# Patient Record
Sex: FEMALE | Race: BLACK OR AFRICAN AMERICAN | NOT HISPANIC OR LATINO | Employment: OTHER | URBAN - METROPOLITAN AREA
[De-identification: names, ages, dates, MRNs, and addresses within clinical notes are randomized per-mention and may not be internally consistent; named-entity substitution may affect disease eponyms.]

---

## 2023-10-09 ENCOUNTER — TELEPHONE (OUTPATIENT)
Dept: ADMISSION | Facility: HOSPITAL | Age: 43
End: 2023-10-09
Payer: COMMERCIAL

## 2023-10-09 DIAGNOSIS — D57.00 SICKLE CELL DISEASE WITH CRISIS (MULTI): Primary | ICD-10-CM

## 2023-10-09 RX ORDER — DEFERASIROX 180 MG/1
3 TABLET, FILM COATED ORAL DAILY
COMMUNITY
Start: 2018-01-16 | End: 2023-10-09 | Stop reason: SDUPTHER

## 2023-10-09 RX ORDER — HYDROMORPHONE HYDROCHLORIDE 4 MG/1
4 TABLET ORAL 3 TIMES DAILY PRN
COMMUNITY
End: 2023-10-09 | Stop reason: SDUPTHER

## 2023-10-09 RX ORDER — NALOXONE HYDROCHLORIDE 4 MG/.1ML
4 SPRAY NASAL AS NEEDED
Qty: 2 EACH | Refills: 2 | Status: SHIPPED | OUTPATIENT
Start: 2023-10-09 | End: 2023-11-16 | Stop reason: WASHOUT

## 2023-10-09 RX ORDER — DEFERASIROX 180 MG/1
540 TABLET, FILM COATED ORAL DAILY
Qty: 90 TABLET | Refills: 1 | Status: SHIPPED | OUTPATIENT
Start: 2023-10-09 | End: 2023-10-17 | Stop reason: SDUPTHER

## 2023-10-09 RX ORDER — METHADONE HYDROCHLORIDE 5 MG/1
5 TABLET ORAL 2 TIMES DAILY
COMMUNITY
Start: 2023-08-29 | End: 2023-10-09 | Stop reason: SDUPTHER

## 2023-10-09 RX ORDER — METHADONE HYDROCHLORIDE 5 MG/1
5 TABLET ORAL 2 TIMES DAILY
Qty: 60 TABLET | Refills: 0 | Status: SHIPPED | OUTPATIENT
Start: 2023-10-09 | End: 2023-11-28 | Stop reason: SDUPTHER

## 2023-10-09 RX ORDER — NALOXONE HYDROCHLORIDE 4 MG/.1ML
4 SPRAY NASAL AS NEEDED
COMMUNITY
Start: 2020-09-08 | End: 2023-10-09 | Stop reason: SDUPTHER

## 2023-10-09 RX ORDER — HYDROMORPHONE HYDROCHLORIDE 4 MG/1
4 TABLET ORAL 3 TIMES DAILY PRN
Qty: 40 TABLET | Refills: 0 | Status: SHIPPED | OUTPATIENT
Start: 2023-10-09 | End: 2023-10-26 | Stop reason: SDUPTHER

## 2023-10-09 NOTE — TELEPHONE ENCOUNTER
Refill request -   Methadone  Hydromorphone 4mg  Jadenu  oxbryta  [facility]

## 2023-10-11 ENCOUNTER — SPECIALTY PHARMACY (OUTPATIENT)
Dept: PHARMACY | Facility: CLINIC | Age: 43
End: 2023-10-11

## 2023-10-11 ENCOUNTER — APPOINTMENT (OUTPATIENT)
Dept: HEMATOLOGY/ONCOLOGY | Facility: HOSPITAL | Age: 43
End: 2023-10-11
Payer: MEDICARE

## 2023-10-11 ENCOUNTER — PHARMACY VISIT (OUTPATIENT)
Dept: PHARMACY | Facility: CLINIC | Age: 43
End: 2023-10-11
Payer: MEDICARE

## 2023-10-14 PROBLEM — I72.9 ANEURYSM (CMS-HCC): Status: ACTIVE | Noted: 2023-10-14

## 2023-10-14 PROBLEM — U09.9 POST-ACUTE COVID-19 SYNDROME: Status: ACTIVE | Noted: 2023-10-14

## 2023-10-14 PROBLEM — R07.89 ATYPICAL CHEST PAIN: Status: ACTIVE | Noted: 2023-10-14

## 2023-10-14 PROBLEM — H40.003 GLAUCOMA SUSPECT OF BOTH EYES: Status: ACTIVE | Noted: 2023-10-14

## 2023-10-14 PROBLEM — D68.1: Status: ACTIVE | Noted: 2023-10-14

## 2023-10-14 PROBLEM — M43.6 TORTICOLLIS: Status: ACTIVE | Noted: 2023-10-14

## 2023-10-14 PROBLEM — R06.02 SHORTNESS OF BREATH ON EXERTION: Status: ACTIVE | Noted: 2023-10-14

## 2023-10-14 PROBLEM — H52.13 MYOPIA OF BOTH EYES WITH ASTIGMATISM: Status: ACTIVE | Noted: 2023-10-14

## 2023-10-14 PROBLEM — N95.8: Status: ACTIVE | Noted: 2023-10-14

## 2023-10-14 PROBLEM — K21.9 GERD (GASTROESOPHAGEAL REFLUX DISEASE): Status: ACTIVE | Noted: 2023-10-14

## 2023-10-14 PROBLEM — M87.00 AVN (AVASCULAR NECROSIS OF BONE) (MULTI): Status: ACTIVE | Noted: 2023-10-14

## 2023-10-14 PROBLEM — H52.203 MYOPIA OF BOTH EYES WITH ASTIGMATISM: Status: ACTIVE | Noted: 2023-10-14

## 2023-10-14 PROBLEM — D64.9 ANEMIA: Status: ACTIVE | Noted: 2023-10-14

## 2023-10-14 PROBLEM — N93.9 ABNORMAL UTERINE BLEEDING: Status: ACTIVE | Noted: 2023-10-14

## 2023-10-14 PROBLEM — N63.23 MASS OF LOWER OUTER QUADRANT OF LEFT BREAST: Status: ACTIVE | Noted: 2023-10-14

## 2023-10-14 PROBLEM — N94.89 ADNEXAL MASS: Status: ACTIVE | Noted: 2023-10-14

## 2023-10-14 PROBLEM — D57.00 SICKLE-CELL CRISIS (MULTI): Status: ACTIVE | Noted: 2023-10-14

## 2023-10-14 PROBLEM — E04.1 THYROID NODULE: Status: ACTIVE | Noted: 2023-10-14

## 2023-10-14 PROBLEM — G43.009 MIGRAINE WITHOUT AURA AND WITHOUT STATUS MIGRAINOSUS, NOT INTRACTABLE: Status: ACTIVE | Noted: 2023-10-14

## 2023-10-14 PROBLEM — K02.63 DENTAL CARIES ON SMOOTH SURFACE PENETRATING INTO PULP: Status: ACTIVE | Noted: 2023-10-14

## 2023-10-14 PROBLEM — R19.00 PELVIC MASS IN FEMALE: Status: ACTIVE | Noted: 2023-10-14

## 2023-10-14 PROBLEM — G47.30 SLEEP APNEA: Status: ACTIVE | Noted: 2023-10-14

## 2023-10-14 PROBLEM — H52.7 REFRACTIVE ERROR: Status: ACTIVE | Noted: 2023-10-14

## 2023-10-14 PROBLEM — E04.2 MULTINODULAR GOITER (NONTOXIC): Status: ACTIVE | Noted: 2023-10-14

## 2023-10-14 PROBLEM — G47.34 NOCTURNAL HYPOXIA: Status: ACTIVE | Noted: 2023-10-14

## 2023-10-14 PROBLEM — N94.89: Status: ACTIVE | Noted: 2023-10-14

## 2023-10-14 PROBLEM — R30.0 DYSURIA: Status: ACTIVE | Noted: 2023-10-14

## 2023-10-14 PROBLEM — F32.9 MDD (MAJOR DEPRESSIVE DISORDER): Status: ACTIVE | Noted: 2023-10-14

## 2023-10-14 PROBLEM — F41.9 ANXIETY: Status: ACTIVE | Noted: 2023-10-14

## 2023-10-14 PROBLEM — D57.1: Status: ACTIVE | Noted: 2023-10-14

## 2023-10-14 PROBLEM — R93.1 ABNORMAL ECHOCARDIOGRAM: Status: ACTIVE | Noted: 2023-10-14

## 2023-10-14 PROBLEM — D57.1 HOMOZYGOUS SICKLE CELL (SS) DISEASE (MULTI): Status: ACTIVE | Noted: 2023-10-14

## 2023-10-14 PROBLEM — D57.00 HEMOGLOBIN SS DISEASE WITH CRISIS (MULTI): Status: ACTIVE | Noted: 2023-10-14

## 2023-10-14 PROBLEM — Z91.148 NONCOMPLIANCE WITH MEDICATION REGIMEN: Status: ACTIVE | Noted: 2023-10-14

## 2023-10-14 RX ORDER — VERAPAMIL HYDROCHLORIDE 180 MG/1
180 TABLET, FILM COATED, EXTENDED RELEASE ORAL 2 TIMES DAILY
COMMUNITY
End: 2023-11-16 | Stop reason: WASHOUT

## 2023-10-14 RX ORDER — ACETAMINOPHEN 500 MG
650 TABLET ORAL EVERY 6 HOURS PRN
COMMUNITY
Start: 2020-05-14 | End: 2023-11-16 | Stop reason: WASHOUT

## 2023-10-14 RX ORDER — HYDROXYZINE HYDROCHLORIDE 25 MG/1
25 TABLET, FILM COATED ORAL 2 TIMES DAILY PRN
COMMUNITY
End: 2023-11-16 | Stop reason: WASHOUT

## 2023-10-14 RX ORDER — GABAPENTIN 100 MG/1
300 CAPSULE ORAL 3 TIMES DAILY
COMMUNITY
End: 2023-11-16 | Stop reason: WASHOUT

## 2023-10-14 RX ORDER — MELOXICAM 15 MG/1
15 TABLET ORAL DAILY
COMMUNITY
Start: 2023-09-17 | End: 2024-04-22 | Stop reason: SDUPTHER

## 2023-10-14 RX ORDER — PROCHLORPERAZINE MALEATE 10 MG
10 TABLET ORAL EVERY 4 HOURS PRN
COMMUNITY
Start: 2023-09-22 | End: 2023-11-16 | Stop reason: WASHOUT

## 2023-10-14 RX ORDER — HYDROXYUREA 500 MG/1
1000 CAPSULE ORAL EVERY OTHER DAY
COMMUNITY
End: 2023-11-16 | Stop reason: WASHOUT

## 2023-10-14 RX ORDER — TIZANIDINE 2 MG/1
2-4 TABLET ORAL EVERY 8 HOURS PRN
COMMUNITY
Start: 2023-08-14 | End: 2023-11-20 | Stop reason: SDUPTHER

## 2023-10-14 RX ORDER — DOCUSATE SODIUM 100 MG/1
100 CAPSULE, LIQUID FILLED ORAL 2 TIMES DAILY PRN
COMMUNITY
Start: 2016-08-17 | End: 2023-11-16 | Stop reason: WASHOUT

## 2023-10-14 RX ORDER — CYCLOBENZAPRINE HCL 10 MG
10 TABLET ORAL 3 TIMES DAILY PRN
COMMUNITY
End: 2023-11-16 | Stop reason: WASHOUT

## 2023-10-14 RX ORDER — SERTRALINE HYDROCHLORIDE 50 MG/1
75 TABLET, FILM COATED ORAL DAILY
COMMUNITY
End: 2023-12-14 | Stop reason: SDUPTHER

## 2023-10-14 RX ORDER — DICLOFENAC SODIUM 10 MG/G
4 GEL TOPICAL 4 TIMES DAILY PRN
COMMUNITY
Start: 2023-09-22 | End: 2023-10-17 | Stop reason: SDUPTHER

## 2023-10-14 RX ORDER — CHOLECALCIFEROL (VITAMIN D3) 25 MCG
25 TABLET ORAL DAILY
COMMUNITY
Start: 2023-01-12 | End: 2024-04-22 | Stop reason: SDUPTHER

## 2023-10-14 RX ORDER — FOLIC ACID 1 MG/1
1 TABLET ORAL DAILY
COMMUNITY
Start: 2015-05-08 | End: 2023-11-16 | Stop reason: WASHOUT

## 2023-10-14 RX ORDER — OMEPRAZOLE 40 MG/1
40 CAPSULE, DELAYED RELEASE ORAL
COMMUNITY
Start: 2021-06-17 | End: 2023-11-16 | Stop reason: WASHOUT

## 2023-10-14 RX ORDER — CALCIUM CARBONATE 200(500)MG
TABLET,CHEWABLE ORAL
COMMUNITY
Start: 2017-04-12

## 2023-10-14 RX ORDER — TRAZODONE HYDROCHLORIDE 100 MG/1
100-200 TABLET ORAL NIGHTLY PRN
COMMUNITY
End: 2023-11-16 | Stop reason: SDUPTHER

## 2023-10-14 RX ORDER — MULTIVIT-MIN/IRON FUM/FOLIC AC 7.5 MG-4
1 TABLET ORAL DAILY
COMMUNITY
End: 2023-11-16 | Stop reason: WASHOUT

## 2023-10-14 RX ORDER — CAPSAICIN 0.03 G/100G
CREAM TOPICAL
COMMUNITY
Start: 2018-01-25

## 2023-10-14 RX ORDER — LORATADINE 10 MG/1
1 TABLET ORAL DAILY
COMMUNITY
Start: 2017-03-06

## 2023-10-14 RX ORDER — TALC
6 POWDER (GRAM) TOPICAL NIGHTLY PRN
COMMUNITY
Start: 2015-09-07

## 2023-10-14 RX ORDER — SENNOSIDES 8.6 MG
1 TABLET ORAL 2 TIMES DAILY PRN
COMMUNITY
End: 2023-12-18 | Stop reason: SDUPTHER

## 2023-10-14 RX ORDER — IBUPROFEN 800 MG/1
800 TABLET ORAL EVERY 8 HOURS PRN
COMMUNITY
Start: 2023-03-25 | End: 2023-12-18 | Stop reason: SDUPTHER

## 2023-10-14 RX ORDER — UBROGEPANT 50 MG/1
1 TABLET ORAL DAILY PRN
COMMUNITY
Start: 2020-08-13 | End: 2023-11-16 | Stop reason: WASHOUT

## 2023-10-14 RX ORDER — MULTIVIT-MIN/IRON/FOLIC ACID/K 18-600-40
1 CAPSULE ORAL DAILY
COMMUNITY
Start: 2019-12-31 | End: 2023-11-16 | Stop reason: WASHOUT

## 2023-10-14 RX ORDER — NORETHINDRONE 5 MG/1
5 TABLET ORAL DAILY
COMMUNITY
End: 2023-11-26 | Stop reason: SDUPTHER

## 2023-10-14 RX ORDER — LIDOCAINE HCL 4 G/100G
CREAM TOPICAL
COMMUNITY
Start: 2018-01-25

## 2023-10-16 ENCOUNTER — APPOINTMENT (OUTPATIENT)
Dept: HEMATOLOGY/ONCOLOGY | Facility: HOSPITAL | Age: 43
End: 2023-10-16
Payer: COMMERCIAL

## 2023-10-17 ENCOUNTER — DOCUMENTATION (OUTPATIENT)
Dept: HEMATOLOGY/ONCOLOGY | Facility: HOSPITAL | Age: 43
End: 2023-10-17

## 2023-10-17 ENCOUNTER — TELEMEDICINE (OUTPATIENT)
Dept: HEMATOLOGY/ONCOLOGY | Facility: HOSPITAL | Age: 43
End: 2023-10-17
Payer: MEDICARE

## 2023-10-17 DIAGNOSIS — D57.00 SICKLE CELL DISEASE WITH CRISIS (MULTI): ICD-10-CM

## 2023-10-17 DIAGNOSIS — R51.9 HEADACHE DISORDER: Primary | ICD-10-CM

## 2023-10-17 PROCEDURE — 99213 OFFICE O/P EST LOW 20 MIN: CPT | Mod: 95 | Performed by: INTERNAL MEDICINE

## 2023-10-17 PROCEDURE — 99213 OFFICE O/P EST LOW 20 MIN: CPT | Performed by: INTERNAL MEDICINE

## 2023-10-17 RX ORDER — DICLOFENAC SODIUM 10 MG/G
4 GEL TOPICAL 4 TIMES DAILY PRN
Qty: 1000 G | Refills: 3 | Status: SHIPPED | OUTPATIENT
Start: 2023-10-17 | End: 2023-10-26 | Stop reason: SDUPTHER

## 2023-10-17 RX ORDER — DEFERASIROX 180 MG/1
540 TABLET, FILM COATED ORAL DAILY
Qty: 90 TABLET | Refills: 3 | Status: SHIPPED | OUTPATIENT
Start: 2023-10-17 | End: 2023-11-16 | Stop reason: WASHOUT

## 2023-10-17 NOTE — PROGRESS NOTES
"Consent:  Verbal consent was requested and obtained from patient on this date for a telehealth visit.    Patient ID: Mary Alice Aragon is a 43 y.o. female.    Subjective    HPI  Mary Alice switched to a virtual visit today, d/t not feeling well today. She is \"spasming\"more often and feels like she is constipated. Had ketamine on the 1st at [facility], #5-6. Gets 30mg over 30 minutes, feels like she had better results when received monthly at . Has not been able to work lately d/t the spasms and pain. The spasms feel different then her sickle cell pain. Had an appt with neurology set up but has not heard back. Placed new referral today. Needs refill on voltaren gel, jadenu, and oxybryta. Ran out of oxybryta this past week, discussed rebound crisis. Some mild chest pain, varies between sickle cell pain and spasms. All other ROS negative.     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 Right frontal encephalomalacia secondary to sequela of right middle cerebral artery infarction.  History of lead poisoning with hydrocephalus status post right ventriculostomy shunt catheter, and subsequent removal.   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 She has been evaluated by Neurosurgery and is stable with no required intervention.  Low-grade squamous epithelial lesion on Pap smear in May 2011.  Pulmonary embolism in 2009 in the postpartum phase, anticoagulated for 6 months.  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   Status post cholecystectomy.   Pneumonia in 2006.   Chronic pain: Currently managed with monthly ketamine infusions with good response.   Nocturnal oxygen dependence    Objective    BSA: There is no height or weight on file to calculate BSA.  There were no vitals taken for this visit.     Physical Exam  No exam d/t virtual " visit    Performance Status:      Assessment/Plan    Oncology History    No history exists.     HbSS disease: Continue folic acid and hydroxyurea as prescribed.  Hgb 6.9, T, ari 2.6, retics .255, HbF 16.3%, . We discussed voxelotor today to help raise hgb, reduce sickling and reduce lysis labs.  Discussed MOA and side effects.  Mary Alice would like to start medication, will send enrollment to [facility]. Started oxybryta but didn't do well with nausea. She will start will taking just one pill with a meal for a month and then go to two.       Reticulocytosis: Noted, discussed wearing O2 nightly to reduce sickling.  Mary Alice had a sleep study completed which did not indicate any sleep-disordered breathing or need for CPAP titration.      Right ingrown toenail:  Referred to podiatry for evaluation, missed previous appointment, referred  Continue epsom salt soaks.    Chronic pain: Patient continues with ketamine infusions, dates have been fragmented due to staffing shortages and infusion location changes ([facility]), which have impacted scheduling.  Appointment in June 2022 was cancelled, Mary Alice will explore outside providers for ketamine infusion, she has scheduled an appointment at [facility]. Methadone 5mg BID restarted.  Has ketamine at [facility] scheduled monthly- had 3 so far.  Taking gabapentin TID- changed pills to 300mg capsule.     Recent fall:  Refer to orthopedics for evaluation of right arm pain, left knee pain, appointment upcoming.    Iron overload: Mary Alice approved for desferal infusion, ferritin today 2016.  Mary Alice will need Cardiac/Liver MRI, sent new rx and new PA to  spec- pt with high co-pay of $49 per dose of drug but will cover nursing care and supplies. Sent refill of Jadenu     Left breast lump:  Mammogram 8/2022 revealed a fibroadenoma with recommendations for repeat imaging 2/2023, biopsy 10/21/22 benign.Has repeat mammogram scheduled.      Psych: Follows with Dr. May, appointment  upcoming.  Grieving loss of best friend, father and father of her children in the last 2 years.  Referred to psych, provided resources for the children, which were sent to Mary Alice via email.  Mary Alice states that she is still sad at times, but feeling a little better.  Will continue to monitor. missed fuv, will reschedule     Vascular access:  Mary Alice is completing dental care; once dental care is up to date (cleaning and extractions/fillings completed), will refer to IR for consultation for vascular access port. would like to be reassessed for ports- referred to IR   will discuss at RTC.     Health Maintenance:   Dental:  follows with [facility], upcoming appointment has follow up coming up.    Optho: 7/2020    PCP: sees Dr. Mcdaniel, last visit 5/2021, will need follow up, Mary Alice will call to schedule.     ECHO: 2020- cardiology in 2022.    MRI: 2019   Immunizations: influenza 9/9/2021, Prevnar 11/19/2020, PPSV23 1/28/21.  Pfizer covid-19 vaccine series complete, eligible for booster if not yet completed.      Headaches/Neuropathic pain: Patient recently seen in neurology, prescribed verapamil for daily treatment, prescribed ubrelvy for abortive therapy.  Last visit 12/15/21, referred to Dr. Ray for follow up. Current pt with neuro- has fuv next week.      Abdominal pain:  Seen May 2022 in Dr. Bolanos's office, suspected endometriosis, recommended IUD contraception for long-term management; transvaginal u/s ordered for surveillance of ovarian cyst, referred to pelvic floor therapy.  Currently prescribed norethindrone by GYN.    Benign follicular cyst, patient states voice changes:  referred to ENT, seen 6/2021, omeprazole started, plan to follow up with possible scope if symptoms persist.  Mary Alice states that symptoms have returned, referred to ENT. re-referred for dysphagia- will discuss at RTC.     Neuropathy:  Socratesdonis states that this has persisted, referred.    Vitamin D deficiency:  Previously noted,  cholecalciferol 2000IU, check at RTC.    Psychosocial:  Program SW to assist with resources for Mary Alice to contact her property management company regarding restoration of electricity and gas to all parts of her home.SW to contact for support.     Integumentary:  Previously referred to dermatology for evaluation of scalp scabs and abscess at previous appointment. Pilonidal cyst- has history and current is not inflamed but painful. Stat referral for dermatology.- upcoming appt.       Diagnoses and all orders for this visit:  Headache disorder  -     Referral to Neurology; Future         MICHELLE Mercado-CNP

## 2023-10-18 ENCOUNTER — PHARMACY VISIT (OUTPATIENT)
Dept: PHARMACY | Facility: CLINIC | Age: 43
End: 2023-10-18
Payer: MEDICARE

## 2023-10-18 ENCOUNTER — SPECIALTY PHARMACY (OUTPATIENT)
Dept: PHARMACY | Facility: CLINIC | Age: 43
End: 2023-10-18

## 2023-10-18 PROCEDURE — RXMED WILLOW AMBULATORY MEDICATION CHARGE

## 2023-10-23 ENCOUNTER — PHARMACY VISIT (OUTPATIENT)
Dept: PHARMACY | Facility: CLINIC | Age: 43
End: 2023-10-23
Payer: MEDICARE

## 2023-10-23 ENCOUNTER — SPECIALTY PHARMACY (OUTPATIENT)
Dept: PHARMACY | Facility: CLINIC | Age: 43
End: 2023-10-23

## 2023-10-23 PROCEDURE — RXMED WILLOW AMBULATORY MEDICATION CHARGE

## 2023-10-26 ENCOUNTER — TELEPHONE (OUTPATIENT)
Dept: ADMISSION | Facility: HOSPITAL | Age: 43
End: 2023-10-26
Payer: COMMERCIAL

## 2023-10-26 DIAGNOSIS — D57.00 SICKLE CELL DISEASE WITH CRISIS (MULTI): ICD-10-CM

## 2023-10-26 RX ORDER — DICLOFENAC SODIUM 10 MG/G
4 GEL TOPICAL 4 TIMES DAILY PRN
Qty: 1000 G | Refills: 3 | Status: SHIPPED | OUTPATIENT
Start: 2023-10-26 | End: 2023-11-16 | Stop reason: WASHOUT

## 2023-10-26 RX ORDER — HYDROMORPHONE HYDROCHLORIDE 4 MG/1
4 TABLET ORAL 3 TIMES DAILY PRN
Qty: 40 TABLET | Refills: 0 | Status: SHIPPED | OUTPATIENT
Start: 2023-10-26 | End: 2023-11-09 | Stop reason: SDUPTHER

## 2023-10-26 NOTE — TELEPHONE ENCOUNTER
Pt requesting a refill of dilaudid 4mg TID prn, #40.  She uses [facility] on [facility] in [place].

## 2023-10-27 ENCOUNTER — TELEPHONE (OUTPATIENT)
Dept: HEMATOLOGY/ONCOLOGY | Facility: HOSPITAL | Age: 43
End: 2023-10-27
Payer: COMMERCIAL

## 2023-10-27 NOTE — TELEPHONE ENCOUNTER
Patient states pharmacy where he meds go dont have a pharmacist on duty would like her meds to be sent somewhere else.

## 2023-10-27 NOTE — TELEPHONE ENCOUNTER
Called and spoke with Mary Alice regarding her medications. She confirmed she did receive them, but would like a different [facility] added to her chart for future medications. Pharmacy added.

## 2023-11-09 ENCOUNTER — TELEPHONE (OUTPATIENT)
Dept: HEMATOLOGY/ONCOLOGY | Facility: HOSPITAL | Age: 43
End: 2023-11-09
Payer: COMMERCIAL

## 2023-11-09 DIAGNOSIS — D57.00 SICKLE CELL DISEASE WITH CRISIS (MULTI): ICD-10-CM

## 2023-11-09 RX ORDER — HYDROMORPHONE HYDROCHLORIDE 4 MG/1
4 TABLET ORAL 3 TIMES DAILY PRN
Qty: 40 TABLET | Refills: 0 | Status: SHIPPED | OUTPATIENT
Start: 2023-11-09 | End: 2023-11-28 | Stop reason: SDUPTHER

## 2023-11-10 PROBLEM — G89.29 OTHER CHRONIC PAIN: Status: ACTIVE | Noted: 2022-11-11

## 2023-11-10 PROBLEM — Z86.16 PERSONAL HISTORY OF COVID-19: Status: ACTIVE | Noted: 2022-11-11

## 2023-11-10 PROBLEM — D57.1 SICKLE CELL DISEASE WITHOUT CRISIS (MULTI): Status: ACTIVE | Noted: 2022-11-11

## 2023-11-10 PROBLEM — Z86.711 PERSONAL HISTORY OF PULMONARY EMBOLISM: Status: ACTIVE | Noted: 2022-11-11

## 2023-11-10 PROBLEM — I67.1 CEREBRAL ANEURYSM (HHS-HCC): Status: ACTIVE | Noted: 2022-11-11

## 2023-11-10 PROBLEM — F32.9 MAJOR DEPRESSIVE DISORDER, SINGLE EPISODE: Status: ACTIVE | Noted: 2022-11-11

## 2023-11-10 PROBLEM — G91.9 HYDROCEPHALUS (MULTI): Status: ACTIVE | Noted: 2022-11-11

## 2023-11-10 PROBLEM — F41.9 ANXIETY DISORDER, UNSPECIFIED: Status: ACTIVE | Noted: 2022-11-11

## 2023-11-10 PROBLEM — E55.9 VITAMIN D DEFICIENCY, UNSPECIFIED: Status: ACTIVE | Noted: 2022-11-11

## 2023-11-10 PROBLEM — F43.21 ADJUSTMENT DISORDER WITH DEPRESSED MOOD: Status: ACTIVE | Noted: 2022-11-11

## 2023-11-10 PROBLEM — G62.9 POLYNEUROPATHY: Status: ACTIVE | Noted: 2022-11-11

## 2023-11-10 PROBLEM — Z87.01 PERSONAL HISTORY OF PNEUMONIA (RECURRENT): Status: ACTIVE | Noted: 2022-11-11

## 2023-11-10 PROBLEM — L72.9 FOLLICULAR CYST OF THE SKIN AND SUBCUTANEOUS TISSUE, UNSPECIFIED: Status: ACTIVE | Noted: 2022-11-11

## 2023-11-10 PROBLEM — E83.111 HEMOCHROMATOSIS AFTER MULTIPLE RED BLOOD CELL TRANSFUSIONS: Status: ACTIVE | Noted: 2022-11-11

## 2023-11-10 PROBLEM — D64.9 ANEMIA, UNSPECIFIED: Status: ACTIVE | Noted: 2022-11-11

## 2023-11-10 PROBLEM — I69.398 OTHER SEQUELAE OF CEREBRAL INFARCTION: Status: ACTIVE | Noted: 2022-11-11

## 2023-11-10 RX ORDER — TROLAMINE SALICYLATE 10 G/100G
CREAM TOPICAL
COMMUNITY
Start: 2022-11-11

## 2023-11-10 RX ORDER — FOLIC ACID 1 MG/1
TABLET ORAL
COMMUNITY
Start: 2022-11-11

## 2023-11-10 RX ORDER — CYCLOBENZAPRINE HCL 10 MG
TABLET ORAL
COMMUNITY
Start: 2022-11-11 | End: 2024-05-15 | Stop reason: WASHOUT

## 2023-11-10 RX ORDER — PROCHLORPERAZINE MALEATE 10 MG
TABLET ORAL
COMMUNITY
Start: 2022-11-11 | End: 2024-05-21 | Stop reason: SDUPTHER

## 2023-11-10 RX ORDER — DOCUSATE SODIUM 100 MG/1
CAPSULE, LIQUID FILLED ORAL
COMMUNITY
Start: 2022-11-11

## 2023-11-10 RX ORDER — TRAZODONE HYDROCHLORIDE 50 MG/1
TABLET ORAL
COMMUNITY
Start: 2022-11-11 | End: 2023-11-16 | Stop reason: WASHOUT

## 2023-11-10 RX ORDER — NALOXONE HYDROCHLORIDE 4 MG/.1ML
SPRAY NASAL
COMMUNITY
Start: 2022-11-11

## 2023-11-10 RX ORDER — OMEPRAZOLE 40 MG/1
CAPSULE, DELAYED RELEASE ORAL
COMMUNITY
Start: 2022-11-11

## 2023-11-10 RX ORDER — HYDROXYZINE HYDROCHLORIDE 25 MG/1
TABLET, FILM COATED ORAL
COMMUNITY
Start: 2022-11-11

## 2023-11-10 RX ORDER — SERTRALINE HYDROCHLORIDE 50 MG/1
TABLET, FILM COATED ORAL
COMMUNITY
Start: 2022-11-11 | End: 2023-11-16 | Stop reason: WASHOUT

## 2023-11-10 RX ORDER — ASCORBIC ACID 500 MG
TABLET ORAL
COMMUNITY
Start: 2022-11-11

## 2023-11-10 RX ORDER — VERAPAMIL HYDROCHLORIDE 180 MG/1
TABLET, FILM COATED, EXTENDED RELEASE ORAL
COMMUNITY
Start: 2022-11-11 | End: 2024-04-22 | Stop reason: WASHOUT

## 2023-11-10 RX ORDER — DICLOFENAC SODIUM 10 MG/G
GEL TOPICAL
COMMUNITY
Start: 2022-11-11 | End: 2023-12-18 | Stop reason: SDUPTHER

## 2023-11-10 RX ORDER — GABAPENTIN 100 MG/1
CAPSULE ORAL
COMMUNITY
Start: 2022-11-11 | End: 2023-12-18 | Stop reason: SDUPTHER

## 2023-11-10 RX ORDER — DEFEROXAMINE MESYLATE 2 G/1
INJECTION, POWDER, LYOPHILIZED, FOR SOLUTION INTRAMUSCULAR; INTRAVENOUS; SUBCUTANEOUS
COMMUNITY
Start: 2022-11-11 | End: 2024-04-30 | Stop reason: SDUPTHER

## 2023-11-10 RX ORDER — UBROGEPANT 50 MG/1
TABLET ORAL
COMMUNITY
Start: 2022-11-11 | End: 2024-04-22 | Stop reason: WASHOUT

## 2023-11-10 RX ORDER — METHADONE HYDROCHLORIDE 10 MG/1
TABLET ORAL
COMMUNITY
Start: 2022-11-11 | End: 2023-11-28 | Stop reason: SDUPTHER

## 2023-11-10 RX ORDER — ACETAMINOPHEN 500 MG
TABLET ORAL
COMMUNITY
Start: 2022-11-11

## 2023-11-10 RX ORDER — HYDROXYUREA 500 MG/1
CAPSULE ORAL
COMMUNITY
Start: 2022-11-11 | End: 2023-12-18 | Stop reason: SDUPTHER

## 2023-11-10 RX ORDER — HYDROMORPHONE HYDROCHLORIDE 4 MG/1
TABLET ORAL
COMMUNITY
Start: 2022-11-11 | End: 2024-05-09 | Stop reason: WASHOUT

## 2023-11-10 RX ORDER — DIPHENHYDRAMINE HCL 25 MG
CAPSULE ORAL
COMMUNITY
Start: 2022-11-11

## 2023-11-10 RX ORDER — MULTIVITAMIN
TABLET ORAL
COMMUNITY
Start: 2022-11-11

## 2023-11-15 ENCOUNTER — NURSE TRIAGE (OUTPATIENT)
Dept: ADMISSION | Facility: HOSPITAL | Age: 43
End: 2023-11-15
Payer: COMMERCIAL

## 2023-11-15 ENCOUNTER — HOSPITAL ENCOUNTER (EMERGENCY)
Facility: HOSPITAL | Age: 43
Discharge: STILL ADMITTED OR ADMINISTRATIVE | End: 2023-11-16
Payer: MEDICARE

## 2023-11-15 PROCEDURE — 4500999001 HC ED NO CHARGE

## 2023-11-15 NOTE — TELEPHONE ENCOUNTER
Mary Alice called the office with c/o pain. She has been taking Dilaudid 4mg q8 and using Voltaren gel but this not helping. Also takes Methadone BID, Tizanidine and Gabapentin. Received Ketamine last Thursday and pain started on Friday. She states she was basically bed-bound over the weekend because the pain was so bad. Rated pain 10/10 over the weekend. Pain was a 7/10 yesterday and today it is a 4/10 which patient states is still high for her.   She feels like the Ketamine did not help her as much as it has helped her in the past.     No shortness of breath or fever. Noted chest pain two days ago (her son was sick so she thought she may be getting sick), however, this went away and she has not had any chest pain since.   States pain is in her upper back and ribcage though.     She expressed to me that she knows she probably needs to be admitted at this time because she feels terrible and has not been able to leave her bed other than to take her kids to/from school.  She will report to the ED to be evaluated.     She currently is not scheduled for a FUV until 12/18 so she is asking if she can get a sooner FUV as well.         Additional Information   Commented on: Where is the pain? Is there more than one place where you're having pain?     Legs, back and arms. Right arm worse than left. Pain starts up from her heels and shoots up the rest of her body.    Protocols used: Pain

## 2023-11-16 ENCOUNTER — TELEMEDICINE (OUTPATIENT)
Dept: BEHAVIORAL HEALTH | Facility: HOSPITAL | Age: 43
End: 2023-11-16
Payer: MEDICARE

## 2023-11-16 DIAGNOSIS — F33.0 MILD EPISODE OF RECURRENT MAJOR DEPRESSIVE DISORDER (CMS-HCC): ICD-10-CM

## 2023-11-16 DIAGNOSIS — F41.9 ANXIETY: ICD-10-CM

## 2023-11-16 PROBLEM — F32.9 MAJOR DEPRESSIVE DISORDER, SINGLE EPISODE: Status: RESOLVED | Noted: 2022-11-11 | Resolved: 2023-11-16

## 2023-11-16 PROBLEM — F43.21 ADJUSTMENT DISORDER WITH DEPRESSED MOOD: Status: RESOLVED | Noted: 2022-11-11 | Resolved: 2023-11-16

## 2023-11-16 PROCEDURE — 90833 PSYTX W PT W E/M 30 MIN: CPT | Performed by: PSYCHIATRY & NEUROLOGY

## 2023-11-16 PROCEDURE — 99214 OFFICE O/P EST MOD 30 MIN: CPT | Performed by: PSYCHIATRY & NEUROLOGY

## 2023-11-16 RX ORDER — TRAZODONE HYDROCHLORIDE 100 MG/1
100-200 TABLET ORAL NIGHTLY PRN
Qty: 60 TABLET | Refills: 2 | Status: SHIPPED | OUTPATIENT
Start: 2023-11-16 | End: 2024-02-05
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
## 2023-11-17 ENCOUNTER — HOSPITAL ENCOUNTER (EMERGENCY)
Facility: HOSPITAL | Age: 43
Discharge: HOME | End: 2023-11-18
Attending: EMERGENCY MEDICINE
Payer: MEDICARE

## 2023-11-17 ENCOUNTER — APPOINTMENT (OUTPATIENT)
Dept: RADIOLOGY | Facility: HOSPITAL | Age: 43
End: 2023-11-17
Payer: MEDICARE

## 2023-11-17 DIAGNOSIS — D57.00 SICKLE CELL CRISIS (MULTI): Primary | ICD-10-CM

## 2023-11-17 LAB
ALBUMIN SERPL BCP-MCNC: 5.2 G/DL (ref 3.4–5)
ALP SERPL-CCNC: 71 U/L (ref 33–110)
ALT SERPL W P-5'-P-CCNC: 12 U/L (ref 7–45)
ANION GAP SERPL CALC-SCNC: 10 MMOL/L (ref 10–20)
AST SERPL W P-5'-P-CCNC: 20 U/L (ref 9–39)
BASOPHILS # BLD AUTO: 0.13 X10*3/UL (ref 0–0.1)
BASOPHILS NFR BLD AUTO: 0.8 %
BILIRUB SERPL-MCNC: 2.9 MG/DL (ref 0–1.2)
BUN SERPL-MCNC: 10 MG/DL (ref 6–23)
CALCIUM SERPL-MCNC: 9.7 MG/DL (ref 8.6–10.6)
CHLORIDE SERPL-SCNC: 105 MMOL/L (ref 98–107)
CO2 SERPL-SCNC: 28 MMOL/L (ref 21–32)
CREAT SERPL-MCNC: 1.08 MG/DL (ref 0.5–1.05)
EOSINOPHIL # BLD AUTO: 0.16 X10*3/UL (ref 0–0.7)
EOSINOPHIL NFR BLD AUTO: 1 %
ERYTHROCYTE [DISTWIDTH] IN BLOOD BY AUTOMATED COUNT: 16 % (ref 11.5–14.5)
GFR SERPL CREATININE-BSD FRML MDRD: 66 ML/MIN/1.73M*2
GLUCOSE SERPL-MCNC: 97 MG/DL (ref 74–99)
HCT VFR BLD AUTO: 21.9 % (ref 36–46)
HGB BLD-MCNC: 7.5 G/DL (ref 12–16)
HGB RETIC QN: 35 PG (ref 28–38)
IMM GRANULOCYTES # BLD AUTO: 0.06 X10*3/UL (ref 0–0.7)
IMM GRANULOCYTES NFR BLD AUTO: 0.4 % (ref 0–0.9)
IMMATURE RETIC FRACTION: 27.8 %
LDH SERPL L TO P-CCNC: 286 U/L (ref 84–246)
LYMPHOCYTES # BLD AUTO: 7.31 X10*3/UL (ref 1.2–4.8)
LYMPHOCYTES NFR BLD AUTO: 45.3 %
MCH RBC QN AUTO: 31.4 PG (ref 26–34)
MCHC RBC AUTO-ENTMCNC: 34.2 G/DL (ref 32–36)
MCV RBC AUTO: 92 FL (ref 80–100)
MONOCYTES # BLD AUTO: 0.79 X10*3/UL (ref 0.1–1)
MONOCYTES NFR BLD AUTO: 4.9 %
NEUTROPHILS # BLD AUTO: 7.67 X10*3/UL (ref 1.2–7.7)
NEUTROPHILS NFR BLD AUTO: 47.6 %
NRBC BLD-RTO: 0.2 /100 WBCS (ref 0–0)
PLATELET # BLD AUTO: 487 X10*3/UL (ref 150–450)
POTASSIUM SERPL-SCNC: 3.6 MMOL/L (ref 3.5–5.3)
PROT SERPL-MCNC: 9 G/DL (ref 6.4–8.2)
RBC # BLD AUTO: 2.39 X10*6/UL (ref 4–5.2)
RBC MORPH BLD: NORMAL
RETICS #: 0.19 X10*6/UL (ref 0.02–0.08)
RETICS/RBC NFR AUTO: 7.9 % (ref 0.5–2)
SODIUM SERPL-SCNC: 139 MMOL/L (ref 136–145)
TARGETS BLD QL SMEAR: NORMAL
WBC # BLD AUTO: 16.1 X10*3/UL (ref 4.4–11.3)

## 2023-11-17 PROCEDURE — 85045 AUTOMATED RETICULOCYTE COUNT: CPT | Performed by: EMERGENCY MEDICINE

## 2023-11-17 PROCEDURE — 2500000004 HC RX 250 GENERAL PHARMACY W/ HCPCS (ALT 636 FOR OP/ED): Mod: SE

## 2023-11-17 PROCEDURE — 2500000001 HC RX 250 WO HCPCS SELF ADMINISTERED DRUGS (ALT 637 FOR MEDICARE OP): Mod: SE | Performed by: EMERGENCY MEDICINE

## 2023-11-17 PROCEDURE — 96376 TX/PRO/DX INJ SAME DRUG ADON: CPT

## 2023-11-17 PROCEDURE — 99285 EMERGENCY DEPT VISIT HI MDM: CPT | Mod: 25 | Performed by: EMERGENCY MEDICINE

## 2023-11-17 PROCEDURE — 85025 COMPLETE CBC W/AUTO DIFF WBC: CPT | Performed by: EMERGENCY MEDICINE

## 2023-11-17 PROCEDURE — 71046 X-RAY EXAM CHEST 2 VIEWS: CPT | Performed by: RADIOLOGY

## 2023-11-17 PROCEDURE — 96361 HYDRATE IV INFUSION ADD-ON: CPT

## 2023-11-17 PROCEDURE — 2500000005 HC RX 250 GENERAL PHARMACY W/O HCPCS: Mod: SE

## 2023-11-17 PROCEDURE — 96374 THER/PROPH/DIAG INJ IV PUSH: CPT

## 2023-11-17 PROCEDURE — 80053 COMPREHEN METABOLIC PANEL: CPT | Performed by: EMERGENCY MEDICINE

## 2023-11-17 PROCEDURE — 99285 EMERGENCY DEPT VISIT HI MDM: CPT | Performed by: EMERGENCY MEDICINE

## 2023-11-17 PROCEDURE — 36415 COLL VENOUS BLD VENIPUNCTURE: CPT | Performed by: EMERGENCY MEDICINE

## 2023-11-17 PROCEDURE — 83615 LACTATE (LD) (LDH) ENZYME: CPT | Performed by: EMERGENCY MEDICINE

## 2023-11-17 PROCEDURE — 71046 X-RAY EXAM CHEST 2 VIEWS: CPT

## 2023-11-17 PROCEDURE — 99284 EMERGENCY DEPT VISIT MOD MDM: CPT | Mod: 25

## 2023-11-17 RX ORDER — DIPHENHYDRAMINE HCL 25 MG
50 CAPSULE ORAL ONCE
Status: COMPLETED | OUTPATIENT
Start: 2023-11-17 | End: 2023-11-17

## 2023-11-17 RX ORDER — ONDANSETRON 4 MG/1
4 TABLET, ORALLY DISINTEGRATING ORAL ONCE
Status: COMPLETED | OUTPATIENT
Start: 2023-11-17 | End: 2023-11-17

## 2023-11-17 RX ORDER — DIPHENHYDRAMINE HCL 25 MG
CAPSULE ORAL
Status: COMPLETED
Start: 2023-11-17 | End: 2023-11-17

## 2023-11-17 RX ORDER — HYDROMORPHONE HYDROCHLORIDE 1 MG/ML
1 INJECTION, SOLUTION INTRAMUSCULAR; INTRAVENOUS; SUBCUTANEOUS
Status: COMPLETED | OUTPATIENT
Start: 2023-11-17 | End: 2023-11-17

## 2023-11-17 RX ORDER — HYDROMORPHONE HYDROCHLORIDE 1 MG/ML
1 INJECTION, SOLUTION INTRAMUSCULAR; INTRAVENOUS; SUBCUTANEOUS ONCE
Status: COMPLETED | OUTPATIENT
Start: 2023-11-17 | End: 2023-11-17

## 2023-11-17 RX ADMIN — HYDROMORPHONE HYDROCHLORIDE 1 MG: 1 INJECTION, SOLUTION INTRAMUSCULAR; INTRAVENOUS; SUBCUTANEOUS at 23:27

## 2023-11-17 RX ADMIN — DIPHENHYDRAMINE HYDROCHLORIDE 50 MG: 25 CAPSULE ORAL at 21:50

## 2023-11-17 RX ADMIN — ONDANSETRON 4 MG: 4 TABLET, ORALLY DISINTEGRATING ORAL at 23:52

## 2023-11-17 RX ADMIN — Medication 50 MG: at 21:50

## 2023-11-17 RX ADMIN — HYDROMORPHONE HYDROCHLORIDE 1 MG: 1 INJECTION, SOLUTION INTRAMUSCULAR; INTRAVENOUS; SUBCUTANEOUS at 21:43

## 2023-11-17 RX ADMIN — SODIUM CHLORIDE, POTASSIUM CHLORIDE, SODIUM LACTATE AND CALCIUM CHLORIDE 1000 ML: 600; 310; 30; 20 INJECTION, SOLUTION INTRAVENOUS at 21:43

## 2023-11-17 RX ADMIN — HYDROMORPHONE HYDROCHLORIDE 1 MG: 1 INJECTION, SOLUTION INTRAMUSCULAR; INTRAVENOUS; SUBCUTANEOUS at 22:34

## 2023-11-17 ASSESSMENT — LIFESTYLE VARIABLES
EVER HAD A DRINK FIRST THING IN THE MORNING TO STEADY YOUR NERVES TO GET RID OF A HANGOVER: NO
REASON UNABLE TO ASSESS: NO
HAVE PEOPLE ANNOYED YOU BY CRITICIZING YOUR DRINKING: NO
HAVE YOU EVER FELT YOU SHOULD CUT DOWN ON YOUR DRINKING: NO
EVER FELT BAD OR GUILTY ABOUT YOUR DRINKING: NO

## 2023-11-17 ASSESSMENT — PAIN SCALES - GENERAL
PAINLEVEL_OUTOF10: 10 - WORST POSSIBLE PAIN
PAINLEVEL_OUTOF10: 5 - MODERATE PAIN
PAINLEVEL_OUTOF10: 10 - WORST POSSIBLE PAIN

## 2023-11-17 ASSESSMENT — PAIN - FUNCTIONAL ASSESSMENT
PAIN_FUNCTIONAL_ASSESSMENT: 0-10
PAIN_FUNCTIONAL_ASSESSMENT: 0-10

## 2023-11-17 ASSESSMENT — COLUMBIA-SUICIDE SEVERITY RATING SCALE - C-SSRS
2. HAVE YOU ACTUALLY HAD ANY THOUGHTS OF KILLING YOURSELF?: NO
1. IN THE PAST MONTH, HAVE YOU WISHED YOU WERE DEAD OR WISHED YOU COULD GO TO SLEEP AND NOT WAKE UP?: NO
6. HAVE YOU EVER DONE ANYTHING, STARTED TO DO ANYTHING, OR PREPARED TO DO ANYTHING TO END YOUR LIFE?: NO

## 2023-11-18 VITALS
HEIGHT: 63 IN | SYSTOLIC BLOOD PRESSURE: 104 MMHG | WEIGHT: 160 LBS | BODY MASS INDEX: 28.35 KG/M2 | RESPIRATION RATE: 16 BRPM | TEMPERATURE: 98.8 F | OXYGEN SATURATION: 96 % | HEART RATE: 94 BPM | DIASTOLIC BLOOD PRESSURE: 67 MMHG

## 2023-11-18 ASSESSMENT — PAIN SCALES - GENERAL: PAINLEVEL_OUTOF10: 3

## 2023-11-18 NOTE — ED TRIAGE NOTES
Pain in legs, back, tailbone, arms. Pt reports this is typical of her SCC pain. Last took her hydromorphone around 2pm.

## 2023-11-18 NOTE — PROGRESS NOTES
I received Mary Alice Aragon in signout from Dr. Hoff.  Please see the previous note for all HPI, PE and MDM up to the time of signout at 2300.    In brief Mary Alice Aragon is an 43 y.o. female presenting for   Chief Complaint   Patient presents with    Sickle Cell Pain Crisis   .  At the time of signout we were awaitin L fluid bolus to be completed.  Patient was given 4 mg of sublingual Zofran for some nausea but states that she is feeling much better in terms of her pain.  Patient stated that she feels safe to return home and thus is discharged in good condition with strict return precautions.  Patient understood and was agreeable with the plan.        Pt Disposition: Discharged home in good condition with strict return precautions

## 2023-11-18 NOTE — ED PROVIDER NOTES
HPI   Chief Complaint   Patient presents with    Sickle Cell Pain Crisis       HPI  Mary Alice Aragon is a 43-year-old female with pertinent past medical history of sickle cell disease, chronic pain syndrome, MDD presenting today with pain in her legs from her knee down to her toes, pain in bilateral arms as well as tailbone.  Patient currently is taking ketamine infusions and used to take them at [facility] although due to staffing shortages she has been taking them at [facility].  She states that when she was taking her ketamine infusions at [facility] that was roughly for 6 hours and not prevented any sickle cell pain crises.  Given that she is switched over to [facility] she is only given ketamine over an hour and patient feels as if that is not helping resolve her pain.  She states that she is using roughly 3 L of oxygen at night to prevent sickling as well as 5 mg of methadone twice a day.  She admits to chest pain and denies fever, nausea, chills, cough, abdominal pain.       Besides the ketamine infusions patient is also taking gabapentin 300 mg 3 times daily, methadone 5 mg twice daily.    Per her oncology note on 10/17/2023: HbSS disease: Continue folic acid and hydroxyurea as prescribed.  Hgb 6.9, T, ari 2.6, retics .255, HbF 16.3%, . We discussed voxelotor today to help raise hgb, reduce sickling and reduce lysis labs.  Discussed MOA and side effects.  Mary Alice would like to start medication, will send enrollment to [facility]. Started oxybryta but didn't do well with nausea. She will start will taking just one pill with a meal for a month and then go to two.                Rei Coma Scale Score: 15                  Patient History   Past Medical History:   Diagnosis Date    Unspecified astigmatism, bilateral 2020    Astigmatism, bilateral     Past Surgical History:   Procedure Laterality Date     SECTION, CLASSIC  2016     Section    CHOLECYSTECTOMY   06/16/2016    Cholecystectomy    MR HEAD ANGIO W AND WO IV CONTRAST  4/15/2013    MR HEAD ANGIO W AND WO IV CONTRAST 4/15/2013 [facility] CLINICAL LEGACY    MR HEAD ANGIO W AND WO IV CONTRAST  8/2/2013    MR HEAD ANGIO W AND WO IV CONTRAST 8/2/2013 [facility] INPATIENT LEGACY    MR HEAD ANGIO WO IV CONTRAST  11/1/2014    MR HEAD ANGIO WO IV CONTRAST 11/1/2014 [facility] ANCILLARY LEGACY    MR HEAD ANGIO WO IV CONTRAST  2/8/2016    MR HEAD ANGIO WO IV CONTRAST 2/8/2016 [facility] ANCILLARY LEGACY    MR HEAD ANGIO WO IV CONTRAST  4/16/2019    MR HEAD ANGIO WO IV CONTRAST 4/16/2019 [facility] ANCILLARY LEGACY    MR HEAD ANGIO WO IV CONTRAST  5/4/2017    MR HEAD ANGIO WO IV CONTRAST 5/4/2017 [facility] INPATIENT LEGACY    MR NECK ANGIO WO IV CONTRAST  8/2/2013    MR NECK ANGIO WO IV CONTRAST 8/2/2013 [facility] INPATIENT LEGACY    MR NECK ANGIO WO IV CONTRAST  11/1/2014    MR NECK ANGIO WO IV CONTRAST 11/1/2014 [facility] ANCILLARY LEGACY    OTHER SURGICAL HISTORY  06/16/2016    Brain Surgery    TUBAL LIGATION  04/10/2017    Tubal Ligation     Family History   Problem Relation Name Age of Onset    Sickle cell trait Sister      Multiple sclerosis Brother      Breast cancer Maternal Grandmother      Breast cancer Other paternal cousin     Sickle cell trait Child       Social History     Tobacco Use    Smoking status: Never    Smokeless tobacco: Never   Substance Use Topics    Alcohol use: Not Currently    Drug use: Not on file       Physical Exam   ED Triage Vitals [11/17/23 2030]   Temp Heart Rate Resp BP   36.9 °C (98.5 °F) 80 18 131/68      SpO2 Temp src Heart Rate Source Patient Position   100 % -- -- --      BP Location FiO2 (%)     -- --       Physical Exam  Constitutional:       General: She is not in acute distress.     Appearance: Normal appearance. She is not ill-appearing.   HENT:      Head: Normocephalic and atraumatic.   Eyes:      Pupils: Pupils are equal, round, and reactive to light.   Cardiovascular:      Rate and Rhythm: Normal rate.   Pulmonary:       Effort: Pulmonary effort is normal. No respiratory distress.      Breath sounds: No stridor. No wheezing or rales.   Abdominal:      General: Abdomen is flat. There is no distension.      Tenderness: There is no abdominal tenderness. There is no guarding.   Musculoskeletal:      Comments: Pain upon palpation of bilateral upper extremity and lower extremity.   Neurological:      General: No focal deficit present.      Mental Status: She is alert.   Psychiatric:         Mood and Affect: Mood normal.         ED Course & MDM    Patient has few target cells on red blood cell morphology, reticulocyte count of 7.9  which is the lowest value she has had over the last year.  It has been as high as 15.9-year ago and most recently noted 6 months ago at 11.9 her hemoglobin was 7.5 which is roughly at her baseline.  Her white blood cell count was 16.1 which is elevated from her most recent 6 months ago in which it was 12.0.  LDH, CMP, chest x-ray are pending.  Patient will be given 1 mg of Dilaudid and 1 L of lactated Ringer's.  Patient was giving another round of Dilaudid 1 mg.  For a total of 2 mg given so far.  Another round of Dilaudid is available if the patient needs it.    Medical Decision Making  Patient's presentation is most likely suggestive of sickle cell pain crisis.  Will complete chest x-ray to rule out acute chest syndrome.  Patient's exam does not suggest any focal neurologic deficits.  No other pertinent findings were evident on physical exam besides tenderness to palpation of her extremities bilaterally and tailbone.  Patient states that her pain is improving and that she will most likely want to go home as long as the improvement lasts.  She also states that the fluids are helping and would like to finish majority of her fluids prior to being discharged.  Disposition most likely to be home subsequent to fluids finishing and another potential round of Dilaudid.    Procedure  Procedures     Alfred Hoff,  MD  Resident  11/17/23 1948

## 2023-11-18 NOTE — DISCHARGE INSTRUCTIONS
You were seen today in the emergency department for a sickle cell pain crisis.  A chest x-ray was performed which showed no acute issues.  You are given two 1 mg Dilaudid for pain control and 1 L of lactated Ringer's for fluids to which she responded well.  You are safely discharged at this time with strict pain well controlled.  If you have any worsening, new, concerning symptoms please report back to the emergency department.   <<-----Click here for Discharge Medication Review

## 2023-11-20 DIAGNOSIS — D57.00 SICKLE CELL DISEASE WITH CRISIS (MULTI): Primary | ICD-10-CM

## 2023-11-21 RX ORDER — TIZANIDINE 2 MG/1
2-4 TABLET ORAL EVERY 8 HOURS PRN
Qty: 40 TABLET | Refills: 2 | Status: SHIPPED | OUTPATIENT
Start: 2023-11-21 | End: 2024-01-31 | Stop reason: SDUPTHER

## 2023-11-26 DIAGNOSIS — N93.9 ABNORMAL UTERINE BLEEDING: Primary | ICD-10-CM

## 2023-11-26 RX ORDER — NORETHINDRONE 5 MG/1
5 TABLET ORAL DAILY
Qty: 90 TABLET | Refills: 3 | Status: SHIPPED | OUTPATIENT
Start: 2023-11-26

## 2023-11-27 ENCOUNTER — TELEPHONE (OUTPATIENT)
Dept: OBSTETRICS AND GYNECOLOGY | Facility: CLINIC | Age: 43
End: 2023-11-27
Payer: COMMERCIAL

## 2023-11-27 NOTE — TELEPHONE ENCOUNTER
MD Concha Chanel RN  Phone Number: [phone]     Refilled.    Needs appointment at some point for followup .          Previous Messages       ----- Message -----  From: Concha Potter RN  Sent: 11/22/2023   4:21 PM EST  To: Mary Ann Bolanos MD  Subject: FW: refill needed                                Pt last seen 5/2022 Rx for norethindrone 5 mg for bleeding.  ----- Message -----  From: Penelope Guadarrama RN  Sent: 11/22/2023  12:45 PM EST  [facility] [place] [id number] [facility]  Subject: refill needed                                    Patient called to ask for refill on hormone pills, I.e. birth control prescribed by Dr. Bolanos         Comments    11/27/23: Called pt Mail box full Will follow up.   Pt called office due to missed call. Discussed follow up apt, Agrees scheduled 1/12/24.

## 2023-11-28 ENCOUNTER — TELEPHONE (OUTPATIENT)
Dept: HEMATOLOGY/ONCOLOGY | Facility: HOSPITAL | Age: 43
End: 2023-11-28
Payer: COMMERCIAL

## 2023-11-28 DIAGNOSIS — D57.00 SICKLE CELL DISEASE WITH CRISIS (MULTI): ICD-10-CM

## 2023-11-28 RX ORDER — METHADONE HYDROCHLORIDE 5 MG/1
5 TABLET ORAL 2 TIMES DAILY
Qty: 60 TABLET | Refills: 0 | Status: SHIPPED | OUTPATIENT
Start: 2023-11-28 | End: 2023-11-28 | Stop reason: DRUGHIGH

## 2023-11-28 RX ORDER — METHADONE HYDROCHLORIDE 10 MG/1
10 TABLET ORAL EVERY 12 HOURS
Qty: 60 TABLET | Refills: 0 | Status: SHIPPED | OUTPATIENT
Start: 2023-11-28 | End: 2024-01-05 | Stop reason: SDUPTHER

## 2023-11-28 RX ORDER — HYDROMORPHONE HYDROCHLORIDE 4 MG/1
4 TABLET ORAL 3 TIMES DAILY PRN
Qty: 40 TABLET | Refills: 0 | Status: SHIPPED | OUTPATIENT
Start: 2023-11-28 | End: 2023-12-19 | Stop reason: SDUPTHER

## 2023-11-28 NOTE — TELEPHONE ENCOUNTER
Refill requests received for Dilaudid 4mg and Methadone 10mg.  [facility] [place].  Message sent to Sickle Cell team.

## 2023-12-05 ENCOUNTER — SPECIALTY PHARMACY (OUTPATIENT)
Dept: PHARMACY | Facility: CLINIC | Age: 43
End: 2023-12-05

## 2023-12-14 ENCOUNTER — TELEMEDICINE (OUTPATIENT)
Dept: BEHAVIORAL HEALTH | Facility: HOSPITAL | Age: 43
End: 2023-12-14
Payer: MEDICARE

## 2023-12-14 DIAGNOSIS — F41.9 ANXIETY: ICD-10-CM

## 2023-12-14 DIAGNOSIS — F33.0 MILD EPISODE OF RECURRENT MAJOR DEPRESSIVE DISORDER (CMS-HCC): ICD-10-CM

## 2023-12-14 PROCEDURE — 90833 PSYTX W PT W E/M 30 MIN: CPT | Performed by: PSYCHIATRY & NEUROLOGY

## 2023-12-14 PROCEDURE — 99214 OFFICE O/P EST MOD 30 MIN: CPT | Performed by: PSYCHIATRY & NEUROLOGY

## 2023-12-14 RX ORDER — SERTRALINE HYDROCHLORIDE 50 MG/1
75 TABLET, FILM COATED ORAL 2 TIMES DAILY
Qty: 60 TABLET | Refills: 2 | Status: SHIPPED | OUTPATIENT
Start: 2023-12-14 | End: 2024-02-05

## 2023-12-14 NOTE — PROGRESS NOTES
Outpatient Psychiatry FUV      Subjective   Mary Alice Aragon, a 43 y.o. female, for virtual FUV    Assessment/Plan   Patient Discussion:  INCREASE to sertraline 50mg 2x/day  NOT currently trazodone 100-200mg at bedtime  CONTINUE hydroxyzine 25mg 1/2 tablet as needed for anxiety  take melatonin 2 hours before preferred bedtime  continue with good sleep hygiene    PREVIOUSLY placed REFERRAL placed for therapy, also emailed [facility]    CONTINUE with light box for 20 minutes per day at 10,000lux. position above you but so it reaches your eyes. You don't need to look directly into the light    Follow up  1/18 at 9AM virtual FUV and 2/15 at 9AM virtual FUV    Call with questions or concerns [phone]    Assessment:   43 y.o.  F with Hgb SS disease, h/o MCA with R frontal encephalomalacia, h/o lead poisoning, chronic pain managed by ketamine referred to psychiatry for depression and anxiety. History of trauma though not classic PTSD. Pt reports increased depression and anxiety related to stressors at home as well as limited support.     On virtual FUV today, pt reports that she has been more anxious with stressors at home. Agreeable to titrate sertraline and follow up 1 month, sooner if needed     Diagnosis:   MDD, recurrent, mild  Other specified anxiety, likely component of depression    Treatment Plan/Recommendations:   1. Safety Assessment: some fleeting SI passive death with a/w pain, but able to refocus on kids, recently thoughts that it is too much but no plan/intent to act, has improved since starting sertraline. No h/o attempts. RF include single, pain, depression. PF include kids, no guns, seeking treatment, F/age/race. low risk though discussed suicide hotline/text line 270/677269    2. MDD, recurrent mild other spec anxiety-component of depression vs NICHOL--  INCREASE to sertraline 50mg PO BID  CONTINUE trazodone 100-200mg PO QHS, r/b/ae discussed including excess serotonin though currently low  doses. can decrease if sleep improves. Not using regularly  use hydroxyzine 12.5mg PO BID PRN anxiety  continue melatonin 10mg 2 hours before bed. continue good sleep hygiene    CAN USE lightbox at 10,000lux for 20 minutes/day.   PHQ 9---11-->9-->15-->13, deferred related to time  have previously discussed therapy, follow up on referral. Notes kids are in therapy now  will continue with supportive therapy during appt    3. Medical: notes and labs reviewed, recently saw neuro for migraines. pain/ankle pain better on O2 at night, saw ortho, rec PT.   recent sleep study, no longer needs O2 at night, now wonders if she should get another updated sleep study  now back on ketamine though having spasms, less helpful for mood  breast mass bx benign   Recent increased pain    4. Social: 2 kids at home, limited support. 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 Also had moved key and had go back to school to keys from her son  Still having trouble with currently rental so now going to look for new place    Not sleeping well related to hearing raccoons outside and worried they will get in. Management company has not been     Ketamine on Tuesday, wasn't able to get lidocaine for not getting labs, didn't realize needed them for lidocaine. Feels infusion wasn't as effective    Not celebrating Reform, only celebrate EOY, focused more con paola.     Moving causes her to miss dad more    Feels overwhelmed but no SI      Current Medications:    Current Outpatient Medications:     acetaminophen (Tylenol Extra Strength) 500 mg tablet, 2 tablet EVERY 6 HOURS (route: oral), Disp: , Rfl:     ascorbic acid (Vitamin C) 500 mg tablet, 2  tablet DAILY (route: oral), Disp: , Rfl:     calcium carbonate (Tums) 200 mg calcium chewable tablet, Chew., Disp: , Rfl:     capsaicin (Zostrix) 0.025 % cream, Apply as directed as needed, Disp: , Rfl:     cyclobenzaprine (Flexeril) 10 mg tablet, 1 tablet 3 TIMES DAILY (route: oral), Disp: , Rfl:     deferoxamine (Desferal) 2 gram injection, Per instructions DAILY (route: injection), Disp: , Rfl:     diclofenac sodium (Voltaren) 1 % gel gel, Per instructions 4 TIMES DAILY (route: topical), Disp: , Rfl:     diphenhydrAMINE (BENADryl) 25 mg capsule, 1 capsule 3 TIMES DAILY (route: oral), Disp: , Rfl:     docusate sodium (Colace) 100 mg capsule, 1 capsule DAILY (route: oral), Disp: , Rfl:     folic acid (Folvite) 1 mg tablet, 1 tablet DAILY (route: oral), Disp: , Rfl:     gabapentin (Neurontin) 100 mg capsule, 3 capsule EVERY 8 HOURS (route: oral), Disp: , Rfl:     HYDROmorphone (Dilaudid) 4 mg tablet, 1 tablet EVERY 4 HOURS (route: oral), Disp: , Rfl:     HYDROmorphone (Dilaudid) 4 mg tablet, Take 1 tablet (4 mg) by mouth 3 times a day as needed for severe pain (7 - 10)., Disp: 40 tablet, Rfl: 0    hydroxyurea (Hydrea) 500 mg capsule, Per instructions AS DIRECTED (route: oral), Disp: , Rfl:     hydrOXYzine HCL (Atarax) 25 mg tablet, 1 tablet 2 TIMES DAILY (route: oral), Disp: , Rfl:     ibuprofen 800 mg tablet, Take 1 tablet (800 mg) by mouth every 8 hours if needed., Disp: , Rfl:     lidocaine (lidocaine HCL) 4 % cream, USE TOPICALLY AS DIRECTED., Disp: , Rfl:     loratadine (Claritin) 10 mg tablet, Take 1 tablet (10 mg) by mouth once daily., Disp: , Rfl:     melatonin 3 mg tablet, Take 2 tablets (6 mg) by mouth as needed at bedtime., Disp: , Rfl:     meloxicam (Mobic) 15 mg tablet, Take 1 tablet (15 mg) by mouth once daily., Disp: , Rfl:     methadone (Dolophine) 10 mg tablet, Take 1 tablet (10 mg) by mouth every 12 hours., Disp: 60 tablet, Rfl: 0    multivitamin tablet, 1 tablet DAILY (route: oral), Disp: , Rfl:      naloxone (Narcan) 4 mg/0.1 mL nasal spray, Per instructions AS NEEDED (route: nasal), Disp: , Rfl:     norethindrone (Aygestin) 5 mg tablet, Take 1 tablet (5 mg) by mouth once daily., Disp: 90 tablet, Rfl: 3    omeprazole (PriLOSEC) 40 mg DR capsule, 1 capsule DAILY (route: oral), Disp: , Rfl:     oxygen (O2) gas therapy, Inhale 3 L/min.  3L O2 WITH ACTIVITY AS NEEDED 3L O2 AT NIGHT AS NEEDED., Disp: , Rfl:     prochlorperazine (Compazine) 10 mg tablet, 1 tablet EVERY 8 HOURS (route: oral), Disp: , Rfl:     senna 8.6 mg tablet, Take 1 tablet (8.6 mg) by mouth 2 times a day as needed., Disp: , Rfl:     sertraline (Zoloft) 50 mg tablet, Take 1.5 tablets (75 mg) by mouth once daily. Take 1 and 1/2 (one and one-half) tablets by mouth daily., Disp: , Rfl:     tiZANidine (Zanaflex) 2 mg tablet, Take 1-2 tablets (2-4 mg) by mouth every 8 hours if needed for muscle spasms., Disp: 40 tablet, Rfl: 2    traZODone (Desyrel) 100 mg tablet, Take 1-2 tablets (100-200 mg) by mouth as needed at bedtime for sleep., Disp: 60 tablet, Rfl: 2    trolamine salicylate (Aspercreme) 10 % cream, Per instructions 4 TIMES DAILY (route: topical), Disp: , Rfl:     ubrogepant (Ubrelvy) 50 mg tablet, 1 tablet DAILY (route: oral), Disp: , Rfl:     verapamil SR (Calan-SR) 180 mg ER tablet, 1 tablet DAILY (route: oral), Disp: , Rfl:     Vitamin D3 25 mcg (1,000 unit) tablet, Take 1 tablet (25 mcg) by mouth once daily., Disp: , Rfl:     voxelotor (OXBRYTA) 500 mg tablet tablet, TAKE THREE (3) TABLETS BY MOUTH ONCE DAILY., Disp: 90 tablet, Rfl: 3  Medical History:  Past Medical History:   Diagnosis Date    Unspecified astigmatism, bilateral 2020    Astigmatism, bilateral       Surgical History:  Past Surgical History:   Procedure Laterality Date     SECTION, CLASSIC  2016     Section    CHOLECYSTECTOMY  2016    Cholecystectomy    MR HEAD ANGIO W AND WO IV CONTRAST  4/15/2013    MR HEAD ANGIO W AND WO IV CONTRAST  4/15/2013 [facility] CLINICAL LEGACY    MR HEAD ANGIO W AND WO IV CONTRAST  8/2/2013    MR HEAD ANGIO W AND WO IV CONTRAST 8/2/2013 [facility]PATIENT LEGACY    MR HEAD ANGIO WO IV CONTRAST  11/1/2014    MR HEAD ANGIO WO IV CONTRAST 11/1/2014 [facility] ANCILLARY LEGACY    MR HEAD ANGIO WO IV CONTRAST  2/8/2016    MR HEAD ANGIO WO IV CONTRAST 2/8/2016 [facility] ANCILLARY LEGACY    MR HEAD ANGIO WO IV CONTRAST  4/16/2019    MR HEAD ANGIO WO IV CONTRAST 4/16/2019 [facility]CILLARY LEGACY    MR HEAD ANGIO WO IV CONTRAST  5/4/2017    MR HEAD ANGIO WO IV CONTRAST 5/4/2017 [facility]PATIENT LEGACY    MR NECK ANGIO WO IV CONTRAST  8/2/2013    MR NECK ANGIO WO IV CONTRAST 8/2/2013 [facility]PATIENT LEGACY    MR NECK ANGIO WO IV CONTRAST  11/1/2014    MR NECK ANGIO WO IV CONTRAST 11/1/2014 [facility] ANCILLARY LEGACY    OTHER SURGICAL HISTORY  06/16/2016    Brain Surgery    TUBAL LIGATION  04/10/2017    Tubal Ligation       Family History:  Family History   Problem Relation Name Age of Onset    Sickle cell trait Sister      Multiple sclerosis Brother      Breast cancer Maternal Grandmother      Breast cancer Other paternal cousin     Sickle cell trait Child         Social History:  Social History     Socioeconomic History    Marital status: Single     Spouse name: Not on file    Number of children: Not on file    Years of education: Not on file    Highest education level: Not on file   Occupational History    Not on file   Tobacco Use    Smoking status: Never    Smokeless tobacco: Never   Substance and Sexual Activity    Alcohol use: Not Currently    Drug use: Not on file    Sexual activity: Not on file   Other Topics Concern    Not on file   Social History Narrative    Not on file     Social Determinants of Health     Financial Resource Strain: Not on file   Food Insecurity: Not on file   Transportation Needs: Not on file   Physical Activity: Not on file   Stress: Not on file   Social Connections: Not on file   Intimate Partner Violence: Not on file   Housing  "Stability: Not on file         Medical Review Of Systems:  +increased pain  +O2 at night      Psychiatric Review Of Systems:  Per HPI       Objective   Mental Status Exam:     Appearance: dressed casually. Appropriate for weather  Attitude: cooperative, engaged.   Behavior: appropriate eye contact via video.   Motor Activity: spontaneous movement.   Speech: regular rate, low volume. +accent. no dysarthria, no aphasia.   Mood: \"ok\".   Affect: congruent, stable.   Thought Process: no KIRIT, on topic.   Thought Content: no delusions, occ passive SI and no plan/intent, no HI.   Thought Perception: no AVH,.   Cognition: alert, oriented to person, place, time. attn intact. MAX h/o stroke with frontal lobe encephalomalacia.   Insight: fair.   Judgment: fair.      Vitals:  There were no vitals filed for this visit. Deferred for virtual visit  No results found for this or any previous visit (from the past 4464 hour(s)).  Lab Results   Component Value Date    WBC 16.1 (H) 11/17/2023    HGB 7.5 (L) 11/17/2023    HCT 21.9 (L) 11/17/2023    MCV 92 11/17/2023     (H) 11/17/2023     Lab Results   Component Value Date    GLUCOSE 97 11/17/2023    CALCIUM 9.7 11/17/2023     11/17/2023    K 3.6 11/17/2023    CO2 28 11/17/2023     11/17/2023    BUN 10 11/17/2023    CREATININE 1.08 (H) 11/17/2023       Time spent in therapy 22 minutes  type supportive, problem solving  target mood, anxiety, grief  techniques reframing, processing, goal setting  response moderate-good  goal decreased sx burden, improved management  follow up 1-2 months       Cinthia May MD  "

## 2023-12-18 ENCOUNTER — LAB (OUTPATIENT)
Dept: LAB | Facility: HOSPITAL | Age: 43
End: 2023-12-18
Payer: MEDICARE

## 2023-12-18 ENCOUNTER — OFFICE VISIT (OUTPATIENT)
Dept: HEMATOLOGY/ONCOLOGY | Facility: HOSPITAL | Age: 43
End: 2023-12-18
Payer: MEDICARE

## 2023-12-18 ENCOUNTER — SOCIAL WORK (OUTPATIENT)
Dept: HEMATOLOGY/ONCOLOGY | Facility: HOSPITAL | Age: 43
End: 2023-12-18

## 2023-12-18 ENCOUNTER — TELEPHONE (OUTPATIENT)
Dept: HEMATOLOGY/ONCOLOGY | Facility: HOSPITAL | Age: 43
End: 2023-12-18

## 2023-12-18 VITALS
SYSTOLIC BLOOD PRESSURE: 101 MMHG | HEART RATE: 91 BPM | OXYGEN SATURATION: 98 % | BODY MASS INDEX: 26.18 KG/M2 | DIASTOLIC BLOOD PRESSURE: 49 MMHG | RESPIRATION RATE: 19 BRPM | WEIGHT: 147.8 LBS | TEMPERATURE: 97 F

## 2023-12-18 DIAGNOSIS — D57.00 SICKLE CELL DISEASE WITH CRISIS (MULTI): Primary | ICD-10-CM

## 2023-12-18 DIAGNOSIS — D57.00 SICKLE CELL DISEASE WITH CRISIS (MULTI): ICD-10-CM

## 2023-12-18 LAB
ABO GROUP (TYPE) IN BLOOD: NORMAL
ALBUMIN SERPL BCP-MCNC: 4.4 G/DL (ref 3.4–5)
ALP SERPL-CCNC: 47 U/L (ref 33–110)
ALT SERPL W P-5'-P-CCNC: 7 U/L (ref 7–45)
ANION GAP SERPL CALC-SCNC: 12 MMOL/L (ref 10–20)
ANTIBODY SCREEN: NORMAL
AST SERPL W P-5'-P-CCNC: 16 U/L (ref 9–39)
BASOPHILS # BLD AUTO: 0.07 X10*3/UL (ref 0–0.1)
BASOPHILS NFR BLD AUTO: 0.6 %
BILIRUB SERPL-MCNC: 2.2 MG/DL (ref 0–1.2)
BUN SERPL-MCNC: 9 MG/DL (ref 6–23)
CALCIUM SERPL-MCNC: 8.9 MG/DL (ref 8.6–10.3)
CHLORIDE SERPL-SCNC: 107 MMOL/L (ref 98–107)
CO2 SERPL-SCNC: 24 MMOL/L (ref 21–32)
CREAT SERPL-MCNC: 1.01 MG/DL (ref 0.5–1.05)
EOSINOPHIL # BLD AUTO: 0.13 X10*3/UL (ref 0–0.7)
EOSINOPHIL NFR BLD AUTO: 1.1 %
ERYTHROCYTE [DISTWIDTH] IN BLOOD BY AUTOMATED COUNT: 17.4 % (ref 11.5–14.5)
FERRITIN SERPL-MCNC: 1986 NG/ML (ref 8–150)
GFR SERPL CREATININE-BSD FRML MDRD: 72 ML/MIN/1.73M*2
GLUCOSE SERPL-MCNC: 92 MG/DL (ref 74–99)
HCT VFR BLD AUTO: 19.4 % (ref 36–46)
HGB BLD-MCNC: 6.7 G/DL (ref 12–16)
HGB RETIC QN: 33 PG (ref 28–38)
IMM GRANULOCYTES # BLD AUTO: 0.04 X10*3/UL (ref 0–0.7)
IMM GRANULOCYTES NFR BLD AUTO: 0.3 % (ref 0–0.9)
IMMATURE RETIC FRACTION: 30.6 %
LDH SERPL L TO P-CCNC: 269 U/L (ref 84–246)
LYMPHOCYTES # BLD AUTO: 4.31 X10*3/UL (ref 1.2–4.8)
LYMPHOCYTES NFR BLD AUTO: 36.2 %
MCH RBC QN AUTO: 31.9 PG (ref 26–34)
MCHC RBC AUTO-ENTMCNC: 34.5 G/DL (ref 32–36)
MCV RBC AUTO: 92 FL (ref 80–100)
MONOCYTES # BLD AUTO: 0.68 X10*3/UL (ref 0.1–1)
MONOCYTES NFR BLD AUTO: 5.7 %
NEUTROPHILS # BLD AUTO: 6.66 X10*3/UL (ref 1.2–7.7)
NEUTROPHILS NFR BLD AUTO: 56.1 %
NRBC BLD-RTO: 0.2 /100 WBCS (ref 0–0)
PLATELET # BLD AUTO: 453 X10*3/UL (ref 150–450)
POTASSIUM SERPL-SCNC: 3.8 MMOL/L (ref 3.5–5.3)
PROT SERPL-MCNC: 7.7 G/DL (ref 6.4–8.2)
RBC # BLD AUTO: 2.1 X10*6/UL (ref 4–5.2)
RETICS #: 0.21 X10*6/UL (ref 0.02–0.08)
RETICS/RBC NFR AUTO: 10 % (ref 0.5–2)
RH FACTOR (ANTIGEN D): NORMAL
SODIUM SERPL-SCNC: 139 MMOL/L (ref 136–145)
WBC # BLD AUTO: 11.9 X10*3/UL (ref 4.4–11.3)

## 2023-12-18 PROCEDURE — 83615 LACTATE (LD) (LDH) ENZYME: CPT

## 2023-12-18 PROCEDURE — 83021 HEMOGLOBIN CHROMOTOGRAPHY: CPT

## 2023-12-18 PROCEDURE — 85045 AUTOMATED RETICULOCYTE COUNT: CPT

## 2023-12-18 PROCEDURE — 1036F TOBACCO NON-USER: CPT | Performed by: INTERNAL MEDICINE

## 2023-12-18 PROCEDURE — 85025 COMPLETE CBC W/AUTO DIFF WBC: CPT

## 2023-12-18 PROCEDURE — 80053 COMPREHEN METABOLIC PANEL: CPT

## 2023-12-18 PROCEDURE — 83020 HEMOGLOBIN ELECTROPHORESIS: CPT | Performed by: PATHOLOGY

## 2023-12-18 PROCEDURE — 99215 OFFICE O/P EST HI 40 MIN: CPT | Performed by: INTERNAL MEDICINE

## 2023-12-18 PROCEDURE — 36415 COLL VENOUS BLD VENIPUNCTURE: CPT

## 2023-12-18 PROCEDURE — 86901 BLOOD TYPING SEROLOGIC RH(D): CPT

## 2023-12-18 PROCEDURE — 82728 ASSAY OF FERRITIN: CPT

## 2023-12-18 RX ORDER — GABAPENTIN 100 MG/1
CAPSULE ORAL
Qty: 90 CAPSULE | Refills: 2 | Status: SHIPPED | OUTPATIENT
Start: 2023-12-18 | End: 2023-12-18 | Stop reason: DRUGHIGH

## 2023-12-18 RX ORDER — GABAPENTIN 300 MG/1
300 CAPSULE ORAL 3 TIMES DAILY
Qty: 90 CAPSULE | Refills: 11 | Status: SHIPPED | OUTPATIENT
Start: 2023-12-18 | End: 2024-04-26 | Stop reason: SDUPTHER

## 2023-12-18 RX ORDER — SENNOSIDES 8.6 MG
1 TABLET ORAL 2 TIMES DAILY PRN
Qty: 30 TABLET | Refills: 2 | Status: SHIPPED | OUTPATIENT
Start: 2023-12-18

## 2023-12-18 RX ORDER — HYDROXYUREA 500 MG/1
CAPSULE ORAL
Qty: 68 CAPSULE | Refills: 2 | Status: SHIPPED | OUTPATIENT
Start: 2023-12-18

## 2023-12-18 RX ORDER — DICLOFENAC SODIUM 10 MG/G
GEL TOPICAL
Qty: 350 G | Refills: 2 | Status: SHIPPED | OUTPATIENT
Start: 2023-12-18

## 2023-12-18 RX ORDER — IBUPROFEN 800 MG/1
800 TABLET ORAL EVERY 8 HOURS PRN
Qty: 60 TABLET | Refills: 2 | Status: SHIPPED | OUTPATIENT
Start: 2023-12-18

## 2023-12-18 ASSESSMENT — ENCOUNTER SYMPTOMS
SHORTNESS OF BREATH: 1
ARTHRALGIAS: 1
NEUROLOGICAL NEGATIVE: 1
CONSTITUTIONAL NEGATIVE: 1
NERVOUS/ANXIOUS: 1
EYES NEGATIVE: 1
CONSTIPATION: 1
DEPRESSION: 1

## 2023-12-18 ASSESSMENT — PAIN SCALES - GENERAL: PAINLEVEL: 3

## 2023-12-18 NOTE — PROGRESS NOTES
Patient ID: Mary Alice Aragon is a 43 y.o. female.  Referring Physician: No referring provider defined for this encounter.  Primary Care Provider: Nyasia Mcdaniel MD  Visit Type: Follow Up      Subjective      HPI  Mary Alice presents for fuv. She is feeling tired. Not doing too much for [place] this year, only celebrates every other year due to family losses. She is trying to move, current rental is having issues. Doing okay with taking hydroxyurea, missed about 3 doses in past month. Has some mild pain today. Still receiving ketamine at [facility]. Had some chest pain and sob this am, feels like sickle cell pain r/t weather.     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 Right frontal encephalomalacia secondary to sequela of right middle cerebral artery infarction.  History of lead poisoning with hydrocephalus status post right ventriculostomy shunt catheter, and subsequent removal.   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 She has been evaluated by Neurosurgery and is stable with no required intervention.  Low-grade squamous epithelial lesion on Pap smear in May 2011.  Pulmonary embolism in 2009 in the postpartum phase, anticoagulated for 6 months.  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   Status post cholecystectomy.   Pneumonia in 2006.   Chronic pain: Currently managed with monthly ketamine infusions with good response.   Nocturnal oxygen dependence  Review of Systems   Constitutional: Negative.    HENT:  Negative.     Eyes: Negative.    Respiratory:  Positive for shortness of breath.    Cardiovascular:  Positive for chest pain.   Gastrointestinal:  Positive for constipation.   Genitourinary: Negative.     Musculoskeletal:  Positive for arthralgias.   Skin: Negative.    Neurological: Negative.    Psychiatric/Behavioral:  Positive  for depression. The patient is nervous/anxious.         Objective   BSA: 1.73 meters squared  BP (!) 101/49 (BP Location: Left arm, Patient Position: Sitting)   Pulse 91   Temp 36.1 °C (97 °F) (Core)   Resp 19   Wt 67 kg (147 lb 12.8 oz)   SpO2 98%   BMI 26.18 kg/m²      has a past medical history of Unspecified astigmatism, bilateral (2020).   has a past surgical history that includes Cholecystectomy (2016);  section, classic (2016); Other surgical history (2016); Tubal ligation (04/10/2017); MR angio head w and wo IV contrast (4/15/2013); MR angio neck wo IV contrast (2013); MR angio head w and wo IV contrast (2013); MR angio head wo IV contrast (2014); MR angio neck wo IV contrast (2014); MR angio head wo IV contrast (2016); MR angio head wo IV contrast (2019); and MR angio head wo IV contrast (2017).  Family History   Problem Relation Name Age of Onset    Sickle cell trait Sister      Multiple sclerosis Brother      Breast cancer Maternal Grandmother      Breast cancer Other paternal cousin     Sickle cell trait Child       Oncology History    No history exists.       Mary Alice Aragon  reports that she has never smoked. She has never used smokeless tobacco.  She  reports that she does not currently use alcohol.  She  has no history on file for drug use.    Physical Exam  HENT:      Head: Normocephalic.      Nose: Nose normal.      Mouth/Throat:      Mouth: Mucous membranes are moist.   Eyes:      Pupils: Pupils are equal, round, and reactive to light.   Cardiovascular:      Rate and Rhythm: Normal rate and regular rhythm.      Pulses: Normal pulses.      Heart sounds: Normal heart sounds.   Pulmonary:      Effort: Pulmonary effort is normal.      Breath sounds: Normal breath sounds.   Abdominal:      General: Bowel sounds are normal.      Palpations: Abdomen is soft.   Musculoskeletal:         General: Normal range of motion.   Skin:      General: Skin is warm and dry.      Comments: Right great toe discoloration near cuticle    Neurological:      General: No focal deficit present.      Mental Status: She is alert and oriented to person, place, and time.   Psychiatric:         Mood and Affect: Mood normal.         Behavior: Behavior normal.         WBC   Date/Time Value Ref Range Status   11/17/2023 08:45 PM 16.1 (H) 4.4 - 11.3 x10*3/uL Final   04/24/2023 01:16 PM 12.0 (H) 4.4 - 11.3 x10E9/L Final   01/31/2023 12:56 PM 17.1 (H) 4.4 - 11.3 x10E9/L Final   01/12/2023 03:02 PM 13.4 (H) 4.4 - 11.3 x10E9/L Final     nRBC   Date Value Ref Range Status   11/17/2023 0.2 (H) 0.0 - 0.0 /100 WBCs Final   04/24/2023 0.6 0.0 - 0.0 /100 WBC Final   01/31/2023 0.3 0.0 - 0.0 /100 WBC Final   01/12/2023 0.5 0.0 - 0.0 /100 WBC Final     RBC   Date Value Ref Range Status   11/17/2023 2.39 (L) 4.00 - 5.20 x10*6/uL Final   04/24/2023 2.30 (L) 4.00 - 5.20 x10E12/L Final   01/31/2023 2.20 (L) 4.00 - 5.20 x10E12/L Final   01/12/2023 2.16 (L) 4.00 - 5.20 x10E12/L Final     Hemoglobin   Date Value Ref Range Status   11/17/2023 7.5 (L) 12.0 - 16.0 g/dL Final   04/24/2023 7.4 (L) 12.0 - 16.0 g/dL Final   01/31/2023 7.1 (L) 12.0 - 16.0 g/dL Final   01/12/2023 7.0 (L) 12.0 - 16.0 g/dL Final     Hematocrit   Date Value Ref Range Status   11/17/2023 21.9 (L) 36.0 - 46.0 % Final   04/24/2023 21.0 (L) 36.0 - 46.0 % Final   01/31/2023 20.0 (L) 36.0 - 46.0 % Final   01/12/2023 19.9 (L) 36.0 - 46.0 % Final     MCV   Date/Time Value Ref Range Status   11/17/2023 08:45 PM 92 80 - 100 fL Final   04/24/2023 01:16 PM 91 80 - 100 fL Final   01/31/2023 12:56 PM 91 80 - 100 fL Final   01/12/2023 03:02 PM 92 80 - 100 fL Final     MCH   Date/Time Value Ref Range Status   11/17/2023 08:45 PM 31.4 26.0 - 34.0 pg Final     MCHC   Date/Time Value Ref Range Status   11/17/2023 08:45 PM 34.2 32.0 - 36.0 g/dL Final   04/24/2023 01:16 PM 35.2 32.0 - 36.0 g/dL Final   01/31/2023 12:56 PM 35.5 32.0 - 36.0  "g/dL Final   01/12/2023 03:02 PM 35.2 32.0 - 36.0 g/dL Final     RDW   Date/Time Value Ref Range Status   11/17/2023 08:45 PM 16.0 (H) 11.5 - 14.5 % Final   04/24/2023 01:16 PM 17.1 (H) 11.5 - 14.5 % Final   01/31/2023 12:56 PM 17.2 (H) 11.5 - 14.5 % Final   01/12/2023 03:02 PM 19.3 (H) 11.5 - 14.5 % Final     Platelets   Date/Time Value Ref Range Status   11/17/2023 08:45  (H) 150 - 450 x10*3/uL Final   04/24/2023 01:16  (H) 150 - 450 x10E9/L Final   01/31/2023 12:56  (H) 150 - 450 x10E9/L Final   01/12/2023 03:02  (H) 150 - 450 x10E9/L Final     No results found for: \"MPV\"  Neutrophils %   Date/Time Value Ref Range Status   11/17/2023 08:45 PM 47.6 40.0 - 80.0 % Final   04/24/2023 01:16 PM 59.3 40.0 - 80.0 % Final   01/31/2023 12:56 PM 62.4 40.0 - 80.0 % Final   01/12/2023 03:02 PM 54.9 40.0 - 80.0 % Final     Immature Granulocytes %, Automated   Date/Time Value Ref Range Status   11/17/2023 08:45 PM 0.4 0.0 - 0.9 % Final     Comment:     Immature Granulocyte Count (IG) includes promyelocytes, myelocytes and metamyelocytes but does not include bands. Percent differential counts (%) should be interpreted in the context of the absolute cell counts (cells/UL).   04/24/2023 01:16 PM 0.7 0.0 - 0.9 % Final     Comment:      Immature Granulocyte Count (IG) includes promyelocytes,    myelocytes and metamyelocytes but does not include bands.   Percent differential counts (%) should be interpreted in the   context of the absolute cell counts (cells/L).     01/31/2023 12:56 PM 0.5 0.0 - 0.9 % Final     Comment:      Immature Granulocyte Count (IG) includes promyelocytes,    myelocytes and metamyelocytes but does not include bands.   Percent differential counts (%) should be interpreted in the   context of the absolute cell counts (cells/L).     01/12/2023 03:02 PM 0.3 0.0 - 0.9 % Final     Comment:      Immature Granulocyte Count (IG) includes promyelocytes,    myelocytes and metamyelocytes but does " not include bands.   Percent differential counts (%) should be interpreted in the   context of the absolute cell counts (cells/L).       Lymphocytes %   Date/Time Value Ref Range Status   11/17/2023 08:45 PM 45.3 13.0 - 44.0 % Final   04/24/2023 01:16 PM 32.1 13.0 - 44.0 % Final   01/31/2023 12:56 PM 30.7 13.0 - 44.0 % Final   01/12/2023 03:02 PM 38.4 13.0 - 44.0 % Final     Monocytes %   Date/Time Value Ref Range Status   11/17/2023 08:45 PM 4.9 2.0 - 10.0 % Final   04/24/2023 01:16 PM 5.9 2.0 - 10.0 % Final   01/31/2023 12:56 PM 5.0 2.0 - 10.0 % Final   01/12/2023 03:02 PM 4.6 2.0 - 10.0 % Final     Eosinophils %   Date/Time Value Ref Range Status   11/17/2023 08:45 PM 1.0 0.0 - 6.0 % Final   04/24/2023 01:16 PM 1.0 0.0 - 6.0 % Final   01/31/2023 12:56 PM 0.8 0.0 - 6.0 % Final   01/12/2023 03:02 PM 1.2 0.0 - 6.0 % Final     Basophils %   Date/Time Value Ref Range Status   11/17/2023 08:45 PM 0.8 0.0 - 2.0 % Final   04/24/2023 01:16 PM 1.0 0.0 - 2.0 % Final   01/31/2023 12:56 PM 0.6 0.0 - 2.0 % Final   01/12/2023 03:02 PM 0.6 0.0 - 2.0 % Final     Neutrophils Absolute   Date/Time Value Ref Range Status   11/17/2023 08:45 PM 7.67 1.20 - 7.70 x10*3/uL Final     Comment:     Percent differential counts (%) should be interpreted in the context of the absolute cell counts (cells/uL).   04/24/2023 01:16 PM 7.10 1.20 - 7.70 x10E9/L Final   01/31/2023 12:56 PM 10.68 (H) 1.20 - 7.70 x10E9/L Final   01/12/2023 03:02 PM 7.33 1.20 - 7.70 x10E9/L Final     Immature Granulocytes Absolute, Automated   Date/Time Value Ref Range Status   11/17/2023 08:45 PM 0.06 0.00 - 0.70 x10*3/uL Final     Lymphocytes Absolute   Date/Time Value Ref Range Status   11/17/2023 08:45 PM 7.31 (H) 1.20 - 4.80 x10*3/uL Final   04/24/2023 01:16 PM 3.84 1.20 - 4.80 x10E9/L Final   01/31/2023 12:56 PM 5.24 (H) 1.20 - 4.80 x10E9/L Final   01/12/2023 03:02 PM 5.13 (H) 1.20 - 4.80 x10E9/L Final     Monocytes Absolute   Date/Time Value Ref Range Status  "  11/17/2023 08:45 PM 0.79 0.10 - 1.00 x10*3/uL Final   04/24/2023 01:16 PM 0.71 0.10 - 1.00 x10E9/L Final   01/31/2023 12:56 PM 0.85 0.10 - 1.00 x10E9/L Final   01/12/2023 03:02 PM 0.61 0.10 - 1.00 x10E9/L Final     Eosinophils Absolute   Date/Time Value Ref Range Status   11/17/2023 08:45 PM 0.16 0.00 - 0.70 x10*3/uL Final   04/24/2023 01:16 PM 0.12 0.00 - 0.70 x10E9/L Final   01/31/2023 12:56 PM 0.13 0.00 - 0.70 x10E9/L Final   01/12/2023 03:02 PM 0.16 0.00 - 0.70 x10E9/L Final     Basophils Absolute   Date/Time Value Ref Range Status   11/17/2023 08:45 PM 0.13 (H) 0.00 - 0.10 x10*3/uL Final   04/24/2023 01:16 PM 0.12 (H) 0.00 - 0.10 x10E9/L Final   01/31/2023 12:56 PM 0.11 (H) 0.00 - 0.10 x10E9/L Final   01/12/2023 03:02 PM 0.08 0.00 - 0.10 x10E9/L Final       No components found for: \"PT\"  aPTT   Date/Time Value Ref Range Status   03/11/2020 11:34 AM 30 28 - 38 sec Final     Comment:       THE APTT IS NO LONGER USED FOR MONITORING     UNFRACTIONATED HEPARIN THERAPY.    FOR MONITORING HEPARIN THERAPY,     USE THE HEPARIN ASSAY.     09/13/2019 07:00 AM 35 28 - 38 sec Final     Comment:       THE APTT IS NO LONGER USED FOR MONITORING     UNFRACTIONATED HEPARIN THERAPY.    FOR MONITORING HEPARIN THERAPY,     USE THE HEPARIN ASSAY.         Assessment/Plan    - fuv in 3 months  - labs pending   - refer to podiatry for left foot pain  - SW to see pt for housing concerns  - rx refills sent  - continue oxybryta and hydroxyurea     HbSS disease: Continue folic acid and hydroxyurea as prescribed.  Hgb 6.9, T, ari 2.6, retics .255, HbF 16.3%, . We discussed voxelotor today to help raise hgb, reduce sickling and reduce lysis labs.  Discussed MOA and side effects.  Mary Alice would like to start medication, will send enrollment to [facility]. Started oxybryta but didn't do well with nausea. She will start will taking just one pill with a meal for a month and then go to two. Will revisit at next visit. Did not stop for " labs.         Reticulocytosis: Noted, discussed wearing O2 nightly to reduce sickling.  Mary Alice had a sleep study completed which did not indicate any sleep-disordered breathing or need for CPAP titration.      Right ingrown toenail:  Referred to podiatry for evaluation, missed previous appointment, referred  Continue epsom salt soaks.    Chronic pain: Patient continues with ketamine infusions, dates have been fragmented due to staffing shortages and infusion location changes ([facility]), which have impacted scheduling.  Appointment in June 2022 was cancelled, Mary Alice will explore outside providers for ketamine infusion, she has scheduled an appointment at [facility]. Methadone 5mg BID restarted.  Has ketamine at [facility] [place] scheduled monthly- had 3 so far.  Taking gabapentin TID- changed pills to 300mg capsule.     Recent fall:  Refer to orthopedics for evaluation of right arm pain, left knee pain, appointment upcoming.    Iron overload: Mary Alice approved for desferal infusion, ferritin today 2016.  Mary Alice will need Cardiac/Liver MRI, sent new rx and new PA to  spec- pt with high co-pay of $49 per dose of drug but will cover nursing care and supplies. Will discuss Jadenu at [facility].     Left breast lump:  Mammogram 8/2022 revealed a fibroadenoma with recommendations for repeat imaging 2/2023, biopsy 10/21/22 benign.Has repeat mammogram scheduled.      Psych: Follows with Dr. May, appointment upcoming.  Grieving loss of best friend, father and father of her children in the last 2 years.  Referred to psych, provided resources for the children, which were sent to Mary Alice via email.  Mary Alice states that she is still sad at times, but feeling a little better.  Will continue to monitor. missed fuv, will reschedule     Vascular access:  Mary Alice is completing dental care; once dental care is up to date (cleaning and extractions/fillings completed), will refer to IR for consultation for vascular access port. would like to be  reassessed for ports- referred to IR       Health Maintenance:   Dental:  follows with [facility], upcoming appointment has follow up coming up.    Optho: 7/2020    PCP: sees Dr. Mcadniel, last visit 5/2021, will need follow up, Mary Alice will call to schedule.     ECHO: 2020- cardiology in 2022.    MRI: 2019   Immunizations: influenza 9/9/2021, Prevnar 11/19/2020, PPSV23 1/28/21.  Pfizer covid-19 vaccine series complete, eligible for booster if not yet completed.      Headaches/Neuropathic pain: Patient recently seen in neurology, prescribed verapamil for daily treatment, prescribed ubrelvy for abortive therapy.  Last visit 12/15/21, referred to Dr. Ray for follow up. Current pt with neuro- has fuv next week.      Abdominal pain:  Seen May 2022 in Dr. Bolanos's office, suspected endometriosis, recommended IUD contraception for long-term management; transvaginal u/s ordered for surveillance of ovarian cyst, referred to pelvic floor therapy.  Currently prescribed norethindrone by GYN.    Benign follicular cyst, patient states voice changes:  referred to ENT, seen 6/2021, omeprazole started, plan to follow up with possible scope if symptoms persist.  Dendonis states that symptoms have returned, referred to ENT. re-referred for dysphagia      Neuropathy:  Mary Alice states that this has persisted, referred.    Vitamin D deficiency:  Previously noted, cholecalciferol 2000IU, check at [facility].    Psychosocial:  Program SW to assist with resources for Mary Alice to contact her property management company regarding restoration of electricity and gas to all parts of her home.SW to contact for support.     Integumentary:  Previously referred to dermatology for evaluation of scalp scabs and abscess at previous appointment. Pilonidal cyst- has history and current is not inflamed but painful. Stat referral for dermatology.- upcoming appt.        Problem List Items Addressed This Visit    None           Adriana Coelho,  APRN-CNP

## 2023-12-18 NOTE — TELEPHONE ENCOUNTER
[facility] in [place] calls today to discuss a recent script that was sent in to them for Gabapentin 100mg. Patient's insurance will only cover 6 capsules/day so they are inquiring if the team wants to send in a new script of Gabapentin 300mg take q8 hours.  Message sent to Sickle Cell team.

## 2023-12-19 ENCOUNTER — TELEPHONE (OUTPATIENT)
Dept: ADMISSION | Facility: HOSPITAL | Age: 43
End: 2023-12-19
Payer: COMMERCIAL

## 2023-12-19 DIAGNOSIS — D57.00 SICKLE CELL DISEASE WITH CRISIS (MULTI): ICD-10-CM

## 2023-12-19 RX ORDER — HYDROMORPHONE HYDROCHLORIDE 4 MG/1
4 TABLET ORAL 3 TIMES DAILY PRN
Qty: 40 TABLET | Refills: 0 | Status: SHIPPED | OUTPATIENT
Start: 2023-12-19 | End: 2024-01-05 | Stop reason: SDUPTHER

## 2023-12-19 NOTE — TELEPHONE ENCOUNTER
Mary Alice Aragon called the refill line for hydromorphone. Requesting refills be sent to [facility] pharmacy; message sent to Sickle Cell team to submit.

## 2023-12-19 NOTE — PROGRESS NOTES
LISW met with patient during outpatient sickle cell appointment for ongoing assessment and support. Pt utilized time with LISW to discuss her plans to move. LISW provided pt with empathetic listening and support throughout visit. Additionally, pt requested letter for children's school. Letter completed by Adriana Coelho CNP and provided to pt via email. Patient denies any further psychosocial or support service needs at this time. Patient encouraged to contact LISW if any needs arise.     LISW will continue to follow as needed.

## 2023-12-20 LAB
HEMOGLOBIN A2: 3.1 % (ref 2–3.5)
HEMOGLOBIN F: 19.8 % (ref 0–2)
HEMOGLOBIN IDENTIFICATION INTERPRETATION: ABNORMAL
HEMOGLOBIN S: 77.1 %
PATH REVIEW-HGB IDENTIFICATION: ABNORMAL

## 2023-12-28 PROCEDURE — RXMED WILLOW AMBULATORY MEDICATION CHARGE

## 2023-12-29 ENCOUNTER — PHARMACY VISIT (OUTPATIENT)
Dept: PHARMACY | Facility: CLINIC | Age: 43
End: 2023-12-29
Payer: MEDICARE

## 2024-01-04 ENCOUNTER — TELEPHONE (OUTPATIENT)
Dept: ADMISSION | Facility: HOSPITAL | Age: 44
End: 2024-01-04
Payer: COMMERCIAL

## 2024-01-04 DIAGNOSIS — D57.00 SICKLE CELL DISEASE WITH CRISIS (MULTI): ICD-10-CM

## 2024-01-04 NOTE — TELEPHONE ENCOUNTER
Pt requesting refill   Dilaudid 4mg. 1 tablet TID prn  Pharmacy on file verified.   Last FUV 12/18

## 2024-01-05 ENCOUNTER — TELEPHONE (OUTPATIENT)
Dept: ADMISSION | Facility: HOSPITAL | Age: 44
End: 2024-01-05
Payer: COMMERCIAL

## 2024-01-05 RX ORDER — METHADONE HYDROCHLORIDE 10 MG/1
10 TABLET ORAL EVERY 12 HOURS
Qty: 60 TABLET | Refills: 0 | Status: SHIPPED | OUTPATIENT
Start: 2024-01-05 | End: 2024-04-22 | Stop reason: SDUPTHER

## 2024-01-05 RX ORDER — HYDROMORPHONE HYDROCHLORIDE 4 MG/1
4 TABLET ORAL 3 TIMES DAILY PRN
Qty: 40 TABLET | Refills: 0 | Status: SHIPPED | OUTPATIENT
Start: 2024-01-05 | End: 2024-01-31 | Stop reason: SDUPTHER

## 2024-01-05 NOTE — TELEPHONE ENCOUNTER
Pt called to check on status of prescription refills and was informed that dilaudid and methadone were refilled today.  She wanted to make team aware that she stopped oxbryta about a week ago after speaking to a pharmacist about increased risk of clotting on the medication.  She correlates this to the issue she is having with her right great toe which she identified as a clot.  She has upcoming appt with podiatry for this.

## 2024-01-05 NOTE — TELEPHONE ENCOUNTER
Per Adriana Coelho CNP, pt should go to podiatry appt and medication can be discussed at her next follow up appt.  Pt aware.  No further needs at this time.

## 2024-01-09 ENCOUNTER — TELEPHONE (OUTPATIENT)
Dept: ADMISSION | Facility: HOSPITAL | Age: 44
End: 2024-01-09
Payer: COMMERCIAL

## 2024-01-09 NOTE — TELEPHONE ENCOUNTER
Pt called stating that paperwork was faxed to the office Friday by her insurance company, and she is requesting for it to be filled out. She said it's so her insurance policy doesn't lapse. Message sent to the team.

## 2024-01-17 RX ORDER — DEFERASIROX 180 MG/1
TABLET, FILM COATED ORAL
COMMUNITY
Start: 2023-10-23

## 2024-01-17 RX ORDER — CEPHALEXIN 500 MG/1
CAPSULE ORAL
COMMUNITY
Start: 2023-12-17

## 2024-01-17 RX ORDER — KETAMINE HYDROCHLORIDE 10 MG/ML
INJECTION, SOLUTION INTRAMUSCULAR; INTRAVENOUS
COMMUNITY

## 2024-01-18 ENCOUNTER — TELEMEDICINE (OUTPATIENT)
Dept: BEHAVIORAL HEALTH | Facility: HOSPITAL | Age: 44
End: 2024-01-18
Payer: MEDICARE

## 2024-01-18 DIAGNOSIS — F41.9 ANXIETY: ICD-10-CM

## 2024-01-18 DIAGNOSIS — F33.0 MILD EPISODE OF RECURRENT MAJOR DEPRESSIVE DISORDER (CMS-HCC): ICD-10-CM

## 2024-01-18 PROCEDURE — 90833 PSYTX W PT W E/M 30 MIN: CPT | Performed by: PSYCHIATRY & NEUROLOGY

## 2024-01-18 PROCEDURE — 99214 OFFICE O/P EST MOD 30 MIN: CPT | Performed by: PSYCHIATRY & NEUROLOGY

## 2024-01-18 NOTE — PROGRESS NOTES
Outpatient Psychiatry FUV      Subjective   Mary Alice Aragon, a 43 y.o. female, for virtual FUV    Assessment/Plan   Patient Discussion:  RESUME sertraline 50mg daily, can increase to 2x/day in 2 weeks  NOT currently trazodone 100-200mg at bedtime  CONTINUE hydroxyzine 25mg 1/2 tablet as needed for anxiety  take melatonin 2 hours before preferred bedtime  continue with good sleep hygiene    Previously placed referral to therapy, continue to consider    CONTINUE with light box for 20 minutes per day at 10,000lux. position above you but so it reaches your eyes. You don't need to look directly into the light    Follow up  2/15 at 9AM virtual FUV  Phone check in 1/25 at noon    Call with questions or concerns [phone]        988 Crisis hotline if any further SI    Assessment:   43 y.o.  F with Hgb SS disease, h/o MCA with R frontal encephalomalacia, h/o lead poisoning, chronic pain managed by ketamine referred to psychiatry for depression and anxiety. History of trauma though not classic PTSD. Pt reports increased depression and anxiety related to stressors at home as well as limited support.     On virtual FUV today, pt reports worsening mood related to stress around housing. Discussed SI with plan last month but didn't act 2/2 worry about her kids, no current SI/plan. Stopped medication so will plan to restart. Will contact  for support, discussed [phone] if further SI. follow up scheduled 1 month, phone check in 1 week, sooner if needed     Diagnosis:   MDD, recurrent, severe  Other specified anxiety, likely component of depression    Treatment Plan/Recommendations:   1. Safety Assessment: over holiday +SI with plan but did not act, worried about kids being affected but continues to have passive thoughts, no plan, kids are protective. Pt also stopped meds so will restart. No h/o attempts. RF include single, pain, depression. PF include kids, no guns, seeking treatment, F/age/race. Low imminent but increased risk  with recent SI +plan that was not acted on, reviewed safety plan including  suicide hotline/text line [phone]    2. MDD, recurrent severe other spec anxiety-component of depression vs NICHOL--  RESUME sertraline 50mg PO daily or 25mg PO BID  CONTINUE trazodone 100-200mg PO QHS, r/b/ae discussed including excess serotonin though currently low doses. can decrease if sleep improves. Not using regularly  use hydroxyzine 12.5mg PO BID PRN anxiety  continue melatonin 10mg 2 hours before bed. continue good sleep hygiene    CAN USE lightbox at 10,000lux for 20 minutes/day.   PHQ 9---11-->9-->15-->13, deferred related to time  have previously discussed therapy, follow up on referral. Notes kids are in therapy now  will continue with supportive therapy during appt    Discussed case with JENNIFER Man to see if increased support around housing and emotional support    3. Medical: notes and labs reviewed, recently saw neuro for migraines. pain/ankle pain better on O2 at night, saw ortho, rec PT.   recent sleep study, no longer needs O2 at night, now wonders if she should get another updated sleep study  now back on ketamine though having spasms, less helpful for mood  breast mass bx benign   Recent increased pain    4. Social: 2 kids at home, limited support. works at [employer] but has not been able to related to health, notes some help from aunt, increased stress around housing, feels stuck      Reason for Visit:   FUV for mood/anxiety    Subjective:  Last seen 1 month ago  Since then continues to have stress related to house   Trying to find a new place but money is tight, not able to work 2/2 health  Stress is making health worse  Feels stuck  Looking for new place later today  Stopped meds last month related to stress    Others don't understand when in pain  Feels alone, limited support    Discussed over [place] had SI with plan for CO poisoning but then was worried it could get to the kids. No longer having this thought or  "plan but still discussed feeling hopeless and thinking about it she has a pain crisis going to hospital and \"that would be it.\" Discussed safety plan with patient including reaching out to MD, JENNIFER, crisis hotline. Pt agreeable and feels she is not presently planning or thinking about ending her life. States, \"I am tired of suffering.\"    Pt agreeable for MD to contact SW. Spoke with Krista Man who reports she spoke with the patient earlier today about paperwork. Discussed recent worsening mood, SI over holidays, and stopping meds. JENNIFER will follow up later today or tomorrow, follow more closely. Discussed MD phone check in planned for next week.      Current Medications:    Current Outpatient Medications:     cephalexin (Keflex) 500 mg capsule, TAKE 1 CAPSULE BY MOUTH EVERY 6 HOURS FOR 10 DAYS., Disp: , Rfl:     deferasirox (Jadenu) 180 mg tablet, , Disp: , Rfl:     acetaminophen (Tylenol Extra Strength) 500 mg tablet, 2 tablet EVERY 6 HOURS (route: oral), Disp: , Rfl:     ascorbic acid (Vitamin C) 500 mg tablet, 2 tablet DAILY (route: oral), Disp: , Rfl:     calcium carbonate (Tums) 200 mg calcium chewable tablet, Chew., Disp: , Rfl:     capsaicin (Zostrix) 0.025 % cream, Apply as directed as needed, Disp: , Rfl:     cyclobenzaprine (Flexeril) 10 mg tablet, 1 tablet 3 TIMES DAILY (route: oral), Disp: , Rfl:     deferoxamine (Desferal) 2 gram injection, Per instructions DAILY (route: injection), Disp: , Rfl:     diclofenac sodium (Voltaren) 1 % gel gel, Per instructions 4 TIMES DAILY (route: topical), Disp: 350 g, Rfl: 2    diphenhydrAMINE (BENADryl) 25 mg capsule, 1 capsule 3 TIMES DAILY (route: oral), Disp: , Rfl:     docusate sodium (Colace) 100 mg capsule, 1 capsule DAILY (route: oral), Disp: , Rfl:     folic acid (Folvite) 1 mg tablet, 1 tablet DAILY (route: oral), Disp: , Rfl:     gabapentin (Neurontin) 300 mg capsule, Take 1 capsule (300 mg) by mouth 3 times a day., Disp: 90 capsule, Rfl: 11    HYDROmorphone " (Dilaudid) 4 mg tablet, 1 tablet EVERY 4 HOURS (route: oral), Disp: , Rfl:     HYDROmorphone (Dilaudid) 4 mg tablet, Take 1 tablet (4 mg) by mouth 3 times a day as needed for severe pain (7 - 10)., Disp: 40 tablet, Rfl: 0    hydroxyurea (Hydrea) 500 mg capsule, Per instructions AS DIRECTED (route: oral), Disp: 68 capsule, Rfl: 2    hydrOXYzine HCL (Atarax) 25 mg tablet, 1 tablet 2 TIMES DAILY (route: oral), Disp: , Rfl:     ibuprofen 800 mg tablet, Take 1 tablet (800 mg) by mouth every 8 hours if needed for moderate pain (4 - 6)., Disp: 60 tablet, Rfl: 2    ketamine (Ketalar) 10 mg/mL injection, Infuse into a venous catheter., Disp: , Rfl:     lidocaine (lidocaine HCL) 4 % cream, USE TOPICALLY AS DIRECTED., Disp: , Rfl:     loratadine (Claritin) 10 mg tablet, Take 1 tablet (10 mg) by mouth once daily., Disp: , Rfl:     melatonin 3 mg tablet, Take 2 tablets (6 mg) by mouth as needed at bedtime., Disp: , Rfl:     meloxicam (Mobic) 15 mg tablet, Take 1 tablet (15 mg) by mouth once daily., Disp: , Rfl:     methadone (Dolophine) 10 mg tablet, Take 1 tablet (10 mg) by mouth every 12 hours., Disp: 60 tablet, Rfl: 0    multivitamin tablet, 1 tablet DAILY (route: oral), Disp: , Rfl:     naloxone (Narcan) 4 mg/0.1 mL nasal spray, Per instructions AS NEEDED (route: nasal), Disp: , Rfl:     norethindrone (Aygestin) 5 mg tablet, Take 1 tablet (5 mg) by mouth once daily., Disp: 90 tablet, Rfl: 3    omeprazole (PriLOSEC) 40 mg DR capsule, 1 capsule DAILY (route: oral), Disp: , Rfl:     oxygen (O2) gas therapy, Inhale 3 L/min.  3L O2 WITH ACTIVITY AS NEEDED 3L O2 AT NIGHT AS NEEDED., Disp: , Rfl:     prochlorperazine (Compazine) 10 mg tablet, 1 tablet EVERY 8 HOURS (route: oral), Disp: , Rfl:     senna 8.6 mg tablet, Take 1 tablet (8.6 mg) by mouth 2 times a day as needed for constipation., Disp: 30 tablet, Rfl: 2    sertraline (Zoloft) 50 mg tablet, Take 1.5 tablets (75 mg) by mouth 2 times a day., Disp: 60 tablet, Rfl: 2     tiZANidine (Zanaflex) 2 mg tablet, Take 1-2 tablets (2-4 mg) by mouth every 8 hours if needed for muscle spasms., Disp: 40 tablet, Rfl: 2    traZODone (Desyrel) 100 mg tablet, Take 1-2 tablets (100-200 mg) by mouth as needed at bedtime for sleep., Disp: 60 tablet, Rfl: 2    trolamine salicylate (Aspercreme) 10 % cream, Per instructions 4 TIMES DAILY (route: topical), Disp: , Rfl:     ubrogepant (Ubrelvy) 50 mg tablet, 1 tablet DAILY (route: oral), Disp: , Rfl:     verapamil SR (Calan-SR) 180 mg ER tablet, 1 tablet DAILY (route: oral), Disp: , Rfl:     Vitamin D3 25 mcg (1,000 unit) tablet, Take 1 tablet (25 mcg) by mouth once daily., Disp: , Rfl:     voxelotor (OXBRYTA) 500 mg tablet tablet, TAKE THREE (3) TABLETS BY MOUTH ONCE DAILY., Disp: 90 tablet, Rfl: 3  Medical History:  Past Medical History:   Diagnosis Date    Unspecified astigmatism, bilateral 2020    Astigmatism, bilateral       Surgical History:  Past Surgical History:   Procedure Laterality Date     SECTION, CLASSIC  2016     Section    CHOLECYSTECTOMY  2016    Cholecystectomy    MR HEAD ANGIO W AND WO IV CONTRAST  4/15/2013    MR HEAD ANGIO W AND WO IV CONTRAST 4/15/2013 [facility] CLINICAL LEGACY    MR HEAD ANGIO W AND WO IV CONTRAST  2013    MR HEAD ANGIO W AND WO IV CONTRAST 2013 [facility] INPATIENT LEGACY    MR HEAD ANGIO WO IV CONTRAST  2014    MR HEAD ANGIO WO IV CONTRAST 2014 [facility] ANCILLARY LEGACY    MR HEAD ANGIO WO IV CONTRAST  2016    MR HEAD ANGIO WO IV CONTRAST 2016 [facility] ANCILLARY LEGACY    MR HEAD ANGIO WO IV CONTRAST  2019    MR HEAD ANGIO WO IV CONTRAST 2019 [facility] ANCILLARY LEGACY    MR HEAD ANGIO WO IV CONTRAST  2017    MR HEAD ANGIO WO IV CONTRAST 2017 [facility] INPATIENT LEGACY    MR NECK ANGIO WO IV CONTRAST  2013    MR NECK ANGIO WO IV CONTRAST 2013 [facility] INPATIENT LEGACY    MR NECK ANGIO WO IV CONTRAST  2014    MR NECK ANGIO WO IV CONTRAST 2014 [facility] [place] [facility] ANCILLARY  "LEGACY    OTHER SURGICAL HISTORY  06/16/2016    Brain Surgery    TUBAL LIGATION  04/10/2017    Tubal Ligation       Family History:  Family History   Problem Relation Name Age of Onset    Sickle cell trait Sister      Multiple sclerosis Brother      Breast cancer Maternal Grandmother      Breast cancer Other paternal cousin     Sickle cell trait Child         Social History:  Social History     Socioeconomic History    Marital status: Single     Spouse name: Not on file    Number of children: Not on file    Years of education: Not on file    Highest education level: Not on file   Occupational History    Not on file   Tobacco Use    Smoking status: Never    Smokeless tobacco: Never   Substance and Sexual Activity    Alcohol use: Not Currently    Drug use: Not on file    Sexual activity: Not on file   Other Topics Concern    Not on file   Social History Narrative    Not on file     Social Determinants of Health     Financial Resource Strain: Not on file   Food Insecurity: Not on file   Transportation Needs: Not on file   Physical Activity: Not on file   Stress: Not on file   Social Connections: Not on file   Intimate Partner Violence: Not on file   Housing Stability: Not on file         Medical Review Of Systems:  +increased pain  +O2 at night      Psychiatric Review Of Systems:  Per HPI       Objective   Mental Status Exam:     Appearance: dressed casually. Appropriate for weather  Attitude: cooperative, engaged.   Behavior: appropriate eye contact via video.   Motor Activity: spontaneous movement.   Speech: regular rate, low volume. +accent. no dysarthria, no aphasia.   Mood: \"depressed\".   Affect: congruent, dysphoric.   Thought Process: no KIRIT, on topic.   Thought Content: no delusions, passive SI but no current plan/intent, no HI.   Thought Perception: no AVH,.   Cognition: alert, oriented to person, place, time. attn intact. MAX h/o stroke with frontal lobe encephalomalacia.   Insight: fair.   Judgment: fair.    "   Vitals:  There were no vitals filed for this visit. Deferred for virtual visit  No results found for this or any previous visit (from the past 4464 hour(s)).  Lab Results   Component Value Date    WBC 11.9 (H) 12/18/2023    HGB 6.7 (L) 12/18/2023    HCT 19.4 (L) 12/18/2023    MCV 92 12/18/2023     (H) 12/18/2023     Lab Results   Component Value Date    GLUCOSE 92 12/18/2023    CALCIUM 8.9 12/18/2023     12/18/2023    K 3.8 12/18/2023    CO2 24 12/18/2023     12/18/2023    BUN 9 12/18/2023    CREATININE 1.01 12/18/2023       Time spent in therapy 28 minutes  type supportive, problem solving  target mood, anxiety, grief  techniques reframing, processing, goal setting  response moderate-good  goal decreased sx burden, improved management  follow up 1-2 months       Cinthia May MD

## 2024-01-24 ENCOUNTER — HOSPITAL ENCOUNTER (OUTPATIENT)
Dept: RADIOLOGY | Facility: EXTERNAL LOCATION | Age: 44
Discharge: HOME | End: 2024-01-24

## 2024-01-24 DIAGNOSIS — M54.9 BACK PAIN DUE TO INJURY: ICD-10-CM

## 2024-01-25 ENCOUNTER — TELEPHONE (OUTPATIENT)
Dept: BEHAVIORAL HEALTH | Facility: HOSPITAL | Age: 44
End: 2024-01-25
Payer: COMMERCIAL

## 2024-01-25 NOTE — TELEPHONE ENCOUNTER
Spoke with patient, notes feeling better since restarted medicine, though feels mixed about needing a medicine  Went back to work to see if that helped her mood and hurt back, feels like a sign that shouldn't be working  Increased sickle cell pain and now new pain with strained muscle in back    Did find a new place in [place]  They did fix the pipe at current place but nothing else    Discussed SI, pt describes more passive thoughts presently. Very focused on finding new place for her kids. Still feels doesn't get to focus on self but hopeful when in a better situation for housing can start to focus on health and feel better    Next appt 2/15  Will check in again via phone 2/1 at 12pm

## 2024-01-31 ENCOUNTER — TELEPHONE (OUTPATIENT)
Dept: ADMISSION | Facility: HOSPITAL | Age: 44
End: 2024-01-31
Payer: COMMERCIAL

## 2024-01-31 DIAGNOSIS — D57.00 SICKLE CELL DISEASE WITH CRISIS (MULTI): ICD-10-CM

## 2024-01-31 RX ORDER — HYDROMORPHONE HYDROCHLORIDE 4 MG/1
4 TABLET ORAL 3 TIMES DAILY PRN
Qty: 40 TABLET | Refills: 0 | Status: SHIPPED | OUTPATIENT
Start: 2024-01-31 | End: 2024-02-22 | Stop reason: SDUPTHER

## 2024-01-31 RX ORDER — TIZANIDINE 2 MG/1
2-4 TABLET ORAL EVERY 8 HOURS PRN
Qty: 40 TABLET | Refills: 2 | Status: SHIPPED | OUTPATIENT
Start: 2024-01-31 | End: 2024-06-06 | Stop reason: SDUPTHER

## 2024-01-31 NOTE — TELEPHONE ENCOUNTER
Pt called requesting refills on her Zanaflex 2mg and Dilaudid 4mg to be sent to [facility] on file. Message sent to the team.

## 2024-02-01 ENCOUNTER — TELEPHONE (OUTPATIENT)
Dept: BEHAVIORAL HEALTH | Facility: HOSPITAL | Age: 44
End: 2024-02-01
Payer: COMMERCIAL

## 2024-02-01 NOTE — TELEPHONE ENCOUNTER
Called to check in  Has a tooth ache  Mood ok other than tooth  Asks for letter to keep her cat for new place  Agreed to check in again next week, next appt 2/15

## 2024-02-04 DIAGNOSIS — F41.9 ANXIETY: ICD-10-CM

## 2024-02-04 DIAGNOSIS — F33.0 MILD EPISODE OF RECURRENT MAJOR DEPRESSIVE DISORDER (CMS-HCC): ICD-10-CM

## 2024-02-05 DIAGNOSIS — F33.0 MILD EPISODE OF RECURRENT MAJOR DEPRESSIVE DISORDER (CMS-HCC): ICD-10-CM

## 2024-02-05 DIAGNOSIS — F41.9 ANXIETY: ICD-10-CM

## 2024-02-05 RX ORDER — SERTRALINE HYDROCHLORIDE 50 MG/1
75 TABLET, FILM COATED ORAL 2 TIMES DAILY
Qty: 270 TABLET | OUTPATIENT
Start: 2024-02-05

## 2024-02-05 RX ORDER — TRAZODONE HYDROCHLORIDE 100 MG/1
TABLET ORAL
Qty: 180 TABLET | Refills: 0 | OUTPATIENT
Start: 2024-02-05

## 2024-02-05 RX ORDER — SERTRALINE HYDROCHLORIDE 50 MG/1
50 TABLET, FILM COATED ORAL 2 TIMES DAILY
Qty: 180 TABLET | Refills: 1 | Status: SHIPPED | OUTPATIENT
Start: 2024-02-05

## 2024-02-05 RX ORDER — TRAZODONE HYDROCHLORIDE 100 MG/1
TABLET ORAL
Qty: 180 TABLET | Refills: 0 | Status: SHIPPED | OUTPATIENT
Start: 2024-02-05 | End: 2024-03-14 | Stop reason: SDUPTHER

## 2024-02-06 ENCOUNTER — SPECIALTY PHARMACY (OUTPATIENT)
Dept: PHARMACY | Facility: CLINIC | Age: 44
End: 2024-02-06

## 2024-02-06 PROCEDURE — RXMED WILLOW AMBULATORY MEDICATION CHARGE

## 2024-02-12 ENCOUNTER — TELEPHONE (OUTPATIENT)
Dept: BEHAVIORAL HEALTH | Facility: HOSPITAL | Age: 44
End: 2024-02-12
Payer: COMMERCIAL

## 2024-02-12 NOTE — TELEPHONE ENCOUNTER
Called pt to check in per plan from last discussion  Had tooth pulled and had a nail in her tire  Son has COVID and conjunctivitis  Also tested for COVID+  Heading home to rest  Will get access code to new [place] tomorrow 2/13/24  Has ketamine Wednesday. Has been able to schedule the rest of the year  Has appt 2/15 at 9AM virtual FUV  Will contact MD if not feeling well enough to keep

## 2024-02-15 ENCOUNTER — TELEMEDICINE (OUTPATIENT)
Dept: BEHAVIORAL HEALTH | Facility: HOSPITAL | Age: 44
End: 2024-02-15
Payer: MEDICARE

## 2024-02-15 DIAGNOSIS — F41.9 ANXIETY: ICD-10-CM

## 2024-02-15 DIAGNOSIS — F33.0 MILD EPISODE OF RECURRENT MAJOR DEPRESSIVE DISORDER (CMS-HCC): ICD-10-CM

## 2024-02-15 PROCEDURE — 99214 OFFICE O/P EST MOD 30 MIN: CPT | Performed by: PSYCHIATRY & NEUROLOGY

## 2024-02-15 PROCEDURE — 1036F TOBACCO NON-USER: CPT | Performed by: PSYCHIATRY & NEUROLOGY

## 2024-02-15 NOTE — PROGRESS NOTES
Outpatient Psychiatry FUV      Subjective   Mary Alice Aragon, a 43 y.o. female, for virtual FUV    Confirmed to be home , started on video, converted to phone 2/2 poor audio quality    Assessment/Plan   Patient Discussion:  CONTINUE sertraline 50mg daily, can increase to 2x/day as tolerated  NOT currently trazodone 100-200mg at bedtime  CONTINUE hydroxyzine 25mg 1/2 tablet as needed for anxiety  take melatonin 2 hours before preferred bedtime  continue with good sleep hygiene    Previously placed referral to therapy, continue to consider    CONTINUE with light box for 20 minutes per day at 10,000lux. position above you but so it reaches your eyes. You don't need to look directly into the light    Follow up  3/21 at 12pm virtual FUV  Phone check in 2/22 around noon    Call with questions or concerns [phone] Crisis hotline if any further SI    Assessment:   43 y.o.  F with Hgb SS disease, h/o MCA with R frontal encephalomalacia, h/o lead poisoning, chronic pain managed by ketamine referred to psychiatry for depression and anxiety. History of trauma though not classic PTSD. Pt reports increased depression and anxiety related to stressors at home as well as limited support.     On virtual FUV today, some improvement in mood, is at new house now follow up scheduled 1 month, phone check in 1 week, sooner if needed     Diagnosis:   MDD, recurrent, mild-moderate  Other specified anxiety, likely component of depression    Treatment Plan/Recommendations:   1. Safety Assessment: over holiday +SI with plan but did not act, worried about kids being affected but continues to have passive thoughts, no plan, kids are protective. Pt also stopped meds so will restart. No h/o attempts. RF include single, pain, depression. PF include kids, no guns, seeking treatment, F/age/race. Low imminent but increased risk with recent SI +plan that was not acted on, reviewed safety plan including  suicide hotline/text line  988/316670    2. MDD, recurrent moderate other spec anxiety-component of depression vs NICHOL--  CONTINUE sertraline 50mg PO daily or 25mg PO BID  CONTINUE trazodone 100-200mg PO QHS, r/b/ae discussed including excess serotonin though currently low doses. can decrease if sleep improves. Not using regularly  use hydroxyzine 12.5mg PO BID PRN anxiety  continue melatonin 10mg 2 hours before bed. continue good sleep hygiene    CAN USE lightbox at 10,000lux for 20 minutes/day.   PHQ 9---11-->9-->15-->13, deferred related to time  have previously discussed therapy, follow up on referral. Notes kids are in therapy now  will continue with supportive therapy during appt    Discussed case with JENNIFER Man 1/2024 to see if increased support around housing and emotional support    3. Medical: notes and labs reviewed, recently saw neuro for migraines. pain/ankle pain better on O2 at night, saw ortho, rec PT.   recent sleep study, no longer needs O2 at night, now wonders if she should get another updated sleep study  now back on ketamine though having spasms, less helpful for mood  breast mass bx benign   Pain better, ketamine 2/14/24, +COVID    4. Social: 2 kids at home, limited support. works at [employer] but has not been able to related to health, notes some help from aunt, found house in [place] and are staying there, will move stuff soon      Reason for Visit:   FUV for mood/anxiety    Subjective:  Last seen 1 month ago though have had weekly check ins  Is now in new house in [place], much happier, better lay out and has heat    Has COVID, still feeling run down    Too busy and too sick to think about mood    Had ketamine yesterday, felt really good while getting the infusion, didn't even feel the COVID sx  Discussed the impact of ketamine on her life to better manage pain.  No further SI    Short visit today but will plan for phone check in 1 week and then 1 month FUV    Current Medications:    Current Outpatient Medications:      acetaminophen (Tylenol Extra Strength) 500 mg tablet, 2 tablet EVERY 6 HOURS (route: oral), Disp: , Rfl:     ascorbic acid (Vitamin C) 500 mg tablet, 2 tablet DAILY (route: oral), Disp: , Rfl:     calcium carbonate (Tums) 200 mg calcium chewable tablet, Chew., Disp: , Rfl:     capsaicin (Zostrix) 0.025 % cream, Apply as directed as needed, Disp: , Rfl:     cephalexin (Keflex) 500 mg capsule, TAKE 1 CAPSULE BY MOUTH EVERY 6 HOURS FOR 10 DAYS., Disp: , Rfl:     cyclobenzaprine (Flexeril) 10 mg tablet, 1 tablet 3 TIMES DAILY (route: oral), Disp: , Rfl:     deferasirox (Jadenu) 180 mg tablet, , Disp: , Rfl:     deferoxamine (Desferal) 2 gram injection, Per instructions DAILY (route: injection), Disp: , Rfl:     diclofenac sodium (Voltaren) 1 % gel gel, Per instructions 4 TIMES DAILY (route: topical), Disp: 350 g, Rfl: 2    diphenhydrAMINE (BENADryl) 25 mg capsule, 1 capsule 3 TIMES DAILY (route: oral), Disp: , Rfl:     docusate sodium (Colace) 100 mg capsule, 1 capsule DAILY (route: oral), Disp: , Rfl:     folic acid (Folvite) 1 mg tablet, 1 tablet DAILY (route: oral), Disp: , Rfl:     gabapentin (Neurontin) 300 mg capsule, Take 1 capsule (300 mg) by mouth 3 times a day., Disp: 90 capsule, Rfl: 11    HYDROmorphone (Dilaudid) 4 mg tablet, 1 tablet EVERY 4 HOURS (route: oral), Disp: , Rfl:     HYDROmorphone (Dilaudid) 4 mg tablet, Take 1 tablet (4 mg) by mouth 3 times a day as needed for severe pain (7 - 10)., Disp: 40 tablet, Rfl: 0    hydroxyurea (Hydrea) 500 mg capsule, Per instructions AS DIRECTED (route: oral), Disp: 68 capsule, Rfl: 2    hydrOXYzine HCL (Atarax) 25 mg tablet, 1 tablet 2 TIMES DAILY (route: oral), Disp: , Rfl:     ibuprofen 800 mg tablet, Take 1 tablet (800 mg) by mouth every 8 hours if needed for moderate pain (4 - 6)., Disp: 60 tablet, Rfl: 2    ketamine (Ketalar) 10 mg/mL injection, Infuse into a venous catheter., Disp: , Rfl:     lidocaine (lidocaine HCL) 4 % cream, USE TOPICALLY AS DIRECTED.,  Disp: , Rfl:     loratadine (Claritin) 10 mg tablet, Take 1 tablet (10 mg) by mouth once daily., Disp: , Rfl:     melatonin 3 mg tablet, Take 2 tablets (6 mg) by mouth as needed at bedtime., Disp: , Rfl:     meloxicam (Mobic) 15 mg tablet, Take 1 tablet (15 mg) by mouth once daily., Disp: , Rfl:     methadone (Dolophine) 10 mg tablet, Take 1 tablet (10 mg) by mouth every 12 hours., Disp: 60 tablet, Rfl: 0    multivitamin tablet, 1 tablet DAILY (route: oral), Disp: , Rfl:     naloxone (Narcan) 4 mg/0.1 mL nasal spray, Per instructions AS NEEDED (route: nasal), Disp: , Rfl:     norethindrone (Aygestin) 5 mg tablet, Take 1 tablet (5 mg) by mouth once daily., Disp: 90 tablet, Rfl: 3    omeprazole (PriLOSEC) 40 mg DR capsule, 1 capsule DAILY (route: oral), Disp: , Rfl:     oxygen (O2) gas therapy, Inhale 3 L/min.  3L O2 WITH ACTIVITY AS NEEDED 3L O2 AT NIGHT AS NEEDED., Disp: , Rfl:     prochlorperazine (Compazine) 10 mg tablet, 1 tablet EVERY 8 HOURS (route: oral), Disp: , Rfl:     senna 8.6 mg tablet, Take 1 tablet (8.6 mg) by mouth 2 times a day as needed for constipation., Disp: 30 tablet, Rfl: 2    sertraline (Zoloft) 50 mg tablet, Take 1 tablet (50 mg) by mouth 2 times a day., Disp: 180 tablet, Rfl: 1    tiZANidine (Zanaflex) 2 mg tablet, Take 1-2 tablets (2-4 mg) by mouth every 8 hours if needed for muscle spasms., Disp: 40 tablet, Rfl: 2    traZODone (Desyrel) 100 mg tablet, TAKE 1 TO 2 TABLETS(100  MG) BY MOUTH AT BEDTIME AS NEEDED FOR SLEEP, Disp: 180 tablet, Rfl: 0    trolamine salicylate (Aspercreme) 10 % cream, Per instructions 4 TIMES DAILY (route: topical), Disp: , Rfl:     ubrogepant (Ubrelvy) 50 mg tablet, 1 tablet DAILY (route: oral), Disp: , Rfl:     verapamil SR (Calan-SR) 180 mg ER tablet, 1 tablet DAILY (route: oral), Disp: , Rfl:     Vitamin D3 25 mcg (1,000 unit) tablet, Take 1 tablet (25 mcg) by mouth once daily., Disp: , Rfl:     voxelotor (OXBRYTA) 500 mg tablet tablet, TAKE THREE (3)  TABLETS BY MOUTH ONCE DAILY., Disp: 90 tablet, Rfl: 3  Medical History:  Past Medical History:   Diagnosis Date    Unspecified astigmatism, bilateral 2020    Astigmatism, bilateral       Surgical History:  Past Surgical History:   Procedure Laterality Date     SECTION, CLASSIC  2016     Section    CHOLECYSTECTOMY  2016    Cholecystectomy    MR HEAD ANGIO W AND WO IV CONTRAST  4/15/2013    MR HEAD ANGIO W AND WO IV CONTRAST 4/15/2013 [facility] CLINICAL LEGACY    MR HEAD ANGIO W AND WO IV CONTRAST  2013    MR HEAD ANGIO W AND WO IV CONTRAST 2013 [facility] INPATIENT LEGACY    MR HEAD ANGIO WO IV CONTRAST  2014    MR HEAD ANGIO WO IV CONTRAST 2014 [facility] ANCILLARY LEGACY    MR HEAD ANGIO WO IV CONTRAST  2016    MR HEAD ANGIO WO IV CONTRAST 2016 [facility] ANCILLARY LEGACY    MR HEAD ANGIO WO IV CONTRAST  2019    MR HEAD ANGIO WO IV CONTRAST 2019 [facility] ANCILLARY LEGACY    MR HEAD ANGIO WO IV CONTRAST  2017    MR HEAD ANGIO WO IV CONTRAST 2017 [facility] INPATIENT LEGACY    MR NECK ANGIO WO IV CONTRAST  2013    MR NECK ANGIO WO IV CONTRAST 2013 [facility] INPATIENT LEGACY    MR NECK ANGIO WO IV CONTRAST  2014    MR NECK ANGIO WO IV CONTRAST 2014 [facility] ANCILLARY LEGACY    OTHER SURGICAL HISTORY  2016    Brain Surgery    TUBAL LIGATION  04/10/2017    Tubal Ligation       Family History:  Family History   Problem Relation Name Age of Onset    Sickle cell trait Sister      Multiple sclerosis Brother      Breast cancer Maternal Grandmother      Breast cancer Other paternal cousin     Sickle cell trait Child         Social History:  Social History     Socioeconomic History    Marital status: Single     Spouse name: Not on file    Number of children: Not on file    Years of education: Not on file    Highest education level: Not on file   Occupational History    Not on file   Tobacco Use    Smoking status: Never    Smokeless tobacco: Never   Substance and Sexual  "Activity    Alcohol use: Not Currently    Drug use: Not on file    Sexual activity: Not on file   Other Topics Concern    Not on file   Social History Narrative    Not on file     Social Determinants of Health     Financial Resource Strain: Not on file   Food Insecurity: Not on file   Transportation Needs: Not on file   Physical Activity: Not on file   Stress: Not on file   Social Connections: Not on file   Intimate Partner Violence: Not on file   Housing Stability: Not on file         Medical Review Of Systems:  +increased pain  +O2 at night      Psychiatric Review Of Systems:  Per HPI       Objective   Mental Status Exam:     Appearance: dressed casually. Appropriate for weather  Attitude: cooperative, engaged.   Behavior: appropriate eye contact via video.   Motor Activity: spontaneous movement.   Speech: regular rate, low volume. +accent. no dysarthria, no aphasia.   Mood: \"too busy and sick to think about my mood\".   Affect: congruent,brighter, improved range.   Thought Process: no KIRIT, on topic.   Thought Content: no delusions, passive SI but no current plan/intent, no HI.   Thought Perception: no AVH,.   Cognition: alert, oriented to person, place, time. attn intact. MAX h/o stroke with frontal lobe encephalomalacia.   Insight: fair.   Judgment: fair.      Vitals:  There were no vitals filed for this visit. Deferred for virtual visit  No results found for this or any previous visit (from the past 4464 hour(s)).  Lab Results   Component Value Date    WBC 11.9 (H) 12/18/2023    HGB 6.7 (L) 12/18/2023    HCT 19.4 (L) 12/18/2023    MCV 92 12/18/2023     (H) 12/18/2023     Lab Results   Component Value Date    GLUCOSE 92 12/18/2023    CALCIUM 8.9 12/18/2023     12/18/2023    K 3.8 12/18/2023    CO2 24 12/18/2023     12/18/2023    BUN 9 12/18/2023    CREATININE 1.01 12/18/2023       Psychotherapy  Time: 12 minutes  type supportive, problem solving  target mood, anxiety, grief  techniques " reframing, processing, goal setting  response moderate-good  goal decreased sx burden, improved management  follow up 1-2 months       Cinthia May MD

## 2024-02-21 ENCOUNTER — TELEPHONE (OUTPATIENT)
Dept: ADMISSION | Facility: HOSPITAL | Age: 44
End: 2024-02-21
Payer: COMMERCIAL

## 2024-02-21 DIAGNOSIS — D57.00 SICKLE CELL DISEASE WITH CRISIS (MULTI): ICD-10-CM

## 2024-02-21 NOTE — TELEPHONE ENCOUNTER
Pt requests a refill of dilaudid 4mg TID prn, #40.  Last prescription written 1/31/24.  Preferred pharmacy is [facility] at [address], [place].

## 2024-02-22 RX ORDER — HYDROMORPHONE HYDROCHLORIDE 4 MG/1
4 TABLET ORAL 3 TIMES DAILY PRN
Qty: 40 TABLET | Refills: 0 | Status: SHIPPED | OUTPATIENT
Start: 2024-02-22 | End: 2024-03-11 | Stop reason: SDUPTHER

## 2024-02-23 ENCOUNTER — PHARMACY VISIT (OUTPATIENT)
Dept: PHARMACY | Facility: CLINIC | Age: 44
End: 2024-02-23
Payer: COMMERCIAL

## 2024-02-28 ENCOUNTER — APPOINTMENT (OUTPATIENT)
Dept: PODIATRY | Facility: CLINIC | Age: 44
End: 2024-02-28
Payer: COMMERCIAL

## 2024-02-29 ENCOUNTER — TELEPHONE (OUTPATIENT)
Dept: BEHAVIORAL HEALTH | Facility: HOSPITAL | Age: 44
End: 2024-02-29
Payer: COMMERCIAL

## 2024-03-11 ENCOUNTER — TELEPHONE (OUTPATIENT)
Dept: ADMISSION | Facility: HOSPITAL | Age: 44
End: 2024-03-11
Payer: COMMERCIAL

## 2024-03-11 DIAGNOSIS — D57.00 SICKLE CELL DISEASE WITH CRISIS (MULTI): ICD-10-CM

## 2024-03-11 RX ORDER — HYDROMORPHONE HYDROCHLORIDE 4 MG/1
4 TABLET ORAL 3 TIMES DAILY PRN
Qty: 40 TABLET | Refills: 0 | Status: SHIPPED | OUTPATIENT
Start: 2024-03-11 | End: 2024-03-28 | Stop reason: SDUPTHER

## 2024-03-12 ENCOUNTER — TELEPHONE (OUTPATIENT)
Dept: BEHAVIORAL HEALTH | Facility: HOSPITAL | Age: 44
End: 2024-03-12
Payer: COMMERCIAL

## 2024-03-12 DIAGNOSIS — F33.0 MILD EPISODE OF RECURRENT MAJOR DEPRESSIVE DISORDER (CMS-HCC): ICD-10-CM

## 2024-03-12 NOTE — TELEPHONE ENCOUNTER
Called pt for planned check in, no answer. Left message that will try later today or another day this week. Pt is scheduled for 3/21 at 12pm virtual FUV    UPDATE 3/14: spoke with patient. Notes doing ok, mixed on date for ketamine. Notes is out of trazodone. Some concerns with kids' school. Reports no SI but has had days not wanting to go out of bed. Next appt is 3/21/24, pt will call sooner if needed.

## 2024-03-14 RX ORDER — TRAZODONE HYDROCHLORIDE 100 MG/1
TABLET ORAL
Qty: 180 TABLET | Refills: 1 | Status: SHIPPED | OUTPATIENT
Start: 2024-03-14 | End: 2024-05-23 | Stop reason: SDUPTHER

## 2024-03-15 DIAGNOSIS — D57.00 SICKLE CELL DISEASE WITH CRISIS (MULTI): Primary | ICD-10-CM

## 2024-03-19 ENCOUNTER — APPOINTMENT (OUTPATIENT)
Dept: HEMATOLOGY/ONCOLOGY | Facility: HOSPITAL | Age: 44
End: 2024-03-19
Payer: MEDICARE

## 2024-03-21 ENCOUNTER — TELEMEDICINE (OUTPATIENT)
Dept: BEHAVIORAL HEALTH | Facility: HOSPITAL | Age: 44
End: 2024-03-21
Payer: MEDICARE

## 2024-03-21 DIAGNOSIS — F33.0 MILD EPISODE OF RECURRENT MAJOR DEPRESSIVE DISORDER (CMS-HCC): ICD-10-CM

## 2024-03-21 DIAGNOSIS — F41.9 ANXIETY: ICD-10-CM

## 2024-03-21 PROCEDURE — 99214 OFFICE O/P EST MOD 30 MIN: CPT | Performed by: PSYCHIATRY & NEUROLOGY

## 2024-03-21 PROCEDURE — 90833 PSYTX W PT W E/M 30 MIN: CPT | Performed by: PSYCHIATRY & NEUROLOGY

## 2024-03-21 PROCEDURE — 1036F TOBACCO NON-USER: CPT | Performed by: PSYCHIATRY & NEUROLOGY

## 2024-03-21 NOTE — PROGRESS NOTES
Outpatient Psychiatry FUV      Subjective   Mary Alice Aragon, a 43 y.o. female, for virtual FUV    Confirmed to be home     Assessment/Plan   Patient Discussion:  CONTINUE sertraline 50mg daily, can increase to 2x/day as tolerated  CONTINUE  trazodone 100-200mg at bedtime  CONTINUE hydroxyzine 25mg 1/2 tablet as needed for anxiety  take melatonin 2 hours before preferred bedtime  continue with good sleep hygiene    Previously placed referral to therapy, continue to consider    CONTINUE with light box for 20 minutes per day at 10,000lux. position above you but so it reaches your eyes. You don't need to look directly into the light    Follow up  5/23 at 12pm virtual FUV  Phone check in 4/11 at 12:30-1      Call with questions or concerns [phone]        985 Crisis hotline if any further SI    Assessment:   43 y.o.  F with Hgb SS disease, h/o MCA with R frontal encephalomalacia, h/o lead poisoning, chronic pain managed by ketamine referred to psychiatry for depression and anxiety. History of trauma though not classic PTSD. Pt reports increased depression and anxiety related to stressors at home as well as limited support.     On virtual FUV today, noted  improvement in mood, working on settling in new house. More pain after mix up in ketamine appt time. phone check in 3 weeks, sooner if needed     Diagnosis:   MDD, recurrent, mild  Other specified anxiety, likely component of depression    Treatment Plan/Recommendations:   1. Safety Assessment: No further SI. over holiday 2023 +SI with plan but did not act, worried about kids being affected but continues to have passive thoughts, no plan, kids are protective. Pt also stopped meds at this time. No h/o attempts. RF include single, pain, depression. PF include kids, no guns, seeking treatment, F/age/race. Low imminent but increased risk with recent SI +plan that was not acted on, reviewed safety plan including  suicide hotline/text line [phone]    2. MDD, recurrent  "moderate other spec anxiety-component of depression vs NICHOL--  CONTINUE sertraline 50mg PO daily or 25mg PO BID  CONTINUE trazodone 100-200mg PO QHS, r/b/ae discussed including excess serotonin though currently low doses. can decrease if sleep improves. Not using regularly  use hydroxyzine 12.5mg PO BID PRN anxiety  continue melatonin 10mg 2 hours before bed. continue good sleep hygiene    CAN USE lightbox at 10,000lux for 20 minutes/day.   PHQ 9---11-->9-->15-->13, deferred related to time  have previously discussed therapy, follow up on referral. Notes kids are in therapy now  will continue with supportive therapy during appt    Discussed case with JENNIFER Man 1/2024 to see if increased support around housing and emotional support    3. Medical: notes and labs reviewed, recently saw neuro for migraines. pain/ankle pain better on O2 at night, saw ortho, rec PT.   recent sleep study, no longer needs O2 at night, now wonders if she should get another updated sleep study  now back on ketamine though having spasms, less helpful for mood  breast mass bx benign   Pain increased, next ketamine 4/10/24    4. Social: 2 kids at home, limited support. works at [employer] but has not been able to related to health, notes some help from aunt, moved to [place]      Reason for Visit:   FUV for mood/anxiety    Subjective:  Last seen 1 month ago with several check ins via phone  Pt notes mood has been better though still, \"all over the place\" with move    More pain and spasms since mix up with ketamine appt  Affecting sleep, which hasn't been a problem for some time  Missed sickle cell appt due to ride for kids getting to school fell through  Had to order more oxygen supplies, has been coming unplugged at night  Next Ketamine is 4/10/24  No further SI  Sleep is good with trazodone  No ae to medications        Current Medications:    Current Outpatient Medications:     acetaminophen (Tylenol Extra Strength) 500 mg tablet, 2 tablet EVERY " 6 HOURS (route: oral), Disp: , Rfl:     ascorbic acid (Vitamin C) 500 mg tablet, 2 tablet DAILY (route: oral), Disp: , Rfl:     calcium carbonate (Tums) 200 mg calcium chewable tablet, Chew., Disp: , Rfl:     capsaicin (Zostrix) 0.025 % cream, Apply as directed as needed, Disp: , Rfl:     cephalexin (Keflex) 500 mg capsule, TAKE 1 CAPSULE BY MOUTH EVERY 6 HOURS FOR 10 DAYS., Disp: , Rfl:     cyclobenzaprine (Flexeril) 10 mg tablet, 1 tablet 3 TIMES DAILY (route: oral), Disp: , Rfl:     deferasirox (Jadenu) 180 mg tablet, , Disp: , Rfl:     deferoxamine (Desferal) 2 gram injection, Per instructions DAILY (route: injection), Disp: , Rfl:     diclofenac sodium (Voltaren) 1 % gel gel, Per instructions 4 TIMES DAILY (route: topical), Disp: 350 g, Rfl: 2    diphenhydrAMINE (BENADryl) 25 mg capsule, 1 capsule 3 TIMES DAILY (route: oral), Disp: , Rfl:     docusate sodium (Colace) 100 mg capsule, 1 capsule DAILY (route: oral), Disp: , Rfl:     folic acid (Folvite) 1 mg tablet, 1 tablet DAILY (route: oral), Disp: , Rfl:     gabapentin (Neurontin) 300 mg capsule, Take 1 capsule (300 mg) by mouth 3 times a day., Disp: 90 capsule, Rfl: 11    HYDROmorphone (Dilaudid) 4 mg tablet, 1 tablet EVERY 4 HOURS (route: oral), Disp: , Rfl:     HYDROmorphone (Dilaudid) 4 mg tablet, Take 1 tablet (4 mg) by mouth 3 times a day as needed for severe pain (7 - 10)., Disp: 40 tablet, Rfl: 0    hydroxyurea (Hydrea) 500 mg capsule, Per instructions AS DIRECTED (route: oral), Disp: 68 capsule, Rfl: 2    hydrOXYzine HCL (Atarax) 25 mg tablet, 1 tablet 2 TIMES DAILY (route: oral), Disp: , Rfl:     ibuprofen 800 mg tablet, Take 1 tablet (800 mg) by mouth every 8 hours if needed for moderate pain (4 - 6)., Disp: 60 tablet, Rfl: 2    ketamine (Ketalar) 10 mg/mL injection, Infuse into a venous catheter., Disp: , Rfl:     lidocaine (lidocaine HCL) 4 % cream, USE TOPICALLY AS DIRECTED., Disp: , Rfl:     loratadine (Claritin) 10 mg tablet, Take 1 tablet (10  mg) by mouth once daily., Disp: , Rfl:     melatonin 3 mg tablet, Take 2 tablets (6 mg) by mouth as needed at bedtime., Disp: , Rfl:     meloxicam (Mobic) 15 mg tablet, Take 1 tablet (15 mg) by mouth once daily., Disp: , Rfl:     methadone (Dolophine) 10 mg tablet, Take 1 tablet (10 mg) by mouth every 12 hours., Disp: 60 tablet, Rfl: 0    multivitamin tablet, 1 tablet DAILY (route: oral), Disp: , Rfl:     naloxone (Narcan) 4 mg/0.1 mL nasal spray, Per instructions AS NEEDED (route: nasal), Disp: , Rfl:     norethindrone (Aygestin) 5 mg tablet, Take 1 tablet (5 mg) by mouth once daily., Disp: 90 tablet, Rfl: 3    omeprazole (PriLOSEC) 40 mg DR capsule, 1 capsule DAILY (route: oral), Disp: , Rfl:     oxygen (O2) gas therapy, Inhale 3 L/min.  3L O2 WITH ACTIVITY AS NEEDED 3L O2 AT NIGHT AS NEEDED., Disp: , Rfl:     prochlorperazine (Compazine) 10 mg tablet, 1 tablet EVERY 8 HOURS (route: oral), Disp: , Rfl:     senna 8.6 mg tablet, Take 1 tablet (8.6 mg) by mouth 2 times a day as needed for constipation., Disp: 30 tablet, Rfl: 2    sertraline (Zoloft) 50 mg tablet, Take 1 tablet (50 mg) by mouth 2 times a day., Disp: 180 tablet, Rfl: 1    tiZANidine (Zanaflex) 2 mg tablet, Take 1-2 tablets (2-4 mg) by mouth every 8 hours if needed for muscle spasms., Disp: 40 tablet, Rfl: 2    traZODone (Desyrel) 100 mg tablet, TAKE 1 TO 2 TABLETS(100  MG) BY MOUTH AT BEDTIME AS NEEDED FOR SLEEP, Disp: 180 tablet, Rfl: 1    trolamine salicylate (Aspercreme) 10 % cream, Per instructions 4 TIMES DAILY (route: topical), Disp: , Rfl:     ubrogepant (Ubrelvy) 50 mg tablet, 1 tablet DAILY (route: oral), Disp: , Rfl:     verapamil SR (Calan-SR) 180 mg ER tablet, 1 tablet DAILY (route: oral), Disp: , Rfl:     Vitamin D3 25 mcg (1,000 unit) tablet, Take 1 tablet (25 mcg) by mouth once daily., Disp: , Rfl:     voxelotor (OXBRYTA) 500 mg tablet tablet, TAKE THREE (3) TABLETS BY MOUTH ONCE DAILY., Disp: 90 tablet, Rfl: 3  Medical  History:  Past Medical History:   Diagnosis Date    Unspecified astigmatism, bilateral 2020    Astigmatism, bilateral       Surgical History:  Past Surgical History:   Procedure Laterality Date     SECTION, CLASSIC  2016     Section    CHOLECYSTECTOMY  2016    Cholecystectomy    MR HEAD ANGIO W AND WO IV CONTRAST  4/15/2013    MR HEAD ANGIO W AND WO IV CONTRAST 4/15/2013 [facility] CLINICAL LEGACY    MR HEAD ANGIO W AND WO IV CONTRAST  2013    MR HEAD ANGIO W AND WO IV CONTRAST 2013 [facility] INPATIENT LEGACY    MR HEAD ANGIO WO IV CONTRAST  2014    MR HEAD ANGIO WO IV CONTRAST 2014 [facility] ANCILLARY LEGACY    MR HEAD ANGIO WO IV CONTRAST  2016    MR HEAD ANGIO WO IV CONTRAST 2016 [facility] ANCILLARY LEGACY    MR HEAD ANGIO WO IV CONTRAST  2019    MR HEAD ANGIO WO IV CONTRAST 2019 [facility] ANCILLARY LEGACY    MR HEAD ANGIO WO IV CONTRAST  2017    MR HEAD ANGIO WO IV CONTRAST 2017 [facility] INPATIENT LEGACY    MR NECK ANGIO WO IV CONTRAST  2013    MR NECK ANGIO WO IV CONTRAST 2013 [facility] INPATIENT LEGACY    MR NECK ANGIO WO IV CONTRAST  2014    MR NECK ANGIO WO IV CONTRAST 2014 [facility] ANCILLARY LEGACY    OTHER SURGICAL HISTORY  2016    Brain Surgery    TUBAL LIGATION  04/10/2017    Tubal Ligation       Family History:  Family History   Problem Relation Name Age of Onset    Sickle cell trait Sister      Multiple sclerosis Brother      Breast cancer Maternal Grandmother      Breast cancer Other paternal cousin     Sickle cell trait Child         Social History:  Social History     Socioeconomic History    Marital status: Single     Spouse name: Not on file    Number of children: Not on file    Years of education: Not on file    Highest education level: Not on file   Occupational History    Not on file   Tobacco Use    Smoking status: Never    Smokeless tobacco: Never   Substance and Sexual Activity    Alcohol use: Not Currently    Drug use: Not on file     "Sexual activity: Not on file   Other Topics Concern    Not on file   Social History Narrative    Not on file     Social Determinants of Health     Financial Resource Strain: Not on file   Food Insecurity: Not on file   Transportation Needs: Not on file   Physical Activity: Not on file   Stress: Not on file   Social Connections: Not on file   Intimate Partner Violence: Not on file   Housing Stability: Not on file         Medical Review Of Systems:  +increased pain  +O2 at night      Psychiatric Review Of Systems:  Per HPI       Objective   Mental Status Exam:     Appearance: dressed casually. Appropriate for weather  Attitude: cooperative, engaged.   Behavior: appropriate eye contact via video.   Motor Activity: spontaneous movement.   Speech: regular rate, low volume. +accent. no dysarthria, no aphasia.   Mood: \"mood is ok but is on the back burner\"  Affect: congruent,brighter, improved range.   Thought Process: no KIRIT, on topic.   Thought Content: no delusions, passive SI but no current plan/intent, no HI.   Thought Perception: no AVH,.   Cognition: alert, oriented to person, place, time. attn intact. MAX h/o stroke with frontal lobe encephalomalacia.   Insight: fair.   Judgment: fair.      Vitals:  There were no vitals filed for this visit. Deferred for virtual visit  No results found for this or any previous visit (from the past 4464 hour(s)).  Lab Results   Component Value Date    WBC 11.9 (H) 12/18/2023    HGB 6.7 (L) 12/18/2023    HCT 19.4 (L) 12/18/2023    MCV 92 12/18/2023     (H) 12/18/2023     Lab Results   Component Value Date    GLUCOSE 92 12/18/2023    CALCIUM 8.9 12/18/2023     12/18/2023    K 3.8 12/18/2023    CO2 24 12/18/2023     12/18/2023    BUN 9 12/18/2023    CREATININE 1.01 12/18/2023       Psychotherapy  Time: 18 minutes  type supportive, problem solving  target mood, anxiety, grief  techniques reframing, processing, goal setting  response moderate-good  goal decreased sx " burden, improved management  follow up 1-2 months       Cinthia May MD

## 2024-03-28 ENCOUNTER — TELEPHONE (OUTPATIENT)
Dept: ADMISSION | Facility: HOSPITAL | Age: 44
End: 2024-03-28
Payer: COMMERCIAL

## 2024-03-28 DIAGNOSIS — D57.00 SICKLE CELL DISEASE WITH CRISIS (MULTI): ICD-10-CM

## 2024-03-28 RX ORDER — HYDROMORPHONE HYDROCHLORIDE 4 MG/1
4 TABLET ORAL 3 TIMES DAILY PRN
Qty: 40 TABLET | Refills: 0 | Status: SHIPPED | OUTPATIENT
Start: 2024-03-28 | End: 2024-04-22 | Stop reason: SDUPTHER

## 2024-04-02 ENCOUNTER — SPECIALTY PHARMACY (OUTPATIENT)
Dept: PHARMACY | Facility: CLINIC | Age: 44
End: 2024-04-02

## 2024-04-08 ENCOUNTER — TELEPHONE (OUTPATIENT)
Dept: HEMATOLOGY/ONCOLOGY | Facility: HOSPITAL | Age: 44
End: 2024-04-08

## 2024-04-08 ENCOUNTER — TELEPHONE (OUTPATIENT)
Dept: HEMATOLOGY/ONCOLOGY | Facility: HOSPITAL | Age: 44
End: 2024-04-08
Payer: COMMERCIAL

## 2024-04-08 NOTE — TELEPHONE ENCOUNTER
Per team, pt needs an appt prior to medication refill.  Attempted to notify pt, no answer and voicemail is full.

## 2024-04-08 NOTE — TELEPHONE ENCOUNTER
Pt requests a refill of methadone 10mg q12, #60.  Last prescribed 1/5/24.  Preferred pharmacy is [address], [place].

## 2024-04-09 DIAGNOSIS — D57.00 SICKLE CELL DISEASE WITH CRISIS (MULTI): ICD-10-CM

## 2024-04-11 ENCOUNTER — TELEPHONE (OUTPATIENT)
Dept: BEHAVIORAL HEALTH | Facility: HOSPITAL | Age: 44
End: 2024-04-11
Payer: COMMERCIAL

## 2024-04-11 NOTE — TELEPHONE ENCOUNTER
Called pt for planned phone check in. She reports having a lot of pain and sickling today. Mood otherwise doing ok. Agreed to talk next week instead due to pain.

## 2024-04-12 ENCOUNTER — SOCIAL WORK (OUTPATIENT)
Dept: HEMATOLOGY/ONCOLOGY | Facility: HOSPITAL | Age: 44
End: 2024-04-12
Payer: COMMERCIAL

## 2024-04-12 ENCOUNTER — TELEPHONE (OUTPATIENT)
Dept: ADMISSION | Facility: HOSPITAL | Age: 44
End: 2024-04-12
Payer: COMMERCIAL

## 2024-04-12 NOTE — PROGRESS NOTES
RADHA received referral from LEE ANN Spencer RN stating pt is reporting issues  with disability claim document that was submitted 3/12/24. LISW contacted pt to discuss. Pt states she was informed the document did not have the provider's signature. The document LISW submitted was signed by Gio Saleem MD. Pt gave LISW approval to contact insurance company ([phone]).     Additionally, pt expressed frustration that she is unable to get her pain medications filled at this time. LISW informed pt of policy regarding attending a follow-up within 3 months in order to refill medications. Pt states she has been seen in March. CAMW reviewed pt's chart and informed pt she had an appointment with Dr. May in March, however, she needs to be seen by a sickle cell provider. Pt expressed understanding. Pt states she will be contacting a  if her medications continue to be withheld from her after her appointment on 4/22/24.     CAMW contacted [employer] regarding pt's disability claim paperwork. CAMW was informed pt would have to provide written permission for representative to speak with LISW. CAMW updated pt who plans to contact [employer] to discuss further. Patient denies any further psychosocial or support service needs at this time. Patient encouraged to contact LISW if any needs arise.     LISW will continue to follow as needed.

## 2024-04-12 NOTE — TELEPHONE ENCOUNTER
The patient called in, states that she has been going back and forth with Krista regarding a form that needs sent into her insurance company, the company states that the form is now sissing the doctors signature, Mary Alice would like this addressed asap since her account is about to go into collections.  The patient then began to escalate with frustration, telling this RN that she has been very sick and that is the reason she is missing her apts and now that her meds are being denied refill, she is on the way to a dispensary in order to get marijuana to assist with pain symptoms. Per pt report she has never done a street drug however she wants her pain relieved, would like me to send this all to the Sickle Cell team. Message routed.

## 2024-04-18 ENCOUNTER — SPECIALTY PHARMACY (OUTPATIENT)
Dept: PHARMACY | Facility: CLINIC | Age: 44
End: 2024-04-18

## 2024-04-18 ENCOUNTER — TELEPHONE (OUTPATIENT)
Dept: BEHAVIORAL HEALTH | Facility: HOSPITAL | Age: 44
End: 2024-04-18
Payer: COMMERCIAL

## 2024-04-22 ENCOUNTER — SOCIAL WORK (OUTPATIENT)
Dept: HEMATOLOGY/ONCOLOGY | Facility: HOSPITAL | Age: 44
End: 2024-04-22
Payer: COMMERCIAL

## 2024-04-22 ENCOUNTER — LAB (OUTPATIENT)
Dept: LAB | Facility: HOSPITAL | Age: 44
End: 2024-04-22
Payer: MEDICARE

## 2024-04-22 ENCOUNTER — OFFICE VISIT (OUTPATIENT)
Dept: HEMATOLOGY/ONCOLOGY | Facility: HOSPITAL | Age: 44
End: 2024-04-22
Payer: MEDICARE

## 2024-04-22 VITALS
DIASTOLIC BLOOD PRESSURE: 63 MMHG | SYSTOLIC BLOOD PRESSURE: 111 MMHG | BODY MASS INDEX: 25.35 KG/M2 | OXYGEN SATURATION: 100 % | TEMPERATURE: 97.5 F | HEART RATE: 90 BPM | WEIGHT: 143.08 LBS | RESPIRATION RATE: 18 BRPM

## 2024-04-22 DIAGNOSIS — D57.00 SICKLE CELL DISEASE WITH CRISIS (MULTI): ICD-10-CM

## 2024-04-22 DIAGNOSIS — E83.111 IRON OVERLOAD, TRANSFUSIONAL: Primary | ICD-10-CM

## 2024-04-22 LAB
AMPHETAMINES UR QL SCN: ABNORMAL
APPEARANCE UR: CLEAR
BACTERIA #/AREA URNS AUTO: ABNORMAL /HPF
BARBITURATES UR QL SCN: ABNORMAL
BENZODIAZ UR QL SCN: ABNORMAL
BILIRUB UR STRIP.AUTO-MCNC: NEGATIVE MG/DL
BZE UR QL SCN: ABNORMAL
CANNABINOIDS UR QL SCN: ABNORMAL
COLOR UR: YELLOW
FENTANYL+NORFENTANYL UR QL SCN: ABNORMAL
GLUCOSE UR STRIP.AUTO-MCNC: NORMAL MG/DL
KETONES UR STRIP.AUTO-MCNC: NEGATIVE MG/DL
LEUKOCYTE ESTERASE UR QL STRIP.AUTO: NEGATIVE
METHADONE UR QL SCN: ABNORMAL
MUCOUS THREADS #/AREA URNS AUTO: ABNORMAL /LPF
NITRITE UR QL STRIP.AUTO: NEGATIVE
OPIATES UR QL SCN: ABNORMAL
OXYCODONE+OXYMORPHONE UR QL SCN: ABNORMAL
PCP UR QL SCN: ABNORMAL
PH UR STRIP.AUTO: 5.5 [PH]
PROT UR STRIP.AUTO-MCNC: ABNORMAL MG/DL
RBC # UR STRIP.AUTO: ABNORMAL /UL
RBC #/AREA URNS AUTO: ABNORMAL /HPF
SP GR UR STRIP.AUTO: 1.01
SQUAMOUS #/AREA URNS AUTO: ABNORMAL /HPF
UROBILINOGEN UR STRIP.AUTO-MCNC: NORMAL MG/DL
WBC #/AREA URNS AUTO: ABNORMAL /HPF

## 2024-04-22 PROCEDURE — 80307 DRUG TEST PRSMV CHEM ANLYZR: CPT | Performed by: INTERNAL MEDICINE

## 2024-04-22 PROCEDURE — 81001 URINALYSIS AUTO W/SCOPE: CPT | Mod: 59 | Performed by: INTERNAL MEDICINE

## 2024-04-22 PROCEDURE — 80349 CANNABINOIDS NATURAL: CPT | Performed by: INTERNAL MEDICINE

## 2024-04-22 PROCEDURE — 99215 OFFICE O/P EST HI 40 MIN: CPT | Performed by: INTERNAL MEDICINE

## 2024-04-22 PROCEDURE — 80365 DRUG SCREENING OXYCODONE: CPT | Mod: MUE | Performed by: INTERNAL MEDICINE

## 2024-04-22 RX ORDER — METHADONE HYDROCHLORIDE 10 MG/1
10 TABLET ORAL EVERY 12 HOURS
Qty: 60 TABLET | Refills: 0 | Status: SHIPPED | OUTPATIENT
Start: 2024-04-22 | End: 2024-04-23 | Stop reason: SDUPTHER

## 2024-04-22 RX ORDER — HYDROMORPHONE HYDROCHLORIDE 4 MG/1
4 TABLET ORAL 3 TIMES DAILY PRN
Qty: 40 TABLET | Refills: 0 | Status: SHIPPED | OUTPATIENT
Start: 2024-04-22 | End: 2024-05-09 | Stop reason: SDUPTHER

## 2024-04-22 RX ORDER — CHOLECALCIFEROL (VITAMIN D3) 25 MCG
1000 TABLET ORAL DAILY
Qty: 30 TABLET | Refills: 3 | Status: SHIPPED | OUTPATIENT
Start: 2024-04-22

## 2024-04-22 RX ORDER — MELOXICAM 15 MG/1
15 TABLET ORAL DAILY
Qty: 30 TABLET | Refills: 2 | Status: SHIPPED | OUTPATIENT
Start: 2024-04-22

## 2024-04-22 ASSESSMENT — ENCOUNTER SYMPTOMS
NERVOUS/ANXIOUS: 1
GASTROINTESTINAL NEGATIVE: 1
ARTHRALGIAS: 1
NUMBNESS: 1
CONSTITUTIONAL NEGATIVE: 1
MYALGIAS: 1
HEADACHES: 0
DEPRESSION: 1

## 2024-04-22 ASSESSMENT — PAIN SCALES - GENERAL: PAINLEVEL: 4

## 2024-04-22 NOTE — PROGRESS NOTES
"COMPREHENSIVE SICKLE CELL CLINIC NOTE   Date of Encounter: @today@    Name:  Mary Alice Aragon  Age: @ge@  MRN: [id number]  Sickle Cell Genotype: SS  Care Plan last updated and signed:   Name of PCP: Nyasia Mcdaniel       - get ALYCE with reflex next visit  - consider cardiac or liver MRI for iron overload  - pulm referral- discuss at RTC   - urine albumin     ASSESSMENT AND PLAN  Mary Alice Aragon is 44 y.o. @gender with     History of Hb SS Sickle cell disease with (acute, chronic, acute on chronic pain) and (HENRY, OUD). Pain regimen will be as follows  Meloxicam 15 daily for AVN  Hydromorphone 4mg   Methadone- 10mg BID   Discussed non pharmacologic methods of pain control   Reviewed OARRS     Encounter for management of disease modifying therapy. Name is currently on veoxeletor, will proceed with/without changes to current management restart voxelotor with 2 tablets and then increase to 3.     Chronic anemia secondary to SCD with a hemoglobin of 7.4. This is around her baseline HB   Daily folic acid     Nocturnal hypoxia on supplemental night time oxygen- needs supplies, has called company.     Constipation is well controlled  - continue current laxatives with     Iron overload secondary to chronic transfusions,  on chelation therapy  - continue current chelation therapy with Desferoximine 2000mg- sent renewal to [facility]    7.  CKD- last renal panel wnl, need urine albumin          Interval History    Subjective    HPI  Mary Alice reports feeling not well lately. She has been having spasm for years, they filling internal and the sharp shooting pain. Flexeril used to help, now takes tizanidine and gabapentin but feels they are getting worse. Worse first thing in the am, the weather or \"excitement' makes it worse. Feels these all over, but usually is the back of her heals and shoots up her legs. Can last anywhere for 5 mins to 1 hrs. She is able to get some relief with a warm blanket, deep breaths, stopping what she is doing, " drinking fluids can help or make it worse.  Takes her longer to do things. Feels this is overall worse in the past 6 months, has had increased stress had to move unexpectedly and went without heat. She is now living in [place]. She increased her gabapentin to an additional 200mg in between the 3 doses of 300mg. She has tried this for the past month. She would like to return to her deferoxamine infusions. Is still getting ketamine for her chronic pain, she is unsure if this is helping. She is getting once a month infusions currently.     Review of Systems   Constitutional: Negative.    HENT:  Negative.     Cardiovascular:  Positive for chest pain.   Gastrointestinal: Negative.    Genitourinary: Negative.     Musculoskeletal:  Positive for arthralgias and myalgias.   Skin: Negative.    Neurological:  Positive for numbness. Negative for headaches.   Psychiatric/Behavioral:  Positive for depression. The patient is nervous/anxious.         Objective   BSA: 1.7 meters squared  /63 (BP Location: Right arm, Patient Position: Sitting)   Pulse 90   Temp 36.4 °C (97.5 °F) (Temporal)   Resp 18   Wt 64.9 kg (143 lb 1.3 oz)   SpO2 100%   BMI 25.35 kg/m²      has a past medical history of Unspecified astigmatism, bilateral (2020).   has a past surgical history that includes Cholecystectomy (2016);  section, classic (2016); Other surgical history (2016); Tubal ligation (04/10/2017); MR angio head w and wo IV contrast (4/15/2013); MR angio neck wo IV contrast (2013); MR angio head w and wo IV contrast (2013); MR angio head wo IV contrast (2014); MR angio neck wo IV contrast (2014); MR angio head wo IV contrast (2016); MR angio head wo IV contrast (2019); and MR angio head wo IV contrast (2017).  Family History   Problem Relation Name Age of Onset    Sickle cell trait Sister      Multiple sclerosis Brother      Breast cancer Maternal Grandmother      Breast  cancer Other paternal cousin     Sickle cell trait Child       Oncology History    No history exists.       Mary Alice Aragon  reports that she has never smoked. She has never used smokeless tobacco.  She  reports that she does not currently use alcohol.  She  has no history on file for drug use.    Physical Exam  Constitutional:       Appearance: She is ill-appearing.   HENT:      Head: Normocephalic.      Nose: Nose normal.      Mouth/Throat:      Mouth: Mucous membranes are moist.   Eyes:      Pupils: Pupils are equal, round, and reactive to light.   Cardiovascular:      Rate and Rhythm: Normal rate and regular rhythm.      Pulses: Normal pulses.      Heart sounds: Normal heart sounds.   Pulmonary:      Effort: Pulmonary effort is normal.      Breath sounds: Normal breath sounds.   Abdominal:      General: Bowel sounds are normal.      Palpations: Abdomen is soft.   Musculoskeletal:         General: Normal range of motion.   Skin:     General: Skin is warm and dry.   Neurological:      General: No focal deficit present.      Mental Status: She is alert and oriented to person, place, and time.   Psychiatric:         Mood and Affect: Mood normal.         Behavior: Behavior normal.           Labs   @labs@         Adriana MICHELLE Mathew-CNP                    Immunizations: defer to PCP   Screening: Mammogram, Pap smear, Colonoscopy, PSA- defer to PCP   Disease modifying therapy: Voxeletor- plans to start   Dose and Frequency 1500mg daily   Hospitalizations in past 12 months: 1  ED and ACC visits in past 12 months: 2       Cardio/Pulmonary   Last Echo date: 2022  Results : normal RSVP, negative bubble study   EF: 60-65%  TRJV: 2.06  Cardiomyopathy: none noted   HTN: no  Cardiac MRI- none   Results   Cardiac Cath date and results none    PAH   PVH  EKG: NSR,    Cardiology follow up [facility]- July 2023  History of Asthma/restrictive lung disease- none noted per chart review   Last PFT, 6 MWD - remote will discuss  RTC   Sleep study remote- discuss RTC  Results   Supplemental oxygen 3L night time     Renal/Nephrology  Urinalysis- trace blood, otherwise normal   Urine albuminuria - last 2022 elevated- need repeat   Renal panel - last wnl   Cystatin C   Renal disease present (YES/NO) inconclusive   Nephrology follow up (Provider and date) none     Eyes  Last eye examination: upcoming appt   Results:  Next eye examination:    Dental examination  Last Dental Examination: past 6 months   Results: extraction   Next dental examination:     Iron overload   Recent ferritin level: pending   Recent ferriscan:   Chelation therapy: desferol plus subcutaneous deferoxamine     Transfusion history  Alloantibodies and type: B positive- negative for antibodies   Full exchange: never   Frequency   Indication   Partial/manual:  Simple: 2022  Date of Blood Transfusion Consent:    Neuro/Psych  Depression and Anxiety Screening:  Provider and last appointment: Lalo- monthly and checks weekly   Neurocognitive Testing:    Social Issue  Needs addressed:   Followed up on:   Occupation:

## 2024-04-23 ENCOUNTER — OFFICE VISIT (OUTPATIENT)
Dept: HEMATOLOGY/ONCOLOGY | Facility: HOSPITAL | Age: 44
End: 2024-04-23
Payer: MEDICARE

## 2024-04-23 ENCOUNTER — TELEPHONE (OUTPATIENT)
Dept: HEMATOLOGY/ONCOLOGY | Facility: HOSPITAL | Age: 44
End: 2024-04-23

## 2024-04-23 VITALS
WEIGHT: 144.84 LBS | SYSTOLIC BLOOD PRESSURE: 102 MMHG | BODY MASS INDEX: 25.66 KG/M2 | RESPIRATION RATE: 18 BRPM | DIASTOLIC BLOOD PRESSURE: 65 MMHG | OXYGEN SATURATION: 100 % | TEMPERATURE: 95.9 F | HEART RATE: 68 BPM

## 2024-04-23 DIAGNOSIS — D57.00 SICKLE CELL DISEASE WITH CRISIS (MULTI): ICD-10-CM

## 2024-04-23 DIAGNOSIS — D57.00 SICKLE CELL DISEASE WITH CRISIS (MULTI): Primary | ICD-10-CM

## 2024-04-23 LAB
ALBUMIN SERPL BCP-MCNC: 4.5 G/DL (ref 3.4–5)
ALP SERPL-CCNC: 54 U/L (ref 33–110)
ALT SERPL W P-5'-P-CCNC: 16 U/L (ref 7–45)
ANION GAP SERPL CALC-SCNC: 14 MMOL/L (ref 10–20)
AST SERPL W P-5'-P-CCNC: 28 U/L (ref 9–39)
BASOPHILS # BLD AUTO: 0.08 X10*3/UL (ref 0–0.1)
BASOPHILS NFR BLD AUTO: 0.7 %
BILIRUB SERPL-MCNC: 2.6 MG/DL (ref 0–1.2)
BUN SERPL-MCNC: 13 MG/DL (ref 6–23)
CALCIUM SERPL-MCNC: 9 MG/DL (ref 8.6–10.3)
CHLORIDE SERPL-SCNC: 107 MMOL/L (ref 98–107)
CO2 SERPL-SCNC: 22 MMOL/L (ref 21–32)
CREAT SERPL-MCNC: 0.83 MG/DL (ref 0.5–1.05)
EGFRCR SERPLBLD CKD-EPI 2021: 90 ML/MIN/1.73M*2
EOSINOPHIL # BLD AUTO: 0.18 X10*3/UL (ref 0–0.7)
EOSINOPHIL NFR BLD AUTO: 1.6 %
ERYTHROCYTE [DISTWIDTH] IN BLOOD BY AUTOMATED COUNT: 17.1 % (ref 11.5–14.5)
GLUCOSE SERPL-MCNC: 84 MG/DL (ref 74–99)
HCT VFR BLD AUTO: 22.3 % (ref 36–46)
HGB BLD-MCNC: 7.4 G/DL (ref 12–16)
HGB RETIC QN: 34 PG (ref 28–38)
HOLD SPECIMEN: NORMAL
IMM GRANULOCYTES # BLD AUTO: 0.04 X10*3/UL (ref 0–0.7)
IMM GRANULOCYTES NFR BLD AUTO: 0.4 % (ref 0–0.9)
IMMATURE RETIC FRACTION: 21.5 %
LDH SERPL L TO P-CCNC: 305 U/L (ref 84–246)
LYMPHOCYTES # BLD AUTO: 3.79 X10*3/UL (ref 1.2–4.8)
LYMPHOCYTES NFR BLD AUTO: 33.4 %
MCH RBC QN AUTO: 31.2 PG (ref 26–34)
MCHC RBC AUTO-ENTMCNC: 33.2 G/DL (ref 32–36)
MCV RBC AUTO: 94 FL (ref 80–100)
MONOCYTES # BLD AUTO: 0.71 X10*3/UL (ref 0.1–1)
MONOCYTES NFR BLD AUTO: 6.3 %
NEUTROPHILS # BLD AUTO: 6.54 X10*3/UL (ref 1.2–7.7)
NEUTROPHILS NFR BLD AUTO: 57.6 %
NRBC BLD-RTO: 0.2 /100 WBCS (ref 0–0)
PLATELET # BLD AUTO: 401 X10*3/UL (ref 150–450)
POTASSIUM SERPL-SCNC: 4 MMOL/L (ref 3.5–5.3)
PROT SERPL-MCNC: 7.8 G/DL (ref 6.4–8.2)
RBC # BLD AUTO: 2.37 X10*6/UL (ref 4–5.2)
RETICS #: 0.18 X10*6/UL (ref 0.02–0.08)
RETICS/RBC NFR AUTO: 7.4 % (ref 0.5–2)
SODIUM SERPL-SCNC: 139 MMOL/L (ref 136–145)
WBC # BLD AUTO: 11.3 X10*3/UL (ref 4.4–11.3)

## 2024-04-23 PROCEDURE — 96372 THER/PROPH/DIAG INJ SC/IM: CPT

## 2024-04-23 PROCEDURE — 2500000005 HC RX 250 GENERAL PHARMACY W/O HCPCS: Performed by: NURSE PRACTITIONER

## 2024-04-23 PROCEDURE — 2500000001 HC RX 250 WO HCPCS SELF ADMINISTERED DRUGS (ALT 637 FOR MEDICARE OP): Performed by: NURSE PRACTITIONER

## 2024-04-23 PROCEDURE — 80053 COMPREHEN METABOLIC PANEL: CPT | Performed by: NURSE PRACTITIONER

## 2024-04-23 PROCEDURE — 83615 LACTATE (LD) (LDH) ENZYME: CPT | Performed by: NURSE PRACTITIONER

## 2024-04-23 PROCEDURE — 99215 OFFICE O/P EST HI 40 MIN: CPT | Mod: ZK | Performed by: NURSE PRACTITIONER

## 2024-04-23 PROCEDURE — 84075 ASSAY ALKALINE PHOSPHATASE: CPT | Performed by: NURSE PRACTITIONER

## 2024-04-23 PROCEDURE — 85045 AUTOMATED RETICULOCYTE COUNT: CPT | Performed by: NURSE PRACTITIONER

## 2024-04-23 PROCEDURE — 2500000004 HC RX 250 GENERAL PHARMACY W/ HCPCS (ALT 636 FOR OP/ED): Performed by: NURSE PRACTITIONER

## 2024-04-23 PROCEDURE — 85025 COMPLETE CBC W/AUTO DIFF WBC: CPT | Performed by: NURSE PRACTITIONER

## 2024-04-23 RX ORDER — DIPHENHYDRAMINE HCL 25 MG
25 CAPSULE ORAL ONCE
Status: COMPLETED | OUTPATIENT
Start: 2024-04-23 | End: 2024-04-23

## 2024-04-23 RX ORDER — DEFEROXAMINE MESYLATE 2 G/1
INJECTION, POWDER, LYOPHILIZED, FOR SOLUTION INTRAMUSCULAR; INTRAVENOUS; SUBCUTANEOUS
Status: CANCELLED | OUTPATIENT
Start: 2024-04-23

## 2024-04-23 RX ORDER — NALOXONE HYDROCHLORIDE 0.4 MG/ML
0.4 INJECTION, SOLUTION INTRAMUSCULAR; INTRAVENOUS; SUBCUTANEOUS
Status: DISCONTINUED | OUTPATIENT
Start: 2024-04-23 | End: 2024-04-29 | Stop reason: HOSPADM

## 2024-04-23 RX ORDER — KETOROLAC TROMETHAMINE 30 MG/ML
30 INJECTION, SOLUTION INTRAMUSCULAR; INTRAVENOUS ONCE
Status: COMPLETED | OUTPATIENT
Start: 2024-04-23 | End: 2024-04-23

## 2024-04-23 RX ORDER — METHADONE HYDROCHLORIDE 10 MG/1
10 TABLET ORAL EVERY 12 HOURS
Qty: 60 TABLET | Refills: 0 | Status: SHIPPED | OUTPATIENT
Start: 2024-04-23 | End: 2024-04-26 | Stop reason: SDUPTHER

## 2024-04-23 RX ORDER — ONDANSETRON 4 MG/1
4 TABLET, FILM COATED ORAL ONCE
Status: COMPLETED | OUTPATIENT
Start: 2024-04-23 | End: 2024-04-23

## 2024-04-23 RX ORDER — CYCLOBENZAPRINE HCL 10 MG
5 TABLET ORAL ONCE
Status: DISCONTINUED | OUTPATIENT
Start: 2024-04-23 | End: 2024-04-23

## 2024-04-23 RX ADMIN — HYDROMORPHONE HYDROCHLORIDE 0.5 MG: 2 INJECTION, SOLUTION INTRAMUSCULAR; INTRAVENOUS; SUBCUTANEOUS at 13:45

## 2024-04-23 RX ADMIN — HYDROMORPHONE HYDROCHLORIDE 0.5 MG: 2 INJECTION, SOLUTION INTRAMUSCULAR; INTRAVENOUS; SUBCUTANEOUS at 15:33

## 2024-04-23 RX ADMIN — KETOROLAC TROMETHAMINE 30 MG: 30 INJECTION INTRAMUSCULAR; INTRAVENOUS at 13:45

## 2024-04-23 RX ADMIN — DIPHENHYDRAMINE HYDROCHLORIDE 25 MG: 25 CAPSULE ORAL at 13:44

## 2024-04-23 RX ADMIN — HYDROMORPHONE HYDROCHLORIDE 0.5 MG: 2 INJECTION, SOLUTION INTRAMUSCULAR; INTRAVENOUS; SUBCUTANEOUS at 14:47

## 2024-04-23 RX ADMIN — ONDANSETRON HYDROCHLORIDE 4 MG: 4 TABLET, FILM COATED ORAL at 13:45

## 2024-04-23 ASSESSMENT — PAIN SCALES - GENERAL: PAINLEVEL: 7

## 2024-04-23 NOTE — PROGRESS NOTES
Patient reports slight improvement in pain at discharge. Advised to call for appointment tomorrow if pain not adequately controlled overnight, per APRN. Niurka called for patient for transport home. Patient left department under own power, no acute distress noted.

## 2024-04-23 NOTE — PROGRESS NOTES
Patient ID:  Mary Alice Aragon is a 44 y.o. female.  Subjective   Chief Complaint: Uncontrolled Pain    HPI  Mary Alice is a 44 y.o. female with history of sickle cell SS disease who presents to [facility] for evaluation of uncontrolled pain. Patient having pain in right arm that is consistent with her sickle cell pain. The pain started yesterday. She has tried taking her home dilaudid 4 mg without enough relief. She has not taken her methadone for the last few weeks, her sickle cell team has sent a request to the specialty pharmacy yesterday and awaiting PA.     Pt denies chest pain, cough, SOB, headaches, blurry vision, falls, fever or chills, n/v/d/abd pain, or urinary complaints.        ROS  Review of Systems - Oncology     Allergies  Allergies   Allergen Reactions   • Other Other        Medications  Current Outpatient Medications   Medication Instructions   • acetaminophen (Tylenol Extra Strength) 500 mg tablet 2 tablet EVERY 6 HOURS (route: oral)   • ascorbic acid (Vitamin C) 500 mg tablet 2 tablet DAILY (route: oral)   • calcium carbonate (Tums) 200 mg calcium chewable tablet oral   • capsaicin (Zostrix) 0.025 % cream Apply as directed as needed   • cephalexin (Keflex) 500 mg capsule TAKE 1 CAPSULE BY MOUTH EVERY 6 HOURS FOR 10 DAYS.   • cyclobenzaprine (Flexeril) 10 mg tablet 1 tablet 3 TIMES DAILY (route: oral)   • deferasirox (Jadenu) 180 mg tablet    • deferoxamine (Desferal) 2 gram injection Per instructions DAILY (route: injection)   • diclofenac sodium (Voltaren) 1 % gel gel Per instructions 4 TIMES DAILY (route: topical)   • diphenhydrAMINE (BENADryl) 25 mg capsule 1 capsule 3 TIMES DAILY (route: oral)   • docusate sodium (Colace) 100 mg capsule 1 capsule DAILY (route: oral)   • folic acid (Folvite) 1 mg tablet 1 tablet DAILY (route: oral)   • gabapentin (NEURONTIN) 300 mg, oral, 3 times daily   • HYDROmorphone (Dilaudid) 4 mg tablet 1 tablet EVERY 4 HOURS (route: oral)   • HYDROmorphone (DILAUDID) 4 mg, oral, 3  times daily PRN   • hydroxyurea (Hydrea) 500 mg capsule Per instructions AS DIRECTED (route: oral)   • hydrOXYzine HCL (Atarax) 25 mg tablet 1 tablet 2 TIMES DAILY (route: oral)   • ibuprofen 800 mg, oral, Every 8 hours PRN   • ketamine (Ketalar) 10 mg/mL injection intravenous   • lidocaine (lidocaine HCL) 4 % cream USE TOPICALLY AS DIRECTED.   • loratadine (Claritin) 10 mg tablet 1 tablet, oral, Daily   • melatonin 6 mg, oral, Nightly PRN   • meloxicam (MOBIC) 15 mg, oral, Daily   • methadone (DOLOPHINE) 10 mg, oral, Every 12 hours   • multivitamin tablet 1 tablet DAILY (route: oral)   • naloxone (Narcan) 4 mg/0.1 mL nasal spray Per instructions AS NEEDED (route: nasal)   • norethindrone (AYGESTIN) 5 mg, oral, Daily   • omeprazole (PriLOSEC) 40 mg DR capsule 1 capsule DAILY (route: oral)   • oxygen (O2) 3 L/min, inhalation,  3L O2 WITH ACTIVITY AS NEEDED 3L O2 AT NIGHT AS NEEDED.<BR>   • prochlorperazine (Compazine) 10 mg tablet 1 tablet EVERY 8 HOURS (route: oral)   • senna 8.6 mg, oral, 2 times daily PRN   • sertraline (ZOLOFT) 50 mg, oral, 2 times daily   • tiZANidine (ZANAFLEX) 2-4 mg, oral, Every 8 hours PRN   • traZODone (Desyrel) 100 mg tablet TAKE 1 TO 2 TABLETS(100  MG) BY MOUTH AT BEDTIME AS NEEDED FOR SLEEP   • trolamine salicylate (Aspercreme) 10 % cream Per instructions 4 TIMES DAILY (route: topical)   • Vitamin D3 1,000 Units, oral, Daily   • voxelotor (OXBRYTA) 500 mg tablet tablet TAKE THREE (3) TABLETS BY MOUTH ONCE DAILY.        Past Medical History:   Past Medical History:  2020: Unspecified astigmatism, bilateral      Comment:  Astigmatism, bilateral   Surgical History:    Past Surgical History:   Procedure Laterality Date   •  SECTION, CLASSIC  2016     Section   • CHOLECYSTECTOMY  2016    Cholecystectomy   • MR HEAD ANGIO W AND WO IV CONTRAST  4/15/2013    MR HEAD ANGIO W AND WO IV CONTRAST 4/15/2013 [facility]   • MR HEAD ANGIO W AND WO IV  CONTRAST  8/2/2013    MR HEAD ANGIO W AND WO IV CONTRAST 8/2/2013 [facility]   • MR HEAD ANGIO WO IV CONTRAST  11/1/2014    MR HEAD ANGIO WO IV CONTRAST 11/1/2014 [facility]   • MR HEAD ANGIO WO IV CONTRAST  2/8/2016    MR HEAD ANGIO WO IV CONTRAST 2/8/2016 [facility]   • MR HEAD ANGIO WO IV CONTRAST  4/16/2019    MR HEAD ANGIO WO IV CONTRAST 4/16/2019 [facility]   • MR HEAD ANGIO WO IV CONTRAST  5/4/2017    MR HEAD ANGIO WO IV CONTRAST 5/4/2017 [facility]   • MR NECK ANGIO WO IV CONTRAST  8/2/2013    MR NECK ANGIO WO IV CONTRAST 8/2/2013 [facility]   • MR NECK ANGIO WO IV CONTRAST  11/1/2014    MR NECK ANGIO WO IV CONTRAST 11/1/2014 [facility]   • OTHER SURGICAL HISTORY  06/16/2016    Brain Surgery   • TUBAL LIGATION  04/10/2017    Tubal Ligation      Family History:    Family History   Problem Relation Name Age of Onset   • Sickle cell trait Sister     • Multiple sclerosis Brother     • Breast cancer Maternal Grandmother     • Breast cancer Other paternal cousin    • Sickle cell trait Child       Family Oncology History:    Cancer-related family history includes Breast cancer in her maternal grandmother and another family member.  Social History:    Social History     Tobacco Use   • Smoking status: Never   • Smokeless tobacco: Never   Substance Use Topics   • Alcohol use: Not Currently        Objective   Vitals: /65 (BP Location: Left arm, Patient Position: Sitting)   Pulse 68   Temp 35.5 °C (95.9 °F) (Temporal)   Resp 18   Wt 65.7 kg (144 lb 13.5 oz)   SpO2 100%   BMI 25.66 kg/m²   Weight:   Vitals:    04/23/24 1322   Weight: 65.7 kg (144 lb 13.5 oz)       Physical Exam  Vitals reviewed.   Constitutional:       Appearance: Normal appearance.   HENT:      Head: Normocephalic and atraumatic.      Nose: Nose normal.      Mouth/Throat:      Mouth: Mucous membranes are moist.      Pharynx: Oropharynx is clear.   Eyes:       Conjunctiva/sclera: Conjunctivae normal.      Pupils: Pupils are equal, round, and reactive to light.   Cardiovascular:      Pulses: Normal pulses.      Heart sounds: Normal heart sounds.   Pulmonary:      Effort: Pulmonary effort is normal.      Breath sounds: Normal breath sounds.   Abdominal:      General: Bowel sounds are normal. There is no distension.      Palpations: Abdomen is soft.      Tenderness: There is no abdominal tenderness.   Musculoskeletal:      Cervical back: Normal range of motion.      Right lower leg: No edema.      Left lower leg: No edema.   Skin:     General: Skin is warm and dry.   Neurological:      General: No focal deficit present.      Mental Status: She is alert and oriented to person, place, and time.   Psychiatric:         Mood and Affect: Mood normal.         Behavior: Behavior normal.       Diagnostic Results     Labs  Results from last 7 days   Lab Units 04/23/24  1357   WBC AUTO x10*3/uL 11.3   HEMOGLOBIN g/dL 7.4*   HEMATOCRIT % 22.3*   PLATELETS AUTO x10*3/uL 401   NEUTROS ABS x10*3/uL 6.54   LYMPHS ABS AUTO x10*3/uL 3.79   MONOS ABS AUTO x10*3/uL 0.71   EOS ABS AUTO x10*3/uL 0.18   NEUTROS PCT AUTO % 57.6   LYMPHS PCT AUTO % 33.4   MONOS PCT AUTO % 6.3   EOS PCT AUTO % 1.6      Results from last 7 days   Lab Units 04/23/24  1357   GLUCOSE mg/dL 84   SODIUM mmol/L 139   POTASSIUM mmol/L 4.0   CHLORIDE mmol/L 107   CO2 mmol/L 22   BUN mg/dL 13   CREATININE mg/dL 0.83   EGFR mL/min/1.73m*2 90   CALCIUM mg/dL 9.0   ALBUMIN g/dL 4.5   PROTEIN TOTAL g/dL 7.8     Results from last 7 days   Lab Units 04/23/24  1357   BILIRUBIN TOTAL mg/dL 2.6*   ALK PHOS U/L 54   ALT U/L 16   AST U/L 28                     Lab Results   Component Value Date    HGB 6.7 (L) 12/18/2023    HGB 7.5 (L) 11/17/2023    HGB 7.4 (L) 04/24/2023     (H) 12/18/2023     (H) 11/17/2023     (H) 04/24/2023     (H) 12/18/2023     (H) 11/17/2023     04/24/2023       Images  ===  01/24/24 ===    XR URGENT CARE XRAY    - Impression -  Mild chronic appearing height loss of L3, L4 and L5 vertebral bodies.  No definite evidence of acute fracture of the lumbar spine.    Multilevel degenerative change of the lumbar spine.      MACRO:  None    Signed by: Costa Carney 1/23/2024 7:43 PM  Dictation workstation:   [id number]   === 03/10/22 ===    - Impression -  Right pelvic abnormality compatible with a unilocular right ovarian  cyst without ancillary features to suggest torsion. If there is  clinical concern, pelvic ultrasound correlation could be considered.    Indeterminate 4.5 mm calcification right pelvis inseparable from the  expected course of the right ureter. The right ureter is normal  caliber. Ureteral calculus not definitively excluded. Recommend  correlation with patient's symptomatology, and urinalysis.    Physiologic amount of pelvic free fluid.    Redemonstrated diffuse multinodular thyroid gland which demonstrate  mixed response compared with prior CT scan. The findings could be  more reliably compared by ultrasound.    Mild prominent caliber central pulmonary arteries. Correlate for  evidence of pulmonary arterial hypertension.    Likely postinflammatory changes at the lung bases.    Chronic findings related to sickle cell anemia.    No thoracic or abdominal aortic aneurysm or dissection.     No echocardiogram results found for the past 12 months       Assessment/Plan   Mary Alice Aragon is a 44 y.o. female with history of sickle cell SS disease who presents to [facility] for evaluation of uncontrolled pain.         ACC Course  - VSS  - Hemolysis labs near baseline, no indication of acute vaso-occlusive crisis or indication for blood transfusion   - Toradol 30mg, given for AVN related pain/inflammation  - dilaudid 0.5 mg subcutaneous x 3 doses  given for sickle cell related pain  - Zofran 4 mg once for opioid induced nausea/vomiting  - Benadryl 25mg once for opioid induced pruritus      Disposition  - Patient discharged home with no further needs following ACC Course  - Return to clinic/ED instructions given            Bri Berry, APRN-CNP

## 2024-04-23 NOTE — PROGRESS NOTES
ONCOLOGY/HEMATOLOGY PSYCHOSOCIAL ASSESSMENT     Demographic Information  Mary Alice Aragon  1980  [id number]  Assessment Type:    Date of assessment: 04/22/24  Person(s) present during assessment:   Did patient participate in assessment:  yes  If no, why not:  Primary language: English  Interpretive services used:   Provider: Adriana Coelho CNP   Diagnosis: Sickle Cell SS                Marital Status / Family / Household  Status:   single  Family composition: Patient resides with two minor children  Support systems: Pt reports limited support system  Primary caregiver:  self    Current employment status:  employed and disability  Work history/type of work:     Comments: Pt is currently on leave from employement. SW assisted pt with paperwork for [employer]     Environmental Supports  Current living situation:   house  Patient's home environment:      Functional Status   Functional status:  Independent, assistance needed during pain crises  Other health issues that the patient is experiencing:   What supports are in place to assist the patient:   What community resources have been offered: Support group  Transportation:  self  Psychosocial risk factors impacting the patient:   Limited social supports     Assistive Devices  Patient has the following equipment: n/a  Patient currently ambulates:      Finances/Insurance  Primary insurance: Medicare  Secondary insurance: [facility]  Prescription insurance:  Does the patient have any pending insurance applications: No   Patient's current income source:  Employment disability  Does the patient have any financial concerns:  denies    Comments:      Advance Directives  power of  for healthcare on file  Advanced directives status:  Not activated  Legal decision maker, when applicable: Kwadwoalisha Gigi-Sister     Taoism or Spiritual Identity  Comments:    Mental Health  Mental health history and status details:  Patient follows with Dr. May.  Open to therapy referral     Depression Screen        Social Determinants of Health     Tobacco Use: Low Risk  (3/21/2024)    Patient History     Smoking Tobacco Use: Never     Smokeless Tobacco Use: Never     Passive Exposure: Not on file   Alcohol Use: Not on file   Financial Resource Strain: Not on file   Food Insecurity: Not on file   Transportation Needs: Not on file   Physical Activity: Not on file   Stress: Not on file   Social Connections: Not on file   Intimate Partner Violence: Not on file   Depression: Not at risk (3/1/2022)    PHQ-2     PHQ-2 Score: 2   Housing Stability: Not on file   Utilities: Not on file   Digital Equity: Not on file   Health Literacy: Not on file       Assessment/Plan  LISW met with patient during Comprehensive Care Visit. Pt was accompanied by her 14 year old daughter. LISW and pt completed documentation for pt's supplemental insurance. Form signed by Adriana Coelho CNP and submitted via email to [email or web address]    Pt shares her greatest concern at this time is getting her pain under control. Pt states her depression has been worse because of this. She has been following consistently with Dr. May. Pt is open to therapy referrals. LISW will send pt email with options based on pt's preferences.  LISW provided pt with empathetic listening and support throughout visit.     Pt shares her overall focus and goal right now is to get her pain under control. Patient denies any further psychosocial or support service needs at this time. Patient encouraged to contact LISW if any needs arise.     LISW will continue to follow as needed.

## 2024-04-24 ENCOUNTER — TELEPHONE (OUTPATIENT)
Dept: HEMATOLOGY/ONCOLOGY | Facility: HOSPITAL | Age: 44
End: 2024-04-24

## 2024-04-25 ENCOUNTER — SOCIAL WORK (OUTPATIENT)
Dept: HEMATOLOGY/ONCOLOGY | Facility: HOSPITAL | Age: 44
End: 2024-04-25
Payer: COMMERCIAL

## 2024-04-25 ENCOUNTER — TELEPHONE (OUTPATIENT)
Dept: HEMATOLOGY/ONCOLOGY | Facility: HOSPITAL | Age: 44
End: 2024-04-25

## 2024-04-25 DIAGNOSIS — D57.00 SICKLE CELL DISEASE WITH CRISIS (MULTI): ICD-10-CM

## 2024-04-25 LAB — CARBOXYTHC UR-MCNC: 216 NG/ML

## 2024-04-25 NOTE — PROGRESS NOTES
Pt contacted RADHA to express her frustrations related to communicating with her medical team. Pt shared the challenges she has experienced this week. Pt shares she is interested in transferring her care to another hospital system. RAD[facility]. CAMW provided pt with empathetic listening and support throughout conversation. Patient denies any further psychosocial or support service needs at this time. Patient encouraged to contact LISW if any needs arise.     RADHA will continue to follow as needed.

## 2024-04-25 NOTE — PROGRESS NOTES
I reached out to patient after a phone call where she was very upset about her arm and leg pain and pain medication. I gave her the phone number for  Specialty Pharmacy since that is where her Methadone was sent on 4/23/2024 when she was seen at a Sickle Cell appointment. She seemed very unhappy with my suggestions and mentioned transitioning to [facility]. I suggested she go to ED via EMS since she was unable to drive. I am concerned because she said she is barely able to move and has two children at home. Reaching out to Social Work.

## 2024-04-26 RX ORDER — GABAPENTIN 300 MG/1
CAPSULE ORAL
Qty: 56 CAPSULE | Refills: 2 | Status: SHIPPED | OUTPATIENT
Start: 2024-04-26 | End: 2024-05-10

## 2024-04-26 RX ORDER — METHADONE HYDROCHLORIDE 10 MG/1
10 TABLET ORAL EVERY 12 HOURS
Qty: 60 TABLET | Refills: 0 | Status: SHIPPED | OUTPATIENT
Start: 2024-04-26

## 2024-04-30 ENCOUNTER — SPECIALTY PHARMACY (OUTPATIENT)
Dept: PHARMACY | Facility: CLINIC | Age: 44
End: 2024-04-30

## 2024-04-30 RX ORDER — DEFEROXAMINE MESYLATE 2 G/1
40 INJECTION, POWDER, LYOPHILIZED, FOR SOLUTION INTRAMUSCULAR; INTRAVENOUS; SUBCUTANEOUS DAILY
Qty: 1000 G | Refills: 3 | Status: SHIPPED | OUTPATIENT
Start: 2024-04-30 | End: 2024-05-01

## 2024-05-01 ENCOUNTER — HOME INFUSION (OUTPATIENT)
Dept: INFUSION THERAPY | Age: 44
End: 2024-05-01
Payer: COMMERCIAL

## 2024-05-01 DIAGNOSIS — E83.111 IRON OVERLOAD, TRANSFUSIONAL: ICD-10-CM

## 2024-05-01 DIAGNOSIS — D57.00 SICKLE CELL DISEASE WITH CRISIS (MULTI): Primary | ICD-10-CM

## 2024-05-01 LAB
6MAM UR CFM-MCNC: <25 NG/ML
CODEINE UR CFM-MCNC: <50 NG/ML
HYDROCODONE CTO UR CFM-MCNC: <25 NG/ML
HYDROMORPHONE UR CFM-MCNC: >2500 NG/ML
MORPHINE UR CFM-MCNC: <50 NG/ML
NORHYDROCODONE UR CFM-MCNC: <25 NG/ML
NOROXYCODONE UR CFM-MCNC: <25 NG/ML
OXYCODONE UR CFM-MCNC: <25 NG/ML
OXYMORPHONE UR CFM-MCNC: <25 NG/ML

## 2024-05-01 RX ORDER — DEFEROXAMINE MESYLATE 2 G/1
INJECTION, POWDER, LYOPHILIZED, FOR SOLUTION INTRAMUSCULAR; INTRAVENOUS; SUBCUTANEOUS
Qty: 1 EACH | Refills: 99 | Status: SHIPPED
Start: 2024-05-01

## 2024-05-01 NOTE — PROGRESS NOTES
Obtained orders for subcutaneous infusion over 8 hours - dose adjusted to 2000mg   Desferal ordered to restart - per referral source homecare RN not needed  RX Only - Desferal profiled    Left message for patient to call infusion pharmacy to schedule delivery

## 2024-05-08 ENCOUNTER — TELEPHONE (OUTPATIENT)
Dept: HEMATOLOGY/ONCOLOGY | Facility: HOSPITAL | Age: 44
End: 2024-05-08

## 2024-05-09 ENCOUNTER — TELEPHONE (OUTPATIENT)
Dept: ADMISSION | Facility: HOSPITAL | Age: 44
End: 2024-05-09
Payer: COMMERCIAL

## 2024-05-09 DIAGNOSIS — D57.00 SICKLE CELL DISEASE WITH CRISIS (MULTI): ICD-10-CM

## 2024-05-09 RX ORDER — HYDROMORPHONE HYDROCHLORIDE 4 MG/1
4 TABLET ORAL 3 TIMES DAILY PRN
Qty: 40 TABLET | Refills: 0 | Status: SHIPPED | OUTPATIENT
Start: 2024-05-09 | End: 2024-06-04 | Stop reason: SDUPTHER

## 2024-05-09 NOTE — TELEPHONE ENCOUNTER
Mary Alice Aragon called the refill line for Hydromorphone. Requesting refills be sent to [facility] pharmacy; message sent to Sickle Cell team to submit.

## 2024-05-15 ENCOUNTER — TELEMEDICINE (OUTPATIENT)
Dept: HEMATOLOGY/ONCOLOGY | Facility: HOSPITAL | Age: 44
End: 2024-05-15
Payer: COMMERCIAL

## 2024-05-15 DIAGNOSIS — D57.00 SICKLE CELL DISEASE WITH CRISIS (MULTI): Primary | ICD-10-CM

## 2024-05-15 PROCEDURE — 99443 PR PHYS/QHP TELEPHONE EVALUATION 21-30 MIN: CPT | Performed by: INTERNAL MEDICINE

## 2024-05-15 ASSESSMENT — ENCOUNTER SYMPTOMS
NERVOUS/ANXIOUS: 1
DEPRESSION: 1
SHORTNESS OF BREATH: 1
EYES NEGATIVE: 1
NEUROLOGICAL NEGATIVE: 1
CONSTIPATION: 1
CONSTITUTIONAL NEGATIVE: 1
ARTHRALGIAS: 1

## 2024-05-15 NOTE — PROGRESS NOTES
Patient ID: Mary Alice Aragon is a 44 y.o. female.  Referring Physician: Adriana Coelho, APRN-CNP  [id number] [place] [address]  Primary Care Provider: Nyasia Mcdaniel MD  Visit Type: Follow Up     Telephone visit-    Subjective      HPI  Mary Alice presents for fuv vis phone. She tried to increase her gabapentin to 600mg and she had some vomiting and dizziness and was unable to tolerate. She tried to slowly increase the medication in the evening dose and still had vomiting. She is currently taking 300mg TID. Tizanidine does help the spasm pain but she is frustrated that it takes an hour to work and that it makes her sleepy. These spasms have been occurring for about 10 yrs, but not its worse and more often. She has tried some deep breathing in the past or counting. She didn't have any other symptoms of illness. She is interested in getting a port placed since it is very difficult to get vascular access.       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 Right frontal encephalomalacia secondary to sequela of right middle cerebral artery infarction.  History of lead poisoning with hydrocephalus status post right ventriculostomy shunt catheter, and subsequent removal.   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 She has been evaluated by Neurosurgery and is stable with no required intervention.  Low-grade squamous epithelial lesion on Pap smear in May 2011.  Pulmonary embolism in 2009 in the postpartum phase, anticoagulated for 6 months.  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   Status post cholecystectomy.   Pneumonia in 2006.   Chronic pain: Currently managed with monthly ketamine infusions with good response.   Nocturnal oxygen dependence  Review of Systems   Constitutional: Negative.    HENT:  Negative.     Eyes: Negative.     Respiratory:  Positive for shortness of breath.    Cardiovascular:  Positive for chest pain.   Gastrointestinal:  Positive for constipation.   Genitourinary: Negative.     Musculoskeletal:  Positive for arthralgias.   Skin: Negative.    Neurological: Negative.    Psychiatric/Behavioral:  Positive for depression. The patient is nervous/anxious.         Objective   BSA: There is no height or weight on file to calculate BSA.  There were no vitals taken for this visit.     has a past medical history of Unspecified astigmatism, bilateral (2020).   has a past surgical history that includes Cholecystectomy (2016);  section, classic (2016); Other surgical history (2016); Tubal ligation (04/10/2017); MR angio head w and wo IV contrast (4/15/2013); MR angio neck wo IV contrast (2013); MR angio head w and wo IV contrast (2013); MR angio head wo IV contrast (2014); MR angio neck wo IV contrast (2014); MR angio head wo IV contrast (2016); MR angio head wo IV contrast (2019); and MR angio head wo IV contrast (2017).  Family History   Problem Relation Name Age of Onset    Sickle cell trait Sister      Multiple sclerosis Brother      Breast cancer Maternal Grandmother      Breast cancer Other paternal cousin     Sickle cell trait Child       Oncology History    No history exists.       Mary Alice Aragon  reports that she has never smoked. She has never used smokeless tobacco.  She  reports that she does not currently use alcohol.  She  has no history on file for drug use.    Physical Exam  HENT:      Head: Normocephalic.      Nose: Nose normal.      Mouth/Throat:      Mouth: Mucous membranes are moist.   Eyes:      Pupils: Pupils are equal, round, and reactive to light.   Cardiovascular:      Rate and Rhythm: Normal rate and regular rhythm.      Pulses: Normal pulses.      Heart sounds: Normal heart sounds.   Pulmonary:      Effort: Pulmonary effort is normal.       Breath sounds: Normal breath sounds.   Abdominal:      General: Bowel sounds are normal.      Palpations: Abdomen is soft.   Musculoskeletal:         General: Normal range of motion.   Skin:     General: Skin is warm and dry.      Comments: Right great toe discoloration near cuticle    Neurological:      General: No focal deficit present.      Mental Status: She is alert and oriented to person, place, and time.   Psychiatric:         Mood and Affect: Mood normal.         Behavior: Behavior normal.         WBC   Date/Time Value Ref Range Status   04/23/2024 01:57 PM 11.3 4.4 - 11.3 x10*3/uL Final   12/18/2023 12:23 PM 11.9 (H) 4.4 - 11.3 x10*3/uL Final   11/17/2023 08:45 PM 16.1 (H) 4.4 - 11.3 x10*3/uL Final     nRBC   Date Value Ref Range Status   04/23/2024 0.2 (H) 0.0 - 0.0 /100 WBCs Final   12/18/2023 0.2 (H) 0.0 - 0.0 /100 WBCs Final   11/17/2023 0.2 (H) 0.0 - 0.0 /100 WBCs Final     RBC   Date Value Ref Range Status   04/23/2024 2.37 (L) 4.00 - 5.20 x10*6/uL Final   12/18/2023 2.10 (L) 4.00 - 5.20 x10*6/uL Final   11/17/2023 2.39 (L) 4.00 - 5.20 x10*6/uL Final     Hemoglobin   Date Value Ref Range Status   04/23/2024 7.4 (L) 12.0 - 16.0 g/dL Final   12/18/2023 6.7 (L) 12.0 - 16.0 g/dL Final   11/17/2023 7.5 (L) 12.0 - 16.0 g/dL Final     Hematocrit   Date Value Ref Range Status   04/23/2024 22.3 (L) 36.0 - 46.0 % Final   12/18/2023 19.4 (L) 36.0 - 46.0 % Final   11/17/2023 21.9 (L) 36.0 - 46.0 % Final     MCV   Date/Time Value Ref Range Status   04/23/2024 01:57 PM 94 80 - 100 fL Final   12/18/2023 12:23 PM 92 80 - 100 fL Final   11/17/2023 08:45 PM 92 80 - 100 fL Final     MCH   Date/Time Value Ref Range Status   04/23/2024 01:57 PM 31.2 26.0 - 34.0 pg Final   12/18/2023 12:23 PM 31.9 26.0 - 34.0 pg Final   11/17/2023 08:45 PM 31.4 26.0 - 34.0 pg Final     MCHC   Date/Time Value Ref Range Status   04/23/2024 01:57 PM 33.2 32.0 - 36.0 g/dL Final   12/18/2023 12:23 PM 34.5 32.0 - 36.0 g/dL Final   11/17/2023  "08:45 PM 34.2 32.0 - 36.0 g/dL Final     RDW   Date/Time Value Ref Range Status   04/23/2024 01:57 PM 17.1 (H) 11.5 - 14.5 % Final   12/18/2023 12:23 PM 17.4 (H) 11.5 - 14.5 % Final   11/17/2023 08:45 PM 16.0 (H) 11.5 - 14.5 % Final     Platelets   Date/Time Value Ref Range Status   04/23/2024 01:57  150 - 450 x10*3/uL Final   12/18/2023 12:23  (H) 150 - 450 x10*3/uL Final   11/17/2023 08:45  (H) 150 - 450 x10*3/uL Final     No results found for: \"MPV\"  Neutrophils %   Date/Time Value Ref Range Status   04/23/2024 01:57 PM 57.6 40.0 - 80.0 % Final   12/18/2023 12:23 PM 56.1 40.0 - 80.0 % Final   11/17/2023 08:45 PM 47.6 40.0 - 80.0 % Final     Immature Granulocytes %, Automated   Date/Time Value Ref Range Status   04/23/2024 01:57 PM 0.4 0.0 - 0.9 % Final     Comment:     Immature Granulocyte Count (IG) includes promyelocytes, myelocytes and metamyelocytes but does not include bands. Percent differential counts (%) should be interpreted in the context of the absolute cell counts (cells/UL).   12/18/2023 12:23 PM 0.3 0.0 - 0.9 % Final     Comment:     Immature Granulocyte Count (IG) includes promyelocytes, myelocytes and metamyelocytes but does not include bands. Percent differential counts (%) should be interpreted in the context of the absolute cell counts (cells/UL).   11/17/2023 08:45 PM 0.4 0.0 - 0.9 % Final     Comment:     Immature Granulocyte Count (IG) includes promyelocytes, myelocytes and metamyelocytes but does not include bands. Percent differential counts (%) should be interpreted in the context of the absolute cell counts (cells/UL).     Lymphocytes %   Date/Time Value Ref Range Status   04/23/2024 01:57 PM 33.4 13.0 - 44.0 % Final   12/18/2023 12:23 PM 36.2 13.0 - 44.0 % Final   11/17/2023 08:45 PM 45.3 13.0 - 44.0 % Final     Monocytes %   Date/Time Value Ref Range Status   04/23/2024 01:57 PM 6.3 2.0 - 10.0 % Final   12/18/2023 12:23 PM 5.7 2.0 - 10.0 % Final   11/17/2023 08:45 PM " 4.9 2.0 - 10.0 % Final     Eosinophils %   Date/Time Value Ref Range Status   04/23/2024 01:57 PM 1.6 0.0 - 6.0 % Final   12/18/2023 12:23 PM 1.1 0.0 - 6.0 % Final   11/17/2023 08:45 PM 1.0 0.0 - 6.0 % Final     Basophils %   Date/Time Value Ref Range Status   04/23/2024 01:57 PM 0.7 0.0 - 2.0 % Final   12/18/2023 12:23 PM 0.6 0.0 - 2.0 % Final   11/17/2023 08:45 PM 0.8 0.0 - 2.0 % Final     Neutrophils Absolute   Date/Time Value Ref Range Status   04/23/2024 01:57 PM 6.54 1.20 - 7.70 x10*3/uL Final     Comment:     Percent differential counts (%) should be interpreted in the context of the absolute cell counts (cells/uL).   12/18/2023 12:23 PM 6.66 1.20 - 7.70 x10*3/uL Final     Comment:     Percent differential counts (%) should be interpreted in the context of the absolute cell counts (cells/uL).   11/17/2023 08:45 PM 7.67 1.20 - 7.70 x10*3/uL Final     Comment:     Percent differential counts (%) should be interpreted in the context of the absolute cell counts (cells/uL).     Immature Granulocytes Absolute, Automated   Date/Time Value Ref Range Status   04/23/2024 01:57 PM 0.04 0.00 - 0.70 x10*3/uL Final   12/18/2023 12:23 PM 0.04 0.00 - 0.70 x10*3/uL Final   11/17/2023 08:45 PM 0.06 0.00 - 0.70 x10*3/uL Final     Lymphocytes Absolute   Date/Time Value Ref Range Status   04/23/2024 01:57 PM 3.79 1.20 - 4.80 x10*3/uL Final   12/18/2023 12:23 PM 4.31 1.20 - 4.80 x10*3/uL Final   11/17/2023 08:45 PM 7.31 (H) 1.20 - 4.80 x10*3/uL Final     Monocytes Absolute   Date/Time Value Ref Range Status   04/23/2024 01:57 PM 0.71 0.10 - 1.00 x10*3/uL Final   12/18/2023 12:23 PM 0.68 0.10 - 1.00 x10*3/uL Final   11/17/2023 08:45 PM 0.79 0.10 - 1.00 x10*3/uL Final     Eosinophils Absolute   Date/Time Value Ref Range Status   04/23/2024 01:57 PM 0.18 0.00 - 0.70 x10*3/uL Final   12/18/2023 12:23 PM 0.13 0.00 - 0.70 x10*3/uL Final   11/17/2023 08:45 PM 0.16 0.00 - 0.70 x10*3/uL Final     Basophils Absolute   Date/Time Value Ref  "Range Status   04/23/2024 01:57 PM 0.08 0.00 - 0.10 x10*3/uL Final   12/18/2023 12:23 PM 0.07 0.00 - 0.10 x10*3/uL Final   11/17/2023 08:45 PM 0.13 (H) 0.00 - 0.10 x10*3/uL Final       No components found for: \"PT\"  aPTT   Date/Time Value Ref Range Status   03/11/2020 11:34 AM 30 28 - 38 sec Final     Comment:       THE APTT IS NO LONGER USED FOR MONITORING     UNFRACTIONATED HEPARIN THERAPY.    FOR MONITORING HEPARIN THERAPY,     USE THE HEPARIN ASSAY.     09/13/2019 07:00 AM 35 28 - 38 sec Final     Comment:       THE APTT IS NO LONGER USED FOR MONITORING     UNFRACTIONATED HEPARIN THERAPY.    FOR MONITORING HEPARIN THERAPY,     USE THE HEPARIN ASSAY.         Assessment/Plan    - fuv in 3 months  - try tizanidine (1/2 pill) up to TID for spasms  - refer to therapist- ask SW   - rx refills sent  - didn't start oxybryta- wants to have foot/toenail resolved    HbSS disease: Continue folic acid and hydroxyurea as prescribed.  Hgb 6.9, T, ari 2.6, retics .255, HbF 16.3%, . We discussed voxelotor today to help raise hgb, reduce sickling and reduce lysis labs.  Discussed MOA and side effects.  Mary Alice would like to start medication, will send enrollment to [facility]. Started oxybryta but didn't do well with nausea. She will start will taking just one pill with a meal for a month and then go to two. Will revisit at next visit. Did not stop for labs.         Reticulocytosis: Noted, discussed wearing O2 nightly to reduce sickling.  Mary Alice had a sleep study completed which did not indicate any sleep-disordered breathing or need for CPAP titration.      Right ingrown toenail:  Referred to podiatry for evaluation, missed previous appointment, referred  Continue epsom salt soaks.    Chronic pain: Patient continues with ketamine infusions, dates have been fragmented due to staffing shortages and [facility] which have impacted scheduling.  Appointment in June 2022 was cancelled, Mary Alice will " explore outside providers for ketamine infusion, she has scheduled an appointment at [facility] [place]. Methadone 5mg BID restarted.  Has ketamine at [facility] scheduled monthly- had 3 so far.  Taking gabapentin TID- changed pills to 300mg capsule.     Recent fall:  Refer to orthopedics for evaluation of right arm pain, left knee pain, appointment upcoming.    Iron overload: Mary Alice approved for desferal infusion, ferritin today 2016.  Mary Alice will need Cardiac/Liver MRI, sent new rx and new PA to  spec- pt with high co-pay of $49 per dose of drug but will cover nursing care and supplies. Will discuss Jadenu at [facility].     Left breast lump:  Mammogram 8/2022 revealed a fibroadenoma with recommendations for repeat imaging 2/2023, biopsy 10/21/22 benign.Has repeat mammogram scheduled.      Psych: Follows with Dr. May, appointment upcoming.  Grieving loss of best friend, father and father of her children in the last 2 years.  Referred to psych, provided resources for the children, which were sent to Mray Alice via email.  Mary Alice states that she is still sad at times, but feeling a little better.  Will continue to monitor. missed fuv, will reschedule     Vascular access:  Mary Alice is completing dental care; once dental care is up to date (cleaning and extractions/fillings completed), will refer to IR for consultation for vascular access port. would like to be reassessed for ports- referred to IR       Health Maintenance:   Dental:  follows with [facility], upcoming appointment has follow up coming up.    Optho: 7/2020    PCP: sees Dr. Mcdaniel, last visit 5/2021, will need follow up, Mary Alice will call to schedule.     ECHO: 2020- cardiology in 2022.    MRI: 2019   Immunizations: influenza 9/9/2021, Prevnar 11/19/2020, PPSV23 1/28/21.  Pfizer covid-19 vaccine series complete, eligible for booster if not yet completed.      Headaches/Neuropathic pain: Patient recently seen in neurology, prescribed verapamil for daily treatment,  prescribed ubrelvy for abortive therapy.  Last visit 12/15/21, referred to Dr. Ray for follow up. Current pt with neuro- has fuv next week.      Abdominal pain:  Seen May 2022 in Dr. Bolanos's office, suspected endometriosis, recommended IUD contraception for long-term management; transvaginal u/s ordered for surveillance of ovarian cyst, referred to pelvic floor therapy.  Currently prescribed norethindrone by GYN.    Benign follicular cyst, patient states voice changes:  referred to ENT, seen 6/2021, omeprazole started, plan to follow up with possible scope if symptoms persist.  Mary Alice states that symptoms have returned, referred to ENT. re-referred for dysphagia      Neuropathy:  Mary Alice states that this has persisted, referred.    Vitamin D deficiency:  Previously noted, cholecalciferol 2000IU, check at [facility].    Psychosocial:  Program SW to assist with resources for Mary Alice to contact her property management company regarding restoration of electricity and gas to all parts of her home.SW to contact for support.     Integumentary:  Previously referred to dermatology for evaluation of scalp scabs and abscess at previous appointment. Pilonidal cyst- has history and current is not inflamed but painful. Stat referral for dermatology.- upcoming appt.        Problem List Items Addressed This Visit    None           MICHELLE Mercado-CNP

## 2024-05-21 ENCOUNTER — HOME HEALTH ADMISSION (OUTPATIENT)
Dept: HOME HEALTH SERVICES | Facility: HOME HEALTH | Age: 44
End: 2024-05-21
Payer: COMMERCIAL

## 2024-05-21 ENCOUNTER — TELEPHONE (OUTPATIENT)
Dept: ADMISSION | Facility: HOSPITAL | Age: 44
End: 2024-05-21
Payer: COMMERCIAL

## 2024-05-21 DIAGNOSIS — D57.00 SICKLE CELL DISEASE WITH CRISIS (MULTI): Primary | ICD-10-CM

## 2024-05-21 DIAGNOSIS — E83.111 IRON OVERLOAD, TRANSFUSIONAL: ICD-10-CM

## 2024-05-21 RX ORDER — PROCHLORPERAZINE MALEATE 10 MG
TABLET ORAL
Qty: 30 TABLET | Refills: 3 | Status: SHIPPED | OUTPATIENT
Start: 2024-05-21

## 2024-05-21 NOTE — TELEPHONE ENCOUNTER
Pt requesting refill   Compazine 10mg. 1 tablet every 8 hrs prn  Pharmacy: [address] in [place]  Last FUV 5/15

## 2024-05-23 ENCOUNTER — TELEMEDICINE (OUTPATIENT)
Dept: BEHAVIORAL HEALTH | Facility: HOSPITAL | Age: 44
End: 2024-05-23
Payer: MEDICARE

## 2024-05-23 DIAGNOSIS — F33.0 MILD EPISODE OF RECURRENT MAJOR DEPRESSIVE DISORDER (CMS-HCC): ICD-10-CM

## 2024-05-23 DIAGNOSIS — F41.9 ANXIETY: ICD-10-CM

## 2024-05-23 PROCEDURE — 1036F TOBACCO NON-USER: CPT | Performed by: PSYCHIATRY & NEUROLOGY

## 2024-05-23 PROCEDURE — 99214 OFFICE O/P EST MOD 30 MIN: CPT | Performed by: PSYCHIATRY & NEUROLOGY

## 2024-05-23 PROCEDURE — 90833 PSYTX W PT W E/M 30 MIN: CPT | Performed by: PSYCHIATRY & NEUROLOGY

## 2024-05-23 RX ORDER — TRAZODONE HYDROCHLORIDE 100 MG/1
TABLET ORAL
Qty: 180 TABLET | Refills: 1 | Status: SHIPPED | OUTPATIENT
Start: 2024-05-23

## 2024-05-23 NOTE — PROGRESS NOTES
Outpatient Psychiatry FUV      Subjective   Mary Alice Aragon, a 44 y.o. female, for virtual FUV    Confirmed to be in car, parked at gas station    Assessment/Plan   Patient Discussion:  CONTINUE sertraline 50mg daily, if you have been off, take 1/2 tab for 1-2 weeks to resume  CONTINUE  trazodone 100-200mg at bedtime  CONTINUE hydroxyzine 25mg 1/2 tablet as needed for anxiety  take melatonin 2 hours before preferred bedtime  continue with good sleep hygiene  Resume melatonin    Previously placed referral to therapy, continue to consider    CONTINUE with light box for 20 minutes per day at 10,000lux. position above you but so it reaches your eyes. You don't need to look directly into the light    Follow up  6/27 at 12pm virtual FUV      Call with questions or concerns [phone] Crisis hotline if any further SI    Assessment:   44 y.o.  F with Hgb SS disease, h/o MCA with R frontal encephalomalacia, h/o lead poisoning, chronic pain managed by ketamine referred to psychiatry for depression and anxiety. History of trauma though not classic PTSD. Pt reports increased depression and anxiety related to stressors at home as well as limited support.     On virtual FUV today, pt reports increased health concerns impacting mood. Somewhat inconsistent with sertraline and not sleeping as well with trazodone but planning to add melatonin and work to be more consistent. Follow up 1 month, sooner if needed     Diagnosis:   MDD, recurrent, mild  Other specified anxiety, likely component of depression  Insomnia    Treatment Plan/Recommendations:   1. Safety Assessment: intermittent passive SI but no plan since 12/2023. over holiday 2023 +SI with plan but did not act, worried about kids being affected kids are protective. Pt also stopped meds at this time. No h/o attempts. RF include single, pain, depression. PF include kids, no guns, seeking treatment, F/age/race. Low imminent but increased risk with recent SI +plan  that was not acted on, reviewed safety plan including  suicide hotline/text line [id number]    2. MDD, recurrent moderate other spec anxiety-component of depression vs NICHOL--  TAKE sertraline 25mg PO daily for 2 weeks then sertraline 50mg PO daily or 25mg PO BID  CONTINUE trazodone 100-200mg PO QHS, r/b/ae discussed   including excess serotonin though currently low doses. can decrease if sleep improves.      use hydroxyzine 12.5mg PO BID PRN anxiety  RESUME  melatonin 10mg 2 hours before bed. continue good sleep hygiene    CAN USE lightbox at 10,000lux for 20 minutes/day.   PHQ 9---11-->9-->15-->13, deferred related to time  have previously discussed therapy, follow up on referral. Notes kids are in therapy now  will continue with supportive therapy during appt    Discussed case with JENNIFER Man 1/2024 to see if increased support around housing and emotional support    3. Medical: notes and labs reviewed, recently saw neuro for migraines. pain/ankle pain better on O2 at night, saw ortho, rec PT.   recent sleep study, no longer needs O2 at night, now wonders if she should get another updated sleep study  now back on ketamine though having spasms, less helpful for mood  breast mass bx benign   Pain increased, less benefit from ketamine    4. Social: 2 kids at home, limited support. works at [employer] but has not been able to related to health, notes some help from aunt, moved to [place]      Reason for Visit:   FUV for mood/anxiety    Subjective:  Last seen 2 months ago. Had missed last phone check in    Daughter has promotion tomorrow  Daughter starting [employer] for high school  Daughter is having anxiety    Having more nausea, 1x emesis x1 week. Thought was maybe related to gabapentin titration. Has been more forgetful and wonders if this is related. Also spasms have been worse.    Goal is to be back to work by Jan 2025. Feels better when working, more fulfilled    Sleep not as good, plans to restart melatonin in addition  to trazodone  Discussed resuming sertraline 1/2 tablet to resume dose to minimize side effects    Ketamine has been less helpful for pain    Reports passive SI when stressed but no plans.     No ae to medications        Current Medications:    Current Outpatient Medications:     acetaminophen (Tylenol Extra Strength) 500 mg tablet, 2 tablet EVERY 6 HOURS (route: oral), Disp: , Rfl:     ascorbic acid (Vitamin C) 500 mg tablet, 2 tablet DAILY (route: oral), Disp: , Rfl:     calcium carbonate (Tums) 200 mg calcium chewable tablet, Chew., Disp: , Rfl:     capsaicin (Zostrix) 0.025 % cream, Apply as directed as needed, Disp: , Rfl:     cephalexin (Keflex) 500 mg capsule, TAKE 1 CAPSULE BY MOUTH EVERY 6 HOURS FOR 10 DAYS., Disp: , Rfl:     deferasirox (Jadenu) 180 mg tablet, , Disp: , Rfl:     deferoxamine (Desferal) 2 gram injection, Infuse 2000mg subcutaneously once daily over 8 hours via [place] Pump, Disp: 1 each, Rfl: 99    diclofenac sodium (Voltaren) 1 % gel gel, Per instructions 4 TIMES DAILY (route: topical), Disp: 350 g, Rfl: 2    diphenhydrAMINE (BENADryl) 25 mg capsule, 1 capsule 3 TIMES DAILY (route: oral), Disp: , Rfl:     docusate sodium (Colace) 100 mg capsule, 1 capsule DAILY (route: oral), Disp: , Rfl:     folic acid (Folvite) 1 mg tablet, 1 tablet DAILY (route: oral), Disp: , Rfl:     gabapentin (Neurontin) 300 mg capsule, Take 300mg for am dose and midday dose and take 2- 300mg capsules before bed, Disp: 56 capsule, Rfl: 2    HYDROmorphone (Dilaudid) 4 mg tablet, Take 1 tablet (4 mg) by mouth 3 times a day as needed for severe pain (7 - 10)., Disp: 40 tablet, Rfl: 0    hydroxyurea (Hydrea) 500 mg capsule, Per instructions AS DIRECTED (route: oral), Disp: 68 capsule, Rfl: 2    hydrOXYzine HCL (Atarax) 25 mg tablet, 1 tablet 2 TIMES DAILY (route: oral), Disp: , Rfl:     ibuprofen 800 mg tablet, Take 1 tablet (800 mg) by mouth every 8 hours if needed for moderate pain (4 - 6)., Disp: 60 tablet, Rfl:  2    ketamine (Ketalar) 10 mg/mL injection, Infuse into a venous catheter., Disp: , Rfl:     lidocaine (lidocaine HCL) 4 % cream, USE TOPICALLY AS DIRECTED., Disp: , Rfl:     loratadine (Claritin) 10 mg tablet, Take 1 tablet (10 mg) by mouth once daily., Disp: , Rfl:     melatonin 3 mg tablet, Take 2 tablets (6 mg) by mouth as needed at bedtime., Disp: , Rfl:     meloxicam (Mobic) 15 mg tablet, Take 1 tablet (15 mg) by mouth once daily., Disp: 30 tablet, Rfl: 2    methadone (Dolophine) 10 mg tablet, Take 1 tablet (10 mg) by mouth every 12 hours., Disp: 60 tablet, Rfl: 0    multivitamin tablet, 1 tablet DAILY (route: oral), Disp: , Rfl:     naloxone (Narcan) 4 mg/0.1 mL nasal spray, Per instructions AS NEEDED (route: nasal), Disp: , Rfl:     norethindrone (Aygestin) 5 mg tablet, Take 1 tablet (5 mg) by mouth once daily., Disp: 90 tablet, Rfl: 3    omeprazole (PriLOSEC) 40 mg DR capsule, 1 capsule DAILY (route: oral), Disp: , Rfl:     oxygen (O2) gas therapy, Inhale 3 L/min.  3L O2 WITH ACTIVITY AS NEEDED 3L O2 AT NIGHT AS NEEDED., Disp: , Rfl:     prochlorperazine (Compazine) 10 mg tablet, 1 tablet EVERY 8 HOURS (route: oral), Disp: 30 tablet, Rfl: 3    senna 8.6 mg tablet, Take 1 tablet (8.6 mg) by mouth 2 times a day as needed for constipation., Disp: 30 tablet, Rfl: 2    sertraline (Zoloft) 50 mg tablet, Take 1 tablet (50 mg) by mouth 2 times a day., Disp: 180 tablet, Rfl: 1    tiZANidine (Zanaflex) 2 mg tablet, Take 1-2 tablets (2-4 mg) by mouth every 8 hours if needed for muscle spasms., Disp: 40 tablet, Rfl: 2    traZODone (Desyrel) 100 mg tablet, TAKE 1 TO 2 TABLETS(100  MG) BY MOUTH AT BEDTIME AS NEEDED FOR SLEEP, Disp: 180 tablet, Rfl: 1    trolamine salicylate (Aspercreme) 10 % cream, Per instructions 4 TIMES DAILY (route: topical), Disp: , Rfl:     Vitamin D3 25 mcg (1,000 unit) tablet, Take 1 tablet (1,000 Units) by mouth once daily., Disp: 30 tablet, Rfl: 3    voxelotor (OXBRYTA) 500 mg tablet  tablet, TAKE THREE (3) TABLETS BY MOUTH ONCE DAILY., Disp: 90 tablet, Rfl: 3  Medical History:  Past Medical History:   Diagnosis Date    Unspecified astigmatism, bilateral 2020    Astigmatism, bilateral       Surgical History:  Past Surgical History:   Procedure Laterality Date     SECTION, CLASSIC  2016     Section    CHOLECYSTECTOMY  2016    Cholecystectomy    MR HEAD ANGIO W AND WO IV CONTRAST  4/15/2013    MR HEAD ANGIO W AND WO IV CONTRAST 4/15/2013 [facility]    MR HEAD ANGIO W AND WO IV CONTRAST  2013    MR HEAD ANGIO W AND WO IV CONTRAST 2013 [facility]GAC[facility]    MR HEAD ANGIO WO IV CONTRAST  2014    MR HEAD ANGIO WO IV CONTRAST 2014 [facility]    MR HEAD ANGIO WO IV CONTRAST  2016    MR HEAD ANGIO WO IV CONTRAST 2016 [facility]    MR HEAD ANGIO WO IV CONTRAST  2019    MR HEAD ANGIO WO IV CONTRAST 2019 [facility]GAC[facility]    MR HEAD ANGIO WO IV CONTRAST  2017    MR HEAD ANGIO WO IV CONTRAST 2017 [facility] INPATIENT LEGACY    MR NECK ANGIO WO IV CONTRAST  2013    MR NECK ANGIO WO IV CONTRAST 2013 [facility] INPATIENT LEGACY    MR NECK ANGIO WO IV CONTRAST  2014    MR NECK ANGIO WO IV CONTRAST 2014 [facility]    OTHER SURGICAL HISTORY  2016    Brain Surgery    TUBAL LIGATION  04/10/2017    Tubal Ligation       Family History:  Family History   Problem Relation Name Age of Onset    Sickle cell trait Sister      Multiple sclerosis Brother      Breast cancer Maternal Grandmother      Breast cancer Other paternal cousin     Sickle cell trait Child         Social History:  Social History     Socioeconomic History    Marital status: Single     Spouse name: Not on file    Number of children: Not on file    Years of education: Not on file    Highest education level: Not on file   Occupational History    Not on file   Tobacco Use    Smoking status: Never    Smokeless tobacco: Never    Substance and Sexual Activity    Alcohol use: Not Currently    Drug use: Not on file    Sexual activity: Not on file   Other Topics Concern    Not on file   Social History Narrative    ONCOLOGY/HEMATOLOGY PSYCHOSOCIAL ASSESSMENT         Demographic Information    Mary Alice Aragon                       1980                      [id number]    Assessment Type:      Date of assessment: 04/22/24    Person(s) present during assessment:     Did patient participate in assessment:  yes    If no, why not:    Primary language: English    Interpretive services used:     Provider: Adriana Coelho CNP     Diagnosis: Sickle Cell SS                    Marital Status / Family / Household    Status:   single    Family composition: Patient resides with two minor children    Support systems: Pt reports limited support system    Primary caregiver:  self         Current employment status:  employed and disability    Work history/type of work:       Comments: Pt is currently on leave from employement. SW assisted pt with paperwork for insurance company         Environmental Supports    Current living situation:   house    Patient's home environment:          Functional Status     Functional status:  Independent, assistance needed during pain crises    Other health issues that the patient is experiencing:     What supports are in place to assist the patient:     What community resources have been offered: Support group    Transportation:  self    Psychosocial risk factors impacting the patient:     Limited social supports         Assistive Devices    Patient has the following equipment: n/a    Patient currently ambulates:          Finances/Insurance    Primary insurance: Medicare    Secondary insurance: [facility]    Prescription insurance:    Does the patient have any pending insurance applications: No     Patient's current income source:  Employment disability    Does the patient have any financial concerns:  denies       "Comments:          Advance Directives    power of  for healthcare on file    Advanced directives status:  Not activated    Legal decision maker, when applicable: Maryanne Yu-Sister         Advent or Spiritual Identity    Comments:         Mental Health    Mental health history and status details:  Patient follows with Dr. May. Open to therapy referral           Social Determinants of Health     Financial Resource Strain: Not on file   Food Insecurity: Not on file   Transportation Needs: Not on file   Physical Activity: Not on file   Stress: Not on file   Social Connections: Not on file   Intimate Partner Violence: Not on file   Housing Stability: Not on file         Medical Review Of Systems:  +increased pain  +O2 at night      Psychiatric Review Of Systems:  Per HPI       Objective   Mental Status Exam:     Appearance: dressed casually. Appropriate for weather  Attitude: cooperative, engaged.   Behavior: appropriate eye contact via video.   Motor Activity: spontaneous movement.   Speech: regular rate, low volume. +accent. no dysarthria, no aphasia.   Mood: \"OK\"  Affect: congruent,overall euthymic, good range  Thought Process: no KIRIT, on topic.   Thought Content: no delusions, passive SI but no current plan/intent, no HI.   Thought Perception: no AVH,.   Cognition: alert, oriented to person, place, time. attn intact. MAX h/o stroke with frontal lobe encephalomalacia.   Insight: fair.   Judgment: fair.      Vitals:  There were no vitals filed for this visit. Deferred for virtual visit  No results found for this or any previous visit (from the past 4464 hour(s)).  Lab Results   Component Value Date    WBC 11.3 04/23/2024    HGB 7.4 (L) 04/23/2024    HCT 22.3 (L) 04/23/2024    MCV 94 04/23/2024     04/23/2024     Lab Results   Component Value Date    GLUCOSE 84 04/23/2024    CALCIUM 9.0 04/23/2024     04/23/2024    K 4.0 04/23/2024    CO2 22 04/23/2024     04/23/2024    BUN 13 " 04/23/2024    CREATININE 0.83 04/23/2024       Psychotherapy  Time: 25 minutes  type supportive, problem solving  target mood, anxiety, grief  techniques reframing, processing, goal setting  response moderate-good  goal decreased sx burden, improved management  follow up 1-2 months       Cinthia May MD

## 2024-05-29 ENCOUNTER — DOCUMENTATION (OUTPATIENT)
Dept: HOME HEALTH SERVICES | Facility: HOME HEALTH | Age: 44
End: 2024-05-29

## 2024-05-29 NOTE — HH CARE COORDINATION
This referral has been made a CANCEL with  Home Care due to  PROVIDER'S NOTIFICATION TO CANCEL AT THIS TIME, WILL NOT CHANGE THE HOME BOUND STATUS TO HB UNTIL AFTER NEXT F/U APPOINTMENT WITH THE PATIENT.  . If you have further questions, feel free to reach out to our office at [phone]. Thank you, [facility] Intake.

## 2024-06-04 ENCOUNTER — TELEPHONE (OUTPATIENT)
Dept: ADMISSION | Facility: HOSPITAL | Age: 44
End: 2024-06-04

## 2024-06-04 DIAGNOSIS — D57.00 SICKLE CELL DISEASE WITH CRISIS (MULTI): ICD-10-CM

## 2024-06-04 RX ORDER — HYDROMORPHONE HYDROCHLORIDE 4 MG/1
4 TABLET ORAL 3 TIMES DAILY PRN
Qty: 40 TABLET | Refills: 0 | Status: SHIPPED | OUTPATIENT
Start: 2024-06-04

## 2024-06-04 NOTE — TELEPHONE ENCOUNTER
Refill request received for Hydromorphone 4mg.  Preferred pharmacy is [facility] at [address] in [place].  Message sent to Sickle Cell team.

## 2024-06-06 ENCOUNTER — TELEPHONE (OUTPATIENT)
Dept: HEMATOLOGY/ONCOLOGY | Facility: HOSPITAL | Age: 44
End: 2024-06-06

## 2024-06-06 DIAGNOSIS — D57.00 SICKLE CELL DISEASE WITH CRISIS (MULTI): ICD-10-CM

## 2024-06-06 RX ORDER — TIZANIDINE 2 MG/1
2-4 TABLET ORAL EVERY 8 HOURS PRN
Qty: 40 TABLET | Refills: 1 | Status: SHIPPED | OUTPATIENT
Start: 2024-06-06

## 2024-06-06 NOTE — TELEPHONE ENCOUNTER
Refill request received for Tizanidine 2mg.  Preferred pharmacy is [facility] at [address] [facility] in [place].  Message sent to Sickle Cell team.

## 2024-06-12 ENCOUNTER — TELEPHONE (OUTPATIENT)
Dept: HEMATOLOGY/ONCOLOGY | Facility: HOSPITAL | Age: 44
End: 2024-06-12

## 2024-06-12 NOTE — TELEPHONE ENCOUNTER
Patient called re port placement.    She no longer had her phone with information in it re scheduling.    There is an active request in and patient was transferred to schedule.

## 2024-06-17 RX ORDER — ORPHENADRINE CITRATE 100 MG/1
TABLET, EXTENDED RELEASE ORAL
COMMUNITY
Start: 2024-01-23

## 2024-06-17 RX ORDER — BENZONATATE 100 MG/1
CAPSULE ORAL 3 TIMES DAILY PRN
COMMUNITY

## 2024-06-17 RX ORDER — MULTIVIT-MIN/IRON FUM/FOLIC AC 7.5 MG-4
TABLET ORAL
COMMUNITY

## 2024-06-17 RX ORDER — GABAPENTIN 100 MG/1
200 CAPSULE ORAL EVERY 8 HOURS
COMMUNITY
Start: 2024-02-06

## 2024-06-17 RX ORDER — VALACYCLOVIR HYDROCHLORIDE 500 MG/1
TABLET, FILM COATED ORAL
COMMUNITY

## 2024-06-17 RX ORDER — AMOXICILLIN 500 MG/1
CAPSULE ORAL
COMMUNITY
Start: 2024-02-06

## 2024-06-17 RX ORDER — CHOLECALCIFEROL (VITAMIN D3) 50 MCG
1 TABLET ORAL
COMMUNITY
Start: 2024-02-05

## 2024-06-17 RX ORDER — FLUCONAZOLE 150 MG/1
TABLET ORAL
COMMUNITY
Start: 2024-03-11

## 2024-06-21 ENCOUNTER — HOSPITAL ENCOUNTER (OUTPATIENT)
Dept: RADIOLOGY | Facility: HOSPITAL | Age: 44
Discharge: HOME | End: 2024-06-21
Payer: MEDICARE

## 2024-06-21 ENCOUNTER — TELEPHONE (OUTPATIENT)
Dept: ADMISSION | Facility: HOSPITAL | Age: 44
End: 2024-06-21

## 2024-06-21 VITALS
TEMPERATURE: 98.4 F | SYSTOLIC BLOOD PRESSURE: 91 MMHG | HEART RATE: 86 BPM | RESPIRATION RATE: 18 BRPM | DIASTOLIC BLOOD PRESSURE: 53 MMHG | OXYGEN SATURATION: 99 %

## 2024-06-21 DIAGNOSIS — D57.00 HB-SS DISEASE WITH CRISIS, UNSPECIFIED (MULTI): ICD-10-CM

## 2024-06-21 DIAGNOSIS — D57.00 SICKLE CELL DISEASE WITH CRISIS (MULTI): ICD-10-CM

## 2024-06-21 PROCEDURE — 77001 FLUOROGUIDE FOR VEIN DEVICE: CPT

## 2024-06-21 PROCEDURE — 7100000009 HC PHASE TWO TIME - INITIAL BASE CHARGE

## 2024-06-21 PROCEDURE — 7100000010 HC PHASE TWO TIME - EACH INCREMENTAL 1 MINUTE

## 2024-06-21 PROCEDURE — 36561 INSERT TUNNELED CV CATH: CPT

## 2024-06-21 PROCEDURE — 2500000004 HC RX 250 GENERAL PHARMACY W/ HCPCS (ALT 636 FOR OP/ED): Performed by: RADIOLOGY

## 2024-06-21 PROCEDURE — 36010 PLACE CATHETER IN VEIN: CPT

## 2024-06-21 PROCEDURE — 76937 US GUIDE VASCULAR ACCESS: CPT

## 2024-06-21 PROCEDURE — C1788 PORT, INDWELLING, IMP: HCPCS

## 2024-06-21 PROCEDURE — 99153 MOD SED SAME PHYS/QHP EA: CPT

## 2024-06-21 PROCEDURE — 2780000003 HC OR 278 NO HCPCS

## 2024-06-21 PROCEDURE — 99152 MOD SED SAME PHYS/QHP 5/>YRS: CPT

## 2024-06-21 PROCEDURE — 2500000005 HC RX 250 GENERAL PHARMACY W/O HCPCS: Performed by: RADIOLOGY

## 2024-06-21 RX ORDER — LIDOCAINE HYDROCHLORIDE AND EPINEPHRINE 10; 10 MG/ML; UG/ML
INJECTION, SOLUTION INFILTRATION; PERINEURAL
Status: COMPLETED | OUTPATIENT
Start: 2024-06-21 | End: 2024-06-21

## 2024-06-21 RX ORDER — FENTANYL CITRATE 50 UG/ML
INJECTION, SOLUTION INTRAMUSCULAR; INTRAVENOUS
Status: COMPLETED | OUTPATIENT
Start: 2024-06-21 | End: 2024-06-21

## 2024-06-21 RX ORDER — MIDAZOLAM HYDROCHLORIDE 1 MG/ML
INJECTION INTRAMUSCULAR; INTRAVENOUS
Status: COMPLETED | OUTPATIENT
Start: 2024-06-21 | End: 2024-06-21

## 2024-06-21 RX ORDER — HEPARIN 100 UNIT/ML
SYRINGE INTRAVENOUS
Status: COMPLETED | OUTPATIENT
Start: 2024-06-21 | End: 2024-06-21

## 2024-06-21 ASSESSMENT — PAIN SCALES - GENERAL
PAINLEVEL_OUTOF10: 0 - NO PAIN

## 2024-06-21 ASSESSMENT — PAIN - FUNCTIONAL ASSESSMENT
PAIN_FUNCTIONAL_ASSESSMENT: 0-10

## 2024-06-21 NOTE — PROCEDURES
Interventional Radiology Brief Postprocedure Note    Attending: Earnestine Lee MD    Assistant:   Staff Role   Toña Velez MT Radiology Technologist   Cinthia Oviedo MT Radiology Technologist   Juan Mendoza, ELEANOR Radiology Nurse   Earnestine Lee MD Radiologist       Diagnosis:   1. Hb-SS disease with crisis, unspecified (Multi)  IR CVC port placement    IR CVC port placement          Description of procedure: IR CVC port placement    Timeout:  Yes    Procedure Area: Procedure Area     Anesthesia:   Conscious Sedation    Complications: None    Estimated Blood Loss: minimal    Medications (Filter: Administrations occurring from 0818 to 0905 on 06/21/24) As of 06/21/24 0905      oxygen (O2) therapy (L/min) Total volume:  Not documented*   *Total volume has not been documented. View each administration to see the amount administered.     Date/Time Rate/Dose/Volume Action       06/21/24  0820 3 L/min Start               lidocaine-epinephrine (Xylocaine W/EPI) 1 %-1:100,000 injection (mL) Total volume:  10 mL      Date/Time Rate/Dose/Volume Action       06/21/24  0828 5 mL Given      0834 5 mL Given               fentaNYL PF (Sublimaze) injection (mcg) Total dose:  100 mcg      Date/Time Rate/Dose/Volume Action       06/21/24  0831 50 mcg Given      0838 50 mcg Given               midazolam (Versed) injection (mg) Total dose:  2 mg      Date/Time Rate/Dose/Volume Action       06/21/24  0831 1 mg Given      0838 1 mg Given               heparin flush 100 unit/mL syringe (Units) Total dose:  100 Units      Date/Time Rate/Dose/Volume Action       06/21/24  0854 100 Units Given                   No specimens collected      See detailed result report with images in PACS.    The patient tolerated the procedure well without incident or complication and is in stable condition.

## 2024-06-21 NOTE — PRE-PROCEDURE NOTE
Interventional Radiology Preprocedure Note    Indication for procedure: The encounter diagnosis was Hb-SS disease with crisis, unspecified (Multi).    Relevant review of systems: NA    Relevant Labs:   Lab Results   Component Value Date    CREATININE 0.83 04/23/2024    EGFR 90 04/23/2024    INR 1.3 (H) 03/11/2022    PROTIME 15.6 (H) 03/11/2022       Planned Sedation/Anesthesia: Moderate    Airway assessment: normal    Directed physical examination:    Aox3  No increased work of breathing.   No acute distress      Mallampati: II (hard and soft palate, upper portion of tonsils and uvula visible)    ASA Score: ASA 2 - Patient with mild systemic disease with no functional limitations    Benefits, risks and alternatives of procedure and planned sedation have been discussed with the patient and/or their representative. All questions answered and they agree to proceed.

## 2024-06-21 NOTE — Clinical Note
Liquid skin adhesive applied to both port site and cut down site. Allowed to dry. Steri strips applied to both chest port site and cut down site.

## 2024-06-24 ENCOUNTER — OFFICE VISIT (OUTPATIENT)
Dept: HEMATOLOGY/ONCOLOGY | Facility: HOSPITAL | Age: 44
End: 2024-06-24
Payer: COMMERCIAL

## 2024-06-24 DIAGNOSIS — D57.00 SICKLE CELL DISEASE WITH CRISIS (MULTI): ICD-10-CM

## 2024-06-24 RX ORDER — METHADONE HYDROCHLORIDE 10 MG/1
10 TABLET ORAL EVERY 12 HOURS
Qty: 60 TABLET | Refills: 0 | Status: SHIPPED | OUTPATIENT
Start: 2024-06-24 | End: 2024-06-26 | Stop reason: SDUPTHER

## 2024-06-24 RX ORDER — HYDROMORPHONE HYDROCHLORIDE 4 MG/1
4 TABLET ORAL 3 TIMES DAILY PRN
Qty: 40 TABLET | Refills: 0 | Status: SHIPPED | OUTPATIENT
Start: 2024-06-24 | End: 2024-06-26 | Stop reason: SDUPTHER

## 2024-06-26 ENCOUNTER — OFFICE VISIT (OUTPATIENT)
Dept: HEMATOLOGY/ONCOLOGY | Facility: HOSPITAL | Age: 44
End: 2024-06-26
Payer: MEDICARE

## 2024-06-26 VITALS
WEIGHT: 142.9 LBS | OXYGEN SATURATION: 100 % | DIASTOLIC BLOOD PRESSURE: 69 MMHG | SYSTOLIC BLOOD PRESSURE: 117 MMHG | RESPIRATION RATE: 16 BRPM | BODY MASS INDEX: 25.31 KG/M2 | HEART RATE: 74 BPM | TEMPERATURE: 98.1 F

## 2024-06-26 DIAGNOSIS — D57.00 SICKLE CELL DISEASE WITH CRISIS (MULTI): ICD-10-CM

## 2024-06-26 DIAGNOSIS — Z87.09 HISTORY OF ENLARGED TONSILS: ICD-10-CM

## 2024-06-26 DIAGNOSIS — R25.2 SPASMS OF THE HANDS OR FEET: Primary | ICD-10-CM

## 2024-06-26 DIAGNOSIS — E83.111 IRON OVERLOAD, TRANSFUSIONAL: ICD-10-CM

## 2024-06-26 DIAGNOSIS — G89.29 OTHER CHRONIC PAIN: ICD-10-CM

## 2024-06-26 PROBLEM — M87.08: Status: ACTIVE | Noted: 2024-06-26

## 2024-06-26 PROBLEM — R93.1 ABNORMAL ECHOCARDIOGRAPHY: Status: ACTIVE | Noted: 2024-06-26

## 2024-06-26 PROBLEM — K21.9 GASTROESOPHAGEAL REFLUX DISEASE: Status: ACTIVE | Noted: 2024-06-26

## 2024-06-26 PROBLEM — E04.2 NON-TOXIC MULTINODULAR GOITER: Status: ACTIVE | Noted: 2024-06-26

## 2024-06-26 PROBLEM — R06.09 DYSPNEA ON EXERTION: Status: ACTIVE | Noted: 2024-06-26

## 2024-06-26 PROBLEM — F32.9 MAJOR DEPRESSIVE DISORDER: Status: ACTIVE | Noted: 2024-06-26

## 2024-06-26 PROBLEM — R19.00 PELVIC MASS: Status: ACTIVE | Noted: 2024-06-26

## 2024-06-26 PROBLEM — N63.0 BREAST LUMP IN LOWER OUTER QUADRANT: Status: ACTIVE | Noted: 2024-06-26

## 2024-06-26 PROBLEM — G43.009 MIGRAINE WITHOUT AURA: Status: ACTIVE | Noted: 2024-06-26

## 2024-06-26 PROCEDURE — 99214 OFFICE O/P EST MOD 30 MIN: CPT | Performed by: INTERNAL MEDICINE

## 2024-06-26 RX ORDER — HYDROMORPHONE HYDROCHLORIDE 4 MG/1
4 TABLET ORAL 3 TIMES DAILY PRN
Qty: 40 TABLET | Refills: 0 | Status: SHIPPED | OUTPATIENT
Start: 2024-06-26

## 2024-06-26 RX ORDER — METHADONE HYDROCHLORIDE 10 MG/1
10 TABLET ORAL EVERY 12 HOURS
Qty: 60 TABLET | Refills: 0 | Status: SHIPPED | OUTPATIENT
Start: 2024-06-26

## 2024-06-26 RX ORDER — MELOXICAM 15 MG/1
15 TABLET ORAL DAILY
Qty: 30 TABLET | Refills: 2 | Status: SHIPPED | OUTPATIENT
Start: 2024-06-26

## 2024-06-26 RX ORDER — HYDROXYUREA 500 MG/1
CAPSULE ORAL
Qty: 68 CAPSULE | Refills: 2 | Status: SHIPPED | OUTPATIENT
Start: 2024-06-26

## 2024-06-26 ASSESSMENT — ENCOUNTER SYMPTOMS
ARTHRALGIAS: 1
EYES NEGATIVE: 1
SHORTNESS OF BREATH: 1
CONSTIPATION: 1
DEPRESSION: 1
CONSTITUTIONAL NEGATIVE: 1
NEUROLOGICAL NEGATIVE: 1
NERVOUS/ANXIOUS: 1

## 2024-06-26 ASSESSMENT — PAIN SCALES - GENERAL: PAINLEVEL: 0-NO PAIN

## 2024-06-26 NOTE — PROGRESS NOTES
Patient ID: Mary Alice Aragon is a 44 y.o. female.  Referring Physician: No referring provider defined for this encounter.  Primary Care Provider: Nyasia Mcdaniel MD  Visit Type: Follow Up      Subjective      HPI  Mary Alice presents for fuv. She is still have spasms, has about 4-5 per week. Start in her feet and move up to her neck. Tizanidine helps her. Taking 300mg TID, but sometimes takes 100-200mg inbetween if spasms are bad. If she takes 600mg at night she feels dizzy, nausea, and vomits. These spasms keep her from her leaving home at time as does sickle cell crisis. She can feel she cannot leave the home about 3-4/7 days per week.     Overall she feels her spasms are about the same as our last visit. Has not seen neurology in awhile, needs a new provider, previous retired. She would also like a referral to ENT, had a biopsy for swelling and is concerned this happening again. History of recurrent strep thought.       These spasms have been occurring for about 10 yrs, but not its worse and more often. She has tried some deep breathing in the past or counting. She didn't have any other symptoms of illness. She is interested in getting a port placed since it is very difficult to get vascular access.       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 Right frontal encephalomalacia secondary to sequela of right middle cerebral artery infarction.  History of lead poisoning with hydrocephalus status post right ventriculostomy shunt catheter, and subsequent removal.   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 She has been evaluated by Neurosurgery and is stable with no required intervention.  Low-grade squamous epithelial lesion on Pap smear in May 2011.  Pulmonary embolism in 2009 in the postpartum phase, anticoagulated for 6 months.  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   Status post cholecystectomy.   Pneumonia in .   Chronic pain: Currently managed with monthly ketamine infusions with good response.   Nocturnal oxygen dependence  Chronic pain   Review of Systems   Constitutional: Negative.    HENT:  Negative.     Eyes: Negative.    Respiratory:  Positive for shortness of breath.    Cardiovascular:  Positive for chest pain.   Gastrointestinal:  Positive for constipation.   Genitourinary: Negative.     Musculoskeletal:  Positive for arthralgias.   Skin: Negative.    Neurological: Negative.    Psychiatric/Behavioral:  Positive for depression. The patient is nervous/anxious.         Objective   BSA: 1.7 meters squared  /69 (BP Location: Left arm, Patient Position: Sitting, BP Cuff Size: Adult)   Pulse 74   Temp 36.7 °C (98.1 °F) (Temporal)   Resp 16   Wt 64.8 kg (142 lb 14.4 oz)   SpO2 100%   BMI 25.31 kg/m²      has a past medical history of Unspecified astigmatism, bilateral (2020).   has a past surgical history that includes Cholecystectomy (2016);  section, classic (2016); Other surgical history (2016); Tubal ligation (04/10/2017); MR angio head w and wo IV contrast (4/15/2013); MR angio neck wo IV contrast (2013); MR angio head w and wo IV contrast (2013); MR angio head wo IV contrast (2014); MR angio neck wo IV contrast (2014); MR angio head wo IV contrast (2016); MR angio head wo IV contrast (2019); and MR angio head wo IV contrast (2017).  Family History   Problem Relation Name Age of Onset    Sickle cell trait Sister      Multiple sclerosis Brother      Breast cancer Maternal Grandmother      Breast cancer Other paternal cousin     Sickle cell trait Child       Oncology History    No history exists.       Mary Alice MIKE Aragon  reports that she has never smoked. She has never used smokeless tobacco.  She  reports that she does not currently use alcohol.  She  has no  history on file for drug use.    Physical Exam  HENT:      Head: Normocephalic.      Nose: Nose normal.      Mouth/Throat:      Mouth: Mucous membranes are moist.   Eyes:      Pupils: Pupils are equal, round, and reactive to light.   Cardiovascular:      Rate and Rhythm: Normal rate and regular rhythm.      Pulses: Normal pulses.      Heart sounds: Normal heart sounds.   Pulmonary:      Effort: Pulmonary effort is normal.      Breath sounds: Normal breath sounds.   Abdominal:      General: Bowel sounds are normal.      Palpations: Abdomen is soft.   Musculoskeletal:         General: Normal range of motion.   Skin:     General: Skin is warm and dry.      Comments: Right great toe discoloration near cuticle    Neurological:      General: No focal deficit present.      Mental Status: She is alert and oriented to person, place, and time.   Psychiatric:         Mood and Affect: Mood normal.         Behavior: Behavior normal.         WBC   Date/Time Value Ref Range Status   04/23/2024 01:57 PM 11.3 4.4 - 11.3 x10*3/uL Final   12/18/2023 12:23 PM 11.9 (H) 4.4 - 11.3 x10*3/uL Final   11/17/2023 08:45 PM 16.1 (H) 4.4 - 11.3 x10*3/uL Final     nRBC   Date Value Ref Range Status   04/23/2024 0.2 (H) 0.0 - 0.0 /100 WBCs Final   12/18/2023 0.2 (H) 0.0 - 0.0 /100 WBCs Final   11/17/2023 0.2 (H) 0.0 - 0.0 /100 WBCs Final     RBC   Date Value Ref Range Status   04/23/2024 2.37 (L) 4.00 - 5.20 x10*6/uL Final   12/18/2023 2.10 (L) 4.00 - 5.20 x10*6/uL Final   11/17/2023 2.39 (L) 4.00 - 5.20 x10*6/uL Final     Hemoglobin   Date Value Ref Range Status   04/23/2024 7.4 (L) 12.0 - 16.0 g/dL Final   12/18/2023 6.7 (L) 12.0 - 16.0 g/dL Final   11/17/2023 7.5 (L) 12.0 - 16.0 g/dL Final     Hematocrit   Date Value Ref Range Status   04/23/2024 22.3 (L) 36.0 - 46.0 % Final   12/18/2023 19.4 (L) 36.0 - 46.0 % Final   11/17/2023 21.9 (L) 36.0 - 46.0 % Final     MCV   Date/Time Value Ref Range Status   04/23/2024 01:57 PM 94 80 - 100 fL Final  "  12/18/2023 12:23 PM 92 80 - 100 fL Final   11/17/2023 08:45 PM 92 80 - 100 fL Final     MCH   Date/Time Value Ref Range Status   04/23/2024 01:57 PM 31.2 26.0 - 34.0 pg Final   12/18/2023 12:23 PM 31.9 26.0 - 34.0 pg Final   11/17/2023 08:45 PM 31.4 26.0 - 34.0 pg Final     MCHC   Date/Time Value Ref Range Status   04/23/2024 01:57 PM 33.2 32.0 - 36.0 g/dL Final   12/18/2023 12:23 PM 34.5 32.0 - 36.0 g/dL Final   11/17/2023 08:45 PM 34.2 32.0 - 36.0 g/dL Final     RDW   Date/Time Value Ref Range Status   04/23/2024 01:57 PM 17.1 (H) 11.5 - 14.5 % Final   12/18/2023 12:23 PM 17.4 (H) 11.5 - 14.5 % Final   11/17/2023 08:45 PM 16.0 (H) 11.5 - 14.5 % Final     Platelets   Date/Time Value Ref Range Status   04/23/2024 01:57  150 - 450 x10*3/uL Final   12/18/2023 12:23  (H) 150 - 450 x10*3/uL Final   11/17/2023 08:45  (H) 150 - 450 x10*3/uL Final     No results found for: \"MPV\"  Neutrophils %   Date/Time Value Ref Range Status   04/23/2024 01:57 PM 57.6 40.0 - 80.0 % Final   12/18/2023 12:23 PM 56.1 40.0 - 80.0 % Final   11/17/2023 08:45 PM 47.6 40.0 - 80.0 % Final     Immature Granulocytes %, Automated   Date/Time Value Ref Range Status   04/23/2024 01:57 PM 0.4 0.0 - 0.9 % Final     Comment:     Immature Granulocyte Count (IG) includes promyelocytes, myelocytes and metamyelocytes but does not include bands. Percent differential counts (%) should be interpreted in the context of the absolute cell counts (cells/UL).   12/18/2023 12:23 PM 0.3 0.0 - 0.9 % Final     Comment:     Immature Granulocyte Count (IG) includes promyelocytes, myelocytes and metamyelocytes but does not include bands. Percent differential counts (%) should be interpreted in the context of the absolute cell counts (cells/UL).   11/17/2023 08:45 PM 0.4 0.0 - 0.9 % Final     Comment:     Immature Granulocyte Count (IG) includes promyelocytes, myelocytes and metamyelocytes but does not include bands. Percent differential counts (%) " should be interpreted in the context of the absolute cell counts (cells/UL).     Lymphocytes %   Date/Time Value Ref Range Status   04/23/2024 01:57 PM 33.4 13.0 - 44.0 % Final   12/18/2023 12:23 PM 36.2 13.0 - 44.0 % Final   11/17/2023 08:45 PM 45.3 13.0 - 44.0 % Final     Monocytes %   Date/Time Value Ref Range Status   04/23/2024 01:57 PM 6.3 2.0 - 10.0 % Final   12/18/2023 12:23 PM 5.7 2.0 - 10.0 % Final   11/17/2023 08:45 PM 4.9 2.0 - 10.0 % Final     Eosinophils %   Date/Time Value Ref Range Status   04/23/2024 01:57 PM 1.6 0.0 - 6.0 % Final   12/18/2023 12:23 PM 1.1 0.0 - 6.0 % Final   11/17/2023 08:45 PM 1.0 0.0 - 6.0 % Final     Basophils %   Date/Time Value Ref Range Status   04/23/2024 01:57 PM 0.7 0.0 - 2.0 % Final   12/18/2023 12:23 PM 0.6 0.0 - 2.0 % Final   11/17/2023 08:45 PM 0.8 0.0 - 2.0 % Final     Neutrophils Absolute   Date/Time Value Ref Range Status   04/23/2024 01:57 PM 6.54 1.20 - 7.70 x10*3/uL Final     Comment:     Percent differential counts (%) should be interpreted in the context of the absolute cell counts (cells/uL).   12/18/2023 12:23 PM 6.66 1.20 - 7.70 x10*3/uL Final     Comment:     Percent differential counts (%) should be interpreted in the context of the absolute cell counts (cells/uL).   11/17/2023 08:45 PM 7.67 1.20 - 7.70 x10*3/uL Final     Comment:     Percent differential counts (%) should be interpreted in the context of the absolute cell counts (cells/uL).     Immature Granulocytes Absolute, Automated   Date/Time Value Ref Range Status   04/23/2024 01:57 PM 0.04 0.00 - 0.70 x10*3/uL Final   12/18/2023 12:23 PM 0.04 0.00 - 0.70 x10*3/uL Final   11/17/2023 08:45 PM 0.06 0.00 - 0.70 x10*3/uL Final     Lymphocytes Absolute   Date/Time Value Ref Range Status   04/23/2024 01:57 PM 3.79 1.20 - 4.80 x10*3/uL Final   12/18/2023 12:23 PM 4.31 1.20 - 4.80 x10*3/uL Final   11/17/2023 08:45 PM 7.31 (H) 1.20 - 4.80 x10*3/uL Final     Monocytes Absolute   Date/Time Value Ref Range  "Status   04/23/2024 01:57 PM 0.71 0.10 - 1.00 x10*3/uL Final   12/18/2023 12:23 PM 0.68 0.10 - 1.00 x10*3/uL Final   11/17/2023 08:45 PM 0.79 0.10 - 1.00 x10*3/uL Final     Eosinophils Absolute   Date/Time Value Ref Range Status   04/23/2024 01:57 PM 0.18 0.00 - 0.70 x10*3/uL Final   12/18/2023 12:23 PM 0.13 0.00 - 0.70 x10*3/uL Final   11/17/2023 08:45 PM 0.16 0.00 - 0.70 x10*3/uL Final     Basophils Absolute   Date/Time Value Ref Range Status   04/23/2024 01:57 PM 0.08 0.00 - 0.10 x10*3/uL Final   12/18/2023 12:23 PM 0.07 0.00 - 0.10 x10*3/uL Final   11/17/2023 08:45 PM 0.13 (H) 0.00 - 0.10 x10*3/uL Final       No components found for: \"PT\"  aPTT   Date/Time Value Ref Range Status   03/11/2020 11:34 AM 30 28 - 38 sec Final     Comment:       THE APTT IS NO LONGER USED FOR MONITORING     UNFRACTIONATED HEPARIN THERAPY.    FOR MONITORING HEPARIN THERAPY,     USE THE HEPARIN ASSAY.     09/13/2019 07:00 AM 35 28 - 38 sec Final     Comment:       THE APTT IS NO LONGER USED FOR MONITORING     UNFRACTIONATED HEPARIN THERAPY.    FOR MONITORING HEPARIN THERAPY,     USE THE HEPARIN ASSAY.         Assessment/Plan    - fuv in 3 months  - try tizanidine (1/2 pill) up to TID for spasms  - resent request to home care for teaching on subcutaneous desferal   - rx refills sent      HbSS disease: Continue folic acid and hydroxyurea as prescribed.  Hgb 6.9, T, ari 2.6, retics .255, HbF 16.3%, . We discussed voxelotor today to help raise hgb, reduce sickling and reduce lysis labs.  Discussed MOA and side effects.  Mary Alice would like to start medication, will send enrollment to [facility]. Started oxybryta but didn't do well with nausea. She will start will taking just one pill with a meal for a month and then go to two.      Reticulocytosis: Noted, discussed wearing O2 nightly to reduce sickling.  Mary Alice had a sleep study completed which did not indicate any sleep-disordered breathing or need for CPAP titration.      Right " ingrown toenail:  Referred to podiatry for evaluation, missed previous appointment, referred  Continue epsom salt soaks.    Chronic pain: Patient continues with ketamine infusions, dates have been fragmented due to staffing shortages and infusion location changes ([facility]), which have impacted scheduling.  Appointment in June 2022 was cancelled, Mary Alice will explore outside providers for ketamine infusion, she has scheduled an appointment at [facility]. Methadone 5mg BID restarted.  Has ketamine at [facility] [place], operated by [facility] scheduled monthly- had 3 so far.  Taking gabapentin TID- but unable to tolerate increased third dose at 600mg. Unable to leave home some weeks due to her acute on chronic pain, despite medication management with methadone, dilaudid and gabapentin.     Recent fall:  Refer to orthopedics for evaluation of right arm pain, left knee pain, appointment upcoming.    Iron overload: Mary Alice approved for desferal infusion, ferritin today 2016.  Mary Alice will need Cardiac/Liver MRI, sent new rx and new PA to  spec- pt with high co-pay of $49 per dose of drug but will cover nursing care and supplies. Refer to home health for re-teaching of desferal home infusions.     Left breast lump:  Mammogram 8/2022 revealed a fibroadenoma with recommendations for repeat imaging 2/2023, biopsy 10/21/22 benign.Has repeat mammogram scheduled.      Psych: Follows with Dr. May, appointment upcoming.  Grieving loss of best friend, father and father of her children in the last 2 years.  Referred to psych, provided resources for the children, which were sent to Mary Alice via email.  Mary Alice states that she is still sad at times, but feeling a little better.  Will continue to monitor. missed fuv, will reschedule     Vascular access:  Mary Alice is completing dental care; once dental care is up to date (cleaning and extractions/fillings completed), will refer to IR for consultation for vascular access port. Had a newly placed right mediport, power  port. Placed 6/24.     Health Maintenance:   Dental:  follows with [facility], upcoming appointment has follow up coming up.    Optho: 7/2020    PCP: sees Dr. Mcdaniel, last visit 5/2021, will need follow up, Mary Alice will call to schedule.     ECHO: 2020- cardiology in 2022.    MRI: 2019   Immunizations: influenza 9/9/2021, Prevnar 11/19/2020, PPSV23 1/28/21.  Pfizer covid-19 vaccine series complete, eligible for booster if not yet completed.      Headaches/Neuropathic pain: Patient recently seen in neurology, prescribed verapamil for daily treatment, prescribed ubrelvy for abortive therapy.  Last visit 12/15/21, referred to Dr. Ray for follow up. Current pt with neuro- has fuv next week.      Abdominal pain:  Seen May 2022 in Dr. Bolanos's office, suspected endometriosis, recommended IUD contraception for long-term management; transvaginal u/s ordered for surveillance of ovarian cyst, referred to pelvic floor therapy.  Currently prescribed norethindrone by GYN.    Benign follicular cyst, patient states voice changes:  referred to ENT, seen 6/2021, omeprazole started, plan to follow up with possible scope if symptoms persist.  Mary Alice states that symptoms have returned, referred to ENT. re-referred for dysphagia      Neuropathy:  Mary Alice states that this has persisted, referred.    Vitamin D deficiency:  Previously noted, cholecalciferol 2000IU, check at [facility].    Psychosocial:  Program SW to assist with resources for Mary Alice to contact her property management company regarding restoration of electricity and gas to all parts of her home.SW to contact for support.     Integumentary:  Previously referred to dermatology for evaluation of scalp scabs and abscess at previous appointment. Pilonidal cyst- has history and current is not inflamed but painful. Stat referral for dermatology.- upcoming appt.        Problem List Items Addressed This Visit    None  Visit Diagnoses         Codes    Spasms of the hands or feet    -   Primary R25.2    Relevant Orders    Referral to Neurology    History of enlarged tonsils     Z87.09    Relevant Orders    Referral to ENT    Sickle cell disease with crisis (Multi)     D57.00    Relevant Medications    meloxicam (Mobic) 15 mg tablet    methadone (Dolophine) 10 mg tablet    HYDROmorphone (Dilaudid) 4 mg tablet    hydroxyurea (Hydrea) 500 mg capsule    Other Relevant Orders    Clinic Appointment Request                 Adriana Coelho, APRN-CNP

## 2024-06-27 ENCOUNTER — TELEMEDICINE (OUTPATIENT)
Dept: BEHAVIORAL HEALTH | Facility: HOSPITAL | Age: 44
End: 2024-06-27
Payer: MEDICARE

## 2024-06-27 DIAGNOSIS — F41.9 ANXIETY: ICD-10-CM

## 2024-06-27 DIAGNOSIS — F33.0 MILD EPISODE OF RECURRENT MAJOR DEPRESSIVE DISORDER (CMS-HCC): ICD-10-CM

## 2024-06-28 ENCOUNTER — APPOINTMENT (OUTPATIENT)
Dept: OBSTETRICS AND GYNECOLOGY | Facility: CLINIC | Age: 44
End: 2024-06-28
Payer: MEDICARE

## 2024-07-05 ENCOUNTER — HOSPITAL ENCOUNTER (EMERGENCY)
Facility: HOSPITAL | Age: 44
Discharge: STILL ADMITTED OR ADMINISTRATIVE | End: 2024-07-05
Payer: MEDICARE

## 2024-07-05 ENCOUNTER — HOSPITAL ENCOUNTER (EMERGENCY)
Facility: HOSPITAL | Age: 44
Discharge: HOME | End: 2024-07-05
Attending: EMERGENCY MEDICINE
Payer: MEDICARE

## 2024-07-05 ENCOUNTER — APPOINTMENT (OUTPATIENT)
Dept: CARDIOLOGY | Facility: HOSPITAL | Age: 44
End: 2024-07-05
Payer: MEDICARE

## 2024-07-05 ENCOUNTER — APPOINTMENT (OUTPATIENT)
Dept: RADIOLOGY | Facility: HOSPITAL | Age: 44
End: 2024-07-05
Payer: MEDICARE

## 2024-07-05 ENCOUNTER — HOME HEALTH ADMISSION (OUTPATIENT)
Dept: HOME HEALTH SERVICES | Facility: HOME HEALTH | Age: 44
End: 2024-07-05
Payer: MEDICARE

## 2024-07-05 ENCOUNTER — NURSE TRIAGE (OUTPATIENT)
Dept: HEMATOLOGY/ONCOLOGY | Facility: HOSPITAL | Age: 44
End: 2024-07-05
Payer: COMMERCIAL

## 2024-07-05 VITALS
TEMPERATURE: 98.6 F | WEIGHT: 150 LBS | SYSTOLIC BLOOD PRESSURE: 137 MMHG | RESPIRATION RATE: 16 BRPM | OXYGEN SATURATION: 100 % | BODY MASS INDEX: 26.58 KG/M2 | DIASTOLIC BLOOD PRESSURE: 65 MMHG | HEART RATE: 95 BPM | HEIGHT: 63 IN

## 2024-07-05 DIAGNOSIS — Z45.2 ENCOUNTER FOR CARE RELATED TO VASCULAR ACCESS PORT: Primary | ICD-10-CM

## 2024-07-05 DIAGNOSIS — D57.00 SICKLE CELL DISEASE WITH CRISIS (MULTI): ICD-10-CM

## 2024-07-05 LAB
ACANTHOCYTES BLD QL SMEAR: NORMAL
ALBUMIN SERPL BCP-MCNC: 4.6 G/DL (ref 3.4–5)
ALP SERPL-CCNC: 48 U/L (ref 33–110)
ALT SERPL W P-5'-P-CCNC: 7 U/L (ref 7–45)
ANION GAP SERPL CALC-SCNC: 13 MMOL/L (ref 10–20)
AST SERPL W P-5'-P-CCNC: 19 U/L (ref 9–39)
BASOPHILS # BLD AUTO: 0.08 X10*3/UL (ref 0–0.1)
BASOPHILS NFR BLD AUTO: 0.5 %
BILIRUB SERPL-MCNC: 2.7 MG/DL (ref 0–1.2)
BUN SERPL-MCNC: 12 MG/DL (ref 6–23)
CALCIUM SERPL-MCNC: 9.1 MG/DL (ref 8.6–10.3)
CHLORIDE SERPL-SCNC: 107 MMOL/L (ref 98–107)
CO2 SERPL-SCNC: 21 MMOL/L (ref 21–32)
CREAT SERPL-MCNC: 1.08 MG/DL (ref 0.5–1.05)
DACRYOCYTES BLD QL SMEAR: NORMAL
EGFRCR SERPLBLD CKD-EPI 2021: 66 ML/MIN/1.73M*2
EOSINOPHIL # BLD AUTO: 0.13 X10*3/UL (ref 0–0.7)
EOSINOPHIL NFR BLD AUTO: 0.9 %
ERYTHROCYTE [DISTWIDTH] IN BLOOD BY AUTOMATED COUNT: 17.2 % (ref 11.5–14.5)
GLUCOSE SERPL-MCNC: 106 MG/DL (ref 74–99)
HCT VFR BLD AUTO: 19.2 % (ref 36–46)
HGB BLD-MCNC: 6.9 G/DL (ref 12–16)
HGB RETIC QN: 34 PG (ref 28–38)
HOWELL-JOLLY BOD BLD QL SMEAR: PRESENT
HYPOCHROMIA BLD QL SMEAR: NORMAL
IMM GRANULOCYTES # BLD AUTO: 0.04 X10*3/UL (ref 0–0.7)
IMM GRANULOCYTES NFR BLD AUTO: 0.3 % (ref 0–0.9)
IMMATURE RETIC FRACTION: 29.7 %
LDH SERPL L TO P-CCNC: 313 U/L (ref 84–246)
LYMPHOCYTES # BLD AUTO: 5.97 X10*3/UL (ref 1.2–4.8)
LYMPHOCYTES NFR BLD AUTO: 40.7 %
MCH RBC QN AUTO: 32.9 PG (ref 26–34)
MCHC RBC AUTO-ENTMCNC: 35.9 G/DL (ref 32–36)
MCV RBC AUTO: 91 FL (ref 80–100)
MONOCYTES # BLD AUTO: 0.8 X10*3/UL (ref 0.1–1)
MONOCYTES NFR BLD AUTO: 5.4 %
NEUTROPHILS # BLD AUTO: 7.66 X10*3/UL (ref 1.2–7.7)
NEUTROPHILS NFR BLD AUTO: 52.2 %
NRBC BLD-RTO: 0.2 /100 WBCS (ref 0–0)
OVALOCYTES BLD QL SMEAR: NORMAL
PLATELET # BLD AUTO: 455 X10*3/UL (ref 150–450)
POLYCHROMASIA BLD QL SMEAR: NORMAL
POTASSIUM SERPL-SCNC: 3.8 MMOL/L (ref 3.5–5.3)
PROT SERPL-MCNC: 7.8 G/DL (ref 6.4–8.2)
RBC # BLD AUTO: 2.1 X10*6/UL (ref 4–5.2)
RBC MORPH BLD: NORMAL
RETICS #: 0.17 X10*6/UL (ref 0.02–0.08)
RETICS/RBC NFR AUTO: 8 % (ref 0.5–2)
SCHISTOCYTES BLD QL SMEAR: NORMAL
SICKLE CELLS BLD QL SMEAR: NORMAL
SODIUM SERPL-SCNC: 137 MMOL/L (ref 136–145)
TARGETS BLD QL SMEAR: NORMAL
WBC # BLD AUTO: 14.7 X10*3/UL (ref 4.4–11.3)

## 2024-07-05 PROCEDURE — 93005 ELECTROCARDIOGRAM TRACING: CPT

## 2024-07-05 PROCEDURE — 71046 X-RAY EXAM CHEST 2 VIEWS: CPT | Performed by: RADIOLOGY

## 2024-07-05 PROCEDURE — 83615 LACTATE (LD) (LDH) ENZYME: CPT | Performed by: NURSE PRACTITIONER

## 2024-07-05 PROCEDURE — 2500000004 HC RX 250 GENERAL PHARMACY W/ HCPCS (ALT 636 FOR OP/ED): Performed by: EMERGENCY MEDICINE

## 2024-07-05 PROCEDURE — 85025 COMPLETE CBC W/AUTO DIFF WBC: CPT | Performed by: NURSE PRACTITIONER

## 2024-07-05 PROCEDURE — 85045 AUTOMATED RETICULOCYTE COUNT: CPT | Performed by: NURSE PRACTITIONER

## 2024-07-05 PROCEDURE — 71046 X-RAY EXAM CHEST 2 VIEWS: CPT

## 2024-07-05 PROCEDURE — 80053 COMPREHEN METABOLIC PANEL: CPT | Performed by: NURSE PRACTITIONER

## 2024-07-05 PROCEDURE — 4500999001 HC ED NO CHARGE

## 2024-07-05 PROCEDURE — 96374 THER/PROPH/DIAG INJ IV PUSH: CPT

## 2024-07-05 PROCEDURE — 99284 EMERGENCY DEPT VISIT MOD MDM: CPT

## 2024-07-05 RX ORDER — DEFEROXAMINE MESYLATE 500 MG/1
2000 INJECTION, POWDER, LYOPHILIZED, FOR SOLUTION INTRAMUSCULAR; INTRAVENOUS; SUBCUTANEOUS DAILY
Qty: 60000 MG | Refills: 11 | Status: SHIPPED
Start: 2024-07-05

## 2024-07-05 RX ORDER — DEFEROXAMINE MESYLATE 2 G/1
INJECTION, POWDER, LYOPHILIZED, FOR SOLUTION INTRAMUSCULAR; INTRAVENOUS; SUBCUTANEOUS
Qty: 1 EACH | Refills: 99 | Status: SHIPPED | OUTPATIENT
Start: 2024-07-05

## 2024-07-05 ASSESSMENT — PAIN DESCRIPTION - ORIENTATION: ORIENTATION: RIGHT;LEFT

## 2024-07-05 ASSESSMENT — COLUMBIA-SUICIDE SEVERITY RATING SCALE - C-SSRS
6. HAVE YOU EVER DONE ANYTHING, STARTED TO DO ANYTHING, OR PREPARED TO DO ANYTHING TO END YOUR LIFE?: NO
1. IN THE PAST MONTH, HAVE YOU WISHED YOU WERE DEAD OR WISHED YOU COULD GO TO SLEEP AND NOT WAKE UP?: NO
2. HAVE YOU ACTUALLY HAD ANY THOUGHTS OF KILLING YOURSELF?: NO

## 2024-07-05 ASSESSMENT — PAIN DESCRIPTION - LOCATION: LOCATION: ARM

## 2024-07-05 ASSESSMENT — PAIN DESCRIPTION - PAIN TYPE: TYPE: ACUTE PAIN;CHRONIC PAIN

## 2024-07-05 ASSESSMENT — PAIN DESCRIPTION - DESCRIPTORS: DESCRIPTORS: SORE

## 2024-07-05 ASSESSMENT — PAIN SCALES - GENERAL: PAINLEVEL_OUTOF10: 5 - MODERATE PAIN

## 2024-07-05 ASSESSMENT — PAIN - FUNCTIONAL ASSESSMENT: PAIN_FUNCTIONAL_ASSESSMENT: 0-10

## 2024-07-05 NOTE — ED TRIAGE NOTES
"PT. STATES HAD PORT PLACED ON JUNE 21, STATES WANTS TO HAVE IT CHECKED. PT. STATES STILL HAS STERI STRIPS AND IT HAS NOT BEEN FLUSHED. PT. ALSO C/O SOME \"SICKLE CELL PAIN,\" STATES PAIN TO ARMS AND LEGS INCREASED TODAY.   "

## 2024-07-05 NOTE — TELEPHONE ENCOUNTER
Per Ev Osman CNP patient should go to the ED to be evaluated.    Call to the patient she is in agreement and states she will drive herself to the [place] ED at 1:30 today.

## 2024-07-05 NOTE — ED TRIAGE NOTES
This patient was seen and examined in triage    HPI:  Patient is a healthy nontoxic-appearing 44-year-old female with past medical history of adnexal mass, anemia, aneurysm, anxiety, congenital factor XI deficiency, hemoglobin sickle cell disease with crisis, depression, COVID-19, migraine, presents the emergency today for concern of sickle cell crisis and port evaluation.  Patient states she had 4 placed several weeks ago to the right chest.  Patient states she is been having gradually worsening pain to the site and Steri-Strips are still in place.  Patient states symptoms began several days ago.  Patient also complains of pain to the bilateral arms and legs which she states is similar in comparison to previous sickle cell pain flares.  Patient states she has not taken any of her home medication today prior to evaluation.  Patient denies any chest pain, shortness of breath or difficulty breathing, abdominal pain, nausea vomiting, diarrhea or constipation, fever, shaking, or chills.    Focused PE:  Gen: Well-appearing, not in acute distress  Cardiovascular: Regular rate, normal rhythm, no murmur, no gallop  Respiratory: No adventitious lung sounds auscultated.  Abdomen: No reproducible abdominal tenderness upon palpation,  physical exam may be limited by patient positioning sitting up in a chair  Neuro:  Alert and Oriented, speech clear and coherent    Plan:  Lab work ordered from triage.    For the remainder the patient's work-up and ED course, please see the main ED provider note.  We discussed need for diagnostic testing including lab studies and imaging.  We also discussed that they may be asked to wait in the waiting room while these test are pending.  They understand that if they choose to leave without having the testing completed or resulted that we cannot rule out acute life-threatening illnesses and the risks involved to lead to worsening condition, permanent disability or even death.

## 2024-07-05 NOTE — TELEPHONE ENCOUNTER
"Patient left a voice mail re her report.    It was put in June 21st and it was healing.   The area right over the port (the round area) is intact. It has steri strips.  The steri strips are slowly starting to come off.  She is noticing the area is wet at times. It looks \"creamy\" like more yellow than not.  It was enough to leave a wet spor on her shirt.  This happened yesterday.    The neck insertion area is not draining but it is swollen.  It is the size of a dime.      Discomfort is both on the port area and neck.  The pain comes and goes and she isn't having any right now.  It feels worse early morning or late night.  It can get up to 7-8/10.  This morning her pain is 5-6/10 at this time.       She does not have a fever, No shortness of breath, no chest pain, No N/V/D.    Pharmacy is [facility]   [phone].    If you want her seen she can go to [facility].    Message sent   "

## 2024-07-06 NOTE — DISCHARGE INSTRUCTIONS
You have been seen at a [facility].  Please follow-up with your primary care provider in the next 1 to 2 days for further evaluation and routine follow-up.  Please return to the emergency room if having any worsening symptoms.  Please follow-up with any specialists if discussed during your emergency room stay.

## 2024-07-06 NOTE — PROGRESS NOTES
"I received Mary Alice Aragon in signout from Dr. Girard.  Please see the previous note for all HPI, PE and MDM up to the time of signout.    In brief Mary Alice Aragon is an 44 y.o. female presenting for   Chief Complaint   Patient presents with    OTHER     PT. STATES HAD PORT PLACED ON JUNE 21, STATES WANTS TO HAVE IT CHECKED. PT. STATES STILL HAS STERI STRIPS AND IT HAS NOT BEEN FLUSHED. PT. ALSO C/O SOME \"SICKLE CELL PAIN,\" STATES PAIN TO ARMS AND LEGS INCREASED TODAY.    .  At the time of signout we were awaiting:    Reevaluation after lab work and chest x-ray being completed for Port-A-Cath evaluation.  Patient was able to have her Port-A-Cath flushed with normal saline.  She also did complain of some sickle cell related pain and did receive IV Dilaudid with relief on reexamination.  This was similar to prior sickle cell pain in the past.  Otherwise she is hemodynamically stable and afebrile.  She did have anemia although near baseline with a hemoglobin of 6.9.  I believe she needs emergent transfusion at this time.  She did have a leukocytosis of 14.7 although no evidence of acute chest syndrome chest x-ray nonacute.  She does have plans to follow-up with her hematologist and oncologist soon for additional evaluation otherwise stable for home discharge at this time.  She will continue her home medications.    Pt Disposition:     DISCHARGE.  The patient is discharged back to their place of residence.  Discharge diagnosis, instructions and plan were discussed and understood. At the time of discharge the patient was comfortable and was in no apparent distress. Patient is aware of diagnostic uncertainty and was notified though testing is negative here, there is a very small chance that pathology may be missed.  The patient understands these risks and the patient /family understood to return immediately to the emergency department if the symptoms worsen or if they have any additional concerns.    FOLLOW UP  Primary " care provider in 1-2 days.

## 2024-07-08 ENCOUNTER — HOME INFUSION (OUTPATIENT)
Dept: INFUSION THERAPY | Age: 44
End: 2024-07-08
Payer: COMMERCIAL

## 2024-07-08 ENCOUNTER — DOCUMENTATION (OUTPATIENT)
Dept: PHARMACY | Facility: CLINIC | Age: 44
End: 2024-07-08

## 2024-07-08 NOTE — PROGRESS NOTES
Reviewed pt info as correct    Diagnosis: transfusional iron overload 2/2 sickle cell disease  PMH: hx of R middle cerebral artery infarction in childhood; questionable history of factor XI deficiency    Allergies   Allergen Reactions    Other Other        Review of labs at discharge  Line info: pt has a mediport to POTENTIALLY be managed per [facility] protocol  Pt ordered subcutaneously desferal 2000mg every day over 8 hours indefinitely  Labs ordered include ferritin levels  Med placed in CADD bags  Care plan done today    Mary Alice Aragon is a 44 y.o. female rferred to [facility] for skilled nursing and pharmacy services.  SOC 7/9  Followed by Adriana Coelho (will need another provider after 7/12 - Ev Osman?)  Follow up appt 8/28 with heme-onc    Rx dispensed the following with flushes and supplies to match with delivery by 9pm 7/8 :  3x desferal CADD bags  DOS 7/9-7/11    Follow up 7/11 : progress, labs, delivery straight    Spoke at length with patient. Verified correct address and phone #. Reviewed [facility] services and answered all questions. RN to call pt with time of appt. Pt agreed to delivery by 9pm this evening and confirmed will refrigerate med as appropriate.   to call pt at [phone] before delivery.    Confirmed address as:  [id number] [id number] ([place])

## 2024-07-10 NOTE — ED PROVIDER NOTES
HPI  Mary Alice Aragon is a 44 y.o. female with a history of sickle cell disease presenting to the emergency department with concern for having her port evaluated.  This was placed on June 21.  She states that she wanted to have it checked.  She also reports that she is having a sickle cell pain crisis and believe that she needs pain medications.  She denies any chest pain or shortness of breath.  She denies any fevers.    [facility]  Past Medical History:   Diagnosis Date    Sickle cell anemia (Multi)     Unspecified astigmatism, bilateral 05/11/2020    Astigmatism, bilateral       Meds  Current Outpatient Medications   Medication Instructions    amoxicillin (Amoxil) 500 mg capsule TAKE 2 CAPSULES BY MOUTH NOW THEN 1 THREE TIMES DAILY UNTIL ALL TAKEN    ascorbic acid (Vitamin C) 500 mg tablet 2 tablet DAILY (route: oral)    ASPIRIN-ACETAMINOPHEN-CAFFEINE ORAL oral, Weekly    benzonatate (Tessalon) 100 mg capsule oral, 3 times daily PRN    calcium carbonate (Tums) 200 mg calcium chewable tablet oral    capsaicin (Zostrix) 0.025 % cream Apply as directed as needed    cholecalciferol (Vitamin D-3) 50 MCG (2000 UT) tablet 1 tablet, oral, Daily (0630)    deferasirox (Jadenu) 180 mg tablet     deferoxamine (Desferal) 2 gram injection Infuse 2000mg subcutaneously once daily over 8 hours via [place] Pump    deferoxamine (DESFERAL) 2,000 mg, subcutaneous, Daily    diclofenac sodium (Voltaren) 1 % gel gel Per instructions 4 TIMES DAILY (route: topical)    diphenhydrAMINE (BENADryl) 25 mg capsule 1 capsule 3 TIMES DAILY (route: oral)    docusate sodium (Colace) 100 mg capsule 1 capsule DAILY (route: oral)    fluconazole (Diflucan) 150 mg tablet TAKE 1 TABLET BY MOUTH 1 TIME AS DIRECTED    folic acid (Folvite) 1 mg tablet 1 tablet DAILY (route: oral)    gabapentin (Neurontin) 300 mg capsule Take 300mg for am dose and midday dose and take 2- 300mg capsules before bed    gabapentin (NEURONTIN) 200 mg, oral, Every 8 hours     HYDROmorphone (DILAUDID) 4 mg, oral, 3 times daily PRN    hydroxyurea (Hydrea) 500 mg capsule Per instructions AS DIRECTED (route: oral)    ibuprofen 800 mg, oral, Every 8 hours PRN    ketamine (Ketalar) 10 mg/mL injection intravenous    lidocaine (lidocaine HCL) 4 % cream USE TOPICALLY AS DIRECTED.    loratadine (Claritin) 10 mg tablet 1 tablet, oral, Daily    melatonin 6 mg, oral, Nightly PRN    meloxicam (MOBIC) 15 mg, oral, Daily    methadone (DOLOPHINE) 10 mg, oral, Every 12 hours    METHYL SALICYLATE-MENTHOL TOP Topical    MULTIVITAMIN ORAL oral    multivitamin tablet 1 tablet DAILY (route: oral)    multivitamin with minerals (multivit-min-iron fum-folic ac) tablet oral    naloxone (Narcan) 4 mg/0.1 mL nasal spray Per instructions AS NEEDED (route: nasal)    norethindrone (AYGESTIN) 5 mg, oral, Daily    omeprazole (PriLOSEC) 40 mg DR capsule 1 capsule DAILY (route: oral)    orphenadrine (Norflex) 100 mg 12 hr tablet     oxygen (O2) 3 L/min, inhalation,  3L O2 WITH ACTIVITY AS NEEDED 3L O2 AT NIGHT AS NEEDED.<BR>    peg 400/hypromellose/glycerin (DRY EYE RELIEF OPHT) ophthalmic (eye)    prochlorperazine (Compazine) 10 mg tablet 1 tablet EVERY 8 HOURS (route: oral)    senna 8.6 mg, oral, 2 times daily PRN    sertraline (ZOLOFT) 50 mg, oral, 2 times daily    tiZANidine (ZANAFLEX) 2-4 mg, oral, Every 8 hours PRN    traZODone (Desyrel) 100 mg tablet TAKE 1 TO 2 TABLETS(100  MG) BY MOUTH AT BEDTIME AS NEEDED FOR SLEEP    trolamine salicylate (Aspercreme) 10 % cream Per instructions 4 TIMES DAILY (route: topical)    valACYclovir (Valtrex) 500 mg tablet oral    Vitamin D3 1,000 Units, oral, Daily    voxelotor (OXBRYTA) 500 mg tablet tablet TAKE THREE (3) TABLETS BY MOUTH ONCE DAILY.       Allergies  Allergies   Allergen Reactions    Other Other        SHx  Social History     Tobacco Use    Smoking status: Never    Smokeless tobacco: Never   Vaping Use    Vaping status: Never Used   Substance Use Topics     "Alcohol use: Not Currently    Drug use: Not Currently       ------------------------------------------------------------------------------------------------------------------------------------------    /65 (BP Location: Right arm, Patient Position: Sitting)   Pulse 95   Temp 37 °C (98.6 °F) (Temporal)   Resp 16   Ht 1.6 m (5' 3\")   Wt 68 kg (150 lb)   SpO2 100%   BMI 26.57 kg/m²     Physical Exam  Vitals and nursing note reviewed.   Constitutional:       General: She is not in acute distress.     Appearance: Normal appearance.   HENT:      Head: Normocephalic and atraumatic.      Right Ear: External ear normal.      Left Ear: External ear normal.   Eyes:      Extraocular Movements: Extraocular movements intact.      Conjunctiva/sclera: Conjunctivae normal.   Cardiovascular:      Rate and Rhythm: Normal rate and regular rhythm.      Pulses: Normal pulses.      Heart sounds: Normal heart sounds. No murmur heard.     No friction rub. No gallop.   Pulmonary:      Effort: Pulmonary effort is normal. No respiratory distress.      Breath sounds: No wheezing, rhonchi or rales.   Chest:       Abdominal:      General: There is no distension.      Palpations: Abdomen is soft.      Tenderness: There is no abdominal tenderness. There is no guarding or rebound.   Musculoskeletal:         General: No swelling. Normal range of motion.      Cervical back: Neck supple.      Right lower leg: No edema.      Left lower leg: No edema.   Skin:     General: Skin is warm and dry.   Neurological:      General: No focal deficit present.      Mental Status: She is alert and oriented to person, place, and time.   Psychiatric:         Mood and Affect: Mood normal.          ------------------------------------------------------------------------------------------------------------------------------------------    Medical Decision Making: This is a well-appearing 44-year-old female presenting to the emergency department with concern " for evaluation of her port.  Her vital signs are normal and stable.  On examination her port has a small mount of edema but there is no signs of infection.  Chest x-ray was performed to evaluate for placement.  This revealed normal positioning.  Her laboratory testing is currently pending.  She was given 1 mg of IV Dilaudid and we signed out pending the rest of her laboratory studies.  Anticipate that she will be stable for discharge if these result normal.      Diagnoses as of 07/10/24 1200   Encounter for care related to vascular access port   Sickle cell disease with crisis (Multi)         Lacho Girard MD  Emergency Medicine Attending       Lacho Girard MD  07/10/24 1201

## 2024-07-11 ENCOUNTER — TELEPHONE (OUTPATIENT)
Dept: HEMATOLOGY/ONCOLOGY | Facility: HOSPITAL | Age: 44
End: 2024-07-11

## 2024-07-11 ENCOUNTER — OFFICE VISIT (OUTPATIENT)
Dept: HEMATOLOGY/ONCOLOGY | Facility: HOSPITAL | Age: 44
End: 2024-07-11
Payer: MEDICARE

## 2024-07-11 VITALS
DIASTOLIC BLOOD PRESSURE: 73 MMHG | OXYGEN SATURATION: 98 % | SYSTOLIC BLOOD PRESSURE: 121 MMHG | RESPIRATION RATE: 16 BRPM | HEART RATE: 71 BPM

## 2024-07-11 DIAGNOSIS — D57.00 SICKLE CELL CRISIS (MULTI): Primary | ICD-10-CM

## 2024-07-11 LAB
ALBUMIN SERPL BCP-MCNC: 4.2 G/DL (ref 3.4–5)
ALP SERPL-CCNC: 48 U/L (ref 33–110)
ALT SERPL W P-5'-P-CCNC: 18 U/L (ref 7–45)
ANION GAP SERPL CALC-SCNC: 13 MMOL/L (ref 10–20)
AST SERPL W P-5'-P-CCNC: 21 U/L (ref 9–39)
BASOPHILS # BLD AUTO: 0.09 X10*3/UL (ref 0–0.1)
BASOPHILS NFR BLD AUTO: 0.6 %
BILIRUB SERPL-MCNC: 2.9 MG/DL (ref 0–1.2)
BUN SERPL-MCNC: 11 MG/DL (ref 6–23)
CALCIUM SERPL-MCNC: 9 MG/DL (ref 8.6–10.3)
CHLORIDE SERPL-SCNC: 109 MMOL/L (ref 98–107)
CO2 SERPL-SCNC: 23 MMOL/L (ref 21–32)
CREAT SERPL-MCNC: 0.98 MG/DL (ref 0.5–1.05)
EGFRCR SERPLBLD CKD-EPI 2021: 74 ML/MIN/1.73M*2
EOSINOPHIL # BLD AUTO: 0.17 X10*3/UL (ref 0–0.7)
EOSINOPHIL NFR BLD AUTO: 1.2 %
ERYTHROCYTE [DISTWIDTH] IN BLOOD BY AUTOMATED COUNT: 18.4 % (ref 11.5–14.5)
GLUCOSE SERPL-MCNC: 85 MG/DL (ref 74–99)
HCT VFR BLD AUTO: 17.3 % (ref 36–46)
HGB BLD-MCNC: 6 G/DL (ref 12–16)
HGB RETIC QN: 36 PG (ref 28–38)
IMM GRANULOCYTES # BLD AUTO: 0.07 X10*3/UL (ref 0–0.7)
IMM GRANULOCYTES NFR BLD AUTO: 0.5 % (ref 0–0.9)
IMMATURE RETIC FRACTION: 30.8 %
LDH SERPL L TO P-CCNC: 266 U/L (ref 84–246)
LYMPHOCYTES # BLD AUTO: 4.81 X10*3/UL (ref 1.2–4.8)
LYMPHOCYTES NFR BLD AUTO: 33.1 %
MCH RBC QN AUTO: 31.7 PG (ref 26–34)
MCHC RBC AUTO-ENTMCNC: 34.7 G/DL (ref 32–36)
MCV RBC AUTO: 92 FL (ref 80–100)
MONOCYTES # BLD AUTO: 0.99 X10*3/UL (ref 0.1–1)
MONOCYTES NFR BLD AUTO: 6.8 %
NEUTROPHILS # BLD AUTO: 8.39 X10*3/UL (ref 1.2–7.7)
NEUTROPHILS NFR BLD AUTO: 57.8 %
NRBC BLD-RTO: 0.8 /100 WBCS (ref 0–0)
PLATELET # BLD AUTO: 415 X10*3/UL (ref 150–450)
POTASSIUM SERPL-SCNC: 3.6 MMOL/L (ref 3.5–5.3)
PROT SERPL-MCNC: 7.3 G/DL (ref 6.4–8.2)
RBC # BLD AUTO: 1.89 X10*6/UL (ref 4–5.2)
RETICS #: 0.29 X10*6/UL (ref 0.02–0.08)
RETICS/RBC NFR AUTO: 15.2 % (ref 0.5–2)
SODIUM SERPL-SCNC: 141 MMOL/L (ref 136–145)
WBC # BLD AUTO: 14.5 X10*3/UL (ref 4.4–11.3)

## 2024-07-11 PROCEDURE — 96375 TX/PRO/DX INJ NEW DRUG ADDON: CPT | Mod: INF

## 2024-07-11 PROCEDURE — 2500000004 HC RX 250 GENERAL PHARMACY W/ HCPCS (ALT 636 FOR OP/ED): Performed by: NURSE PRACTITIONER

## 2024-07-11 PROCEDURE — 85025 COMPLETE CBC W/AUTO DIFF WBC: CPT | Performed by: NURSE PRACTITIONER

## 2024-07-11 PROCEDURE — 96374 THER/PROPH/DIAG INJ IV PUSH: CPT | Mod: INF

## 2024-07-11 PROCEDURE — 99215 OFFICE O/P EST HI 40 MIN: CPT | Performed by: NURSE PRACTITIONER

## 2024-07-11 PROCEDURE — 2500000005 HC RX 250 GENERAL PHARMACY W/O HCPCS: Performed by: NURSE PRACTITIONER

## 2024-07-11 PROCEDURE — 80053 COMPREHEN METABOLIC PANEL: CPT | Performed by: NURSE PRACTITIONER

## 2024-07-11 PROCEDURE — 85045 AUTOMATED RETICULOCYTE COUNT: CPT | Performed by: NURSE PRACTITIONER

## 2024-07-11 PROCEDURE — 2500000001 HC RX 250 WO HCPCS SELF ADMINISTERED DRUGS (ALT 637 FOR MEDICARE OP): Performed by: NURSE PRACTITIONER

## 2024-07-11 PROCEDURE — 83615 LACTATE (LD) (LDH) ENZYME: CPT | Performed by: NURSE PRACTITIONER

## 2024-07-11 RX ORDER — DIPHENHYDRAMINE HCL 25 MG
25 CAPSULE ORAL ONCE
Status: COMPLETED | OUTPATIENT
Start: 2024-07-11 | End: 2024-07-11

## 2024-07-11 RX ORDER — NALOXONE HYDROCHLORIDE 0.4 MG/ML
0.4 INJECTION, SOLUTION INTRAMUSCULAR; INTRAVENOUS; SUBCUTANEOUS
Status: DISCONTINUED | OUTPATIENT
Start: 2024-07-11 | End: 2024-07-11 | Stop reason: HOSPADM

## 2024-07-11 RX ORDER — CYCLOBENZAPRINE HCL 10 MG
5 TABLET ORAL ONCE
OUTPATIENT
Start: 2024-07-11

## 2024-07-11 RX ORDER — HEPARIN 100 UNIT/ML
5 SYRINGE INTRAVENOUS ONCE
Status: DISCONTINUED | OUTPATIENT
Start: 2024-07-11 | End: 2024-07-11 | Stop reason: HOSPADM

## 2024-07-11 RX ORDER — ONDANSETRON HYDROCHLORIDE 8 MG/1
8 TABLET, FILM COATED ORAL ONCE
Status: COMPLETED | OUTPATIENT
Start: 2024-07-11 | End: 2024-07-11

## 2024-07-11 ASSESSMENT — PAIN DESCRIPTION - LOCATION
LOCATION: ANKLE
LOCATION: ARM

## 2024-07-11 ASSESSMENT — PAIN SCALES - GENERAL
PAINLEVEL_OUTOF10: 5 - MODERATE PAIN
PAINLEVEL_OUTOF10: 5 - MODERATE PAIN
PAINLEVEL_OUTOF10: 7
PAINLEVEL_OUTOF10: 7

## 2024-07-11 ASSESSMENT — PAIN SCALES - PAIN ASSESSMENT IN ADVANCED DEMENTIA (PAINAD): TOTALSCORE: MEDICATION (SEE MAR)

## 2024-07-11 ASSESSMENT — PAIN DESCRIPTION - ORIENTATION
ORIENTATION: RIGHT
ORIENTATION: RIGHT

## 2024-07-11 NOTE — PROGRESS NOTES
Patient ID:  Mary Alice Aragon is a 44 y.o. female.  Subjective   Chief Complaint: Uncontrolled Pain    HPI  Mary Alice is a 44 y.o. female with sickle cell SS disease, who presents to [facility] with uncontrolled pain. Her pain started last week in her right arm and BLE typical of her sickle cell disease. She went to the ED because she was having a hard time getting around 2/2 pain. They gave her one dose of dilaudid and she got significant relief and decided to go home. The pain has been getting progressively worse since she got home. She took her methadone and oxy this morning with some relief but not enough. She still has medication left and says that her methadone lasts her about 2 months. Pt denies chest pain, cough, SOB, headaches, blurry vision, falls, fever or chills, n/v/d/abd pain, or urinary complaints.       She said they evaluated her port at the ED and no one felt comfortable using it so it caused delay in her care. She said other than it being a little sore after insertion she denies any complaints. She had a cavity filled after her port was placed and denies any issues with that filling. She has some pain with chewing on a tooth that is cracked but denies fever, swelling, drainage or pain at rest in that area. She reports the dentist recommended extraction at some point to prevent issues.     ROS  Review of Systems - Oncology     Allergies  Allergies   Allergen Reactions    Other Other        Medications  Current Outpatient Medications   Medication Instructions    amoxicillin (Amoxil) 500 mg capsule TAKE 2 CAPSULES BY MOUTH NOW THEN 1 THREE TIMES DAILY UNTIL ALL TAKEN    ascorbic acid (Vitamin C) 500 mg tablet 2 tablet DAILY (route: oral)    ASPIRIN-ACETAMINOPHEN-CAFFEINE ORAL oral, Weekly    benzonatate (Tessalon) 100 mg capsule oral, 3 times daily PRN    calcium carbonate (Tums) 200 mg calcium chewable tablet oral    capsaicin (Zostrix) 0.025 % cream Apply as directed as needed    cholecalciferol (Vitamin D-3)  50 MCG (2000 UT) tablet 1 tablet, oral, Daily (0630)    deferasirox (Jadenu) 180 mg tablet     deferoxamine (Desferal) 2 gram injection Infuse 2000mg subcutaneously once daily over 8 hours via [place] Pump    deferoxamine (DESFERAL) 2,000 mg, subcutaneous, Daily    diclofenac sodium (Voltaren) 1 % gel gel Per instructions 4 TIMES DAILY (route: topical)    diphenhydrAMINE (BENADryl) 25 mg capsule 1 capsule 3 TIMES DAILY (route: oral)    docusate sodium (Colace) 100 mg capsule 1 capsule DAILY (route: oral)    fluconazole (Diflucan) 150 mg tablet TAKE 1 TABLET BY MOUTH 1 TIME AS DIRECTED    folic acid (Folvite) 1 mg tablet 1 tablet DAILY (route: oral)    gabapentin (Neurontin) 300 mg capsule Take 300mg for am dose and midday dose and take 2- 300mg capsules before bed    gabapentin (NEURONTIN) 200 mg, oral, Every 8 hours    HYDROmorphone (DILAUDID) 4 mg, oral, 3 times daily PRN    hydroxyurea (Hydrea) 500 mg capsule Per instructions AS DIRECTED (route: oral)    ibuprofen 800 mg, oral, Every 8 hours PRN    ketamine (Ketalar) 10 mg/mL injection intravenous    lidocaine (lidocaine HCL) 4 % cream USE TOPICALLY AS DIRECTED.    loratadine (Claritin) 10 mg tablet 1 tablet, oral, Daily    melatonin 6 mg, oral, Nightly PRN    meloxicam (MOBIC) 15 mg, oral, Daily    methadone (DOLOPHINE) 10 mg, oral, Every 12 hours    METHYL SALICYLATE-MENTHOL TOP Topical    MULTIVITAMIN ORAL oral    multivitamin tablet 1 tablet DAILY (route: oral)    multivitamin with minerals (multivit-min-iron fum-folic ac) tablet oral    naloxone (Narcan) 4 mg/0.1 mL nasal spray Per instructions AS NEEDED (route: nasal)    norethindrone (AYGESTIN) 5 mg, oral, Daily    omeprazole (PriLOSEC) 40 mg DR capsule 1 capsule DAILY (route: oral)    orphenadrine (Norflex) 100 mg 12 hr tablet     oxygen (O2) 3 L/min, inhalation,  3L O2 WITH ACTIVITY AS NEEDED 3L O2 AT NIGHT AS NEEDED.<BR>    peg 400/hypromellose/glycerin (DRY EYE RELIEF OPHT) ophthalmic (eye)     prochlorperazine (Compazine) 10 mg tablet 1 tablet EVERY 8 HOURS (route: oral)    senna 8.6 mg, oral, 2 times daily PRN    sertraline (ZOLOFT) 50 mg, oral, 2 times daily    tiZANidine (ZANAFLEX) 2-4 mg, oral, Every 8 hours PRN    traZODone (Desyrel) 100 mg tablet TAKE 1 TO 2 TABLETS(100  MG) BY MOUTH AT BEDTIME AS NEEDED FOR SLEEP    trolamine salicylate (Aspercreme) 10 % cream Per instructions 4 TIMES DAILY (route: topical)    valACYclovir (Valtrex) 500 mg tablet oral    Vitamin D3 1,000 Units, oral, Daily    voxelotor (OXBRYTA) 500 mg tablet tablet TAKE THREE (3) TABLETS BY MOUTH ONCE DAILY.        Past Medical History:   Past Medical History:  No date: Sickle cell anemia (Multi)  2020: Unspecified astigmatism, bilateral      Comment:  Astigmatism, bilateral   Surgical History:    Past Surgical History:   Procedure Laterality Date     SECTION, CLASSIC  2016     Section    CHOLECYSTECTOMY  2016    Cholecystectomy    MR HEAD ANGIO W AND WO IV CONTRAST  4/15/2013    MR HEAD ANGIO W AND WO IV CONTRAST 4/15/2013 [facility] CLINICAL LEGACY    MR HEAD ANGIO W AND WO IV CONTRAST  2013    MR HEAD ANGIO W AND WO IV CONTRAST 2013 [facility] INPATIENT LEGACY    MR HEAD ANGIO WO IV CONTRAST  2014    MR HEAD ANGIO WO IV CONTRAST 2014 [facility] ANCILLARY LEGACY    MR HEAD ANGIO WO IV CONTRAST  2016    MR HEAD ANGIO WO IV CONTRAST 2016 [facility] ANCILLARY LEGACY    MR HEAD ANGIO WO IV CONTRAST  2019    MR HEAD ANGIO WO IV CONTRAST 2019 [facility] ANCILLARY LEGACY    MR HEAD ANGIO WO IV CONTRAST  2017    MR HEAD ANGIO WO IV CONTRAST 2017 [facility] INPATIENT LEGACY    MR NECK ANGIO WO IV CONTRAST  2013    MR NECK ANGIO WO IV CONTRAST 2013 [facility] INPATIENT LEGACY    MR NECK ANGIO WO IV CONTRAST  2014    MR NECK ANGIO WO IV CONTRAST 2014 [facility]CILLARY LEGACY    OTHER SURGICAL HISTORY  2016    Brain Surgery    TUBAL LIGATION  04/10/2017    Tubal Ligation      Family  History:    Family History   Problem Relation Name Age of Onset    Sickle cell trait Sister      Multiple sclerosis Brother      Breast cancer Maternal Grandmother      Breast cancer Other paternal cousin     Sickle cell trait Child       Family Oncology History:    Cancer-related family history includes Breast cancer in her maternal grandmother and another family member.  Social History:    Social History     Tobacco Use    Smoking status: Never    Smokeless tobacco: Never   Vaping Use    Vaping status: Never Used   Substance Use Topics    Alcohol use: Not Currently    Drug use: Not Currently        Objective   Vitals: /73   Pulse 71   Resp 16   SpO2 98%   Weight: There were no vitals filed for this visit.    Physical Exam  Vitals reviewed.   Constitutional:       Appearance: Normal appearance.   HENT:      Head: Normocephalic and atraumatic.      Nose: Nose normal.      Mouth/Throat:      Mouth: Mucous membranes are moist.      Comments: No erythema, swelling or drainage  Eyes:      Conjunctiva/sclera: Conjunctivae normal.      Pupils: Pupils are equal, round, and reactive to light.   Cardiovascular:      Rate and Rhythm: Normal rate and regular rhythm.   Pulmonary:      Effort: Pulmonary effort is normal.      Breath sounds: Normal breath sounds.      Comments: Mediport without erythema or swelling  Abdominal:      General: Abdomen is flat. Bowel sounds are normal.      Palpations: Abdomen is soft.      Tenderness: There is no guarding.   Musculoskeletal:         General: Normal range of motion.      Cervical back: Normal range of motion.   Skin:     General: Skin is warm and dry.   Neurological:      General: No focal deficit present.      Mental Status: She is alert.   Psychiatric:         Mood and Affect: Mood normal.         Behavior: Behavior normal.       Diagnostic Results     Labs  Results from last 7 days   Lab Units 07/11/24  1032 07/05/24  2113   WBC AUTO x10*3/uL 14.5* 14.7*   HEMOGLOBIN g/dL  6.0* 6.9*   HEMATOCRIT % 17.3* 19.2*   PLATELETS AUTO x10*3/uL 415 455*   NEUTROS ABS x10*3/uL 8.39* 7.66   LYMPHS ABS AUTO x10*3/uL 4.81* 5.97*   MONOS ABS AUTO x10*3/uL 0.99 0.80   EOS ABS AUTO x10*3/uL 0.17 0.13   NEUTROS PCT AUTO % 57.8 52.2   LYMPHS PCT AUTO % 33.1 40.7   MONOS PCT AUTO % 6.8 5.4   EOS PCT AUTO % 1.2 0.9      Results from last 7 days   Lab Units 07/11/24  1032 07/05/24  2113   GLUCOSE mg/dL 85 106*   SODIUM mmol/L 141 137   POTASSIUM mmol/L 3.6 3.8   CHLORIDE mmol/L 109* 107   CO2 mmol/L 23 21   BUN mg/dL 11 12   CREATININE mg/dL 0.98 1.08*   EGFR mL/min/1.73m*2 74 66   CALCIUM mg/dL 9.0 9.1   ALBUMIN g/dL 4.2 4.6   PROTEIN TOTAL g/dL 7.3 7.8     Results from last 7 days   Lab Units 07/11/24  1032 07/05/24  2113   BILIRUBIN TOTAL mg/dL 2.9* 2.7*   ALK PHOS U/L 48 48   ALT U/L 18 7   AST U/L 21 19         Results from last 7 days   Lab Units 07/11/24  1032 07/05/24  2113   RETIC CT PCT % 15.2* 8.0*   RETIC CT ABS x10*6/uL 0.287* 0.168*   IMMATURE RETIC FRACTION % 30.8* 29.7*   RETIC HGB pg 36 34     Results from last 7 days   Lab Units 07/11/24  1032 07/05/24  2113   LD U/L 266* 313*         Lab Results   Component Value Date    HGB 6.0 (LL) 07/11/2024    HGB 6.9 (L) 07/05/2024    HGB 7.4 (L) 04/23/2024     07/11/2024     (H) 07/05/2024     04/23/2024     (H) 07/11/2024     (H) 07/05/2024     (H) 04/23/2024       Images  === 07/05/24 ===    XR CHEST 2 VIEWS    - Impression -  No acute cardiopulmonary process.    MACRO:  None    Signed by: Autumn Rodriguez 7/5/2024 9:27 PM  Dictation workstation:   [id number]   === 03/10/22 ===    - Impression -  Right pelvic abnormality compatible with a unilocular right ovarian  cyst without ancillary features to suggest torsion. If there is  clinical concern, pelvic ultrasound correlation could be considered.    Indeterminate 4.5 mm calcification right pelvis inseparable from the  expected course of the right ureter.  The right ureter is normal  caliber. Ureteral calculus not definitively excluded. Recommend  correlation with patient's symptomatology, and urinalysis.    Physiologic amount of pelvic free fluid.    Redemonstrated diffuse multinodular thyroid gland which demonstrate  mixed response compared with prior CT scan. The findings could be  more reliably compared by ultrasound.    Mild prominent caliber central pulmonary arteries. Correlate for  evidence of pulmonary arterial hypertension.    Likely postinflammatory changes at the lung bases.    Chronic findings related to sickle cell anemia.    No thoracic or abdominal aortic aneurysm or dissection.     No echocardiogram results found for the past 12 months       Assessment/Plan   Mary Alice Aragon is a 44 y.o. female with sickle cell disease who presents with uncontrolled pain.          ACC Course  - VSS  - Hemolysis labs near baseline, no indication of acute vaso-occlusive crisis. Hgb 6 which is near baseline and no other symptoms or evidence of organ ischemia to indicate need for blood transfusion. She has also reported not taking her hydrea very consistently and may have caused mild drop in hgb, educated to resume with consistency or discuss with her team if she cannot consistently take her hydrea. She does not want oxybryta because she thinks it gave her bad side effects  - pt took her home tizanidine on arrival  - dilaudid 0.5mg IVP (port) x4 doses given for sickle cell related pain  - Zofran 8mg PO once for opioid induced nausea/vomiting  - Benadryl 25mg PO once for opioid induced pruritus   - EMG ordered for c/o significant nerve pain  - ortho referral made to evaluate BLE pain/AVN  - discussed case with Dr. Saleem who gave gabapentin weaning instructions, called to patient    Disposition  - Patient discharged home with no further needs following ACC Course  - Return to clinic/ED instructions given            MICHELLE Aggarwal-CNP

## 2024-07-12 ENCOUNTER — HOME INFUSION (OUTPATIENT)
Dept: INFUSION THERAPY | Age: 44
End: 2024-07-12
Payer: COMMERCIAL

## 2024-07-12 NOTE — PROGRESS NOTES
Checked with Nursing Team and start of care has not occurred. Team will try again on Monday to resume DFO infusions.    Follow up 07/17 with RN start of care, contact patient and arrange delivery of more doses.

## 2024-07-13 LAB
ATRIAL RATE: 94 BPM
P AXIS: 72 DEGREES
P OFFSET: 190 MS
P ONSET: 138 MS
PR INTERVAL: 166 MS
Q ONSET: 221 MS
QRS COUNT: 15 BEATS
QRS DURATION: 78 MS
QT INTERVAL: 350 MS
QTC CALCULATION(BAZETT): 437 MS
QTC FREDERICIA: 406 MS
R AXIS: 47 DEGREES
T AXIS: 61 DEGREES
T OFFSET: 396 MS
VENTRICULAR RATE: 94 BPM

## 2024-07-15 ENCOUNTER — APPOINTMENT (OUTPATIENT)
Dept: HEMATOLOGY/ONCOLOGY | Facility: HOSPITAL | Age: 44
End: 2024-07-15
Payer: COMMERCIAL

## 2024-07-15 ENCOUNTER — HOME CARE VISIT (OUTPATIENT)
Dept: HOME HEALTH SERVICES | Facility: HOME HEALTH | Age: 44
End: 2024-07-15

## 2024-07-15 NOTE — CASE COMMUNICATION
Hi all. We have been unsuccessful getting this patient scheduled for her SOC. We have made 6 unsuccessful attempts. I made an unsuccessful attempt today to explain to her that we need to get her set up for a SOC by Tuesday 7/16 and if we are unsucessful, we would make her a non admit and she would need to coordinate with her MD for possible utilization of an outpatient infusion center. I did leave a message for her urging her to please  call us so we could get her started. I will update when/if I am able to contact the patient. Thank you.

## 2024-07-16 ENCOUNTER — HOME CARE VISIT (OUTPATIENT)
Dept: HOME HEALTH SERVICES | Facility: HOME HEALTH | Age: 44
End: 2024-07-16
Payer: MEDICARE

## 2024-07-16 PROCEDURE — 169592 NO-PAY CLAIM PROCEDURE

## 2024-07-16 PROCEDURE — G0299 HHS/HOSPICE OF RN EA 15 MIN: HCPCS | Mod: HHH

## 2024-07-16 ASSESSMENT — LIFESTYLE VARIABLES: SMOKING_STATUS: 0

## 2024-07-16 ASSESSMENT — ENCOUNTER SYMPTOMS
HEADACHES: 1
DYSPNEA ACTIVITY LEVEL: AT REST
APPETITE LEVEL: GOOD
DESCRIPTION OF MEMORY LOSS: SHORT TERM
LOWEST PAIN SEVERITY IN PAST 24 HOURS: 1/10
HIGHEST PAIN SEVERITY IN PAST 24 HOURS: 6/10
PAIN: 1
SHORTNESS OF BREATH: 1
PERSON REPORTING PAIN: PATIENT

## 2024-07-16 ASSESSMENT — ACTIVITIES OF DAILY LIVING (ADL)
OASIS_M1830: 00
ENTERING_EXITING_HOME: DEPENDENT

## 2024-07-16 NOTE — HOME HEALTH
PT SEEN FOR SUBQ EDUCATION. MARILYN NIEVES PRESENT FOR ADMIN. PT HAS DONE IT BEFORE JUST NEEDED A REFRESH ON PRIMING PROCEDURE. PT TOLERATED WELL. AGREES TO PULL OUT NEEDLE WHEN INFUSION IS DONE. WILL HAVE IV MED FOR 3 DAYS. HAS SICKLE CELL- PAIN IS CHRONIC. TREATED W/ CURRENT REGIMEN. MEDICATIONS AND ALLERGIES REVIEWED AND UPDATED. NO ISSUES FOUND. PATIENT HAS DENIED THE NEED FOR A DIETICIAN. [facility] WILL NOT BE DOING LABS OR MANAGING MEDIIPORT. PATIENT WILL GO INTO MD OFFICE FOR PORT CARE AND LABS.

## 2024-07-16 NOTE — Clinical Note
PT TAUGHT HOW TO ADMIN SUBQ INFUSION. AGREES TO PULL OUT NEEDLE HERSELF WHEN INFUSION IS COMPLETE. SHE TOLERATED WELL. NO OTHER CONCERNS AT THIS TIME

## 2024-07-17 ENCOUNTER — DOCUMENTATION (OUTPATIENT)
Dept: PHARMACY | Facility: CLINIC | Age: 44
End: 2024-07-17

## 2024-07-17 ENCOUNTER — HOME INFUSION (OUTPATIENT)
Dept: INFUSION THERAPY | Age: 44
End: 2024-07-17
Payer: COMMERCIAL

## 2024-07-17 NOTE — PROGRESS NOTES
Chart review. Mary Alice Aragon is receiving subcutaneous defeoxamine 2000mg over 8 hours once daily for sickle cell disease indefinitely. Previously followed by Adriana Coelho, no longer with . unknown following provider     No labs ordered    Patient started with nursing on 7/16. Per notes, pt has done SQ infusions before, just needed a refresher on priming. Tolerated well, no issues.    Tel call to patient, infusions going well. Has a bag for tomorrow 7/18. Agreeable to delivery tomorrow any time,  to call on the way. If she is not home,  can leave on porch - please call before delivering.     Mixing 7/17 and delivering 7/18 with appropriate supplies to match:  7x deferoxamine CADD bags  DOS 7/19-7/25     Followup 7/24 check progress, OVN

## 2024-07-19 ENCOUNTER — TELEPHONE (OUTPATIENT)
Dept: HEMATOLOGY/ONCOLOGY | Facility: HOSPITAL | Age: 44
End: 2024-07-19
Payer: COMMERCIAL

## 2024-07-19 DIAGNOSIS — D57.00 SICKLE CELL DISEASE WITH CRISIS (MULTI): ICD-10-CM

## 2024-07-19 RX ORDER — TIZANIDINE 2 MG/1
2-4 TABLET ORAL EVERY 8 HOURS PRN
Qty: 40 TABLET | Refills: 1 | Status: SHIPPED | OUTPATIENT
Start: 2024-07-19

## 2024-07-19 RX ORDER — MELOXICAM 15 MG/1
15 TABLET ORAL DAILY
Qty: 30 TABLET | Refills: 2 | Status: SHIPPED | OUTPATIENT
Start: 2024-07-19

## 2024-07-19 RX ORDER — HYDROMORPHONE HYDROCHLORIDE 4 MG/1
4 TABLET ORAL 3 TIMES DAILY PRN
Qty: 40 TABLET | Refills: 0 | Status: SHIPPED | OUTPATIENT
Start: 2024-07-19

## 2024-07-19 NOTE — TELEPHONE ENCOUNTER
Refill requests received for the following medications:    Dilaudid 4mg  Meloxicam 15mg  Tizanidine 2mg    Preferred pharmacy is [facility] at [address] [facility] [address] in [place].    Message sent to Sickle Cell team.

## 2024-07-24 ENCOUNTER — HOME INFUSION (OUTPATIENT)
Dept: INFUSION THERAPY | Age: 44
End: 2024-07-24
Payer: COMMERCIAL

## 2024-07-24 ENCOUNTER — HOME CARE VISIT (OUTPATIENT)
Dept: HOME HEALTH SERVICES | Facility: HOME HEALTH | Age: 44
End: 2024-07-24
Payer: MEDICARE

## 2024-07-24 NOTE — PROGRESS NOTES
Chart review. Mary Alice Aragon is receiving subcutaneous defeoxamine 2000mg over 8 hours once daily for sickle cell disease indefinitely. Previously followed by dAriana Coelho, no longer with . unknown following provider    No labs ordered    [facility] call to patient, left message informing of delivery of medication and standard supplies on 7/25.  to call at delivery, left pharmacy number to contact if needed.    Pharmacy to mix 7/24 and deliver 7/25 with supplies to match:  7x deferoxamine CADD bags  DOS: 7/26 - 8/1    Follow up 7/31 - check progress, deliver OVN

## 2024-07-25 ENCOUNTER — DOCUMENTATION (OUTPATIENT)
Dept: PHARMACY | Facility: CLINIC | Age: 44
End: 2024-07-25

## 2024-07-25 ENCOUNTER — TELEPHONE (OUTPATIENT)
Dept: HEMATOLOGY/ONCOLOGY | Facility: HOSPITAL | Age: 44
End: 2024-07-25

## 2024-07-25 NOTE — PROGRESS NOTES
CALLED PT AND LEFT VM - DELIVERY IS SCHEDULED FOR THURSDAY  9  PM.  LET PT KNOW WE'RE SENDING STND SUPPLIES AND ASKED TO CALL W/ ANY QUESTIONS.    2 = A lot of assistance 1 = Total assistance

## 2024-07-25 NOTE — TELEPHONE ENCOUNTER
Patient would like to discuss transferring Infusion treatment to [facility], with the care team.

## 2024-07-29 ENCOUNTER — TELEPHONE (OUTPATIENT)
Dept: HEMATOLOGY/ONCOLOGY | Facility: HOSPITAL | Age: 44
End: 2024-07-29

## 2024-07-29 DIAGNOSIS — D57.00 SICKLE CELL CRISIS (MULTI): Primary | ICD-10-CM

## 2024-07-31 ENCOUNTER — HOME INFUSION (OUTPATIENT)
Dept: INFUSION THERAPY | Age: 44
End: 2024-07-31
Payer: COMMERCIAL

## 2024-07-31 NOTE — PROGRESS NOTES
Chart review. Mary Alice Aragon is receiving subcutaneous defeoxamine 2000mg over 8 hours once daily for sickle cell disease indefinitely. Previously followed by Adriana Coelho, no longer with . unknown following provider     No labs ordered     [facility] call to patient, left message informing of delivery of medication and standard supplies on 8/1.  to call at delivery, left pharmacy number to contact if needed.     Pharmacy to mix 7/31 and deliver 8/1 with supplies to match:  7x deferoxamine CADD bags  DOS: 8/2 - 8/8     Follow up 8/7 - check progress, deliver OVN

## 2024-08-01 ENCOUNTER — HOME CARE VISIT (OUTPATIENT)
Dept: HOME HEALTH SERVICES | Facility: HOME HEALTH | Age: 44
End: 2024-08-01
Payer: MEDICARE

## 2024-08-01 ENCOUNTER — TELEPHONE (OUTPATIENT)
Dept: HEMATOLOGY/ONCOLOGY | Facility: HOSPITAL | Age: 44
End: 2024-08-01
Payer: COMMERCIAL

## 2024-08-01 ENCOUNTER — TELEPHONE (OUTPATIENT)
Dept: ADMISSION | Facility: HOSPITAL | Age: 44
End: 2024-08-01
Payer: COMMERCIAL

## 2024-08-01 ENCOUNTER — DOCUMENTATION (OUTPATIENT)
Dept: PHARMACY | Facility: CLINIC | Age: 44
End: 2024-08-01

## 2024-08-01 DIAGNOSIS — D57.00 SICKLE CELL DISEASE WITH CRISIS (MULTI): ICD-10-CM

## 2024-08-01 DIAGNOSIS — G60.9 IDIOPATHIC PERIPHERAL NEUROPATHY: Primary | ICD-10-CM

## 2024-08-01 RX ORDER — PREGABALIN 25 MG/1
25 CAPSULE ORAL 2 TIMES DAILY
Qty: 60 CAPSULE | Refills: 0 | Status: SHIPPED | OUTPATIENT
Start: 2024-08-01 | End: 2024-08-31

## 2024-08-01 RX ORDER — METHADONE HYDROCHLORIDE 10 MG/1
10 TABLET ORAL EVERY 12 HOURS
Qty: 60 TABLET | Refills: 0 | Status: SHIPPED | OUTPATIENT
Start: 2024-08-01

## 2024-08-01 ASSESSMENT — ACTIVITIES OF DAILY LIVING (ADL)
HOME_HEALTH_OASIS: 00
OASIS_M1830: 00

## 2024-08-01 NOTE — TELEPHONE ENCOUNTER
Per Dr. Saleem:  David. Wanted to call her and talk to her about the Lyrica I was planning on starting her on. I will call her back to discuss this.

## 2024-08-01 NOTE — HOME HEALTH
PT SHIRLEY FROM [facility]. MD OFFICE WILL MANAGE PORT. NO FURTHER CONCERNS OR SKILLED NEED

## 2024-08-01 NOTE — TELEPHONE ENCOUNTER
Mary Alice states team was weaning her off of her Gabapentin and once she was off of it, they would plan to switch her to a new medication.  She finished her Gabapentin yesterday.   Asking if team can send in new prescription.

## 2024-08-01 NOTE — TELEPHONE ENCOUNTER
Called patient to discuss new prescription for Lyrica. I will start her at 25 mg BID with plan to increase dose as needed. She has completed gabapentin. Indication for medication is peripheral neuropathy not responding to gabapentin. Side effects of medication reviewed and thins includes but not limited to  allergic reaction, increased sleepiness or drowsiness, suicidal ideation, issues with breathing and GI symptoms. Patient verbalized understanding of the plan and will let us know if there are any new developments

## 2024-08-01 NOTE — PROGRESS NOTES
CALLED PT AND LEFT VM - DELIVERY IS SCHEDULED FOR THURSDAY  8  PM.  LET PT KNOW WE'RE SENDING STND SUPPLIES AND ASKED TO CALL W/ ANY QUESTIONS.

## 2024-08-07 ENCOUNTER — DOCUMENTATION (OUTPATIENT)
Dept: PHARMACY | Facility: CLINIC | Age: 44
End: 2024-08-07

## 2024-08-07 ENCOUNTER — HOME INFUSION (OUTPATIENT)
Dept: INFUSION THERAPY | Age: 44
End: 2024-08-07
Payer: COMMERCIAL

## 2024-08-07 NOTE — PROGRESS NOTES
Chart review. Mary Alice Aragon is receiving subcutaneous defeoxamine 2000mg over 8 hours once daily for sickle cell disease indefinitely. Previously followed by Adriana Coelho, no longer with . Next heme-onc appt 8/28 with Ev Osman     No labs ordered     [facility] call to patient, direct to voicemail, left message informing of delivery of medication and standard supplies on 8/1.  to call at delivery, left pharmacy number to contact if needed. Patient may not have power at the moment     Pharmacy to mix 8/7 and deliver 8/8 with supplies to match:  7x deferoxamine CADD bags  DOS: 8/9 - 8/15     Follow up 8/14 - check progress, deliver OVN

## 2024-08-08 ENCOUNTER — TELEPHONE (OUTPATIENT)
Dept: ADMISSION | Facility: HOSPITAL | Age: 44
End: 2024-08-08
Payer: COMMERCIAL

## 2024-08-08 DIAGNOSIS — D57.00 SICKLE CELL DISEASE WITH CRISIS (MULTI): ICD-10-CM

## 2024-08-08 RX ORDER — HYDROMORPHONE HYDROCHLORIDE 4 MG/1
4 TABLET ORAL 3 TIMES DAILY PRN
Qty: 40 TABLET | Refills: 0 | Status: SHIPPED
Start: 2024-08-08

## 2024-08-08 NOTE — TELEPHONE ENCOUNTER
Pt requesting refill   Dilaudid 4mg. 1 tablet TID prn  Pharmacy: [facility] in [place]  Last FUV 6/26 with next FUV 8/28

## 2024-08-12 ENCOUNTER — TELEPHONE (OUTPATIENT)
Dept: HEMATOLOGY/ONCOLOGY | Facility: HOSPITAL | Age: 44
End: 2024-08-12
Payer: COMMERCIAL

## 2024-08-12 DIAGNOSIS — D57.00 SICKLE CELL DISEASE WITH CRISIS (MULTI): ICD-10-CM

## 2024-08-12 RX ORDER — HYDROMORPHONE HYDROCHLORIDE 4 MG/1
4 TABLET ORAL 3 TIMES DAILY PRN
Qty: 40 TABLET | Refills: 0 | Status: SHIPPED | OUTPATIENT
Start: 2024-08-12

## 2024-08-12 NOTE — TELEPHONE ENCOUNTER
Pt's pharmacy did not receive refill of dilaudid 4mg last week because of a power outage.  Pt is requesting to have it re-sent to [facility] on [address], [place].

## 2024-08-14 ENCOUNTER — DOCUMENTATION (OUTPATIENT)
Dept: PHARMACY | Facility: CLINIC | Age: 44
End: 2024-08-14

## 2024-08-14 ENCOUNTER — HOME INFUSION (OUTPATIENT)
Dept: INFUSION THERAPY | Age: 44
End: 2024-08-14
Payer: COMMERCIAL

## 2024-08-14 NOTE — PROGRESS NOTES
Chart review. Mary Alice Aragon is receiving subcutaneous defeoxamine 2000mg over 8 hours once daily for sickle cell disease indefinitely. Previously followed by Adriana Coeloh, no longer with . Next heme-onc appt 8/28 with Ev Osman     No labs ordered    [facility] call to patient, her power did not come back until Saturday, she was unable to keep her previous doses cold and so has not had doses since some time last week. Delivery last week was held and kept in fridge. Agreeable to delivery tonight of medications (one off ice) with supplies to match.  to call on the way.     Pharmacy to mix 8/14 and deliver straight with supplies to match:  9x deferoxamine CADD bags (recycle 4 bags from last week)  DOS: 8/14 - 8/22     Follow up 8/21 - check progress, deliver OVN

## 2024-08-16 ENCOUNTER — TELEPHONE (OUTPATIENT)
Dept: HEMATOLOGY/ONCOLOGY | Facility: HOSPITAL | Age: 44
End: 2024-08-16
Payer: COMMERCIAL

## 2024-08-16 DIAGNOSIS — D57.00 SICKLE CELL DISEASE WITH CRISIS (MULTI): ICD-10-CM

## 2024-08-16 RX ORDER — DICLOFENAC SODIUM 10 MG/G
GEL TOPICAL
Qty: 350 G | Refills: 2 | Status: SHIPPED | OUTPATIENT
Start: 2024-08-16

## 2024-08-21 ENCOUNTER — HOME INFUSION (OUTPATIENT)
Dept: INFUSION THERAPY | Age: 44
End: 2024-08-21
Payer: COMMERCIAL

## 2024-08-21 NOTE — PROGRESS NOTES
Chart review. Mary Alice Aragon is receiving subcutaneous defeoxamine 2000mg over 8 hours once daily for sickle cell disease indefinitely. Previously followed by Adriana Coelho, no longer with . Next heme-onc appt 8/28 with Ev Osman     No labs ordered     [facility] call to patient, infusions going well and inventory correct. Agreeable to delivery Thursday of medications with supplies to match.  to call on the way.     Pharmacy to mix 8/21 and deliver OVN with supplies to match:  7x deferoxamine CADD bags   DOS: 8/23 - 8/29/24     Follow up 8/28 - check progress, deliver OVN

## 2024-08-22 ENCOUNTER — DOCUMENTATION (OUTPATIENT)
Dept: PHARMACY | Facility: CLINIC | Age: 44
End: 2024-08-22

## 2024-08-28 ENCOUNTER — HOME INFUSION (OUTPATIENT)
Dept: INFUSION THERAPY | Age: 44
End: 2024-08-28
Payer: COMMERCIAL

## 2024-08-28 ENCOUNTER — DOCUMENTATION (OUTPATIENT)
Dept: PHARMACY | Facility: CLINIC | Age: 44
End: 2024-08-28

## 2024-08-28 ENCOUNTER — OFFICE VISIT (OUTPATIENT)
Dept: HEMATOLOGY/ONCOLOGY | Facility: HOSPITAL | Age: 44
End: 2024-08-28
Payer: MEDICARE

## 2024-08-28 VITALS
SYSTOLIC BLOOD PRESSURE: 120 MMHG | DIASTOLIC BLOOD PRESSURE: 72 MMHG | WEIGHT: 138.7 LBS | OXYGEN SATURATION: 100 % | BODY MASS INDEX: 24.57 KG/M2 | HEART RATE: 67 BPM | TEMPERATURE: 98.2 F

## 2024-08-28 DIAGNOSIS — D57.00 SICKLE CELL DISEASE WITH CRISIS (MULTI): ICD-10-CM

## 2024-08-28 PROCEDURE — 1036F TOBACCO NON-USER: CPT | Performed by: NURSE PRACTITIONER

## 2024-08-28 PROCEDURE — 99214 OFFICE O/P EST MOD 30 MIN: CPT | Performed by: NURSE PRACTITIONER

## 2024-08-28 RX ORDER — HYDROMORPHONE HYDROCHLORIDE 4 MG/1
4 TABLET ORAL 3 TIMES DAILY PRN
Qty: 40 TABLET | Refills: 0 | Status: SHIPPED | OUTPATIENT
Start: 2024-08-28

## 2024-08-28 RX ORDER — HEPARIN SODIUM,PORCINE/PF 10 UNIT/ML
50 SYRINGE (ML) INTRAVENOUS AS NEEDED
OUTPATIENT
Start: 2024-08-28

## 2024-08-28 RX ORDER — HEPARIN 100 UNIT/ML
500 SYRINGE INTRAVENOUS AS NEEDED
Status: CANCELLED | OUTPATIENT
Start: 2024-08-28

## 2024-08-28 ASSESSMENT — PAIN SCALES - GENERAL: PAINLEVEL: 0-NO PAIN

## 2024-08-28 NOTE — PROGRESS NOTES
Chart review. Mary Alice Aragon is receiving subcutaneous defeoxamine 2000mg over 8 hours once daily for sickle cell disease indefinitely. Previously followed by Adriana Coelho, no longer with . Next heme-onc appt it today, 8/28, with Ev Osman     No labs ordered     [facility] call to patient, infusions going well and inventory correct. Agreeable to delivery Thursday of medications with supplies to match.  to call on the way.     Pharmacy to mix 8/28 and deliver 8/29/24 with supplies to match:  7x deferoxamine CADD bags   DOS: 8/30 - 9/5/24     Follow up 9/4/24 - check progress, deliver OVN

## 2024-08-28 NOTE — PROGRESS NOTES
Patient ID: Mary Alice Aragon is a 44 y.o. female.  Referring Physician: Adriana Coelho, APRN-CNP  No address on file  Primary Care Provider: Nyasia Mcdaniel MD  Visit Type: Follow Up      Subjective  44 year old black female presents for Sickle Cell follow up. Patient has SS type. She was seen in [facility]  for pain crisis. When she has pain it is in her legs, arms and chest. She manages her pain at home with  Ibuprofen, Gabapentin and Dilaudid 4 mg. She uses 3 liters oxygen at 3 liters at home. She has a port which needs to be flushed. She is not working, denies smoking and has a child. She has not had Ketamine for 2 months due to unforseen circumstances. She takes Oxbryta , Hydroxyuera and Jadenu by injection for disease modification. She C/O constipation.     HPI    Review of Systems - Oncology     Objective   BSA: 1.67 meters squared  /72 (BP Location: Left arm, Patient Position: Sitting, BP Cuff Size: Adult)   Pulse 67   Temp 36.8 °C (98.2 °F) (Temporal)   Wt 62.9 kg (138 lb 11.2 oz)   SpO2 100%   BMI 24.57 kg/m²      has a past medical history of Sickle cell anemia (Multi) and Unspecified astigmatism, bilateral (2020).   has a past surgical history that includes Cholecystectomy (2016);  section, classic (2016); Other surgical history (2016); Tubal ligation (04/10/2017); MR angio head w and wo IV contrast (4/15/2013); MR angio neck wo IV contrast (2013); MR angio head w and wo IV contrast (2013); MR angio head wo IV contrast (2014); MR angio neck wo IV contrast (2014); MR angio head wo IV contrast (2016); MR angio head wo IV contrast (2019); and MR angio head wo IV contrast (2017).  Family History   Problem Relation Name Age of Onset    Sickle cell trait Sister      Multiple sclerosis Brother      Breast cancer Maternal Grandmother      Breast cancer Other paternal cousin     Sickle cell trait Child       Oncology History    No history  existsJose R       Mary Alice Aragon  reports that she has never smoked. She has never used smokeless tobacco.  She  reports that she does not currently use alcohol.  She  reports that she does not currently use drugs.    Physical Exam  Vitals reviewed.   Constitutional:       Appearance: Normal appearance.   HENT:      Head: Normocephalic and atraumatic.      Nose: Nose normal.      Mouth/Throat:      Mouth: Mucous membranes are moist.   Eyes:      Pupils: Pupils are equal, round, and reactive to light.   Cardiovascular:      Rate and Rhythm: Normal rate.   Pulmonary:      Effort: Pulmonary effort is normal.      Breath sounds: Normal breath sounds.   Abdominal:      General: Bowel sounds are normal.      Palpations: Abdomen is soft.   Musculoskeletal:         General: Normal range of motion.      Cervical back: Normal range of motion and neck supple.   Skin:     General: Skin is warm and dry.      Capillary Refill: Capillary refill takes less than 2 seconds.   Neurological:      General: No focal deficit present.      Mental Status: She is alert.   Psychiatric:         Mood and Affect: Mood normal.         Behavior: Behavior normal.     WBC   Date/Time Value Ref Range Status   07/11/2024 10:32 AM 14.5 (H) 4.4 - 11.3 x10*3/uL Final   07/05/2024 09:13 PM 14.7 (H) 4.4 - 11.3 x10*3/uL Final   04/23/2024 01:57 PM 11.3 4.4 - 11.3 x10*3/uL Final     nRBC   Date Value Ref Range Status   07/11/2024 0.8 (H) 0.0 - 0.0 /100 WBCs Final   07/05/2024 0.2 (H) 0.0 - 0.0 /100 WBCs Final   04/23/2024 0.2 (H) 0.0 - 0.0 /100 WBCs Final     RBC   Date Value Ref Range Status   07/11/2024 1.89 (L) 4.00 - 5.20 x10*6/uL Final   07/05/2024 2.10 (L) 4.00 - 5.20 x10*6/uL Final   04/23/2024 2.37 (L) 4.00 - 5.20 x10*6/uL Final     Hemoglobin   Date Value Ref Range Status   07/11/2024 6.0 (LL) 12.0 - 16.0 g/dL Final   07/05/2024 6.9 (L) 12.0 - 16.0 g/dL Final   04/23/2024 7.4 (L) 12.0 - 16.0 g/dL Final     Hematocrit   Date Value Ref Range Status  "  07/11/2024 17.3 (L) 36.0 - 46.0 % Final   07/05/2024 19.2 (L) 36.0 - 46.0 % Final   04/23/2024 22.3 (L) 36.0 - 46.0 % Final     MCV   Date/Time Value Ref Range Status   07/11/2024 10:32 AM 92 80 - 100 fL Final   07/05/2024 09:13 PM 91 80 - 100 fL Final   04/23/2024 01:57 PM 94 80 - 100 fL Final     MCH   Date/Time Value Ref Range Status   07/11/2024 10:32 AM 31.7 26.0 - 34.0 pg Final   07/05/2024 09:13 PM 32.9 26.0 - 34.0 pg Final   04/23/2024 01:57 PM 31.2 26.0 - 34.0 pg Final     MCHC   Date/Time Value Ref Range Status   07/11/2024 10:32 AM 34.7 32.0 - 36.0 g/dL Final   07/05/2024 09:13 PM 35.9 32.0 - 36.0 g/dL Final   04/23/2024 01:57 PM 33.2 32.0 - 36.0 g/dL Final     RDW   Date/Time Value Ref Range Status   07/11/2024 10:32 AM 18.4 (H) 11.5 - 14.5 % Final   07/05/2024 09:13 PM 17.2 (H) 11.5 - 14.5 % Final   04/23/2024 01:57 PM 17.1 (H) 11.5 - 14.5 % Final     Platelets   Date/Time Value Ref Range Status   07/11/2024 10:32  150 - 450 x10*3/uL Final   07/05/2024 09:13  (H) 150 - 450 x10*3/uL Final   04/23/2024 01:57  150 - 450 x10*3/uL Final     No results found for: \"MPV\"  Neutrophils %   Date/Time Value Ref Range Status   07/11/2024 10:32 AM 57.8 40.0 - 80.0 % Final   07/05/2024 09:13 PM 52.2 40.0 - 80.0 % Final   04/23/2024 01:57 PM 57.6 40.0 - 80.0 % Final     Immature Granulocytes %, Automated   Date/Time Value Ref Range Status   07/11/2024 10:32 AM 0.5 0.0 - 0.9 % Final     Comment:     Immature Granulocyte Count (IG) includes promyelocytes, myelocytes and metamyelocytes but does not include bands. Percent differential counts (%) should be interpreted in the context of the absolute cell counts (cells/UL).   07/05/2024 09:13 PM 0.3 0.0 - 0.9 % Final     Comment:     Immature Granulocyte Count (IG) includes promyelocytes, myelocytes and metamyelocytes but does not include bands. Percent differential counts (%) should be interpreted in the context of the absolute cell counts (cells/UL). "   04/23/2024 01:57 PM 0.4 0.0 - 0.9 % Final     Comment:     Immature Granulocyte Count (IG) includes promyelocytes, myelocytes and metamyelocytes but does not include bands. Percent differential counts (%) should be interpreted in the context of the absolute cell counts (cells/UL).     Lymphocytes %   Date/Time Value Ref Range Status   07/11/2024 10:32 AM 33.1 13.0 - 44.0 % Final   07/05/2024 09:13 PM 40.7 13.0 - 44.0 % Final   04/23/2024 01:57 PM 33.4 13.0 - 44.0 % Final     Monocytes %   Date/Time Value Ref Range Status   07/11/2024 10:32 AM 6.8 2.0 - 10.0 % Final   07/05/2024 09:13 PM 5.4 2.0 - 10.0 % Final   04/23/2024 01:57 PM 6.3 2.0 - 10.0 % Final     Eosinophils %   Date/Time Value Ref Range Status   07/11/2024 10:32 AM 1.2 0.0 - 6.0 % Final   07/05/2024 09:13 PM 0.9 0.0 - 6.0 % Final   04/23/2024 01:57 PM 1.6 0.0 - 6.0 % Final     Basophils %   Date/Time Value Ref Range Status   07/11/2024 10:32 AM 0.6 0.0 - 2.0 % Final   07/05/2024 09:13 PM 0.5 0.0 - 2.0 % Final   04/23/2024 01:57 PM 0.7 0.0 - 2.0 % Final     Neutrophils Absolute   Date/Time Value Ref Range Status   07/11/2024 10:32 AM 8.39 (H) 1.20 - 7.70 x10*3/uL Final     Comment:     Percent differential counts (%) should be interpreted in the context of the absolute cell counts (cells/uL).   07/05/2024 09:13 PM 7.66 1.20 - 7.70 x10*3/uL Final     Comment:     Percent differential counts (%) should be interpreted in the context of the absolute cell counts (cells/uL).   04/23/2024 01:57 PM 6.54 1.20 - 7.70 x10*3/uL Final     Comment:     Percent differential counts (%) should be interpreted in the context of the absolute cell counts (cells/uL).     Immature Granulocytes Absolute, Automated   Date/Time Value Ref Range Status   07/11/2024 10:32 AM 0.07 0.00 - 0.70 x10*3/uL Final   07/05/2024 09:13 PM 0.04 0.00 - 0.70 x10*3/uL Final   04/23/2024 01:57 PM 0.04 0.00 - 0.70 x10*3/uL Final     Lymphocytes Absolute   Date/Time Value Ref Range Status  "  07/11/2024 10:32 AM 4.81 (H) 1.20 - 4.80 x10*3/uL Final   07/05/2024 09:13 PM 5.97 (H) 1.20 - 4.80 x10*3/uL Final   04/23/2024 01:57 PM 3.79 1.20 - 4.80 x10*3/uL Final     Monocytes Absolute   Date/Time Value Ref Range Status   07/11/2024 10:32 AM 0.99 0.10 - 1.00 x10*3/uL Final   07/05/2024 09:13 PM 0.80 0.10 - 1.00 x10*3/uL Final   04/23/2024 01:57 PM 0.71 0.10 - 1.00 x10*3/uL Final     Eosinophils Absolute   Date/Time Value Ref Range Status   07/11/2024 10:32 AM 0.17 0.00 - 0.70 x10*3/uL Final   07/05/2024 09:13 PM 0.13 0.00 - 0.70 x10*3/uL Final   04/23/2024 01:57 PM 0.18 0.00 - 0.70 x10*3/uL Final     Basophils Absolute   Date/Time Value Ref Range Status   07/11/2024 10:32 AM 0.09 0.00 - 0.10 x10*3/uL Final   07/05/2024 09:13 PM 0.08 0.00 - 0.10 x10*3/uL Final   04/23/2024 01:57 PM 0.08 0.00 - 0.10 x10*3/uL Final       No components found for: \"PT\"  aPTT   Date/Time Value Ref Range Status   03/11/2020 11:34 AM 30 28 - 38 sec Final     Comment:       THE APTT IS NO LONGER USED FOR MONITORING     UNFRACTIONATED HEPARIN THERAPY.    FOR MONITORING HEPARIN THERAPY,     USE THE HEPARIN ASSAY.     09/13/2019 07:00 AM 35 28 - 38 sec Final     Comment:       THE APTT IS NO LONGER USED FOR MONITORING     UNFRACTIONATED HEPARIN THERAPY.    FOR MONITORING HEPARIN THERAPY,     USE THE HEPARIN ASSAY.         Assessment/Plan         3 month follow up  Continue with Jadenu injection  Follow up with Ketamine doses and administration  Continue the use of Oxygen 3 liters at night   Follow up with line care appointment   Continue with Hydroxyurea and  Oxbryta       "

## 2024-08-29 ENCOUNTER — TELEPHONE (OUTPATIENT)
Dept: HEMATOLOGY/ONCOLOGY | Facility: HOSPITAL | Age: 44
End: 2024-08-29
Payer: COMMERCIAL

## 2024-08-29 NOTE — TELEPHONE ENCOUNTER
Patient is currently on Pregabalin 25mg BID, she reports the dose is been working well, but doesn't seem to last for the 12 hours. Could it be adjusted to more often to help her in middle of day.  Patient wants to update team, she has received Ketamine infusion in past at [facility], but was unable to get a dose in July based on availability and not received in August due to power outage. She researched a facility called [facility] that will be able to provider her Ketamine infusion but she will have to pay out of pocket for it. She plans to contact her pain management doctor at [facility] to discuss, but wanted Dr. Saleem to also be aware.

## 2024-08-30 ENCOUNTER — LAB (OUTPATIENT)
Dept: HEMATOLOGY/ONCOLOGY | Facility: HOSPITAL | Age: 44
End: 2024-08-30
Payer: MEDICARE

## 2024-08-30 DIAGNOSIS — D57.1 SICKLE CELL DISEASE WITHOUT CRISIS (MULTI): ICD-10-CM

## 2024-08-30 DIAGNOSIS — D57.00 SICKLE CELL DISEASE WITH CRISIS (MULTI): ICD-10-CM

## 2024-08-30 DIAGNOSIS — D57.00 SICKLE CELL CRISIS (MULTI): ICD-10-CM

## 2024-08-30 LAB
ABO GROUP (TYPE) IN BLOOD: NORMAL
ALBUMIN SERPL BCP-MCNC: 4.4 G/DL (ref 3.4–5)
ALP SERPL-CCNC: 49 U/L (ref 33–110)
ALT SERPL W P-5'-P-CCNC: 12 U/L (ref 7–45)
ANION GAP SERPL CALC-SCNC: 14 MMOL/L (ref 10–20)
ANTIBODY SCREEN: NORMAL
AST SERPL W P-5'-P-CCNC: 19 U/L (ref 9–39)
BASOPHILS # BLD AUTO: 0.05 X10*3/UL (ref 0–0.1)
BASOPHILS NFR BLD AUTO: 0.5 %
BILIRUB SERPL-MCNC: 2.1 MG/DL (ref 0–1.2)
BUN SERPL-MCNC: 11 MG/DL (ref 6–23)
CALCIUM SERPL-MCNC: 8.6 MG/DL (ref 8.6–10.3)
CHLORIDE SERPL-SCNC: 109 MMOL/L (ref 98–107)
CO2 SERPL-SCNC: 20 MMOL/L (ref 21–32)
CREAT SERPL-MCNC: 1.04 MG/DL (ref 0.5–1.05)
EGFRCR SERPLBLD CKD-EPI 2021: 69 ML/MIN/1.73M*2
EOSINOPHIL # BLD AUTO: 0.05 X10*3/UL (ref 0–0.7)
EOSINOPHIL NFR BLD AUTO: 0.5 %
ERYTHROCYTE [DISTWIDTH] IN BLOOD BY AUTOMATED COUNT: 18.7 % (ref 11.5–14.5)
FERRITIN SERPL-MCNC: 1678 NG/ML (ref 8–150)
GLUCOSE SERPL-MCNC: 83 MG/DL (ref 74–99)
HCT VFR BLD AUTO: 20.1 % (ref 36–46)
HGB BLD-MCNC: 7.1 G/DL (ref 12–16)
HGB RETIC QN: 37 PG (ref 28–38)
IMM GRANULOCYTES # BLD AUTO: 0.03 X10*3/UL (ref 0–0.7)
IMM GRANULOCYTES NFR BLD AUTO: 0.3 % (ref 0–0.9)
IMMATURE RETIC FRACTION: 29.4 %
LDH SERPL L TO P-CCNC: 308 U/L (ref 84–246)
LYMPHOCYTES # BLD AUTO: 3.03 X10*3/UL (ref 1.2–4.8)
LYMPHOCYTES NFR BLD AUTO: 29.8 %
MCH RBC QN AUTO: 34.5 PG (ref 26–34)
MCHC RBC AUTO-ENTMCNC: 35.3 G/DL (ref 32–36)
MCV RBC AUTO: 98 FL (ref 80–100)
MONOCYTES # BLD AUTO: 0.38 X10*3/UL (ref 0.1–1)
MONOCYTES NFR BLD AUTO: 3.7 %
NEUTROPHILS # BLD AUTO: 6.63 X10*3/UL (ref 1.2–7.7)
NEUTROPHILS NFR BLD AUTO: 65.2 %
NRBC BLD-RTO: 0.4 /100 WBCS (ref 0–0)
PLATELET # BLD AUTO: 369 X10*3/UL (ref 150–450)
POTASSIUM SERPL-SCNC: 3.5 MMOL/L (ref 3.5–5.3)
PROT SERPL-MCNC: 7.7 G/DL (ref 6.4–8.2)
RBC # BLD AUTO: 2.06 X10*6/UL (ref 4–5.2)
RETICS #: 0.15 X10*6/UL (ref 0.02–0.08)
RETICS/RBC NFR AUTO: 7.3 % (ref 0.5–2)
RH FACTOR (ANTIGEN D): NORMAL
SODIUM SERPL-SCNC: 139 MMOL/L (ref 136–145)
WBC # BLD AUTO: 10.2 X10*3/UL (ref 4.4–11.3)

## 2024-08-30 PROCEDURE — 83615 LACTATE (LD) (LDH) ENZYME: CPT | Performed by: NURSE PRACTITIONER

## 2024-08-30 PROCEDURE — 85045 AUTOMATED RETICULOCYTE COUNT: CPT | Performed by: NURSE PRACTITIONER

## 2024-08-30 PROCEDURE — 85025 COMPLETE CBC W/AUTO DIFF WBC: CPT | Performed by: NURSE PRACTITIONER

## 2024-08-30 PROCEDURE — 86901 BLOOD TYPING SEROLOGIC RH(D): CPT | Performed by: NURSE PRACTITIONER

## 2024-08-30 PROCEDURE — 83021 HEMOGLOBIN CHROMOTOGRAPHY: CPT | Performed by: NURSE PRACTITIONER

## 2024-08-30 PROCEDURE — 36591 DRAW BLOOD OFF VENOUS DEVICE: CPT

## 2024-08-30 PROCEDURE — 80053 COMPREHEN METABOLIC PANEL: CPT | Performed by: NURSE PRACTITIONER

## 2024-08-30 PROCEDURE — 2500000004 HC RX 250 GENERAL PHARMACY W/ HCPCS (ALT 636 FOR OP/ED): Performed by: NURSE PRACTITIONER

## 2024-08-30 PROCEDURE — 82728 ASSAY OF FERRITIN: CPT | Performed by: NURSE PRACTITIONER

## 2024-08-30 RX ORDER — HEPARIN 100 UNIT/ML
500 SYRINGE INTRAVENOUS AS NEEDED
Status: DISCONTINUED | OUTPATIENT
Start: 2024-08-30 | End: 2024-08-30 | Stop reason: HOSPADM

## 2024-08-30 RX ORDER — HEPARIN 100 UNIT/ML
500 SYRINGE INTRAVENOUS AS NEEDED
OUTPATIENT
Start: 2024-08-30

## 2024-08-30 RX ORDER — HEPARIN SODIUM,PORCINE/PF 10 UNIT/ML
50 SYRINGE (ML) INTRAVENOUS AS NEEDED
OUTPATIENT
Start: 2024-08-30

## 2024-08-31 LAB
HEMOGLOBIN A2: 2.1 % (ref 2–3.5)
HEMOGLOBIN F: 20.7 % (ref 0–2)
HEMOGLOBIN IDENTIFICATION INTERPRETATION: ABNORMAL
HEMOGLOBIN S: 77.2 %
PATH REVIEW-HGB IDENTIFICATION: ABNORMAL

## 2024-09-04 ENCOUNTER — HOME INFUSION (OUTPATIENT)
Dept: INFUSION THERAPY | Age: 44
End: 2024-09-04
Payer: COMMERCIAL

## 2024-09-04 NOTE — PROGRESS NOTES
Chart review. Mary Alice Aragon is receiving subcutaneous deferoxamine 2000mg over 8 hours once daily for sickle cell disease indefinitely.    No labs ordered     [facility] call to patient, infusions going well but has some extra doses on hand due to not feeling well. Agreeable to delivery next Tuesday of medications with supplies to match.  to call on the way.     Pharmacy to mix 9/09 and deliver 9/10/24 with supplies to match:  7x deferoxamine CADD bags   DOS: 9/11 - 9/17/24     Follow up 9/16/24 - check progress, deliver OVN

## 2024-09-05 ENCOUNTER — DOCUMENTATION (OUTPATIENT)
Dept: INFUSION THERAPY | Age: 44
End: 2024-09-05
Payer: COMMERCIAL

## 2024-09-05 NOTE — PROGRESS NOTES
PER [facility] THE NEXT SHIPMENT PT REQUESTED TUESDAY, SEPT 10TH... AND WITH 7 DFO BAGS OK FOR USUAL SUPPLIES...

## 2024-09-10 ENCOUNTER — TELEPHONE (OUTPATIENT)
Dept: ADMISSION | Facility: HOSPITAL | Age: 44
End: 2024-09-10
Payer: COMMERCIAL

## 2024-09-10 DIAGNOSIS — G60.9 IDIOPATHIC PERIPHERAL NEUROPATHY: ICD-10-CM

## 2024-09-10 DIAGNOSIS — D57.00 SICKLE CELL DISEASE WITH CRISIS (MULTI): ICD-10-CM

## 2024-09-11 DIAGNOSIS — G60.9 IDIOPATHIC PERIPHERAL NEUROPATHY: ICD-10-CM

## 2024-09-11 DIAGNOSIS — D57.00 SICKLE CELL DISEASE WITH CRISIS (MULTI): ICD-10-CM

## 2024-09-11 RX ORDER — PREGABALIN 25 MG/1
25 CAPSULE ORAL 2 TIMES DAILY
Qty: 60 CAPSULE | Refills: 0 | Status: SHIPPED | OUTPATIENT
Start: 2024-09-11 | End: 2024-10-11

## 2024-09-11 RX ORDER — TIZANIDINE 2 MG/1
2-4 TABLET ORAL EVERY 8 HOURS PRN
Qty: 40 TABLET | Refills: 1 | Status: SHIPPED | OUTPATIENT
Start: 2024-09-11

## 2024-09-11 RX ORDER — PREGABALIN 25 MG/1
25 CAPSULE ORAL 2 TIMES DAILY
Qty: 60 CAPSULE | Refills: 0 | Status: CANCELLED | OUTPATIENT
Start: 2024-09-11 | End: 2024-10-11

## 2024-09-11 RX ORDER — TIZANIDINE 2 MG/1
2-4 TABLET ORAL EVERY 8 HOURS PRN
Qty: 40 TABLET | Refills: 1 | Status: CANCELLED | OUTPATIENT
Start: 2024-09-11

## 2024-09-11 RX ORDER — HYDROMORPHONE HYDROCHLORIDE 4 MG/1
4 TABLET ORAL 3 TIMES DAILY PRN
Qty: 40 TABLET | Refills: 0 | Status: CANCELLED | OUTPATIENT
Start: 2024-09-11

## 2024-09-11 RX ORDER — HYDROMORPHONE HYDROCHLORIDE 4 MG/1
4 TABLET ORAL 3 TIMES DAILY PRN
Qty: 40 TABLET | Refills: 0 | Status: SHIPPED | OUTPATIENT
Start: 2024-09-11

## 2024-09-16 ENCOUNTER — HOME INFUSION (OUTPATIENT)
Dept: INFUSION THERAPY | Age: 44
End: 2024-09-16
Payer: COMMERCIAL

## 2024-09-16 PROBLEM — R25.2 SPASM: Status: ACTIVE | Noted: 2024-09-16

## 2024-09-16 PROBLEM — M25.559 ARTHRALGIA OF HIP: Status: ACTIVE | Noted: 2024-09-16

## 2024-09-16 PROBLEM — U07.1 DISEASE DUE TO SEVERE ACUTE RESPIRATORY SYNDROME CORONAVIRUS 2 (SARS-COV-2): Status: ACTIVE | Noted: 2024-09-16

## 2024-09-16 PROBLEM — R06.02 SHORTNESS OF BREATH: Status: ACTIVE | Noted: 2024-09-16

## 2024-09-16 PROBLEM — T14.8XXA LOCAL INFECTION OF WOUND: Status: ACTIVE | Noted: 2024-09-16

## 2024-09-16 PROBLEM — M16.10 ARTHRITIS OF HIP: Status: ACTIVE | Noted: 2024-09-16

## 2024-09-16 PROBLEM — M79.673 PAIN OF FOOT: Status: ACTIVE | Noted: 2024-09-16

## 2024-09-16 PROBLEM — R10.31 RIGHT LOWER QUADRANT ABDOMINAL PAIN: Status: ACTIVE | Noted: 2024-09-16

## 2024-09-16 PROBLEM — S93.409A SPRAIN OF ANKLE: Status: ACTIVE | Noted: 2024-09-16

## 2024-09-16 PROBLEM — N83.8 COMPLEX CYST OF UTERINE ADNEXA: Status: ACTIVE | Noted: 2024-09-16

## 2024-09-16 PROBLEM — D57.00 SICKLE CELL CRISIS (MULTI): Status: ACTIVE | Noted: 2024-09-16

## 2024-09-16 PROBLEM — R69 DISEASE SUSPECTED: Status: ACTIVE | Noted: 2024-09-16

## 2024-09-16 PROBLEM — M79.89 SWELLING OF LOWER EXTREMITY: Status: ACTIVE | Noted: 2024-09-16

## 2024-09-16 PROBLEM — L08.9 LOCAL INFECTION OF WOUND: Status: ACTIVE | Noted: 2024-09-16

## 2024-09-16 PROBLEM — L02.811 SCALP ABSCESS: Status: ACTIVE | Noted: 2024-09-16

## 2024-09-16 PROBLEM — Z91.89: Status: ACTIVE | Noted: 2024-09-16

## 2024-09-16 PROBLEM — M25.569 KNEE PAIN: Status: ACTIVE | Noted: 2024-09-16

## 2024-09-16 PROBLEM — M25.579 ANKLE PAIN: Status: ACTIVE | Noted: 2024-09-16

## 2024-09-16 NOTE — PROGRESS NOTES
Review of chart. Patient with order for subcutaneous deferoxamine over 8 hours daily for transfusional iron overload. Current order is good thru 7/5/25.    No labs are ordered.    Tel call to patient. No answer. Left message letting patient know that another delivery is scheduled for 9/17/24. Asked for call back if a different delivery date is needed or for questions or concerns.    Processed fill for 7 x deferoxamine bags for mix 9/16 and delivery 9/17 to cover doses 9/18 thru 9/24/24.    Follow up 9/23/24 with next fill ovn. Check progress.

## 2024-09-17 ENCOUNTER — DOCUMENTATION (OUTPATIENT)
Dept: PHARMACY | Facility: CLINIC | Age: 44
End: 2024-09-17

## 2024-09-17 ENCOUNTER — TELEPHONE (OUTPATIENT)
Dept: ADMISSION | Facility: HOSPITAL | Age: 44
End: 2024-09-17
Payer: COMMERCIAL

## 2024-09-17 DIAGNOSIS — D57.00 SICKLE CELL DISEASE WITH CRISIS (MULTI): ICD-10-CM

## 2024-09-17 RX ORDER — IBUPROFEN 800 MG/1
800 TABLET ORAL EVERY 8 HOURS PRN
Qty: 60 TABLET | Refills: 2 | Status: SHIPPED | OUTPATIENT
Start: 2024-09-17

## 2024-09-17 NOTE — TELEPHONE ENCOUNTER
Pt requesting refill  Ibuprofen 800mg. 1 tablet every 8 hrs prn  Pharmacy: [facility] in [place].   Last FUV 8/28, next FUV 10/2

## 2024-09-17 NOTE — PROGRESS NOTES
CALLED PT AND LEFT VM - DELIVERY IS SCHEDULED FOR TUESDAY BY  8  PM.  LET PT KNOW WE'RE SENDING STND SUPPLIES AND ASKED TO CALL W/ ANY QUESTIONS.

## 2024-09-23 ENCOUNTER — DOCUMENTATION (OUTPATIENT)
Dept: PHARMACY | Facility: CLINIC | Age: 44
End: 2024-09-23

## 2024-09-23 ENCOUNTER — HOME INFUSION (OUTPATIENT)
Dept: INFUSION THERAPY | Age: 44
End: 2024-09-23
Payer: COMMERCIAL

## 2024-09-23 NOTE — PROGRESS NOTES
Review of chart. Patient with order for deferoxamin subcutaneous over 8 hours daily for transfusional iron overload. Current orders are good thru 7/25/25.    No labs are ordered.    Tel call with patient. She states infusions are going well, but she used last dose yesterday and has nothing to infuse this pm. Patient states she infuses just once daily and has not had to waste any doses. Patient agreeable to delivery later today with 9 doses (2 x replacement and 7 x weekly doses) with supplies to match along with the addition of Tegaderm. States last week that she only received gauze to tape down over that needle and that doesn't stay put well enough. Tegaderm to be sent with this weeks delivery.    Processed fill for the following for mix and straight delivery 9/23/24:    2 x replacement deferoxamine doses for infusion on 9/23 and 9/24 (one dose to be kept off ice)    7 x deferoxamine doses for infusion 9/25 thru 10/1/24.    Follow up 9/30/24 for next fill ovn.

## 2024-09-26 ENCOUNTER — APPOINTMENT (OUTPATIENT)
Dept: BEHAVIORAL HEALTH | Facility: HOSPITAL | Age: 44
End: 2024-09-26
Payer: MEDICARE

## 2024-09-26 DIAGNOSIS — F33.41 RECURRENT MAJOR DEPRESSIVE DISORDER, IN PARTIAL REMISSION (CMS-HCC): ICD-10-CM

## 2024-09-26 DIAGNOSIS — F41.9 ANXIETY: ICD-10-CM

## 2024-09-26 PROCEDURE — 90833 PSYTX W PT W E/M 30 MIN: CPT | Performed by: PSYCHIATRY & NEUROLOGY

## 2024-09-26 PROCEDURE — 1036F TOBACCO NON-USER: CPT | Performed by: PSYCHIATRY & NEUROLOGY

## 2024-09-26 PROCEDURE — 99214 OFFICE O/P EST MOD 30 MIN: CPT | Performed by: PSYCHIATRY & NEUROLOGY

## 2024-09-26 RX ORDER — FLUOXETINE 10 MG/1
10 CAPSULE ORAL DAILY
Qty: 30 CAPSULE | Refills: 2 | Status: SHIPPED | OUTPATIENT
Start: 2024-09-26 | End: 2024-12-25

## 2024-09-26 RX ORDER — HYDROXYZINE HYDROCHLORIDE 25 MG/1
25 TABLET, FILM COATED ORAL 2 TIMES DAILY PRN
Qty: 60 TABLET | Refills: 2 | Status: SHIPPED | OUTPATIENT
Start: 2024-09-26 | End: 2024-12-25

## 2024-09-26 NOTE — PROGRESS NOTES
Outpatient Psychiatry [facility]UV      Subjective   Mary Alice Aragon, a 44 y.o. female, for virtual FUV    Virtual or Telephone Consent    A telephone visit (audio only) between the patient (at the originating site) and the provider (at the distant site) was utilized to provide this telehealth service.   Verbal consent was requested and obtained from Mary Alice Aragon on this date, 09/26/24 for a telehealth visit.   Confirmed to be home        Assessment/Plan   Patient Discussion:  START fluoxetine 10mg daily  STOP sertraline  CONTINUE  trazodone 100-200mg at bedtime  CONTINUE hydroxyzine 25mg 2x as needed for anxiety  take melatonin 2 hours before preferred bedtime  continue with good sleep hygiene  Resume melatonin     Previously placed referral to therapy, continue to consider     CONTINUE with light box for 20 minutes per day at 10,000lux. position above you but so it reaches your eyes. You don't need to look directly into the light     Follow up  10/31 at 12pm virtual FUV      Call with questions or concerns [phone] Crisis hotline if any further SI        Assessment:   44 y.o.  F with Hgb SS disease, h/o MCA with R frontal encephalomalacia, h/o lead poisoning, chronic pain managed by ketamine referred to psychiatry for depression and anxiety. History of trauma though not classic PTSD. Pt reports increased depression and anxiety related to stressors at home as well as limited support.      FUV 9/26/2024  pt reports mood is currently stable but will have untriggered week bouts of depression. Will change sertraline to fluoxetine. Follow up 1 month, sooner if needed     Diagnosis:   MDD, recurrent, currently p remission  Other specified anxiety, likely component of depression  Insomnia    Treatment Plan/Recommendations:   1. Safety Assessment: intermittent passive SI but no plan since 12/2023. over holiday 2023 +SI with plan but did not act, worried about kids being affected kids are protective. Pt also  stopped meds at this time. No h/o attempts. RF include single, pain, depression. PF include kids, no guns, seeking treatment, F/age/race. Low imminent but increased risk with recent SI +plan that was not acted on, reviewed safety plan including  suicide hotline/text line [id number]     2. MDD, recurrent moderate other spec anxiety-component of depression vs NICHOL--  STOP sertraline--not filled since 2/2024 though may have had leftover from previous rx  START fluoxetine 10mg PO daily   CONTINUE trazodone 100-200mg PO QHS, r/b/ae discussed   including excess serotonin though currently low doses. can decrease if sleep improves.      use hydroxyzine 25mg PO BID PRN anxiety  RESUME  melatonin 10mg 2 hours before bed. continue good sleep hygiene     CAN USE lightbox at 10,000lux for 20 minutes/day.   have previously discussed therapy, follow up on referral. Notes kids are in therapy now  will continue with supportive therapy during appt     Discussed case with JENNIFER Man 1/2024 to see if increased support around housing and emotional support     3. Medical: notes and labs reviewed, recently saw neuro for migraines. pain/ankle pain better on O2 at night, saw ortho, rec PT.   recent sleep study, no longer needs O2 at night, now wonders if she should get another updated sleep study  now back on ketamine though having spasms, less helpful for mood  breast mass bx benign   Pain increased, less benefit from ketamine     4. Social: 2 kids at home, limited support. works at [email or web address] but has not been able to related to health, notes some help from aunt, moved to [place]     Reason for Visit:   FUV for mood/anxiety    Subjective:  Last seen 5/2024    Since then notes currently doing ok but still has periods of depression, hit out of no where and last 1 week, occurs about 2-2.5 weeks/month    Health no great but has been avoiding after bad experience  Still having a lot of pain, wondering about moving away from ketamine    Changing  gabapentin to lyrica due to increased spasm and this has been helpful    No recent thoughts of wishing she were dead, no SI    No ae to medications, notes taking sertraline but not full dose. Agreed to change to fluoxetine         Current Medications:    Current Outpatient Medications:     amoxicillin (Amoxil) 500 mg capsule, TAKE 2 CAPSULES BY MOUTH NOW THEN 1 THREE TIMES DAILY UNTIL ALL TAKEN, Disp: , Rfl:     ascorbic acid (Vitamin C) 500 mg tablet, 2 tablet DAILY (route: oral), Disp: , Rfl:     ASPIRIN-ACETAMINOPHEN-CAFFEINE ORAL, Take by mouth once a week., Disp: , Rfl:     benzonatate (Tessalon) 100 mg capsule, Take by mouth 3 times a day as needed., Disp: , Rfl:     calcium carbonate (Tums) 200 mg calcium chewable tablet, Chew 1 tablet once daily., Disp: , Rfl:     capsaicin (Zostrix) 0.025 % cream, Apply as directed as needed, Disp: , Rfl:     cholecalciferol (Vitamin D-3) 50 MCG (2000 UT) tablet, Take 1 tablet (2,000 Units) by mouth early in the morning.., Disp: , Rfl:     deferasirox (Jadenu) 180 mg tablet, TAKE ONE TABLET BY MOUTH DAILY, Disp: , Rfl:     deferoxamine (Desferal) 2 gram injection, Infuse 2000mg subcutaneously once daily over 8 hours via [place] Pump, Disp: 1 each, Rfl: 99    deferoxamine (Desferal) 500 mg injection, Inject 2,000 mg under the skin once daily., Disp: 73575 mg, Rfl: 11    diclofenac sodium (Voltaren) 1 % gel, Per instructions 4 TIMES DAILY (route: topical), Disp: 350 g, Rfl: 2    diphenhydrAMINE (BENADryl) 25 mg capsule, 1 capsule 3 TIMES DAILY (route: oral), Disp: , Rfl:     docusate sodium (Colace) 100 mg capsule, 1 capsule DAILY (route: oral), Disp: , Rfl:     fluconazole (Diflucan) 150 mg tablet, TAKE 1 TABLET BY MOUTH 1 TIME AS DIRECTED, Disp: , Rfl:     folic acid (Folvite) 1 mg tablet, 1 tablet DAILY (route: oral), Disp: , Rfl:     gabapentin (Neurontin) 100 mg capsule, Take 2 capsules by mouth every 8 hours., Disp: , Rfl:     gabapentin (Neurontin) 300 mg capsule,  Take 300mg for am dose and midday dose and take 2- 300mg capsules before bed, Disp: 56 capsule, Rfl: 2    HYDROmorphone (Dilaudid) 4 mg tablet, Take 1 tablet (4 mg) by mouth 3 times a day as needed for severe pain (7 - 10)., Disp: 40 tablet, Rfl: 0    hydroxyurea (Hydrea) 500 mg capsule, Per instructions AS DIRECTED (route: oral), Disp: 68 capsule, Rfl: 2    ibuprofen 800 mg tablet, Take 1 tablet (800 mg) by mouth every 8 hours if needed for moderate pain (4 - 6)., Disp: 60 tablet, Rfl: 2    ketamine (Ketalar) 10 mg/mL injection, Infuse 10 mg into a venous catheter 1 time if needed (CHRONIC PAIN TREATMENT). Not administered in the home. , Disp: , Rfl:     lidocaine (lidocaine HCL) 4 % cream, USE TOPICALLY AS DIRECTED., Disp: , Rfl:     loratadine (Claritin) 10 mg tablet, Take 1 tablet (10 mg) by mouth once daily., Disp: , Rfl:     melatonin 3 mg tablet, Take 2 tablets by mouth as needed at bedtime., Disp: , Rfl:     meloxicam (Mobic) 15 mg tablet, Take 1 tablet (15 mg) by mouth once daily., Disp: 30 tablet, Rfl: 2    methadone (Dolophine) 10 mg tablet, Take 1 tablet (10 mg) by mouth every 12 hours., Disp: 60 tablet, Rfl: 0    METHYL SALICYLATE-MENTHOL TOP, Apply topically., Disp: , Rfl:     MULTIVITAMIN ORAL, Take 1 tablet by mouth once daily., Disp: , Rfl:     multivitamin tablet, 1 tablet DAILY (route: oral), Disp: , Rfl:     multivitamin with minerals (multivit-min-iron fum-folic ac) tablet, Take by mouth., Disp: , Rfl:     naloxone (Narcan) 4 mg/0.1 mL nasal spray, Per instructions AS NEEDED (route: nasal), Disp: , Rfl:     norethindrone (Aygestin) 5 mg tablet, Take 1 tablet (5 mg) by mouth once daily., Disp: 90 tablet, Rfl: 3    omeprazole (PriLOSEC) 40 mg DR capsule, 1 capsule DAILY (route: oral), Disp: , Rfl:     orphenadrine (Norflex) 100 mg 12 hr tablet, TAKE ONE TABLET BY MOUTH DAILY AS NEEDED, Disp: , Rfl:     oxygen (O2) gas therapy, Inhale 3 L/min.  3L O2 WITH ACTIVITY AS NEEDED 3L O2 AT NIGHT AS  NEEDED. , Disp: , Rfl:     peg 400/hypromellose/glycerin (DRY EYE RELIEF OPHT), Administer 1 drop into affected eye(s) once daily., Disp: , Rfl:     pregabalin (Lyrica) 25 mg capsule, Take 1 capsule (25 mg) by mouth 2 times a day., Disp: 60 capsule, Rfl: 0    prochlorperazine (Compazine) 10 mg tablet, 1 tablet EVERY 8 HOURS (route: oral), Disp: 30 tablet, Rfl: 3    senna 8.6 mg tablet, Take 1 tablet (8.6 mg) by mouth 2 times a day as needed for constipation., Disp: 30 tablet, Rfl: 2    sertraline (Zoloft) 50 mg tablet, Take 1 tablet (50 mg) by mouth 2 times a day., Disp: 180 tablet, Rfl: 1    tiZANidine (Zanaflex) 2 mg tablet, Take 1-2 tablets (2-4 mg) by mouth every 8 hours if needed for muscle spasms., Disp: 40 tablet, Rfl: 1    traZODone (Desyrel) 100 mg tablet, TAKE 1 TO 2 TABLETS(100  MG) BY MOUTH AT BEDTIME AS NEEDED FOR SLEEP, Disp: 180 tablet, Rfl: 1    trolamine salicylate (Aspercreme) 10 % cream, Per instructions 4 TIMES DAILY (route: topical), Disp: , Rfl:     valACYclovir (Valtrex) 500 mg tablet, Take 500 mg by mouth once daily., Disp: , Rfl:     Vitamin D3 25 mcg (1,000 unit) tablet, Take 1 tablet (1,000 Units) by mouth once daily., Disp: 30 tablet, Rfl: 3    voxelotor (OXBRYTA) 500 mg tablet tablet, TAKE THREE (3) TABLETS BY MOUTH ONCE DAILY., Disp: 90 tablet, Rfl: 3  Medical History:  Past Medical History:   Diagnosis Date    Sickle cell anemia (Multi)     Unspecified astigmatism, bilateral 2020    Astigmatism, bilateral       Surgical History:  Past Surgical History:   Procedure Laterality Date     SECTION, CLASSIC  2016     Section    CHOLECYSTECTOMY  2016    Cholecystectomy    MR HEAD ANGIO W AND WO IV CONTRAST  4/15/2013    MR HEAD ANGIO W AND WO IV CONTRAST 4/15/2013 [facility] CLINICAL LEGACY    MR HEAD ANGIO W AND WO IV CONTRAST  2013    MR HEAD ANGIO W AND WO IV CONTRAST 2013 [facility] INPATIENT LEGACY    MR HEAD ANGIO WO IV CONTRAST  2014    MR HEAD  ANGIO WO IV CONTRAST 11/1/2014 [facility] ANCILLARY LEGACY    MR HEAD ANGIO WO IV CONTRAST  2/8/2016    MR HEAD ANGIO WO IV CONTRAST 2/8/2016 [facility] ANCILLARY LEGACY    MR HEAD ANGIO WO IV CONTRAST  4/16/2019    MR HEAD ANGIO WO IV CONTRAST 4/16/2019 [facility] ANCILLARY LEGACY    MR HEAD ANGIO WO IV CONTRAST  5/4/2017    MR HEAD ANGIO WO IV CONTRAST 5/4/2017 [facility] INPATIENT LEGACY    MR NECK ANGIO WO IV CONTRAST  8/2/2013    MR NECK ANGIO WO IV CONTRAST 8/2/2013 [facility] INPATIENT LEGACY    MR NECK ANGIO WO IV CONTRAST  11/1/2014    MR NECK ANGIO WO IV CONTRAST 11/1/2014 [facility] ANCILLARY LEGACY    OTHER SURGICAL HISTORY  06/16/2016    Brain Surgery    TUBAL LIGATION  04/10/2017    Tubal Ligation       Family History:  Family History   Problem Relation Name Age of Onset    Sickle cell trait Sister      Multiple sclerosis Brother      Breast cancer Maternal Grandmother      Breast cancer Other paternal cousin     Sickle cell trait Child         Social History:  Social History     Socioeconomic History    Marital status: Single     Spouse name: Not on file    Number of children: Not on file    Years of education: Not on file    Highest education level: Not on file   Occupational History    Not on file   Tobacco Use    Smoking status: Never    Smokeless tobacco: Never   Vaping Use    Vaping status: Never Used   Substance and Sexual Activity    Alcohol use: Not Currently    Drug use: Not Currently    Sexual activity: Not on file   Other Topics Concern    Not on file   Social History Narrative    ONCOLOGY/HEMATOLOGY PSYCHOSOCIAL ASSESSMENT         Demographic Information    Mary Alice MCKEON Margo                       1980                      [id number]    Assessment Type:      Date of assessment: 04/22/24    Person(s) present during assessment:     Did patient participate in assessment:  yes    If no, why not:    Primary language: English    Interpretive services used:     Provider: Adriana Coelho CNP     Diagnosis: Sickle Cell SS                     Marital Status / Family / Household    Status:   single    Family composition: Patient resides with two minor children    Support systems: Pt reports limited support system    Primary caregiver:  self         Current employment status:  employed and disability    Work history/type of work:       Comments: Pt is currently on leave from employement. SW assisted pt with paperwork for insurance company         Environmental Supports    Current living situation:   house    Patient's home environment:          Functional Status     Functional status:  Independent, assistance needed during pain crises    Other health issues that the patient is experiencing:     What supports are in place to assist the patient:     What community resources have been offered: Support group    Transportation:  self    Psychosocial risk factors impacting the patient:     Limited social supports         Assistive Devices    Patient has the following equipment: n/a    Patient currently ambulates:          Finances/Insurance    Primary insurance: Medicare    Secondary insurance: [employer]    Prescription insurance:    Does the patient have any pending insurance applications: No     Patient's current income source:  Employment disability    Does the patient have any financial concerns:  denies      Comments:          Advance Directives    power of  for healthcare on file    Advanced directives status:  Not activated    Legal decision maker, when applicable: Maryanne Yu-Sister         Pentecostalism or Spiritual Identity    Comments:         Mental Health    Mental health history and status details:  Patient follows with Dr. May. Open to therapy referral           Social Determinants of Health     Financial Resource Strain: Not on file   Food Insecurity: Not on file   Transportation Needs: No Transportation Needs (8/1/2024)    OASIS : Transportation     Lack of Transportation (Medical): No     Lack of Transportation  "(Non-Medical): No     Patient Unable or Declines to Respond: No   Physical Activity: Not on file   Stress: Not on file   Social Connections: Feeling Socially Integrated (8/1/2024)    OASIS : Social Isolation     Frequency of experiencing loneliness or isolation: Never   Intimate Partner Violence: Not on file   Housing Stability: Not on file         Medical Review Of Systems:  +increased pain  +O2 at night    Psychiatric Review Of Systems:  Per HPI       Objective   Mental Status Exam:  Appearance: defer  Attitude: cooperative, engaged.   Behavior: appropriate conversation  Motor Activity: defer  Speech: regular rate, low volume. +accent. no dysarthria, no aphasia.   Mood: \"OK now\"  Affect: congruent,overall euthymic, good range  Thought Process: no KIRIT, on topic.   Thought Content: no delusions, passive SI but no current plan/intent, no HI.   Thought Perception: no AVH,.   Cognition: alert, oriented to person, place, time. attn intact. MAX h/o stroke with frontal lobe encephalomalacia.   Insight: fair.   Judgment: fair.      Vitals:  There were no vitals filed for this visit. Deferred for virtual visit  Encounter Date: 07/05/24   ECG 12 lead   Result Value    Ventricular Rate 94    Atrial Rate 94    AZ Interval 166    QRS Duration 78    QT Interval 350    QTC Calculation(Bazett) 437    P Axis 72    R Axis 47    T Axis 61    QRS Count 15    Q Onset 221    P Onset 138    P Offset 190    T Offset 396    QTC Fredericia 406    Narrative    Normal sinus rhythm  Possible Left atrial enlargement  Septal infarct , age undetermined  Abnormal ECG  When compared with ECG of 20-JUL-2023 13:23,  Septal infarct is now Present  T wave inversion now evident in Anterior leads  See ED provider note for full interpretation and clinical correlation  Confirmed by Virginia Morales ([id number]) on 7/13/2024 7:00:37 PM     Lab Results   Component Value Date    WBC 10.2 08/30/2024    HGB 7.1 (L) 08/30/2024    HCT 20.1 (L) 08/30/2024    " MCV 98 08/30/2024     08/30/2024     Lab Results   Component Value Date    GLUCOSE 83 08/30/2024    CALCIUM 8.6 08/30/2024     08/30/2024    K 3.5 08/30/2024    CO2 20 (L) 08/30/2024     (H) 08/30/2024    BUN 11 08/30/2024    CREATININE 1.04 08/30/2024       Psychotherapy  Time: 19 minutes  type supportive, problem solving  target mood, anxiety, grief  techniques reframing, processing, goal setting  response moderate-good  goal decreased sx burden, improved management  follow up 1-2 months     Total time via phone: 24 minutes    Cinthia May MD

## 2024-09-27 ENCOUNTER — TELEPHONE (OUTPATIENT)
Dept: HEMATOLOGY/ONCOLOGY | Facility: HOSPITAL | Age: 44
End: 2024-09-27
Payer: COMMERCIAL

## 2024-09-27 DIAGNOSIS — D57.00 SICKLE CELL DISEASE WITH CRISIS (MULTI): ICD-10-CM

## 2024-09-30 ENCOUNTER — HOME INFUSION (OUTPATIENT)
Dept: INFUSION THERAPY | Age: 44
End: 2024-09-30
Payer: COMMERCIAL

## 2024-09-30 RX ORDER — HYDROMORPHONE HYDROCHLORIDE 4 MG/1
4 TABLET ORAL 3 TIMES DAILY PRN
Qty: 40 TABLET | Refills: 0 | Status: SHIPPED | OUTPATIENT
Start: 2024-09-30

## 2024-09-30 NOTE — PROGRESS NOTES
Review of chart. Patient with order for deferoxamine subcutaneous infusions over 8 hours daily for transfusional iron overload. Current orders are good thru 7/25/25. No labs are ordered. Last week patient reported being short x 2 DFO bags and replacement bags were sent with last weeks delivery.    Tel call to patient. No answer. VM full, unable to leave a message.    No new entries in epic. Patient is not showing as being admitted. Will continue with therapy for this week as ordered.    Processed fill for 7 x deferoxamine doses for mix 9/30 and delivery 10/1 to cover doses 10/2 thru 10/8/24.    Follow up 10/7/24 with next fill ovn. Check progress.

## 2024-10-01 ENCOUNTER — DOCUMENTATION (OUTPATIENT)
Dept: PHARMACY | Facility: CLINIC | Age: 44
End: 2024-10-01

## 2024-10-01 DIAGNOSIS — D57.00 SICKLE CELL DISEASE WITH CRISIS (MULTI): Primary | ICD-10-CM

## 2024-10-01 NOTE — PROGRESS NOTES
CALLED PT AND LEFT VM - DELIVERY IS SCHEDULED FOR TUESDAY BY  8  PM.  LET PT KNOW WE'RE SENDING STND SUPPLIES AND ASKED TO CALL W/ ANY QUESTIONS

## 2024-10-02 ENCOUNTER — APPOINTMENT (OUTPATIENT)
Dept: HEMATOLOGY/ONCOLOGY | Facility: HOSPITAL | Age: 44
End: 2024-10-02
Payer: MEDICARE

## 2024-10-02 ENCOUNTER — OFFICE VISIT (OUTPATIENT)
Dept: HEMATOLOGY/ONCOLOGY | Facility: HOSPITAL | Age: 44
End: 2024-10-02
Payer: MEDICARE

## 2024-10-02 ENCOUNTER — SOCIAL WORK (OUTPATIENT)
Dept: HEMATOLOGY/ONCOLOGY | Facility: HOSPITAL | Age: 44
End: 2024-10-02

## 2024-10-02 ENCOUNTER — LAB (OUTPATIENT)
Dept: HEMATOLOGY/ONCOLOGY | Facility: HOSPITAL | Age: 44
End: 2024-10-02
Payer: MEDICARE

## 2024-10-02 ENCOUNTER — TELEPHONE (OUTPATIENT)
Dept: HEMATOLOGY/ONCOLOGY | Facility: HOSPITAL | Age: 44
End: 2024-10-02

## 2024-10-02 VITALS
TEMPERATURE: 97.5 F | HEART RATE: 90 BPM | SYSTOLIC BLOOD PRESSURE: 132 MMHG | BODY MASS INDEX: 25.15 KG/M2 | WEIGHT: 142 LBS | DIASTOLIC BLOOD PRESSURE: 73 MMHG | OXYGEN SATURATION: 100 %

## 2024-10-02 DIAGNOSIS — D57.1 SICKLE CELL DISEASE WITHOUT CRISIS (MULTI): ICD-10-CM

## 2024-10-02 DIAGNOSIS — D57.00 SICKLE CELL DISEASE WITH CRISIS (MULTI): ICD-10-CM

## 2024-10-02 LAB
ALBUMIN SERPL BCP-MCNC: 4.7 G/DL (ref 3.4–5)
ALP SERPL-CCNC: 59 U/L (ref 33–110)
ALT SERPL W P-5'-P-CCNC: 12 U/L (ref 7–45)
ANION GAP SERPL CALC-SCNC: 13 MMOL/L (ref 10–20)
AST SERPL W P-5'-P-CCNC: 20 U/L (ref 9–39)
BASOPHILS # BLD AUTO: 0.07 X10*3/UL (ref 0–0.1)
BASOPHILS NFR BLD AUTO: 0.8 %
BILIRUB SERPL-MCNC: 3.3 MG/DL (ref 0–1.2)
BUN SERPL-MCNC: 12 MG/DL (ref 6–23)
CALCIUM SERPL-MCNC: 9.3 MG/DL (ref 8.6–10.3)
CHLORIDE SERPL-SCNC: 106 MMOL/L (ref 98–107)
CO2 SERPL-SCNC: 23 MMOL/L (ref 21–32)
CREAT SERPL-MCNC: 1.08 MG/DL (ref 0.5–1.05)
EGFRCR SERPLBLD CKD-EPI 2021: 66 ML/MIN/1.73M*2
EOSINOPHIL # BLD AUTO: 0.07 X10*3/UL (ref 0–0.7)
EOSINOPHIL NFR BLD AUTO: 0.8 %
ERYTHROCYTE [DISTWIDTH] IN BLOOD BY AUTOMATED COUNT: 16 % (ref 11.5–14.5)
FERRITIN SERPL-MCNC: 2285 NG/ML (ref 8–150)
GLUCOSE SERPL-MCNC: 120 MG/DL (ref 74–99)
HCT VFR BLD AUTO: 21.2 % (ref 36–46)
HGB BLD-MCNC: 7.4 G/DL (ref 12–16)
HGB RETIC QN: 34 PG (ref 28–38)
IMM GRANULOCYTES # BLD AUTO: 0.1 X10*3/UL (ref 0–0.7)
IMM GRANULOCYTES NFR BLD AUTO: 1.1 % (ref 0–0.9)
IMMATURE RETIC FRACTION: 22.6 %
LDH SERPL L TO P-CCNC: 286 U/L (ref 84–246)
LYMPHOCYTES # BLD AUTO: 3.06 X10*3/UL (ref 1.2–4.8)
LYMPHOCYTES NFR BLD AUTO: 33.7 %
MCH RBC QN AUTO: 33 PG (ref 26–34)
MCHC RBC AUTO-ENTMCNC: 34.9 G/DL (ref 32–36)
MCV RBC AUTO: 95 FL (ref 80–100)
MONOCYTES # BLD AUTO: 0.59 X10*3/UL (ref 0.1–1)
MONOCYTES NFR BLD AUTO: 6.5 %
NEUTROPHILS # BLD AUTO: 5.2 X10*3/UL (ref 1.2–7.7)
NEUTROPHILS NFR BLD AUTO: 57.1 %
NRBC BLD-RTO: 0 /100 WBCS (ref 0–0)
PLATELET # BLD AUTO: 451 X10*3/UL (ref 150–450)
POTASSIUM SERPL-SCNC: 3.8 MMOL/L (ref 3.5–5.3)
PROT SERPL-MCNC: 8.2 G/DL (ref 6.4–8.2)
RBC # BLD AUTO: 2.24 X10*6/UL (ref 4–5.2)
RETICS #: 0.14 X10*6/UL (ref 0.02–0.08)
RETICS/RBC NFR AUTO: 6.3 % (ref 0.5–2)
SODIUM SERPL-SCNC: 138 MMOL/L (ref 136–145)
WBC # BLD AUTO: 9.1 X10*3/UL (ref 4.4–11.3)

## 2024-10-02 PROCEDURE — 36591 DRAW BLOOD OFF VENOUS DEVICE: CPT

## 2024-10-02 PROCEDURE — 85045 AUTOMATED RETICULOCYTE COUNT: CPT

## 2024-10-02 PROCEDURE — 2500000004 HC RX 250 GENERAL PHARMACY W/ HCPCS (ALT 636 FOR OP/ED): Performed by: NURSE PRACTITIONER

## 2024-10-02 PROCEDURE — 84075 ASSAY ALKALINE PHOSPHATASE: CPT

## 2024-10-02 PROCEDURE — 85025 COMPLETE CBC W/AUTO DIFF WBC: CPT

## 2024-10-02 PROCEDURE — 82728 ASSAY OF FERRITIN: CPT

## 2024-10-02 PROCEDURE — 83615 LACTATE (LD) (LDH) ENZYME: CPT

## 2024-10-02 RX ORDER — HEPARIN 100 UNIT/ML
500 SYRINGE INTRAVENOUS AS NEEDED
Status: DISCONTINUED | OUTPATIENT
Start: 2024-10-02 | End: 2024-10-02 | Stop reason: HOSPADM

## 2024-10-02 RX ORDER — HEPARIN 100 UNIT/ML
500 SYRINGE INTRAVENOUS AS NEEDED
OUTPATIENT
Start: 2024-10-02

## 2024-10-02 RX ORDER — HEPARIN SODIUM,PORCINE/PF 10 UNIT/ML
50 SYRINGE (ML) INTRAVENOUS AS NEEDED
OUTPATIENT
Start: 2024-10-02

## 2024-10-02 ASSESSMENT — PAIN SCALES - GENERAL: PAINLEVEL: 0-NO PAIN

## 2024-10-02 NOTE — TELEPHONE ENCOUNTER
Mary Alice calls to say that she needs a referral to [facility].  She came for her NP visit with Ev Osman.  When she checked in she was sent to get port care that she was not aware that she was supposed to come for port care or she would have been there earlier.    She states her confirmation information did not include the time for the port care.   She states she had also asked to talk to a  but was told she was busy on the phone and was not able to speak.    She expresses frustration with the system and would like a referral to the [facility] for her care.      She still would like to speak to Krista In social work when possible     Message sent

## 2024-10-02 NOTE — PROGRESS NOTES
Social Work Note    Referral Source: Patient/JESÚS Askew RN  Meeting Location: Phone  Person(s) Present: Patient/ SW  Identified Needs: Referral   Impression and Plan: LISW was informed by RN pt is requesting to see pt. LISW unavailable at moment and pt left before being seen. LISW contacted pt via phone to connect. Pt expressed frustration with her visit today as she was unable to be seen by provider. Pt shared her frustrations related to the current status of their health and the healthcare they have been receiving. Pt states she would like a referral to [facility] as soon as possible. LISW informed pt that the referral was being placed by Gio Saleem MD. LISW encouraged pt to contact the medical centers to schedule an appointment after the referrals are placed to receive the earliest appointment. LISW encouraged pt to make the best choices for her and her health. LISW provided pt with active listening and support thorughout visit. Additionally, pt had spoken to LISW regarding insurance documentation on 10/1. Pt was informed to send paperwork to [facility] and paperwork will be completed. LISW reiterated this information. Pt agreeable. Patient denies any further psychosocial or support service needs at this time. Patient encouraged to contact LISW if any needs arise. Team updated.   Interventions Provided: Advocacy, Care Coordination, De-Escalation, Empowerment/Coaching, and Supportive Counseling  Estimated Time Spent: 30 min     SW will continue to follow as needed.

## 2024-10-07 ENCOUNTER — HOME INFUSION (OUTPATIENT)
Dept: INFUSION THERAPY | Age: 44
End: 2024-10-07
Payer: COMMERCIAL

## 2024-10-07 NOTE — PROGRESS NOTES
Review of chart. Patient with order for deferoxamine subcutaneous infusions over 8 hours daily for transfusional iron overload. Current orders are good thru 7/25/25. No labs are ordered.      Tel call to patient. No answer. Left message letting patient know that this week's delivery is scheduled for tomorrow, 10/8/24, and asked for call back with questions or concerns.     Review of entries in Epic. Pt requesting referrals to [facility] and [facility] as she is current frustrated with her care at . Will continue with therapy for this week as ordered and follow for possible future change in provider in the future.     Processed fill for 7 x deferoxamine doses for mix 10/7 and delivery 10/8 to cover doses 10/29 thru 10/15/24.     Follow up 10/14/24 with next fill ovn. Check progress.

## 2024-10-08 ENCOUNTER — TELEPHONE (OUTPATIENT)
Dept: HEMATOLOGY/ONCOLOGY | Facility: HOSPITAL | Age: 44
End: 2024-10-08

## 2024-10-08 ENCOUNTER — TELEPHONE (OUTPATIENT)
Dept: HEMATOLOGY/ONCOLOGY | Facility: HOSPITAL | Age: 44
End: 2024-10-08
Payer: COMMERCIAL

## 2024-10-09 ENCOUNTER — SOCIAL WORK (OUTPATIENT)
Dept: HEMATOLOGY/ONCOLOGY | Facility: HOSPITAL | Age: 44
End: 2024-10-09
Payer: COMMERCIAL

## 2024-10-09 NOTE — PROGRESS NOTES
Documentation    Documentation Type: Life Insurance Physician Form  Date Received:10/8/24   Individual Requesting Documentation: Patient  Completed By: Gio Saleem MD   Documentation submitted to : [employer] via Email (Samira Gonzales <[email or web address]>) on 10/09/24  Additional Information: n/a      Documentation scanned into EMR. Pt updated. SW will continue to follow as needed.

## 2024-10-14 ENCOUNTER — HOME INFUSION (OUTPATIENT)
Dept: INFUSION THERAPY | Age: 44
End: 2024-10-14
Payer: COMMERCIAL

## 2024-10-14 ENCOUNTER — DOCUMENTATION (OUTPATIENT)
Dept: PHARMACY | Facility: CLINIC | Age: 44
End: 2024-10-14

## 2024-10-14 ENCOUNTER — TELEPHONE (OUTPATIENT)
Dept: ADMISSION | Facility: HOSPITAL | Age: 44
End: 2024-10-14
Payer: COMMERCIAL

## 2024-10-14 DIAGNOSIS — D57.00 SICKLE CELL DISEASE WITH CRISIS (MULTI): ICD-10-CM

## 2024-10-14 RX ORDER — HYDROMORPHONE HYDROCHLORIDE 4 MG/1
4 TABLET ORAL 3 TIMES DAILY PRN
Qty: 40 TABLET | Refills: 0 | Status: SHIPPED | OUTPATIENT
Start: 2024-10-14

## 2024-10-14 NOTE — PROGRESS NOTES
SPOKE W/ PT - DELIVERY IS SCHEDULED FOR MONDAY BY 9 PM.  ASKED PT IF THERE ARE ANY QUESTIONS TO A PHARMACIST. PT SAID: NO QUESTIONS.

## 2024-10-14 NOTE — TELEPHONE ENCOUNTER
Mary Alice Aragon called the refill line for Dilaudid. Requesting refills be sent to [facility] pharmacy; message sent to Sickle Cell team to submit.

## 2024-10-14 NOTE — TELEPHONE ENCOUNTER
Pt called to scheduled follow up visit. Appointment scheduled for 10/21 @ 1030. Pt acknowledged understanding.

## 2024-10-14 NOTE — PROGRESS NOTES
Review of chart. Patient with order for deferoxamine subcutaneous infusions over 8 hours daily for transfusional iron overload. Current orders are good thru 7/25/25. No labs are ordered.       Tel call to patient. Delivery last week was left out and wasted due to patient being away for a death in the family. Needs dose for tonight and tomorrow. Pharmacy will replace at no charge to patient due to communication issue. Patient also agrees to this week's delivery to arrive tonight.  to call before arrival.  Future deliveries will only be sent after patient is contacted by Pharmacy (Per patient request)     Review of entries in Epic. Follow up appt scheduled 10/21.     Processed fill for 2 x deferoxamine doses (Replacements at No Charge) for mix and delivery 10/14 to cover doses 10/14 thru 10/15/24.     Processed fill for 7 x deferoxamine doses for mix and delivery 10/14 to cover doses 10/16 thru 10/22/24.    Follow up 10/21/24 with next fill ovn. Check progress and only send if patient is contacted.

## 2024-10-21 ENCOUNTER — LAB (OUTPATIENT)
Dept: LAB | Facility: HOSPITAL | Age: 44
End: 2024-10-21
Payer: MEDICARE

## 2024-10-21 ENCOUNTER — HOME INFUSION (OUTPATIENT)
Dept: INFUSION THERAPY | Age: 44
End: 2024-10-21
Payer: COMMERCIAL

## 2024-10-21 ENCOUNTER — TELEPHONE (OUTPATIENT)
Dept: ADMISSION | Facility: HOSPITAL | Age: 44
End: 2024-10-21
Payer: COMMERCIAL

## 2024-10-21 ENCOUNTER — OFFICE VISIT (OUTPATIENT)
Dept: HEMATOLOGY/ONCOLOGY | Facility: HOSPITAL | Age: 44
End: 2024-10-21
Payer: MEDICARE

## 2024-10-21 VITALS
RESPIRATION RATE: 16 BRPM | TEMPERATURE: 98.8 F | BODY MASS INDEX: 25.31 KG/M2 | SYSTOLIC BLOOD PRESSURE: 117 MMHG | WEIGHT: 142.86 LBS | DIASTOLIC BLOOD PRESSURE: 56 MMHG | OXYGEN SATURATION: 97 % | HEART RATE: 91 BPM

## 2024-10-21 DIAGNOSIS — Z79.64 ENCOUNTER FOR MONITORING OF HYDROXYUREA THERAPY: ICD-10-CM

## 2024-10-21 DIAGNOSIS — I67.1 CEREBRAL ANEURYSM (HHS-HCC): Primary | ICD-10-CM

## 2024-10-21 DIAGNOSIS — G89.4 CHRONIC PAIN SYNDROME: ICD-10-CM

## 2024-10-21 DIAGNOSIS — Z51.81 ENCOUNTER FOR MONITORING OF HYDROXYUREA THERAPY: ICD-10-CM

## 2024-10-21 DIAGNOSIS — D57.00 SICKLE CELL DISEASE WITH CRISIS (MULTI): ICD-10-CM

## 2024-10-21 DIAGNOSIS — E83.111 IRON OVERLOAD, TRANSFUSIONAL: ICD-10-CM

## 2024-10-21 DIAGNOSIS — E83.19 IRON OVERLOAD: ICD-10-CM

## 2024-10-21 LAB
AMPHETAMINES UR QL SCN: ABNORMAL
BARBITURATES UR QL SCN: ABNORMAL
BENZODIAZ UR QL SCN: ABNORMAL
BZE UR QL SCN: ABNORMAL
CANNABINOIDS UR QL SCN: ABNORMAL
CREAT UR-MCNC: 81.6 MG/DL (ref 20–320)
FENTANYL+NORFENTANYL UR QL SCN: ABNORMAL
METHADONE UR QL SCN: ABNORMAL
MICROALBUMIN UR-MCNC: 16.8 MG/L
MICROALBUMIN/CREAT UR: 20.6 UG/MG CREAT
OPIATES UR QL SCN: ABNORMAL
OXYCODONE+OXYMORPHONE UR QL SCN: ABNORMAL
PCP UR QL SCN: ABNORMAL

## 2024-10-21 PROCEDURE — 99215 OFFICE O/P EST HI 40 MIN: CPT | Performed by: PEDIATRICS

## 2024-10-21 PROCEDURE — 80361 OPIATES 1 OR MORE: CPT | Mod: MUE

## 2024-10-21 PROCEDURE — G2211 COMPLEX E/M VISIT ADD ON: HCPCS | Performed by: PEDIATRICS

## 2024-10-21 PROCEDURE — 80358 DRUG SCREENING METHADONE: CPT | Mod: MUE

## 2024-10-21 PROCEDURE — 80349 CANNABINOIDS NATURAL: CPT

## 2024-10-21 PROCEDURE — 80307 DRUG TEST PRSMV CHEM ANLYZR: CPT

## 2024-10-21 PROCEDURE — 82043 UR ALBUMIN QUANTITATIVE: CPT

## 2024-10-21 RX ORDER — NALOXONE HYDROCHLORIDE 4 MG/.1ML
1 SPRAY NASAL AS NEEDED
Qty: 2 EACH | Refills: 0 | Status: SHIPPED | OUTPATIENT
Start: 2024-10-21

## 2024-10-21 RX ORDER — SENNOSIDES 8.6 MG
1 TABLET ORAL 2 TIMES DAILY PRN
Qty: 30 TABLET | Refills: 2 | Status: SHIPPED | OUTPATIENT
Start: 2024-10-21

## 2024-10-21 RX ORDER — CHOLECALCIFEROL (VITAMIN D3) 50 MCG
2000 TABLET ORAL
Qty: 90 TABLET | Refills: 3 | Status: SHIPPED | OUTPATIENT
Start: 2024-10-21 | End: 2025-01-19

## 2024-10-21 ASSESSMENT — PAIN SCALES - GENERAL: PAINLEVEL_OUTOF10: 2

## 2024-10-21 ASSESSMENT — COLUMBIA-SUICIDE SEVERITY RATING SCALE - C-SSRS
2. HAVE YOU ACTUALLY HAD ANY THOUGHTS OF KILLING YOURSELF?: NO
6. HAVE YOU EVER DONE ANYTHING, STARTED TO DO ANYTHING, OR PREPARED TO DO ANYTHING TO END YOUR LIFE?: NO
1. IN THE PAST MONTH, HAVE YOU WISHED YOU WERE DEAD OR WISHED YOU COULD GO TO SLEEP AND NOT WAKE UP?: NO

## 2024-10-21 ASSESSMENT — PATIENT HEALTH QUESTIONNAIRE - PHQ9
1. LITTLE INTEREST OR PLEASURE IN DOING THINGS: NOT AT ALL
SUM OF ALL RESPONSES TO PHQ9 QUESTIONS 1 AND 2: 2
2. FEELING DOWN, DEPRESSED OR HOPELESS: MORE THAN HALF THE DAYS
10. IF YOU CHECKED OFF ANY PROBLEMS, HOW DIFFICULT HAVE THESE PROBLEMS MADE IT FOR YOU TO DO YOUR WORK, TAKE CARE OF THINGS AT HOME, OR GET ALONG WITH OTHER PEOPLE: NOT DIFFICULT AT ALL

## 2024-10-21 NOTE — PROGRESS NOTES
NPV - Former patient of Karyn MARTINEZ-CNP last seen 6/8/2020. She has a history of headaches, sickle cell disease, remote right hemispheric infarct with suboccipital craniotomy, and 3 mm LICA cavernous segment aneurysm observed with serial MRAs with the last MRA done 4/16/2019 with plan to re-image this year (5 year). She was lost to follow up and presents to re-establish care for observation of known Aneurysm.

## 2024-10-21 NOTE — PROGRESS NOTES
Review of chart. Patient with order for deferoxamine subcutaneous infusions over 8 hours daily for transfusional iron overload. Current orders are good thru 7/25/25. No labs are ordered.       Tel call to patient. She is agreeable to delivery of another 7 days tomorrow. Same supplies as las week.  to call on the way. Patient was currently at hospital for MD appt.    *REMINDER: DONOT send a delivery if not able to confirm with patient (Per patient request)*     Review of entries in Epic. Follow up appt scheduled 10/21.    Processed fill for 7 x deferoxamine doses for mix 10/21 and delivery 10/22 to cover doses 10/23 thru 10/29/24.     Follow up 10/28/24 with next fill ovn. Check progress and only send if patient is contacted.

## 2024-10-21 NOTE — TELEPHONE ENCOUNTER
Patient called and states she just left [facility] with Dr. Saleem.  She is calling to notify Dr. Saleem that she was previously taking Verapamil - patient was taking this for headaches, previously prescribed by Dr. Ray (neurology) he is now retired.      Patient has neurosurgery appointment on 10/28/24.

## 2024-10-22 ENCOUNTER — APPOINTMENT (OUTPATIENT)
Dept: AUDIOLOGY | Facility: CLINIC | Age: 44
End: 2024-10-22
Payer: COMMERCIAL

## 2024-10-22 ENCOUNTER — DOCUMENTATION (OUTPATIENT)
Dept: PHARMACY | Facility: CLINIC | Age: 44
End: 2024-10-22

## 2024-10-22 ENCOUNTER — APPOINTMENT (OUTPATIENT)
Dept: OTOLARYNGOLOGY | Facility: CLINIC | Age: 44
End: 2024-10-22
Payer: COMMERCIAL

## 2024-10-24 ENCOUNTER — APPOINTMENT (OUTPATIENT)
Dept: HEMATOLOGY/ONCOLOGY | Facility: HOSPITAL | Age: 44
End: 2024-10-24
Payer: MEDICARE

## 2024-10-24 LAB
6MAM UR CFM-MCNC: <25 NG/ML
CODEINE UR CFM-MCNC: <50 NG/ML
EDDP UR CFM-MCNC: >1000 NG/ML
HYDROCODONE CTO UR CFM-MCNC: <25 NG/ML
HYDROMORPHONE UR CFM-MCNC: >2500 NG/ML
METHADONE UR CFM-MCNC: 290 NG/ML
MORPHINE UR CFM-MCNC: <50 NG/ML
NORHYDROCODONE UR CFM-MCNC: <25 NG/ML
NOROXYCODONE UR CFM-MCNC: <25 NG/ML
OXYCODONE UR CFM-MCNC: <25 NG/ML
OXYMORPHONE UR CFM-MCNC: <25 NG/ML

## 2024-10-26 LAB — CARBOXYTHC UR-MCNC: 25 NG/ML

## 2024-10-28 ENCOUNTER — APPOINTMENT (OUTPATIENT)
Dept: NEUROSURGERY | Facility: CLINIC | Age: 44
End: 2024-10-28
Payer: MEDICARE

## 2024-10-28 ENCOUNTER — HOME INFUSION (OUTPATIENT)
Dept: INFUSION THERAPY | Age: 44
End: 2024-10-28
Payer: COMMERCIAL

## 2024-10-29 ENCOUNTER — DOCUMENTATION (OUTPATIENT)
Dept: PHARMACY | Facility: CLINIC | Age: 44
End: 2024-10-29

## 2024-10-29 DIAGNOSIS — D57.00 SICKLE CELL DISEASE WITH CRISIS (MULTI): ICD-10-CM

## 2024-10-29 RX ORDER — PROCHLORPERAZINE MALEATE 10 MG
TABLET ORAL
Qty: 30 TABLET | Refills: 1 | Status: SHIPPED | OUTPATIENT
Start: 2024-10-29

## 2024-10-30 ENCOUNTER — APPOINTMENT (OUTPATIENT)
Dept: HEMATOLOGY/ONCOLOGY | Facility: HOSPITAL | Age: 44
End: 2024-10-30
Payer: COMMERCIAL

## 2024-10-30 ENCOUNTER — APPOINTMENT (OUTPATIENT)
Dept: RADIOLOGY | Facility: HOSPITAL | Age: 44
End: 2024-10-30
Payer: MEDICARE

## 2024-10-31 ENCOUNTER — LAB (OUTPATIENT)
Dept: HEMATOLOGY/ONCOLOGY | Facility: HOSPITAL | Age: 44
End: 2024-10-31
Payer: MEDICARE

## 2024-10-31 ENCOUNTER — TELEPHONE (OUTPATIENT)
Dept: ADMISSION | Facility: HOSPITAL | Age: 44
End: 2024-10-31
Payer: COMMERCIAL

## 2024-10-31 ENCOUNTER — TELEMEDICINE (OUTPATIENT)
Dept: BEHAVIORAL HEALTH | Facility: HOSPITAL | Age: 44
End: 2024-10-31
Payer: MEDICARE

## 2024-10-31 ENCOUNTER — APPOINTMENT (OUTPATIENT)
Dept: NEUROSURGERY | Facility: HOSPITAL | Age: 44
End: 2024-10-31
Payer: MEDICARE

## 2024-10-31 DIAGNOSIS — D57.1 SICKLE CELL DISEASE WITHOUT CRISIS (MULTI): ICD-10-CM

## 2024-10-31 DIAGNOSIS — D57.00 SICKLE CELL DISEASE WITH CRISIS (MULTI): ICD-10-CM

## 2024-10-31 DIAGNOSIS — D57.00 SICKLE CELL DISEASE WITH CRISIS (MULTI): Primary | ICD-10-CM

## 2024-10-31 DIAGNOSIS — F33.41 RECURRENT MAJOR DEPRESSIVE DISORDER, IN PARTIAL REMISSION (CMS-HCC): ICD-10-CM

## 2024-10-31 DIAGNOSIS — F41.9 ANXIETY: ICD-10-CM

## 2024-10-31 LAB
ALBUMIN SERPL BCP-MCNC: 4.6 G/DL (ref 3.4–5)
ALP SERPL-CCNC: 62 U/L (ref 33–110)
ALT SERPL W P-5'-P-CCNC: 9 U/L (ref 7–45)
ANION GAP SERPL CALC-SCNC: 12 MMOL/L (ref 10–20)
AST SERPL W P-5'-P-CCNC: 19 U/L (ref 9–39)
BASOPHILS # BLD AUTO: 0.06 X10*3/UL (ref 0–0.1)
BASOPHILS NFR BLD AUTO: 0.6 %
BILIRUB SERPL-MCNC: 2 MG/DL (ref 0–1.2)
BUN SERPL-MCNC: 9 MG/DL (ref 6–23)
CALCIUM SERPL-MCNC: 9.3 MG/DL (ref 8.6–10.3)
CHLORIDE SERPL-SCNC: 107 MMOL/L (ref 98–107)
CO2 SERPL-SCNC: 24 MMOL/L (ref 21–32)
CREAT SERPL-MCNC: 0.99 MG/DL (ref 0.5–1.05)
EGFRCR SERPLBLD CKD-EPI 2021: 73 ML/MIN/1.73M*2
EOSINOPHIL # BLD AUTO: 0.07 X10*3/UL (ref 0–0.7)
EOSINOPHIL NFR BLD AUTO: 0.7 %
ERYTHROCYTE [DISTWIDTH] IN BLOOD BY AUTOMATED COUNT: 16.5 % (ref 11.5–14.5)
GLUCOSE SERPL-MCNC: 112 MG/DL (ref 74–99)
HCT VFR BLD AUTO: 21 % (ref 36–46)
HGB BLD-MCNC: 7.3 G/DL (ref 12–16)
IMM GRANULOCYTES # BLD AUTO: 0.03 X10*3/UL (ref 0–0.7)
IMM GRANULOCYTES NFR BLD AUTO: 0.3 % (ref 0–0.9)
LYMPHOCYTES # BLD AUTO: 3.09 X10*3/UL (ref 1.2–4.8)
LYMPHOCYTES NFR BLD AUTO: 32.4 %
MCH RBC QN AUTO: 32.9 PG (ref 26–34)
MCHC RBC AUTO-ENTMCNC: 34.8 G/DL (ref 32–36)
MCV RBC AUTO: 95 FL (ref 80–100)
MONOCYTES # BLD AUTO: 0.38 X10*3/UL (ref 0.1–1)
MONOCYTES NFR BLD AUTO: 4 %
NEUTROPHILS # BLD AUTO: 5.9 X10*3/UL (ref 1.2–7.7)
NEUTROPHILS NFR BLD AUTO: 62 %
NRBC BLD-RTO: 0.4 /100 WBCS (ref 0–0)
PLATELET # BLD AUTO: 453 X10*3/UL (ref 150–450)
POTASSIUM SERPL-SCNC: 3.7 MMOL/L (ref 3.5–5.3)
PROT SERPL-MCNC: 8.2 G/DL (ref 6.4–8.2)
RBC # BLD AUTO: 2.22 X10*6/UL (ref 4–5.2)
SODIUM SERPL-SCNC: 139 MMOL/L (ref 136–145)
WBC # BLD AUTO: 9.5 X10*3/UL (ref 4.4–11.3)

## 2024-10-31 PROCEDURE — 80053 COMPREHEN METABOLIC PANEL: CPT

## 2024-10-31 PROCEDURE — 36591 DRAW BLOOD OFF VENOUS DEVICE: CPT

## 2024-10-31 PROCEDURE — 2500000004 HC RX 250 GENERAL PHARMACY W/ HCPCS (ALT 636 FOR OP/ED): Performed by: NURSE PRACTITIONER

## 2024-10-31 PROCEDURE — 85025 COMPLETE CBC W/AUTO DIFF WBC: CPT

## 2024-10-31 RX ORDER — HYDROMORPHONE HYDROCHLORIDE 4 MG/1
4 TABLET ORAL 3 TIMES DAILY PRN
Qty: 40 TABLET | Refills: 0 | Status: SHIPPED | OUTPATIENT
Start: 2024-10-31

## 2024-10-31 RX ORDER — HEPARIN 100 UNIT/ML
500 SYRINGE INTRAVENOUS AS NEEDED
OUTPATIENT
Start: 2024-10-31

## 2024-10-31 RX ORDER — METHADONE HYDROCHLORIDE 10 MG/1
10 TABLET ORAL EVERY 12 HOURS
Qty: 60 TABLET | Refills: 0 | Status: SHIPPED | OUTPATIENT
Start: 2024-10-31

## 2024-10-31 RX ORDER — HEPARIN 100 UNIT/ML
500 SYRINGE INTRAVENOUS AS NEEDED
Status: DISCONTINUED | OUTPATIENT
Start: 2024-10-31 | End: 2024-10-31 | Stop reason: HOSPADM

## 2024-10-31 RX ORDER — HEPARIN SODIUM,PORCINE/PF 10 UNIT/ML
50 SYRINGE (ML) INTRAVENOUS AS NEEDED
OUTPATIENT
Start: 2024-10-31

## 2024-11-02 ASSESSMENT — ENCOUNTER SYMPTOMS
ABDOMINAL PAIN: 0
BRUISES/BLEEDS EASILY: 0
CHEST TIGHTNESS: 0
CONSTIPATION: 0
SORE THROAT: 0
FEVER: 0
NAUSEA: 0
ARTHRALGIAS: 1
EYE PROBLEMS: 0
LIGHT-HEADEDNESS: 0
DEPRESSION: 0
WOUND: 0
HEMOPTYSIS: 0
VOMITING: 0
SHORTNESS OF BREATH: 0
MYALGIAS: 0
COUGH: 0
NERVOUS/ANXIOUS: 0
WHEEZING: 0
HEADACHES: 0
LEG SWELLING: 0
FATIGUE: 0
DIZZINESS: 0
SCLERAL ICTERUS: 0
PALPITATIONS: 0
DIARRHEA: 0
BACK PAIN: 0

## 2024-11-04 ENCOUNTER — HOME INFUSION (OUTPATIENT)
Dept: INFUSION THERAPY | Age: 44
End: 2024-11-04
Payer: COMMERCIAL

## 2024-11-04 ENCOUNTER — DOCUMENTATION (OUTPATIENT)
Dept: PHARMACY | Facility: CLINIC | Age: 44
End: 2024-11-04

## 2024-11-04 NOTE — PROGRESS NOTES
Review of chart. Patient with order for deferoxamine subcutaneous infusions over 8 hours daily for transfusional iron overload. Current orders are good thru 7/25/25. No labs are ordered.       Tel call to patient. She is agreeable to delivery of another 7 days tomorrow. Same supplies as last week.  to call on the way. Can be anytime during Tuesday.      *REMINDER: DON'T send a delivery if not able to confirm with patient (Per patient request)*     Review of entries in Epic. No changes in therapy.     Processed refill for 7x deferoxamine doses for mix 11/4 and delivery 11/5 to cover doses 11/6 thru 11/12/24.     Follow up 11/11/24 with next fill OVN. Check progress and only send if patient is contacted.

## 2024-11-04 NOTE — PROGRESS NOTES
SPOKE W/ PT - DELIVERY IS SCHEDULED FOR TUESDAY BY 9 PM.  ASKED PT IF THERE ARE ANY QUESTIONS TO A PHARMACIST. PT SAID: NO QUESTIONS.

## 2024-11-05 ENCOUNTER — HOSPITAL ENCOUNTER (OUTPATIENT)
Dept: RADIOLOGY | Facility: HOSPITAL | Age: 44
Discharge: HOME | End: 2024-11-05
Payer: MEDICARE

## 2024-11-05 ENCOUNTER — TELEPHONE (OUTPATIENT)
Dept: HEMATOLOGY/ONCOLOGY | Facility: HOSPITAL | Age: 44
End: 2024-11-05

## 2024-11-05 ENCOUNTER — OFFICE VISIT (OUTPATIENT)
Dept: HEMATOLOGY/ONCOLOGY | Facility: HOSPITAL | Age: 44
End: 2024-11-05
Payer: MEDICARE

## 2024-11-05 ENCOUNTER — APPOINTMENT (OUTPATIENT)
Dept: NEUROSURGERY | Facility: HOSPITAL | Age: 44
End: 2024-11-05
Payer: COMMERCIAL

## 2024-11-05 VITALS
TEMPERATURE: 96.8 F | OXYGEN SATURATION: 99 % | RESPIRATION RATE: 18 BRPM | WEIGHT: 146 LBS | HEART RATE: 72 BPM | BODY MASS INDEX: 25.86 KG/M2 | SYSTOLIC BLOOD PRESSURE: 117 MMHG | DIASTOLIC BLOOD PRESSURE: 60 MMHG

## 2024-11-05 DIAGNOSIS — R52 ACUTE PAIN: Primary | ICD-10-CM

## 2024-11-05 DIAGNOSIS — D57.1 SICKLE CELL DISEASE WITHOUT CRISIS (MULTI): ICD-10-CM

## 2024-11-05 DIAGNOSIS — D57.00 SICKLE CELL CRISIS (MULTI): ICD-10-CM

## 2024-11-05 DIAGNOSIS — R07.9 CHEST PAIN, UNSPECIFIED TYPE: ICD-10-CM

## 2024-11-05 DIAGNOSIS — D57.09 SICKLE CELL DISEASE WITH CRISIS AND OTHER COMPLICATION: ICD-10-CM

## 2024-11-05 LAB
ALBUMIN SERPL BCP-MCNC: 4.1 G/DL (ref 3.4–5)
ALP SERPL-CCNC: 59 U/L (ref 33–110)
ALT SERPL W P-5'-P-CCNC: 8 U/L (ref 7–45)
ANION GAP SERPL CALC-SCNC: 8 MMOL/L (ref 10–20)
AST SERPL W P-5'-P-CCNC: 18 U/L (ref 9–39)
BASOPHILS # BLD AUTO: 0.06 X10*3/UL (ref 0–0.1)
BASOPHILS NFR BLD AUTO: 0.6 %
BILIRUB SERPL-MCNC: 1.9 MG/DL (ref 0–1.2)
BUN SERPL-MCNC: 8 MG/DL (ref 6–23)
CALCIUM SERPL-MCNC: 8.6 MG/DL (ref 8.6–10.3)
CHLORIDE SERPL-SCNC: 111 MMOL/L (ref 98–107)
CO2 SERPL-SCNC: 25 MMOL/L (ref 21–32)
CREAT SERPL-MCNC: 0.82 MG/DL (ref 0.5–1.05)
CRP SERPL HS-MCNC: 1.9 MG/L
EGFRCR SERPLBLD CKD-EPI 2021: >90 ML/MIN/1.73M*2
EOSINOPHIL # BLD AUTO: 0.08 X10*3/UL (ref 0–0.7)
EOSINOPHIL NFR BLD AUTO: 0.8 %
ERYTHROCYTE [DISTWIDTH] IN BLOOD BY AUTOMATED COUNT: 16.7 % (ref 11.5–14.5)
GLUCOSE SERPL-MCNC: 98 MG/DL (ref 74–99)
HCT VFR BLD AUTO: 19.2 % (ref 36–46)
HGB BLD-MCNC: 6.6 G/DL (ref 12–16)
HGB RETIC QN: 35 PG (ref 28–38)
IMM GRANULOCYTES # BLD AUTO: 0.05 X10*3/UL (ref 0–0.7)
IMM GRANULOCYTES NFR BLD AUTO: 0.5 % (ref 0–0.9)
IMMATURE RETIC FRACTION: 35.7 %
LDH SERPL L TO P-CCNC: 286 U/L (ref 84–246)
LYMPHOCYTES # BLD AUTO: 3.23 X10*3/UL (ref 1.2–4.8)
LYMPHOCYTES NFR BLD AUTO: 32.1 %
MCH RBC QN AUTO: 32.4 PG (ref 26–34)
MCHC RBC AUTO-ENTMCNC: 34.4 G/DL (ref 32–36)
MCV RBC AUTO: 94 FL (ref 80–100)
MONOCYTES # BLD AUTO: 0.47 X10*3/UL (ref 0.1–1)
MONOCYTES NFR BLD AUTO: 4.7 %
NEUTROPHILS # BLD AUTO: 6.18 X10*3/UL (ref 1.2–7.7)
NEUTROPHILS NFR BLD AUTO: 61.3 %
NRBC BLD-RTO: 0.8 /100 WBCS (ref 0–0)
PLATELET # BLD AUTO: 366 X10*3/UL (ref 150–450)
POTASSIUM SERPL-SCNC: 3.3 MMOL/L (ref 3.5–5.3)
PROT SERPL-MCNC: 7 G/DL (ref 6.4–8.2)
RBC # BLD AUTO: 2.04 X10*6/UL (ref 4–5.2)
RETICS #: 0.2 X10*6/UL (ref 0.02–0.08)
RETICS/RBC NFR AUTO: 10 % (ref 0.5–2)
SODIUM SERPL-SCNC: 141 MMOL/L (ref 136–145)
WBC # BLD AUTO: 10.1 X10*3/UL (ref 4.4–11.3)

## 2024-11-05 PROCEDURE — 73610 X-RAY EXAM OF ANKLE: CPT | Mod: RIGHT SIDE | Performed by: RADIOLOGY

## 2024-11-05 PROCEDURE — 2500000004 HC RX 250 GENERAL PHARMACY W/ HCPCS (ALT 636 FOR OP/ED)

## 2024-11-05 PROCEDURE — 99215 OFFICE O/P EST HI 40 MIN: CPT

## 2024-11-05 PROCEDURE — 85025 COMPLETE CBC W/AUTO DIFF WBC: CPT

## 2024-11-05 PROCEDURE — 2500000004 HC RX 250 GENERAL PHARMACY W/ HCPCS (ALT 636 FOR OP/ED): Performed by: NURSE PRACTITIONER

## 2024-11-05 PROCEDURE — 2500000005 HC RX 250 GENERAL PHARMACY W/O HCPCS

## 2024-11-05 PROCEDURE — 73610 X-RAY EXAM OF ANKLE: CPT | Mod: RT

## 2024-11-05 PROCEDURE — 84075 ASSAY ALKALINE PHOSPHATASE: CPT

## 2024-11-05 PROCEDURE — 83690 ASSAY OF LIPASE: CPT | Performed by: EMERGENCY MEDICINE

## 2024-11-05 PROCEDURE — 96375 TX/PRO/DX INJ NEW DRUG ADDON: CPT | Mod: INF

## 2024-11-05 PROCEDURE — 2500000002 HC RX 250 W HCPCS SELF ADMINISTERED DRUGS (ALT 637 FOR MEDICARE OP, ALT 636 FOR OP/ED): Mod: MUE

## 2024-11-05 PROCEDURE — 86141 C-REACTIVE PROTEIN HS: CPT

## 2024-11-05 PROCEDURE — 2500000001 HC RX 250 WO HCPCS SELF ADMINISTERED DRUGS (ALT 637 FOR MEDICARE OP)

## 2024-11-05 PROCEDURE — 85045 AUTOMATED RETICULOCYTE COUNT: CPT

## 2024-11-05 PROCEDURE — 96374 THER/PROPH/DIAG INJ IV PUSH: CPT | Mod: INF

## 2024-11-05 PROCEDURE — 83615 LACTATE (LD) (LDH) ENZYME: CPT

## 2024-11-05 PROCEDURE — 96376 TX/PRO/DX INJ SAME DRUG ADON: CPT

## 2024-11-05 PROCEDURE — 99215 OFFICE O/P EST HI 40 MIN: CPT | Mod: 25

## 2024-11-05 RX ORDER — ONDANSETRON 4 MG/1
4 TABLET, FILM COATED ORAL ONCE
Status: DISCONTINUED | OUTPATIENT
Start: 2024-11-05 | End: 2024-11-05

## 2024-11-05 RX ORDER — POTASSIUM CHLORIDE 750 MG/1
40 TABLET, FILM COATED, EXTENDED RELEASE ORAL ONCE
Status: COMPLETED | OUTPATIENT
Start: 2024-11-05 | End: 2024-11-05

## 2024-11-05 RX ORDER — HEPARIN 100 UNIT/ML
500 SYRINGE INTRAVENOUS AS NEEDED
OUTPATIENT
Start: 2024-11-05

## 2024-11-05 RX ORDER — DIPHENHYDRAMINE HCL 25 MG
25 CAPSULE ORAL ONCE
Status: COMPLETED | OUTPATIENT
Start: 2024-11-05 | End: 2024-11-05

## 2024-11-05 RX ORDER — DIPHENHYDRAMINE HCL 25 MG
25 CAPSULE ORAL ONCE
Status: DISCONTINUED | OUTPATIENT
Start: 2024-11-05 | End: 2024-11-05

## 2024-11-05 RX ORDER — CYCLOBENZAPRINE HCL 10 MG
5 TABLET ORAL ONCE
Status: DISCONTINUED | OUTPATIENT
Start: 2024-11-05 | End: 2024-11-05

## 2024-11-05 RX ORDER — HYDROMORPHONE HYDROCHLORIDE 1 MG/ML
0.5 INJECTION, SOLUTION INTRAMUSCULAR; INTRAVENOUS; SUBCUTANEOUS EVERY 30 MIN PRN
Status: DISCONTINUED | OUTPATIENT
Start: 2024-11-05 | End: 2024-11-05

## 2024-11-05 RX ORDER — ONDANSETRON 4 MG/1
4 TABLET, FILM COATED ORAL ONCE
Status: COMPLETED | OUTPATIENT
Start: 2024-11-05 | End: 2024-11-05

## 2024-11-05 RX ORDER — NALOXONE HYDROCHLORIDE 0.4 MG/ML
0.4 INJECTION, SOLUTION INTRAMUSCULAR; INTRAVENOUS; SUBCUTANEOUS
Status: DISCONTINUED | OUTPATIENT
Start: 2024-11-05 | End: 2024-11-05 | Stop reason: HOSPADM

## 2024-11-05 RX ORDER — HYDROMORPHONE HYDROCHLORIDE 1 MG/ML
0.5 INJECTION, SOLUTION INTRAMUSCULAR; INTRAVENOUS; SUBCUTANEOUS
Status: DISCONTINUED | OUTPATIENT
Start: 2024-11-05 | End: 2024-11-05

## 2024-11-05 RX ORDER — HEPARIN 100 UNIT/ML
500 SYRINGE INTRAVENOUS AS NEEDED
Status: DISCONTINUED | OUTPATIENT
Start: 2024-11-05 | End: 2024-11-05 | Stop reason: HOSPADM

## 2024-11-05 RX ORDER — DIPHENHYDRAMINE HCL 25 MG
25 CAPSULE ORAL ONCE AS NEEDED
Status: DISCONTINUED | OUTPATIENT
Start: 2024-11-05 | End: 2024-11-05 | Stop reason: HOSPADM

## 2024-11-05 RX ORDER — KETOROLAC TROMETHAMINE 30 MG/ML
30 INJECTION, SOLUTION INTRAMUSCULAR; INTRAVENOUS ONCE
Status: DISCONTINUED | OUTPATIENT
Start: 2024-11-05 | End: 2024-11-05

## 2024-11-05 RX ORDER — KETOROLAC TROMETHAMINE 30 MG/ML
30 INJECTION, SOLUTION INTRAMUSCULAR; INTRAVENOUS ONCE
Status: COMPLETED | OUTPATIENT
Start: 2024-11-05 | End: 2024-11-05

## 2024-11-05 RX ORDER — HEPARIN SODIUM,PORCINE/PF 10 UNIT/ML
50 SYRINGE (ML) INTRAVENOUS AS NEEDED
OUTPATIENT
Start: 2024-11-05

## 2024-11-05 RX ORDER — HYDROMORPHONE HYDROCHLORIDE 1 MG/ML
0.5 INJECTION, SOLUTION INTRAMUSCULAR; INTRAVENOUS; SUBCUTANEOUS EVERY 30 MIN PRN
Status: DISCONTINUED | OUTPATIENT
Start: 2024-11-05 | End: 2024-11-05 | Stop reason: HOSPADM

## 2024-11-05 ASSESSMENT — PAIN SCALES - GENERAL
PAINLEVEL_OUTOF10: 5 - MODERATE PAIN
PAINLEVEL_OUTOF10: 6
PAINLEVEL_OUTOF10: 5
PAINLEVEL_OUTOF10: 6
PAINLEVEL_OUTOF10: 5 - MODERATE PAIN
PAINLEVEL_OUTOF10: 6
PAINLEVEL_OUTOF10: 0-NO PAIN
PAINLEVEL_OUTOF10: 6
PAINLEVEL_OUTOF10: 6

## 2024-11-05 ASSESSMENT — ENCOUNTER SYMPTOMS
BACK PAIN: 0
DIZZINESS: 0
CONSTIPATION: 0
ARTHRALGIAS: 1
SHORTNESS OF BREATH: 0
DYSURIA: 0
FEVER: 0
COUGH: 0
CHILLS: 0
VOMITING: 0
SORE THROAT: 0
LIGHT-HEADEDNESS: 0
HEADACHES: 0
PALPITATIONS: 0
ABDOMINAL PAIN: 0
NAUSEA: 0
DIARRHEA: 0

## 2024-11-05 ASSESSMENT — PAIN DESCRIPTION - ORIENTATION: ORIENTATION: RIGHT

## 2024-11-05 NOTE — PROGRESS NOTES
Patient ID:  Mary Alice Aragon is a 44 y.o. female.  Subjective   Chief Complaint: Uncontrolled Pain    HPI  Mary Alice is a 44 y.o. female with PMHx of Hgb SS (baseline Hgb ~7) c/b transfusion-related iron overload, cerebral aneurysm, NICHOL, MDD, and nocturnal hypoxia who presents to [facility] with R ankle pain, unable to fully bear weight. She states this started on Saturday night, and normally doesn't have issues with pain since she is on ketamine infusions. She has been using charline-bruce and pressure socks on that ankle, but hasn't seen improvement. She was taking meloxicam at home (no dose today) without relief.       ROS  Review of Systems   Constitutional:  Negative for chills and fever.   HENT:   Negative for sore throat.    Respiratory:  Negative for cough and shortness of breath.    Cardiovascular:  Negative for chest pain and palpitations.   Gastrointestinal:  Negative for abdominal pain, constipation, diarrhea, nausea and vomiting.   Genitourinary:  Negative for dysuria.    Musculoskeletal:  Positive for arthralgias. Negative for back pain.   Neurological:  Negative for dizziness, headaches and light-headedness.        Allergies  No Known Allergies     Medications  Current Outpatient Medications   Medication Instructions    ascorbic acid (Vitamin C) 500 mg tablet 2 tablet DAILY (route: oral)    calcium carbonate (Tums) 200 mg calcium chewable tablet 1 tablet, Daily    capsaicin (Zostrix) 0.025 % cream Apply as directed as needed    cholecalciferol (VITAMIN D-3) 2,000 Units, oral, Daily (0630)    deferasirox (Jadenu) 180 mg tablet TAKE ONE TABLET BY MOUTH DAILY    deferoxamine (Desferal) 2 gram injection Infuse 2000mg subcutaneously once daily over 8 hours via [place] Pump    deferoxamine (DESFERAL) 2,000 mg, subcutaneous, Daily    diclofenac sodium (Voltaren) 1 % gel Per instructions 4 TIMES DAILY (route: topical)    diphenhydrAMINE (BENADryl) 25 mg capsule 1 capsule 3 TIMES DAILY (route: oral)    docusate sodium  (Colace) 100 mg capsule 1 capsule DAILY (route: oral)    fluconazole (Diflucan) 150 mg tablet TAKE 1 TABLET BY MOUTH 1 TIME AS DIRECTED    FLUoxetine (PROZAC) 10 mg, oral, Daily    folic acid (Folvite) 1 mg tablet 1 tablet DAILY (route: oral)    HYDROmorphone (DILAUDID) 4 mg, oral, 3 times daily PRN    hydroxyurea (Hydrea) 500 mg capsule Per instructions AS DIRECTED (route: oral)    hydrOXYzine HCL (ATARAX) 25 mg, oral, 2 times daily PRN    ibuprofen 800 mg, oral, Every 8 hours PRN    ketamine (KETALAR) 10 mg, Once PRN Procedure    loratadine (Claritin) 10 mg tablet 1 tablet, Daily    melatonin 3 mg tablet 2 tablets, Nightly PRN    meloxicam (MOBIC) 15 mg, oral, Daily    methadone (DOLOPHINE) 10 mg, oral, Every 12 hours    METHYL SALICYLATE-MENTHOL TOP Topical    MULTIVITAMIN ORAL 1 tablet, oral, Daily    multivitamin tablet 1 tablet DAILY (route: oral)    multivitamin with minerals (multivit-min-iron fum-folic ac) tablet oral    naloxone (NARCAN) 4 mg, nasal, As needed, May repeat every 2-3 minutes if needed, alternating nostrils, until medical assistance becomes available.    norethindrone (AYGESTIN) 5 mg, oral, Daily    omeprazole (PriLOSEC) 40 mg DR capsule 1 capsule DAILY (route: oral)    orphenadrine (Norflex) 100 mg 12 hr tablet TAKE ONE TABLET BY MOUTH DAILY AS NEEDED    oxygen (O2) 3 L/min    peg 400/hypromellose/glycerin (DRY EYE RELIEF OPHT) 1 drop, Daily    pregabalin (LYRICA) 25 mg, oral, 2 times daily    prochlorperazine (Compazine) 10 mg tablet 1 tablet EVERY 8 HOURS (route: oral)    senna 8.6 mg, oral, 2 times daily PRN    tiZANidine (ZANAFLEX) 2-4 mg, oral, Every 8 hours PRN    traZODone (Desyrel) 100 mg tablet TAKE 1 TO 2 TABLETS(100  MG) BY MOUTH AT BEDTIME AS NEEDED FOR SLEEP    trolamine salicylate (Aspercreme) 10 % cream Per instructions 4 TIMES DAILY (route: topical)    valACYclovir (VALTREX) 500 mg, oral, Daily    Vitamin D3 1,000 Units, oral, Daily        Past Medical History:   Past  Medical History:  No date: Sickle cell anemia (Multi)  2020: Unspecified astigmatism, bilateral      Comment:  Astigmatism, bilateral   Surgical History:    Past Surgical History:   Procedure Laterality Date     SECTION, CLASSIC  2016     Section    CHOLECYSTECTOMY  2016    Cholecystectomy    MR HEAD ANGIO W AND WO IV CONTRAST  4/15/2013    MR HEAD ANGIO W AND WO IV CONTRAST 4/15/2013 [facility] CLINICAL LEGACY    MR HEAD ANGIO W AND WO IV CONTRAST  2013    MR HEAD ANGIO W AND WO IV CONTRAST 2013 [facility] INPATIENT LEGACY    MR HEAD ANGIO WO IV CONTRAST  2014    MR HEAD ANGIO WO IV CONTRAST 2014 [facility] ANCILLARY LEGACY    MR HEAD ANGIO WO IV CONTRAST  2016    MR HEAD ANGIO WO IV CONTRAST 2016 [facility] ANCILLARY LEGACY    MR HEAD ANGIO WO IV CONTRAST  2019    MR HEAD ANGIO WO IV CONTRAST 2019 [facility] ANCILLARY LEGACY    MR HEAD ANGIO WO IV CONTRAST  2017    MR HEAD ANGIO WO IV CONTRAST 2017 [facility] INPATIENT LEGACY    MR NECK ANGIO WO IV CONTRAST  2013    MR NECK ANGIO WO IV CONTRAST 2013 [facility] INPATIENT LEGACY    MR NECK ANGIO WO IV CONTRAST  2014    MR NECK ANGIO WO IV CONTRAST 2014 [facility] ANCILLARY LEGACY    OTHER SURGICAL HISTORY  2016    Brain Surgery    TUBAL LIGATION  04/10/2017    Tubal Ligation      Family History:    Family History   Problem Relation Name Age of Onset    Sickle cell trait Sister      Multiple sclerosis Brother      Breast cancer Maternal Grandmother      Breast cancer Other paternal cousin     Sickle cell trait Child       Family Oncology History:    Cancer-related family history includes Breast cancer in her maternal grandmother and another family member.    Social History:    Social History     Tobacco Use    Smoking status: Never    Smokeless tobacco: Never   Vaping Use    Vaping status: Never Used   Substance Use Topics    Alcohol use: Not Currently    Drug use: Not Currently        Objective   Vitals: /60 (BP  Location: Left arm, Patient Position: Sitting)   Pulse 72   Temp 36 °C (96.8 °F) (Temporal)   Resp 18   Wt 66.2 kg (146 lb)   SpO2 99%   BMI 25.86 kg/m²   Weight:   Vitals:    11/05/24 1049   Weight: 66.2 kg (146 lb)       Physical Exam  Constitutional:       General: She is not in acute distress.     Appearance: She is not toxic-appearing.   HENT:      Head: Normocephalic and atraumatic.   Eyes:      General: Scleral icterus present.   Cardiovascular:      Rate and Rhythm: Normal rate and regular rhythm.   Pulmonary:      Effort: Pulmonary effort is normal.   Abdominal:      General: Abdomen is flat.      Palpations: Abdomen is soft.      Tenderness: There is no abdominal tenderness.   Musculoskeletal:         General: Swelling (R ankle) and tenderness (R ankle) present.   Skin:     Findings: No bruising or rash.   Neurological:      General: No focal deficit present.      Mental Status: She is oriented to person, place, and time. Mental status is at baseline.         Diagnostic Results     Labs  Results from last 7 days   Lab Units 10/31/24  1126   WBC AUTO x10*3/uL 9.5   HEMOGLOBIN g/dL 7.3*   HEMATOCRIT % 21.0*   PLATELETS AUTO x10*3/uL 453*   NEUTROS ABS x10*3/uL 5.90   LYMPHS ABS AUTO x10*3/uL 3.09   MONOS ABS AUTO x10*3/uL 0.38   EOS ABS AUTO x10*3/uL 0.07   NEUTROS PCT AUTO % 62.0   LYMPHS PCT AUTO % 32.4   MONOS PCT AUTO % 4.0   EOS PCT AUTO % 0.7      Results from last 7 days   Lab Units 10/31/24  1126   GLUCOSE mg/dL 112*   SODIUM mmol/L 139   POTASSIUM mmol/L 3.7   CHLORIDE mmol/L 107   CO2 mmol/L 24   BUN mg/dL 9   CREATININE mg/dL 0.99   EGFR mL/min/1.73m*2 73   CALCIUM mg/dL 9.3   ALBUMIN g/dL 4.6   PROTEIN TOTAL g/dL 8.2     Results from last 7 days   Lab Units 10/31/24  1126   BILIRUBIN TOTAL mg/dL 2.0*   ALK PHOS U/L 62   ALT U/L 9   AST U/L 19                     Lab Results   Component Value Date    HGB 7.3 (L) 10/31/2024    HGB 7.4 (L) 10/02/2024    HGB 7.1 (L) 08/30/2024     (H)  10/31/2024     (H) 10/02/2024     08/30/2024     (H) 10/02/2024     (H) 08/30/2024     (H) 07/11/2024       Assessment/Plan   Mary Alice Aragon is a 44 y.o. female with PMHx of Hgb SS (baseline Hgb ~7) c/b transfusion-related iron overload, cerebral aneurysm, NICHOL, MDD, and nocturnal hypoxia who presents to [facility] with R ankle pain, unable to fully bear weight.       [facility] Course  - VSS  - Hemolysis labs near baseline, no indication of acute vaso-occlusive crisis or indication for blood transfusion. 40meq potassium given for hypokalemia   - CRP 1.9  - R ankle xray- spoke with radiology and no acute process identified  - Toradol 30mg IVP once, given for AVN related pain/inflammation  - dilaudid 0.5mg IV q30min x 4 doses (via port) given for sickle cell related pain (additional dose given for ongoing pain in right arm and right ankle)  - Zofran 4mg PO once for opioid induced nausea/vomiting  - Benadryl 25mg PO once for opioid induced pruritus     - given normal WBC and no objective redness/warmth, low suspicion for infectious process of R ankle. XR showing remote changes r/t SCD, no acute injury.     Disposition  - Patient discharged home with no further needs following [facility] Course  - Return to clinic/ED instructions given      MICHELLE Aranda-CNP

## 2024-11-06 ENCOUNTER — TELEPHONE (OUTPATIENT)
Dept: ADMISSION | Facility: HOSPITAL | Age: 44
End: 2024-11-06
Payer: COMMERCIAL

## 2024-11-06 DIAGNOSIS — D57.00 SICKLE CELL DISEASE WITH CRISIS (MULTI): ICD-10-CM

## 2024-11-06 DIAGNOSIS — G47.34 NOCTURNAL HYPOXIA: Primary | ICD-10-CM

## 2024-11-06 NOTE — TELEPHONE ENCOUNTER
Pt states she was in [facility] yesterday for pain in rt leg and arm and has gotten no relief.   She is taking dilaudid every 2-4 hrs with no relief.   She does not want to come back to [facility] - since it did not help yesterday.   Pt also states she is planning on going out of town 12/22-1/3 and needs to know if she needs to have a flight test scheduled.

## 2024-11-07 ENCOUNTER — TELEPHONE (OUTPATIENT)
Dept: ADMISSION | Facility: HOSPITAL | Age: 44
End: 2024-11-07
Payer: COMMERCIAL

## 2024-11-08 ENCOUNTER — APPOINTMENT (OUTPATIENT)
Dept: OTOLARYNGOLOGY | Facility: CLINIC | Age: 44
End: 2024-11-08
Payer: COMMERCIAL

## 2024-11-08 DIAGNOSIS — D57.00 SICKLE CELL DISEASE WITH CRISIS (MULTI): ICD-10-CM

## 2024-11-08 RX ORDER — TIZANIDINE 2 MG/1
2-4 TABLET ORAL EVERY 8 HOURS PRN
Qty: 40 TABLET | Refills: 1 | Status: SHIPPED | OUTPATIENT
Start: 2024-11-08

## 2024-11-14 ENCOUNTER — APPOINTMENT (OUTPATIENT)
Dept: RADIOLOGY | Facility: HOSPITAL | Age: 44
End: 2024-11-14
Payer: MEDICARE

## 2024-11-14 ENCOUNTER — HOSPITAL ENCOUNTER (EMERGENCY)
Facility: HOSPITAL | Age: 44
Discharge: HOME | End: 2024-11-15
Attending: EMERGENCY MEDICINE
Payer: MEDICARE

## 2024-11-14 DIAGNOSIS — J06.9 UPPER RESPIRATORY TRACT INFECTION, UNSPECIFIED TYPE: Primary | ICD-10-CM

## 2024-11-14 PROCEDURE — 99285 EMERGENCY DEPT VISIT HI MDM: CPT

## 2024-11-14 PROCEDURE — 99284 EMERGENCY DEPT VISIT MOD MDM: CPT | Mod: 25

## 2024-11-14 PROCEDURE — 71045 X-RAY EXAM CHEST 1 VIEW: CPT

## 2024-11-14 RX ORDER — ONDANSETRON HYDROCHLORIDE 2 MG/ML
4 INJECTION, SOLUTION INTRAVENOUS ONCE
Status: COMPLETED | OUTPATIENT
Start: 2024-11-14 | End: 2024-11-15

## 2024-11-14 RX ORDER — HYDROMORPHONE HYDROCHLORIDE 1 MG/ML
0.6 INJECTION, SOLUTION INTRAMUSCULAR; INTRAVENOUS; SUBCUTANEOUS ONCE
Status: COMPLETED | OUTPATIENT
Start: 2024-11-14 | End: 2024-11-15

## 2024-11-14 RX ORDER — KETOROLAC TROMETHAMINE 30 MG/ML
30 INJECTION, SOLUTION INTRAMUSCULAR; INTRAVENOUS ONCE
Status: COMPLETED | OUTPATIENT
Start: 2024-11-14 | End: 2024-11-15

## 2024-11-14 RX ORDER — DIPHENHYDRAMINE HYDROCHLORIDE 50 MG/ML
50 INJECTION INTRAMUSCULAR; INTRAVENOUS ONCE
Status: COMPLETED | OUTPATIENT
Start: 2024-11-14 | End: 2024-11-15

## 2024-11-14 ASSESSMENT — PAIN DESCRIPTION - PAIN TYPE: TYPE: ACUTE PAIN

## 2024-11-14 ASSESSMENT — COLUMBIA-SUICIDE SEVERITY RATING SCALE - C-SSRS
1. IN THE PAST MONTH, HAVE YOU WISHED YOU WERE DEAD OR WISHED YOU COULD GO TO SLEEP AND NOT WAKE UP?: NO
6. HAVE YOU EVER DONE ANYTHING, STARTED TO DO ANYTHING, OR PREPARED TO DO ANYTHING TO END YOUR LIFE?: NO
2. HAVE YOU ACTUALLY HAD ANY THOUGHTS OF KILLING YOURSELF?: NO

## 2024-11-14 ASSESSMENT — PAIN DESCRIPTION - DESCRIPTORS: DESCRIPTORS: ACHING

## 2024-11-14 ASSESSMENT — PAIN - FUNCTIONAL ASSESSMENT: PAIN_FUNCTIONAL_ASSESSMENT: 0-10

## 2024-11-14 ASSESSMENT — PAIN SCALES - GENERAL: PAINLEVEL_OUTOF10: 4

## 2024-11-15 ENCOUNTER — TELEPHONE (OUTPATIENT)
Dept: ADMISSION | Facility: HOSPITAL | Age: 44
End: 2024-11-15

## 2024-11-15 ENCOUNTER — TELEPHONE (OUTPATIENT)
Dept: ADMISSION | Facility: HOSPITAL | Age: 44
End: 2024-11-15
Payer: COMMERCIAL

## 2024-11-15 VITALS
TEMPERATURE: 97.7 F | BODY MASS INDEX: 26.05 KG/M2 | HEART RATE: 80 BPM | HEIGHT: 63 IN | WEIGHT: 147 LBS | SYSTOLIC BLOOD PRESSURE: 102 MMHG | RESPIRATION RATE: 16 BRPM | OXYGEN SATURATION: 98 % | DIASTOLIC BLOOD PRESSURE: 58 MMHG

## 2024-11-15 LAB
ALBUMIN SERPL BCP-MCNC: 4.4 G/DL (ref 3.4–5)
ALP SERPL-CCNC: 66 U/L (ref 33–110)
ALT SERPL W P-5'-P-CCNC: 20 U/L (ref 7–45)
ANION GAP SERPL CALC-SCNC: 12 MMOL/L (ref 10–20)
AST SERPL W P-5'-P-CCNC: 23 U/L (ref 9–39)
B-HCG SERPL-ACNC: <2 MIU/ML
BASOPHILS # BLD AUTO: 0.05 X10*3/UL (ref 0–0.1)
BASOPHILS NFR BLD AUTO: 0.4 %
BILIRUB SERPL-MCNC: 2.8 MG/DL (ref 0–1.2)
BUN SERPL-MCNC: 17 MG/DL (ref 6–23)
CALCIUM SERPL-MCNC: 8.8 MG/DL (ref 8.6–10.3)
CHLORIDE SERPL-SCNC: 107 MMOL/L (ref 98–107)
CO2 SERPL-SCNC: 25 MMOL/L (ref 21–32)
CREAT SERPL-MCNC: 1.24 MG/DL (ref 0.5–1.05)
DACRYOCYTES BLD QL SMEAR: NORMAL
EGFRCR SERPLBLD CKD-EPI 2021: 56 ML/MIN/1.73M*2
EOSINOPHIL # BLD AUTO: 0.11 X10*3/UL (ref 0–0.7)
EOSINOPHIL NFR BLD AUTO: 0.8 %
ERYTHROCYTE [DISTWIDTH] IN BLOOD BY AUTOMATED COUNT: 15.8 % (ref 11.5–14.5)
FLUAV RNA RESP QL NAA+PROBE: NOT DETECTED
FLUBV RNA RESP QL NAA+PROBE: NOT DETECTED
GLUCOSE SERPL-MCNC: 98 MG/DL (ref 74–99)
HCT VFR BLD AUTO: 17.6 % (ref 36–46)
HGB BLD-MCNC: 6.1 G/DL (ref 12–16)
HGB RETIC QN: 33 PG (ref 28–38)
HYPOCHROMIA BLD QL SMEAR: NORMAL
IMM GRANULOCYTES # BLD AUTO: 0.06 X10*3/UL (ref 0–0.7)
IMM GRANULOCYTES NFR BLD AUTO: 0.4 % (ref 0–0.9)
IMMATURE RETIC FRACTION: 18 %
LACTATE SERPL-SCNC: 1 MMOL/L (ref 0.4–2)
LIPASE SERPL-CCNC: 12 U/L (ref 9–82)
LYMPHOCYTES # BLD AUTO: 5.53 X10*3/UL (ref 1.2–4.8)
LYMPHOCYTES NFR BLD AUTO: 41.2 %
MAGNESIUM SERPL-MCNC: 1.82 MG/DL (ref 1.6–2.4)
MCH RBC QN AUTO: 32.1 PG (ref 26–34)
MCHC RBC AUTO-ENTMCNC: 34.7 G/DL (ref 32–36)
MCV RBC AUTO: 93 FL (ref 80–100)
MONOCYTES # BLD AUTO: 0.81 X10*3/UL (ref 0.1–1)
MONOCYTES NFR BLD AUTO: 6 %
NEUTROPHILS # BLD AUTO: 6.86 X10*3/UL (ref 1.2–7.7)
NEUTROPHILS NFR BLD AUTO: 51.2 %
NRBC BLD-RTO: 0 /100 WBCS (ref 0–0)
OVALOCYTES BLD QL SMEAR: NORMAL
PLATELET # BLD AUTO: 297 X10*3/UL (ref 150–450)
POLYCHROMASIA BLD QL SMEAR: NORMAL
POTASSIUM SERPL-SCNC: 3.7 MMOL/L (ref 3.5–5.3)
PROT SERPL-MCNC: 7.5 G/DL (ref 6.4–8.2)
RBC # BLD AUTO: 1.9 X10*6/UL (ref 4–5.2)
RBC MORPH BLD: NORMAL
RETICS #: 0.07 X10*6/UL (ref 0.02–0.08)
RETICS/RBC NFR AUTO: 3.8 % (ref 0.5–2)
S PYO DNA THROAT QL NAA+PROBE: NOT DETECTED
SARS-COV-2 RNA RESP QL NAA+PROBE: NOT DETECTED
SODIUM SERPL-SCNC: 140 MMOL/L (ref 136–145)
TARGETS BLD QL SMEAR: NORMAL
WBC # BLD AUTO: 13.4 X10*3/UL (ref 4.4–11.3)

## 2024-11-15 PROCEDURE — 83605 ASSAY OF LACTIC ACID: CPT | Performed by: EMERGENCY MEDICINE

## 2024-11-15 PROCEDURE — 80053 COMPREHEN METABOLIC PANEL: CPT | Performed by: EMERGENCY MEDICINE

## 2024-11-15 PROCEDURE — 84702 CHORIONIC GONADOTROPIN TEST: CPT | Performed by: EMERGENCY MEDICINE

## 2024-11-15 PROCEDURE — 96361 HYDRATE IV INFUSION ADD-ON: CPT

## 2024-11-15 PROCEDURE — 96375 TX/PRO/DX INJ NEW DRUG ADDON: CPT

## 2024-11-15 PROCEDURE — 2500000004 HC RX 250 GENERAL PHARMACY W/ HCPCS (ALT 636 FOR OP/ED): Performed by: EMERGENCY MEDICINE

## 2024-11-15 PROCEDURE — 87636 SARSCOV2 & INF A&B AMP PRB: CPT | Performed by: EMERGENCY MEDICINE

## 2024-11-15 PROCEDURE — 71045 X-RAY EXAM CHEST 1 VIEW: CPT | Performed by: STUDENT IN AN ORGANIZED HEALTH CARE EDUCATION/TRAINING PROGRAM

## 2024-11-15 PROCEDURE — 96376 TX/PRO/DX INJ SAME DRUG ADON: CPT

## 2024-11-15 PROCEDURE — 85045 AUTOMATED RETICULOCYTE COUNT: CPT | Performed by: EMERGENCY MEDICINE

## 2024-11-15 PROCEDURE — 96374 THER/PROPH/DIAG INJ IV PUSH: CPT

## 2024-11-15 PROCEDURE — 87651 STREP A DNA AMP PROBE: CPT | Performed by: EMERGENCY MEDICINE

## 2024-11-15 PROCEDURE — 83735 ASSAY OF MAGNESIUM: CPT | Performed by: EMERGENCY MEDICINE

## 2024-11-15 PROCEDURE — 85025 COMPLETE CBC W/AUTO DIFF WBC: CPT | Performed by: EMERGENCY MEDICINE

## 2024-11-15 RX ORDER — HYDROMORPHONE HYDROCHLORIDE 4 MG/1
4 TABLET ORAL 3 TIMES DAILY PRN
Qty: 40 TABLET | Refills: 0 | Status: SHIPPED | OUTPATIENT
Start: 2024-11-15

## 2024-11-15 RX ORDER — HYDROMORPHONE HYDROCHLORIDE 1 MG/ML
0.6 INJECTION, SOLUTION INTRAMUSCULAR; INTRAVENOUS; SUBCUTANEOUS ONCE
Status: COMPLETED | OUTPATIENT
Start: 2024-11-15 | End: 2024-11-15

## 2024-11-15 ASSESSMENT — LIFESTYLE VARIABLES
HAVE YOU EVER FELT YOU SHOULD CUT DOWN ON YOUR DRINKING: NO
TOTAL SCORE: 0
EVER FELT BAD OR GUILTY ABOUT YOUR DRINKING: NO
EVER HAD A DRINK FIRST THING IN THE MORNING TO STEADY YOUR NERVES TO GET RID OF A HANGOVER: NO
HAVE PEOPLE ANNOYED YOU BY CRITICIZING YOUR DRINKING: NO

## 2024-11-15 ASSESSMENT — PAIN SCALES - GENERAL
PAINLEVEL_OUTOF10: 5 - MODERATE PAIN

## 2024-11-15 ASSESSMENT — PAIN - FUNCTIONAL ASSESSMENT
PAIN_FUNCTIONAL_ASSESSMENT: 0-10
PAIN_FUNCTIONAL_ASSESSMENT: 0-10

## 2024-11-15 NOTE — TELEPHONE ENCOUNTER
Pt is requesting a refill of dilaudid 4mg TID prn, #40.  Last prescription was written 10/31/24.  Preferred pharmacy is [facility] in [place].

## 2024-11-15 NOTE — DISCHARGE INSTRUCTIONS
Please follow-up with your primary care provider as well as your hematologist regarding your abnormal blood test.  Increase oral fluid intake.  Return immediately to the emergency department if you develop any worsening abdominal pain, chest pain, headache, lower extremity pain, persistent fever, lightheadedness, dizziness or if concerns arise.

## 2024-11-15 NOTE — ED PROVIDER NOTES
HPI   Chief Complaint   Patient presents with    Flu Symptoms       This is a 44 years old female patient with history of sickle cell disease presented to the emergency department with multiple complaints including fever, abdominal pain, cough, nonspecific lower extremity pain, vomiting.  Stated that the abdominal pain resolved spontaneously but she is having bilateral lower extremity pain which is concerning her for sickle cell disease flareup.  Also reported cough, highest fever was 101 Fahrenheit, reported headache that is 3 out of 10 in severity which did not start suddenly and would not call it the worst headache in her life.  Stated that her son was sick with the same symptoms.  Stated that her symptoms started on Wednesday which is less than 72 hours ago contrary to what was documented in the triage note.    Review of system: As stated above in the HPI section.              Patient History   Past Medical History:   Diagnosis Date    Sickle cell anemia (Multi)     Unspecified astigmatism, bilateral 2020    Astigmatism, bilateral     Past Surgical History:   Procedure Laterality Date     SECTION, CLASSIC  2016     Section    CHOLECYSTECTOMY  2016    Cholecystectomy    MR HEAD ANGIO W AND WO IV CONTRAST  4/15/2013    MR HEAD ANGIO W AND WO IV CONTRAST 4/15/2013 [facility] CLINICAL LEGACY    MR HEAD ANGIO W AND WO IV CONTRAST  2013    MR HEAD ANGIO W AND WO IV CONTRAST 2013 [facility] INPATIENT LEGACY    MR HEAD ANGIO WO IV CONTRAST  2014    MR HEAD ANGIO WO IV CONTRAST 2014 [facility] ANCILLARY LEGACY    MR HEAD ANGIO WO IV CONTRAST  2016    MR HEAD ANGIO WO IV CONTRAST 2016 [facility] ANCILLARY LEGACY    MR HEAD ANGIO WO IV CONTRAST  2019    MR HEAD ANGIO WO IV CONTRAST 2019 [facility] ANCILLARY LEGACY    MR HEAD ANGIO WO IV CONTRAST  2017    MR HEAD ANGIO WO IV CONTRAST 2017 [facility] INPATIENT LEGACY    MR NECK ANGIO WO IV CONTRAST  2013    MR NECK ANGIO WO IV  CONTRAST 8/2/2013 [facility] INPATIENT LEGACY    MR NECK ANGIO WO IV CONTRAST  11/1/2014    MR NECK ANGIO WO IV CONTRAST 11/1/2014 [facility] ANCILLARY LEGACY    OTHER SURGICAL HISTORY  06/16/2016    Brain Surgery    TUBAL LIGATION  04/10/2017    Tubal Ligation     Family History   Problem Relation Name Age of Onset    Sickle cell trait Sister      Multiple sclerosis Brother      Breast cancer Maternal Grandmother      Breast cancer Other paternal cousin     Sickle cell trait Child       Social History     Tobacco Use    Smoking status: Never    Smokeless tobacco: Never   Vaping Use    Vaping status: Never Used   Substance Use Topics    Alcohol use: Not Currently    Drug use: Not Currently       Physical Exam   ED Triage Vitals [11/14/24 2321]   Temperature Heart Rate Respirations BP   36.4 °C (97.5 °F) 79 16 108/55      Pulse Ox Temp Source Heart Rate Source Patient Position   98 % Temporal Monitor Sitting      BP Location FiO2 (%)     Right arm --       Physical Exam  Vitals and nursing note reviewed.   Constitutional:       General: She is not in acute distress.     Appearance: She is well-developed.   HENT:      Head: Normocephalic and atraumatic.   Eyes:      Conjunctiva/sclera: Conjunctivae normal.   Cardiovascular:      Rate and Rhythm: Normal rate and regular rhythm.      Heart sounds: No murmur heard.  Pulmonary:      Effort: Pulmonary effort is normal. No respiratory distress.      Breath sounds: Normal breath sounds.   Abdominal:      Palpations: Abdomen is soft.      Tenderness: There is no abdominal tenderness.      Comments: Abdomen soft, nondistended, nontender, no guarding, rigidity, rebound.   Musculoskeletal:         General: No swelling.      Cervical back: Neck supple.      Comments: Intact neurovascular exam distally.  Able to bear weight and walk with no difficulty.   Skin:     General: Skin is warm and dry.      Capillary Refill: Capillary refill takes less than 2 seconds.   Neurological:      Mental  Status: She is alert.   Psychiatric:         Mood and Affect: Mood normal.           ED Course & MDM   Diagnoses as of 11/15/24 2913   Upper respiratory tract infection, unspecified type                 No data recorded     [place] Coma Scale Score: 15 (11/14/24 8273 : [place], ELEANOR)                           Medical Decision Making  Patient seen and examined, obtain basic labs, will obtain viral panel including flu testing, COVID-19, and strep throat.  Patient's symptoms started less than 72 hours ago.  Headache is benign and abdominal exam is unremarkable.  Given the sickle cell disease history and lower extremity pain concerning the patient for sickle cell disease flareup will obtain basic labs, will administer IV fluids, Dilaudid, Toradol, and Benadryl.  Will administer Zofran and will provide the patient with a p.o. challenge after medication.  Reassess the patient after treatment and if labs are reassuring and she is feeling better we will be able to discharge the patient home.    I consulted hematology service from [facility] and I spoke to Dr. Wilde hematologist and the recommendation was to hold off back.  Blood cell transfusion and to transfer the patient to [facility] if she remains in pain.  Patient remained in pain but stated that her pain is slightly better and she declined transfer to [facility] and stated that I will be able to deal with this pain and it at home.  She is given recommendation to follow-up with her sickle cell disease/hematologist.  To return to the emergency department if alarming symptoms arise as per discharge instruction.        Procedure  Procedures     Eros Richardson,   11/15/24 2654

## 2024-11-15 NOTE — TELEPHONE ENCOUNTER
"Pt went to ED yesterday with her son- she was advised to be admitted to main [facility]- pt declined as she felt she needed to take care of her son.   Pt's hgb 6.1 and she \" feels like crap\". Pt advised to go to ED for evaluation/treatment.   Pt requesting to be seen in [facility] on Monday- advised that she needs to be seen in ED if she is not feeling well.   Pt states that she will try to make it through weekend, but if symptoms- dyspnea, fatigue- worsen she will go to main campus ED.   "

## 2024-11-18 ENCOUNTER — OFFICE VISIT (OUTPATIENT)
Dept: HEMATOLOGY/ONCOLOGY | Facility: HOSPITAL | Age: 44
End: 2024-11-18
Payer: MEDICARE

## 2024-11-18 VITALS
HEART RATE: 74 BPM | SYSTOLIC BLOOD PRESSURE: 114 MMHG | OXYGEN SATURATION: 100 % | RESPIRATION RATE: 16 BRPM | TEMPERATURE: 98.1 F | DIASTOLIC BLOOD PRESSURE: 60 MMHG

## 2024-11-18 DIAGNOSIS — D57.00 SICKLE CELL CRISIS (MULTI): Primary | ICD-10-CM

## 2024-11-18 DIAGNOSIS — R11.0 NAUSEA: ICD-10-CM

## 2024-11-18 DIAGNOSIS — T45.1X5A CHEMOTHERAPY INDUCED NAUSEA AND VOMITING: ICD-10-CM

## 2024-11-18 DIAGNOSIS — R11.2 CHEMOTHERAPY INDUCED NAUSEA AND VOMITING: ICD-10-CM

## 2024-11-18 DIAGNOSIS — D57.1 SICKLE CELL DISEASE WITHOUT CRISIS (MULTI): ICD-10-CM

## 2024-11-18 LAB
ABO GROUP (TYPE) IN BLOOD: NORMAL
ALBUMIN SERPL BCP-MCNC: 4.2 G/DL (ref 3.4–5)
ALP SERPL-CCNC: 59 U/L (ref 33–110)
ALT SERPL W P-5'-P-CCNC: 13 U/L (ref 7–45)
ANION GAP SERPL CALC-SCNC: 12 MMOL/L (ref 10–20)
ANTIBODY SCREEN: NORMAL
AST SERPL W P-5'-P-CCNC: 20 U/L (ref 9–39)
BASOPHILS # BLD AUTO: 0.07 X10*3/UL (ref 0–0.1)
BASOPHILS NFR BLD AUTO: 0.6 %
BILIRUB SERPL-MCNC: 2.1 MG/DL (ref 0–1.2)
BLOOD EXPIRATION DATE: NORMAL
BUN SERPL-MCNC: 12 MG/DL (ref 6–23)
CALCIUM SERPL-MCNC: 8.7 MG/DL (ref 8.6–10.3)
CHLORIDE SERPL-SCNC: 109 MMOL/L (ref 98–107)
CO2 SERPL-SCNC: 25 MMOL/L (ref 21–32)
CREAT SERPL-MCNC: 0.87 MG/DL (ref 0.5–1.05)
DISPENSE STATUS: NORMAL
EGFRCR SERPLBLD CKD-EPI 2021: 85 ML/MIN/1.73M*2
EOSINOPHIL # BLD AUTO: 0.12 X10*3/UL (ref 0–0.7)
EOSINOPHIL NFR BLD AUTO: 1 %
ERYTHROCYTE [DISTWIDTH] IN BLOOD BY AUTOMATED COUNT: 17.6 % (ref 11.5–14.5)
GLUCOSE SERPL-MCNC: 105 MG/DL (ref 74–99)
HCT VFR BLD AUTO: 16.8 % (ref 36–46)
HGB BLD-MCNC: 5.8 G/DL (ref 12–16)
HGB RETIC QN: 35 PG (ref 28–38)
IMM GRANULOCYTES # BLD AUTO: 0.05 X10*3/UL (ref 0–0.7)
IMM GRANULOCYTES NFR BLD AUTO: 0.4 % (ref 0–0.9)
IMMATURE RETIC FRACTION: 43.2 %
LDH SERPL L TO P-CCNC: 295 U/L (ref 84–246)
LYMPHOCYTES # BLD AUTO: 4.24 X10*3/UL (ref 1.2–4.8)
LYMPHOCYTES NFR BLD AUTO: 34.9 %
MCH RBC QN AUTO: 32.6 PG (ref 26–34)
MCHC RBC AUTO-ENTMCNC: 34.5 G/DL (ref 32–36)
MCV RBC AUTO: 94 FL (ref 80–100)
MONOCYTES # BLD AUTO: 0.76 X10*3/UL (ref 0.1–1)
MONOCYTES NFR BLD AUTO: 6.3 %
NEUTROPHILS # BLD AUTO: 6.91 X10*3/UL (ref 1.2–7.7)
NEUTROPHILS NFR BLD AUTO: 56.8 %
NRBC BLD-RTO: 0.7 /100 WBCS (ref 0–0)
PLATELET # BLD AUTO: 322 X10*3/UL (ref 150–450)
POTASSIUM SERPL-SCNC: 3.5 MMOL/L (ref 3.5–5.3)
PRODUCT BLOOD TYPE: 9500
PRODUCT CODE: NORMAL
PROT SERPL-MCNC: 7.1 G/DL (ref 6.4–8.2)
RBC # BLD AUTO: 1.78 X10*6/UL (ref 4–5.2)
RETICS #: 0.2 X10*6/UL (ref 0.02–0.08)
RETICS/RBC NFR AUTO: 11.4 % (ref 0.5–2)
RH FACTOR (ANTIGEN D): NORMAL
SODIUM SERPL-SCNC: 142 MMOL/L (ref 136–145)
UNIT ABO: NORMAL
UNIT NUMBER: NORMAL
UNIT RH: NORMAL
UNIT VOLUME: 274
WBC # BLD AUTO: 12.2 X10*3/UL (ref 4.4–11.3)
XM INTEP: NORMAL

## 2024-11-18 PROCEDURE — 2500000001 HC RX 250 WO HCPCS SELF ADMINISTERED DRUGS (ALT 637 FOR MEDICARE OP)

## 2024-11-18 PROCEDURE — 96374 THER/PROPH/DIAG INJ IV PUSH: CPT | Mod: INF

## 2024-11-18 PROCEDURE — 36430 TRANSFUSION BLD/BLD COMPNT: CPT

## 2024-11-18 PROCEDURE — 86920 COMPATIBILITY TEST SPIN: CPT

## 2024-11-18 PROCEDURE — 2500000004 HC RX 250 GENERAL PHARMACY W/ HCPCS (ALT 636 FOR OP/ED): Performed by: NURSE PRACTITIONER

## 2024-11-18 PROCEDURE — 2500000005 HC RX 250 GENERAL PHARMACY W/O HCPCS

## 2024-11-18 PROCEDURE — 85045 AUTOMATED RETICULOCYTE COUNT: CPT

## 2024-11-18 PROCEDURE — 83615 LACTATE (LD) (LDH) ENZYME: CPT

## 2024-11-18 PROCEDURE — P9040 RBC LEUKOREDUCED IRRADIATED: HCPCS

## 2024-11-18 PROCEDURE — 99214 OFFICE O/P EST MOD 30 MIN: CPT | Mod: 25

## 2024-11-18 PROCEDURE — 85025 COMPLETE CBC W/AUTO DIFF WBC: CPT

## 2024-11-18 PROCEDURE — 86901 BLOOD TYPING SEROLOGIC RH(D): CPT

## 2024-11-18 PROCEDURE — 99214 OFFICE O/P EST MOD 30 MIN: CPT

## 2024-11-18 PROCEDURE — 96376 TX/PRO/DX INJ SAME DRUG ADON: CPT

## 2024-11-18 PROCEDURE — 80053 COMPREHEN METABOLIC PANEL: CPT

## 2024-11-18 PROCEDURE — 2500000004 HC RX 250 GENERAL PHARMACY W/ HCPCS (ALT 636 FOR OP/ED)

## 2024-11-18 RX ORDER — ONDANSETRON HYDROCHLORIDE 8 MG/1
8 TABLET, FILM COATED ORAL ONCE
Status: COMPLETED | OUTPATIENT
Start: 2024-11-18 | End: 2024-11-18

## 2024-11-18 RX ORDER — EPINEPHRINE 0.3 MG/.3ML
0.3 INJECTION SUBCUTANEOUS EVERY 5 MIN PRN
OUTPATIENT
Start: 2024-11-18

## 2024-11-18 RX ORDER — DIPHENHYDRAMINE HCL 25 MG
25 CAPSULE ORAL ONCE
OUTPATIENT
Start: 2024-11-18

## 2024-11-18 RX ORDER — NALOXONE HYDROCHLORIDE 0.4 MG/ML
0.4 INJECTION, SOLUTION INTRAMUSCULAR; INTRAVENOUS; SUBCUTANEOUS
Status: DISCONTINUED | OUTPATIENT
Start: 2024-11-18 | End: 2024-11-18 | Stop reason: HOSPADM

## 2024-11-18 RX ORDER — ACETAMINOPHEN 325 MG/1
650 TABLET ORAL ONCE
OUTPATIENT
Start: 2024-11-18

## 2024-11-18 RX ORDER — HYDROMORPHONE HYDROCHLORIDE 1 MG/ML
1 INJECTION, SOLUTION INTRAMUSCULAR; INTRAVENOUS; SUBCUTANEOUS ONCE
Status: COMPLETED | OUTPATIENT
Start: 2024-11-18 | End: 2024-11-18

## 2024-11-18 RX ORDER — ALBUTEROL SULFATE 0.83 MG/ML
3 SOLUTION RESPIRATORY (INHALATION) AS NEEDED
OUTPATIENT
Start: 2024-11-18

## 2024-11-18 RX ORDER — DIPHENHYDRAMINE HYDROCHLORIDE 50 MG/ML
50 INJECTION INTRAMUSCULAR; INTRAVENOUS AS NEEDED
OUTPATIENT
Start: 2024-11-18

## 2024-11-18 RX ORDER — HYDROMORPHONE HYDROCHLORIDE 1 MG/ML
1 INJECTION, SOLUTION INTRAMUSCULAR; INTRAVENOUS; SUBCUTANEOUS
Status: COMPLETED | OUTPATIENT
Start: 2024-11-18 | End: 2024-11-18

## 2024-11-18 RX ORDER — FAMOTIDINE 10 MG/ML
20 INJECTION INTRAVENOUS ONCE AS NEEDED
OUTPATIENT
Start: 2024-11-18

## 2024-11-18 RX ORDER — HEPARIN 100 UNIT/ML
500 SYRINGE INTRAVENOUS AS NEEDED
Status: DISCONTINUED | OUTPATIENT
Start: 2024-11-18 | End: 2024-11-18 | Stop reason: HOSPADM

## 2024-11-18 RX ORDER — ACETAMINOPHEN 325 MG/1
650 TABLET ORAL ONCE
Status: COMPLETED | OUTPATIENT
Start: 2024-11-18 | End: 2024-11-18

## 2024-11-18 RX ORDER — DIPHENHYDRAMINE HCL 25 MG
25 CAPSULE ORAL ONCE
Status: COMPLETED | OUTPATIENT
Start: 2024-11-18 | End: 2024-11-18

## 2024-11-18 RX ORDER — HEPARIN SODIUM,PORCINE/PF 10 UNIT/ML
50 SYRINGE (ML) INTRAVENOUS AS NEEDED
OUTPATIENT
Start: 2024-11-18

## 2024-11-18 RX ORDER — HYDROMORPHONE HYDROCHLORIDE 1 MG/ML
0.5 INJECTION, SOLUTION INTRAMUSCULAR; INTRAVENOUS; SUBCUTANEOUS
Status: DISCONTINUED | OUTPATIENT
Start: 2024-11-18 | End: 2024-11-18

## 2024-11-18 RX ORDER — PROCHLORPERAZINE MALEATE 10 MG
5 TABLET ORAL ONCE
Status: COMPLETED | OUTPATIENT
Start: 2024-11-18 | End: 2024-11-18

## 2024-11-18 RX ORDER — HEPARIN 100 UNIT/ML
500 SYRINGE INTRAVENOUS AS NEEDED
OUTPATIENT
Start: 2024-11-18

## 2024-11-18 ASSESSMENT — PAIN SCALES - GENERAL
PAINLEVEL_OUTOF10: 8
PAINLEVEL_OUTOF10: 5
PAINLEVEL_OUTOF10: 8

## 2024-11-18 ASSESSMENT — ENCOUNTER SYMPTOMS
APPETITE CHANGE: 1
FATIGUE: 1
NAUSEA: 1
BACK PAIN: 1
LEG SWELLING: 1
ARTHRALGIAS: 1

## 2024-11-18 ASSESSMENT — PAIN DESCRIPTION - LOCATION: LOCATION: GENERALIZED

## 2024-11-18 NOTE — PROGRESS NOTES
Patient ID:  Mary Alice Aragon is a 44 y.o. female.  Subjective   Chief Complaint: Uncontrolled Pain    HPI  Mary Alice is a 44 y.o. female with PMHx of Hgb SS (baseline Hgb ~7) c/b transfusion-related iron overload, cerebral aneurysm, NICHOL, MDD, and nocturnal hypoxia who presents to [facility] with ongoing fatigue, leg swelling, pain all over. Patient reports that her son is sick and she developed the same sickness. She feels that this has caused her to crisis to worsening and ongoing pain. Reports over the weekend she started to feel better. Has not had any more nausea, able to keep fluids down, and has not had any fevers. Reports ongoing swelling in legs and pain along with nausea.     ROS  Review of Systems   Constitutional:  Positive for appetite change and fatigue.   Cardiovascular:  Positive for leg swelling.   Gastrointestinal:  Positive for nausea.   Musculoskeletal:  Positive for arthralgias and back pain.        Allergies  No Known Allergies     Medications  Current Outpatient Medications   Medication Instructions    ascorbic acid (Vitamin C) 500 mg tablet 2 tablet DAILY (route: oral)    calcium carbonate (Tums) 200 mg calcium chewable tablet 1 tablet, Daily    capsaicin (Zostrix) 0.025 % cream Apply as directed as needed    cholecalciferol (VITAMIN D-3) 2,000 Units, oral, Daily (0630)    deferasirox (Jadenu) 180 mg tablet TAKE ONE TABLET BY MOUTH DAILY    deferoxamine (Desferal) 2 gram injection Infuse 2000mg subcutaneously once daily over 8 hours via [place] Pump    deferoxamine (DESFERAL) 2,000 mg, subcutaneous, Daily    diclofenac sodium (Voltaren) 1 % gel Per instructions 4 TIMES DAILY (route: topical)    diphenhydrAMINE (BENADryl) 25 mg capsule 1 capsule 3 TIMES DAILY (route: oral)    docusate sodium (Colace) 100 mg capsule 1 capsule DAILY (route: oral)    fluconazole (Diflucan) 150 mg tablet TAKE 1 TABLET BY MOUTH 1 TIME AS DIRECTED    FLUoxetine (PROZAC) 10 mg, oral, Daily    folic acid (Folvite) 1 mg tablet  1 tablet DAILY (route: oral)    HYDROmorphone (DILAUDID) 4 mg, oral, 3 times daily PRN    hydroxyurea (Hydrea) 500 mg capsule Per instructions AS DIRECTED (route: oral)    hydrOXYzine HCL (ATARAX) 25 mg, oral, 2 times daily PRN    ibuprofen 800 mg, oral, Every 8 hours PRN    ketamine (KETALAR) 10 mg, Once PRN Procedure    loratadine (Claritin) 10 mg tablet 1 tablet, Daily    melatonin 3 mg tablet 2 tablets, Nightly PRN    meloxicam (MOBIC) 15 mg, oral, Daily    methadone (DOLOPHINE) 10 mg, oral, Every 12 hours    METHYL SALICYLATE-MENTHOL TOP Topical    MULTIVITAMIN ORAL 1 tablet, oral, Daily    multivitamin tablet 1 tablet DAILY (route: oral)    multivitamin with minerals (multivit-min-iron fum-folic ac) tablet oral    naloxone (NARCAN) 4 mg, nasal, As needed, May repeat every 2-3 minutes if needed, alternating nostrils, until medical assistance becomes available.    norethindrone (AYGESTIN) 5 mg, oral, Daily    omeprazole (PriLOSEC) 40 mg DR capsule 1 capsule DAILY (route: oral)    orphenadrine (Norflex) 100 mg 12 hr tablet TAKE ONE TABLET BY MOUTH DAILY AS NEEDED    oxygen (O2) 3 L/min    peg 400/hypromellose/glycerin (DRY EYE RELIEF OPHT) 1 drop, Daily    pregabalin (LYRICA) 25 mg, oral, 2 times daily    prochlorperazine (Compazine) 10 mg tablet 1 tablet EVERY 8 HOURS (route: oral)    senna 8.6 mg, oral, 2 times daily PRN    tiZANidine (ZANAFLEX) 2-4 mg, oral, Every 8 hours PRN    traZODone (Desyrel) 100 mg tablet TAKE 1 TO 2 TABLETS(100  MG) BY MOUTH AT BEDTIME AS NEEDED FOR SLEEP    trolamine salicylate (Aspercreme) 10 % cream Per instructions 4 TIMES DAILY (route: topical)    valACYclovir (VALTREX) 500 mg, oral, Daily    Vitamin D3 1,000 Units, oral, Daily        Past Medical History:   Past Medical History:  No date: Sickle cell anemia (Multi)  05/11/2020: Unspecified astigmatism, bilateral      Comment:  Astigmatism, bilateral   Surgical History:    Past Surgical History:   Procedure Laterality Date      SECTION, CLASSIC  2016     Section    CHOLECYSTECTOMY  2016    Cholecystectomy    MR HEAD ANGIO W AND WO IV CONTRAST  4/15/2013    MR HEAD ANGIO W AND WO IV CONTRAST 4/15/2013 [facility] CLINICAL LEGACY    MR HEAD ANGIO W AND WO IV CONTRAST  2013    MR HEAD ANGIO W AND WO IV CONTRAST 2013 [facility] INPATIENT LEGACY    MR HEAD ANGIO WO IV CONTRAST  2014    MR HEAD ANGIO WO IV CONTRAST 2014 [facility] ANCILLARY LEGACY    MR HEAD ANGIO WO IV CONTRAST  2016    MR HEAD ANGIO WO IV CONTRAST 2016 [facility] ANCILLARY LEGACY    MR HEAD ANGIO WO IV CONTRAST  2019    MR HEAD ANGIO WO IV CONTRAST 2019 [facility] ANCILLARY LEGACY    MR HEAD ANGIO WO IV CONTRAST  2017    MR HEAD ANGIO WO IV CONTRAST 2017 [facility] INPATIENT LEGACY    MR NECK ANGIO WO IV CONTRAST  2013    MR NECK ANGIO WO IV CONTRAST 2013 [facility] INPATIENT LEGACY    MR NECK ANGIO WO IV CONTRAST  2014    MR NECK ANGIO WO IV CONTRAST 2014 [facility] ANCILLARY LEGACY    OTHER SURGICAL HISTORY  2016    Brain Surgery    TUBAL LIGATION  04/10/2017    Tubal Ligation      Family History:    Family History   Problem Relation Name Age of Onset    Sickle cell trait Sister      Multiple sclerosis Brother      Breast cancer Maternal Grandmother      Breast cancer Other paternal cousin     Sickle cell trait Child       Family Oncology History:    Cancer-related family history includes Breast cancer in her maternal grandmother and another family member.  Social History:    Social History     Tobacco Use    Smoking status: Never    Smokeless tobacco: Never   Vaping Use    Vaping status: Never Used   Substance Use Topics    Alcohol use: Not Currently    Drug use: Not Currently        Objective   Vitals: LMP  (LMP Unknown) Comment: tubal and hormones to prevent menses  Weight: There were no vitals filed for this visit.    Physical Exam  Vitals reviewed.   Constitutional:       Appearance: She is ill-appearing.   HENT:      Head:  Normocephalic.      Mouth/Throat:      Mouth: Mucous membranes are moist.   Eyes:      Pupils: Pupils are equal, round, and reactive to light.   Cardiovascular:      Rate and Rhythm: Normal rate and regular rhythm.      Pulses: Normal pulses.   Pulmonary:      Effort: Pulmonary effort is normal.      Breath sounds: Normal breath sounds.   Abdominal:      General: Abdomen is flat.      Palpations: Abdomen is soft.   Musculoskeletal:      Cervical back: Normal range of motion.      Right lower leg: Edema present.      Left lower leg: Edema present.   Skin:     General: Skin is warm.      Capillary Refill: Capillary refill takes less than 2 seconds.   Neurological:      General: No focal deficit present.      Mental Status: She is alert and oriented to person, place, and time.   Psychiatric:         Mood and Affect: Mood normal.         Behavior: Behavior normal.         Diagnostic Results     Labs  Results from last 7 days   Lab Units 11/15/24  0049   WBC AUTO x10*3/uL 13.4*   HEMOGLOBIN g/dL 6.1*   HEMATOCRIT % 17.6*   PLATELETS AUTO x10*3/uL 297   NEUTROS ABS x10*3/uL 6.86   LYMPHS ABS AUTO x10*3/uL 5.53*   MONOS ABS AUTO x10*3/uL 0.81   EOS ABS AUTO x10*3/uL 0.11   NEUTROS PCT AUTO % 51.2   LYMPHS PCT AUTO % 41.2   MONOS PCT AUTO % 6.0   EOS PCT AUTO % 0.8      Results from last 7 days   Lab Units 11/15/24  0049   GLUCOSE mg/dL 98   SODIUM mmol/L 140   POTASSIUM mmol/L 3.7   CHLORIDE mmol/L 107   CO2 mmol/L 25   BUN mg/dL 17   CREATININE mg/dL 1.24*   EGFR mL/min/1.73m*2 56*   CALCIUM mg/dL 8.8   MAGNESIUM mg/dL 1.82   ALBUMIN g/dL 4.4   PROTEIN TOTAL g/dL 7.5     Results from last 7 days   Lab Units 11/15/24  0049   BILIRUBIN TOTAL mg/dL 2.8*   ALK PHOS U/L 66   ALT U/L 20   AST U/L 23         Results from last 7 days   Lab Units 11/15/24  0049   RETIC CT PCT % 3.8*   RETIC CT ABS x10*6/uL 0.072   IMMATURE RETIC FRACTION % 18.0*   RETIC HGB pg 33     Results from last 7 days   Lab Units 11/15/24  0049   LACTATE  mmol/L 1.0         Lab Results   Component Value Date    HGB 6.1 (LL) 11/15/2024    HGB 6.6 (L) 11/05/2024    HGB 7.3 (L) 10/31/2024     11/15/2024     11/05/2024     (H) 10/31/2024     (H) 11/05/2024     (H) 10/02/2024     (H) 08/30/2024       Images  === 11/14/24 ===    XR CHEST 1 VIEW    - Impression -  1. No acute cardiopulmonary process.    MACRO:  None    Signed by: Tony Rand 11/15/2024 12:40 AM  Dictation workstation:   [id number]   === 03/10/22 ===    - Impression -  Right pelvic abnormality compatible with a unilocular right ovarian  cyst without ancillary features to suggest torsion. If there is  clinical concern, pelvic ultrasound correlation could be considered.    Indeterminate 4.5 mm calcification right pelvis inseparable from the  expected course of the right ureter. The right ureter is normal  caliber. Ureteral calculus not definitively excluded. Recommend  correlation with patient's symptomatology, and urinalysis.    Physiologic amount of pelvic free fluid.    Redemonstrated diffuse multinodular thyroid gland which demonstrate  mixed response compared with prior CT scan. The findings could be  more reliably compared by ultrasound.    Mild prominent caliber central pulmonary arteries. Correlate for  evidence of pulmonary arterial hypertension.    Likely postinflammatory changes at the lung bases.    Chronic findings related to sickle cell anemia.    No thoracic or abdominal aortic aneurysm or dissection.     No echocardiogram results found for the past 12 months       Assessment/Plan   Mary Alice Aragon is a 44 y.o. female with PMHx of Hgb SS (baseline Hgb ~7) c/b transfusion-related iron overload, cerebral aneurysm, NICHOL, MDD, and nocturnal hypoxia who presents to [facility] with ongoing fatigue and pain all over.     ACC Course  - VSS  -  ongoing acute on chronic anemia. Hgb 5.8 today, 1 unit PRBC ordered. Dr. Saleem made aware  - 1mg dilaudid q1 hr IVP x4 doses  given for sickle cell related pain  - Zofran 8mg  once for opioid induced nausea/vomiting  - Benadryl 25mg/ tylenol 650mg PO once for premed prior to blood transfusion  - 5mg oral compazine given for ongoing nausea     Disposition  - Patient discharged home with no further needs following ACC Course  - Return to clinic/ED instructions given    MICHELLE Schneider-CNP

## 2024-11-18 NOTE — TELEPHONE ENCOUNTER
Spoke with pt- she did not go to ED- not feeling better, actually feeling worse.   Message sent to [facility] provider and Sickle Cell team  via secure chat

## 2024-11-19 ENCOUNTER — HOME INFUSION (OUTPATIENT)
Dept: INFUSION THERAPY | Age: 44
End: 2024-11-19
Payer: COMMERCIAL

## 2024-11-19 ENCOUNTER — DOCUMENTATION (OUTPATIENT)
Dept: PHARMACY | Facility: CLINIC | Age: 44
End: 2024-11-19

## 2024-11-19 ENCOUNTER — TELEPHONE (OUTPATIENT)
Dept: ADMISSION | Facility: HOSPITAL | Age: 44
End: 2024-11-19
Payer: COMMERCIAL

## 2024-11-19 NOTE — TELEPHONE ENCOUNTER
Called patient and recommended that she comes to the ED to be evaluated and a decision made on whether she needs to be admitted to hospital or not. I will like her to make arrangements for  on account of possibility of her being admitted to hospital for further evaluation and management. She was agreeable to the plan

## 2024-11-19 NOTE — PROGRESS NOTES
Review of chart. Patient with order for deferoxamine subcutaneous infusions over 8 hours daily for transfusional iron overload. Current orders are good thru 7/25/25. No labs are ordered.       Tel call to patient. She is agreeable to delivery of another 7 days today. Same supplies as last time.  to call on the way.      *REMINDER: DON'T send a delivery if not able to confirm with patient (Per patient request)*     Review of entries in Epic. No changes in therapy.     Processed refill for 7x deferoxamine doses for mix and delivery 11/19 to cover doses 11/20 thru 11/26/24.     Follow up 11/25/24 with next fill OVN. Check progress and only send if patient is contacted.

## 2024-11-19 NOTE — TELEPHONE ENCOUNTER
"Pt states that she is still \" feeling like crap\" . Very fatigued, some dyspnea, generalized malaise.   Pt had 1 unit of blood yesterday 11/18, would like to know if/when she needs labs and possibly another unit of PRBC.   Please advise.   "

## 2024-11-20 ENCOUNTER — HOSPITAL ENCOUNTER (INPATIENT)
Facility: HOSPITAL | Age: 44
LOS: 1 days | Discharge: ANOTHER HOSPITAL | End: 2024-11-23
Attending: EMERGENCY MEDICINE | Admitting: INTERNAL MEDICINE
Payer: MEDICARE

## 2024-11-20 ENCOUNTER — APPOINTMENT (OUTPATIENT)
Dept: CARDIOLOGY | Facility: HOSPITAL | Age: 44
End: 2024-11-20
Payer: MEDICARE

## 2024-11-20 ENCOUNTER — TELEPHONE (OUTPATIENT)
Dept: ADMISSION | Facility: HOSPITAL | Age: 44
End: 2024-11-20
Payer: COMMERCIAL

## 2024-11-20 ENCOUNTER — APPOINTMENT (OUTPATIENT)
Dept: RADIOLOGY | Facility: HOSPITAL | Age: 44
End: 2024-11-20
Payer: MEDICARE

## 2024-11-20 DIAGNOSIS — D57.00 SICKLE CELL PAIN CRISIS (MULTI): Primary | ICD-10-CM

## 2024-11-20 DIAGNOSIS — K64.0 GRADE I HEMORRHOIDS: ICD-10-CM

## 2024-11-20 LAB
ABO GROUP (TYPE) IN BLOOD: NORMAL
ALBUMIN SERPL BCP-MCNC: 4.1 G/DL (ref 3.4–5)
ALP SERPL-CCNC: 65 U/L (ref 33–110)
ALT SERPL W P-5'-P-CCNC: 14 U/L (ref 7–45)
ANION GAP SERPL CALC-SCNC: 9 MMOL/L (ref 10–20)
ANTIBODY SCREEN: NORMAL
AST SERPL W P-5'-P-CCNC: 21 U/L (ref 9–39)
BASOPHILS # BLD AUTO: 0.07 X10*3/UL (ref 0–0.1)
BASOPHILS NFR BLD AUTO: 0.5 %
BILIRUB SERPL-MCNC: 2.2 MG/DL (ref 0–1.2)
BITE CELLS BLD QL SMEAR: PRESENT
BUN SERPL-MCNC: 10 MG/DL (ref 6–23)
CALCIUM SERPL-MCNC: 8.8 MG/DL (ref 8.6–10.3)
CARDIAC TROPONIN I PNL SERPL HS: <3 NG/L (ref 0–13)
CHLORIDE SERPL-SCNC: 110 MMOL/L (ref 98–107)
CO2 SERPL-SCNC: 26 MMOL/L (ref 21–32)
CREAT SERPL-MCNC: 1.06 MG/DL (ref 0.5–1.05)
DACRYOCYTES BLD QL SMEAR: NORMAL
EGFRCR SERPLBLD CKD-EPI 2021: 67 ML/MIN/1.73M*2
EOSINOPHIL # BLD AUTO: 0.08 X10*3/UL (ref 0–0.7)
EOSINOPHIL NFR BLD AUTO: 0.6 %
ERYTHROCYTE [DISTWIDTH] IN BLOOD BY AUTOMATED COUNT: 18.3 % (ref 11.5–14.5)
GLUCOSE SERPL-MCNC: 110 MG/DL (ref 74–99)
HCT VFR BLD AUTO: 19.7 % (ref 36–46)
HGB BLD-MCNC: 6.7 G/DL (ref 12–16)
HGB RETIC QN: 32 PG (ref 28–38)
HYPOCHROMIA BLD QL SMEAR: NORMAL
IMM GRANULOCYTES # BLD AUTO: 0.51 X10*3/UL (ref 0–0.7)
IMM GRANULOCYTES NFR BLD AUTO: 3.9 % (ref 0–0.9)
IMMATURE RETIC FRACTION: 31.6 %
LYMPHOCYTES # BLD AUTO: 3.3 X10*3/UL (ref 1.2–4.8)
LYMPHOCYTES NFR BLD AUTO: 25 %
MCH RBC QN AUTO: 32.4 PG (ref 26–34)
MCHC RBC AUTO-ENTMCNC: 34 G/DL (ref 32–36)
MCV RBC AUTO: 95 FL (ref 80–100)
MONOCYTES # BLD AUTO: 0.66 X10*3/UL (ref 0.1–1)
MONOCYTES NFR BLD AUTO: 5 %
NEUTROPHILS # BLD AUTO: 8.58 X10*3/UL (ref 1.2–7.7)
NEUTROPHILS NFR BLD AUTO: 65 %
NRBC BLD-RTO: 2.4 /100 WBCS (ref 0–0)
OVALOCYTES BLD QL SMEAR: NORMAL
PLATELET # BLD AUTO: 347 X10*3/UL (ref 150–450)
POLYCHROMASIA BLD QL SMEAR: NORMAL
POTASSIUM SERPL-SCNC: 4 MMOL/L (ref 3.5–5.3)
PROT SERPL-MCNC: 6.8 G/DL (ref 6.4–8.2)
RBC # BLD AUTO: 2.07 X10*6/UL (ref 4–5.2)
RBC MORPH BLD: NORMAL
RETICS #: 0.24 X10*6/UL (ref 0.02–0.08)
RETICS/RBC NFR AUTO: 11.7 % (ref 0.5–2)
RH FACTOR (ANTIGEN D): NORMAL
SCHISTOCYTES BLD QL SMEAR: NORMAL
SODIUM SERPL-SCNC: 141 MMOL/L (ref 136–145)
TARGETS BLD QL SMEAR: NORMAL
WBC # BLD AUTO: 13.2 X10*3/UL (ref 4.4–11.3)

## 2024-11-20 PROCEDURE — 96376 TX/PRO/DX INJ SAME DRUG ADON: CPT

## 2024-11-20 PROCEDURE — 80053 COMPREHEN METABOLIC PANEL: CPT | Performed by: EMERGENCY MEDICINE

## 2024-11-20 PROCEDURE — 99285 EMERGENCY DEPT VISIT HI MDM: CPT | Mod: 25

## 2024-11-20 PROCEDURE — 2500000004 HC RX 250 GENERAL PHARMACY W/ HCPCS (ALT 636 FOR OP/ED): Performed by: EMERGENCY MEDICINE

## 2024-11-20 PROCEDURE — 85025 COMPLETE CBC W/AUTO DIFF WBC: CPT | Performed by: EMERGENCY MEDICINE

## 2024-11-20 PROCEDURE — 84484 ASSAY OF TROPONIN QUANT: CPT | Performed by: EMERGENCY MEDICINE

## 2024-11-20 PROCEDURE — 96374 THER/PROPH/DIAG INJ IV PUSH: CPT

## 2024-11-20 PROCEDURE — 86900 BLOOD TYPING SEROLOGIC ABO: CPT | Performed by: EMERGENCY MEDICINE

## 2024-11-20 PROCEDURE — 96375 TX/PRO/DX INJ NEW DRUG ADDON: CPT

## 2024-11-20 PROCEDURE — 85045 AUTOMATED RETICULOCYTE COUNT: CPT | Performed by: EMERGENCY MEDICINE

## 2024-11-20 PROCEDURE — 71045 X-RAY EXAM CHEST 1 VIEW: CPT | Mod: FOREIGN READ | Performed by: RADIOLOGY

## 2024-11-20 PROCEDURE — 93005 ELECTROCARDIOGRAM TRACING: CPT

## 2024-11-20 PROCEDURE — 71045 X-RAY EXAM CHEST 1 VIEW: CPT

## 2024-11-20 PROCEDURE — 86922 COMPATIBILITY TEST ANTIGLOB: CPT

## 2024-11-20 RX ORDER — CHOLECALCIFEROL (VITAMIN D3) 25 MCG
2000 TABLET ORAL
Status: DISCONTINUED | OUTPATIENT
Start: 2024-11-21 | End: 2024-11-23 | Stop reason: HOSPADM

## 2024-11-20 RX ORDER — KETOROLAC TROMETHAMINE 30 MG/ML
15 INJECTION, SOLUTION INTRAMUSCULAR; INTRAVENOUS ONCE
Status: COMPLETED | OUTPATIENT
Start: 2024-11-20 | End: 2024-11-20

## 2024-11-20 RX ORDER — TIZANIDINE 4 MG/1
2 TABLET ORAL EVERY 8 HOURS PRN
Status: DISCONTINUED | OUTPATIENT
Start: 2024-11-20 | End: 2024-11-23 | Stop reason: HOSPADM

## 2024-11-20 RX ORDER — HYDROMORPHONE HYDROCHLORIDE 1 MG/ML
1 INJECTION, SOLUTION INTRAMUSCULAR; INTRAVENOUS; SUBCUTANEOUS
Status: COMPLETED | OUTPATIENT
Start: 2024-11-20 | End: 2024-11-20

## 2024-11-20 RX ORDER — FLUOXETINE 10 MG/1
10 CAPSULE ORAL DAILY
Status: DISCONTINUED | OUTPATIENT
Start: 2024-11-21 | End: 2024-11-23 | Stop reason: HOSPADM

## 2024-11-20 RX ORDER — HYDROMORPHONE HYDROCHLORIDE 1 MG/ML
1 INJECTION, SOLUTION INTRAMUSCULAR; INTRAVENOUS; SUBCUTANEOUS
Status: DISCONTINUED | OUTPATIENT
Start: 2024-11-20 | End: 2024-11-22

## 2024-11-20 RX ORDER — HYDROMORPHONE HYDROCHLORIDE 1 MG/ML
1 INJECTION, SOLUTION INTRAMUSCULAR; INTRAVENOUS; SUBCUTANEOUS ONCE
Status: COMPLETED | OUTPATIENT
Start: 2024-11-20 | End: 2024-11-20

## 2024-11-20 RX ORDER — IBUPROFEN 800 MG/1
800 TABLET ORAL EVERY 8 HOURS PRN
Status: DISCONTINUED | OUTPATIENT
Start: 2024-11-20 | End: 2024-11-21 | Stop reason: SDUPTHER

## 2024-11-20 RX ORDER — ONDANSETRON HYDROCHLORIDE 2 MG/ML
4 INJECTION, SOLUTION INTRAVENOUS ONCE
Status: COMPLETED | OUTPATIENT
Start: 2024-11-20 | End: 2024-11-20

## 2024-11-20 RX ORDER — HYDROXYZINE HYDROCHLORIDE 25 MG/1
25 TABLET, FILM COATED ORAL 2 TIMES DAILY PRN
Status: DISCONTINUED | OUTPATIENT
Start: 2024-11-20 | End: 2024-11-23 | Stop reason: HOSPADM

## 2024-11-20 RX ORDER — DIPHENHYDRAMINE HYDROCHLORIDE 50 MG/ML
25 INJECTION INTRAMUSCULAR; INTRAVENOUS ONCE
Status: COMPLETED | OUTPATIENT
Start: 2024-11-20 | End: 2024-11-20

## 2024-11-20 ASSESSMENT — COLUMBIA-SUICIDE SEVERITY RATING SCALE - C-SSRS
1. IN THE PAST MONTH, HAVE YOU WISHED YOU WERE DEAD OR WISHED YOU COULD GO TO SLEEP AND NOT WAKE UP?: NO
2. HAVE YOU ACTUALLY HAD ANY THOUGHTS OF KILLING YOURSELF?: NO
6. HAVE YOU EVER DONE ANYTHING, STARTED TO DO ANYTHING, OR PREPARED TO DO ANYTHING TO END YOUR LIFE?: NO

## 2024-11-20 ASSESSMENT — PAIN SCALES - GENERAL
PAINLEVEL_OUTOF10: 8
PAINLEVEL_OUTOF10: 9
PAINLEVEL_OUTOF10: 10 - WORST POSSIBLE PAIN
PAINLEVEL_OUTOF10: 9

## 2024-11-20 ASSESSMENT — PAIN DESCRIPTION - PROGRESSION: CLINICAL_PROGRESSION: GRADUALLY WORSENING

## 2024-11-20 ASSESSMENT — PAIN - FUNCTIONAL ASSESSMENT: PAIN_FUNCTIONAL_ASSESSMENT: 0-10

## 2024-11-20 ASSESSMENT — PAIN DESCRIPTION - LOCATION
LOCATION: GENERALIZED
LOCATION: GENERALIZED

## 2024-11-20 NOTE — TELEPHONE ENCOUNTER
Pt called back- states she is still waiting on a bed at [facility] campus and doesn't want anyone to forget about her. She said she doesn't feel like the ER is controlling her pain. I advised her that she has already been accepted to transfer to [facility], but they are just waiting for a bed to open up. Pt verbalized understanding. Team updated.

## 2024-11-20 NOTE — TELEPHONE ENCOUNTER
Pt states that she is in [place] ED awaiting transfer to [facility].   Pt said ED told her they do not know when she will be transferred. Pt having extreme pain, dizziness, and fatigue.   Team updated.

## 2024-11-20 NOTE — ED PROVIDER NOTES
"HPI   Chief Complaint   Patient presents with    Sickle Cell Pain Crisis       HPI  Patient is a 44-year-old female with history of sickle cell anemia, presenting to the ED today for pain consistent with sickle cell pain crisis.  Patient explains that she has been having sickle cell pain for the past 2 days, but got progressively worse today.  Patient takes Dilaudid 4 mg every 4-6 hours for her pain crisis at home, but it has not been improving.  She notes that she did have a blood transfusion 2 days ago for anemia with some improvement of her pain at that time.  She otherwise denies any significant chest pain or shortness of breath.  She describes \"pain everywhere,\" particularly in her legs bilaterally.  Patient follows with Dr. Saleem, heme/onc.      Patient History   Past Medical History:   Diagnosis Date    Sickle cell anemia (Multi)     Unspecified astigmatism, bilateral 2020    Astigmatism, bilateral     Past Surgical History:   Procedure Laterality Date     SECTION, CLASSIC  2016     Section    CHOLECYSTECTOMY  2016    Cholecystectomy    MR HEAD ANGIO W AND WO IV CONTRAST  4/15/2013    MR HEAD ANGIO W AND WO IV CONTRAST 4/15/2013 [facility] CLINICAL LEGACY    MR HEAD ANGIO W AND WO IV CONTRAST  2013    MR HEAD ANGIO W AND WO IV CONTRAST 2013 [facility] INPATIENT LEGACY    MR HEAD ANGIO WO IV CONTRAST  2014    MR HEAD ANGIO WO IV CONTRAST 2014 [facility] ANCILLARY LEGACY    MR HEAD ANGIO WO IV CONTRAST  2016    MR HEAD ANGIO WO IV CONTRAST 2016 [facility] ANCILLARY LEGACY    MR HEAD ANGIO WO IV CONTRAST  2019    MR HEAD ANGIO WO IV CONTRAST 2019 [facility] ANCILLARY LEGACY    MR HEAD ANGIO WO IV CONTRAST  2017    MR HEAD ANGIO WO IV CONTRAST 2017 [facility] INPATIENT LEGACY    MR NECK ANGIO WO IV CONTRAST  2013    MR NECK ANGIO WO IV CONTRAST 2013 [facility] INPATIENT LEGACY    MR NECK ANGIO WO IV CONTRAST  2014    MR NECK ANGIO WO IV CONTRAST 2014 [facility] ANCILLARY " LEGACY    OTHER SURGICAL HISTORY  06/16/2016    Brain Surgery    TUBAL LIGATION  04/10/2017    Tubal Ligation     Family History   Problem Relation Name Age of Onset    Sickle cell trait Sister      Multiple sclerosis Brother      Breast cancer Maternal Grandmother      Breast cancer Other paternal cousin     Sickle cell trait Child       Social History     Tobacco Use    Smoking status: Never    Smokeless tobacco: Never   Vaping Use    Vaping status: Never Used   Substance Use Topics    Alcohol use: Not Currently    Drug use: Not Currently       Physical Exam   ED Triage Vitals [11/20/24 0659]   Temperature Heart Rate Respirations BP   36.9 °C (98.4 °F) 94 18 121/67      Pulse Ox Temp Source Heart Rate Source Patient Position   96 % Temporal Monitor Sitting      BP Location FiO2 (%)     -- --       Physical Exam  Vitals and nursing note reviewed.   Constitutional:       Appearance: She is not toxic-appearing.   HENT:      Head: Normocephalic.      Mouth/Throat:      Mouth: Mucous membranes are moist.   Eyes:      Extraocular Movements: Extraocular movements intact.      Conjunctiva/sclera: Conjunctivae normal.   Cardiovascular:      Rate and Rhythm: Normal rate and regular rhythm.      Pulses: Normal pulses.      Comments: Mediport present in the right anterior chest wall  Pulmonary:      Effort: Pulmonary effort is normal. No respiratory distress.      Breath sounds: Normal breath sounds. No wheezing.   Abdominal:      General: There is no distension.      Palpations: Abdomen is soft.      Tenderness: There is no abdominal tenderness.   Musculoskeletal:         General: No swelling.      Cervical back: Neck supple.   Skin:     General: Skin is warm and dry.      Capillary Refill: Capillary refill takes less than 2 seconds.   Neurological:      General: No focal deficit present.      Mental Status: She is alert. Mental status is at baseline.           ED Course & MDM   ED Course as of 11/20/24 1252   Wed Nov 20,  2024 1111 EKG obtained at 734, interpreted by myself.  Normal sinus rhythm with a ventricular rate of 80, no axis deviation, normal intervals, with no acute ischemic changes [VT]      ED Course User Index  [VT] Akila VARGAS MD         Diagnoses as of 11/20/24 1252   Sickle cell pain crisis (Multi)             No data recorded     Rei Coma Scale Score: 15 (11/20/24 0702 : Hoa Bright, ELEANOR)                         Medical Decision Making  Patient was seen evaluated for symptoms consistent with her episodes of sickle cell pain crisis.  On arrival, vital signs are unremarkable.  Patient denies any chance of pregnancy.  Mediport is accessed and patient is administered Dilaudid and Toradol for her pain.  Additional lab work and imaging are ordered for further evaluation.    CBC shows chronic anemia with a hemoglobin of 6.7.  Patient has 11.7% reticulocytes.  CMP is unremarkable.  High sensitive troponin is negative.  XR chest 1 view   Final Result   No acute findings.   Signed by Zenon Felix MD        I discussed the case with Dr. Saleem, patient's hematologist.  As patient's hemoglobin of 6.7 is near her baseline, he does NOT recommend transfusion at this time.  He recommends every 1 hour doses of Dilaudid 1 mg IV.  If patient's pain continues to remain uncontrolled, he recommends transfer to [facility] for further pain management.  On reevaluation after 3 doses of Dilaudid, patient continues to have significant pain. Patient was informed of their lab and imaging results, and all questions and concerns were answered. Transfer planning for further management was discussed at this time, to which the patient was agreeable.  I discussed the case with Dr. Penaloza, bernarda/onc at [facility], who accepts patient for transfer.    Procedure  Procedures     Akila VARGAS MD  11/20/24 6740

## 2024-11-21 ENCOUNTER — HOME INFUSION (OUTPATIENT)
Dept: INFUSION THERAPY | Age: 44
End: 2024-11-21
Payer: COMMERCIAL

## 2024-11-21 PROBLEM — D57.00 SICKLE CELL PAIN CRISIS (MULTI): Status: ACTIVE | Noted: 2024-11-21

## 2024-11-21 PROCEDURE — 2500000005 HC RX 250 GENERAL PHARMACY W/O HCPCS: Performed by: NURSE PRACTITIONER

## 2024-11-21 PROCEDURE — 2500000001 HC RX 250 WO HCPCS SELF ADMINISTERED DRUGS (ALT 637 FOR MEDICARE OP): Performed by: NURSE PRACTITIONER

## 2024-11-21 PROCEDURE — 2500000004 HC RX 250 GENERAL PHARMACY W/ HCPCS (ALT 636 FOR OP/ED): Performed by: EMERGENCY MEDICINE

## 2024-11-21 PROCEDURE — G0378 HOSPITAL OBSERVATION PER HR: HCPCS

## 2024-11-21 PROCEDURE — 96376 TX/PRO/DX INJ SAME DRUG ADON: CPT

## 2024-11-21 PROCEDURE — S0109 METHADONE ORAL 5MG: HCPCS | Performed by: NURSE PRACTITIONER

## 2024-11-21 PROCEDURE — 97161 PT EVAL LOW COMPLEX 20 MIN: CPT | Mod: GP

## 2024-11-21 PROCEDURE — 99222 1ST HOSP IP/OBS MODERATE 55: CPT

## 2024-11-21 PROCEDURE — 2500000002 HC RX 250 W HCPCS SELF ADMINISTERED DRUGS (ALT 637 FOR MEDICARE OP, ALT 636 FOR OP/ED): Performed by: NURSE PRACTITIONER

## 2024-11-21 PROCEDURE — 2500000001 HC RX 250 WO HCPCS SELF ADMINISTERED DRUGS (ALT 637 FOR MEDICARE OP): Performed by: EMERGENCY MEDICINE

## 2024-11-21 PROCEDURE — 2500000002 HC RX 250 W HCPCS SELF ADMINISTERED DRUGS (ALT 637 FOR MEDICARE OP, ALT 636 FOR OP/ED): Performed by: EMERGENCY MEDICINE

## 2024-11-21 PROCEDURE — 2500000004 HC RX 250 GENERAL PHARMACY W/ HCPCS (ALT 636 FOR OP/ED): Performed by: NURSE PRACTITIONER

## 2024-11-21 PROCEDURE — 97165 OT EVAL LOW COMPLEX 30 MIN: CPT | Mod: GO

## 2024-11-21 PROCEDURE — S4993 CONTRACEPTIVE PILLS FOR BC: HCPCS | Performed by: NURSE PRACTITIONER

## 2024-11-21 RX ORDER — DOCUSATE SODIUM 100 MG/1
100 CAPSULE, LIQUID FILLED ORAL EVERY OTHER DAY
Status: DISCONTINUED | OUTPATIENT
Start: 2024-11-22 | End: 2024-11-23 | Stop reason: HOSPADM

## 2024-11-21 RX ORDER — ACETAMINOPHEN 500 MG
5 TABLET ORAL NIGHTLY
Status: DISCONTINUED | OUTPATIENT
Start: 2024-11-21 | End: 2024-11-23 | Stop reason: HOSPADM

## 2024-11-21 RX ORDER — VALACYCLOVIR HYDROCHLORIDE 500 MG/1
500 TABLET, FILM COATED ORAL DAILY
COMMUNITY

## 2024-11-21 RX ORDER — HYDROXYUREA 500 MG/1
500 CAPSULE ORAL
COMMUNITY

## 2024-11-21 RX ORDER — DOCUSATE SODIUM 100 MG/1
100 CAPSULE, LIQUID FILLED ORAL DAILY
Status: CANCELLED | OUTPATIENT
Start: 2024-11-21

## 2024-11-21 RX ORDER — FOLIC ACID 1 MG/1
1 TABLET ORAL DAILY
Status: CANCELLED | OUTPATIENT
Start: 2024-11-21

## 2024-11-21 RX ORDER — SODIUM CHLORIDE 9 MG/ML
100 INJECTION, SOLUTION INTRAVENOUS CONTINUOUS
Status: ACTIVE | OUTPATIENT
Start: 2024-11-21 | End: 2024-11-22

## 2024-11-21 RX ORDER — ACETAMINOPHEN 325 MG/1
650 TABLET ORAL EVERY 4 HOURS PRN
Status: DISCONTINUED | OUTPATIENT
Start: 2024-11-21 | End: 2024-11-23 | Stop reason: HOSPADM

## 2024-11-21 RX ORDER — CETIRIZINE HYDROCHLORIDE 10 MG/1
10 TABLET ORAL DAILY
Status: DISCONTINUED | OUTPATIENT
Start: 2024-11-21 | End: 2024-11-23 | Stop reason: HOSPADM

## 2024-11-21 RX ORDER — DEFERASIROX 180 MG/1
180 TABLET, FILM COATED ORAL DAILY
Status: CANCELLED | OUTPATIENT
Start: 2024-11-21

## 2024-11-21 RX ORDER — NORETHINDRONE 5 MG/1
5 TABLET ORAL DAILY
Status: DISCONTINUED | OUTPATIENT
Start: 2024-11-21 | End: 2024-11-23 | Stop reason: HOSPADM

## 2024-11-21 RX ORDER — FOLIC ACID 1 MG/1
1 TABLET ORAL DAILY
Status: DISCONTINUED | OUTPATIENT
Start: 2024-11-21 | End: 2024-11-23 | Stop reason: HOSPADM

## 2024-11-21 RX ORDER — HYDROXYUREA 500 MG/1
500 CAPSULE ORAL
Status: DISCONTINUED | OUTPATIENT
Start: 2024-11-21 | End: 2024-11-23 | Stop reason: HOSPADM

## 2024-11-21 RX ORDER — PROCHLORPERAZINE MALEATE 5 MG
5 TABLET ORAL EVERY 8 HOURS PRN
Status: DISCONTINUED | OUTPATIENT
Start: 2024-11-21 | End: 2024-11-21

## 2024-11-21 RX ORDER — DIPHENHYDRAMINE HCL 25 MG
25 CAPSULE ORAL EVERY 8 HOURS PRN
Status: DISCONTINUED | OUTPATIENT
Start: 2024-11-21 | End: 2024-11-23 | Stop reason: HOSPADM

## 2024-11-21 RX ORDER — KETOROLAC TROMETHAMINE 30 MG/ML
15 INJECTION, SOLUTION INTRAMUSCULAR; INTRAVENOUS EVERY 8 HOURS PRN
Status: DISCONTINUED | OUTPATIENT
Start: 2024-11-21 | End: 2024-11-23 | Stop reason: HOSPADM

## 2024-11-21 RX ORDER — METHADONE HYDROCHLORIDE 10 MG/1
10 TABLET ORAL EVERY 12 HOURS
Status: CANCELLED | OUTPATIENT
Start: 2024-11-21

## 2024-11-21 RX ORDER — CETIRIZINE HYDROCHLORIDE 10 MG/1
10 TABLET ORAL DAILY
Status: CANCELLED | OUTPATIENT
Start: 2024-11-21

## 2024-11-21 RX ORDER — DIPHENHYDRAMINE HCL 25 MG
25 CAPSULE ORAL 3 TIMES DAILY
Status: CANCELLED | OUTPATIENT
Start: 2024-11-21

## 2024-11-21 RX ORDER — ACETAMINOPHEN 650 MG/1
650 SUPPOSITORY RECTAL EVERY 4 HOURS PRN
Status: DISCONTINUED | OUTPATIENT
Start: 2024-11-21 | End: 2024-11-23 | Stop reason: HOSPADM

## 2024-11-21 RX ORDER — PREGABALIN 25 MG/1
25 CAPSULE ORAL 2 TIMES DAILY
Status: DISCONTINUED | OUTPATIENT
Start: 2024-11-21 | End: 2024-11-23 | Stop reason: HOSPADM

## 2024-11-21 RX ORDER — POLYETHYLENE GLYCOL 3350 17 G/17G
17 POWDER, FOR SOLUTION ORAL DAILY
Status: DISCONTINUED | OUTPATIENT
Start: 2024-11-21 | End: 2024-11-23 | Stop reason: HOSPADM

## 2024-11-21 RX ORDER — SENNOSIDES 8.6 MG/1
1 TABLET ORAL 2 TIMES DAILY PRN
Status: DISCONTINUED | OUTPATIENT
Start: 2024-11-21 | End: 2024-11-23 | Stop reason: HOSPADM

## 2024-11-21 RX ORDER — ACETAMINOPHEN 500 MG
5 TABLET ORAL NIGHTLY
COMMUNITY

## 2024-11-21 RX ORDER — ACETAMINOPHEN 160 MG/5ML
650 SOLUTION ORAL EVERY 4 HOURS PRN
Status: DISCONTINUED | OUTPATIENT
Start: 2024-11-21 | End: 2024-11-23 | Stop reason: HOSPADM

## 2024-11-21 RX ORDER — MELOXICAM 15 MG/1
15 TABLET ORAL DAILY
Status: DISCONTINUED | OUTPATIENT
Start: 2024-11-21 | End: 2024-11-23 | Stop reason: HOSPADM

## 2024-11-21 RX ORDER — PRAMOXINE HYDROCHLORIDE 10 MG/G
1 AEROSOL, FOAM TOPICAL EVERY 6 HOURS PRN
Status: DISCONTINUED | OUTPATIENT
Start: 2024-11-21 | End: 2024-11-23 | Stop reason: HOSPADM

## 2024-11-21 RX ORDER — PROCHLORPERAZINE MALEATE 5 MG
5 TABLET ORAL EVERY 6 HOURS PRN
Status: DISCONTINUED | OUTPATIENT
Start: 2024-11-21 | End: 2024-11-23 | Stop reason: HOSPADM

## 2024-11-21 RX ORDER — LIDOCAINE 50 MG/G
1 PATCH TOPICAL DAILY PRN
COMMUNITY

## 2024-11-21 RX ORDER — CAPSAICIN 0.03 G/100G
1 CREAM TOPICAL AS NEEDED
Status: DISCONTINUED | OUTPATIENT
Start: 2024-11-21 | End: 2024-11-23 | Stop reason: HOSPADM

## 2024-11-21 RX ORDER — METHADONE HYDROCHLORIDE 10 MG/1
10 TABLET ORAL EVERY 12 HOURS
Status: DISCONTINUED | OUTPATIENT
Start: 2024-11-21 | End: 2024-11-23 | Stop reason: HOSPADM

## 2024-11-21 RX ORDER — LIDOCAINE 560 MG/1
1 PATCH PERCUTANEOUS; TOPICAL; TRANSDERMAL DAILY
Status: DISCONTINUED | OUTPATIENT
Start: 2024-11-21 | End: 2024-11-23 | Stop reason: HOSPADM

## 2024-11-21 RX ORDER — TALC
6 POWDER (GRAM) TOPICAL NIGHTLY PRN
Status: CANCELLED | OUTPATIENT
Start: 2024-11-21

## 2024-11-21 RX ORDER — PANTOPRAZOLE SODIUM 40 MG/1
40 TABLET, DELAYED RELEASE ORAL
Status: DISCONTINUED | OUTPATIENT
Start: 2024-11-22 | End: 2024-11-23 | Stop reason: HOSPADM

## 2024-11-21 RX ORDER — MELOXICAM 15 MG/1
15 TABLET ORAL DAILY
Status: CANCELLED | OUTPATIENT
Start: 2024-11-21

## 2024-11-21 RX ORDER — CAPSAICIN 0.03 G/100G
1 CREAM TOPICAL AS NEEDED
Status: CANCELLED | OUTPATIENT
Start: 2024-11-21

## 2024-11-21 RX ORDER — DICLOFENAC SODIUM 10 MG/G
4 GEL TOPICAL 4 TIMES DAILY PRN
Status: DISCONTINUED | OUTPATIENT
Start: 2024-11-21 | End: 2024-11-23 | Stop reason: HOSPADM

## 2024-11-21 SDOH — SOCIAL STABILITY: SOCIAL INSECURITY: HAS ANYONE EVER THREATENED TO HURT YOUR FAMILY OR YOUR PETS?: NO

## 2024-11-21 SDOH — SOCIAL STABILITY: SOCIAL INSECURITY
WITHIN THE LAST YEAR, HAVE YOU BEEN RAPED OR FORCED TO HAVE ANY KIND OF SEXUAL ACTIVITY BY YOUR PARTNER OR EX-PARTNER?: NO

## 2024-11-21 SDOH — SOCIAL STABILITY: SOCIAL INSECURITY: DOES ANYONE TRY TO KEEP YOU FROM HAVING/CONTACTING OTHER FRIENDS OR DOING THINGS OUTSIDE YOUR HOME?: NO

## 2024-11-21 SDOH — SOCIAL STABILITY: SOCIAL INSECURITY: DO YOU FEEL UNSAFE GOING BACK TO THE PLACE WHERE YOU ARE LIVING?: NO

## 2024-11-21 SDOH — ECONOMIC STABILITY: FOOD INSECURITY: WITHIN THE PAST 12 MONTHS, YOU WORRIED THAT YOUR FOOD WOULD RUN OUT BEFORE YOU GOT THE MONEY TO BUY MORE.: NEVER TRUE

## 2024-11-21 SDOH — ECONOMIC STABILITY: INCOME INSECURITY: IN THE PAST 12 MONTHS HAS THE ELECTRIC, GAS, OIL, OR WATER COMPANY THREATENED TO SHUT OFF SERVICES IN YOUR HOME?: NO

## 2024-11-21 SDOH — SOCIAL STABILITY: SOCIAL INSECURITY: WITHIN THE LAST YEAR, HAVE YOU BEEN HUMILIATED OR EMOTIONALLY ABUSED IN OTHER WAYS BY YOUR PARTNER OR EX-PARTNER?: NO

## 2024-11-21 SDOH — SOCIAL STABILITY: SOCIAL INSECURITY: HAVE YOU HAD THOUGHTS OF HARMING ANYONE ELSE?: NO

## 2024-11-21 SDOH — SOCIAL STABILITY: SOCIAL INSECURITY: HAVE YOU HAD ANY THOUGHTS OF HARMING ANYONE ELSE?: NO

## 2024-11-21 SDOH — SOCIAL STABILITY: SOCIAL INSECURITY: ABUSE: ADULT

## 2024-11-21 SDOH — SOCIAL STABILITY: SOCIAL INSECURITY: DO YOU FEEL ANYONE HAS EXPLOITED OR TAKEN ADVANTAGE OF YOU FINANCIALLY OR OF YOUR PERSONAL PROPERTY?: NO

## 2024-11-21 SDOH — SOCIAL STABILITY: SOCIAL INSECURITY
WITHIN THE LAST YEAR, HAVE YOU BEEN KICKED, HIT, SLAPPED, OR OTHERWISE PHYSICALLY HURT BY YOUR PARTNER OR EX-PARTNER?: NO

## 2024-11-21 SDOH — SOCIAL STABILITY: SOCIAL INSECURITY: WITHIN THE LAST YEAR, HAVE YOU BEEN AFRAID OF YOUR PARTNER OR EX-PARTNER?: NO

## 2024-11-21 SDOH — ECONOMIC STABILITY: FOOD INSECURITY: WITHIN THE PAST 12 MONTHS, THE FOOD YOU BOUGHT JUST DIDN'T LAST AND YOU DIDN'T HAVE MONEY TO GET MORE.: NEVER TRUE

## 2024-11-21 SDOH — SOCIAL STABILITY: SOCIAL INSECURITY: WERE YOU ABLE TO COMPLETE ALL THE BEHAVIORAL HEALTH SCREENINGS?: YES

## 2024-11-21 SDOH — SOCIAL STABILITY: SOCIAL INSECURITY: ARE THERE ANY APPARENT SIGNS OF INJURIES/BEHAVIORS THAT COULD BE RELATED TO ABUSE/NEGLECT?: NO

## 2024-11-21 SDOH — SOCIAL STABILITY: SOCIAL INSECURITY: ARE YOU OR HAVE YOU BEEN THREATENED OR ABUSED PHYSICALLY, EMOTIONALLY, OR SEXUALLY BY ANYONE?: NO

## 2024-11-21 ASSESSMENT — PAIN DESCRIPTION - ORIENTATION
ORIENTATION: RIGHT;LEFT
ORIENTATION: RIGHT;LEFT

## 2024-11-21 ASSESSMENT — ACTIVITIES OF DAILY LIVING (ADL)
HEARING - RIGHT EAR: FUNCTIONAL
BATHING: NEEDS ASSISTANCE
JUDGMENT_ADEQUATE_SAFELY_COMPLETE_DAILY_ACTIVITIES: YES
GROOMING: INDEPENDENT
ADEQUATE_TO_COMPLETE_ADL: YES
FEEDING YOURSELF: INDEPENDENT
DRESSING YOURSELF: INDEPENDENT
WALKS IN HOME: NEEDS ASSISTANCE
HEARING - LEFT EAR: FUNCTIONAL
LACK_OF_TRANSPORTATION: NO
TOILETING: NEEDS ASSISTANCE
PATIENT'S MEMORY ADEQUATE TO SAFELY COMPLETE DAILY ACTIVITIES?: YES

## 2024-11-21 ASSESSMENT — LIFESTYLE VARIABLES
AUDIT-C TOTAL SCORE: 0
HOW MANY STANDARD DRINKS CONTAINING ALCOHOL DO YOU HAVE ON A TYPICAL DAY: PATIENT DOES NOT DRINK
HOW OFTEN DO YOU HAVE 6 OR MORE DRINKS ON ONE OCCASION: NEVER
HOW OFTEN DO YOU HAVE A DRINK CONTAINING ALCOHOL: NEVER
PRESCIPTION_ABUSE_PAST_12_MONTHS: NO
SKIP TO QUESTIONS 9-10: 1
AUDIT-C TOTAL SCORE: 0
SUBSTANCE_ABUSE_PAST_12_MONTHS: NO

## 2024-11-21 ASSESSMENT — PAIN DESCRIPTION - LOCATION
LOCATION: GENERALIZED
LOCATION: ARM
LOCATION: GENERALIZED

## 2024-11-21 ASSESSMENT — PAIN SCALES - GENERAL
PAINLEVEL_OUTOF10: 8
PAINLEVEL_OUTOF10: 8
PAINLEVEL_OUTOF10: 6
PAINLEVEL_OUTOF10: 8
PAINLEVEL_OUTOF10: 9
PAINLEVEL_OUTOF10: 8

## 2024-11-21 ASSESSMENT — COGNITIVE AND FUNCTIONAL STATUS - GENERAL
DAILY ACTIVITIY SCORE: 19
MOVING TO AND FROM BED TO CHAIR: A LITTLE
HELP NEEDED FOR BATHING: A LITTLE
STANDING UP FROM CHAIR USING ARMS: A LITTLE
CLIMB 3 TO 5 STEPS WITH RAILING: A LOT
DRESSING REGULAR LOWER BODY CLOTHING: A LITTLE
PATIENT BASELINE BEDBOUND: NO
HELP NEEDED FOR BATHING: A LITTLE
DRESSING REGULAR UPPER BODY CLOTHING: A LITTLE
MOVING TO AND FROM BED TO CHAIR: A LITTLE
TOILETING: A LITTLE
MOBILITY SCORE: 19
HELP NEEDED FOR BATHING: A LITTLE
WALKING IN HOSPITAL ROOM: A LITTLE
WALKING IN HOSPITAL ROOM: A LITTLE
CLIMB 3 TO 5 STEPS WITH RAILING: A LITTLE
CLIMB 3 TO 5 STEPS WITH RAILING: A LITTLE
MOBILITY SCORE: 20
MOBILITY SCORE: 20
STANDING UP FROM CHAIR USING ARMS: A LITTLE
STANDING UP FROM CHAIR USING ARMS: A LITTLE
DAILY ACTIVITIY SCORE: 22
MOVING TO AND FROM BED TO CHAIR: A LITTLE
WALKING IN HOSPITAL ROOM: A LITTLE
DAILY ACTIVITIY SCORE: 22
TOILETING: A LITTLE
PERSONAL GROOMING: A LITTLE
TOILETING: A LITTLE

## 2024-11-21 ASSESSMENT — PATIENT HEALTH QUESTIONNAIRE - PHQ9
2. FEELING DOWN, DEPRESSED OR HOPELESS: NOT AT ALL
1. LITTLE INTEREST OR PLEASURE IN DOING THINGS: NOT AT ALL
SUM OF ALL RESPONSES TO PHQ9 QUESTIONS 1 & 2: 0

## 2024-11-21 ASSESSMENT — PAIN - FUNCTIONAL ASSESSMENT
PAIN_FUNCTIONAL_ASSESSMENT: 0-10

## 2024-11-21 NOTE — PROGRESS NOTES
Occupational Therapy                 Therapy Communication Note    Patient Name: Mary Alice Aragon  MRN: [id number]  Department: [facility] ED  Room: [place] [address]  Today's Date: 11/21/2024     Discipline: Occupational Therapy    Missed Visit Reason: Missed Visit Reason: Patient placed on medical hold (RN asking to hold until after patient is medicated; will reattempt as able/appropriate)

## 2024-11-21 NOTE — PROGRESS NOTES
Pharmacy Medication History Review    Mary Alice Aragon is a 44 y.o. female admitted for Sickle cell pain crisis (Multi). Pharmacy reviewed the patient's [id number] medications and allergies for accuracy.    The list below reflectives the updated PTA list. Please review each medication in order reconciliation for additional clarification and justification.  Prior to Admission medications    Medication Sig Start Date End Date Authorizing Provider   pregabalin (Lyrica) 25 mg capsule Take 1 capsule (25 mg) by mouth 2 times a day.  Last filled 9/11 for 30ds   Ev Osman APRN-CNP   traZODone (Desyrel) 100 mg tablet Take 1-2 tablets (100-200 mg) by mouth once daily at bedtime.  Last filled 2/5 for 30ds   Cinthia May MD   ascorbic acid (Vitamin C) 500 mg tablet Take 2 tablets (1,000 mg) by mouth once daily.   Historical Provider, MD   calcium carbonate (Tums) 200 mg calcium chewable tablet Chew 1-2 tablets (500-1,000 mg) 4 times a day as needed for heartburn.   Historical Provider, MD   capsaicin (Zostrix) 0.025 % cream Apply 1 Application topically if needed.   Historical Provider, MD   cholecalciferol (Vitamin D-3) 50 MCG (2000 UT) tablet Take 1 tablet (2,000 Units) by mouth early in the morning..   Gio Saleem MD   deferasirox (Jadenu) 180 mg tablet Take 1 tablet (180 mg) by mouth once daily.  Only takes when Desferal is unavailable    Historical Provider, MD   deferoxamine (Desferal) 2 gram injection Infuse 2000mg subcutaneously once daily over 8 hours via [place] Pump   Adriana Coelho APRN-CNP   diclofenac sodium (Voltaren) 1 % gel Apply 4.5 inches (4 g) topically 4 times a day as needed (pain).   Gio Saleem MD   diphenhydrAMINE (BENADryl) 25 mg capsule Take 1 capsule (25 mg) by mouth every 8 hours if needed.   Historical Provider, MD   docusate sodium (Colace) 100 mg capsule Take 1 capsule (100 mg) by mouth every other day.   Historical Provider, MD   FLUoxetine (PROzac) 10  mg capsule Take 1 capsule (10 mg) by mouth once daily.  Last filled 9/26 for 30ds 9/26/24 12/25/24 Cinthia May MD   folic acid (Folvite) 1 mg tablet Take 1 tablet (1 mg) by mouth once daily.   Historical Provider, MD   HYDROmorphone (Dilaudid) 4 mg tablet Take 1 tablet (4 mg) by mouth 3 times a day as needed for severe pain (7 - 10).   Ev Osman APRN-CNP   hydroxyurea (Hydrea) 500 mg capsule Take 2 capsules (1,000 mg total) by mouth once a day on Monday, Wednesday, and Friday.  Last filled 4/15 for 28ds   MICHELLE Mercado-CNP   hydroxyurea (Hydrea) 500 mg capsule Take 1 capsule (500 mg total) by mouth once a day on Sunday, Tuesday, Thursday, and Saturday.  Last filled 4/15 for 28ds   Historical Provider, MD   hydrOXYzine HCL (Atarax) 25 mg tablet Take 1 tablet (25 mg) by mouth 2 times a day as needed for anxiety.   Cinthia May MD   ibuprofen 800 mg tablet Take 1 tablet (800 mg) by mouth every 8 hours if needed for moderate pain (4 - 6).  Does not take while on Meloxicam   Gio Saleem MD   ketamine (Ketalar) 10 mg/mL injection Infuse 1 mL (10 mg) into a venous catheter every 30 (thirty) days. Not administered in the home.   Historical Provider, MD   lidocaine (Lidoderm) 5 % patch Place 1 patch on the skin once daily as needed for mild pain (1 - 3).   Historical Provider, MD   loratadine (Claritin) 10 mg tablet Take 1 tablet (10 mg) by mouth once daily as needed for allergies.   Historical Provider, MD   melatonin 5 mg tablet Take 1 tablet (5 mg) by mouth once daily at bedtime.   Historical Provider, MD   meloxicam (Mobic) 15 mg tablet Take 1 tablet (15 mg) by mouth once daily.   vE Osman, APRN-CNP   methadone (Dolophine) 10 mg tablet Take 1 tablet (10 mg) by mouth every 12 hours.   Gio Saleem MD   multivitamin tablet Take 1 tablet by mouth once daily.   Historical Provider, MD   naloxone (Narcan) 4 mg/0.1 mL nasal spray Administer 1 spray (4 mg) into  affected nostril(s) if needed for opioid reversal or respiratory depression. May repeat every 2-3 minutes if needed, alternating nostrils, until medical assistance becomes available.   Gio Saleem MD   norethindrone (Aygestin) 5 mg tablet Take 1 tablet (5 mg) by mouth once daily.   Mary Ann Bolanos MD   omeprazole (PriLOSEC) 40 mg DR capsule Take 1 capsule (40 mg) by mouth once daily as needed.   Historical Provider, MD   oxygen (O2) gas therapy Inhale 3 L/min once daily at bedtime.   Historical Provider, MD   peg 400/hypromellose/glycerin (DRY EYE RELIEF OPHT) Administer 1 drop into affected eye(s) once daily.   Historical Provider, MD   prochlorperazine (Compazine) 10 mg tablet 1 tablet EVERY 8 HOURS (route: oral)   Gio Saleem MD   senna 8.6 mg tablet Take 1 tablet (8.6 mg) by mouth 2 times a day as needed for constipation.   Gio Saleem MD   tiZANidine (Zanaflex) 2 mg tablet Take 1-2 tablets (2-4 mg) by mouth every 8 hours if needed for muscle spasms.   Ev Osman, APRN-CNP   trolamine salicylate (Aspercreme) 10 % cream Apply 1 Application topically if needed.   Historical Provider, MD   valACYclovir (Valtrex) 500 mg tablet Take 1 tablet (500 mg) by mouth once daily.  No pharmacy record   Historical Provider, MD        The list below reflectives the updated allergy list. Please review each documented allergy for additional clarification and justification.  Allergies  Reviewed by Merly Frazier RN on 11/21/2024   No Known Allergies         Below are additional concerns with the patient's PTA list.      Jane Knight

## 2024-11-21 NOTE — PROGRESS NOTES
Physical Therapy    Physical Therapy Evaluation    Patient Name: Mary Alice Aragon  MRN: [id number]  Today's Date: 11/21/2024   Time Calculation  Start Time: 1420  Stop Time: 1436  Time Calculation (min): 16 min  AC25/AC25    Assessment/Plan   PT Assessment  PT Assessment Results: Decreased strength, Decreased mobility, Impaired balance, Pain  Rehab Prognosis: Fair  Evaluation/Treatment Tolerance: Patient tolerated treatment well  Medical Staff Made Aware: Yes  Strengths: Ability to acquire knowledge  End of Session Communication: Bedside nurse  Assessment Comment: pt tolerated session. pt required SBA for functional mobility to maintain safety. pt presented with decreased functional mobility, strength, and balance. pt would benefit from low int therapy.  End of Session Patient Position: Alarm off, caregiver present, On cart (2 rails, dtr present, call light in reach)  IP OR SWING BED PT PLAN  Inpatient or Swing Bed: Inpatient  PT Plan  Treatment/Interventions: Bed mobility, Transfer training, Gait training, Balance training, Strengthening, Stair training, Range of motion, Endurance training, Therapeutic exercise, Therapeutic activity  PT Plan: Ongoing PT  PT Frequency: 2 times per week  PT Discharge Recommendations: Low intensity level of continued care  PT Recommended Transfer Status: Assist x1  PT - OK to Discharge: Yes (once medically cleared)    Subjective     Current Problem:  1. Sickle cell pain crisis (Multi)          Patient Active Problem List   Diagnosis    Abnormal echocardiogram    Abnormal uterine bleeding    Adnexal mass    Pelvic mass in female    Simple adnexal cyst less than 5 cm in diameter in premenopausal patient    Anemia    Aneurysm (CMS-HCC)    Anxiety    Atypical chest pain    Congenital factor XI deficiency (Multi)    Dental caries on smooth surface penetrating into pulp    Dysuria    GERD (gastroesophageal reflux disease)    Glaucoma suspect of both eyes    Hemoglobin SS disease with crisis  (Multi)    Hemoglobin SS genotype (Multi)    Homozygous sickle cell (SS) disease (Multi)    Sickle-cell crisis (Multi)    AVN (avascular necrosis of bone) (Multi)    Mass of lower outer quadrant of left breast    MDD (major depressive disorder)    Migraine without aura and without status migrainosus, not intractable    Multinodular goiter (nontoxic)    Myopia of both eyes with astigmatism    Noncompliance with medication regimen    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    Refractive error    Shortness of breath on exertion    Nocturnal hypoxia    Sleep apnea    Thyroid nodule    Torticollis    Vitamin D deficiency, unspecified    Sickle cell disease without crisis (Multi)    Polyneuropathy    Personal history of pulmonary embolism    Personal history of pneumonia (recurrent)    Personal history of COVID-19    Other sequelae of cerebral infarction    Follicular cyst of the skin and subcutaneous tissue, unspecified    Other chronic pain    Hydrocephalus    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    Cerebral aneurysm (HHS-HCC)    Anemia, unspecified    Idiopathic aseptic necrosis of bone, other site    Abnormal echocardiography    Gastroesophageal reflux disease    Hemoglobin SS disease (Multi)    Major depressive disorder    Breast lump in lower outer quadrant    Migraine without aura    Non-toxic multinodular goiter    Pelvic mass    Myopia    Dyspnea on exertion    Shortness of breath    Swelling of lower extremity    Sprain of ankle    Spasm    Right lower quadrant abdominal pain    Local infection of wound    Disease suspected    Disease due to severe acute respiratory syndrome coronavirus 2 (SARS-CoV-2)    Arthritis of hip    Knee pain    Arthralgia of hip    Pain of foot    Ankle pain    Sickle cell crisis (Multi)    Scalp abscess    At risk for complication    Preglaucoma, unspecified, bilateral    Complex cyst of uterine adnexa    Sickle cell pain crisis (Multi)       General Visit  Information:  General  Reason for Referral: PT eval and tx  Referred By: Khoa  Past Medical History Relevant to Rehab: Afib, anemia, DM, DVT, CKD, GERD, HTN, HLD, OA, LUE thrombus, vertigo, AV block, heart failure, MI, IBS, pacemaker, cardiomyopathy, dehydration  Co-Treatment: OT  Co-Treatment Reason: to maximize pt safety and mobility  Prior to Session Communication: Bedside nurse  Patient Position Received: On cart, Alarm off, not on at start of session (2 rails, dtr present)    Home Living:  Home Living  Home Living Comments: lives with 2 dtrs in house. 4 GABRIEL with rail. first floor set up. pt has tub shower with HHS. pt has standard toilet. pt owns cane. pt drives. denies falls.    Prior Level of Function:  Prior Function Per Pt/Caregiver Report  Prior Function Comments: pt IND in ADLs. shares IADLs with dtrs. pt amb with cane PRN.    Precautions:  Precautions  Precautions Comment: falls, IV, NC, tele       Objective     Pain:  Pain Assessment  0-10 (Numeric) Pain Score: 8  Pain Location: Generalized    Cognition:  Cognition  Overall Cognitive Status: Within Functional Limits    General Assessments:         Sensation  Light Touch: No apparent deficits    Strength  Strength Comments: BLE strength not assessed formally with MMT d/t LE pain. pt was SBA for functional mobility to maintain safety.          Functional Assessments:     Bed Mobility  Bed Mobility:  (completed sup<>sitting with SBA)  Transfers  Transfer:  (completed STS from bed with SBA)  Ambulation/Gait Training  Ambulation/Gait Training Performed:  (completed amb with w/w and SBA)          Outcome Measures:     [facility] Basic Mobility  Turning from your back to your side while in a flat bed without using bedrails: None  Moving from lying on your back to sitting on the side of a flat bed without using bedrails: None  Moving to and from bed to chair (including a wheelchair): A little  Standing up from a chair using your arms (e.g. wheelchair or bedside  chair): A little  To walk in hospital room: A little  Climbing 3-5 steps with railing: A lot  Basic Mobility - Total Score: 19                   Goals:  Encounter Problems       Encounter Problems (Active)       PT Problem       STG - Pt will perform a B LE ther ex program of 2-3 sets of 10  (Progressing)       Start:  11/21/24    Expected End:  12/05/24            STG - Pt will amb 100' using w/w with Ramana  (Progressing)       Start:  11/21/24    Expected End:  12/05/24            STG - Pt will transfer STS IND  (Progressing)       Start:  11/21/24    Expected End:  12/05/24            STG -  Pt will navigate 4 stairs using rail with SBA  (Progressing)       Start:  11/21/24    Expected End:  12/05/24                 Education Documentation  Mobility Training, taught by Wandy Wolfe PT at 11/21/2024  3:05 PM.  Learner: Family, Patient  Readiness: Acceptance  Method: Explanation  Response: Verbalizes Understanding    Education Comments  No comments found.

## 2024-11-21 NOTE — PROGRESS NOTES
Occupational Therapy    Evaluation    Patient Name: Mary Alice Aragon  MRN: [id number]  Department: [facility] ED  Room: [place] [address]  Today's Date: 11/21/2024  Time Calculation  Start Time: 1419  Stop Time: 1436  Time Calculation (min): 17 min        Assessment:  End of Session Communication: Bedside nurse  End of Session Patient Position: On cart, Alarm off, not on at start of session (2 rails up, call light in reach, all needs met. daughter present at bedside)  Strengths: Insight into problems, Living arrangement secure, Support of extended family/friends  Barriers to Participation: Comorbidities  Plan:  Treatment Interventions: ADL retraining, Functional transfer training, UE strengthening/ROM, Endurance training, Patient/family training, Equipment evaluation/education, Compensatory technique education  OT Frequency: 2 times per week  OT Discharge Recommendations: Low intensity level of continued care  OT - OK to Discharge: Yes (once medically appropriate to next level of care)  Treatment Interventions: ADL retraining, Functional transfer training, UE strengthening/ROM, Endurance training, Patient/family training, Equipment evaluation/education, Compensatory technique education    Subjective   Current Problem:  1. Sickle cell pain crisis (Multi)          General:  General  Reason for Referral: OT eval and tx; ADLs. dx; Sickle cell pain crisis. chest x-ray: No acute findings.  Referred By: Khoa  Past Medical History Relevant to Rehab: 44 y.o. female with PMHx of Sickle cell disease c/b transfusion-related iron overload, heart murmur, cerebral aneurysm, NICHOL, MDD, and nocturnal hypoxia (with home 02 at 3 liters at hs and prn) who presented yesterday with pain consistent with sickle cell crisis.  Patient reports that 5 days ago she developed a viral illness for which she had fevers, chills, abdominal pain, and diarrhea.  She reports after a few days she started to feel better, but then started having pain consistent with  "sickle cell crisis.  She notes bilateral lower extremity pain 9-10 out of 10 and inability to walk so she decided to come to the emergency room for evaluation.  She notes she does not often have sickle cell crisis however it is normally precipitated with an illness in this case.  She currently denies any other symptoms at this time but does endorse some mild shortness of breath for which she uses her prescribed oxygen as needed.  Missed Visit: Yes  Missed Visit Reason: Patient placed on medical hold (RN asking to hold until after patient is medicated; will reattempt as able/appropriate)  Family/Caregiver Present: Yes (patient daughter present, supportive)  Co-Treatment: PT  Co-Treatment Reason: for safety and to maximize therapeutic performance  Prior to Session Communication: Bedside nurse  Patient Position Received: On cart, Alarm off, not on at start of session (2 rails up)  General Comment: patient pleasant and cooperative to participate  Precautions:  Medical Precautions: Fall precautions (IV, tele, 02 via NC)      Pain:  Pain Assessment  Pain Assessment:  (reports 8/10 pain \"all over\" especially in BLE; reports she is not due for pain meds yet; also reports was premedicated)    Objective   Cognition:  Overall Cognitive Status: Within Functional Limits           Home Living:  Type of Home:  (per patient report upon eval, lives with 2 kids, house 4 [address] with HR. patient has 1st floor set up)  Bathroom Shower/Tub:  (tub shower with HHS)  Bathroom Toilet:  (standard toilet)  Prior Function:  Level of [place]:  (independent in ADLs/share IADLs with kids PLOF. use of cane PRN. drives. reports not currently working. denies falls.)  Prior Function Comments: reports has friends/family close by that can assist    ADL:  LE Dressing Assistance:  (debra MCNEIL, required SBA to doff non skid socks)  Activity Tolerance:  Endurance: Decreased tolerance for upright activites  Bed Mobility/Transfers: Bed Mobility  Bed " Mobility:  (completed supine <-->sit on ED cart with SBA)    Transfers  Transfer:  (completed STS with SBA without AD)      Functional Mobility:  Functional Mobility  Functional Mobility Performed:  (engaged in simple mobility with SBA with WW; distance limited due to lines)     Sensation:  Light Touch: No apparent deficits  Strength:  Strength Comments: BUE ROM/MMT WFL for patient age as seen through ADLs, transfers, and mobility this date      Outcome Measures:[facility] Daily Activity  Putting on and taking off regular lower body clothing: A little  Bathing (including washing, rinsing, drying): A little  Putting on and taking off regular upper body clothing: A little  Toileting, which includes using toilet, bedpan or urinal: A little  Taking care of personal grooming such as brushing teeth: A little  Eating Meals: None  Daily Activity - Total Score: 19        Education Documentation  Body Mechanics, taught by Kandy Givens OT at 11/21/2024  2:57 PM.  Learner: Patient  Readiness: Acceptance  Method: Explanation  Response: Verbalizes Understanding, Needs Reinforcement    ADL Training, taught by Kandy Givens OT at 11/21/2024  2:57 PM.  Learner: Patient  Readiness: Acceptance  Method: Explanation  Response: Verbalizes Understanding, Needs Reinforcement    Education Comments  No comments found.        OP EDUCATION:       Goals:  Encounter Problems       Encounter Problems (Active)       OT Goals       STG- patient will complete LB dressing at MOD I with use of ae/ad/dme prn (Progressing)       Start:  11/21/24    Expected End:  12/05/24            STG- patient will complete toileting at MOD I with use of ae/ad/dme prn (Progressing)       Start:  11/21/24    Expected End:  12/05/24            STG- patient will complete transfers from bed, chair, commode at MOD I with use of ae/ad/dme prn (Progressing)       Start:  11/21/24    Expected End:  12/05/24            STG- patient will complete simple mobility at MOD I with  use of ae/ad/dme prn (Progressing)       Start:  11/21/24    Expected End:  12/05/24            STG- patient will complete grooming at MOD I with use of ae/ad/dme prn (Progressing)       Start:  11/21/24    Expected End:  12/05/24

## 2024-11-21 NOTE — PROGRESS NOTES
Emergency Medicine Transition of Care Note.    I received Mary Alice Aragon in signout from Dr. Klerman.  Please see the previous ED provider note for all HPI, PE and MDM up to the time of signout at 0700. This is in addition to the primary record.    In brief Mary Alice Aragon is an 44 y.o. female presenting for sickle cell pain  Chief Complaint   Patient presents with    Sickle Cell Pain Crisis     At the time of signout we were awaiting: bed placement at [facility]    ED Course as of 11/21/24 0748 Wed Nov 20, 2024   1111 EKG obtained at 734, interpreted by myself.  Normal sinus rhythm with a ventricular rate of 80, no axis deviation, normal intervals, with no acute ischemic changes [VT]      ED Course User Index  [VT] Akila VARGAS MD         Diagnoses as of 11/21/24 0748   Sickle cell pain crisis (Multi)       Medical Decision Making  Patient initially presented to the ED for sickle cell pain crisis.  She is pending transfer to [facility].  She has been awaiting a bed at [facility] for approximately 24 hours now.  We did contact the transfer center and they state that there will be no beds available until at least this afternoon.  We will therefore plan on admitting patient here while pending bed placement at [facility].  Patient is agreeable with this plan.    I discussed the case with Dr. Couch, hospitalist, who accepts patient for admission.      Final diagnoses:   [D57.00] Sickle cell pain crisis (Multi)       Procedure  Procedures    Akila Morales MD

## 2024-11-21 NOTE — H&P
History Of Present Illness  Mary Alice Aragon is a 44 y.o. female with PMHx of Sickle cell disease c/b transfusion-related iron overload, heart murmur, cerebral aneurysm, NICHOL, MDD, and nocturnal hypoxia (with home 02 at 3 liters at hs and prn) who presented yesterday with pain consistent with sickle cell crisis.  Patient reports that 5 days ago she developed a viral illness for which she had fevers, chills, abdominal pain, and diarrhea.  She reports after a few days she started to feel better, but then started having pain consistent with sickle cell crisis.  She notes bilateral lower extremity pain 9-10 out of 10 and inability to walk so she decided to come to the emergency room for evaluation.  She notes she does not often have sickle cell crisis however it is normally precipitated with an illness in this case.  She currently denies any other symptoms at this time but does endorse some mild shortness of breath for which she uses her prescribed oxygen as needed.    In the ED lab work was performed and revealed chloride 110, creatinine 1.06 (0.87 on November 18, 2024), WBC 13.2, H&H 6.7 and 19.7 (improved from 5.8 and 16.8 on November 18), neutrophils 8.58, retic 0.242 (has been steadily increasing since November 15) EKG, per ER physician impression revealed Normal sinus rhythm with a ventricular rate of 80, no axis deviation, normal intervals, with no acute ischemic changes.  Vital signs were stable.  ER physician spoke with patient's hematologist, Dr. Saleem, who eventually recommended admission to [facility] due to patient's continued pain after treatment.  It was also noted patient's hemoglobin was 6.7 but since this was patient's baseline, Dr. Saleem did not recommend any transfusion at this time.  Given there were no beds at [facility], the decision was made to admit the patient here at [place] until a bed is available at [facility].  Patient was given Benadryl, Dilaudid, Toradol, and 1 mg of Dilaudid every 1 hour is ordered for her pain.  She was admitted under the care of Dr. Couch for the time being.      10-point review of systems performed and is negative besides what is stated in HPI.    Past Medical History  Past Medical History:   Diagnosis Date    Sickle cell anemia (Multi)     Unspecified astigmatism, bilateral 2020    Astigmatism, bilateral       Surgical History  Past Surgical History:   Procedure Laterality Date     SECTION, CLASSIC  2016     Section    CHOLECYSTECTOMY  2016    Cholecystectomy    MR HEAD ANGIO W AND WO IV CONTRAST  4/15/2013    MR HEAD ANGIO W AND WO IV CONTRAST 4/15/2013 [facility] CLINICAL LEGACY    MR HEAD ANGIO W AND WO IV CONTRAST  2013    MR HEAD ANGIO W AND WO IV CONTRAST 2013 [facility] INPATIENT LEGACY    MR HEAD ANGIO WO IV CONTRAST  2014    MR HEAD ANGIO WO IV CONTRAST 2014 [facility]CILLARY LEGACY    MR HEAD ANGIO WO IV CONTRAST  2016    MR HEAD ANGIO WO IV CONTRAST 2016 [facility]GAC[facility]    MR HEAD ANGIO WO IV CONTRAST  2019    MR HEAD ANGIO WO IV CONTRAST 2019 [facility] LEGAC[facility]    MR HEAD ANGIO WO IV CONTRAST  2017    MR HEAD ANGIO WO IV CONTRAST 2017 [facility] INPATIENT LEGACY    MR NECK ANGIO WO IV CONTRAST  2013    MR NECK ANGIO WO IV CONTRAST 2013 [facility] INPATIENT LEGACY    MR NECK ANGIO WO IV CONTRAST  2014    MR NECK ANGIO WO IV CONTRAST 2014 [facility] LEGACY    OTHER SURGICAL HISTORY  2016    Brain Surgery    TUBAL LIGATION  04/10/2017    Tubal Ligation        Social History  She reports that she has never smoked. She has never used smokeless tobacco. She reports that she does not currently use alcohol. She reports that she does not currently use drugs.    Family History  Family History   Problem Relation Name Age of Onset    Sickle cell trait Sister      Multiple sclerosis Brother      Breast cancer Maternal Grandmother      Breast cancer Other paternal cousin     Sickle cell trait Child         "  Allergies  Patient has no known allergies.      Physical Exam  Constitutional:       Appearance: Normal appearance.   HENT:      Head: Normocephalic and atraumatic.      Nose: Nose normal.      Mouth/Throat:      Mouth: Mucous membranes are moist.   Eyes:      Conjunctiva/sclera: Conjunctivae normal.   Cardiovascular:      Rate and Rhythm: Normal rate and regular rhythm.      Heart sounds: Murmur heard.   Pulmonary:      Effort: Pulmonary effort is normal.      Breath sounds: Normal breath sounds.   Abdominal:      General: Bowel sounds are normal.      Palpations: Abdomen is soft.      Tenderness: There is abdominal tenderness.   Musculoskeletal:         General: Normal range of motion.      Cervical back: Neck supple.      Comments: Slight nausea or lower extremity edema, bilateral lower extremity tenderness   Skin:     General: Skin is warm and dry.   Neurological:      Mental Status: She is alert. Mental status is at baseline.   Psychiatric:         Mood and Affect: Mood normal.          Last Recorded Vitals  Blood pressure 117/57, pulse 51, temperature 36.9 °C (98.4 °F), temperature source Temporal, resp. rate 14, height 1.6 m (5' 3\"), weight 68 kg (150 lb), SpO2 100%.    Relevant Results    Scheduled medications  cetirizine, 10 mg, oral, Daily  cholecalciferol, 2,000 Units, oral, Daily  [START ON 11/22/2024] docusate sodium, 100 mg, oral, Every other day  FLUoxetine, 10 mg, oral, Daily  folic acid, 1 mg, oral, Daily  hydroxyurea, 500 mg, oral, Every Sun/Tues/Thur/Sat  lidocaine, 1 patch, transdermal, Daily  melatonin, 5 mg, oral, Nightly  meloxicam, 15 mg, oral, Daily  methadone, 10 mg, oral, q12h  norethindrone, 5 mg, oral, Daily  oxygen, 3 L/min, inhalation, Nightly  [START ON 11/22/2024] pantoprazole, 40 mg, oral, Daily before breakfast  peg 400-hypromellose-glycerin, 1 drop, Both Eyes, Daily  polyethylene glycol, 17 g, oral, Daily      Continuous medications  sodium chloride 0.9%, 100 mL/hr      PRN " medications  PRN medications: acetaminophen **OR** acetaminophen **OR** acetaminophen, capsaicin, diclofenac sodium, diphenhydrAMINE, HYDROmorphone, hydrOXYzine HCL, ibuprofen, prochlorperazine, sennosides, tiZANidine, trolamine salicylate       Scheduled medications  cetirizine, 10 mg, oral, Daily  cholecalciferol, 2,000 Units, oral, Daily  [START ON 11/22/2024] docusate sodium, 100 mg, oral, Every other day  FLUoxetine, 10 mg, oral, Daily  folic acid, 1 mg, oral, Daily  hydroxyurea, 500 mg, oral, Every Sun/Tues/Thur/Sat  lidocaine, 1 patch, transdermal, Daily  melatonin, 5 mg, oral, Nightly  meloxicam, 15 mg, oral, Daily  methadone, 10 mg, oral, q12h  norethindrone, 5 mg, oral, Daily  oxygen, 3 L/min, inhalation, Nightly  [START ON 11/22/2024] pantoprazole, 40 mg, oral, Daily before breakfast  peg 400-hypromellose-glycerin, 1 drop, Both Eyes, Daily  polyethylene glycol, 17 g, oral, Daily      Continuous medications  sodium chloride 0.9%, 100 mL/hr      PRN medications  PRN medications: acetaminophen **OR** acetaminophen **OR** acetaminophen, capsaicin, diclofenac sodium, diphenhydrAMINE, HYDROmorphone, hydrOXYzine HCL, ibuprofen, prochlorperazine, sennosides, tiZANidine, trolamine salicylate       XR chest 1 view    Result Date: 11/20/2024  STUDY: Chest Radiograph;  11/20/2024 7:31 AM. INDICATION: Chest pain. COMPARISON: Chest radiograph 11/15/2024. ACCESSION NUMBER(S): [id number] ORDERING CLINICIAN: LAINEY ESPINOSA TECHNIQUE:  Frontal chest was obtained at 07:31 hours. FINDINGS: CARDIOMEDIASTINAL SILHOUETTE: Cardiomediastinal silhouette is normal in size and configuration.  LUNGS: Right-sided chest port with catheter tip in the right atrium. Lungs are clear.  ABDOMEN: No remarkable upper abdominal findings.  BONES: No acute osseous changes.    No acute findings. Signed by Zenon Felix MD           Assessment/Plan   Assessment & Plan  Sickle cell pain crisis (Multi)      Admit to regular medicine  floor  Observation  Appreciate heme/onc input  [place] [facility]  Continue dilaudid  NS at 100 ml/hr x1d  AM CBC, CMP      Sickle cell disease c/b transfusion-related iron overload, cerebral aneurysm, NICHOL, MDD, and nocturnal hypoxia (with home 02 at 3 liters at hs and prn)     Continue home medications as listed above  Continue home oxygen    DVT prophylaxis    SCDs      Cinthia Juares, APRN-CNP

## 2024-11-21 NOTE — CONSULTS
Inpatient consult to Oncology  Consult performed by: Jimi Fine DO  Consult ordered by: Cinthia Juares, APRN-CNP        Reason For Consult  Sickle cell crisis    History Of Present Illness  Mary Alice Aragon is a 44 y.o. 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 She reports the last time she had a sickle crisis was over 1 year ago. Her son had a cold and she reports she also became sick which she attributes to this crisis. She reports she ahs been taking her hydroxyurea however she missed her ketamine infusion this month. Reports she is chronically on 3L oxygen nightly and as needed. Denies any chest pain or any other complaints.     In the ED, WBC 13.2, Cr 1.06, Hgb 6.7 (near pts baseline). Patients hematologist was contacted from [facility], Dr. Saleem who recommended transfer to [facility] however there are currently no beds available so pt will be admitted to [facility] pending transfer. He also reported her hgb is around her baseline and does not need a transfusion. Pt also received benadryl, toradol, dilaudid and IVF.     Past Medical History  As above    Surgical History  As above     Social History  She reports that she has never smoked. She has never used smokeless tobacco. She reports that she does not currently use alcohol. She reports that she does not currently use drugs.    Family History  Family History   Problem Relation Name Age of Onset    Sickle cell trait Sister      Multiple sclerosis Brother      Breast cancer Maternal Grandmother      Breast cancer Other paternal cousin     Sickle cell trait Child          Allergies  Patient has no known allergies.    Review of Systems  10 point review of systems performed and negative except as stated above     Physical Exam  General:  Pleasant and cooperative. No apparent distress.  HEENT:  Normocephalic, atraumatic, mucus membranes moist.    Neck:  Trachea midline.  No JVD.    Chest:  Clear to auscultation bilaterally. No wheezes, rales, or rhonchi.  CV:  Regular rate and rhythm.  Positive S1/S2.   Abdomen: Bowel sounds present in all four quadrants, abdomen is soft, non-tender, non-distended.  Extremities:  No lower extremity edema or cyanosis.   Neurological:  AAOx3. No focal deficits.  Skin:  Warm and dry.         Last Recorded Vitals  /57   Pulse 52   Temp 36.9 °C (98.4 °F) (Temporal)   Resp 12   Wt 68 kg (150 lb)   SpO2 100%        Assessment/Plan     #Sickle cell disease, acute crisis  [place] [facility]   Agree with IV fluids and pain control as needed. Continue supportive care. No transfusion for now as patients hgb is around her baseline.     Jimi Fine, DO

## 2024-11-22 ENCOUNTER — TELEPHONE (OUTPATIENT)
Dept: ADMISSION | Facility: HOSPITAL | Age: 44
End: 2024-11-22
Payer: COMMERCIAL

## 2024-11-22 LAB
ABO GROUP (TYPE) IN BLOOD: NORMAL
ALBUMIN SERPL BCP-MCNC: 3.8 G/DL (ref 3.4–5)
ALP SERPL-CCNC: 51 U/L (ref 33–110)
ALT SERPL W P-5'-P-CCNC: 11 U/L (ref 7–45)
ANION GAP SERPL CALC-SCNC: 7 MMOL/L (ref 10–20)
AST SERPL W P-5'-P-CCNC: 15 U/L (ref 9–39)
BILIRUB SERPL-MCNC: 1.8 MG/DL (ref 0–1.2)
BLOOD EXPIRATION DATE: NORMAL
BUN SERPL-MCNC: 13 MG/DL (ref 6–23)
CALCIUM SERPL-MCNC: 8.2 MG/DL (ref 8.6–10.3)
CHLORIDE SERPL-SCNC: 111 MMOL/L (ref 98–107)
CO2 SERPL-SCNC: 27 MMOL/L (ref 21–32)
CREAT SERPL-MCNC: 0.96 MG/DL (ref 0.5–1.05)
DISPENSE STATUS: NORMAL
EGFRCR SERPLBLD CKD-EPI 2021: 76 ML/MIN/1.73M*2
ERYTHROCYTE [DISTWIDTH] IN BLOOD BY AUTOMATED COUNT: 17.2 % (ref 11.5–14.5)
GLUCOSE SERPL-MCNC: 95 MG/DL (ref 74–99)
HCT VFR BLD AUTO: 17.2 % (ref 36–46)
HGB BLD-MCNC: 5.8 G/DL (ref 12–16)
HOLD SPECIMEN: NORMAL
MCH RBC QN AUTO: 31.9 PG (ref 26–34)
MCHC RBC AUTO-ENTMCNC: 33.7 G/DL (ref 32–36)
MCV RBC AUTO: 95 FL (ref 80–100)
NRBC BLD-RTO: 0.6 /100 WBCS (ref 0–0)
PLATELET # BLD AUTO: 295 X10*3/UL (ref 150–450)
POTASSIUM SERPL-SCNC: 4 MMOL/L (ref 3.5–5.3)
PRODUCT BLOOD TYPE: 7300
PRODUCT CODE: NORMAL
PROT SERPL-MCNC: 6.2 G/DL (ref 6.4–8.2)
RBC # BLD AUTO: 1.82 X10*6/UL (ref 4–5.2)
RH FACTOR (ANTIGEN D): NORMAL
SODIUM SERPL-SCNC: 141 MMOL/L (ref 136–145)
UNIT ABO: NORMAL
UNIT NUMBER: NORMAL
UNIT RH: NORMAL
UNIT VOLUME: 350
WBC # BLD AUTO: 11.2 X10*3/UL (ref 4.4–11.3)
XM INTEP: NORMAL

## 2024-11-22 PROCEDURE — 2500000005 HC RX 250 GENERAL PHARMACY W/O HCPCS: Performed by: NURSE PRACTITIONER

## 2024-11-22 PROCEDURE — 85027 COMPLETE CBC AUTOMATED: CPT | Performed by: NURSE PRACTITIONER

## 2024-11-22 PROCEDURE — 1100000001 HC PRIVATE ROOM DAILY

## 2024-11-22 PROCEDURE — S0109 METHADONE ORAL 5MG: HCPCS | Performed by: NURSE PRACTITIONER

## 2024-11-22 PROCEDURE — 99233 SBSQ HOSP IP/OBS HIGH 50: CPT

## 2024-11-22 PROCEDURE — S4993 CONTRACEPTIVE PILLS FOR BC: HCPCS | Performed by: NURSE PRACTITIONER

## 2024-11-22 PROCEDURE — P9040 RBC LEUKOREDUCED IRRADIATED: HCPCS

## 2024-11-22 PROCEDURE — 2500000001 HC RX 250 WO HCPCS SELF ADMINISTERED DRUGS (ALT 637 FOR MEDICARE OP): Performed by: NURSE PRACTITIONER

## 2024-11-22 PROCEDURE — 2500000001 HC RX 250 WO HCPCS SELF ADMINISTERED DRUGS (ALT 637 FOR MEDICARE OP): Performed by: EMERGENCY MEDICINE

## 2024-11-22 PROCEDURE — 2500000002 HC RX 250 W HCPCS SELF ADMINISTERED DRUGS (ALT 637 FOR MEDICARE OP, ALT 636 FOR OP/ED): Performed by: EMERGENCY MEDICINE

## 2024-11-22 PROCEDURE — 2500000002 HC RX 250 W HCPCS SELF ADMINISTERED DRUGS (ALT 637 FOR MEDICARE OP, ALT 636 FOR OP/ED): Performed by: NURSE PRACTITIONER

## 2024-11-22 PROCEDURE — 84132 ASSAY OF SERUM POTASSIUM: CPT | Performed by: NURSE PRACTITIONER

## 2024-11-22 PROCEDURE — 2500000004 HC RX 250 GENERAL PHARMACY W/ HCPCS (ALT 636 FOR OP/ED): Performed by: NURSE PRACTITIONER

## 2024-11-22 PROCEDURE — 2500000001 HC RX 250 WO HCPCS SELF ADMINISTERED DRUGS (ALT 637 FOR MEDICARE OP): Performed by: PHYSICIAN ASSISTANT

## 2024-11-22 PROCEDURE — 2500000004 HC RX 250 GENERAL PHARMACY W/ HCPCS (ALT 636 FOR OP/ED): Performed by: EMERGENCY MEDICINE

## 2024-11-22 PROCEDURE — 36591 DRAW BLOOD OFF VENOUS DEVICE: CPT | Performed by: NURSE PRACTITIONER

## 2024-11-22 PROCEDURE — 36430 TRANSFUSION BLD/BLD COMPNT: CPT

## 2024-11-22 PROCEDURE — 2500000004 HC RX 250 GENERAL PHARMACY W/ HCPCS (ALT 636 FOR OP/ED): Performed by: INTERNAL MEDICINE

## 2024-11-22 RX ORDER — HYDROMORPHONE HYDROCHLORIDE 1 MG/ML
1 INJECTION, SOLUTION INTRAMUSCULAR; INTRAVENOUS; SUBCUTANEOUS EVERY 4 HOURS PRN
Status: DISCONTINUED | OUTPATIENT
Start: 2024-11-22 | End: 2024-11-22

## 2024-11-22 ASSESSMENT — PAIN DESCRIPTION - LOCATION
LOCATION: LEG
LOCATION: OTHER (COMMENT)

## 2024-11-22 ASSESSMENT — PAIN - FUNCTIONAL ASSESSMENT
PAIN_FUNCTIONAL_ASSESSMENT: 0-10

## 2024-11-22 ASSESSMENT — PAIN SCALES - WONG BAKER
WONGBAKER_NUMERICALRESPONSE: HURTS EVEN MORE
WONGBAKER_NUMERICALRESPONSE: HURTS WORST
WONGBAKER_NUMERICALRESPONSE: HURTS EVEN MORE
WONGBAKER_NUMERICALRESPONSE: HURTS WHOLE LOT
WONGBAKER_NUMERICALRESPONSE: HURTS WORST

## 2024-11-22 ASSESSMENT — PAIN SCALES - GENERAL
PAINLEVEL_OUTOF10: 7
PAINLEVEL_OUTOF10: 7
PAINLEVEL_OUTOF10: 9
PAINLEVEL_OUTOF10: 8
PAINLEVEL_OUTOF10: 10 - WORST POSSIBLE PAIN
PAINLEVEL_OUTOF10: 9
PAINLEVEL_OUTOF10: 7
PAINLEVEL_OUTOF10: 8
PAINLEVEL_OUTOF10: 7
PAINLEVEL_OUTOF10: 8
PAINLEVEL_OUTOF10: 8

## 2024-11-22 ASSESSMENT — PAIN DESCRIPTION - ORIENTATION
ORIENTATION: RIGHT;LEFT

## 2024-11-22 NOTE — PROGRESS NOTES
Mary Alice Aragon is a 44 y.o. female on day 0 of admission presenting with Sickle cell pain crisis (Multi).      Subjective   44 y.o. female with PMHx of Sickle cell disease c/b transfusion-related iron overload, heart murmur, cerebral aneurysm, NICHOL, MDD, and nocturnal hypoxia (with home 02 at 3 liters at hs and prn) who presented yesterday with pain consistent with sickle cell crisis.  Patient reports that 5 days ago she developed a viral illness for which she had fevers, chills, abdominal pain, and diarrhea.  She reports after a few days she started to feel better, but then started having pain consistent with sickle cell crisis.  She notes bilateral lower extremity pain 9-10 out of 10 and inability to walk so she decided to come to the emergency room for evaluation.  She notes she does not often have sickle cell crisis however it is normally precipitated with an illness in this case.  She currently denies any other symptoms at this time but does endorse some mild shortness of breath for which she uses her prescribed oxygen as needed.     In the ED lab work was performed and revealed chloride 110, creatinine 1.06 (0.87 on November 18, 2024), WBC 13.2, H&H 6.7 and 19.7 (improved from 5.8 and 16.8 on November 18), neutrophils 8.58, retic 0.242 (has been steadily increasing since November 15) EKG, per ER physician impression revealed Normal sinus rhythm with a ventricular rate of 80, no axis deviation, normal intervals, with no acute ischemic changes.  Vital signs were stable.  ER physician spoke with patient's hematologist, Dr. Saleem, who eventually recommended admission to [facility] due to patient's continued pain after treatment.  It was also noted patient's hemoglobin was 6.7 but since this was patient's baseline, Dr. Saleem did not recommend any transfusion at this time.  Given there were no beds at [facility], the decision was made to admit the patient here at [place] until a bed is available at [facility].  Patient was given Benadryl,  Dilaudid, Toradol, and 1 mg of Dilaudid every 1 hour is ordered for her pain. She was admitted under the care of Dr. Couch for the time being.          Objective     Last Recorded Vitals  /60 (BP Location: Right arm, Patient Position: Lying)   Pulse 66   Temp 37.1 °C (98.8 °F) (Temporal)   Resp 18   Wt 68 kg (150 lb)   SpO2 96%   Intake/Output last 3 Shifts:    Intake/Output Summary (Last 24 hours) at 11/22/2024 1430  Last data filed at 11/21/2024 2300  Gross per 24 hour   Intake 1101.67 ml   Output --   Net 1101.67 ml       Admission Weight  Weight: 68 kg (150 lb) (11/20/24 0659)    Daily Weight  11/21/24 : 68 kg (150 lb)    Image Results  XR chest 1 view  Narrative: STUDY:  Chest Radiograph;  11/20/2024 7:31 AM.  INDICATION:  Chest pain.  COMPARISON:  Chest radiograph 11/15/2024.  ACCESSION NUMBER(S):  [id number]  ORDERING CLINICIAN:  LAINEY ESPINOSA  TECHNIQUE:  Frontal chest was obtained at 07:31 hours.  FINDINGS:  CARDIOMEDIASTINAL SILHOUETTE:  Cardiomediastinal silhouette is normal in size and configuration.     LUNGS:  Right-sided chest port with catheter tip in the right atrium.  Lungs are clear.     ABDOMEN:  No remarkable upper abdominal findings.     BONES:  No acute osseous changes.  Impression: No acute findings.  Signed by Zenon Felix MD    Results from last 7 days   Lab Units 11/22/24  0620   WBC AUTO x10*3/uL 11.2   HEMOGLOBIN g/dL 5.8*   HEMATOCRIT % 17.2*   PLATELETS AUTO x10*3/uL 295      Results from last 7 days   Lab Units 11/22/24  0620   SODIUM mmol/L 141   POTASSIUM mmol/L 4.0   CHLORIDE mmol/L 111*   CO2 mmol/L 27   BUN mg/dL 13   CREATININE mg/dL 0.96   GLUCOSE mg/dL 95   CALCIUM mg/dL 8.2*      Except for what was mentioned in the history of present illness the 10 system review is negative at this time      Physical Exam  Awake and alert and oriented not icteric or jaundiced stable  Relevant Results               Assessment/Plan        This patient has a central line   Reason  for the central line remaining today?             Assessment & Plan  Sickle cell pain crisis (Multi)    Admit to regular medicine floor  Observation  Appreciate heme/onc input  [facility]  Continue dilaudid  NS at 100 ml/hr x1d  AM CBC, CMP        Sickle cell disease c/b transfusion-related iron overload, cerebral aneurysm, NICHOL, MDD, and nocturnal hypoxia (with home 02 at 3 liters at hs and prn)      Continue home medications as listed above  Continue home oxygen                 Reid Couch, DO

## 2024-11-22 NOTE — CARE PLAN
I have been asked to order 1 unit PRBCs for this patient per Dr. Couch's request. Patient's Hgb has dropped from 6.7 -> 5.8. Consent has been obtained and 1 unit of PRBCs ordered.

## 2024-11-22 NOTE — PROGRESS NOTES
Mary Alice Aragon is a 44 y.o. female on day 0 of admission presenting with Sickle cell pain crisis (Multi).      Subjective   Pt was seen at bedside today, reports her pain has improved since admission but is still there.        Objective     Last Recorded Vitals  /60 (BP Location: Right arm, Patient Position: Lying)   Pulse 66   Temp 37.1 °C (98.8 °F) (Temporal)   Resp 18   Wt 68 kg (150 lb)   SpO2 96%   Intake/Output last 3 Shifts:    Intake/Output Summary (Last 24 hours) at 11/22/2024 1312  Last data filed at 11/21/2024 2300  Gross per 24 hour   Intake 1101.67 ml   Output --   Net 1101.67 ml       Admission Weight  Weight: 68 kg (150 lb) (11/20/24 0659)    Daily Weight  11/21/24 : 68 kg (150 lb)    Image Results  XR chest 1 view  Narrative: STUDY:  Chest Radiograph;  11/20/2024 7:31 AM.  INDICATION:  Chest pain.  COMPARISON:  Chest radiograph 11/15/2024.  ACCESSION NUMBER(S):  [id number]  ORDERING CLINICIAN:  LAINEY ESPINOSA  TECHNIQUE:  Frontal chest was obtained at 07:31 hours.  FINDINGS:  CARDIOMEDIASTINAL SILHOUETTE:  Cardiomediastinal silhouette is normal in size and configuration.     LUNGS:  Right-sided chest port with catheter tip in the right atrium.  Lungs are clear.     ABDOMEN:  No remarkable upper abdominal findings.     BONES:  No acute osseous changes.  Impression: No acute findings.  Signed by Zenon Felix MD      Physical Exam  General:  Pleasant and cooperative. No apparent distress.  HEENT:  Normocephalic, atraumatic, mucus membranes moist.   Neck:  Trachea midline.  No JVD.    Chest:  Clear to auscultation bilaterally. No wheezes, rales, or rhonchi.  CV:  Regular rate and rhythm.  Positive S1/S2.   Abdomen: Bowel sounds present in all four quadrants, abdomen is soft, non-tender, non-distended.  Extremities:  No lower extremity edema or cyanosis.   Neurological:  AAOx3. No focal deficits.  Skin:  Warm and dry.        Assessment/Plan   Assessment & Plan  Sickle cell pain crisis  (Multi)    #Sickle cell disease, acute crisis  Patient is pending transfer to [facility].   Agree with IV fluids and pain control as needed. Continue supportive care. Pt received 1 unit pRBC today.     Jimi Fine DO

## 2024-11-22 NOTE — TELEPHONE ENCOUNTER
Patient called and states she is currently admitted at [facility] for pain. She received another unit of blood.  Her biggest concern is pain control.  She is now only getting pain medication every 4 hrs instead of every 1 hr.  Using hot packs and cream.  She is concerned pain will not be controlled with every 4 hrs.  She is frustrated that the dose was changed without her being notified.     I advised patient to reach out to nurse and MD on division when pain is not controlled again.  She will reach out to team taking care of her.      No further needs at this time.  Message sent to team as RIKY.

## 2024-11-22 NOTE — PROGRESS NOTES
Spiritual Care Visit   Notes  Ms. Aragon welcomed a visit. She talked about her present health issue. Her dtr was at her bedside. Ms. Aragon shared strong emotions. I created time and space for spiritual support and a non-anxious, nonjudgmental presence. Per her request, I offered spiritual resources. She expressed gratitude. My visit was part of a holistic approach that addressed the body, mind and spirit. I integrated with the interdisciplinary team members. There are no other needs.    Clinical Encounter Type  Visited With: Patient  Routine Visit: Introduction  Continue Visiting: No  Referral From:   Patient Spiritual Care Encounters  Suffering Severity: Mild  Taxonomy  Intended Effects: Demonstrate caring and concern  Methods: Explore gabriel and values  Interventions: Provide spiritual/Amish resources, Active listening    Patient: Mary Alice Aragon    Date: 11/22/2024  Time: 4:55 PM  Total time (min.): 40    I educated Ms. Aragon on how to contact the Spiritual Care department for any future requests. I am available upon request.    [facility] Department of Spiritual Care Contact #: [phone]  Signed by: Rev. Adelina Prakash [facility], [place]

## 2024-11-22 NOTE — CARE PLAN
I accompanied the attending physician, Dr. Couch, during rounding today. He gave me the verbal order to decrease the patient's IV Dilaudid order from 1mg Q1H to 1mg Q4H. Order modified per attending physician.

## 2024-11-23 ENCOUNTER — HOSPITAL ENCOUNTER (INPATIENT)
Facility: HOSPITAL | Age: 44
End: 2024-11-23
Attending: INTERNAL MEDICINE | Admitting: INTERNAL MEDICINE
Payer: MEDICARE

## 2024-11-23 VITALS
HEART RATE: 98 BPM | TEMPERATURE: 98.8 F | OXYGEN SATURATION: 96 % | SYSTOLIC BLOOD PRESSURE: 141 MMHG | DIASTOLIC BLOOD PRESSURE: 67 MMHG | BODY MASS INDEX: 26.58 KG/M2 | HEIGHT: 63 IN | WEIGHT: 150 LBS | RESPIRATION RATE: 18 BRPM

## 2024-11-23 DIAGNOSIS — D57.00 SICKLE CELL CRISIS (MULTI): Primary | ICD-10-CM

## 2024-11-23 LAB
ANION GAP SERPL CALC-SCNC: 9 MMOL/L (ref 10–20)
BUN SERPL-MCNC: 16 MG/DL (ref 6–23)
CALCIUM SERPL-MCNC: 8.4 MG/DL (ref 8.6–10.3)
CHLORIDE SERPL-SCNC: 110 MMOL/L (ref 98–107)
CO2 SERPL-SCNC: 25 MMOL/L (ref 21–32)
CREAT SERPL-MCNC: 1.04 MG/DL (ref 0.5–1.05)
EGFRCR SERPLBLD CKD-EPI 2021: 69 ML/MIN/1.73M*2
ERYTHROCYTE [DISTWIDTH] IN BLOOD BY AUTOMATED COUNT: 17.9 % (ref 11.5–14.5)
GLUCOSE SERPL-MCNC: 87 MG/DL (ref 74–99)
HCT VFR BLD AUTO: 19.7 % (ref 36–46)
HGB BLD-MCNC: 6.7 G/DL (ref 12–16)
HOLD SPECIMEN: NORMAL
MAGNESIUM SERPL-MCNC: 1.97 MG/DL (ref 1.6–2.4)
MCH RBC QN AUTO: 31.3 PG (ref 26–34)
MCHC RBC AUTO-ENTMCNC: 34 G/DL (ref 32–36)
MCV RBC AUTO: 92 FL (ref 80–100)
NRBC BLD-RTO: 0.4 /100 WBCS (ref 0–0)
PLATELET # BLD AUTO: 299 X10*3/UL (ref 150–450)
POTASSIUM SERPL-SCNC: 4.2 MMOL/L (ref 3.5–5.3)
RBC # BLD AUTO: 2.14 X10*6/UL (ref 4–5.2)
SODIUM SERPL-SCNC: 140 MMOL/L (ref 136–145)
WBC # BLD AUTO: 12.9 X10*3/UL (ref 4.4–11.3)

## 2024-11-23 PROCEDURE — 80048 BASIC METABOLIC PNL TOTAL CA: CPT | Performed by: PHYSICIAN ASSISTANT

## 2024-11-23 PROCEDURE — S0109 METHADONE ORAL 5MG: HCPCS | Performed by: NURSE PRACTITIONER

## 2024-11-23 PROCEDURE — 1170000001 HC PRIVATE ONCOLOGY ROOM DAILY

## 2024-11-23 PROCEDURE — 2500000004 HC RX 250 GENERAL PHARMACY W/ HCPCS (ALT 636 FOR OP/ED): Performed by: NURSE PRACTITIONER

## 2024-11-23 PROCEDURE — 85027 COMPLETE CBC AUTOMATED: CPT | Performed by: PHYSICIAN ASSISTANT

## 2024-11-23 PROCEDURE — 2500000004 HC RX 250 GENERAL PHARMACY W/ HCPCS (ALT 636 FOR OP/ED): Performed by: INTERNAL MEDICINE

## 2024-11-23 PROCEDURE — S4993 CONTRACEPTIVE PILLS FOR BC: HCPCS | Performed by: NURSE PRACTITIONER

## 2024-11-23 PROCEDURE — 2500000002 HC RX 250 W HCPCS SELF ADMINISTERED DRUGS (ALT 637 FOR MEDICARE OP, ALT 636 FOR OP/ED): Performed by: NURSE PRACTITIONER

## 2024-11-23 PROCEDURE — 2500000002 HC RX 250 W HCPCS SELF ADMINISTERED DRUGS (ALT 637 FOR MEDICARE OP, ALT 636 FOR OP/ED): Performed by: EMERGENCY MEDICINE

## 2024-11-23 PROCEDURE — 2500000001 HC RX 250 WO HCPCS SELF ADMINISTERED DRUGS (ALT 637 FOR MEDICARE OP): Performed by: PHYSICIAN ASSISTANT

## 2024-11-23 PROCEDURE — 2500000001 HC RX 250 WO HCPCS SELF ADMINISTERED DRUGS (ALT 637 FOR MEDICARE OP): Performed by: NURSE PRACTITIONER

## 2024-11-23 PROCEDURE — 2500000001 HC RX 250 WO HCPCS SELF ADMINISTERED DRUGS (ALT 637 FOR MEDICARE OP): Performed by: EMERGENCY MEDICINE

## 2024-11-23 PROCEDURE — 2500000005 HC RX 250 GENERAL PHARMACY W/O HCPCS: Performed by: NURSE PRACTITIONER

## 2024-11-23 PROCEDURE — 83735 ASSAY OF MAGNESIUM: CPT | Performed by: PHYSICIAN ASSISTANT

## 2024-11-23 RX ORDER — PRAMOXINE HYDROCHLORIDE 10 MG/G
1 AEROSOL, FOAM TOPICAL EVERY 6 HOURS PRN
Start: 2024-11-23

## 2024-11-23 ASSESSMENT — COGNITIVE AND FUNCTIONAL STATUS - GENERAL
MOBILITY SCORE: 20
CLIMB 3 TO 5 STEPS WITH RAILING: A LITTLE
DAILY ACTIVITIY SCORE: 22
WALKING IN HOSPITAL ROOM: A LITTLE
HELP NEEDED FOR BATHING: A LITTLE
MOVING TO AND FROM BED TO CHAIR: A LITTLE
TOILETING: A LITTLE
STANDING UP FROM CHAIR USING ARMS: A LITTLE

## 2024-11-23 ASSESSMENT — PAIN SCALES - WONG BAKER: WONGBAKER_NUMERICALRESPONSE: HURTS WORST

## 2024-11-23 ASSESSMENT — PAIN SCALES - GENERAL
PAINLEVEL_OUTOF10: 6
PAINLEVEL_OUTOF10: 8
PAINLEVEL_OUTOF10: 6
PAINLEVEL_OUTOF10: 8
PAINLEVEL_OUTOF10: 5 - MODERATE PAIN
PAINLEVEL_OUTOF10: 10 - WORST POSSIBLE PAIN
PAINLEVEL_OUTOF10: 6
PAINLEVEL_OUTOF10: 9
PAINLEVEL_OUTOF10: 8
PAINLEVEL_OUTOF10: 9
PAINLEVEL_OUTOF10: 5 - MODERATE PAIN
PAINLEVEL_OUTOF10: 7

## 2024-11-23 ASSESSMENT — PAIN - FUNCTIONAL ASSESSMENT
PAIN_FUNCTIONAL_ASSESSMENT: 0-10

## 2024-11-23 ASSESSMENT — PAIN DESCRIPTION - LOCATION
LOCATION: GENERALIZED
LOCATION: GENERALIZED
LOCATION: CHEST
LOCATION: CHEST
LOCATION: GENERALIZED

## 2024-11-23 ASSESSMENT — PAIN DESCRIPTION - ORIENTATION
ORIENTATION: MID
ORIENTATION: MID

## 2024-11-23 ASSESSMENT — PAIN SCALES - PAIN ASSESSMENT IN ADVANCED DEMENTIA (PAINAD): TOTALSCORE: MEDICATION (SEE MAR)

## 2024-11-23 NOTE — DISCHARGE SUMMARY
Discharge Diagnosis  Sickle cell pain crisis (Multi)    Issues Requiring Follow-Up      Discharge Meds     Medication List      ASK your doctor about these medications     Aspercreme 10 % cream; Generic drug: trolamine salicylate   capsaicin 0.025 % cream; Commonly known as: Zostrix   deferasirox 180 mg tablet; Commonly known as: Jadenu   * deferoxamine 2 gram injection; Commonly known as: Desferal; Infuse   2000mg subcutaneously once daily over 8 hours via [facility]   * deferoxamine 500 mg injection; Commonly known as: Desferal; Inject   2,000 mg under the skin once daily.   diclofenac sodium 1 % gel; Commonly known as: Voltaren; Per instructions   4 TIMES DAILY (route: topical)   diphenhydrAMINE 25 mg capsule; Commonly known as: BENADryl   docusate sodium 100 mg capsule; Commonly known as: Colace   DRY EYE RELIEF OPHT   FLUoxetine 10 mg capsule; Commonly known as: PROzac; Take 1 capsule (10   mg) by mouth once daily.   folic acid 1 mg tablet; Commonly known as: Folvite   HYDROmorphone 4 mg tablet; Commonly known as: Dilaudid; Take 1 tablet (4   mg) by mouth 3 times a day as needed for severe pain (7 - 10).   * hydroxyurea 500 mg capsule; Commonly known as: Hydrea   * hydroxyurea 500 mg capsule; Commonly known as: Hydrea; Per   instructions AS DIRECTED (route: oral)   hydrOXYzine HCL 25 mg tablet; Commonly known as: Atarax; Take 1 tablet   (25 mg) by mouth 2 times a day as needed for anxiety.   ibuprofen 800 mg tablet; Take 1 tablet (800 mg) by mouth every 8 hours   if needed for moderate pain (4 - 6).   ketamine 10 mg/mL injection; Commonly known as: Ketalar   lidocaine 5 % patch; Commonly known as: Lidoderm   loratadine 10 mg tablet; Commonly known as: Claritin   melatonin 5 mg tablet; Ask about: Which instructions should I use?   meloxicam 15 mg tablet; Commonly known as: Mobic; Take 1 tablet (15 mg)   by mouth once daily.   methadone 10 mg tablet; Commonly known as: Dolophine; Take 1 tablet (10   mg) by  mouth every 12 hours.   multivitamin tablet; Ask about: Which instructions should I use?   naloxone 4 mg/0.1 mL nasal spray; Commonly known as: Narcan; Administer   1 spray (4 mg) into affected nostril(s) if needed for opioid reversal or   respiratory depression. May repeat every 2-3 minutes if needed,   alternating nostrils, until medical assistance becomes available.   norethindrone 5 mg tablet; Commonly known as: Aygestin; Take 1 tablet (5   mg) by mouth once daily.   omeprazole 40 mg DR capsule; Commonly known as: PriLOSEC   oxygen gas therapy; Commonly known as: O2   pregabalin 25 mg capsule; Commonly known as: Lyrica; Take 1 capsule (25   mg) by mouth 2 times a day.   prochlorperazine 10 mg tablet; Commonly known as: Compazine; 1 tablet   EVERY 8 HOURS (route: oral)   senna 8.6 mg tablet; Generic drug: sennosides; Take 1 tablet (8.6 mg) by   mouth 2 times a day as needed for constipation.   tiZANidine 2 mg tablet; Commonly known as: Zanaflex; Take 1-2 tablets   (2-4 mg) by mouth every 8 hours if needed for muscle spasms.   traZODone 100 mg tablet; Commonly known as: Desyrel; TAKE 1 TO 2   TABLETS(100  MG) BY MOUTH AT BEDTIME AS NEEDED FOR SLEEP   Tums 200 mg calcium chewable tablet; Generic drug: calcium carbonate   valACYclovir 500 mg tablet; Commonly known as: Valtrex; Ask about: Which   instructions should I use?   Vitamin C 500 mg tablet; Generic drug: ascorbic acid   * Vitamin D3 25 mcg (1,000 unit) tablet; Generic drug: cholecalciferol;   Take 1 tablet (1,000 Units) by mouth once daily.   * cholecalciferol 50 MCG (2000 UT) tablet; Commonly known as: Vitamin   D-3; Take 1 tablet (2,000 Units) by mouth early in the morning..  * This list has 6 medication(s) that are the same as other medications   prescribed for you. Read the directions carefully, and ask your doctor or   other care provider to review them with you.       Test Results Pending At Discharge  Pending Labs       No current pending labs.             Hospital Course   44 y.o. female with PMHx of Sickle cell disease c/b transfusion-related iron overload, heart murmur, cerebral aneurysm, NICHOL, MDD, and nocturnal hypoxia (with home 02 at 3 liters at hs and prn) who presented yesterday with pain consistent with sickle cell crisis.  Patient reports that 5 days ago she developed a viral illness for which she had fevers, chills, abdominal pain, and diarrhea.  She reports after a few days she started to feel better, but then started having pain consistent with sickle cell crisis.  She notes bilateral lower extremity pain 9-10 out of 10 and inability to walk so she decided to come to the emergency room for evaluation.  She notes she does not often have sickle cell crisis however it is normally precipitated with an illness in this case.  She currently denies any other symptoms at this time but does endorse some mild shortness of breath for which she uses her prescribed oxygen as needed.     In the ED lab work was performed and revealed chloride 110, creatinine 1.06 (0.87 on November 18, 2024), WBC 13.2, H&H 6.7 and 19.7 (improved from 5.8 and 16.8 on November 18), neutrophils 8.58, retic 0.242 (has been steadily increasing since November 15) EKG, per ER physician impression revealed Normal sinus rhythm with a ventricular rate of 80, no axis deviation, normal intervals, with no acute ischemic changes.  Vital signs were stable.  ER physician spoke with patient's hematologist, Dr. Saleem, who eventually recommended admission to [facility] due to patient's continued pain after treatment.  It was also noted patient's hemoglobin was 6.7 but since this was patient's baseline, Dr. Saleem did not recommend any transfusion at this time.  Given there were no beds at [facility], the decision was made to admit the patient here at [place] until a bed is available at [facility].  Patient was given Benadryl, Dilaudid, Toradol, and 1 mg of Dilaudid every 1 hour is ordered for her pain. She was admitted  under the care of Dr. Couch for the time being.     I went ahead and treated her with pain medication transfuse her due to 1 unit because of her sickle cell and because of the fragility do not want to do any further units she was asymptomatic went ahead and transfuse just 1 unit.  Brought the pain under control transfer was in place.    On the day of her discharge she was awake and alert comfortable hemodynamically stable I went through all of her meds and her labs I completed the transfer presumably discharges went through the remainder of her medication medication management and discharged her transfer no complications discharged the management greater than 30 minutes total time thank you    Pertinent Physical Exam At Time of Discharge  Physical Exam  Awake and alert and oriented x 3 no specific complaints or problems or issues was able to be transferred in stable condition  Outpatient Follow-Up  Future Appointments   Date Time Provider Department Center   12/18/2024 11:40 AM [facility] CENTRAL LINE 01 [id number] Academic   12/18/2024 12:00 PM Gio Saleem MD [id number] Academic   3/13/2025  9:10 AM Ania Merchant DO [id number] [place]         Reid Couch DO   Statement Selected

## 2024-11-23 NOTE — CARE PLAN
The patient's goals for the shift include      The clinical goals for the shift include Pain relief    Problem: Pain - Adult  Goal: Verbalizes/displays adequate comfort level or baseline comfort level  Outcome: Progressing     Problem: Safety - Adult  Goal: Free from fall injury  Outcome: Progressing     Problem: Discharge Planning  Goal: Discharge to home or other facility with appropriate resources  Outcome: Progressing     Problem: Chronic Conditions and Co-morbidities  Goal: Patient's chronic conditions and co-morbidity symptoms are monitored and maintained or improved  Outcome: Progressing     Problem: Pain  Goal: Takes deep breaths with improved pain control throughout the shift  Outcome: Progressing  Goal: Turns in bed with improved pain control throughout the shift  Outcome: Progressing  Goal: Walks with improved pain control throughout the shift  Outcome: Progressing  Goal: Performs ADL's with improved pain control throughout shift  Outcome: Progressing  Goal: Participates in PT with improved pain control throughout the shift  Outcome: Progressing  Goal: Free from opioid side effects throughout the shift  Outcome: Progressing  Goal: Free from acute confusion related to pain meds throughout the shift  Outcome: Progressing

## 2024-11-23 NOTE — CARE PLAN
The patient's goals for the shift include  pain control    The clinical goals for the shift include effective pain management      Problem: Pain - Adult  Goal: Verbalizes/displays adequate comfort level or baseline comfort level  Outcome: Progressing     Problem: Safety - Adult  Goal: Free from fall injury  Outcome: Progressing     Problem: Discharge Planning  Goal: Discharge to home or other facility with appropriate resources  Outcome: Progressing     Problem: Chronic Conditions and Co-morbidities  Goal: Patient's chronic conditions and co-morbidity symptoms are monitored and maintained or improved  Outcome: Progressing     Problem: Pain  Goal: Takes deep breaths with improved pain control throughout the shift  Outcome: Progressing  Goal: Turns in bed with improved pain control throughout the shift  Outcome: Progressing  Goal: Walks with improved pain control throughout the shift  Outcome: Progressing  Goal: Performs ADL's with improved pain control throughout shift  Outcome: Progressing  Goal: Participates in PT with improved pain control throughout the shift  Outcome: Progressing  Goal: Free from opioid side effects throughout the shift  Outcome: Progressing  Goal: Free from acute confusion related to pain meds throughout the shift  Outcome: Progressing

## 2024-11-24 ENCOUNTER — APPOINTMENT (OUTPATIENT)
Dept: RADIOLOGY | Facility: HOSPITAL | Age: 44
End: 2024-11-24
Payer: MEDICARE

## 2024-11-24 VITALS
HEIGHT: 63 IN | DIASTOLIC BLOOD PRESSURE: 70 MMHG | TEMPERATURE: 97.7 F | RESPIRATION RATE: 18 BRPM | OXYGEN SATURATION: 94 % | BODY MASS INDEX: 26.58 KG/M2 | WEIGHT: 150 LBS | SYSTOLIC BLOOD PRESSURE: 105 MMHG | HEART RATE: 85 BPM

## 2024-11-24 LAB
ALBUMIN SERPL BCP-MCNC: 4 G/DL (ref 3.4–5)
ALBUMIN SERPL BCP-MCNC: 4 G/DL (ref 3.4–5)
ALP SERPL-CCNC: 54 U/L (ref 33–110)
ALT SERPL W P-5'-P-CCNC: 11 U/L (ref 7–45)
ANION GAP SERPL CALC-SCNC: 8 MMOL/L (ref 10–20)
AST SERPL W P-5'-P-CCNC: 23 U/L (ref 9–39)
BASOPHILS # BLD AUTO: 0.07 X10*3/UL (ref 0–0.1)
BASOPHILS NFR BLD AUTO: 0.5 %
BILIRUB DIRECT SERPL-MCNC: 0.5 MG/DL (ref 0–0.3)
BILIRUB SERPL-MCNC: 2.2 MG/DL (ref 0–1.2)
BUN SERPL-MCNC: 14 MG/DL (ref 6–23)
CALCIUM SERPL-MCNC: 8.9 MG/DL (ref 8.6–10.6)
CARDIAC TROPONIN I PNL SERPL HS: 3 NG/L (ref 0–34)
CHLORIDE SERPL-SCNC: 108 MMOL/L (ref 98–107)
CO2 SERPL-SCNC: 29 MMOL/L (ref 21–32)
CREAT SERPL-MCNC: 0.98 MG/DL (ref 0.5–1.05)
EGFRCR SERPLBLD CKD-EPI 2021: 74 ML/MIN/1.73M*2
EOSINOPHIL # BLD AUTO: 0.27 X10*3/UL (ref 0–0.7)
EOSINOPHIL NFR BLD AUTO: 1.9 %
ERYTHROCYTE [DISTWIDTH] IN BLOOD BY AUTOMATED COUNT: 17.3 % (ref 11.5–14.5)
GLUCOSE SERPL-MCNC: 90 MG/DL (ref 74–99)
HCT VFR BLD AUTO: 19.4 % (ref 36–46)
HGB BLD-MCNC: 6.5 G/DL (ref 12–16)
HGB RETIC QN: 30 PG (ref 28–38)
IMM GRANULOCYTES # BLD AUTO: 0.05 X10*3/UL (ref 0–0.7)
IMM GRANULOCYTES NFR BLD AUTO: 0.4 % (ref 0–0.9)
IMMATURE RETIC FRACTION: 37.5 %
LDH SERPL L TO P-CCNC: 325 U/L (ref 84–246)
LYMPHOCYTES # BLD AUTO: 3.45 X10*3/UL (ref 1.2–4.8)
LYMPHOCYTES NFR BLD AUTO: 24.4 %
MAGNESIUM SERPL-MCNC: 2.14 MG/DL (ref 1.6–2.4)
MCH RBC QN AUTO: 30.2 PG (ref 26–34)
MCHC RBC AUTO-ENTMCNC: 33.5 G/DL (ref 32–36)
MCV RBC AUTO: 90 FL (ref 80–100)
MONOCYTES # BLD AUTO: 0.69 X10*3/UL (ref 0.1–1)
MONOCYTES NFR BLD AUTO: 4.9 %
NEUTROPHILS # BLD AUTO: 9.63 X10*3/UL (ref 1.2–7.7)
NEUTROPHILS NFR BLD AUTO: 67.9 %
NRBC BLD-RTO: 0.2 /100 WBCS (ref 0–0)
PHOSPHATE SERPL-MCNC: 3.9 MG/DL (ref 2.5–4.9)
PLATELET # BLD AUTO: 314 X10*3/UL (ref 150–450)
POTASSIUM SERPL-SCNC: 4.3 MMOL/L (ref 3.5–5.3)
PROT SERPL-MCNC: 6.5 G/DL (ref 6.4–8.2)
RBC # BLD AUTO: 2.15 X10*6/UL (ref 4–5.2)
RETICS #: 0.11 X10*6/UL (ref 0.02–0.08)
RETICS/RBC NFR AUTO: 5.2 % (ref 0.5–2)
SODIUM SERPL-SCNC: 141 MMOL/L (ref 136–145)
WBC # BLD AUTO: 14.2 X10*3/UL (ref 4.4–11.3)

## 2024-11-24 PROCEDURE — 2500000004 HC RX 250 GENERAL PHARMACY W/ HCPCS (ALT 636 FOR OP/ED)

## 2024-11-24 PROCEDURE — 71045 X-RAY EXAM CHEST 1 VIEW: CPT

## 2024-11-24 PROCEDURE — 2500000005 HC RX 250 GENERAL PHARMACY W/O HCPCS

## 2024-11-24 PROCEDURE — 80069 RENAL FUNCTION PANEL: CPT

## 2024-11-24 PROCEDURE — 71045 X-RAY EXAM CHEST 1 VIEW: CPT | Performed by: STUDENT IN AN ORGANIZED HEALTH CARE EDUCATION/TRAINING PROGRAM

## 2024-11-24 PROCEDURE — 83735 ASSAY OF MAGNESIUM: CPT

## 2024-11-24 PROCEDURE — 82040 ASSAY OF SERUM ALBUMIN: CPT

## 2024-11-24 PROCEDURE — 83615 LACTATE (LD) (LDH) ENZYME: CPT

## 2024-11-24 PROCEDURE — 85045 AUTOMATED RETICULOCYTE COUNT: CPT

## 2024-11-24 PROCEDURE — 1170000001 HC PRIVATE ONCOLOGY ROOM DAILY

## 2024-11-24 PROCEDURE — 84484 ASSAY OF TROPONIN QUANT: CPT

## 2024-11-24 PROCEDURE — 85025 COMPLETE CBC W/AUTO DIFF WBC: CPT

## 2024-11-24 PROCEDURE — S4993 CONTRACEPTIVE PILLS FOR BC: HCPCS

## 2024-11-24 PROCEDURE — 83021 HEMOGLOBIN CHROMOTOGRAPHY: CPT

## 2024-11-24 PROCEDURE — 99223 1ST HOSP IP/OBS HIGH 75: CPT

## 2024-11-24 PROCEDURE — 2500000002 HC RX 250 W HCPCS SELF ADMINISTERED DRUGS (ALT 637 FOR MEDICARE OP, ALT 636 FOR OP/ED)

## 2024-11-24 PROCEDURE — S0109 METHADONE ORAL 5MG: HCPCS

## 2024-11-24 PROCEDURE — 2500000001 HC RX 250 WO HCPCS SELF ADMINISTERED DRUGS (ALT 637 FOR MEDICARE OP)

## 2024-11-24 RX ORDER — PRAMOXINE HYDROCHLORIDE 10 MG/G
1 AEROSOL, FOAM TOPICAL EVERY 6 HOURS PRN
Status: DISCONTINUED | OUTPATIENT
Start: 2024-11-24 | End: 2024-11-27 | Stop reason: HOSPADM

## 2024-11-24 RX ORDER — ACETAMINOPHEN 500 MG
5 TABLET ORAL NIGHTLY
Status: DISCONTINUED | OUTPATIENT
Start: 2024-11-24 | End: 2024-11-27 | Stop reason: HOSPADM

## 2024-11-24 RX ORDER — IBUPROFEN 400 MG/1
800 TABLET ORAL EVERY 8 HOURS PRN
Status: DISCONTINUED | OUTPATIENT
Start: 2024-11-24 | End: 2024-11-24

## 2024-11-24 RX ORDER — ENOXAPARIN SODIUM 100 MG/ML
40 INJECTION SUBCUTANEOUS EVERY 24 HOURS
Status: DISCONTINUED | OUTPATIENT
Start: 2024-11-24 | End: 2024-11-24

## 2024-11-24 RX ORDER — CAPSAICIN 0.03 G/100G
1 CREAM TOPICAL AS NEEDED
Status: DISCONTINUED | OUTPATIENT
Start: 2024-11-24 | End: 2024-11-27 | Stop reason: HOSPADM

## 2024-11-24 RX ORDER — ACETAMINOPHEN 650 MG/1
650 SUPPOSITORY RECTAL EVERY 4 HOURS PRN
Status: DISCONTINUED | OUTPATIENT
Start: 2024-11-24 | End: 2024-11-26

## 2024-11-24 RX ORDER — ONDANSETRON 4 MG/1
4 TABLET, FILM COATED ORAL EVERY 8 HOURS PRN
Status: DISCONTINUED | OUTPATIENT
Start: 2024-11-24 | End: 2024-11-27 | Stop reason: HOSPADM

## 2024-11-24 RX ORDER — ACETAMINOPHEN 160 MG/5ML
650 SOLUTION ORAL EVERY 4 HOURS PRN
Status: DISCONTINUED | OUTPATIENT
Start: 2024-11-24 | End: 2024-11-26

## 2024-11-24 RX ORDER — HYDROXYUREA 500 MG/1
500 CAPSULE ORAL
Status: DISCONTINUED | OUTPATIENT
Start: 2024-11-24 | End: 2024-11-27 | Stop reason: HOSPADM

## 2024-11-24 RX ORDER — PANTOPRAZOLE SODIUM 40 MG/1
40 TABLET, DELAYED RELEASE ORAL
Status: DISCONTINUED | OUTPATIENT
Start: 2024-11-24 | End: 2024-11-27 | Stop reason: HOSPADM

## 2024-11-24 RX ORDER — AMOXICILLIN 250 MG
2 CAPSULE ORAL 2 TIMES DAILY
Status: DISCONTINUED | OUTPATIENT
Start: 2024-11-24 | End: 2024-11-27 | Stop reason: HOSPADM

## 2024-11-24 RX ORDER — HYDROXYZINE HYDROCHLORIDE 25 MG/1
25 TABLET, FILM COATED ORAL 2 TIMES DAILY PRN
Status: DISCONTINUED | OUTPATIENT
Start: 2024-11-24 | End: 2024-11-27 | Stop reason: HOSPADM

## 2024-11-24 RX ORDER — METHADONE HYDROCHLORIDE 10 MG/1
10 TABLET ORAL EVERY 12 HOURS
Status: DISCONTINUED | OUTPATIENT
Start: 2024-11-24 | End: 2024-11-27 | Stop reason: HOSPADM

## 2024-11-24 RX ORDER — HYDROMORPHONE HCL/0.9% NACL/PF 15 MG/30ML
PATIENT CONTROLLED ANALGESIA SYRINGE INTRAVENOUS CONTINUOUS
Status: DISCONTINUED | OUTPATIENT
Start: 2024-11-24 | End: 2024-11-25

## 2024-11-24 RX ORDER — TIZANIDINE 2 MG/1
2 TABLET ORAL EVERY 8 HOURS PRN
Status: DISCONTINUED | OUTPATIENT
Start: 2024-11-24 | End: 2024-11-27 | Stop reason: HOSPADM

## 2024-11-24 RX ORDER — FOLIC ACID 1 MG/1
1 TABLET ORAL DAILY
Status: DISCONTINUED | OUTPATIENT
Start: 2024-11-24 | End: 2024-11-27 | Stop reason: HOSPADM

## 2024-11-24 RX ORDER — ACETAMINOPHEN 325 MG/1
650 TABLET ORAL EVERY 4 HOURS PRN
Status: DISCONTINUED | OUTPATIENT
Start: 2024-11-24 | End: 2024-11-26

## 2024-11-24 RX ORDER — DEFEROXAMINE MESYLATE 2 G/1
2000 INJECTION, POWDER, LYOPHILIZED, FOR SOLUTION INTRAMUSCULAR; INTRAVENOUS; SUBCUTANEOUS DAILY
Status: DISCONTINUED | OUTPATIENT
Start: 2024-11-24 | End: 2024-11-24

## 2024-11-24 RX ORDER — ASCORBIC ACID 500 MG
1000 TABLET ORAL DAILY
Status: DISCONTINUED | OUTPATIENT
Start: 2024-11-24 | End: 2024-11-27 | Stop reason: HOSPADM

## 2024-11-24 RX ORDER — KETOROLAC TROMETHAMINE 15 MG/ML
15 INJECTION, SOLUTION INTRAMUSCULAR; INTRAVENOUS ONCE
Status: COMPLETED | OUTPATIENT
Start: 2024-11-24 | End: 2024-11-24

## 2024-11-24 RX ORDER — NORETHINDRONE 5 MG/1
5 TABLET ORAL DAILY
Status: DISCONTINUED | OUTPATIENT
Start: 2024-11-24 | End: 2024-11-27 | Stop reason: HOSPADM

## 2024-11-24 RX ORDER — DICLOFENAC SODIUM 10 MG/G
4 GEL TOPICAL 4 TIMES DAILY PRN
Status: DISCONTINUED | OUTPATIENT
Start: 2024-11-24 | End: 2024-11-27 | Stop reason: HOSPADM

## 2024-11-24 RX ORDER — NALOXONE HYDROCHLORIDE 0.4 MG/ML
0.2 INJECTION, SOLUTION INTRAMUSCULAR; INTRAVENOUS; SUBCUTANEOUS AS NEEDED
Status: DISCONTINUED | OUTPATIENT
Start: 2024-11-24 | End: 2024-11-25

## 2024-11-24 RX ORDER — DIPHENHYDRAMINE HCL 25 MG
25 CAPSULE ORAL EVERY 8 HOURS PRN
Status: DISCONTINUED | OUTPATIENT
Start: 2024-11-24 | End: 2024-11-27 | Stop reason: HOSPADM

## 2024-11-24 RX ORDER — LIDOCAINE 560 MG/1
2 PATCH PERCUTANEOUS; TOPICAL; TRANSDERMAL DAILY
Status: DISCONTINUED | OUTPATIENT
Start: 2024-11-24 | End: 2024-11-27 | Stop reason: HOSPADM

## 2024-11-24 RX ORDER — DOCUSATE SODIUM 100 MG/1
100 CAPSULE, LIQUID FILLED ORAL EVERY OTHER DAY
Status: DISCONTINUED | OUTPATIENT
Start: 2024-11-24 | End: 2024-11-27 | Stop reason: HOSPADM

## 2024-11-24 RX ORDER — HYDROXYUREA 500 MG/1
1000 CAPSULE ORAL
Status: DISCONTINUED | OUTPATIENT
Start: 2024-11-25 | End: 2024-11-27 | Stop reason: HOSPADM

## 2024-11-24 RX ORDER — KETOROLAC TROMETHAMINE 30 MG/ML
30 INJECTION, SOLUTION INTRAMUSCULAR; INTRAVENOUS EVERY 6 HOURS SCHEDULED
Status: COMPLETED | OUTPATIENT
Start: 2024-11-24 | End: 2024-11-25

## 2024-11-24 RX ORDER — VALACYCLOVIR HYDROCHLORIDE 500 MG/1
500 TABLET, FILM COATED ORAL DAILY
Status: DISCONTINUED | OUTPATIENT
Start: 2024-11-24 | End: 2024-11-27 | Stop reason: HOSPADM

## 2024-11-24 RX ORDER — CALCIUM CARBONATE 200(500)MG
500 TABLET,CHEWABLE ORAL 4 TIMES DAILY PRN
Status: DISCONTINUED | OUTPATIENT
Start: 2024-11-24 | End: 2024-11-27 | Stop reason: HOSPADM

## 2024-11-24 RX ORDER — LIDOCAINE 560 MG/1
1 PATCH PERCUTANEOUS; TOPICAL; TRANSDERMAL DAILY
Status: DISCONTINUED | OUTPATIENT
Start: 2024-11-24 | End: 2024-11-24

## 2024-11-24 RX ORDER — DEFEROXAMINE MESYLATE 2 G/1
2000 INJECTION, POWDER, LYOPHILIZED, FOR SOLUTION INTRAMUSCULAR; INTRAVENOUS; SUBCUTANEOUS DAILY
Status: DISCONTINUED | OUTPATIENT
Start: 2024-11-24 | End: 2024-11-25

## 2024-11-24 RX ORDER — PREGABALIN 25 MG/1
25 CAPSULE ORAL 2 TIMES DAILY
Status: DISCONTINUED | OUTPATIENT
Start: 2024-11-24 | End: 2024-11-27 | Stop reason: HOSPADM

## 2024-11-24 RX ORDER — POLYETHYLENE GLYCOL 3350 17 G/17G
17 POWDER, FOR SOLUTION ORAL DAILY
Status: DISCONTINUED | OUTPATIENT
Start: 2024-11-24 | End: 2024-11-27 | Stop reason: HOSPADM

## 2024-11-24 RX ORDER — TRAZODONE HYDROCHLORIDE 50 MG/1
100 TABLET ORAL NIGHTLY PRN
Status: DISCONTINUED | OUTPATIENT
Start: 2024-11-24 | End: 2024-11-27 | Stop reason: HOSPADM

## 2024-11-24 RX ORDER — MELOXICAM 15 MG/1
15 TABLET ORAL DAILY
Status: DISCONTINUED | OUTPATIENT
Start: 2024-11-24 | End: 2024-11-27 | Stop reason: HOSPADM

## 2024-11-24 RX ORDER — FLUOXETINE 10 MG/1
10 CAPSULE ORAL DAILY
Status: DISCONTINUED | OUTPATIENT
Start: 2024-11-24 | End: 2024-11-27 | Stop reason: HOSPADM

## 2024-11-24 RX ORDER — CHOLECALCIFEROL (VITAMIN D3) 25 MCG
2000 TABLET ORAL
Status: DISCONTINUED | OUTPATIENT
Start: 2024-11-24 | End: 2024-11-27 | Stop reason: HOSPADM

## 2024-11-24 RX ADMIN — Medication 2000 UNITS: at 06:31

## 2024-11-24 RX ADMIN — VALACYCLOVIR 500 MG: 500 TABLET, FILM COATED ORAL at 08:56

## 2024-11-24 RX ADMIN — LIDOCAINE 2 PATCH: 4 PATCH TOPICAL at 12:31

## 2024-11-24 RX ADMIN — KETOROLAC TROMETHAMINE 30 MG: 30 INJECTION, SOLUTION INTRAMUSCULAR; INTRAVENOUS at 18:12

## 2024-11-24 RX ADMIN — METHADONE HYDROCHLORIDE 10 MG: 10 TABLET ORAL at 21:22

## 2024-11-24 RX ADMIN — DOCUSATE SODIUM 100 MG: 100 CAPSULE, LIQUID FILLED ORAL at 08:56

## 2024-11-24 RX ADMIN — DEFEROXAMINE MESYLATE 2000 MG: 2 INJECTION, POWDER, LYOPHILIZED, FOR SOLUTION INTRAMUSCULAR; INTRAVENOUS; SUBCUTANEOUS at 08:57

## 2024-11-24 RX ADMIN — HYDROMORPHONE HYDROCHLORIDE 2 MG: 2 INJECTION, SOLUTION INTRAMUSCULAR; INTRAVENOUS; SUBCUTANEOUS at 09:56

## 2024-11-24 RX ADMIN — PANTOPRAZOLE SODIUM 40 MG: 40 TABLET, DELAYED RELEASE ORAL at 06:53

## 2024-11-24 RX ADMIN — KETOROLAC TROMETHAMINE 15 MG: 15 INJECTION, SOLUTION INTRAMUSCULAR; INTRAVENOUS at 07:05

## 2024-11-24 RX ADMIN — SENNOSIDES AND DOCUSATE SODIUM 2 TABLET: 50; 8.6 TABLET ORAL at 21:22

## 2024-11-24 RX ADMIN — OXYCODONE HYDROCHLORIDE AND ACETAMINOPHEN 1000 MG: 500 TABLET ORAL at 08:56

## 2024-11-24 RX ADMIN — FLUOXETINE 10 MG: 10 CAPSULE ORAL at 08:59

## 2024-11-24 RX ADMIN — POLYETHYLENE GLYCOL 3350 17 G: 17 POWDER, FOR SOLUTION ORAL at 08:56

## 2024-11-24 RX ADMIN — PREGABALIN 25 MG: 25 CAPSULE ORAL at 08:56

## 2024-11-24 RX ADMIN — ONDANSETRON HYDROCHLORIDE 4 MG: 4 TABLET, FILM COATED ORAL at 05:13

## 2024-11-24 RX ADMIN — HYDROMORPHONE HYDROCHLORIDE 2 MG: 2 INJECTION, SOLUTION INTRAMUSCULAR; INTRAVENOUS; SUBCUTANEOUS at 07:49

## 2024-11-24 RX ADMIN — MELOXICAM 15 MG: 15 TABLET ORAL at 08:56

## 2024-11-24 RX ADMIN — FOLIC ACID 1 MG: 1 TABLET ORAL at 08:56

## 2024-11-24 RX ADMIN — HYDROMORPHONE HYDROCHLORIDE: 10 INJECTION, SOLUTION INTRAMUSCULAR; INTRAVENOUS; SUBCUTANEOUS at 23:00

## 2024-11-24 RX ADMIN — LIDOCAINE 1 PATCH: 4 PATCH TOPICAL at 08:56

## 2024-11-24 RX ADMIN — ONDANSETRON HYDROCHLORIDE 4 MG: 4 TABLET, FILM COATED ORAL at 18:12

## 2024-11-24 RX ADMIN — HYDROMORPHONE HYDROCHLORIDE 2 MG: 2 INJECTION, SOLUTION INTRAMUSCULAR; INTRAVENOUS; SUBCUTANEOUS at 04:17

## 2024-11-24 RX ADMIN — NORETHINDRONE ACETATE 5 MG: 5 TABLET ORAL at 08:56

## 2024-11-24 RX ADMIN — HYDROMORPHONE HYDROCHLORIDE 2 MG: 2 INJECTION, SOLUTION INTRAMUSCULAR; INTRAVENOUS; SUBCUTANEOUS at 01:07

## 2024-11-24 RX ADMIN — KETOROLAC TROMETHAMINE 30 MG: 30 INJECTION, SOLUTION INTRAMUSCULAR; INTRAVENOUS at 12:31

## 2024-11-24 RX ADMIN — SENNOSIDES AND DOCUSATE SODIUM 2 TABLET: 50; 8.6 TABLET ORAL at 01:07

## 2024-11-24 RX ADMIN — HYDROXYUREA 500 MG: 500 CAPSULE ORAL at 08:56

## 2024-11-24 RX ADMIN — HYDROMORPHONE HYDROCHLORIDE: 10 INJECTION, SOLUTION INTRAMUSCULAR; INTRAVENOUS; SUBCUTANEOUS at 12:34

## 2024-11-24 RX ADMIN — Medication 5 MG: at 21:22

## 2024-11-24 RX ADMIN — METHADONE HYDROCHLORIDE 10 MG: 10 TABLET ORAL at 08:56

## 2024-11-24 RX ADMIN — HYDROMORPHONE HYDROCHLORIDE 2 MG: 2 INJECTION, SOLUTION INTRAMUSCULAR; INTRAVENOUS; SUBCUTANEOUS at 11:57

## 2024-11-24 RX ADMIN — PREGABALIN 25 MG: 25 CAPSULE ORAL at 21:22

## 2024-11-24 SDOH — SOCIAL STABILITY: SOCIAL INSECURITY: WITHIN THE LAST YEAR, HAVE YOU BEEN HUMILIATED OR EMOTIONALLY ABUSED IN OTHER WAYS BY YOUR PARTNER OR EX-PARTNER?: NO

## 2024-11-24 SDOH — ECONOMIC STABILITY: FOOD INSECURITY: WITHIN THE PAST 12 MONTHS, YOU WORRIED THAT YOUR FOOD WOULD RUN OUT BEFORE YOU GOT THE MONEY TO BUY MORE.: NEVER TRUE

## 2024-11-24 SDOH — ECONOMIC STABILITY: FOOD INSECURITY: WITHIN THE PAST 12 MONTHS, THE FOOD YOU BOUGHT JUST DIDN'T LAST AND YOU DIDN'T HAVE MONEY TO GET MORE.: NEVER TRUE

## 2024-11-24 SDOH — SOCIAL STABILITY: SOCIAL INSECURITY: HAS ANYONE EVER THREATENED TO HURT YOUR FAMILY OR YOUR PETS?: NO

## 2024-11-24 SDOH — SOCIAL STABILITY: SOCIAL INSECURITY: DOES ANYONE TRY TO KEEP YOU FROM HAVING/CONTACTING OTHER FRIENDS OR DOING THINGS OUTSIDE YOUR HOME?: NO

## 2024-11-24 SDOH — SOCIAL STABILITY: SOCIAL INSECURITY: HAVE YOU HAD THOUGHTS OF HARMING ANYONE ELSE?: NO

## 2024-11-24 SDOH — SOCIAL STABILITY: SOCIAL INSECURITY: ABUSE: ADULT

## 2024-11-24 SDOH — SOCIAL STABILITY: SOCIAL INSECURITY: DO YOU FEEL UNSAFE GOING BACK TO THE PLACE WHERE YOU ARE LIVING?: NO

## 2024-11-24 SDOH — SOCIAL STABILITY: SOCIAL INSECURITY: WITHIN THE LAST YEAR, HAVE YOU BEEN AFRAID OF YOUR PARTNER OR EX-PARTNER?: NO

## 2024-11-24 SDOH — ECONOMIC STABILITY: HOUSING INSECURITY: IN THE LAST 12 MONTHS, WAS THERE A TIME WHEN YOU WERE NOT ABLE TO PAY THE MORTGAGE OR RENT ON TIME?: NO

## 2024-11-24 SDOH — ECONOMIC STABILITY: INCOME INSECURITY: IN THE PAST 12 MONTHS HAS THE ELECTRIC, GAS, OIL, OR WATER COMPANY THREATENED TO SHUT OFF SERVICES IN YOUR HOME?: NO

## 2024-11-24 SDOH — SOCIAL STABILITY: SOCIAL INSECURITY: ARE YOU OR HAVE YOU BEEN THREATENED OR ABUSED PHYSICALLY, EMOTIONALLY, OR SEXUALLY BY ANYONE?: NO

## 2024-11-24 SDOH — SOCIAL STABILITY: SOCIAL INSECURITY: DO YOU FEEL ANYONE HAS EXPLOITED OR TAKEN ADVANTAGE OF YOU FINANCIALLY OR OF YOUR PERSONAL PROPERTY?: NO

## 2024-11-24 SDOH — ECONOMIC STABILITY: HOUSING INSECURITY: AT ANY TIME IN THE PAST 12 MONTHS, WERE YOU HOMELESS OR LIVING IN A SHELTER (INCLUDING NOW)?: NO

## 2024-11-24 SDOH — SOCIAL STABILITY: SOCIAL INSECURITY: WERE YOU ABLE TO COMPLETE ALL THE BEHAVIORAL HEALTH SCREENINGS?: YES

## 2024-11-24 SDOH — ECONOMIC STABILITY: HOUSING INSECURITY: IN THE PAST 12 MONTHS, HOW MANY TIMES HAVE YOU MOVED WHERE YOU WERE LIVING?: 0

## 2024-11-24 SDOH — ECONOMIC STABILITY: TRANSPORTATION INSECURITY: IN THE PAST 12 MONTHS, HAS LACK OF TRANSPORTATION KEPT YOU FROM MEDICAL APPOINTMENTS OR FROM GETTING MEDICATIONS?: NO

## 2024-11-24 SDOH — SOCIAL STABILITY: SOCIAL INSECURITY: HAVE YOU HAD ANY THOUGHTS OF HARMING ANYONE ELSE?: NO

## 2024-11-24 SDOH — ECONOMIC STABILITY: FOOD INSECURITY: HOW HARD IS IT FOR YOU TO PAY FOR THE VERY BASICS LIKE FOOD, HOUSING, MEDICAL CARE, AND HEATING?: NOT HARD AT ALL

## 2024-11-24 SDOH — SOCIAL STABILITY: SOCIAL INSECURITY: ARE THERE ANY APPARENT SIGNS OF INJURIES/BEHAVIORS THAT COULD BE RELATED TO ABUSE/NEGLECT?: NO

## 2024-11-24 ASSESSMENT — COGNITIVE AND FUNCTIONAL STATUS - GENERAL
MOBILITY SCORE: 21
DAILY ACTIVITIY SCORE: 23
CLIMB 3 TO 5 STEPS WITH RAILING: A LITTLE
MOVING TO AND FROM BED TO CHAIR: A LITTLE
MOVING TO AND FROM BED TO CHAIR: A LITTLE
WALKING IN HOSPITAL ROOM: A LITTLE
STANDING UP FROM CHAIR USING ARMS: A LITTLE
WALKING IN HOSPITAL ROOM: A LITTLE
MOBILITY SCORE: 20
CLIMB 3 TO 5 STEPS WITH RAILING: A LITTLE
CLIMB 3 TO 5 STEPS WITH RAILING: A LITTLE
DAILY ACTIVITIY SCORE: 24
DAILY ACTIVITIY SCORE: 24
HELP NEEDED FOR BATHING: A LITTLE
STANDING UP FROM CHAIR USING ARMS: A LITTLE
PATIENT BASELINE BEDBOUND: NO
MOVING TO AND FROM BED TO CHAIR: A LITTLE
WALKING IN HOSPITAL ROOM: A LITTLE
MOBILITY SCORE: 20

## 2024-11-24 ASSESSMENT — ACTIVITIES OF DAILY LIVING (ADL)
WALKS IN HOME: INDEPENDENT
HEARING - RIGHT EAR: FUNCTIONAL
LACK_OF_TRANSPORTATION: NO
BATHING: INDEPENDENT
LACK_OF_TRANSPORTATION: NO
TOILETING: NEEDS ASSISTANCE
DRESSING YOURSELF: INDEPENDENT
ADEQUATE_TO_COMPLETE_ADL: YES
HEARING - LEFT EAR: FUNCTIONAL
JUDGMENT_ADEQUATE_SAFELY_COMPLETE_DAILY_ACTIVITIES: YES
FEEDING YOURSELF: INDEPENDENT
PATIENT'S MEMORY ADEQUATE TO SAFELY COMPLETE DAILY ACTIVITIES?: YES
GROOMING: INDEPENDENT

## 2024-11-24 ASSESSMENT — LIFESTYLE VARIABLES
HOW OFTEN DO YOU HAVE 6 OR MORE DRINKS ON ONE OCCASION: NEVER
SKIP TO QUESTIONS 9-10: 1
HOW OFTEN DO YOU HAVE A DRINK CONTAINING ALCOHOL: MONTHLY OR LESS
HOW MANY STANDARD DRINKS CONTAINING ALCOHOL DO YOU HAVE ON A TYPICAL DAY: 1 OR 2
AUDIT-C TOTAL SCORE: 1
AUDIT-C TOTAL SCORE: 1

## 2024-11-24 ASSESSMENT — PAIN SCALES - GENERAL
PAINLEVEL_OUTOF10: 4
PAINLEVEL_OUTOF10: 10 - WORST POSSIBLE PAIN
PAINLEVEL_OUTOF10: 9
PAINLEVEL_OUTOF10: 10 - WORST POSSIBLE PAIN
PAINLEVEL_OUTOF10: 9

## 2024-11-24 ASSESSMENT — PAIN DESCRIPTION - ORIENTATION
ORIENTATION: RIGHT;LEFT
ORIENTATION: RIGHT

## 2024-11-24 ASSESSMENT — PATIENT HEALTH QUESTIONNAIRE - PHQ9
SUM OF ALL RESPONSES TO PHQ9 QUESTIONS 1 & 2: 2
2. FEELING DOWN, DEPRESSED OR HOPELESS: SEVERAL DAYS
1. LITTLE INTEREST OR PLEASURE IN DOING THINGS: SEVERAL DAYS

## 2024-11-24 ASSESSMENT — PAIN - FUNCTIONAL ASSESSMENT
PAIN_FUNCTIONAL_ASSESSMENT: UNABLE TO SELF-REPORT
PAIN_FUNCTIONAL_ASSESSMENT: 0-10

## 2024-11-24 ASSESSMENT — PAIN DESCRIPTION - LOCATION
LOCATION: LEG

## 2024-11-24 NOTE — PROGRESS NOTES
Pharmacy Medication History Review    Mary Alice Aragon is a 44 y.o. female admitted for Sickle cell crisis (Multi). Pharmacy reviewed the patient's [id number] medications and allergies for accuracy.    The list below reflects the updated PTA list.   Prior to Admission Medications   Prescriptions Last Dose Informant   FLUoxetine (PROzac) 10 mg capsule  Self   Sig: Take 1 capsule (10 mg) by mouth once daily.   HYDROmorphone (Dilaudid) 4 mg tablet  Self   Sig: Take 1 tablet (4 mg) by mouth 3 times a day as needed for severe pain (7 - 10).   ascorbic acid (Vitamin C) 500 mg tablet  Self   Sig: Take 2 tablets (1,000 mg) by mouth once daily.   calcium carbonate (Tums) 200 mg calcium chewable tablet  Self   Sig: Chew 1-2 tablets (500-1,000 mg) 4 times a day as needed for heartburn.   capsaicin (Zostrix) 0.025 % cream  Self   Sig: Apply 1 Application topically if needed.   cholecalciferol (Vitamin D-3) 50 MCG (2000 UT) tablet  Self   Sig: Take 1 tablet (2,000 Units) by mouth early in the morning..   deferasirox (Jadenu) 180 mg tablet  Self   Sig: Take 1 tablet (180 mg) by mouth once daily.  Only takes if she does not have the Desferal     deferoxamine (Desferal) 2 gram injection  Self   Sig: Infuse 2000mg subcutaneously once daily over 8 hours via [place] Pump   diclofenac sodium (Voltaren) 1 % gel  Self   Sig: Per instructions 4 TIMES DAILY (route: topical)   Patient taking differently: Apply 4.5 inches (4 g) topically 4 times a day as needed (pain).   diphenhydrAMINE (BENADryl) 25 mg capsule  Self   Sig: Take 1 capsule (25 mg) by mouth every 8 hours if needed.   docusate sodium (Colace) 100 mg capsule  Self   Sig: Take 1 capsule (100 mg) by mouth every other day.   folic acid (Folvite) 1 mg tablet  Self   Sig: Take 1 tablet (1 mg) by mouth once daily.   hydrOXYzine HCL (Atarax) 25 mg tablet  Self   Sig: Take 1 tablet (25 mg) by mouth 2 times a day as needed for anxiety.   hydroxyurea (Hydrea) 500 mg capsule   Self   Sig: Per instructions AS DIRECTED (route: oral)   Patient taking differently: Take 2 capsules (1,000 mg total) by mouth once a day on Monday, Wednesday, and Friday.   hydroxyurea (Hydrea) 500 mg capsule  Self   Sig: Take 1 capsule (500 mg total) by mouth once a day on Sunday, Tuesday, Thursday, and Saturday.   ibuprofen 800 mg tablet  Self   Sig: Take 1 tablet (800 mg) by mouth every 8 hours if needed for moderate pain (4 - 6).   ketamine (Ketalar) 10 mg/mL injection  Self   Sig: Infuse 1 mL (10 mg) into a venous catheter every 30 (thirty) days. Not administered in the home.   lidocaine (Lidoderm) 5 % patch  Self   Sig: Place 1 patch on the skin once daily as needed for mild pain (1 - 3).   loratadine (Claritin) 10 mg tablet  Self   Sig: Take 1 tablet (10 mg) by mouth once daily as needed for allergies.   melatonin 5 mg tablet  Self   Sig: Take 1 tablet (5 mg) by mouth once daily at bedtime.   meloxicam (Mobic) 15 mg tablet  Self   Sig: Take 1 tablet (15 mg) by mouth once daily.   methadone (Dolophine) 10 mg tablet  Self   Sig: Take 1 tablet (10 mg) by mouth every 12 hours.   multivitamin tablet  Self   Sig: Take 1 tablet by mouth once daily.   naloxone (Narcan) 4 mg/0.1 mL nasal spray  Self   Sig: Administer 1 spray (4 mg) into affected nostril(s) if needed for opioid reversal or respiratory depression. May repeat every 2-3 minutes if needed, alternating nostrils, until medical assistance becomes available.   norethindrone (Aygestin) 5 mg tablet  Self   Sig: Take 1 tablet (5 mg) by mouth once daily.           oxygen (O2) gas therapy  Self   Sig: Inhale 3 L/min once daily at bedtime.   peg 400/hypromellose/glycerin (DRY EYE RELIEF OPHT)  Self   Sig: Administer 1 drop into affected eye(s) once daily.   pramoxine (Proctofoam) 1 % foam  Self   Sig: Insert 1 Application into the rectum every 6 hours if needed for irritation (2nd line itching).   pregabalin (Lyrica) 25 mg capsule  Self   Sig: Take 1 capsule (25  "mg) by mouth 2 times a day.   prochlorperazine (Compazine) 10 mg tablet  Self   Si tablet EVERY 8 HOURS (route: oral)   senna 8.6 mg tablet  Self   Sig: Take 1 tablet (8.6 mg) by mouth 2 times a day as needed for constipation.   tiZANidine (Zanaflex) 2 mg tablet  Self   Sig: Take 1-2 tablets (2-4 mg) by mouth every 8 hours if needed for muscle spasms.   traZODone (Desyrel) 100 mg tablet  Self   Sig: TAKE 1 TO 2 TABLETS(100  MG) BY MOUTH AT BEDTIME AS NEEDED FOR SLEEP   Patient taking differently: Take 1-2 tablets (100-200 mg) by mouth once daily at bedtime.   trolamine salicylate (Aspercreme) 10 % cream  Self   Sig: Apply 1 Application topically if needed.   valACYclovir (Valtrex) 500 mg tablet  Self   Sig: Take 1 tablet (500 mg) by mouth once daily.      Facility-Administered Medications: None        The list below reflects the updated allergy list. Please review each documented allergy for additional clarification and justification.  Allergies  Reviewed by Felecia Hinds PharmD on 2024   No Known Allergies         Patient declines M2B at discharge. Pharmacy has been updated to [facility].    Sources used to complete the med history include:    [facility]  Pharmacy dispense history  Patient interview Moderate historian  Chart Review  Care Everywhere     Below are additional concerns with the patient's PTA list.  Pt reports that she gets Valacyclovir from a feminine clinic (No pharmacy record available)  Patient gets Folic Acid 1mg with paying out of pocket, so no pharmacy fill history available.  Patient takes Jadenu when she doesn't receive Desferal.  Patient is not sure if taking \"Omeprazole 40\"---> no pharmacy fill history available.  Patient takes Hyroxyurea as follows:   1000 mg on ,,and Friday  500 mg the rest of the week      Medications ADDED:  None   Medications CHANGED:  None   Medications REMOVED:   Omeprazole    Felecia Hinds PharmD  Transitions of Care Pharmacist  [facility] " and Retail Services  Please reach out via Secure Chat for questions, or if no response call [employer] or [facility]

## 2024-11-24 NOTE — CARE PLAN
The patient's goals for the shift include      The clinical goals for the shift include pts pain will be well controlled      Problem: Pain - Adult  Goal: Verbalizes/displays adequate comfort level or baseline comfort level  Outcome: Progressing     Problem: Safety - Adult  Goal: Free from fall injury  Outcome: Progressing     Problem: Discharge Planning  Goal: Discharge to home or other facility with appropriate resources  Outcome: Progressing     Problem: Chronic Conditions and Co-morbidities  Goal: Patient's chronic conditions and co-morbidity symptoms are monitored and maintained or improved  Outcome: Progressing     Problem: Pain  Goal: Takes deep breaths with improved pain control throughout the shift  Outcome: Progressing  Goal: Turns in bed with improved pain control throughout the shift  Outcome: Progressing  Goal: Walks with improved pain control throughout the shift  Outcome: Progressing  Goal: Performs ADL's with improved pain control throughout shift  Outcome: Progressing  Goal: Participates in PT with improved pain control throughout the shift  Outcome: Progressing  Goal: Free from opioid side effects throughout the shift  Outcome: Progressing  Goal: Free from acute confusion related to pain meds throughout the shift  Outcome: Progressing

## 2024-11-24 NOTE — CARE PLAN
The patient's goals for the shift include      The clinical goals for the shift include patient's pain will be <5/10 throughout shift.      Problem: Pain - Adult  Goal: Verbalizes/displays adequate comfort level or baseline comfort level  Outcome: Progressing     Problem: Safety - Adult  Goal: Free from fall injury  Outcome: Progressing     Problem: Discharge Planning  Goal: Discharge to home or other facility with appropriate resources  Outcome: Progressing     Problem: Chronic Conditions and Co-morbidities  Goal: Patient's chronic conditions and co-morbidity symptoms are monitored and maintained or improved  Outcome: Progressing

## 2024-11-24 NOTE — H&P
"MEDICINE ADMISSION NOTE    History of Present Illness  Mary Alice Aragon is a 44 y.o. female with PMHx of Sickle cell disease c/b transfusion-related iron overload, heart murmur, cerebral aneurysm, NICHOL, MDD, and nocturnal hypoxia (on 2-3L at home as needed) who presented to [facility] 11/21 for bilateral lower extremity pain that was concerning for sickle cell crisis pain.    She reports the last time she had a sickle crisis was over 1 year ago. Her son had a cold and she reports she also became sick with fever and chills 1 week ago, now resolved, which she attributed to this crisis. She reports she has been taking her hydroxyurea however she missed her ketamine infusion this month. Reports she is chronically on 3L oxygen nightly and as needed. Heme was consulted at [facility], patient had an acute drop in Hgb 6.7 (baseline) -> 5.8 s/p 1 unit of pRBCs 11/22. Dr. Saleem aware of patient admission and was waiting for bed to become available, which she was later transferred to [facility].      Upon arrival, patient reporting 10/10 pain mostly in her bilateral LE. States dilaudid helps bring the pain down to 7/10. Per RN, patient pain started after she walked back from the restroom. Denied any worsening sob, breathing on 3L (home baseline), fever, chills, lightheadedness,  chest pain, abdominal pain, N/V/C/D.      ED Course:  /78 (BP Location: Left arm, Patient Position: Lying)   Pulse 85   Temp 36.4 °C (97.5 °F) (Temporal)   Resp 18   Ht 1.6 m (5' 3\")   Wt 68 kg (150 lb)   LMP  (LMP Unknown) Comment: tubal and hormones to prevent menses  SpO2 98%   BMI 26.57 kg/m²      Labs:     CBC: WBC 12.9 , HGB 6.7,   BMP: , K 4.2, Cl 110, HCO3 25, BUN 16, CR 1.04, Glu 87  LFTS: AST 15 , ALT 11, ALKPHOS 51 , TBILI 1.8 , DBILI No results found for requested labs within last 365 days.  TROP: <3  BNP: No results found for requested labs within last 365 days.  COAGS: PT No results found for requested labs within last 365 days. " , PTT No results found for requested labs within last 365 days.  , INR No results found for requested labs within last 365 days.  UA:     ABG:       EKG  Encounter Date: 07/05/24   ECG 12 lead   Result Value    Ventricular Rate 94    Atrial Rate 94    IN Interval 166    QRS Duration 78    QT Interval 350    QTC Calculation(Bazett) 437    P Axis 72    R Axis 47    T Axis 61    QRS Count 15    Q Onset 221    P Onset 138    P Offset 190    T Offset 396    QTC Fredericia 406    Narrative    Normal sinus rhythm  Possible Left atrial enlargement  Septal infarct , age undetermined  Abnormal ECG  When compared with ECG of 20-JUL-2023 13:23,  Septal infarct is now Present  T wave inversion now evident in Anterior leads  See ED provider note for full interpretation and clinical correlation  Confirmed by Virginia Morales ([id number]) on 7/13/2024 7:00:37 PM        Imaging:  No results found.     ED Interventions:  Medications   enoxaparin (Lovenox) syringe 40 mg (40 mg subcutaneous Not Given 11/24/24 0107)   polyethylene glycol (Glycolax, Miralax) packet 17 g (has no administration in time range)   sennosides-docusate sodium (Emilie-Colace) 8.6-50 mg per tablet 2 tablet (2 tablets oral Given 11/24/24 0107)   ascorbic acid (Vitamin C) tablet 1,000 mg (has no administration in time range)   calcium carbonate (Tums) chewable tablet 500 mg (has no administration in time range)   capsaicin (Zostrix) 0.025 % cream 1 Application (has no administration in time range)   cholecalciferol (Vitamin D-3) tablet 2,000 Units (has no administration in time range)   diphenhydrAMINE (BENADryl) capsule 25 mg (has no administration in time range)   folic acid (Folvite) tablet 1 mg (has no administration in time range)   FLUoxetine (PROzac) capsule 10 mg (has no administration in time range)   docusate sodium (Colace) capsule 100 mg (has no administration in time range)   hydroxyurea (Hydrea) capsule 1,000 mg (has no administration in time range)    hydroxyurea (Hydrea) capsule 500 mg (has no administration in time range)   hydrOXYzine HCL (Atarax) tablet 25 mg (has no administration in time range)   ibuprofen tablet 800 mg (has no administration in time range)   lidocaine 4 % patch 1 patch (has no administration in time range)   melatonin tablet 5 mg (5 mg oral Not Given 11/24/24 0108)   methadone (Dolophine) tablet 10 mg (has no administration in time range)   norethindrone (Aygestin) tablet 5 mg (has no administration in time range)   pantoprazole (ProtoNix) EC tablet 40 mg (has no administration in time range)   pregabalin (Lyrica) capsule 25 mg (25 mg oral Not Given 11/24/24 0108)   tiZANidine (Zanaflex) tablet 2 mg (has no administration in time range)   traZODone (Desyrel) tablet 100 mg (has no administration in time range)   valACYclovir (Valtrex) tablet 500 mg (has no administration in time range)   meloxicam (Mobic) tablet 15 mg (has no administration in time range)   acetaminophen (Tylenol) tablet 650 mg (has no administration in time range)     Or   acetaminophen (Tylenol) oral liquid 650 mg (has no administration in time range)     Or   acetaminophen (Tylenol) suppository 650 mg (has no administration in time range)   diclofenac sodium (Voltaren) 1 % gel 4 g (has no administration in time range)   HYDROmorphone PF (Dilaudid) injection 2 mg (2 mg intravenous Given 11/24/24 0107)   pramoxine (Proctofoam) 1 % foam 1 Application (has no administration in time range)   deferoxamine (Desferal) 2,000 mg in sodium chloride 0.9% 100 mL IV (has no administration in time range)       Cardiac Hx:  Last echo: No results found for this or any previous visit.   Last cath: CV NCDR CATHPCI V5 COLLECTION FORM   Last EKG:   Encounter Date: 07/05/24   ECG 12 lead   Result Value    Ventricular Rate 94    Atrial Rate 94    PA Interval 166    QRS Duration 78    QT Interval 350    QTC Calculation(Bazett) 437    P Axis 72    R Axis 47    T Axis 61    QRS Count 15    Q  Onset 221    P Onset 138    P Offset 190    T Offset 396    QTC Fredericia 406    Narrative    Normal sinus rhythm  Possible Left atrial enlargement  Septal infarct , age undetermined  Abnormal ECG  When compared with ECG of 20-JUL-2023 13:23,  Septal infarct is now Present  T wave inversion now evident in Anterior leads  See ED provider note for full interpretation and clinical correlation  Confirmed by Virginia Morales ([id number]) on 7/13/2024 7:00:37 PM        GI Hx:  Last EGD: No results found for this or any previous visit. No results found for this or any previous visit.  Last Colonoscopy: No results found for this or any previous visit. No results found for this or any previous visit.    Historical Data:  XR chest 1 view    Result Date: 11/20/2024  STUDY: Chest Radiograph;  11/20/2024 7:31 AM. INDICATION: Chest pain. COMPARISON: Chest radiograph 11/15/2024. ACCESSION NUMBER(S): [id number] ORDERING CLINICIAN: LAINEY ESPINOSA TECHNIQUE:  Frontal chest was obtained at 07:31 hours. FINDINGS: CARDIOMEDIASTINAL SILHOUETTE: Cardiomediastinal silhouette is normal in size and configuration.  LUNGS: Right-sided chest port with catheter tip in the right atrium. Lungs are clear.  ABDOMEN: No remarkable upper abdominal findings.  BONES: No acute osseous changes.    No acute findings. Signed by Zenon Felix MD    XR chest 1 view    Result Date: 11/15/2024  Interpreted By:  Tony Rand, STUDY: XR CHEST 1 VIEW;  11/15/2024 12:07 am   INDICATION: Signs/Symptoms:cough.   COMPARISON: CXR 07/05/2024 Chest CT 03/10/2022   ACCESSION NUMBER(S): [id number]   ORDERING CLINICIAN: AJ BEARD   FINDINGS: Right chest port with catheter tip in the mid SVC.   Lungs are clear.   No pleural effusion or pneumothorax.   Cardiomediastinal contour is normal in size and configuration.   Upper abdomen is unremarkable.   No acute osseous abnormality.       1. No acute cardiopulmonary process.   MACRO: None   Signed by: Tony Rand 11/15/2024  12:40 AM Dictation workstation:   [id number]    XR ankle right 3+ views    Result Date: 2024  Interpreted By:  Mikie Davalos  and Karyn Briggs STUDY: XR ANKLE RIGHT 3+ VIEWS; ;  2024 12:40 pm   INDICATION: Signs/Symptoms:ankle pain, sickle cell anemia.     COMPARISON: MRI foot 2019.   ACCESSION NUMBER(S): [id number]   ORDERING CLINICIAN: LILLIAN PITTMAN   FINDINGS: Three views of the right ankle are provided.   Ankle mortise is intact. Serpiginous area with overlying sclerosis in the distal tibia marrow which is concurrent with possible chronic infarct when compared to prior MRI of the foot 2019. No acute fractures or malalignment. No soft tissue gas or radiopaque foreign body.       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   Previous bone infarct of the calcaneus seen on prior MRI not visualized well. No acute findings right ankle.   I personally reviewed the images/study and I agree with the findings as stated by Resident Brigitte Boss. This study was interpreted at [place], [place].   MACRO: None   Signed by: Mikie Davalos 2024 3:04 PM Dictation workstation:   [id number]        Past Medical History  Past Medical History:   Diagnosis Date    Sickle cell anemia (Multi)     Unspecified astigmatism, bilateral 2020    Astigmatism, bilateral       Surgical History  Past Surgical History:   Procedure Laterality Date     SECTION, CLASSIC  2016     Section    CHOLECYSTECTOMY  2016    Cholecystectomy    MR HEAD ANGIO W AND WO IV CONTRAST  4/15/2013    MR HEAD ANGIO W AND WO IV CONTRAST 4/15/2013 [facility] CLINICAL LEGACY    MR HEAD ANGIO W AND WO IV CONTRAST  2013    MR HEAD ANGIO W AND WO IV CONTRAST 2013 [facility] INPATIENT LEGACY    MR HEAD ANGIO WO IV CONTRAST  2014    MR HEAD ANGIO WO IV CONTRAST 2014 [facility] ANCILLARY LEGACY    MR HEAD ANGIO WO IV CONTRAST  2016    MR HEAD ANGIO WO IV  CONTRAST 2/8/2016 [facility] ANCILLARY LEGACY    MR HEAD ANGIO WO IV CONTRAST  4/16/2019    MR HEAD ANGIO WO IV CONTRAST 4/16/2019 [facility] ANCILLARY LEGACY    MR HEAD ANGIO WO IV CONTRAST  5/4/2017    MR HEAD ANGIO WO IV CONTRAST 5/4/2017 [facility] INPATIENT LEGACY    MR NECK ANGIO WO IV CONTRAST  8/2/2013    MR NECK ANGIO WO IV CONTRAST 8/2/2013 [facility] INPATIENT LEGACY    MR NECK ANGIO WO IV CONTRAST  11/1/2014    MR NECK ANGIO WO IV CONTRAST 11/1/2014 [facility] ANCILLARY LEGACY    OTHER SURGICAL HISTORY  06/16/2016    Brain Surgery    TUBAL LIGATION  04/10/2017    Tubal Ligation       Social History  She reports that she has never smoked. She has never used smokeless tobacco. She reports that she does not currently use alcohol. She reports that she does not currently use drugs.    Family History  Family History   Problem Relation Name Age of Onset    Sickle cell trait Sister      Multiple sclerosis Brother      Breast cancer Maternal Grandmother      Breast cancer Other paternal cousin     Sickle cell trait Child          Allergies  Patient has no known allergies.     Physical Exam   Constitutional: No acute distress, resting comfortably in bed, cooperative  HEENT: Normocephalic, atraumatic, PERRLA, EOMI, moist mucous membranes, no pharyngeal erythema  Cardiovascular: RRR, normal S1/S2, no murmurs noted  Pulmonary: Clear to auscultation b/l, no wheezes/crackles/rhonchi, no increased work of breathing,on 3L supplemental oxygen  GI: Soft, non-tender, non-distended, normoactive bowel sounds  : No quintanilla catheter  Lower extremities: Warm and well perfused, no lower extremity edema  Neuro: A&O x3, able to move all 4 extremities spontaneously, normal gait  Psych: Appropriate mood and affect     Medications  Scheduled medications  ascorbic acid, 1,000 mg, oral, Daily  cholecalciferol, 2,000 Units, oral, Daily  deferoxamine, 2,000 mg, intravenous, Daily  docusate sodium, 100 mg, oral, Every other day  enoxaparin, 40 mg, subcutaneous,  q24h  FLUoxetine, 10 mg, oral, Daily  folic acid, 1 mg, oral, Daily  [START ON 11/25/2024] hydroxyurea, 1,000 mg, oral, Every Mon/Wed/Fri  hydroxyurea, 500 mg, oral, Every Sun/Tues/Thur/Sat  lidocaine, 1 patch, transdermal, Daily  melatonin, 5 mg, oral, Nightly  meloxicam, 15 mg, oral, Daily  methadone, 10 mg, oral, q12h  norethindrone, 5 mg, oral, Daily  pantoprazole, 40 mg, oral, Daily before breakfast  polyethylene glycol, 17 g, oral, Daily  pregabalin, 25 mg, oral, BID  sennosides-docusate sodium, 2 tablet, oral, BID  valACYclovir, 500 mg, oral, Daily      Continuous medications     PRN medications  PRN medications: acetaminophen **OR** acetaminophen **OR** acetaminophen, calcium carbonate, capsaicin, diclofenac sodium, diphenhydrAMINE, HYDROmorphone, hydrOXYzine HCL, ibuprofen, pramoxine, tiZANidine, traZODone      Assessment/Plan   Mary Alice Aragon is a 44 y.o. female with PMHx of Sickle cell disease c/b transfusion-related iron overload, heart murmur, cerebral aneurysm, NICHOL, MDD, and nocturnal hypoxia (on 2-3L at home as needed) who presented to [facility] 11/21 for bilateral lower extremity pain concerning for sickle cell crisis pain after recent exposure to viral infection from her son. [place] [facility] [place].     #Hb SS SCD with acute on chronic sickle cell pain   ::Missed ketamine infusion this month because there was none available.   - Retic count and LDH similar to baseline 11/20  - Continue home methadone 10 mg BID, lyrica 25 BID.  - Continue home hydroxyurea 500 mg X 4 days in a week and 1000 X 3 days in a week   - IV Dilaudid 2 mg every 3 hours PRN  - PO zofran and benadryl for nausea and itching PRN    #Nocturnal hypoxia on 3L supplemental night time oxygen   - Continue night supplemental oxygen       #Constipation   - Miralax daily and doc senna BID    #Iron overload secondary to chronic transfusions, on double chelation therapy. Ferritin level is 2285 ng/mL    - Continue current  chelation therapy with desferal 2 g daily subcutaneous   - Holding oral jadenu 360 mg daily patient states she does not take it when on the infusions has it as a back up if she does not have infusions  -  Liver MRI to assess iron overload ordered outpatient, can consider ordering while inpatient.      #NICHOL  #MDD  - Continue home atarax PRN, trazodone and Prozac    #Cerebral Aneurysm   - Per chart review has not seen NSGY in a while, was referred by outpatient heme/onc. Will need to be referred if not appointment.         F : PRN  E: PRN  N: Adult diet Regular  A:  PIV and central port rt chest wall   DVT prophylaxis: Lovenox subq (patient declined)    Code Status: Full Code (confirmed on admission)   NOK: Extended Emergency Contact Information  Primary Emergency Contact: Penelope Bain  Home Phone: [phone]  Relation: Parent        Malini De Anda MD  Internal Medicine, PGY-2

## 2024-11-24 NOTE — SIGNIFICANT EVENT
Patient seen at bedside. States that she is experiencing severe 9/10 pain in her legs and chest. States that her pain was controlled after receiving blood a few days prior but then after her pain medications were decreased too soon her pain came back. Denies any new fevers/chills, SOB, CP, heart palpitations, N/V/D/C. States that she has only been using her oxygen for comfort. States that she has not experienced any hypoxia       Mary Alice Aragon is a 44 y.o. female with PMHx of Sickle cell disease c/b transfusion-related iron overload, heart murmur, cerebral aneurysm, NICHOL, MDD, and nocturnal hypoxia (on 2-3L at home as needed) who presented to [facility] 11/21 for bilateral lower extremity pain concerning for sickle cell crisis pain after recent exposure to viral infection from her son. Transferred to [facility] for further management. Lysis labs at baseline. CXR neg for acute process. 11/14 patient started on a dPCA for uncontrolled pain. Discharge pending improvement of pain       #Hb SS SCD with acute on chronic sickle cell pain crisis   - No active carepath   - On Dilaudid 4mg PO PRN at home for sickle cell pain   - OARRS reviewed. No aberrant behavior-last filled 11/15 dilaudid 4mg (40 tabs total)   - Missed ketamine infusion this month because there was none available.   - Lysis labs at baseline   - S/p 1u PRBC 11/22   - Continue home methadone 10 mg BID, lyrica 25 BID.  - Continue home hydroxyurea 500 mg X 4 days in a week and 1000 X 3 days in a week   - 11/23 IV Dilaudid 2 mg every 3 hours PRN. 11/24 started on dPCA 1mg q30 minutes   - Lidocaine patches and heat packs for pain management   - 11/24 started Toradol 30mg q6 hours x4 doses (only ordered 4 doses d/t kidney function)   - Continue Deferoxamine 2000mg daily  - Continue Hydroxyurea 500mg S/T/Th/S, and 1,000mg M/W/F  - PO zofran and benadryl for nausea and itching PRN     #Nocturnal hypoxia on 3L supplemental night time oxygen   - Continue night supplemental  oxygen        #Constipation   - Miralax daily and doc senna BID     #Iron overload secondary to chronic transfusions, on double chelation therapy. Ferritin level is 2285 ng/mL    - Continue current chelation therapy with desferal 2 g daily subcutaneous   - Holding oral jadenu 360 mg daily patient states she does not take it when on the infusions has it as a back up if she does not have infusions  -  Liver MRI to assess iron overload ordered outpatient, can consider ordering while inpatient.      #NICHOL  #MDD  - Continue home atarax PRN, trazodone and Prozac     #Cerebral Aneurysm   - Follows neurology outpatient. Next appointment 3/13/25     #Prophy   DVT prophylaxis: Lovenox subq (patient declined). SCDs while in bed      Dispo  - Discharge after improvement in pain   - Code Status: Full Code (confirmed on admission)   - NOK: Extended Emergency Contact Information  - Primary Emergency Contact: Penelope Bain Phone: [phone] Relation: Parent

## 2024-11-24 NOTE — NURSING NOTE
11/24/24 1207 VAST RN consulted for 2nd IV access. Pt has a accessed port currently but pain medication is incompatible. Scanned arms bilaterally, no sustainable veins noted for cannulation. Bedside RN aware.

## 2024-11-25 ENCOUNTER — HOME INFUSION (OUTPATIENT)
Dept: INFUSION THERAPY | Age: 44
End: 2024-11-25
Payer: COMMERCIAL

## 2024-11-25 LAB
ABO GROUP (TYPE) IN BLOOD: NORMAL
ALBUMIN SERPL BCP-MCNC: 4 G/DL (ref 3.4–5)
ALP SERPL-CCNC: 60 U/L (ref 33–110)
ALT SERPL W P-5'-P-CCNC: 13 U/L (ref 7–45)
ANION GAP SERPL CALC-SCNC: 10 MMOL/L (ref 10–20)
ANTIBODY SCREEN: NORMAL
AST SERPL W P-5'-P-CCNC: 25 U/L (ref 9–39)
BASOPHILS # BLD AUTO: 0.04 X10*3/UL (ref 0–0.1)
BASOPHILS NFR BLD AUTO: 0.3 %
BILIRUB SERPL-MCNC: 2.5 MG/DL (ref 0–1.2)
BUN SERPL-MCNC: 18 MG/DL (ref 6–23)
CALCIUM SERPL-MCNC: 8.6 MG/DL (ref 8.6–10.6)
CHLORIDE SERPL-SCNC: 109 MMOL/L (ref 98–107)
CO2 SERPL-SCNC: 29 MMOL/L (ref 21–32)
CREAT SERPL-MCNC: 1.09 MG/DL (ref 0.5–1.05)
EGFRCR SERPLBLD CKD-EPI 2021: 65 ML/MIN/1.73M*2
EOSINOPHIL # BLD AUTO: 0.26 X10*3/UL (ref 0–0.7)
EOSINOPHIL NFR BLD AUTO: 2 %
ERYTHROCYTE [DISTWIDTH] IN BLOOD BY AUTOMATED COUNT: 17.1 % (ref 11.5–14.5)
GLUCOSE SERPL-MCNC: 78 MG/DL (ref 74–99)
HCT VFR BLD AUTO: 19 % (ref 36–46)
HGB BLD-MCNC: 6.2 G/DL (ref 12–16)
IMM GRANULOCYTES # BLD AUTO: 0.07 X10*3/UL (ref 0–0.7)
IMM GRANULOCYTES NFR BLD AUTO: 0.5 % (ref 0–0.9)
LDH SERPL L TO P-CCNC: 328 U/L (ref 84–246)
LYMPHOCYTES # BLD AUTO: 2.5 X10*3/UL (ref 1.2–4.8)
LYMPHOCYTES NFR BLD AUTO: 19 %
MCH RBC QN AUTO: 30.2 PG (ref 26–34)
MCHC RBC AUTO-ENTMCNC: 32.6 G/DL (ref 32–36)
MCV RBC AUTO: 93 FL (ref 80–100)
MONOCYTES # BLD AUTO: 0.5 X10*3/UL (ref 0.1–1)
MONOCYTES NFR BLD AUTO: 3.8 %
NEUTROPHILS # BLD AUTO: 9.82 X10*3/UL (ref 1.2–7.7)
NEUTROPHILS NFR BLD AUTO: 74.4 %
NRBC BLD-RTO: 0 /100 WBCS (ref 0–0)
PLATELET # BLD AUTO: 299 X10*3/UL (ref 150–450)
POTASSIUM SERPL-SCNC: 4.8 MMOL/L (ref 3.5–5.3)
PROT SERPL-MCNC: 6.6 G/DL (ref 6.4–8.2)
RBC # BLD AUTO: 2.05 X10*6/UL (ref 4–5.2)
RH FACTOR (ANTIGEN D): NORMAL
SODIUM SERPL-SCNC: 143 MMOL/L (ref 136–145)
WBC # BLD AUTO: 13.2 X10*3/UL (ref 4.4–11.3)

## 2024-11-25 PROCEDURE — 80053 COMPREHEN METABOLIC PANEL: CPT

## 2024-11-25 PROCEDURE — 86920 COMPATIBILITY TEST SPIN: CPT

## 2024-11-25 PROCEDURE — 2500000001 HC RX 250 WO HCPCS SELF ADMINISTERED DRUGS (ALT 637 FOR MEDICARE OP)

## 2024-11-25 PROCEDURE — 2500000002 HC RX 250 W HCPCS SELF ADMINISTERED DRUGS (ALT 637 FOR MEDICARE OP, ALT 636 FOR OP/ED)

## 2024-11-25 PROCEDURE — 99233 SBSQ HOSP IP/OBS HIGH 50: CPT

## 2024-11-25 PROCEDURE — S0109 METHADONE ORAL 5MG: HCPCS

## 2024-11-25 PROCEDURE — S4993 CONTRACEPTIVE PILLS FOR BC: HCPCS

## 2024-11-25 PROCEDURE — 86901 BLOOD TYPING SEROLOGIC RH(D): CPT

## 2024-11-25 PROCEDURE — 2500000004 HC RX 250 GENERAL PHARMACY W/ HCPCS (ALT 636 FOR OP/ED)

## 2024-11-25 PROCEDURE — 83615 LACTATE (LD) (LDH) ENZYME: CPT

## 2024-11-25 PROCEDURE — 1170000001 HC PRIVATE ONCOLOGY ROOM DAILY

## 2024-11-25 PROCEDURE — 2500000005 HC RX 250 GENERAL PHARMACY W/O HCPCS

## 2024-11-25 PROCEDURE — 85025 COMPLETE CBC W/AUTO DIFF WBC: CPT

## 2024-11-25 RX ORDER — HYDROMORPHONE HYDROCHLORIDE 1 MG/ML
1 INJECTION, SOLUTION INTRAMUSCULAR; INTRAVENOUS; SUBCUTANEOUS ONCE
Status: COMPLETED | OUTPATIENT
Start: 2024-11-25 | End: 2024-11-25

## 2024-11-25 RX ADMIN — KETOROLAC TROMETHAMINE 30 MG: 30 INJECTION, SOLUTION INTRAMUSCULAR; INTRAVENOUS at 00:48

## 2024-11-25 RX ADMIN — FOLIC ACID 1 MG: 1 TABLET ORAL at 09:28

## 2024-11-25 RX ADMIN — SENNOSIDES AND DOCUSATE SODIUM 2 TABLET: 50; 8.6 TABLET ORAL at 22:18

## 2024-11-25 RX ADMIN — METHADONE HYDROCHLORIDE 10 MG: 10 TABLET ORAL at 09:28

## 2024-11-25 RX ADMIN — Medication 2000 UNITS: at 06:36

## 2024-11-25 RX ADMIN — FLUOXETINE 10 MG: 10 CAPSULE ORAL at 09:29

## 2024-11-25 RX ADMIN — LIDOCAINE 2 PATCH: 4 PATCH TOPICAL at 13:17

## 2024-11-25 RX ADMIN — Medication 5 MG: at 22:18

## 2024-11-25 RX ADMIN — POLYETHYLENE GLYCOL 3350 17 G: 17 POWDER, FOR SOLUTION ORAL at 09:27

## 2024-11-25 RX ADMIN — HYDROMORPHONE HYDROCHLORIDE 2 MG: 2 INJECTION, SOLUTION INTRAMUSCULAR; INTRAVENOUS; SUBCUTANEOUS at 10:46

## 2024-11-25 RX ADMIN — SODIUM CHLORIDE 2000 MG: 900 INJECTION, SOLUTION INTRAVENOUS at 15:24

## 2024-11-25 RX ADMIN — SENNOSIDES AND DOCUSATE SODIUM 2 TABLET: 50; 8.6 TABLET ORAL at 09:27

## 2024-11-25 RX ADMIN — MELOXICAM 15 MG: 15 TABLET ORAL at 09:29

## 2024-11-25 RX ADMIN — HYDROMORPHONE HYDROCHLORIDE 2 MG: 2 INJECTION, SOLUTION INTRAMUSCULAR; INTRAVENOUS; SUBCUTANEOUS at 19:49

## 2024-11-25 RX ADMIN — OXYCODONE HYDROCHLORIDE AND ACETAMINOPHEN 1000 MG: 500 TABLET ORAL at 09:28

## 2024-11-25 RX ADMIN — VALACYCLOVIR 500 MG: 500 TABLET, FILM COATED ORAL at 09:29

## 2024-11-25 RX ADMIN — HYDROMORPHONE HYDROCHLORIDE 2 MG: 2 INJECTION, SOLUTION INTRAMUSCULAR; INTRAVENOUS; SUBCUTANEOUS at 15:24

## 2024-11-25 RX ADMIN — TIZANIDINE 2 MG: 2 TABLET ORAL at 22:31

## 2024-11-25 RX ADMIN — HYDROMORPHONE HYDROCHLORIDE 2 MG: 2 INJECTION, SOLUTION INTRAMUSCULAR; INTRAVENOUS; SUBCUTANEOUS at 17:38

## 2024-11-25 RX ADMIN — HYDROMORPHONE HYDROCHLORIDE 1 MG: 1 INJECTION, SOLUTION INTRAMUSCULAR; INTRAVENOUS; SUBCUTANEOUS at 23:21

## 2024-11-25 RX ADMIN — NORETHINDRONE ACETATE 5 MG: 5 TABLET ORAL at 09:29

## 2024-11-25 RX ADMIN — KETOROLAC TROMETHAMINE 30 MG: 30 INJECTION, SOLUTION INTRAMUSCULAR; INTRAVENOUS at 06:36

## 2024-11-25 RX ADMIN — HYDROMORPHONE HYDROCHLORIDE 2 MG: 2 INJECTION, SOLUTION INTRAMUSCULAR; INTRAVENOUS; SUBCUTANEOUS at 22:19

## 2024-11-25 RX ADMIN — METHADONE HYDROCHLORIDE 10 MG: 10 TABLET ORAL at 22:18

## 2024-11-25 RX ADMIN — HYDROXYUREA 1000 MG: 500 CAPSULE ORAL at 09:29

## 2024-11-25 RX ADMIN — PREGABALIN 25 MG: 25 CAPSULE ORAL at 22:18

## 2024-11-25 RX ADMIN — HYDROMORPHONE HYDROCHLORIDE 2 MG: 2 INJECTION, SOLUTION INTRAMUSCULAR; INTRAVENOUS; SUBCUTANEOUS at 13:16

## 2024-11-25 RX ADMIN — PANTOPRAZOLE SODIUM 40 MG: 40 TABLET, DELAYED RELEASE ORAL at 06:36

## 2024-11-25 RX ADMIN — PREGABALIN 25 MG: 25 CAPSULE ORAL at 09:28

## 2024-11-25 ASSESSMENT — PAIN - FUNCTIONAL ASSESSMENT
PAIN_FUNCTIONAL_ASSESSMENT: 0-10

## 2024-11-25 ASSESSMENT — PAIN SCALES - GENERAL
PAINLEVEL_OUTOF10: 9
PAINLEVEL_OUTOF10: 7
PAINLEVEL_OUTOF10: 6
PAINLEVEL_OUTOF10: 8
PAINLEVEL_OUTOF10: 6
PAINLEVEL_OUTOF10: 8
PAINLEVEL_OUTOF10: 8
PAINLEVEL_OUTOF10: 6
PAINLEVEL_OUTOF10: 8

## 2024-11-25 ASSESSMENT — PAIN DESCRIPTION - ORIENTATION
ORIENTATION: RIGHT;LEFT
ORIENTATION: RIGHT;LEFT

## 2024-11-25 ASSESSMENT — PAIN DESCRIPTION - LOCATION
LOCATION: GENERALIZED
LOCATION: LEG
LOCATION: CHEST
LOCATION: LEG
LOCATION: GENERALIZED
LOCATION: BUTTOCKS
LOCATION: BUTTOCKS

## 2024-11-25 NOTE — CARE PLAN
Problem: Pain - Adult  Goal: Verbalizes/displays adequate comfort level or baseline comfort level  Outcome: Progressing     Problem: Safety - Adult  Goal: Free from fall injury  Outcome: Progressing     Problem: Discharge Planning  Goal: Discharge to home or other facility with appropriate resources  Outcome: Progressing     Problem: Chronic Conditions and Co-morbidities  Goal: Patient's chronic conditions and co-morbidity symptoms are monitored and maintained or improved  Outcome: Progressing   The patient's goals for the shift include come off PCA.     The clinical goals for the shift include patient will have adequte pain control when PCA comes down    Patient wanted to come off PCA today. Receiving 2 mg dilaudid prn. Has needed q 2 hours. Took shower. Up with 1 assist. Receiving Desferal over 8 hours. Remained safe this shift.

## 2024-11-25 NOTE — PROGRESS NOTES
Patient presented to [facility] ER in sickle cell crisis on  - was transferred to [facility] for admission and pain management. Pharmacy Only. Email sent to Intake RN and chart given to Intake Pharmacist.  Likely no changes in [facility] orders upon discharge.     Last DFO delivery on     on     [facility] will await discharge to follow up.

## 2024-11-25 NOTE — CONSULTS
"Nutrition Initial Assessment:   Nutrition Assessment    Reason for Assessment: Admission nursing screening    Patient is a 44 y.o. female with PMHx of Sickle cell disease c/b transfusion-related iron overload, heart murmur, cerebral aneurysm, NICHOL, MDD, and nocturnal hypoxia (on 2-3L at home as needed) who presented to [facility] 11/21 for bilateral lower extremity pain concerning for sickle cell crisis pain after recent exposure to viral infection from her son. Transferred to [facility] for further management.       Nutrition History:  Energy Intake: Good > 75 %  Food and Nutrient History: During visit pt reports she has a good appetite. Reports is consuming 3 meals per day. Reports she likes Ensure supplements and would like some during admission. Denies vomiting, diarrhea, abdominal pain, difficulty chewing/swallowing. Endorses nausea and constipation.  Vitamin/Herbal Supplement Use: Per home med list ascorbic acid, cholecalciferol  Food Allergies/Intolerances:  None  GI Symptoms: Constipation and Nausea  Oral Problems: None       Anthropometrics:  Height: 160 cm (5' 3\")   Weight: 68 kg (150 lb)   BMI (Calculated): 26.58  IBW/kg (Dietitian Calculated): 52.3 kg  Percent of IBW: 130 %       Weight History:   Wt Readings from Last 15 Encounters:   11/24/24 68 kg (150 lb)   11/21/24 68 kg (150 lb)   11/14/24 66.7 kg (147 lb)   11/05/24 66.2 kg (146 lb)   10/21/24 64.8 kg (142 lb 13.7 oz)   10/02/24 64.4 kg (142 lb)   08/28/24 62.9 kg (138 lb 11.2 oz)   07/05/24 68 kg (150 lb)   06/26/24 64.8 kg (142 lb 14.4 oz)   04/23/24 65.7 kg (144 lb 13.5 oz)   04/22/24 64.9 kg (143 lb 1.3 oz)   01/23/24 65.8 kg (145 lb 1 oz)   12/18/23 67 kg (147 lb 12.8 oz)   11/17/23 72.6 kg (160 lb) - 6.3% loss   07/20/23 67.2 kg (148 lb 2 oz)       Weight Change %:  Weight History / % Weight Change: Pt reports her wt fluctuates. Reports usual body wt between 150-160lb.  Significant Weight Loss: No    Nutrition Focused Physical Exam " Findings:    Subcutaneous Fat Loss:   Orbital Fat Pads: Mild-Moderate (slight dark circles and slight hollowing)  Buccal Fat Pads: Well nourished (full, rounded cheeks)  Triceps: Well nourished (ample fat tissue)  Muscle Wasting:  Temporalis: Well nourished (well-defined muscle)  Pectoralis (Clavicular Region): Well nourished (clavicle not visible)  Deltoid/Trapezius: Well nourished (rounded appearance at arm, shoulder, neck)  Quadriceps: Well nourished (well developed, well rounded)  Gastrocnemius: Well nourished (well developed bulbous muscle)  Edema:  Edema: none  Physical Findings:  Hair: Negative  Eyes: Negative  Skin: Negative    Nutrition Significant Labs:  CBC Trend:   Results from last 7 days   Lab Units 11/25/24 0644 11/24/24 0623 11/23/24 0607 11/22/24  0620   WBC AUTO x10*3/uL 13.2* 14.2* 12.9* 11.2   RBC AUTO x10*6/uL 2.05* 2.15* 2.14* 1.82*   HEMOGLOBIN g/dL 6.2* 6.5* 6.7* 5.8*   HEMATOCRIT % 19.0* 19.4* 19.7* 17.2*   MCV fL 93 90 92 95   PLATELETS AUTO x10*3/uL 299 314 299 295    , BMP Trend:   Results from last 7 days   Lab Units 11/25/24  0644 11/24/24  0623 11/23/24  0607 11/22/24  0620   GLUCOSE mg/dL 78 90 87 95   CALCIUM mg/dL 8.6 8.9 8.4* 8.2*   SODIUM mmol/L 143 141 140 141   POTASSIUM mmol/L 4.8 4.3 4.2 4.0   CO2 mmol/L 29 29 25 27   CHLORIDE mmol/L 109* 108* 110* 111*   BUN mg/dL 18 14 16 13   CREATININE mg/dL 1.09* 0.98 1.04 0.96   Renal Lab Trend:   Results from last 7 days   Lab Units 11/25/24  0644 11/24/24  0623 11/23/24  0607 11/22/24  0620 11/22/24  0620   POTASSIUM mmol/L 4.8 4.3 4.2  --  4.0   PHOSPHORUS mg/dL  --  3.9  --   --   --    SODIUM mmol/L 143 141 140  --  141   MAGNESIUM mg/dL  --  2.14 1.97   < >  --    EGFR mL/min/1.73m*2 65 74 69  --  76   BUN mg/dL 18 14 16  --  13   CREATININE mg/dL 1.09* 0.98 1.04  --  0.96    < > = values in this interval not displayed.        Nutrition Specific Medications:  Scheduled medications  ascorbic acid, 1,000 mg, oral,  Daily  cholecalciferol, 2,000 Units, oral, Daily  deferoxamine, 2,000 mg, subcutaneous, Daily  docusate sodium, 100 mg, oral, Every other day  FLUoxetine, 10 mg, oral, Daily  folic acid, 1 mg, oral, Daily  hydroxyurea, 1,000 mg, oral, Every Mon/Wed/Fri  hydroxyurea, 500 mg, oral, Every Sun/Tues/Thur/Sat  lidocaine, 2 patch, transdermal, Daily  melatonin, 5 mg, oral, Nightly  meloxicam, 15 mg, oral, Daily  methadone, 10 mg, oral, q12h  norethindrone, 5 mg, oral, Daily  pantoprazole, 40 mg, oral, Daily before breakfast  polyethylene glycol, 17 g, oral, Daily  pregabalin, 25 mg, oral, BID  sennosides-docusate sodium, 2 tablet, oral, BID  valACYclovir, 500 mg, oral, Daily        I/O:   Last BM Date: 11/24/24;      Dietary Orders (From admission, onward)       Start     Ordered    11/24/24 0233  May Participate in Room Service  ( ROOM SERVICE MAY PARTICIPATE)  Once        Question:  .  Answer:  Yes    11/24/24 0232 11/24/24 0016  Adult diet Regular  Diet effective now        Question:  Diet type  Answer:  Regular    11/24/24 0016                     Estimated Needs:   Total Energy Estimated Needs (kCal): 1600 kCal  Method for Estimating Needs: 30kcal/kg IBW  Total Protein Estimated Needs (g):  (65-70)  Method for Estimating Needs: 1.3g/kg IBW  Total Fluid Estimated Needs (mL):  (1mL/kcal or per team)           Nutrition Diagnosis   Malnutrition Diagnosis  Patient has Malnutrition Diagnosis: No    Nutrition Diagnosis  Patient has Nutrition Diagnosis: Yes  Diagnosis Status (1): New  Nutrition Diagnosis 1: Increased nutrient needs (protein)  Related to (1): increased metabolic demand  As Evidenced by (1): sickle cell disease       Nutrition Interventions/Recommendations         Nutrition Prescription:  Ensure High Protein once daily (160kcal, 16g protein).  Continue regular diet.        Nutrition Interventions:   Interventions: Meals and snacks, Medical food supplement  Goal: consume >50% of meals  Medical Food  Supplement: Commercial beverage  Goal: Ensure High Protein once daily       Nutrition Education:   N/A       Nutrition Monitoring and Evaluation   Food/Nutrient Related History Monitoring  Monitoring and Evaluation Plan: Energy intake, Amount of food  Criteria: meet >75% of estimated energy needs  Amount of Food: Medical food intake  Criteria: consume 100% ONS    Body Composition/Growth/Weight History  Monitoring and Evaluation Plan: Weight  Criteria: stable weight    Biochemical Data, Medical Tests and Procedures  Monitoring and Evaluation Plan: Electrolyte/renal panel, Glucose/endocrine profile  Criteria: WNL              Time Spent (min): 60 minutes

## 2024-11-25 NOTE — PROGRESS NOTES
11/25/24 1400   Discharge Planning   Living Arrangements Children   Support Systems Children;Family members   Assistance Needed none   Type of Residence Private residence   Who is requesting discharge planning? Provider   Home or Post Acute Services None   Expected Discharge Disposition Home       This SW/TCC met with pt to introduce self and role.  Per the medical team, this patient has no anticipated discharge needs; full assessment deferred at this time.  Please advise SW/TCC if discharge planning needs arise. Patient did inquire about getting a new cane. Medical team aware and will place order for walking cane. TCC to send to [facility]. Patient home O2 supplier is [employer]. Will continue to follow. Renetta Ocampo RN TCC

## 2024-11-25 NOTE — CARE PLAN
The patient's goals for the shift include      The clinical goals for the shift include Patient pain will be managed this shift  till 11/25/24 at 0700    Pain managed with PCA and scheduled Toradol      Problem: Pain - Adult  Goal: Verbalizes/displays adequate comfort level or baseline comfort level  Outcome: Progressing     Problem: Safety - Adult  Goal: Free from fall injury  Outcome: Progressing     Problem: Discharge Planning  Goal: Discharge to home or other facility with appropriate resources  Outcome: Progressing     Problem: Chronic Conditions and Co-morbidities  Goal: Patient's chronic conditions and co-morbidity symptoms are monitored and maintained or improved  Outcome: Progressing

## 2024-11-25 NOTE — PROGRESS NOTES
"Mary Alice Aragon is a 44 y.o. female on day 2 of admission presenting with Sickle cell crisis (Multi).    Subjective   Seen at bedside this AM. Patient states that chest pain is significantly better, now rating 1/10. Continues to have 5-6/10 BLE pain. Her goal today is to stop dPCA pump and be able to walk more comfortably. Discussed stopping dPCA and starting IVP dilaudid with goal to rotate tomorrow. Agreeable to plan. Denies any new fevers/chills, SOB, CP, heart palpitations, N/V/D/C.     Objective     Physical Exam  Vitals reviewed.   Constitutional:       Appearance: Normal appearance.   HENT:      Head: Normocephalic and atraumatic.      Nose: Nose normal.      Mouth/Throat:      Mouth: Mucous membranes are moist.      Pharynx: Oropharynx is clear.   Eyes:      Extraocular Movements: Extraocular movements intact.      Pupils: Pupils are equal, round, and reactive to light.   Cardiovascular:      Rate and Rhythm: Normal rate and regular rhythm.      Pulses: Normal pulses.      Heart sounds: Normal heart sounds.   Pulmonary:      Effort: Pulmonary effort is normal.      Breath sounds: Normal breath sounds.   Abdominal:      General: Bowel sounds are normal.      Palpations: Abdomen is soft.   Musculoskeletal:         General: Normal range of motion.   Skin:     General: Skin is warm.   Neurological:      General: No focal deficit present.      Mental Status: She is alert and oriented to person, place, and time. Mental status is at baseline.   Psychiatric:         Mood and Affect: Mood normal.         Behavior: Behavior normal.       Last Recorded Vitals  Blood pressure 171/89, pulse 106, temperature 37.1 °C (98.8 °F), resp. rate 18, height 1.6 m (5' 3\"), weight 68 kg (150 lb), SpO2 93%.  Intake/Output last 3 Shifts:  I/O last 3 completed shifts:  In: 1460 (21.5 mL/kg) [P.O.:1460]  Out: 2600 (38.2 mL/kg) [Urine:2600 (1.1 mL/kg/hr)]  Weight: 68 kg     Relevant Results  Scheduled medications  ascorbic acid, 1,000 " mg, oral, Daily  cholecalciferol, 2,000 Units, oral, Daily  deferoxamine, 2,000 mg, subcutaneous, Daily  docusate sodium, 100 mg, oral, Every other day  FLUoxetine, 10 mg, oral, Daily  folic acid, 1 mg, oral, Daily  hydroxyurea, 1,000 mg, oral, Every Mon/Wed/Fri  hydroxyurea, 500 mg, oral, Every Sun/Tues/Thur/Sat  lidocaine, 2 patch, transdermal, Daily  melatonin, 5 mg, oral, Nightly  meloxicam, 15 mg, oral, Daily  methadone, 10 mg, oral, q12h  norethindrone, 5 mg, oral, Daily  pantoprazole, 40 mg, oral, Daily before breakfast  polyethylene glycol, 17 g, oral, Daily  pregabalin, 25 mg, oral, BID  sennosides-docusate sodium, 2 tablet, oral, BID  valACYclovir, 500 mg, oral, Daily    Continuous medications     PRN medications  PRN medications: acetaminophen **OR** acetaminophen **OR** acetaminophen, calcium carbonate, capsaicin, diclofenac sodium, diphenhydrAMINE, HYDROmorphone, hydrOXYzine HCL, ondansetron, pramoxine, tiZANidine, traZODone       This patient has a central line   Reason for the central line remaining today? Dialysis/Hemapheresis      Assessment/Plan   Assessment & Plan  Sickle cell crisis (Multi)    Mary Alice Aragon is a 44 y.o. female with PMHx of Sickle cell disease c/b transfusion-related iron overload, heart murmur, cerebral aneurysm, NICHOL, MDD, and nocturnal hypoxia (on 2-3L at home as needed) who presented to [facility] 11/21 for bilateral lower extremity pain concerning for sickle cell crisis pain after recent exposure to viral infection from her son. Transferred to [facility] for further management. Lysis labs at baseline. CXR neg for acute process. 11/24 patient started on a dPCA for uncontrolled pain, stopped on 11/25 and started IVP dilaudid with plan to start rotating 11/26 if able. Discharge pending improvement of pain.      # Hb SS SCD with acute on chronic sickle cell pain crisis   - No active carepath   - On Dilaudid 4mg PO PRN at home for sickle cell pain   - OARRS reviewed. No aberrant  behavior-last filled 11/15 dilaudid 4mg (40 tabs total)   - Missed ketamine infusion this month because there was none available.   - Lysis labs at baseline   - S/p 1u PRBC 11/22 for hgb 5.8   - hgb S 52.8% (11/24)   - hgb 6.2 (11/25); pt would like to see what hgb is like 11/26 before getting blood, pt asymptomatic; reassess in AM   - Continue home methadone 10 mg BID, lyrica 25 BID   - Continue home hydroxyurea 500 mg X 4 days in a week and 1000 X 3 days in a week   - s/p 11/23 IV Dilaudid 2 mg every 3 hours PRN  - s/p started on dPCA 1mg q30 minutes (11/24-11/25)   - started 2mg IVP dilaudid Q2hrs (11/25- ); if able start rotating with home pain medications 11/26   - Lidocaine patches and heat packs for pain management   - 11/24 started Toradol 30mg q6 hours x4 doses (only ordered 4 doses d/t kidney function)   - Continue Deferoxamine 2000mg daily  - Continue Hydroxyurea 500mg S/T/Th/S, and 1,000mg M/W/F  - PO zofran and benadryl for nausea and itching PRN      # Nocturnal hypoxia on 3L supplemental night time oxygen   - Continue night supplemental oxygen        # Constipation   - Miralax daily and doc senna BID     # Iron overload secondary to chronic transfusions  - on double chelation therapy. Ferritin level is 2285 ng/mL    - Continue current chelation therapy with desferal 2 g daily subcutaneous   - Holding oral jadenu 360 mg daily patient states she does not take it when on the infusions has it as a back up if she does not have infusions  -  Liver MRI to assess iron overload ordered outpatient, can consider ordering while inpatient.      # NICHOL  # MDD  - Continue home atarax PRN, trazodone and Prozac     # Cerebral Aneurysm   - Follows neurology outpatient. Next appointment 3/13/25     # Prophy   DVT prophylaxis: Lovenox subq (patient declined). SCDs while in bed      Dispo  - Discharge after improvement in pain   - Code Status: Full Code (confirmed on admission)   - NOK: Extended Emergency Contact  Information  - Primary Emergency Contact: Penelope Bain Home Phone: [phone] Relation: Parent       I spent 60 minutes in the professional and overall care of this patient.    Assessment and plan as above discussed with attending physician Dr. Nayely Eric, APRN-CNP

## 2024-11-26 LAB
ALBUMIN SERPL BCP-MCNC: 4 G/DL (ref 3.4–5)
ALP SERPL-CCNC: 60 U/L (ref 33–110)
ALT SERPL W P-5'-P-CCNC: 14 U/L (ref 7–45)
ANION GAP SERPL CALC-SCNC: 9 MMOL/L (ref 10–20)
AST SERPL W P-5'-P-CCNC: 21 U/L (ref 9–39)
BASOPHILS # BLD AUTO: 0.04 X10*3/UL (ref 0–0.1)
BASOPHILS NFR BLD AUTO: 0.4 %
BILIRUB SERPL-MCNC: 2.1 MG/DL (ref 0–1.2)
BUN SERPL-MCNC: 14 MG/DL (ref 6–23)
CALCIUM SERPL-MCNC: 8.5 MG/DL (ref 8.6–10.6)
CHLORIDE SERPL-SCNC: 105 MMOL/L (ref 98–107)
CO2 SERPL-SCNC: 28 MMOL/L (ref 21–32)
CREAT SERPL-MCNC: 0.98 MG/DL (ref 0.5–1.05)
EGFRCR SERPLBLD CKD-EPI 2021: 74 ML/MIN/1.73M*2
EOSINOPHIL # BLD AUTO: 0.21 X10*3/UL (ref 0–0.7)
EOSINOPHIL NFR BLD AUTO: 2 %
ERYTHROCYTE [DISTWIDTH] IN BLOOD BY AUTOMATED COUNT: 16.7 % (ref 11.5–14.5)
GLUCOSE SERPL-MCNC: 79 MG/DL (ref 74–99)
HCT VFR BLD AUTO: 18.6 % (ref 36–46)
HEMOGLOBIN A2: 2.9 % (ref 2–3.5)
HEMOGLOBIN A: 27.8 % (ref 95.8–98)
HEMOGLOBIN F: 16.5 % (ref 0–2)
HEMOGLOBIN IDENTIFICATION INTERPRETATION: ABNORMAL
HEMOGLOBIN S: 52.8 %
HGB BLD-MCNC: 6.1 G/DL (ref 12–16)
HGB RETIC QN: 31 PG (ref 28–38)
IMM GRANULOCYTES # BLD AUTO: 0.05 X10*3/UL (ref 0–0.7)
IMM GRANULOCYTES NFR BLD AUTO: 0.5 % (ref 0–0.9)
IMMATURE RETIC FRACTION: 19 %
LDH SERPL L TO P-CCNC: 315 U/L (ref 84–246)
LYMPHOCYTES # BLD AUTO: 2.69 X10*3/UL (ref 1.2–4.8)
LYMPHOCYTES NFR BLD AUTO: 25.8 %
MCH RBC QN AUTO: 30 PG (ref 26–34)
MCHC RBC AUTO-ENTMCNC: 32.8 G/DL (ref 32–36)
MCV RBC AUTO: 92 FL (ref 80–100)
MONOCYTES # BLD AUTO: 0.41 X10*3/UL (ref 0.1–1)
MONOCYTES NFR BLD AUTO: 3.9 %
NEUTROPHILS # BLD AUTO: 7.04 X10*3/UL (ref 1.2–7.7)
NEUTROPHILS NFR BLD AUTO: 67.4 %
NRBC BLD-RTO: 0 /100 WBCS (ref 0–0)
PATH REVIEW-HGB IDENTIFICATION: ABNORMAL
PLATELET # BLD AUTO: 306 X10*3/UL (ref 150–450)
POTASSIUM SERPL-SCNC: 4.5 MMOL/L (ref 3.5–5.3)
PROT SERPL-MCNC: 6.6 G/DL (ref 6.4–8.2)
RBC # BLD AUTO: 2.03 X10*6/UL (ref 4–5.2)
RETICS #: 0.08 X10*6/UL (ref 0.02–0.08)
RETICS/RBC NFR AUTO: 4.2 % (ref 0.5–2)
SODIUM SERPL-SCNC: 137 MMOL/L (ref 136–145)
WBC # BLD AUTO: 10.4 X10*3/UL (ref 4.4–11.3)

## 2024-11-26 PROCEDURE — 85025 COMPLETE CBC W/AUTO DIFF WBC: CPT

## 2024-11-26 PROCEDURE — 2500000001 HC RX 250 WO HCPCS SELF ADMINISTERED DRUGS (ALT 637 FOR MEDICARE OP)

## 2024-11-26 PROCEDURE — 85045 AUTOMATED RETICULOCYTE COUNT: CPT

## 2024-11-26 PROCEDURE — 99233 SBSQ HOSP IP/OBS HIGH 50: CPT

## 2024-11-26 PROCEDURE — 1170000001 HC PRIVATE ONCOLOGY ROOM DAILY

## 2024-11-26 PROCEDURE — 2500000005 HC RX 250 GENERAL PHARMACY W/O HCPCS

## 2024-11-26 PROCEDURE — 83615 LACTATE (LD) (LDH) ENZYME: CPT

## 2024-11-26 PROCEDURE — 36430 TRANSFUSION BLD/BLD COMPNT: CPT

## 2024-11-26 PROCEDURE — 86902 BLOOD TYPE ANTIGEN DONOR EA: CPT

## 2024-11-26 PROCEDURE — 85660 RBC SICKLE CELL TEST: CPT

## 2024-11-26 PROCEDURE — S0109 METHADONE ORAL 5MG: HCPCS

## 2024-11-26 PROCEDURE — 2500000002 HC RX 250 W HCPCS SELF ADMINISTERED DRUGS (ALT 637 FOR MEDICARE OP, ALT 636 FOR OP/ED)

## 2024-11-26 PROCEDURE — S4993 CONTRACEPTIVE PILLS FOR BC: HCPCS

## 2024-11-26 PROCEDURE — 2500000004 HC RX 250 GENERAL PHARMACY W/ HCPCS (ALT 636 FOR OP/ED)

## 2024-11-26 PROCEDURE — P9016 RBC LEUKOCYTES REDUCED: HCPCS

## 2024-11-26 PROCEDURE — 80053 COMPREHEN METABOLIC PANEL: CPT

## 2024-11-26 RX ORDER — HYDROMORPHONE HYDROCHLORIDE 1 MG/ML
1 INJECTION, SOLUTION INTRAMUSCULAR; INTRAVENOUS; SUBCUTANEOUS EVERY 6 HOURS PRN
Status: DISCONTINUED | OUTPATIENT
Start: 2024-11-26 | End: 2024-11-27

## 2024-11-26 RX ORDER — HYDROMORPHONE HYDROCHLORIDE 4 MG/1
4 TABLET ORAL EVERY 6 HOURS PRN
Status: DISCONTINUED | OUTPATIENT
Start: 2024-11-26 | End: 2024-11-27

## 2024-11-26 RX ORDER — ACETAMINOPHEN 325 MG/1
650 TABLET ORAL EVERY 6 HOURS PRN
Status: DISCONTINUED | OUTPATIENT
Start: 2024-11-26 | End: 2024-11-27 | Stop reason: HOSPADM

## 2024-11-26 RX ADMIN — LIDOCAINE 2 PATCH: 4 PATCH TOPICAL at 12:52

## 2024-11-26 RX ADMIN — HYDROMORPHONE HYDROCHLORIDE 2 MG: 2 INJECTION, SOLUTION INTRAMUSCULAR; INTRAVENOUS; SUBCUTANEOUS at 11:07

## 2024-11-26 RX ADMIN — METHADONE HYDROCHLORIDE 10 MG: 10 TABLET ORAL at 08:32

## 2024-11-26 RX ADMIN — HYDROMORPHONE HYDROCHLORIDE 4 MG: 4 TABLET ORAL at 16:39

## 2024-11-26 RX ADMIN — MELOXICAM 15 MG: 15 TABLET ORAL at 08:28

## 2024-11-26 RX ADMIN — HYDROMORPHONE HYDROCHLORIDE 2 MG: 2 INJECTION, SOLUTION INTRAMUSCULAR; INTRAVENOUS; SUBCUTANEOUS at 08:28

## 2024-11-26 RX ADMIN — HYDROMORPHONE HYDROCHLORIDE 2 MG: 2 INJECTION, SOLUTION INTRAMUSCULAR; INTRAVENOUS; SUBCUTANEOUS at 02:24

## 2024-11-26 RX ADMIN — PREGABALIN 25 MG: 25 CAPSULE ORAL at 21:08

## 2024-11-26 RX ADMIN — ACETAMINOPHEN 650 MG: 325 TABLET ORAL at 12:46

## 2024-11-26 RX ADMIN — VALACYCLOVIR 500 MG: 500 TABLET, FILM COATED ORAL at 08:28

## 2024-11-26 RX ADMIN — HYDROMORPHONE HYDROCHLORIDE 1 MG: 1 INJECTION, SOLUTION INTRAMUSCULAR; INTRAVENOUS; SUBCUTANEOUS at 19:51

## 2024-11-26 RX ADMIN — HYDROXYUREA 1000 MG: 500 CAPSULE ORAL at 21:08

## 2024-11-26 RX ADMIN — FLUOXETINE 10 MG: 10 CAPSULE ORAL at 08:27

## 2024-11-26 RX ADMIN — DOCUSATE SODIUM 100 MG: 100 CAPSULE, LIQUID FILLED ORAL at 08:27

## 2024-11-26 RX ADMIN — ONDANSETRON HYDROCHLORIDE 4 MG: 4 TABLET, FILM COATED ORAL at 02:27

## 2024-11-26 RX ADMIN — METHADONE HYDROCHLORIDE 10 MG: 10 TABLET ORAL at 21:08

## 2024-11-26 RX ADMIN — POLYETHYLENE GLYCOL 3350 17 G: 17 POWDER, FOR SOLUTION ORAL at 08:32

## 2024-11-26 RX ADMIN — HYDROMORPHONE HYDROCHLORIDE 2 MG: 2 INJECTION, SOLUTION INTRAMUSCULAR; INTRAVENOUS; SUBCUTANEOUS at 13:24

## 2024-11-26 RX ADMIN — NORETHINDRONE ACETATE 5 MG: 5 TABLET ORAL at 08:28

## 2024-11-26 RX ADMIN — TIZANIDINE 2 MG: 2 TABLET ORAL at 08:27

## 2024-11-26 RX ADMIN — FOLIC ACID 1 MG: 1 TABLET ORAL at 08:27

## 2024-11-26 RX ADMIN — Medication 5 MG: at 21:08

## 2024-11-26 RX ADMIN — SODIUM CHLORIDE 2000 MG: 900 INJECTION, SOLUTION INTRAVENOUS at 16:40

## 2024-11-26 RX ADMIN — TIZANIDINE 2 MG: 2 TABLET ORAL at 22:30

## 2024-11-26 RX ADMIN — Medication 2000 UNITS: at 06:14

## 2024-11-26 RX ADMIN — OXYCODONE HYDROCHLORIDE AND ACETAMINOPHEN 1000 MG: 500 TABLET ORAL at 08:27

## 2024-11-26 RX ADMIN — POLYETHYLENE GLYCOL 3350 17 G: 17 POWDER, FOR SOLUTION ORAL at 12:51

## 2024-11-26 RX ADMIN — SENNOSIDES AND DOCUSATE SODIUM 2 TABLET: 50; 8.6 TABLET ORAL at 08:27

## 2024-11-26 RX ADMIN — HYDROMORPHONE HYDROCHLORIDE 4 MG: 4 TABLET ORAL at 23:55

## 2024-11-26 RX ADMIN — HYDROXYUREA 500 MG: 500 CAPSULE ORAL at 02:23

## 2024-11-26 RX ADMIN — HYDROMORPHONE HYDROCHLORIDE 2 MG: 2 INJECTION, SOLUTION INTRAMUSCULAR; INTRAVENOUS; SUBCUTANEOUS at 06:14

## 2024-11-26 RX ADMIN — PANTOPRAZOLE SODIUM 40 MG: 40 TABLET, DELAYED RELEASE ORAL at 08:27

## 2024-11-26 RX ADMIN — PREGABALIN 25 MG: 25 CAPSULE ORAL at 08:27

## 2024-11-26 ASSESSMENT — PAIN SCALES - GENERAL
PAINLEVEL_OUTOF10: 5 - MODERATE PAIN
PAINLEVEL_OUTOF10: 7
PAINLEVEL_OUTOF10: 6
PAINLEVEL_OUTOF10: 8
PAINLEVEL_OUTOF10: 7
PAINLEVEL_OUTOF10: 5 - MODERATE PAIN
PAINLEVEL_OUTOF10: 6
PAINLEVEL_OUTOF10: 6
PAINLEVEL_OUTOF10: 8
PAINLEVEL_OUTOF10: 8

## 2024-11-26 ASSESSMENT — PAIN DESCRIPTION - ORIENTATION
ORIENTATION: RIGHT;LEFT

## 2024-11-26 ASSESSMENT — COGNITIVE AND FUNCTIONAL STATUS - GENERAL
WALKING IN HOSPITAL ROOM: A LITTLE
DRESSING REGULAR UPPER BODY CLOTHING: A LITTLE
HELP NEEDED FOR BATHING: A LITTLE
MOBILITY SCORE: 20
MOVING TO AND FROM BED TO CHAIR: A LITTLE
STANDING UP FROM CHAIR USING ARMS: A LITTLE
DAILY ACTIVITIY SCORE: 19
TOILETING: A LITTLE
EATING MEALS: A LITTLE
PERSONAL GROOMING: A LITTLE
CLIMB 3 TO 5 STEPS WITH RAILING: A LITTLE

## 2024-11-26 ASSESSMENT — PAIN - FUNCTIONAL ASSESSMENT
PAIN_FUNCTIONAL_ASSESSMENT: 0-10

## 2024-11-26 ASSESSMENT — PAIN DESCRIPTION - LOCATION
LOCATION: LEG

## 2024-11-26 NOTE — PROGRESS NOTES
"Mary Alice Aragon is a 44 y.o. female on day 3 of admission presenting with Sickle cell crisis (Multi).    Subjective   Seen at bedside in morning. Patient was able to ambulate around room with decreased pain. Requested to start rotating this afternoon. We discussed hemoglobin and blood transfusion today, patient was agreeable. Her goal is to be discharged tomorrow if pain is better controlled.  Denies any new fevers/chills, SOB, CP, heart palpitations, N/V/D/C.     Objective     Physical Exam  Vitals reviewed.   Constitutional:       Appearance: Normal appearance.   HENT:      Head: Normocephalic and atraumatic.      Nose: Nose normal.      Mouth/Throat:      Mouth: Mucous membranes are moist.      Pharynx: Oropharynx is clear.   Eyes:      Extraocular Movements: Extraocular movements intact.      Pupils: Pupils are equal, round, and reactive to light.   Cardiovascular:      Rate and Rhythm: Normal rate and regular rhythm.      Pulses: Normal pulses.      Heart sounds: Normal heart sounds.   Pulmonary:      Effort: Pulmonary effort is normal.      Breath sounds: Normal breath sounds.   Abdominal:      General: Bowel sounds are normal.      Palpations: Abdomen is soft.   Musculoskeletal:         General: Normal range of motion.   Skin:     General: Skin is warm.   Neurological:      General: No focal deficit present.      Mental Status: She is alert and oriented to person, place, and time. Mental status is at baseline.   Psychiatric:         Mood and Affect: Mood normal.         Behavior: Behavior normal.       Last Recorded Vitals  Blood pressure 126/76, pulse 90, temperature 36.5 °C (97.7 °F), temperature source Temporal, resp. rate 18, height 1.6 m (5' 3\"), weight 68 kg (150 lb), SpO2 97%.  Intake/Output last 3 Shifts:  I/O last 3 completed shifts:  In: 1481.1 (21.8 mL/kg) [P.O.:960; IV Piggyback:521.1]  Out: 550 (8.1 mL/kg) [Urine:550 (0.2 mL/kg/hr)]  Weight: 68 kg     Relevant Results  Scheduled " medications  ascorbic acid, 1,000 mg, oral, Daily  cholecalciferol, 2,000 Units, oral, Daily  deferoxamine, 2,000 mg, intravenous, q24h  docusate sodium, 100 mg, oral, Every other day  FLUoxetine, 10 mg, oral, Daily  folic acid, 1 mg, oral, Daily  hydroxyurea, 1,000 mg, oral, Every Mon/Wed/Fri  hydroxyurea, 500 mg, oral, Every Sun/Tues/Thur/Sat  lidocaine, 2 patch, transdermal, Daily  melatonin, 5 mg, oral, Nightly  meloxicam, 15 mg, oral, Daily  methadone, 10 mg, oral, q12h  norethindrone, 5 mg, oral, Daily  pantoprazole, 40 mg, oral, Daily before breakfast  polyethylene glycol, 17 g, oral, Daily  pregabalin, 25 mg, oral, BID  sennosides-docusate sodium, 2 tablet, oral, BID  valACYclovir, 500 mg, oral, Daily      Continuous medications     PRN medications  PRN medications: acetaminophen **OR** acetaminophen **OR** acetaminophen, calcium carbonate, capsaicin, diclofenac sodium, diphenhydrAMINE, HYDROmorphone, hydrOXYzine HCL, ondansetron, pramoxine, tiZANidine, traZODone     Assessment/Plan   Assessment & Plan  Sickle cell crisis (Multi)    Mary Alice Aragon is a 44 y.o. female with PMHx of Sickle cell disease c/b transfusion-related iron overload, heart murmur, cerebral aneurysm, NICHOL, MDD, and nocturnal hypoxia (on 2-3L at home as needed) who presented to [facility] 11/21 for bilateral lower extremity pain concerning for sickle cell crisis pain after recent exposure to viral infection from her son. Transferred to [facility] for further management. Lysis labs at baseline. CXR neg for acute process. 11/24 patient started on a dPCA for uncontrolled pain, stopped on 11/25 and started IVP dilaudid. 11/26 plan to start rotating with home PO dose. 11/26, hgb 6.1, ordered 1 unit pRBCs. Discharge pending improvement of pain, most likely 11/27.      Updates 11/26:  - hgb 6.1, ordered 1 unit pRBCs  - plan to start rotating this afternoon with discharge tomorrow     # Hb SS SCD with acute on chronic sickle cell pain crisis   - No active  carepath   - On Dilaudid 4mg PO PRN at home for sickle cell pain   - OARRS reviewed. No aberrant behavior-last filled 11/15 dilaudid 4mg (40 tabs total)   - Missed ketamine infusion this month because there was none available.   - Lysis labs at baseline   - S/p 1u PRBC 11/22 for hgb 5.8   - hgb S 52.8% (11/24)   - hgb 6.2 (11/25); pt would like to see what hgb is like 11/26 before getting blood, pt asymptomatic; reassess in AM  - hgb 6.1, ordered 1 unit pRBCs (11/26)   - Continue home methadone 10 mg BID, lyrica 25 BID   - s/p 11/23 IV Dilaudid 2 mg every 3 hours PRN  - s/p started on dPCA 1mg q30 minutes (11/24-11/25)   - started 2mg IVP dilaudid Q2hrs (11/25- ); if able start rotating with home pain medications 11/26   - Lidocaine patches and heat packs for pain management   - 11/24 started Toradol 30mg q6 hours x4 doses (only ordered 4 doses d/t kidney function)   - Continue Deferoxamine 2000mg daily  - Continue Hydroxyurea 500mg S/T/Th/S, and 1,000mg M/W/F  - PO zofran and benadryl for nausea and itching PRN      # Nocturnal hypoxia on 3L supplemental night time oxygen   - Continue night supplemental oxygen        # Constipation   - Miralax daily and doc senna BID     # Iron overload secondary to chronic transfusions  - on double chelation therapy. Ferritin level is 2285 ng/mL    - Continue current chelation therapy with desferal 2 g daily subcutaneous   - Holding oral jadenu 360 mg daily patient states she does not take it when on the infusions has it as a back up if she does not have infusions  -  Liver MRI to assess iron overload ordered outpatient, can consider ordering while inpatient.      # NICHOL  # MDD  - Continue home atarax PRN, trazodone and Prozac     # Cerebral Aneurysm   - Follows neurology outpatient. Next appointment 3/13/25     # Prophy   DVT prophylaxis: Lovenox subq (patient declined). SCDs while in bed      Dispo  - Discharge after improvement in pain; most likely 11/27   - Code Status: Full  Code (confirmed on admission)   - Primary Emergency Contact: Penelope Bain Home Phone: [phone] Relation: Parent       I spent 60 minutes in the professional and overall care of this patient.    Assessment and plan as above discussed with attending physician Dr. Nayely Eric, APRN-CNP

## 2024-11-26 NOTE — CARE PLAN
The patient's goals for the shift include      The clinical goals for the shift include pt will  remain HDS throughout shift        Problem: Pain - Adult  Goal: Verbalizes/displays adequate comfort level or baseline comfort level  Outcome: Progressing     Problem: Safety - Adult  Goal: Free from fall injury  Outcome: Progressing     Problem: Discharge Planning  Goal: Discharge to home or other facility with appropriate resources  Outcome: Progressing     Problem: Chronic Conditions and Co-morbidities  Goal: Patient's chronic conditions and co-morbidity symptoms are monitored and maintained or improved  Outcome: Progressing

## 2024-11-27 ENCOUNTER — PHARMACY VISIT (OUTPATIENT)
Dept: PHARMACY | Facility: CLINIC | Age: 44
End: 2024-11-27
Payer: COMMERCIAL

## 2024-11-27 VITALS
HEIGHT: 63 IN | WEIGHT: 150 LBS | TEMPERATURE: 97.3 F | DIASTOLIC BLOOD PRESSURE: 78 MMHG | RESPIRATION RATE: 16 BRPM | BODY MASS INDEX: 26.58 KG/M2 | SYSTOLIC BLOOD PRESSURE: 139 MMHG | OXYGEN SATURATION: 96 % | HEART RATE: 77 BPM

## 2024-11-27 DIAGNOSIS — D57.00 SICKLE CELL DISEASE WITH CRISIS (MULTI): ICD-10-CM

## 2024-11-27 LAB
ALBUMIN SERPL BCP-MCNC: 3.9 G/DL (ref 3.4–5)
ALP SERPL-CCNC: 64 U/L (ref 33–110)
ALT SERPL W P-5'-P-CCNC: 14 U/L (ref 7–45)
ANION GAP SERPL CALC-SCNC: 11 MMOL/L (ref 10–20)
AST SERPL W P-5'-P-CCNC: 22 U/L (ref 9–39)
BASOPHILS # BLD AUTO: 0.06 X10*3/UL (ref 0–0.1)
BASOPHILS NFR BLD AUTO: 0.7 %
BILIRUB SERPL-MCNC: 1.8 MG/DL (ref 0–1.2)
BLOOD EXPIRATION DATE: NORMAL
BUN SERPL-MCNC: 14 MG/DL (ref 6–23)
CALCIUM SERPL-MCNC: 8.6 MG/DL (ref 8.6–10.6)
CHLORIDE SERPL-SCNC: 107 MMOL/L (ref 98–107)
CO2 SERPL-SCNC: 28 MMOL/L (ref 21–32)
CREAT SERPL-MCNC: 1.05 MG/DL (ref 0.5–1.05)
DISPENSE STATUS: NORMAL
EGFRCR SERPLBLD CKD-EPI 2021: 68 ML/MIN/1.73M*2
EOSINOPHIL # BLD AUTO: 0.21 X10*3/UL (ref 0–0.7)
EOSINOPHIL NFR BLD AUTO: 2.6 %
ERYTHROCYTE [DISTWIDTH] IN BLOOD BY AUTOMATED COUNT: 15.9 % (ref 11.5–14.5)
GLUCOSE SERPL-MCNC: 85 MG/DL (ref 74–99)
HCT VFR BLD AUTO: 21.8 % (ref 36–46)
HGB BLD-MCNC: 7.3 G/DL (ref 12–16)
HGB RETIC QN: 32 PG (ref 28–38)
IMM GRANULOCYTES # BLD AUTO: 0.03 X10*3/UL (ref 0–0.7)
IMM GRANULOCYTES NFR BLD AUTO: 0.4 % (ref 0–0.9)
IMMATURE RETIC FRACTION: 21.4 %
LDH SERPL L TO P-CCNC: 303 U/L (ref 84–246)
LYMPHOCYTES # BLD AUTO: 2.68 X10*3/UL (ref 1.2–4.8)
LYMPHOCYTES NFR BLD AUTO: 33.2 %
MCH RBC QN AUTO: 30.7 PG (ref 26–34)
MCHC RBC AUTO-ENTMCNC: 33.5 G/DL (ref 32–36)
MCV RBC AUTO: 92 FL (ref 80–100)
MONOCYTES # BLD AUTO: 0.46 X10*3/UL (ref 0.1–1)
MONOCYTES NFR BLD AUTO: 5.7 %
NEUTROPHILS # BLD AUTO: 4.64 X10*3/UL (ref 1.2–7.7)
NEUTROPHILS NFR BLD AUTO: 57.4 %
NRBC BLD-RTO: 0.2 /100 WBCS (ref 0–0)
PLATELET # BLD AUTO: 311 X10*3/UL (ref 150–450)
POTASSIUM SERPL-SCNC: 4.5 MMOL/L (ref 3.5–5.3)
PRODUCT BLOOD TYPE: 1700
PRODUCT CODE: NORMAL
PROT SERPL-MCNC: 6.8 G/DL (ref 6.4–8.2)
RBC # BLD AUTO: 2.38 X10*6/UL (ref 4–5.2)
RETICS #: 0.07 X10*6/UL (ref 0.02–0.08)
RETICS/RBC NFR AUTO: 3.1 % (ref 0.5–2)
SODIUM SERPL-SCNC: 141 MMOL/L (ref 136–145)
UNIT ABO: NORMAL
UNIT NUMBER: NORMAL
UNIT RH: NORMAL
UNIT VOLUME: 277
WBC # BLD AUTO: 8.1 X10*3/UL (ref 4.4–11.3)
XM INTEP: NORMAL

## 2024-11-27 PROCEDURE — RXMED WILLOW AMBULATORY MEDICATION CHARGE

## 2024-11-27 PROCEDURE — 2500000004 HC RX 250 GENERAL PHARMACY W/ HCPCS (ALT 636 FOR OP/ED)

## 2024-11-27 PROCEDURE — S4993 CONTRACEPTIVE PILLS FOR BC: HCPCS

## 2024-11-27 PROCEDURE — 85025 COMPLETE CBC W/AUTO DIFF WBC: CPT

## 2024-11-27 PROCEDURE — 2500000002 HC RX 250 W HCPCS SELF ADMINISTERED DRUGS (ALT 637 FOR MEDICARE OP, ALT 636 FOR OP/ED)

## 2024-11-27 PROCEDURE — 2500000005 HC RX 250 GENERAL PHARMACY W/O HCPCS

## 2024-11-27 PROCEDURE — S0109 METHADONE ORAL 5MG: HCPCS

## 2024-11-27 PROCEDURE — 2500000004 HC RX 250 GENERAL PHARMACY W/ HCPCS (ALT 636 FOR OP/ED): Performed by: PHYSICIAN ASSISTANT

## 2024-11-27 PROCEDURE — 85045 AUTOMATED RETICULOCYTE COUNT: CPT

## 2024-11-27 PROCEDURE — 83615 LACTATE (LD) (LDH) ENZYME: CPT

## 2024-11-27 PROCEDURE — 99239 HOSP IP/OBS DSCHRG MGMT >30: CPT

## 2024-11-27 PROCEDURE — 2500000001 HC RX 250 WO HCPCS SELF ADMINISTERED DRUGS (ALT 637 FOR MEDICARE OP)

## 2024-11-27 PROCEDURE — 84075 ASSAY ALKALINE PHOSPHATASE: CPT

## 2024-11-27 RX ORDER — HYDROMORPHONE HYDROCHLORIDE 4 MG/1
4 TABLET ORAL EVERY 4 HOURS PRN
Status: DISCONTINUED | OUTPATIENT
Start: 2024-11-27 | End: 2024-11-27 | Stop reason: HOSPADM

## 2024-11-27 RX ORDER — HEPARIN 100 UNIT/ML
5 SYRINGE INTRAVENOUS ONCE
Status: COMPLETED | OUTPATIENT
Start: 2024-11-27 | End: 2024-11-27

## 2024-11-27 RX ORDER — HYDROMORPHONE HYDROCHLORIDE 8 MG/1
4 TABLET ORAL 3 TIMES DAILY PRN
Qty: 20 TABLET | Refills: 0 | Status: SHIPPED | OUTPATIENT
Start: 2024-11-27

## 2024-11-27 RX ORDER — HYDROMORPHONE HYDROCHLORIDE 1 MG/ML
0.5 INJECTION, SOLUTION INTRAMUSCULAR; INTRAVENOUS; SUBCUTANEOUS ONCE
Status: COMPLETED | OUTPATIENT
Start: 2024-11-27 | End: 2024-11-27

## 2024-11-27 RX ORDER — HYDROMORPHONE HYDROCHLORIDE 1 MG/ML
1 INJECTION, SOLUTION INTRAMUSCULAR; INTRAVENOUS; SUBCUTANEOUS EVERY 6 HOURS PRN
Status: DISCONTINUED | OUTPATIENT
Start: 2024-11-27 | End: 2024-11-27 | Stop reason: HOSPADM

## 2024-11-27 RX ORDER — HYDROMORPHONE HYDROCHLORIDE 4 MG/1
4 TABLET ORAL 3 TIMES DAILY PRN
Qty: 40 TABLET | Refills: 0 | Status: SHIPPED | OUTPATIENT
Start: 2024-11-27 | End: 2024-11-27 | Stop reason: SDUPTHER

## 2024-11-27 RX ADMIN — METHADONE HYDROCHLORIDE 10 MG: 10 TABLET ORAL at 09:17

## 2024-11-27 RX ADMIN — LIDOCAINE 2 PATCH: 4 PATCH TOPICAL at 09:15

## 2024-11-27 RX ADMIN — NORETHINDRONE ACETATE 5 MG: 5 TABLET ORAL at 09:16

## 2024-11-27 RX ADMIN — PREGABALIN 25 MG: 25 CAPSULE ORAL at 09:17

## 2024-11-27 RX ADMIN — HYDROMORPHONE HYDROCHLORIDE 0.5 MG: 1 INJECTION, SOLUTION INTRAMUSCULAR; INTRAVENOUS; SUBCUTANEOUS at 09:17

## 2024-11-27 RX ADMIN — HYDROMORPHONE HYDROCHLORIDE 4 MG: 4 TABLET ORAL at 12:25

## 2024-11-27 RX ADMIN — FOLIC ACID 1 MG: 1 TABLET ORAL at 09:17

## 2024-11-27 RX ADMIN — FLUOXETINE 10 MG: 10 CAPSULE ORAL at 09:17

## 2024-11-27 RX ADMIN — MELOXICAM 15 MG: 15 TABLET ORAL at 09:16

## 2024-11-27 RX ADMIN — OXYCODONE HYDROCHLORIDE AND ACETAMINOPHEN 1000 MG: 500 TABLET ORAL at 09:17

## 2024-11-27 RX ADMIN — Medication 2000 UNITS: at 06:16

## 2024-11-27 RX ADMIN — VALACYCLOVIR 500 MG: 500 TABLET, FILM COATED ORAL at 09:16

## 2024-11-27 RX ADMIN — PANTOPRAZOLE SODIUM 40 MG: 40 TABLET, DELAYED RELEASE ORAL at 06:16

## 2024-11-27 RX ADMIN — SENNOSIDES AND DOCUSATE SODIUM 2 TABLET: 50; 8.6 TABLET ORAL at 09:17

## 2024-11-27 RX ADMIN — HYDROMORPHONE HYDROCHLORIDE 1 MG: 1 INJECTION, SOLUTION INTRAMUSCULAR; INTRAVENOUS; SUBCUTANEOUS at 04:02

## 2024-11-27 RX ADMIN — HEPARIN 500 UNITS: 100 SYRINGE at 15:47

## 2024-11-27 RX ADMIN — ALTEPLASE 1 MG: 2.2 INJECTION, POWDER, LYOPHILIZED, FOR SOLUTION INTRAVENOUS at 12:20

## 2024-11-27 ASSESSMENT — PAIN SCALES - GENERAL
PAINLEVEL_OUTOF10: 9
PAINLEVEL_OUTOF10: 9
PAINLEVEL_OUTOF10: 8
PAINLEVEL_OUTOF10: 8
PAINLEVEL_OUTOF10: 6
PAINLEVEL_OUTOF10: 6

## 2024-11-27 ASSESSMENT — COGNITIVE AND FUNCTIONAL STATUS - GENERAL
WALKING IN HOSPITAL ROOM: A LITTLE
TOILETING: A LITTLE
HELP NEEDED FOR BATHING: A LITTLE
DAILY ACTIVITIY SCORE: 20
DRESSING REGULAR UPPER BODY CLOTHING: A LITTLE
MOBILITY SCORE: 22
CLIMB 3 TO 5 STEPS WITH RAILING: A LITTLE
PERSONAL GROOMING: A LITTLE

## 2024-11-27 ASSESSMENT — PAIN DESCRIPTION - LOCATION
LOCATION: LEG
LOCATION: LEG

## 2024-11-27 ASSESSMENT — PAIN DESCRIPTION - ORIENTATION
ORIENTATION: RIGHT;LEFT
ORIENTATION: RIGHT

## 2024-11-27 NOTE — DISCHARGE SUMMARY
Discharge Diagnosis  Sickle cell crisis (Multi)    Hospital Course  Mary Alice Aragon is a 44 y.o. female with PMHx of Sickle cell disease c/b transfusion-related iron overload, heart murmur, cerebral aneurysm, NICHOL, MDD, and nocturnal hypoxia (on 2-3L at home as needed) who presented to [facility] 11/21 for bilateral lower extremity pain concerning for sickle cell crisis pain after recent exposure to viral infection from her son. [place] [facility] [place]. Lysis labs at baseline. CXR neg for acute process. 11/24 patient started on a dPCA for uncontrolled pain, stopped on 11/25 and started IVP dilaudid. 11/26 started rotating with home dilaudid PO dose. 11/26, hgb 6.1, ordered 1 unit pRBCs. Hgb 7.4 11/27. On 11/27, patient reported feeling well and pain was more under control. Patient requesting to be discharged. Vitals and labs stable.    FUV sickle cell 12/18     Dilaudid PO delivered to bedside before discharge; sent by Dr. Stanton,     Pertinent Physical Exam At Time of Discharge  Physical Exam  Vitals reviewed.   Constitutional:       Appearance: Normal appearance.   HENT:      Head: Normocephalic and atraumatic.      Nose: Nose normal.      Mouth/Throat:      Mouth: Mucous membranes are moist.      Pharynx: Oropharynx is clear.   Eyes:      Extraocular Movements: Extraocular movements intact.      Pupils: Pupils are equal, round, and reactive to light.   Cardiovascular:      Rate and Rhythm: Normal rate and regular rhythm.      Pulses: Normal pulses.      Heart sounds: Normal heart sounds.   Pulmonary:      Effort: Pulmonary effort is normal.      Breath sounds: Normal breath sounds.   Abdominal:      General: Bowel sounds are normal.      Palpations: Abdomen is soft.   Musculoskeletal:         General: Normal range of motion.   Skin:     General: Skin is warm.   Neurological:      General: No focal deficit present.      Mental Status: She is alert and oriented to person, place, and time. Mental status  is at baseline.   Psychiatric:         Mood and Affect: Mood normal.         Behavior: Behavior normal.         Home Medications     Medication List      CONTINUE taking these medications     Aspercreme 10 % cream; Generic drug: trolamine salicylate   capsaicin 0.025 % cream; Commonly known as: Zostrix   cholecalciferol 50 MCG (2000 UT) tablet; Commonly known as: Vitamin D-3;   Take 1 tablet (2,000 Units) by mouth early in the morning..   deferasirox 180 mg tablet; Commonly known as: Jadenu   deferoxamine 2 gram injection; Commonly known as: Desferal; Infuse   2000mg subcutaneously once daily over 8 hours via [place] [facility]   diclofenac sodium 1 % gel; Commonly known as: Voltaren; Per instructions   4 TIMES DAILY (route: topical)   diphenhydrAMINE 25 mg capsule; Commonly known as: BENADryl   docusate sodium 100 mg capsule; Commonly known as: Colace   DRY EYE RELIEF OPHT   FLUoxetine 10 mg capsule; Commonly known as: PROzac; Take 1 capsule (10   mg) by mouth once daily.   folic acid 1 mg tablet; Commonly known as: Folvite   * hydroxyurea 500 mg capsule; Commonly known as: Hydrea   * hydroxyurea 500 mg capsule; Commonly known as: Hydrea; Per   instructions AS DIRECTED (route: oral)   hydrOXYzine HCL 25 mg tablet; Commonly known as: Atarax; Take 1 tablet   (25 mg) by mouth 2 times a day as needed for anxiety.   ibuprofen 800 mg tablet; Take 1 tablet (800 mg) by mouth every 8 hours   if needed for moderate pain (4 - 6).   ketamine 10 mg/mL injection; Commonly known as: Ketalar   lidocaine 5 % patch; Commonly known as: Lidoderm   loratadine 10 mg tablet; Commonly known as: Claritin   melatonin 5 mg tablet   meloxicam 15 mg tablet; Commonly known as: Mobic; Take 1 tablet (15 mg)   by mouth once daily.   methadone 10 mg tablet; Commonly known as: Dolophine; Take 1 tablet (10   mg) by mouth every 12 hours.   multivitamin tablet   naloxone 4 mg/0.1 mL nasal spray; Commonly known as: Narcan; Administer   1 spray (4 mg) into  affected nostril(s) if needed for opioid reversal or   respiratory depression. May repeat every 2-3 minutes if needed,   alternating nostrils, until medical assistance becomes available.   norethindrone 5 mg tablet; Commonly known as: Aygestin; Take 1 tablet (5   mg) by mouth once daily.   oxygen gas therapy; Commonly known as: O2   pramoxine 1 % foam; Commonly known as: Proctofoam; Insert 1 Application   into the rectum every 6 hours if needed for irritation (2nd line itching).   pregabalin 25 mg capsule; Commonly known as: Lyrica; Take 1 capsule (25   mg) by mouth 2 times a day.   prochlorperazine 10 mg tablet; Commonly known as: Compazine; 1 tablet   EVERY 8 HOURS (route: oral)   senna 8.6 mg tablet; Generic drug: sennosides; Take 1 tablet (8.6 mg) by   mouth 2 times a day as needed for constipation.   tiZANidine 2 mg tablet; Commonly known as: Zanaflex; Take 1-2 tablets   (2-4 mg) by mouth every 8 hours if needed for muscle spasms.   traZODone 100 mg tablet; Commonly known as: Desyrel; TAKE 1 TO 2   TABLETS(100  MG) BY MOUTH AT BEDTIME AS NEEDED FOR SLEEP   Tums 200 mg calcium chewable tablet; Generic drug: calcium carbonate   valACYclovir 500 mg tablet; Commonly known as: Valtrex   Vitamin C 500 mg tablet; Generic drug: ascorbic acid  * This list has 2 medication(s) that are the same as other medications   prescribed for you. Read the directions carefully, and ask your doctor or   other care provider to review them with you.     ASK your doctor about these medications     HYDROmorphone 8 mg tablet; Commonly known as: Dilaudid; Take 0.5 tablets   (4 mg) by mouth 3 times a day as needed for severe pain (7 - 10).; Ask   about: Which instructions should I use?       Outpatient Follow-Up  Future Appointments   Date Time Provider Department Center   12/18/2024 11:40 AM [facility] CENTRAL LINE 01 [id number] [facility]   12/18/2024 12:00 PM Gio Saleem MD [id number] [facility]   3/13/2025  9:10 AM Ania Merchant,   [id number] [place]       Assessment and plan as above discussed with attending physician Dr. Nayely Eric, APRN-CNP

## 2024-11-27 NOTE — CARE PLAN
The patient's goals for the shift include      The clinical goals for the shift include Pt will remain HDS      Problem: Pain - Adult  Goal: Verbalizes/displays adequate comfort level or baseline comfort level  Outcome: Progressing     Problem: Safety - Adult  Goal: Free from fall injury  Outcome: Progressing     Problem: Discharge Planning  Goal: Discharge to home or other facility with appropriate resources  Outcome: Progressing     Problem: Chronic Conditions and Co-morbidities  Goal: Patient's chronic conditions and co-morbidity symptoms are monitored and maintained or improved  Outcome: Progressing

## 2024-11-27 NOTE — NURSING NOTE
Patient discharged. Port flushed with heparin flush after using cathflow. Patient given meds from meds to beds and placed into her bag. Denied need of transport and ordered her own lyft to go home.

## 2024-11-29 ENCOUNTER — HOME INFUSION (OUTPATIENT)
Dept: INFUSION THERAPY | Age: 44
End: 2024-11-29
Payer: COMMERCIAL

## 2024-11-29 ENCOUNTER — DOCUMENTATION (OUTPATIENT)
Dept: PHARMACY | Facility: CLINIC | Age: 44
End: 2024-11-29

## 2024-11-29 NOTE — PROGRESS NOTES
Review of chart. Patient was discharged on 24 with no update sent to [facility]. Email sent to Intake Nursing Team to confirm discharge from hospital.  Discharge Summary reviewed and no changes to deferoxamine orders. Patient with orders for deferoxamine subcutaneous infusions over 8 hours daily for transfusional iron overload. Current orders are good thru 25 and will be kept as the appropriate order. No labs are ordered and Nursing is not involved at this time..       Tel call to patient. Confirmed discharge and no chnges to her treatment plan. Understands that doses in home  after today. She is agreeable to delivery of another 7 days today with standard supplies. Can be anytime during Friday.      **REMINDER: DON'T send a delivery if not able to confirm with patient (Per patient request)**     Review of entries in Epic. No changes in therapy.     Processed refill for 7 x deferoxamine doses for mix  and straight delivery  to cover doses  thru 24.     Follow up 24 with next fill ovn. Check progress and only send if patient is contacted.

## 2024-12-02 ENCOUNTER — HOSPITAL ENCOUNTER (EMERGENCY)
Facility: HOSPITAL | Age: 44
Discharge: ANOTHER HOSPITAL | End: 2024-12-03
Attending: INTERNAL MEDICINE | Admitting: STUDENT IN AN ORGANIZED HEALTH CARE EDUCATION/TRAINING PROGRAM
Payer: MEDICARE

## 2024-12-02 ENCOUNTER — APPOINTMENT (OUTPATIENT)
Dept: RADIOLOGY | Facility: HOSPITAL | Age: 44
End: 2024-12-02
Payer: MEDICARE

## 2024-12-02 DIAGNOSIS — K35.80 ACUTE APPENDICITIS WITHOUT PERITONITIS: Primary | ICD-10-CM

## 2024-12-02 LAB
ABO GROUP (TYPE) IN BLOOD: NORMAL
ALBUMIN SERPL BCP-MCNC: 5 G/DL (ref 3.4–5)
ALP SERPL-CCNC: 80 U/L (ref 33–110)
ALT SERPL W P-5'-P-CCNC: 30 U/L (ref 7–45)
ANION GAP SERPL CALC-SCNC: 11 MMOL/L (ref 10–20)
ANTIBODY SCREEN: NORMAL
AST SERPL W P-5'-P-CCNC: 38 U/L (ref 9–39)
B-HCG SERPL-ACNC: <2 MIU/ML
BASOPHILS # BLD AUTO: 0.05 X10*3/UL (ref 0–0.1)
BASOPHILS NFR BLD AUTO: 0.4 %
BILIRUB SERPL-MCNC: 2 MG/DL (ref 0–1.2)
BUN SERPL-MCNC: 15 MG/DL (ref 6–23)
CALCIUM SERPL-MCNC: 10 MG/DL (ref 8.6–10.3)
CHLORIDE SERPL-SCNC: 104 MMOL/L (ref 98–107)
CO2 SERPL-SCNC: 25 MMOL/L (ref 21–32)
CREAT SERPL-MCNC: 0.84 MG/DL (ref 0.5–1.05)
EGFRCR SERPLBLD CKD-EPI 2021: 89 ML/MIN/1.73M*2
EOSINOPHIL # BLD AUTO: 0 X10*3/UL (ref 0–0.7)
EOSINOPHIL NFR BLD AUTO: 0 %
ERYTHROCYTE [DISTWIDTH] IN BLOOD BY AUTOMATED COUNT: 15 % (ref 11.5–14.5)
FLUAV RNA RESP QL NAA+PROBE: NOT DETECTED
FLUBV RNA RESP QL NAA+PROBE: NOT DETECTED
GLUCOSE SERPL-MCNC: 121 MG/DL (ref 74–99)
HCT VFR BLD AUTO: 27.9 % (ref 36–46)
HGB BLD-MCNC: 9.6 G/DL (ref 12–16)
IMM GRANULOCYTES # BLD AUTO: 0.06 X10*3/UL (ref 0–0.7)
IMM GRANULOCYTES NFR BLD AUTO: 0.4 % (ref 0–0.9)
LYMPHOCYTES # BLD AUTO: 1.09 X10*3/UL (ref 1.2–4.8)
LYMPHOCYTES NFR BLD AUTO: 7.7 %
MAGNESIUM SERPL-MCNC: 2.27 MG/DL (ref 1.6–2.4)
MCH RBC QN AUTO: 31 PG (ref 26–34)
MCHC RBC AUTO-ENTMCNC: 34.4 G/DL (ref 32–36)
MCV RBC AUTO: 90 FL (ref 80–100)
MONOCYTES # BLD AUTO: 0.31 X10*3/UL (ref 0.1–1)
MONOCYTES NFR BLD AUTO: 2.2 %
NEUTROPHILS # BLD AUTO: 12.59 X10*3/UL (ref 1.2–7.7)
NEUTROPHILS NFR BLD AUTO: 89.3 %
NRBC BLD-RTO: 0 /100 WBCS (ref 0–0)
PLATELET # BLD AUTO: 438 X10*3/UL (ref 150–450)
POTASSIUM SERPL-SCNC: 4 MMOL/L (ref 3.5–5.3)
PROT SERPL-MCNC: 8.6 G/DL (ref 6.4–8.2)
RBC # BLD AUTO: 3.1 X10*6/UL (ref 4–5.2)
RH FACTOR (ANTIGEN D): NORMAL
SARS-COV-2 RNA RESP QL NAA+PROBE: NOT DETECTED
SODIUM SERPL-SCNC: 136 MMOL/L (ref 136–145)
WBC # BLD AUTO: 14.1 X10*3/UL (ref 4.4–11.3)

## 2024-12-02 PROCEDURE — 99285 EMERGENCY DEPT VISIT HI MDM: CPT | Mod: 25 | Performed by: INTERNAL MEDICINE

## 2024-12-02 PROCEDURE — 96376 TX/PRO/DX INJ SAME DRUG ADON: CPT

## 2024-12-02 PROCEDURE — G0378 HOSPITAL OBSERVATION PER HR: HCPCS

## 2024-12-02 PROCEDURE — 2500000004 HC RX 250 GENERAL PHARMACY W/ HCPCS (ALT 636 FOR OP/ED): Performed by: STUDENT IN AN ORGANIZED HEALTH CARE EDUCATION/TRAINING PROGRAM

## 2024-12-02 PROCEDURE — 87636 SARSCOV2 & INF A&B AMP PRB: CPT | Performed by: INTERNAL MEDICINE

## 2024-12-02 PROCEDURE — 2550000001 HC RX 255 CONTRASTS: Performed by: INTERNAL MEDICINE

## 2024-12-02 PROCEDURE — 96375 TX/PRO/DX INJ NEW DRUG ADDON: CPT

## 2024-12-02 PROCEDURE — 96365 THER/PROPH/DIAG IV INF INIT: CPT

## 2024-12-02 PROCEDURE — 86901 BLOOD TYPING SEROLOGIC RH(D): CPT | Performed by: INTERNAL MEDICINE

## 2024-12-02 PROCEDURE — 96372 THER/PROPH/DIAG INJ SC/IM: CPT | Performed by: INTERNAL MEDICINE

## 2024-12-02 PROCEDURE — 85025 COMPLETE CBC W/AUTO DIFF WBC: CPT | Performed by: INTERNAL MEDICINE

## 2024-12-02 PROCEDURE — 96361 HYDRATE IV INFUSION ADD-ON: CPT

## 2024-12-02 PROCEDURE — 83735 ASSAY OF MAGNESIUM: CPT | Performed by: INTERNAL MEDICINE

## 2024-12-02 PROCEDURE — 2500000004 HC RX 250 GENERAL PHARMACY W/ HCPCS (ALT 636 FOR OP/ED): Performed by: INTERNAL MEDICINE

## 2024-12-02 PROCEDURE — 84702 CHORIONIC GONADOTROPIN TEST: CPT | Performed by: INTERNAL MEDICINE

## 2024-12-02 PROCEDURE — 74177 CT ABD & PELVIS W/CONTRAST: CPT

## 2024-12-02 PROCEDURE — 99223 1ST HOSP IP/OBS HIGH 75: CPT | Performed by: SURGERY

## 2024-12-02 PROCEDURE — 96366 THER/PROPH/DIAG IV INF ADDON: CPT

## 2024-12-02 PROCEDURE — 80053 COMPREHEN METABOLIC PANEL: CPT | Performed by: INTERNAL MEDICINE

## 2024-12-02 PROCEDURE — 74177 CT ABD & PELVIS W/CONTRAST: CPT | Performed by: RADIOLOGY

## 2024-12-02 RX ORDER — ONDANSETRON HYDROCHLORIDE 2 MG/ML
4 INJECTION, SOLUTION INTRAVENOUS ONCE
Status: COMPLETED | OUTPATIENT
Start: 2024-12-02 | End: 2024-12-02

## 2024-12-02 RX ORDER — DIPHENHYDRAMINE HYDROCHLORIDE 50 MG/ML
25 INJECTION INTRAMUSCULAR; INTRAVENOUS ONCE
Status: COMPLETED | OUTPATIENT
Start: 2024-12-02 | End: 2024-12-02

## 2024-12-02 RX ORDER — HYDROMORPHONE HYDROCHLORIDE 1 MG/ML
1 INJECTION, SOLUTION INTRAMUSCULAR; INTRAVENOUS; SUBCUTANEOUS ONCE
Status: COMPLETED | OUTPATIENT
Start: 2024-12-02 | End: 2024-12-02

## 2024-12-02 RX ORDER — HALOPERIDOL 5 MG/ML
5 INJECTION INTRAMUSCULAR ONCE
Status: COMPLETED | OUTPATIENT
Start: 2024-12-02 | End: 2024-12-02

## 2024-12-02 RX ORDER — DIPHENHYDRAMINE HYDROCHLORIDE 50 MG/ML
25 INJECTION INTRAMUSCULAR; INTRAVENOUS ONCE
Status: DISCONTINUED | OUTPATIENT
Start: 2024-12-02 | End: 2024-12-02

## 2024-12-02 RX ORDER — METOCLOPRAMIDE HYDROCHLORIDE 5 MG/ML
10 INJECTION INTRAMUSCULAR; INTRAVENOUS ONCE
Status: DISCONTINUED | OUTPATIENT
Start: 2024-12-02 | End: 2024-12-02

## 2024-12-02 RX ORDER — FAMOTIDINE 10 MG/ML
20 INJECTION INTRAVENOUS ONCE
Status: DISCONTINUED | OUTPATIENT
Start: 2024-12-02 | End: 2024-12-03 | Stop reason: HOSPADM

## 2024-12-02 ASSESSMENT — COLUMBIA-SUICIDE SEVERITY RATING SCALE - C-SSRS
6. HAVE YOU EVER DONE ANYTHING, STARTED TO DO ANYTHING, OR PREPARED TO DO ANYTHING TO END YOUR LIFE?: NO
2. HAVE YOU ACTUALLY HAD ANY THOUGHTS OF KILLING YOURSELF?: NO
1. IN THE PAST MONTH, HAVE YOU WISHED YOU WERE DEAD OR WISHED YOU COULD GO TO SLEEP AND NOT WAKE UP?: NO

## 2024-12-02 ASSESSMENT — PAIN SCALES - GENERAL
PAINLEVEL_OUTOF10: 10 - WORST POSSIBLE PAIN
PAINLEVEL_OUTOF10: 9

## 2024-12-02 ASSESSMENT — PAIN DESCRIPTION - LOCATION: LOCATION: ABDOMEN

## 2024-12-02 NOTE — ED PROVIDER NOTES
HPI   Chief Complaint   Patient presents with    Sickle Cell Pain Crisis     Patient arrives from home with complaints of sickle cell crisis. C/o pain all over with nausea, dry heaves.        Patient presented for evaluation of vomiting.  Patient states she has had vomiting starting today.  Patient denies recent sick contacts.  Patient denies suspicious food intake.  Patient notes she is also having a sickle cell crisis.  Patient indicates the pain is diffusely all over her entire body.  Denies recent trauma.              Patient History   Past Medical History:   Diagnosis Date    Sickle cell anemia (Multi)     Unspecified astigmatism, bilateral 2020    Astigmatism, bilateral     Past Surgical History:   Procedure Laterality Date     SECTION, CLASSIC  2016     Section    CHOLECYSTECTOMY  2016    Cholecystectomy    MR HEAD ANGIO W AND WO IV CONTRAST  4/15/2013    MR HEAD ANGIO W AND WO IV CONTRAST 4/15/2013 [facility] CLINICAL LEGACY    MR HEAD ANGIO W AND WO IV CONTRAST  2013    MR HEAD ANGIO W AND WO IV CONTRAST 2013 [facility] INPATIENT LEGACY    MR HEAD ANGIO WO IV CONTRAST  2014    MR HEAD ANGIO WO IV CONTRAST 2014 [facility] [place] ANCILLARY LEGACY    MR HEAD ANGIO WO IV CONTRAST  2016    MR HEAD ANGIO WO IV CONTRAST 2016 [facility] [place] ANCILLARY LEGACY    MR HEAD ANGIO WO IV CONTRAST  2019    MR HEAD ANGIO WO IV CONTRAST 2019 [facility] [place] ANCILLARY LEGACY    MR HEAD ANGIO WO IV CONTRAST  2017    MR HEAD ANGIO WO IV CONTRAST 2017 [facility] INPATIENT LEGACY    MR NECK ANGIO WO IV CONTRAST  2013    MR NECK ANGIO WO IV CONTRAST 2013 [facility] INPATIENT LEGACY    MR NECK ANGIO WO IV CONTRAST  2014    MR NECK ANGIO WO IV CONTRAST 2014 [facility] [place] ANCILLARY LEGACY    OTHER SURGICAL HISTORY  2016    Brain Surgery    TUBAL LIGATION  04/10/2017    Tubal Ligation     Family History   Problem Relation Name Age of Onset    Sickle cell trait Sister      Multiple sclerosis Brother       Breast cancer Maternal Grandmother      Breast cancer Other paternal cousin     Sickle cell trait Child       Social History     Tobacco Use    Smoking status: Never    Smokeless tobacco: Never   Vaping Use    Vaping status: Never Used   Substance Use Topics    Alcohol use: Not Currently    Drug use: Not Currently       Physical Exam   ED Triage Vitals [12/02/24 1144]   Temperature Heart Rate Respirations BP   36.6 °C (97.9 °F) 89 16 163/90      Pulse Ox Temp Source Heart Rate Source Patient Position   100 % Temporal Monitor Sitting      BP Location FiO2 (%)     Right arm --       Physical Exam  Vitals and nursing note reviewed.   Constitutional:       Appearance: Normal appearance.   HENT:      Head: Atraumatic.      Right Ear: External ear normal.      Left Ear: External ear normal.      Nose: Nose normal.      Mouth/Throat:      Mouth: Mucous membranes are moist.   Eyes:      Extraocular Movements: Extraocular movements intact.      Pupils: Pupils are equal, round, and reactive to light.   Cardiovascular:      Rate and Rhythm: Normal rate and regular rhythm.      Pulses: Normal pulses.   Pulmonary:      Effort: Pulmonary effort is normal.      Breath sounds: Normal breath sounds.   Abdominal:      Palpations: Abdomen is soft.      Tenderness: There is abdominal tenderness in the right lower quadrant. There is no right CVA tenderness or left CVA tenderness.   Musculoskeletal:         General: No tenderness. Normal range of motion.      Cervical back: Normal range of motion and neck supple. No rigidity or tenderness.   Skin:     General: Skin is warm and dry.   Neurological:      General: No focal deficit present.      Mental Status: She is alert and oriented to person, place, and time. Mental status is at baseline.   Psychiatric:         Mood and Affect: Mood is anxious.         Speech: Speech is delayed.           ED Course & MDM   ED Course as of 12/04/24 1801   Mon Dec 02, 2024   1242 Nursing indicates they are  unable to access patient's port.  Patient previously required heparinization Cathflo.  Cathflo ordered at this time. [JA]   1247 Waiting for cath wound to take effect this time.  Patient requesting IM medications as she states she and her nausea and pain is increasing. [JA]   1707 Patient refusing further evaluation at this time until she receives pain medicine.  Patient was asleep in the emergency room for extended period of time.  Reassessed patient.  Patient states she is in pain.  Patient notes she has pain in her arms and legs consistent with previous sickle cell crises. [JA]   1711 Patient notes that she normally takes 4 mg of Dilaudid multiple times per day as needed for pain.  Patient reports she has not taken any Dilaudid today [JA]   1954 Reassessed patient.  Patient is asleep at this time. [JA]   2041 Updated patient and family with findings.  They agree with plan of admission. [JA]   2055 Discussed with Dr. Corral and he agrees to consult on this patient.  [JA]   2133 Discussed with Dr. Maldonado and he accepts the patient to his service and we agree to start the patient on Zosyn.  [JA]   2200 Discussed with Dr. Corral and he would like to discuss with Dr. Maldonado prior to admission.    [JA]      ED Course User Index  [JA] Colin Wheeler DO         Diagnoses as of 12/04/24 1801   Acute appendicitis without peritonitis                 No data recorded     [place] Coma Scale Score: 15 (12/02/24 1331 : Osmel Bahena RN)                           Medical Decision Making  Differential diagnosis: Gastritis, gastroenteritis, colitis, appendicitis, sickle cell crisis, viral illness, other      Patient presented for evaluation of abdominal pain and vomiting.  Patient also having diffuse pain in the arms and legs.  Patient is a history of sickle cell disease.  Patient states the pain in the arms and legs is consistent with her sickle cell crises.  Patient states the vomiting and abdominal pain is new.  Patient does  have tenderness in the right lower quadrant on exam.  Minimal leukocytosis.  Patient is afebrile.  Anemia improved from recent admission for sickle cell crisis.  CT scan showing possible early appendicitis.  General surgery has been consulted.  Initially admitted to the hospitalist however general surgery was to consult further with hospitalist as to whether or not this patient can stay at this facility versus requiring [place] [facility] [place].    Patient care transferred oncoming physician.        Procedure  Procedures     Colin Wheeler DO  12/04/24 1803

## 2024-12-02 NOTE — ED TRIAGE NOTES
Patient arrives from home with complaints of sickle cell crisis. C/o pain all over with nausea, dry heaves.

## 2024-12-03 ENCOUNTER — HOSPITAL ENCOUNTER (INPATIENT)
Facility: HOSPITAL | Age: 44
End: 2024-12-03
Attending: EMERGENCY MEDICINE | Admitting: STUDENT IN AN ORGANIZED HEALTH CARE EDUCATION/TRAINING PROGRAM
Payer: MEDICARE

## 2024-12-03 ENCOUNTER — ANESTHESIA EVENT (OUTPATIENT)
Dept: OPERATING ROOM | Facility: HOSPITAL | Age: 44
End: 2024-12-03
Payer: MEDICARE

## 2024-12-03 ENCOUNTER — ANESTHESIA (OUTPATIENT)
Dept: OPERATING ROOM | Facility: HOSPITAL | Age: 44
End: 2024-12-03
Payer: MEDICARE

## 2024-12-03 VITALS
WEIGHT: 150 LBS | TEMPERATURE: 97.5 F | BODY MASS INDEX: 26.57 KG/M2 | HEART RATE: 91 BPM | RESPIRATION RATE: 16 BRPM | OXYGEN SATURATION: 95 % | DIASTOLIC BLOOD PRESSURE: 71 MMHG | SYSTOLIC BLOOD PRESSURE: 117 MMHG

## 2024-12-03 DIAGNOSIS — D57.00 SICKLE CELL PAIN CRISIS (MULTI): ICD-10-CM

## 2024-12-03 DIAGNOSIS — G60.9 IDIOPATHIC PERIPHERAL NEUROPATHY: ICD-10-CM

## 2024-12-03 DIAGNOSIS — L60.0 INGROWN TOENAIL: ICD-10-CM

## 2024-12-03 DIAGNOSIS — M79.605 LOWER EXTREMITY PAIN, BILATERAL: ICD-10-CM

## 2024-12-03 DIAGNOSIS — M79.604 LOWER EXTREMITY PAIN, BILATERAL: ICD-10-CM

## 2024-12-03 DIAGNOSIS — R60.0 BILATERAL LOWER EXTREMITY EDEMA: ICD-10-CM

## 2024-12-03 DIAGNOSIS — D57.09 SICKLE CELL DISEASE WITH CRISIS AND OTHER COMPLICATION: ICD-10-CM

## 2024-12-03 DIAGNOSIS — D57.00 SICKLE CELL DISEASE WITH CRISIS (MULTI): ICD-10-CM

## 2024-12-03 DIAGNOSIS — K35.80 ACUTE APPENDICITIS, UNSPECIFIED ACUTE APPENDICITIS TYPE: Primary | ICD-10-CM

## 2024-12-03 PROBLEM — G47.33 OSA (OBSTRUCTIVE SLEEP APNEA): Status: ACTIVE | Noted: 2024-12-03

## 2024-12-03 LAB
ABO GROUP (TYPE) IN BLOOD: NORMAL
ANTIBODY SCREEN: NORMAL
RH FACTOR (ANTIGEN D): NORMAL

## 2024-12-03 PROCEDURE — 96375 TX/PRO/DX INJ NEW DRUG ADDON: CPT

## 2024-12-03 PROCEDURE — 2500000001 HC RX 250 WO HCPCS SELF ADMINISTERED DRUGS (ALT 637 FOR MEDICARE OP)

## 2024-12-03 PROCEDURE — 94760 N-INVAS EAR/PLS OXIMETRY 1: CPT

## 2024-12-03 PROCEDURE — 86901 BLOOD TYPING SEROLOGIC RH(D): CPT | Performed by: EMERGENCY MEDICINE

## 2024-12-03 PROCEDURE — 3700000001 HC GENERAL ANESTHESIA TIME - INITIAL BASE CHARGE: Performed by: STUDENT IN AN ORGANIZED HEALTH CARE EDUCATION/TRAINING PROGRAM

## 2024-12-03 PROCEDURE — 88304 TISSUE EXAM BY PATHOLOGIST: CPT | Mod: TC,SUR | Performed by: NURSE PRACTITIONER

## 2024-12-03 PROCEDURE — 1170000001 HC PRIVATE ONCOLOGY ROOM DAILY

## 2024-12-03 PROCEDURE — 2500000004 HC RX 250 GENERAL PHARMACY W/ HCPCS (ALT 636 FOR OP/ED): Performed by: STUDENT IN AN ORGANIZED HEALTH CARE EDUCATION/TRAINING PROGRAM

## 2024-12-03 PROCEDURE — 3600000009 HC OR TIME - EACH INCREMENTAL 1 MINUTE - PROCEDURE LEVEL FOUR: Performed by: STUDENT IN AN ORGANIZED HEALTH CARE EDUCATION/TRAINING PROGRAM

## 2024-12-03 PROCEDURE — 2500000004 HC RX 250 GENERAL PHARMACY W/ HCPCS (ALT 636 FOR OP/ED)

## 2024-12-03 PROCEDURE — 99221 1ST HOSP IP/OBS SF/LOW 40: CPT | Performed by: STUDENT IN AN ORGANIZED HEALTH CARE EDUCATION/TRAINING PROGRAM

## 2024-12-03 PROCEDURE — 86900 BLOOD TYPING SEROLOGIC ABO: CPT | Performed by: EMERGENCY MEDICINE

## 2024-12-03 PROCEDURE — 0DNU4ZZ RELEASE OMENTUM, PERCUTANEOUS ENDOSCOPIC APPROACH: ICD-10-PCS | Performed by: STUDENT IN AN ORGANIZED HEALTH CARE EDUCATION/TRAINING PROGRAM

## 2024-12-03 PROCEDURE — 99285 EMERGENCY DEPT VISIT HI MDM: CPT | Performed by: EMERGENCY MEDICINE

## 2024-12-03 PROCEDURE — 7100000001 HC RECOVERY ROOM TIME - INITIAL BASE CHARGE: Performed by: STUDENT IN AN ORGANIZED HEALTH CARE EDUCATION/TRAINING PROGRAM

## 2024-12-03 PROCEDURE — 96376 TX/PRO/DX INJ SAME DRUG ADON: CPT

## 2024-12-03 PROCEDURE — 96366 THER/PROPH/DIAG IV INF ADDON: CPT

## 2024-12-03 PROCEDURE — 99223 1ST HOSP IP/OBS HIGH 75: CPT | Performed by: STUDENT IN AN ORGANIZED HEALTH CARE EDUCATION/TRAINING PROGRAM

## 2024-12-03 PROCEDURE — 2720000007 HC OR 272 NO HCPCS: Performed by: STUDENT IN AN ORGANIZED HEALTH CARE EDUCATION/TRAINING PROGRAM

## 2024-12-03 PROCEDURE — 2500000005 HC RX 250 GENERAL PHARMACY W/O HCPCS

## 2024-12-03 PROCEDURE — 96374 THER/PROPH/DIAG INJ IV PUSH: CPT

## 2024-12-03 PROCEDURE — 88304 TISSUE EXAM BY PATHOLOGIST: CPT | Performed by: STUDENT IN AN ORGANIZED HEALTH CARE EDUCATION/TRAINING PROGRAM

## 2024-12-03 PROCEDURE — 2500000005 HC RX 250 GENERAL PHARMACY W/O HCPCS: Performed by: STUDENT IN AN ORGANIZED HEALTH CARE EDUCATION/TRAINING PROGRAM

## 2024-12-03 PROCEDURE — 2500000004 HC RX 250 GENERAL PHARMACY W/ HCPCS (ALT 636 FOR OP/ED): Performed by: EMERGENCY MEDICINE

## 2024-12-03 PROCEDURE — 0DTJ4ZZ RESECTION OF APPENDIX, PERCUTANEOUS ENDOSCOPIC APPROACH: ICD-10-PCS | Performed by: STUDENT IN AN ORGANIZED HEALTH CARE EDUCATION/TRAINING PROGRAM

## 2024-12-03 PROCEDURE — 44970 LAPAROSCOPY APPENDECTOMY: CPT | Performed by: STUDENT IN AN ORGANIZED HEALTH CARE EDUCATION/TRAINING PROGRAM

## 2024-12-03 PROCEDURE — 86920 COMPATIBILITY TEST SPIN: CPT

## 2024-12-03 PROCEDURE — G0378 HOSPITAL OBSERVATION PER HR: HCPCS

## 2024-12-03 PROCEDURE — 7100000002 HC RECOVERY ROOM TIME - EACH INCREMENTAL 1 MINUTE: Performed by: STUDENT IN AN ORGANIZED HEALTH CARE EDUCATION/TRAINING PROGRAM

## 2024-12-03 PROCEDURE — 2500000002 HC RX 250 W HCPCS SELF ADMINISTERED DRUGS (ALT 637 FOR MEDICARE OP, ALT 636 FOR OP/ED)

## 2024-12-03 PROCEDURE — 3600000004 HC OR TIME - INITIAL BASE CHARGE - PROCEDURE LEVEL FOUR: Performed by: STUDENT IN AN ORGANIZED HEALTH CARE EDUCATION/TRAINING PROGRAM

## 2024-12-03 PROCEDURE — 3700000002 HC GENERAL ANESTHESIA TIME - EACH INCREMENTAL 1 MINUTE: Performed by: STUDENT IN AN ORGANIZED HEALTH CARE EDUCATION/TRAINING PROGRAM

## 2024-12-03 PROCEDURE — S0109 METHADONE ORAL 5MG: HCPCS

## 2024-12-03 RX ORDER — PHENYLEPHRINE HCL IN 0.9% NACL 0.4MG/10ML
SYRINGE (ML) INTRAVENOUS AS NEEDED
Status: DISCONTINUED | OUTPATIENT
Start: 2024-12-03 | End: 2024-12-03

## 2024-12-03 RX ORDER — LIDOCAINE HCL/PF 100 MG/5ML
SYRINGE (ML) INTRAVENOUS AS NEEDED
Status: DISCONTINUED | OUTPATIENT
Start: 2024-12-03 | End: 2024-12-03

## 2024-12-03 RX ORDER — ENOXAPARIN SODIUM 100 MG/ML
40 INJECTION SUBCUTANEOUS
Status: DISPENSED | OUTPATIENT
Start: 2024-12-03

## 2024-12-03 RX ORDER — MELOXICAM 15 MG/1
15 TABLET ORAL DAILY
Status: DISCONTINUED | OUTPATIENT
Start: 2024-12-04 | End: 2024-12-05

## 2024-12-03 RX ORDER — ROCURONIUM BROMIDE 10 MG/ML
INJECTION, SOLUTION INTRAVENOUS AS NEEDED
Status: DISCONTINUED | OUTPATIENT
Start: 2024-12-03 | End: 2024-12-03

## 2024-12-03 RX ORDER — METHADONE HYDROCHLORIDE 10 MG/1
10 TABLET ORAL EVERY 12 HOURS
Status: DISPENSED | OUTPATIENT
Start: 2024-12-03

## 2024-12-03 RX ORDER — ONDANSETRON HYDROCHLORIDE 2 MG/ML
4 INJECTION, SOLUTION INTRAVENOUS EVERY 4 HOURS PRN
Status: DISCONTINUED | OUTPATIENT
Start: 2024-12-03 | End: 2024-12-03 | Stop reason: HOSPADM

## 2024-12-03 RX ORDER — IBUPROFEN 600 MG/1
600 TABLET ORAL EVERY 6 HOURS PRN
Status: DISCONTINUED | OUTPATIENT
Start: 2024-12-03 | End: 2024-12-05

## 2024-12-03 RX ORDER — SODIUM CHLORIDE 0.9 G/100ML
IRRIGANT IRRIGATION AS NEEDED
Status: DISCONTINUED | OUTPATIENT
Start: 2024-12-03 | End: 2024-12-03 | Stop reason: HOSPADM

## 2024-12-03 RX ORDER — NORETHINDRONE 5 MG/1
5 TABLET ORAL DAILY
Status: DISPENSED | OUTPATIENT
Start: 2024-12-04

## 2024-12-03 RX ORDER — NALOXONE HYDROCHLORIDE 4 MG/.1ML
4 SPRAY NASAL AS NEEDED
Status: DISCONTINUED | OUTPATIENT
Start: 2024-12-03 | End: 2024-12-03

## 2024-12-03 RX ORDER — ACETAMINOPHEN 325 MG/1
650 TABLET ORAL EVERY 6 HOURS
Status: DISCONTINUED | OUTPATIENT
Start: 2024-12-03 | End: 2024-12-04

## 2024-12-03 RX ORDER — FLUOXETINE 10 MG/1
10 CAPSULE ORAL DAILY
Status: DISPENSED | OUTPATIENT
Start: 2024-12-04

## 2024-12-03 RX ORDER — HYDROMORPHONE HYDROCHLORIDE 4 MG/1
8 TABLET ORAL EVERY 8 HOURS PRN
Status: DISCONTINUED | OUTPATIENT
Start: 2024-12-03 | End: 2024-12-03

## 2024-12-03 RX ORDER — MIDAZOLAM HYDROCHLORIDE 1 MG/ML
INJECTION INTRAMUSCULAR; INTRAVENOUS AS NEEDED
Status: DISCONTINUED | OUTPATIENT
Start: 2024-12-03 | End: 2024-12-03

## 2024-12-03 RX ORDER — LIDOCAINE HYDROCHLORIDE 10 MG/ML
0.1 INJECTION, SOLUTION INFILTRATION; PERINEURAL ONCE
Status: DISCONTINUED | OUTPATIENT
Start: 2024-12-03 | End: 2024-12-03 | Stop reason: HOSPADM

## 2024-12-03 RX ORDER — DIPHENHYDRAMINE HCL 25 MG
25 CAPSULE ORAL EVERY 8 HOURS PRN
Status: DISPENSED | OUTPATIENT
Start: 2024-12-03

## 2024-12-03 RX ORDER — LABETALOL HYDROCHLORIDE 5 MG/ML
5 INJECTION, SOLUTION INTRAVENOUS ONCE AS NEEDED
Status: DISCONTINUED | OUTPATIENT
Start: 2024-12-03 | End: 2024-12-03 | Stop reason: HOSPADM

## 2024-12-03 RX ORDER — OXYCODONE HYDROCHLORIDE 5 MG/1
10 TABLET ORAL EVERY 6 HOURS PRN
Status: DISCONTINUED | OUTPATIENT
Start: 2024-12-03 | End: 2024-12-05

## 2024-12-03 RX ORDER — HYDROMORPHONE HYDROCHLORIDE 1 MG/ML
1 INJECTION, SOLUTION INTRAMUSCULAR; INTRAVENOUS; SUBCUTANEOUS EVERY 5 MIN PRN
Status: DISCONTINUED | OUTPATIENT
Start: 2024-12-03 | End: 2024-12-03 | Stop reason: HOSPADM

## 2024-12-03 RX ORDER — FENTANYL CITRATE 50 UG/ML
INJECTION, SOLUTION INTRAMUSCULAR; INTRAVENOUS AS NEEDED
Status: DISCONTINUED | OUTPATIENT
Start: 2024-12-03 | End: 2024-12-03

## 2024-12-03 RX ORDER — ONDANSETRON HYDROCHLORIDE 2 MG/ML
4 INJECTION, SOLUTION INTRAVENOUS ONCE
Status: COMPLETED | OUTPATIENT
Start: 2024-12-03 | End: 2024-12-03

## 2024-12-03 RX ORDER — METHADONE IN SOD CHLOR,ISO-OSM 10 MG/ML
SYRINGE (ML) INTRAVENOUS AS NEEDED
Status: DISCONTINUED | OUTPATIENT
Start: 2024-12-03 | End: 2024-12-03

## 2024-12-03 RX ORDER — HYDROMORPHONE HYDROCHLORIDE 4 MG/1
4 TABLET ORAL EVERY 8 HOURS PRN
Status: DISCONTINUED | OUTPATIENT
Start: 2024-12-03 | End: 2024-12-04

## 2024-12-03 RX ORDER — PREGABALIN 25 MG/1
25 CAPSULE ORAL 2 TIMES DAILY PRN
Status: DISPENSED | OUTPATIENT
Start: 2024-12-03

## 2024-12-03 RX ORDER — ROPIVACAINE HYDROCHLORIDE 5 MG/ML
INJECTION, SOLUTION EPIDURAL; INFILTRATION; PERINEURAL AS NEEDED
Status: DISCONTINUED | OUTPATIENT
Start: 2024-12-03 | End: 2024-12-03

## 2024-12-03 RX ORDER — PROCHLORPERAZINE MALEATE 10 MG
10 TABLET ORAL EVERY 8 HOURS PRN
Status: DISPENSED | OUTPATIENT
Start: 2024-12-03

## 2024-12-03 RX ORDER — ROPIVACAINE IN 0.9% SOD CHL/PF 0.2 %
14 PLASTIC BAG, INJECTION (ML) EPIDURAL CONTINUOUS
Status: DISPENSED | OUTPATIENT
Start: 2024-12-03

## 2024-12-03 RX ORDER — HYDROMORPHONE HYDROCHLORIDE 2 MG/ML
2 INJECTION, SOLUTION INTRAMUSCULAR; INTRAVENOUS; SUBCUTANEOUS EVERY 5 MIN PRN
Status: DISCONTINUED | OUTPATIENT
Start: 2024-12-03 | End: 2024-12-03 | Stop reason: HOSPADM

## 2024-12-03 RX ORDER — LIDOCAINE 560 MG/1
1 PATCH PERCUTANEOUS; TOPICAL; TRANSDERMAL DAILY
Status: DISCONTINUED | OUTPATIENT
Start: 2024-12-04 | End: 2024-12-04

## 2024-12-03 RX ORDER — HYDROMORPHONE HYDROCHLORIDE 1 MG/ML
INJECTION, SOLUTION INTRAMUSCULAR; INTRAVENOUS; SUBCUTANEOUS AS NEEDED
Status: DISCONTINUED | OUTPATIENT
Start: 2024-12-03 | End: 2024-12-03

## 2024-12-03 RX ORDER — HYDROMORPHONE HYDROCHLORIDE 1 MG/ML
1 INJECTION, SOLUTION INTRAMUSCULAR; INTRAVENOUS; SUBCUTANEOUS ONCE
Status: COMPLETED | OUTPATIENT
Start: 2024-12-03 | End: 2024-12-03

## 2024-12-03 RX ORDER — SODIUM CHLORIDE, SODIUM LACTATE, POTASSIUM CHLORIDE, CALCIUM CHLORIDE 600; 310; 30; 20 MG/100ML; MG/100ML; MG/100ML; MG/100ML
100 INJECTION, SOLUTION INTRAVENOUS CONTINUOUS
Status: ACTIVE | OUTPATIENT
Start: 2024-12-03 | End: 2024-12-03

## 2024-12-03 RX ORDER — HYDROXYZINE HYDROCHLORIDE 25 MG/1
25 TABLET, FILM COATED ORAL 2 TIMES DAILY PRN
Status: DISPENSED | OUTPATIENT
Start: 2024-12-03

## 2024-12-03 RX ORDER — OXYCODONE HYDROCHLORIDE 5 MG/1
20 TABLET ORAL EVERY 4 HOURS PRN
Status: DISCONTINUED | OUTPATIENT
Start: 2024-12-03 | End: 2024-12-03 | Stop reason: HOSPADM

## 2024-12-03 RX ORDER — NALOXONE HYDROCHLORIDE 0.4 MG/ML
0.2 INJECTION, SOLUTION INTRAMUSCULAR; INTRAVENOUS; SUBCUTANEOUS EVERY 5 MIN PRN
Status: ACTIVE | OUTPATIENT
Start: 2024-12-03

## 2024-12-03 RX ORDER — ACETAMINOPHEN 500 MG
5 TABLET ORAL NIGHTLY
Status: DISPENSED | OUTPATIENT
Start: 2024-12-03

## 2024-12-03 RX ORDER — BUPIVACAINE HCL/EPINEPHRINE 0.5-1:200K
VIAL (ML) INJECTION AS NEEDED
Status: DISCONTINUED | OUTPATIENT
Start: 2024-12-03 | End: 2024-12-03 | Stop reason: HOSPADM

## 2024-12-03 RX ORDER — HYDROMORPHONE HYDROCHLORIDE 1 MG/ML
1 INJECTION, SOLUTION INTRAMUSCULAR; INTRAVENOUS; SUBCUTANEOUS
Status: DISCONTINUED | OUTPATIENT
Start: 2024-12-03 | End: 2024-12-04

## 2024-12-03 RX ORDER — OXYCODONE HYDROCHLORIDE 5 MG/1
5 TABLET ORAL EVERY 4 HOURS PRN
Status: DISCONTINUED | OUTPATIENT
Start: 2024-12-03 | End: 2024-12-05

## 2024-12-03 RX ORDER — METHOCARBAMOL 500 MG/1
500 TABLET, FILM COATED ORAL EVERY 6 HOURS SCHEDULED
Status: DISCONTINUED | OUTPATIENT
Start: 2024-12-03 | End: 2024-12-03

## 2024-12-03 RX ORDER — OXYCODONE HYDROCHLORIDE 5 MG/1
10 TABLET ORAL EVERY 4 HOURS PRN
Status: DISCONTINUED | OUTPATIENT
Start: 2024-12-03 | End: 2024-12-03 | Stop reason: HOSPADM

## 2024-12-03 RX ORDER — ONDANSETRON HYDROCHLORIDE 2 MG/ML
4 INJECTION, SOLUTION INTRAVENOUS ONCE AS NEEDED
Status: DISCONTINUED | OUTPATIENT
Start: 2024-12-03 | End: 2024-12-03 | Stop reason: HOSPADM

## 2024-12-03 RX ORDER — SODIUM CHLORIDE, SODIUM LACTATE, POTASSIUM CHLORIDE, CALCIUM CHLORIDE 600; 310; 30; 20 MG/100ML; MG/100ML; MG/100ML; MG/100ML
108 INJECTION, SOLUTION INTRAVENOUS CONTINUOUS
Status: DISCONTINUED | OUTPATIENT
Start: 2024-12-03 | End: 2024-12-04

## 2024-12-03 RX ORDER — ONDANSETRON HYDROCHLORIDE 2 MG/ML
INJECTION, SOLUTION INTRAVENOUS AS NEEDED
Status: DISCONTINUED | OUTPATIENT
Start: 2024-12-03 | End: 2024-12-03

## 2024-12-03 RX ORDER — TIZANIDINE 2 MG/1
2 TABLET ORAL EVERY 8 HOURS PRN
Status: DISCONTINUED | OUTPATIENT
Start: 2024-12-03 | End: 2024-12-04

## 2024-12-03 RX ORDER — PROPOFOL 10 MG/ML
INJECTION, EMULSION INTRAVENOUS AS NEEDED
Status: DISCONTINUED | OUTPATIENT
Start: 2024-12-03 | End: 2024-12-03

## 2024-12-03 RX ORDER — HYDROMORPHONE HYDROCHLORIDE 1 MG/ML
1 INJECTION, SOLUTION INTRAMUSCULAR; INTRAVENOUS; SUBCUTANEOUS EVERY 2 HOUR PRN
Status: COMPLETED | OUTPATIENT
Start: 2024-12-03 | End: 2024-12-03

## 2024-12-03 RX ORDER — IBUPROFEN 400 MG/1
800 TABLET ORAL EVERY 8 HOURS
Status: DISCONTINUED | OUTPATIENT
Start: 2024-12-03 | End: 2024-12-03

## 2024-12-03 RX ORDER — ACETAMINOPHEN 325 MG/1
650 TABLET ORAL EVERY 4 HOURS PRN
Status: DISCONTINUED | OUTPATIENT
Start: 2024-12-03 | End: 2024-12-03 | Stop reason: HOSPADM

## 2024-12-03 SDOH — ECONOMIC STABILITY: HOUSING INSECURITY: IN THE LAST 12 MONTHS, WAS THERE A TIME WHEN YOU WERE NOT ABLE TO PAY THE MORTGAGE OR RENT ON TIME?: NO

## 2024-12-03 SDOH — ECONOMIC STABILITY: HOUSING INSECURITY: IN THE PAST 12 MONTHS, HOW MANY TIMES HAVE YOU MOVED WHERE YOU WERE LIVING?: 1

## 2024-12-03 SDOH — SOCIAL STABILITY: SOCIAL INSECURITY: DO YOU FEEL UNSAFE GOING BACK TO THE PLACE WHERE YOU ARE LIVING?: NO

## 2024-12-03 SDOH — ECONOMIC STABILITY: HOUSING INSECURITY: AT ANY TIME IN THE PAST 12 MONTHS, WERE YOU HOMELESS OR LIVING IN A SHELTER (INCLUDING NOW)?: NO

## 2024-12-03 SDOH — SOCIAL STABILITY: SOCIAL INSECURITY: WITHIN THE LAST YEAR, HAVE YOU BEEN AFRAID OF YOUR PARTNER OR EX-PARTNER?: NO

## 2024-12-03 SDOH — SOCIAL STABILITY: SOCIAL INSECURITY: ARE YOU OR HAVE YOU BEEN THREATENED OR ABUSED PHYSICALLY, EMOTIONALLY, OR SEXUALLY BY ANYONE?: NO

## 2024-12-03 SDOH — SOCIAL STABILITY: SOCIAL INSECURITY: WERE YOU ABLE TO COMPLETE ALL THE BEHAVIORAL HEALTH SCREENINGS?: YES

## 2024-12-03 SDOH — SOCIAL STABILITY: SOCIAL INSECURITY: DOES ANYONE TRY TO KEEP YOU FROM HAVING/CONTACTING OTHER FRIENDS OR DOING THINGS OUTSIDE YOUR HOME?: NO

## 2024-12-03 SDOH — ECONOMIC STABILITY: INCOME INSECURITY: IN THE PAST 12 MONTHS HAS THE ELECTRIC, GAS, OIL, OR WATER COMPANY THREATENED TO SHUT OFF SERVICES IN YOUR HOME?: NO

## 2024-12-03 SDOH — ECONOMIC STABILITY: FOOD INSECURITY: HOW HARD IS IT FOR YOU TO PAY FOR THE VERY BASICS LIKE FOOD, HOUSING, MEDICAL CARE, AND HEATING?: NOT HARD AT ALL

## 2024-12-03 SDOH — SOCIAL STABILITY: SOCIAL INSECURITY: WITHIN THE LAST YEAR, HAVE YOU BEEN HUMILIATED OR EMOTIONALLY ABUSED IN OTHER WAYS BY YOUR PARTNER OR EX-PARTNER?: NO

## 2024-12-03 SDOH — ECONOMIC STABILITY: FOOD INSECURITY: WITHIN THE PAST 12 MONTHS, YOU WORRIED THAT YOUR FOOD WOULD RUN OUT BEFORE YOU GOT THE MONEY TO BUY MORE.: NEVER TRUE

## 2024-12-03 SDOH — SOCIAL STABILITY: SOCIAL INSECURITY: ABUSE: ADULT

## 2024-12-03 SDOH — SOCIAL STABILITY: SOCIAL INSECURITY: ARE THERE ANY APPARENT SIGNS OF INJURIES/BEHAVIORS THAT COULD BE RELATED TO ABUSE/NEGLECT?: NO

## 2024-12-03 SDOH — SOCIAL STABILITY: SOCIAL INSECURITY: HAS ANYONE EVER THREATENED TO HURT YOUR FAMILY OR YOUR PETS?: NO

## 2024-12-03 SDOH — SOCIAL STABILITY: SOCIAL INSECURITY: DO YOU FEEL ANYONE HAS EXPLOITED OR TAKEN ADVANTAGE OF YOU FINANCIALLY OR OF YOUR PERSONAL PROPERTY?: NO

## 2024-12-03 SDOH — SOCIAL STABILITY: SOCIAL INSECURITY: HAVE YOU HAD THOUGHTS OF HARMING ANYONE ELSE?: NO

## 2024-12-03 SDOH — ECONOMIC STABILITY: FOOD INSECURITY: WITHIN THE PAST 12 MONTHS, THE FOOD YOU BOUGHT JUST DIDN'T LAST AND YOU DIDN'T HAVE MONEY TO GET MORE.: NEVER TRUE

## 2024-12-03 SDOH — ECONOMIC STABILITY: TRANSPORTATION INSECURITY: IN THE PAST 12 MONTHS, HAS LACK OF TRANSPORTATION KEPT YOU FROM MEDICAL APPOINTMENTS OR FROM GETTING MEDICATIONS?: NO

## 2024-12-03 SDOH — SOCIAL STABILITY: SOCIAL INSECURITY: HAVE YOU HAD ANY THOUGHTS OF HARMING ANYONE ELSE?: NO

## 2024-12-03 SDOH — HEALTH STABILITY: MENTAL HEALTH: CURRENT SMOKER: 0

## 2024-12-03 ASSESSMENT — ACTIVITIES OF DAILY LIVING (ADL)
JUDGMENT_ADEQUATE_SAFELY_COMPLETE_DAILY_ACTIVITIES: YES
LACK_OF_TRANSPORTATION: NO
GROOMING: INDEPENDENT
DRESSING YOURSELF: INDEPENDENT
LACK_OF_TRANSPORTATION: NO
TOILETING: INDEPENDENT
PATIENT'S MEMORY ADEQUATE TO SAFELY COMPLETE DAILY ACTIVITIES?: YES
DRESSING YOURSELF: INDEPENDENT
WALKS IN HOME: INDEPENDENT
HEARING - RIGHT EAR: FUNCTIONAL
TOILETING: INDEPENDENT
BATHING: INDEPENDENT
JUDGMENT_ADEQUATE_SAFELY_COMPLETE_DAILY_ACTIVITIES: YES
PATIENT'S MEMORY ADEQUATE TO SAFELY COMPLETE DAILY ACTIVITIES?: YES
FEEDING YOURSELF: INDEPENDENT
GROOMING: INDEPENDENT
HEARING - LEFT EAR: FUNCTIONAL
BATHING: INDEPENDENT
ADEQUATE_TO_COMPLETE_ADL: YES
ADEQUATE_TO_COMPLETE_ADL: YES

## 2024-12-03 ASSESSMENT — PAIN SCALES - GENERAL
PAINLEVEL_OUTOF10: 5 - MODERATE PAIN
PAINLEVEL_OUTOF10: 9
PAIN_LEVEL: 0
PAINLEVEL_OUTOF10: 10 - WORST POSSIBLE PAIN
PAINLEVEL_OUTOF10: 9
PAINLEVEL_OUTOF10: 5 - MODERATE PAIN
PAINLEVEL_OUTOF10: 0 - NO PAIN
PAINLEVEL_OUTOF10: 5 - MODERATE PAIN
PAINLEVEL_OUTOF10: 0 - NO PAIN
PAINLEVEL_OUTOF10: 8
PAINLEVEL_OUTOF10: 6
PAINLEVEL_OUTOF10: 8
PAINLEVEL_OUTOF10: 10 - WORST POSSIBLE PAIN
PAINLEVEL_OUTOF10: 0 - NO PAIN
PAINLEVEL_OUTOF10: 10 - WORST POSSIBLE PAIN
PAINLEVEL_OUTOF10: 0 - NO PAIN

## 2024-12-03 ASSESSMENT — COGNITIVE AND FUNCTIONAL STATUS - GENERAL
PATIENT BASELINE BEDBOUND: NO
DAILY ACTIVITIY SCORE: 24
MOBILITY SCORE: 24

## 2024-12-03 ASSESSMENT — LIFESTYLE VARIABLES
HOW MANY STANDARD DRINKS CONTAINING ALCOHOL DO YOU HAVE ON A TYPICAL DAY: PATIENT DOES NOT DRINK
HOW OFTEN DO YOU HAVE A DRINK CONTAINING ALCOHOL: NEVER
HOW OFTEN DO YOU HAVE 6 OR MORE DRINKS ON ONE OCCASION: NEVER
AUDIT-C TOTAL SCORE: 0
AUDIT-C TOTAL SCORE: 0
SKIP TO QUESTIONS 9-10: 1

## 2024-12-03 ASSESSMENT — PAIN DESCRIPTION - LOCATION
LOCATION: ABDOMEN

## 2024-12-03 ASSESSMENT — COLUMBIA-SUICIDE SEVERITY RATING SCALE - C-SSRS
2. HAVE YOU ACTUALLY HAD ANY THOUGHTS OF KILLING YOURSELF?: NO
1. IN THE PAST MONTH, HAVE YOU WISHED YOU WERE DEAD OR WISHED YOU COULD GO TO SLEEP AND NOT WAKE UP?: NO
6. HAVE YOU EVER DONE ANYTHING, STARTED TO DO ANYTHING, OR PREPARED TO DO ANYTHING TO END YOUR LIFE?: NO
2. HAVE YOU ACTUALLY HAD ANY THOUGHTS OF KILLING YOURSELF?: NO
1. IN THE PAST MONTH, HAVE YOU WISHED YOU WERE DEAD OR WISHED YOU COULD GO TO SLEEP AND NOT WAKE UP?: NO
6. HAVE YOU EVER DONE ANYTHING, STARTED TO DO ANYTHING, OR PREPARED TO DO ANYTHING TO END YOUR LIFE?: NO

## 2024-12-03 ASSESSMENT — PATIENT HEALTH QUESTIONNAIRE - PHQ9
SUM OF ALL RESPONSES TO PHQ9 QUESTIONS 1 & 2: 0
2. FEELING DOWN, DEPRESSED OR HOPELESS: NOT AT ALL
1. LITTLE INTEREST OR PLEASURE IN DOING THINGS: NOT AT ALL

## 2024-12-03 ASSESSMENT — PAIN - FUNCTIONAL ASSESSMENT
PAIN_FUNCTIONAL_ASSESSMENT: 0-10
PAIN_FUNCTIONAL_ASSESSMENT: 0-10
PAIN_FUNCTIONAL_ASSESSMENT: UNABLE TO SELF-REPORT
PAIN_FUNCTIONAL_ASSESSMENT: UNABLE TO SELF-REPORT
PAIN_FUNCTIONAL_ASSESSMENT: 0-10
PAIN_FUNCTIONAL_ASSESSMENT: UNABLE TO SELF-REPORT
PAIN_FUNCTIONAL_ASSESSMENT: 0-10
PAIN_FUNCTIONAL_ASSESSMENT: UNABLE TO SELF-REPORT
PAIN_FUNCTIONAL_ASSESSMENT: 0-10
PAIN_FUNCTIONAL_ASSESSMENT: UNABLE TO SELF-REPORT

## 2024-12-03 ASSESSMENT — PAIN DESCRIPTION - ORIENTATION
ORIENTATION: RIGHT
ORIENTATION: RIGHT

## 2024-12-03 ASSESSMENT — PAIN DESCRIPTION - PAIN TYPE: TYPE: ACUTE PAIN

## 2024-12-03 ASSESSMENT — PAIN SCALES - WONG BAKER: WONGBAKER_NUMERICALRESPONSE: HURTS WORST

## 2024-12-03 NOTE — ED TRIAGE NOTES
"Pt. Reports to ED as a transfer from [facility]. Pt. states she woke up yesterday \"vomiting bile\" and began having abdominal pain and went to [facility]. Pt. Has dx of appendicitis from [facility] ED.  "

## 2024-12-03 NOTE — CONSULTS
[facility]  ACUTE CARE SURGERY - CONSULT    Patient Name: Mary Alice Aragon  MRN: [id number]  Admit Date: 1203  : 1980  AGE: 44 y.o.   GENDER: female  ==============================================================================  TODAY'S ASSESSMENT AND PLAN OF CARE:  ASSESSMENT:  Mary Alice Aragon is a 44 y.o. female with history of sickle cell disease  who initially presented to  [facility]  for nausea/vomiting that then progressed to widespread pain including extremities, back and shoulders. She was transferred to [facility] for further management of her appendicitis in the setting of her known sickle cell disease. ACS is consulted for acute appendicitis seen on CT imaging. Patient is in fair condition.     Plan:  Consented and posted for Laparoscopic (possible convert to open) appendectomy  MILAN, Prieto, SIERRAO    Patient seen and discussed with Dr. Waters.     Jovana Sanchez MD  General Surgery PGY-1  Acute Care Surgery [id number]    ==============================================================================  CHIEF COMPLAINT/REASON FOR CONSULT:  Chief Complaint: Appendicitis    HPI:  Mary Alice Aragon is a 44 y.o. female with a history of sickle cell disease, congenital factor 11 deficiency, multiple ruben surgeries (shunt placements/revisions for childhood lead poisoning c/b hydrocephalus), and a brain aneurysm (does not currently follow with neurosurgery). who had onset of nausea and vomiting at 4 AM on .  She then developed pain everywhere including her extremities, back and abdomen.  She feels as if she is having a sickle cell crisis.  She was admitted to [facility] on 2024 with a sickle cell crisis and transferred to [facility] 3 days later for further management.  She was discharged home on 2024 and felt better until 12/2 AM.  CT scan was obtained which showed likely acute appendicitis, for which ACS was consulted. She was transferred to  [facility] for concern of reaction to anesthesia in the setting of her sickle cell disease.       Objective   PAST MEDICAL HISTORY:   PMH:   Past Medical History:   Diagnosis Date    Sickle cell anemia (Multi)     Unspecified astigmatism, bilateral 2020    Astigmatism, bilateral       PSH:   Past Surgical History:   Procedure Laterality Date     SECTION, CLASSIC  2016     Section    CHOLECYSTECTOMY  2016    Cholecystectomy    MR HEAD ANGIO W AND WO IV CONTRAST  4/15/2013    MR HEAD ANGIO W AND WO IV CONTRAST 4/15/2013 [facility] CLINICAL LEGACY    MR HEAD ANGIO W AND WO IV CONTRAST  2013    MR HEAD ANGIO W AND WO IV CONTRAST 2013 [facility] INPATIENT LEGACY    MR HEAD ANGIO WO IV CONTRAST  2014    MR HEAD ANGIO WO IV CONTRAST 2014 [facility] ANCILLARY LEGACY    MR HEAD ANGIO WO IV CONTRAST  2016    MR HEAD ANGIO WO IV CONTRAST 2016 [facility] ANCILLARY LEGACY    MR HEAD ANGIO WO IV CONTRAST  2019    MR HEAD ANGIO WO IV CONTRAST 2019 [facility] ANCILLARY LEGACY    MR HEAD ANGIO WO IV CONTRAST  2017    MR HEAD ANGIO WO IV CONTRAST 2017 [facility] INPATIENT LEGACY    MR NECK ANGIO WO IV CONTRAST  2013    MR NECK ANGIO WO IV CONTRAST 2013 [facility] INPATIENT LEGACY    MR NECK ANGIO WO IV CONTRAST  2014    MR NECK ANGIO WO IV CONTRAST 2014 [facility] ANCILLARY LEGACY    OTHER SURGICAL HISTORY  2016    Brain Surgery    TUBAL LIGATION  04/10/2017    Tubal Ligation     FH:   Family History   Problem Relation Name Age of Onset    Sickle cell trait Sister      Multiple sclerosis Brother      Breast cancer Maternal Grandmother      Breast cancer Other paternal cousin     Sickle cell trait Child       SOCIAL HISTORY:    Smoking:    Social History     Tobacco Use   Smoking Status Never   Smokeless Tobacco Never       Alcohol:    Social History     Substance and Sexual Activity   Alcohol Use Not Currently       Drug use: Denies    MEDICATIONS:   Prior to Admission medications     Medication Sig Start Date End Date Taking? Authorizing Provider   ascorbic acid (Vitamin C) 500 mg tablet Take 2 tablets (1,000 mg) by mouth once daily.    Historical Provider, MD   calcium carbonate (Tums) 200 mg calcium chewable tablet Chew 1-2 tablets (500-1,000 mg) 4 times a day as needed for heartburn.    Historical Provider, MD   capsaicin (Zostrix) 0.025 % cream Apply 1 Application topically if needed.    Historical Provider, MD   cholecalciferol (Vitamin D-3) 50 MCG (2000 UT) tablet Take 1 tablet (2,000 Units) by mouth early in the morning.. 10/21/24 1/19/25  Gio Saleem MD   deferasirox (Jadenu) 180 mg tablet Take 1 tablet (180 mg) by mouth once daily.    Historical Provider, MD   deferoxamine (Desferal) 2 gram injection Infuse 2000mg subcutaneously once daily over 8 hours via [place] Pump 7/5/24   Adriana Coelho, APRN-CNP   diclofenac sodium (Voltaren) 1 % gel Per instructions 4 TIMES DAILY (route: topical)  Patient taking differently: Apply 4.5 inches (4 g) topically 4 times a day as needed (pain). 8/16/24   Gio Saleem MD   diphenhydrAMINE (BENADryl) 25 mg capsule Take 1 capsule (25 mg) by mouth every 8 hours if needed.    Historical Provider, MD   docusate sodium (Colace) 100 mg capsule Take 1 capsule (100 mg) by mouth every other day.    Historical Provider, MD   FLUoxetine (PROzac) 10 mg capsule Take 1 capsule (10 mg) by mouth once daily. 9/26/24 12/25/24  Cinthia May MD   folic acid (Folvite) 1 mg tablet Take 1 tablet (1 mg) by mouth once daily.    Historical Provider, MD   HYDROmorphone (Dilaudid) 8 mg tablet Take 0.5 tablets (4 mg) by mouth 3 times a day as needed for severe pain (7 - 10). 11/27/24   Gio Saleem MD   hydroxyurea (Hydrea) 500 mg capsule Per instructions AS DIRECTED (route: oral)  Patient taking differently: Take 2 capsules (1,000 mg total) by mouth once a day on Monday, Wednesday, and Friday. 6/26/24   Adriana Coelho, APRN-CNP   hydroxyurea (Hydrea)  500 mg capsule Take 1 capsule (500 mg total) by mouth once a day on Sunday, Tuesday, Thursday, and Saturday.    Historical Provider, MD   hydrOXYzine HCL (Atarax) 25 mg tablet Take 1 tablet (25 mg) by mouth 2 times a day as needed for anxiety. 9/26/24 12/25/24  Cinthia May MD   ibuprofen 800 mg tablet Take 1 tablet (800 mg) by mouth every 8 hours if needed for moderate pain (4 - 6). 9/17/24   Gio Saleem MD   ketamine (Ketalar) 10 mg/mL injection Infuse 1 mL (10 mg) into a venous catheter every 30 (thirty) days. Not administered in the home.    Historical Provider, MD   lidocaine (Lidoderm) 5 % patch Place 1 patch on the skin once daily as needed for mild pain (1 - 3).    Historical Provider, MD   loratadine (Claritin) 10 mg tablet Take 1 tablet (10 mg) by mouth once daily as needed for allergies.    Historical Provider, MD   melatonin 5 mg tablet Take 1 tablet (5 mg) by mouth once daily at bedtime.    Historical Provider, MD   meloxicam (Mobic) 15 mg tablet Take 1 tablet (15 mg) by mouth once daily. 7/19/24   MICHELLE Ohara-CNP   methadone (Dolophine) 10 mg tablet Take 1 tablet (10 mg) by mouth every 12 hours. 10/31/24   Gio Saleem MD   multivitamin tablet Take 1 tablet by mouth once daily.    Historical Provider, MD   naloxone (Narcan) 4 mg/0.1 mL nasal spray Administer 1 spray (4 mg) into affected nostril(s) if needed for opioid reversal or respiratory depression. May repeat every 2-3 minutes if needed, alternating nostrils, until medical assistance becomes available. 10/21/24   Gio Saleem MD   norethindrone (Aygestin) 5 mg tablet Take 1 tablet (5 mg) by mouth once daily. 11/26/23   Mary Ann Bolanos MD   oxygen (O2) gas therapy Inhale 3 L/min once daily at bedtime.    Historical Provider, MD   peg 400/hypromellose/glycerin (DRY EYE RELIEF OPHT) Administer 1 drop into affected eye(s) once daily.    Historical Provider, MD   pramoxine (Proctofoam) 1 % foam Insert 1 Application into  the rectum every 6 hours if needed for irritation (2nd line itching). 11/23/24   Reid Couch DO   pregabalin (Lyrica) 25 mg capsule Take 1 capsule (25 mg) by mouth 2 times a day. 9/11/24 11/21/25  SHYAM Ohara   prochlorperazine (Compazine) 10 mg tablet 1 tablet EVERY 8 HOURS (route: oral) 10/29/24   Gio Saleem MD   senna 8.6 mg tablet Take 1 tablet (8.6 mg) by mouth 2 times a day as needed for constipation. 10/21/24   Gio Saleem MD   tiZANidine (Zanaflex) 2 mg tablet Take 1-2 tablets (2-4 mg) by mouth every 8 hours if needed for muscle spasms. 11/8/24   SHYAM Ohara   traZODone (Desyrel) 100 mg tablet TAKE 1 TO 2 TABLETS(100  MG) BY MOUTH AT BEDTIME AS NEEDED FOR SLEEP  Patient taking differently: Take 1-2 tablets (100-200 mg) by mouth once daily at bedtime. 5/23/24   Cinhtia May MD   trolamine salicylate (Aspercreme) 10 % cream Apply 1 Application topically if needed.    Historical Provider, MD   valACYclovir (Valtrex) 500 mg tablet Take 1 tablet (500 mg) by mouth once daily.    Historical Provider, MD   HYDROmorphone (Dilaudid) 4 mg tablet Take 1 tablet (4 mg) by mouth 3 times a day as needed for severe pain (7 - 10). 11/15/24 11/27/24  SHYAM Ohara   HYDROmorphone (Dilaudid) 4 mg tablet Take 1 tablet (4 mg) by mouth 3 times a day as needed for severe pain (7 - 10). Meds to beds [facility] Rm [id number] 11/27/24 11/27/24  Gio Saleem MD     ALLERGIES:   No Known Allergies    REVIEW OF SYSTEMS:  A 12-point ROS was performed and was unremarkable except as above.    PHYSICAL EXAM:  GEN: No acute distress. Alert, awake and conversive.  HEENT: Sclera anicteric. Moist mucous membranes.  RESP: Breathing non-labored, equal chest rise. On RA.  CV: Regular rate, normotensive  GI: Abdomen soft, mildly distended, +Rovsing's sign, rebound tenderness, guarding  : Voiding spontaneously.  MSK: No gross deformities. Moves all extremities  spontaneously.  NEURO: Alert and oriented x3. No focal deficits.  PSYCH: Appropriate mood and affect.  SKIN:  Evidence of prior  and gallbladder procedures, but no current rashes or lesions.     IMAGING SUMMARY:   CT abdomen pelvis w IV contrast    Result Date: 2024  Interpreted By:  Román Bella, STUDY: CT ABDOMEN PELVIS W IV CONTRAST;  2024 7:37 pm   INDICATION: Signs/Symptoms:rlq abdominal pain.     COMPARISON: None.   ACCESSION NUMBER(S): [id number]   ORDERING CLINICIAN: ANNIKA BREEN   TECHNIQUE: CT of the abdomen and pelvis was performed.  Standard contiguous axial images were obtained at 3 mm slice thickness through the abdomen and pelvis. Coronal and sagittal reconstructions at 3 mm slice thickness were performed.  75 ML of Omnipaque 350 was administered intravenously without immediate complication.   FINDINGS: There does appear to be mild inflammatory stranding seen in the right lower quadrant with a slightly thickened appendix. Reference image 78. Early appendicitis should be considered. No abscess. No free air. No free fluid   Constipation detected. Colonic wall difficult to assess given degree of constipation   Peripheral calcification noted along the margin of the spleen   Hepatic steatosis. Gallbladder is been removed. Unremarkable adrenal glands and pancreas. 1 cm right renal cyst. No no pelvic masses. Thick-walled urinary bladder versus poor distention noted. Correlation with urinalysis advised.   Stigmata of sickle cell disease noted in the bones.       Constipation. Appendix is slightly prominent with minimal periappendiceal inflammation. Early appendicitis should be considered.   Degree of stool limiting assessment of colonic mucosa.   Features of sickle cell disease.   MACRO: None   Signed by: Román Bella 2024 8:15 PM Dictation workstation:   [id number]    I have reviewed the imaging above as it pertains to the patient's surgical concerns and agree with the  radiologist's interpretation.  LABS:  Results for orders placed or performed during the hospital encounter of 12/02/24 (from the past 24 hours)   CBC and Auto Differential   Result Value Ref Range    WBC 14.1 (H) 4.4 - 11.3 x10*3/uL    nRBC 0.0 0.0 - 0.0 /100 WBCs    RBC 3.10 (L) 4.00 - 5.20 x10*6/uL    Hemoglobin 9.6 (L) 12.0 - 16.0 g/dL    Hematocrit 27.9 (L) 36.0 - 46.0 %    MCV 90 80 - 100 fL    MCH 31.0 26.0 - 34.0 pg    MCHC 34.4 32.0 - 36.0 g/dL    RDW 15.0 (H) 11.5 - 14.5 %    Platelets 438 150 - 450 x10*3/uL    Neutrophils % 89.3 40.0 - 80.0 %    Immature Granulocytes %, Automated 0.4 0.0 - 0.9 %    Lymphocytes % 7.7 13.0 - 44.0 %    Monocytes % 2.2 2.0 - 10.0 %    Eosinophils % 0.0 0.0 - 6.0 %    Basophils % 0.4 0.0 - 2.0 %    Neutrophils Absolute 12.59 (H) 1.20 - 7.70 x10*3/uL    Immature Granulocytes Absolute, Automated 0.06 0.00 - 0.70 x10*3/uL    Lymphocytes Absolute 1.09 (L) 1.20 - 4.80 x10*3/uL    Monocytes Absolute 0.31 0.10 - 1.00 x10*3/uL    Eosinophils Absolute 0.00 0.00 - 0.70 x10*3/uL    Basophils Absolute 0.05 0.00 - 0.10 x10*3/uL   Magnesium   Result Value Ref Range    Magnesium 2.27 1.60 - 2.40 mg/dL   Comprehensive metabolic panel   Result Value Ref Range    Glucose 121 (H) 74 - 99 mg/dL    Sodium 136 136 - 145 mmol/L    Potassium 4.0 3.5 - 5.3 mmol/L    Chloride 104 98 - 107 mmol/L    Bicarbonate 25 21 - 32 mmol/L    Anion Gap 11 10 - 20 mmol/L    Urea Nitrogen 15 6 - 23 mg/dL    Creatinine 0.84 0.50 - 1.05 mg/dL    eGFR 89 >60 mL/min/1.73m*2    Calcium 10.0 8.6 - 10.3 mg/dL    Albumin 5.0 3.4 - 5.0 g/dL    Alkaline Phosphatase 80 33 - 110 U/L    Total Protein 8.6 (H) 6.4 - 8.2 g/dL    AST 38 9 - 39 U/L    Bilirubin, Total 2.0 (H) 0.0 - 1.2 mg/dL    ALT 30 7 - 45 U/L   Human Chorionic Gonadotropin, Serum Quantitative   Result Value Ref Range    HCG, Beta-Quantitative <2 <5 mIU/mL   Type And Screen   Result Value Ref Range    ABO TYPE B     Rh TYPE POS     ANTIBODY SCREEN NEG    Sars-CoV-2  PCR   Result Value Ref Range    Coronavirus 2019, PCR Not Detected Not Detected   Influenza A, and B PCR   Result Value Ref Range    Flu A Result Not Detected Not Detected    Flu B Result Not Detected Not Detected     *Note: Due to a large number of results and/or encounters for the requested time period, some results have not been displayed. A complete set of results can be found in Results Review.     I/O past 24h:  No intake/output data recorded.    I have reviewed all laboratory and imaging results ordered/pertinent for this encounter.

## 2024-12-03 NOTE — PROGRESS NOTES
Patient care was taken over at sign out from previous physician. Please see initial provider note for details of H&P and ED work up thus far.    In brief, this is a 44 y.o. female with a history of sickle cell disease, cerebral aneurysm, NICHOL, MDD, and nocturnal hypoxia on 2 to 3 L at home as needed who presented to the ED with abdominal pain.  Was found to have early appendicitis on CT and started on Zosyn for this. Signed out pending surgery recs.    Was evaluated by surgery and based on their conversations with anesthesia, they are recommending transfer to [facility]  Case was discussed with Dr. Cole and Dr. Pierce from acute care surgery and emergency department.  Patient has been accepted for transfer.  Received multiple doses of pain/nausea control throughout my shift but otherwise in stable condition.  Signed out to oncoming physician still pending final transfer.      Clinical Assessment:  Appendicitis    Disposition:  Transfer to [facility]    Keyonna Kearns DO  Emergency Medicine

## 2024-12-03 NOTE — ANESTHESIA PROCEDURE NOTES
Peripheral Block    Patient location during procedure: pre-op  Start time: 12/3/2024 11:50 AM  End time: 12/3/2024 12:04 PM  Reason for block: at surgeon's request and post-op pain management  Staffing  Performed: resident   Authorized by: Chan Tejada DO    Performed by: Chan Tejada DO  Preanesthetic Checklist  Completed: patient identified, IV checked, site marked, risks and benefits discussed, surgical consent, monitors and equipment checked, pre-op evaluation and timeout performed   Timeout performed at: 12/3/2024 11:50 PM  Peripheral Block  Patient position: sitting  Prep: ChloraPrep  Patient monitoring: heart rate and continuous pulse ox  Block type: SAHIL  Laterality: B/L  Injection technique: catheter  Guidance: ultrasound guided  Local infiltration: ropivacaine  Needle  Needle type: Tuohy   Needle gauge: 22 G  Needle length: 8 cm  Needle localization: ultrasound guidance     image stored in chart  Assessment  Injection assessment: negative aspiration for heme, no paresthesia on injection, incremental injection and local visualized surrounding nerve on ultrasound  Additional Notes  Erector spinae plane block: Informed consent obtained.  Risks, benefits, and alternatives discussed.  ASA monitors placed, and timeout performed.  Patient positioned, prepped with chlorhexidine, and draped with sterile towels. Anatomical landmarks clearly marked.    Ultrasound guidance used to visualize the erector spine a muscle above the TP/costal junction at T9.  Good visualization of the needle throughout duration of the procedure.  Aspiration was negative. A total of Type of Local: 12cc of 0.5% ropivacaine was divided and injected into the paravertebral space and then 13 ml of 0.5% ropivacaine was divided and injected bilaterally into SAHIL.  Catheters threaded and secured. Patient tolerated procedure well.    Timeout by Carmen WEBSTER

## 2024-12-03 NOTE — ANESTHESIA PREPROCEDURE EVALUATION
"Patient: Mary Alice Aragon    Procedure Information       Date/Time: 12/03/24 1115    Procedure: Appendectomy Laparoscopy, possible open    Location: [facility] / Virtual [facility]    Surgeons: Maria Isabel Waters MD        HPI: 44 year old female presenting for appendectomy. History significant for SCD with recent sickle crisis    Denies recent cough, cold, runny nose, fever, flu like symptoms. Endorses nocturnal dyspnea that is stable on 3 L O2 Denies palpitations. History of unprovoked DVT/PE no longer requiring AC.    Anesthesia history: no issues    Visit Vitals  /78   Pulse 87   Temp 36 °C (96.8 °F) (Temporal)   Resp 16   Ht 1.6 m (5' 3\")   Wt 68 kg (149 lb 14.6 oz)   LMP  (LMP Unknown) Comment: tubal and hormones to prevent menses   SpO2 98%   BMI 26.56 kg/m²   OB Status Having periods   Smoking Status Never   BSA 1.74 m²        [unfilled]      No results found for this or any previous visit from the past 1095 days.       Encounter Date: 11/20/24   ECG 12 lead   Result Value    Ventricular Rate 80    Atrial Rate 79    CO Interval 178    QRS Duration 97    QT Interval 374    QTC Calculation(Bazett) 432    P Axis 69    R Axis 55    T Axis 61    QRS Count 12    Q Onset 251    T Offset 438    QTC Fredericia 412    Narrative    Sinus rhythm  Abnormal R-wave progression, early transition        Stress test: none      Relevant Problems   Anesthesia (within normal limits)      Cardiac   (+) Atypical chest pain      Pulmonary   (+) Dyspnea on exertion   (+) DIAN (obstructive sleep apnea)   (+) Shortness of breath on exertion      Neuro   (+) Anxiety   (+) Cerebral aneurysm (HHS-HCC)   (+) MDD (major depressive disorder)   (+) Major depressive disorder   (+) Polyneuropathy      GI   (+) GERD (gastroesophageal reflux disease)   (+) Gastroesophageal reflux disease      Endocrine   (+) Multinodular goiter (nontoxic)   (+) Non-toxic multinodular goiter      Hematology   (+) Anemia   (+) Anemia, unspecified   (+) " Congenital factor XI deficiency (Multi)   (+) Hemoglobin SS disease (Multi)   (+) Hemoglobin SS disease with crisis (Multi)   (+) Hemoglobin SS genotype (Multi)   (+) Homozygous sickle cell (SS) disease (Multi)   (+) Sickle cell crisis (Multi)   (+) Sickle cell disease without crisis (Multi)   (+) Sickle cell pain crisis (Multi)   (+) Sickle-cell crisis (Multi)      ID   (+) Disease due to severe acute respiratory syndrome coronavirus 2 (SARS-CoV-2)   (+) Local infection of wound      GYN   (+) Abnormal uterine bleeding       Clinical information reviewed:   Tobacco  Allergies  Meds   Med Hx  Surg Hx   Fam Hx  Soc Hx        NPO Detail:  NPO/Void Status  Carbohydrate Drink Given Prior to Surgery? : N  Date of Last Liquid: 12/02/24  Time of Last Liquid: 2300  Date of Last Solid: 12/02/24  Time of Last Solid: 2300  Last Intake Type: Clear fluids  Time of Last Void: 1106         Physical Exam    Airway  Mallampati: II  TM distance: >3 FB  Neck ROM: full     Cardiovascular   Rhythm: regular  Rate: normal     Dental - normal exam     Pulmonary   Breath sounds clear to auscultation     Abdominal   Abdomen: soft and tender       Other findings: Mediport in right chest          Anesthesia Plan    History of general anesthesia?: yes  History of complications of general anesthesia?: no    ASA 4     general     The patient is not a current smoker.    intravenous induction   Postoperative administration of opioids is intended.  Trial extubation is planned.  Anesthetic plan and risks discussed with patient.  Use of blood products discussed with patient who consented to blood products.    Plan discussed with CRNA.

## 2024-12-03 NOTE — BRIEF OP NOTE
Date: 12/3/2024  OR Location: [facility] OR    Name: Mary Alice Aragon : 1980, Age: 44 y.o., MRN: [id number], Sex: female    Diagnosis  Pre-op Diagnosis      * Acute appendicitis, unspecified acute appendicitis type [K35.80] Post-op Diagnosis     * Acute appendicitis, unspecified acute appendicitis type [K35.80]     Procedures  Appendectomy Laparoscopy  79700 - PA LAPAROSCOPIC APPENDECTOMY      Surgeons      * Maria Isabel Waters - Primary    Resident/Fellow/Other Assistant:  Surgeons and Role:     * Hebert Bolden MD - Resident - Assisting     * Tanya Perez MD - Resident - Assisting    Staff:   Relief Circulator: Sapphire Arzate Person: Dora  Circulator: Cinthia Lang Scrub: Cinthia    Anesthesia Staff: Anesthesiologist: Salma Vargas MD; Zenon Cabrales DO  C-AA: ELEN Garsia    Procedure Summary  Anesthesia: General  ASA: IV  Estimated Blood Loss: 10mL  Intra-op Medications:   Administrations occurring from 1115 to 1350 on 24:   Medication Name Total Dose   dexAMETHasone (Decadron) injection 4 mg/mL 8 mg   dexmedeTOMIDine (Precedex) bolus from bag 24 mcg   fentaNYL (Sublimaze) injection 50 mcg/mL 100 mcg   HYDROmorphone (Dilaudid) injection 1 mg/mL 3 mg   ketamine injection 50 mg/ 5 mL (10 mg/mL) 40 mg   lidocaine (cardiac) injection 2% prefilled syringe 100 mg   methadone (Dolophine) injection 20 mg   midazolam PF (Versed) injection 1 mg/mL 2 mg   phenylephrine 40 mcg/mL syringe 10 mL 160 mcg   piperacillin-tazobactam (Zosyn) 3.375 g in dextrose (iso) IV 50 mL 3.375 g   propofol (Diprivan) injection 10 mg/mL 150 mg   rocuronium (ZeMuron) 50 mg/5 mL injection 70 mg   ropivacaine PF (Naropin) 0.5 % 25 mL   ropivacaine (PF) in NS cmpd (Naropin) 1000 mg/500 mL (0.2%) infusion 14.7 mL   NaCl 0.9 % bolus Cannot be calculated              Anesthesia Record               Intraprocedure I/O Totals          Intake    Dexmedetomidine 0.00 mL    The total shown is the total volume documented  since Anesthesia Start was filed.    NaCl 0.9 % bolus 1000.00 mL    Total Intake 1000 mL          Specimen:   ID Type Source Tests Collected by Time   1 : APPENDIX Tissue APPENDIX SURGICAL PATHOLOGY EXAM Maria Isabel Waters MD 12/3/2024 3318                  Findings: inflamed retrocecal appendix, not perforated, moderate amount of adhesions    Complications:  None; patient tolerated the procedure well.     Disposition: PACU - hemodynamically stable.  Condition: stable  Specimens Collected:   ID Type Source Tests Collected by Time   1 : APPENDIX Tissue APPENDIX SURGICAL PATHOLOGY EXAM Maria Isabel Waters MD 12/3/2024 5255     Attending Attestation: I was present and scrubbed for the entire procedure.    Maria Isabel Waters  Phone Number: [phone]

## 2024-12-03 NOTE — INTERVAL H&P NOTE
H&P reviewed. The patient was examined and there are no changes to the H&P. Please see consult note from ACS 12/3 for full history and physical.

## 2024-12-03 NOTE — CONSULTS
Mary Alice Aragon is a 44 y.o. year old female patient who presents for Appendectomy laparoscopy, possible open with Dr. Waters on 12/3. Acute Pain consulted for block for postoperative pain control.     Anticipated Postop Pain Issues -   Palliative: typically relieved with IV analgesics and regional local anesthetics  Provocative: typically with movement  Quality: typically burning and aching  Radiation: typically none  Severity: typically severe -10/10  Timing: typically constant    Past Medical History:   Diagnosis Date    Sickle cell anemia (Multi)     Unspecified astigmatism, bilateral 2020    Astigmatism, bilateral        Past Surgical History:   Procedure Laterality Date     SECTION, CLASSIC  2016     Section    CHOLECYSTECTOMY  2016    Cholecystectomy    MR HEAD ANGIO W AND WO IV CONTRAST  4/15/2013    MR HEAD ANGIO W AND WO IV CONTRAST 4/15/2013 [facility] CLINICAL LEGACY    MR HEAD ANGIO W AND WO IV CONTRAST  2013    MR HEAD ANGIO W AND WO IV CONTRAST 2013 [facility] INPATIENT LEGACY    MR HEAD ANGIO WO IV CONTRAST  2014    MR HEAD ANGIO WO IV CONTRAST 2014 [facility] ANCILLARY LEGACY    MR HEAD ANGIO WO IV CONTRAST  2016    MR HEAD ANGIO WO IV CONTRAST 2016 [facility] ANCILLARY LEGACY    MR HEAD ANGIO WO IV CONTRAST  2019    MR HEAD ANGIO WO IV CONTRAST 2019 [facility] ANCILLARY LEGACY    MR HEAD ANGIO WO IV CONTRAST  2017    MR HEAD ANGIO WO IV CONTRAST 2017 [facility] INPATIENT LEGACY    MR NECK ANGIO WO IV CONTRAST  2013    MR NECK ANGIO WO IV CONTRAST 2013 [facility] INPATIENT LEGACY    MR NECK ANGIO WO IV CONTRAST  2014    MR NECK ANGIO WO IV CONTRAST 2014 [facility] ANCILLARY LEGACY    OTHER SURGICAL HISTORY  2016    Brain Surgery    TUBAL LIGATION  04/10/2017    Tubal Ligation        Family History   Problem Relation Name Age of Onset    Sickle cell trait Sister      Multiple sclerosis Brother      Breast cancer Maternal Grandmother      Breast cancer  Other paternal cousin     Sickle cell trait Child          Social History     Socioeconomic History    Marital status: Single     Spouse name: Not on file    Number of children: Not on file    Years of education: Not on file    Highest education level: Not on file   Occupational History    Not on file   Tobacco Use    Smoking status: Never    Smokeless tobacco: Never   Vaping Use    Vaping status: Never Used   Substance and Sexual Activity    Alcohol use: Not Currently    Drug use: Not Currently    Sexual activity: Not on file   Other Topics Concern    Not on file   Social History Narrative    ONCOLOGY/HEMATOLOGY PSYCHOSOCIAL ASSESSMENT         Demographic Information    Mary Alice Aragon                       1980                      [id number]    Assessment Type:      Date of assessment: 04/22/24    Person(s) present during assessment:     Did patient participate in assessment:  yes    If no, why not:    Primary language: English    Interpretive services used:     Provider: Adriana Coelho CNP     Diagnosis: Sickle Cell SS                    Marital Status / Family / Household    Status:   single    Family composition: Patient resides with two minor children    Support systems: Pt reports limited support system    Primary caregiver:  self         Current employment status:  employed and disability    Work history/type of work:       Comments: Pt is currently on leave from employement. SW assisted pt with paperwork for insurance company         Environmental Supports    Current living situation:   house    Patient's home environment:          Functional Status     Functional status:  Independent, assistance needed during pain crises    Other health issues that the patient is experiencing:     What supports are in place to assist the patient:     What community resources have been offered: Support group    Transportation:  self    Psychosocial risk factors impacting the patient:     Limited social supports          Assistive Devices    Patient has the following equipment: n/a    Patient currently ambulates:          Finances/Insurance    Primary insurance: Medicare    Secondary [facility]    Prescription insurance:    Does the patient have any pending insurance applications: No     Patient's current income source:  Employment disability    Does the patient have any financial concerns:  denies      Comments:          Advance Directives    power of  for healthcare on file    Advanced directives status:  Not activated    Legal decision maker, when applicable: Maryanne Yu-Sister         Adventist or Spiritual Identity    Comments:         Mental Health    Mental health history and status details:  Patient follows with Dr. May. Open to therapy referral           Social Drivers of Health     Financial Resource Strain: Low Risk  (11/24/2024)    Overall Financial Resource Strain (CARDIA)     Difficulty of Paying Living Expenses: Not hard at all   Food Insecurity: No Food Insecurity (11/24/2024)    Hunger Vital Sign     Worried About Running Out of Food in the Last Year: Never true     Ran Out of Food in the Last Year: Never true   Transportation Needs: No Transportation Needs (11/24/2024)    PRAPARE - Transportation     Lack of Transportation (Medical): No     Lack of Transportation (Non-Medical): No   Physical Activity: Not on file   Stress: Not on file   Social Connections: Feeling Socially Integrated (8/1/2024)    OASIS : Social Isolation     Frequency of experiencing loneliness or isolation: Never   Intimate Partner Violence: Not At Risk (11/24/2024)    Humiliation, Afraid, Rape, and Kick questionnaire     Fear of Current or Ex-Partner: No     Emotionally Abused: No     Physically Abused: No     Sexually Abused: No   Housing Stability: Low Risk  (11/24/2024)    Housing Stability Vital Sign     Unable to Pay for Housing in the Last Year: No     Number of Times Moved in the Last Year: 0      Homeless in the Last Year: No        No Known Allergies      Review of Systems  Gen: No fatigue, anorexia, insomnia, fever.   Eyes: No vision loss, double vision, drainage, eye pain.   ENT: No pharyngitis, dry mouth, no hearing changes or ear discharge  Cardiac: No chest pain, palpitations, syncope, near syncope.   Pulmonary: No shortness of breath, cough, hemoptysis.   Heme/lymph: No swollen glands, fever, bleeding.   GI: No abdominal pain, change in bowel habits, melena, hematemesis, hematochezia, nausea, vomiting, diarrhea.   : No discharge, dysuria, frequency, urgency, hematuria.  Endo: No polyuria or weight loss.   Musculoskeletal: Negative for any pain or loss of ROM/weakness  Skin: No rashes or lesions  Neuro: Normal speech, no numbness or weakness. No gait difficulties  Review of systems is otherwise negative unless stated above or in history of present illness.    Physical Exam:  Constitutional:  no distress, alert and cooperative  Eyes: clear sclera  Head/Neck: No apparent injury, trachea midline  Respiratory/Thorax: Patent airways, thorax symmetric, breathing comfortably  Cardiovascular: no pitting edema  Gastrointestinal: Nondistended  Musculoskeletal: ROM intact  Extremities: no clubbing  Neurological: alert, alexander x4  Psychological: Appropriate affect    Results for orders placed or performed during the hospital encounter of 12/03/24 (from the past 24 hours)   Type and Screen   Result Value Ref Range    ABO TYPE B     Rh TYPE POS     ANTIBODY SCREEN NEG      *Note: Due to a large number of results and/or encounters for the requested time period, some results have not been displayed. A complete set of results can be found in Results Review.        Mary Alice Aragon is a 44 y.o. year old female patient who presents for Appendectomy laparoscopy, possible open with Dr. Waters on 12/3. Acute Pain consulted for block for postoperative pain control.     Plan:  - Bilateral SAHIL blocks with catheters performed  preoperatively on 12/3  - Ambit ball with Ropivacaine 0.2%/NaCl 0.9% 500mL, Rate 7 cc/hr bilaterally  - Ambit medication will not interfere with pain medication prescribed by the primary team.   - Please be aware of local anesthetic toxic dose and absorption variability before considering lidocaine patches  - Acute pain service will follow while catheters in place  - Rest of pain management per primary team    Acute Pain Resident  pg [phone] ph [phone]

## 2024-12-03 NOTE — ANESTHESIA PROCEDURE NOTES
Airway  Date/Time: 12/3/2024 12:32 PM  Urgency: elective      Staffing  Performed: ELEN   Authorized by: Zenon Cabrales DO    Performed by: ELEN Garsia  Patient location during procedure: OR    Indications and Patient Condition  Indications for airway management: anesthesia  Spontaneous ventilation: present  Sedation level: deep  Preoxygenated: yes  Mask difficulty assessment: 1 - vent by mask    Final Airway Details  Final airway type: endotracheal airway      Successful airway: ETT  Cuffed: yes   Successful intubation technique: direct laryngoscopy  Blade size: #3  ETT size (mm): 7.0  Cormack-Lehane Classification: grade I - full view of glottis  Measured from: lips  ETT to lips (cm): 22  Number of attempts at approach: 1  Ventilation between attempts: BVM

## 2024-12-03 NOTE — H&P (VIEW-ONLY)
Reason For Consult  Appendicitis    History Of Present Illness  Mary Alice Aragon is a 44 y.o. female with a history of sickle cell disease who had onset of nausea and vomiting at 4 AM today.  She then developed pain everywhere including her extremities, back and abdomen.  She feels as if she is having a sickle cell crisis.  She was admitted to [facility] on 2024 with a sickle cell crisis and transferred to [facility] 3 days later for further management.  She was discharged home on 2024 and felt better until this morning.  CT scan was concerning for possible early appendicitis and surgery consultation was requested.    Past medical history:  Sickle cell disease.  Her hematologist is Dr. Gio Saleem.  She takes routine methadone and Dilaudid as needed.  She additionally receives ketamine infusions.  Transfusion related iron overload.  Nocturnal hypoxia  Cerebral aneurysm  Ovarian cysts  Laparoscopic cholecystectomy    Past medical History  She has a past medical history of Sickle cell anemia (Multi) and Unspecified astigmatism, bilateral (2020).    Surgical History  She has a past surgical history that includes Cholecystectomy (2016);  section, classic (2016); Other surgical history (2016); Tubal ligation (04/10/2017); MR angio head w and wo IV contrast (4/15/2013); MR angio neck wo IV contrast (2013); MR angio head w and wo IV contrast (2013); MR angio head wo IV contrast (2014); MR angio neck wo IV contrast (2014); MR angio head wo IV contrast (2016); MR angio head wo IV contrast (2019); and MR angio head wo IV contrast (2017).     Social History  She reports that she has never smoked. She has never used smokeless tobacco. She reports that she does not currently use alcohol. She reports that she does not currently use drugs.    Family History  Family History   Problem Relation Name Age of Onset    Sickle cell trait  Sister      Multiple sclerosis Brother      Breast cancer Maternal Grandmother      Breast cancer Other paternal cousin     Sickle cell trait Child          Allergies  Patient has no known allergies.    Review of Systems  As above     Physical Exam  Constitutional: Well-developed, well-nourished, alert and oriented, appeared comfortable, but complains of 10/10 pain everywhere.  Skin: Warm and dry, no lesions, no rashes, no jaundice  HEENT: Normocephalic, atraumatic, EOMI, no scleral icterus, eyes have no redness or swelling or discharge, external inspection of ears and nose is normal, mucous membranes moist  Neck: Soft, nontender, no mass or adenopathy  Cardiac: Regular rate and rhythm, no murmur  Chest: Patent airway, clear to auscultation, normal breath sounds with good chest expansion, no wheezes or rales or rhonchi noted, thorax symmetric  Abdomen: Nondistended, laparoscopy scars, few bowel sounds, soft, moderate generalized tenderness, no mass  Rectal: Not performed  Extremities: Generalized moderate tenderness of all extremities  Lymphatic: No cervical adenopathy  Musculoskeletal: Range of motion intact, no joint swelling  Neurological: Alert and oriented x3, intact sensory and motor function, no obvious focal neurologic abnormalities  Psychological: Appropriate mood and behavior     Last Recorded Vitals  Blood pressure 136/70, pulse 100, temperature 36.6 °C (97.9 °F), temperature source Temporal, resp. rate 18, weight 68 kg (150 lb), SpO2 100%.    Relevant Results  Labs: WBC 14.1, hemoglobin 9.6, platelet 438  Electrolytes, BUN and creatinine normal  Beta-hCG negative    I reviewed the CT abdomen/pelvis report and images.  IMPRESSION:  Constipation. Appendix is slightly prominent with minimal  periappendiceal inflammation. Early appendicitis should be considered.    Degree of stool limiting assessment of colonic mucosa.    Features of sickle cell disease.     Assessment/Plan   44-year-old female with history  of sickle cell disease who has nausea/vomiting and pain everywhere.  She has generalized abdominal tenderness as well as back and extremity tenderness.  She feels as if she is having a sickle cell crisis.  She was discharged from [facility] 5 days ago following treatment for sickle cell crisis.  CT scan today is concerning for early appendicitis.  I reviewed a CT scan from March 10, 2022 at which time the appendix appeared normal.  She was given Zosyn in the emergency room.  I discussed the patient with Dr. Juan Maldonado from hospitalist service and Dr. Italo Ramirez from anesthesia.  We all feel that the patient would be best served by transfer to [facility] where her hematologist is located for further management of both her appendicitis and sickle cell disease.        Antonio Corral MD

## 2024-12-03 NOTE — OP NOTE
Appendectomy Laparoscopy, possible open Operative Note     Date: 12/3/2024  OR Location: [facility] OR    Name: Mary Alice Aragon, : 1980, Age: 44 y.o., MRN: [id number], Sex: female    Diagnosis  Pre-op Diagnosis      * Acute appendicitis, unspecified acute appendicitis type [K35.80] Post-op Diagnosis     * Acute appendicitis, unspecified acute appendicitis type [K35.80]     Procedures  Appendectomy Laparoscopy, possible open  95617 - [place] LAPAROSCOPIC APPENDECTOMY  Lysis of adhesions    Surgeons      * Maria Isabel Waters - Primary    Resident/Fellow/Other Assistant:  Surgeons and Role:     * Hebert Bolden MD - Resident - Assisting     * Tanya Perez MD - Resident - Assisting    Staff:   Relief Circulator: Sapphire Arzate Person: Dora  Circulator: Cinthia Lang Scrub: Cinthia    Anesthesia Staff: Anesthesiologist: Salma Vargas MD; Zenon Cabrales DO  C-AA: ELEN Garsia    Procedure Summary  Anesthesia: General  ASA: IV  Estimated Blood Loss: 10mL  Intra-op Medications:   Administrations occurring from 1115 to 1350 on 24:   Medication Name Total Dose   dexAMETHasone (Decadron) injection 4 mg/mL 8 mg   dexmedeTOMIDine (Precedex) bolus from bag 24 mcg   fentaNYL (Sublimaze) injection 50 mcg/mL 100 mcg   HYDROmorphone (Dilaudid) injection 1 mg/mL 3 mg   ketamine injection 50 mg/ 5 mL (10 mg/mL) 40 mg   lidocaine (cardiac) injection 2% prefilled syringe 100 mg   methadone (Dolophine) injection 20 mg   midazolam PF (Versed) injection 1 mg/mL 2 mg   phenylephrine 40 mcg/mL syringe 10 mL 160 mcg   piperacillin-tazobactam (Zosyn) 3.375 g in dextrose (iso) IV 50 mL 3.375 g   propofol (Diprivan) injection 10 mg/mL 150 mg   rocuronium (ZeMuron) 50 mg/5 mL injection 70 mg   ropivacaine PF (Naropin) 0.5 % 25 mL   ropivacaine (PF) in NS cmpd (Naropin) 1000 mg/500 mL (0.2%) infusion 14.7 mL   NaCl 0.9 % bolus Cannot be calculated              Anesthesia Record               Intraprocedure I/O Totals           Intake    Dexmedetomidine 0.00 mL    The total shown is the total volume documented since Anesthesia Start was filed.    Total Intake 0 mL          Specimen:   ID Type Source Tests Collected by Time   1 : APPENDIX Tissue APPENDIX SURGICAL PATHOLOGY EXAM Maria Isabel Waters MD 12/3/2024 1417                 Drains and/or Catheters: * None in log *    Tourniquet Times:         Implants:     Findings: Acute non-perforated appendicitis    Indications: Mary Alice Aragon is an 44 y.o. female who is having surgery for Acute appendicitis, unspecified acute appendicitis type [K35.80].     The patient was seen in the preoperative area. The risks, benefits, complications, treatment options, non-operative alternatives, expected recovery and outcomes were discussed with the patient. The possibilities of reaction to medication, pulmonary aspiration, injury to surrounding structures, bleeding, recurrent infection, the need for additional procedures, failure to diagnose a condition, and creating a complication requiring transfusion or operation were discussed with the patient. The patient concurred with the proposed plan, giving informed consent.  The site of surgery was properly noted/marked if necessary per policy. The patient has been actively warmed in preoperative area. Preoperative antibiotics have been ordered and given within 1 hours of incision. Venous thrombosis prophylaxis have been ordered including bilateral sequential compression devices    Procedure Details: The patient was brought to the operating room and placed in supine position.  The patient was prepped and draped in the usual sterile fashion.  A 1 cm supra umbilical incision was made using the Vladez technique.  There was extensive adhesions with omentum stuck to the abdominal wall.  Extensive lysis of adhesions was performed using blunt dissection.  We then placed a 5 mm ports in the left lateral quadrants and another 5 mm ports in the suprapubic region.   More lysis of adhesions was performed until we identified the cecum.  The cecum was medialized and we identified the appendix.  The appendix was skeletonized from the mesoappendix.  We used the [place] LigaSure to ligate the mesoappendix.  We then switched the 10 scope to a 530 scope and used a 45 Endo JUAN F blue load stapler to resect the appendix at the base.  There was no active bleeding or expanding hematoma at the appendiceal stump or mesoappendix stump.  We noted that the uterus was adhesed to the abdominal wall.  The midline incision was closed with 0 Vicryl on UR 6 and the 5 mm ports were closed with 4 Monocryl.  The patient tolerated the procedure well and was extubated.  The patient was taken to the PACU.  Complications:  None; patient tolerated the procedure well.    Disposition: PACU - hemodynamically stable.  Condition: stable             Attending Attestation: I was present and scrubbed for the entire procedure.    Maria Isabel Waters  Phone Number: [phone]

## 2024-12-03 NOTE — ANESTHESIA POSTPROCEDURE EVALUATION
Patient: Mary Alice Aragon    Procedure Summary       Date: 12/03/24 Room / Location: [facility] OR 11 / Virtual [facility] OR    Anesthesia Start: 1220 Anesthesia Stop: 1454    Procedure: Appendectomy Laparoscopy (Abdomen) Diagnosis:       Acute appendicitis, unspecified acute appendicitis type      (Acute appendicitis, unspecified acute appendicitis type [K35.80])    Surgeons: Maria Isabel Waters MD Responsible Provider: Salma Vargas MD    Anesthesia Type: general ASA Status: 4            Anesthesia Type: general    Vitals Value Taken Time   BP 97/52 12/03/24 1445   Temp 36.7 °C (98.1 °F) 12/03/24 1445   Pulse 82 12/03/24 1452   Resp 15 12/03/24 1452   SpO2 100 % 12/03/24 1452   Vitals shown include unfiled device data.    Anesthesia Post Evaluation    Patient location during evaluation: PACU  Patient participation: complete - patient participated  Level of consciousness: responsive to verbal stimuli and sleepy but conscious  Pain score: 0  Pain management: adequate  Airway patency: patent  Cardiovascular status: acceptable  Respiratory status: acceptable and face mask  Hydration status: acceptable  Postoperative Nausea and Vomiting: none        There were no known notable events for this encounter.

## 2024-12-03 NOTE — CONSULTS
Reason For Consult  Appendicitis    History Of Present Illness  Mary Alice Aragon is a 44 y.o. female with a history of sickle cell disease who had onset of nausea and vomiting at 4 AM today.  She then developed pain everywhere including her extremities, back and abdomen.  She feels as if she is having a sickle cell crisis.  She was admitted to [facility] on 2024 with a sickle cell crisis and transferred to [facility] 3 days later for further management.  She was discharged home on 2024 and felt better until this morning.  CT scan was concerning for possible early appendicitis and surgery consultation was requested.    Past medical history:  Sickle cell disease.  Her hematologist is Dr. Gio Saleem.  She takes routine methadone and Dilaudid as needed.  She additionally receives ketamine infusions.  Transfusion related iron overload.  Nocturnal hypoxia  Cerebral aneurysm  Ovarian cysts  Laparoscopic cholecystectomy    Past medical History  She has a past medical history of Sickle cell anemia (Multi) and Unspecified astigmatism, bilateral (2020).    Surgical History  She has a past surgical history that includes Cholecystectomy (2016);  section, classic (2016); Other surgical history (2016); Tubal ligation (04/10/2017); MR angio head w and wo IV contrast (4/15/2013); MR angio neck wo IV contrast (2013); MR angio head w and wo IV contrast (2013); MR angio head wo IV contrast (2014); MR angio neck wo IV contrast (2014); MR angio head wo IV contrast (2016); MR angio head wo IV contrast (2019); and MR angio head wo IV contrast (2017).     Social History  She reports that she has never smoked. She has never used smokeless tobacco. She reports that she does not currently use alcohol. She reports that she does not currently use drugs.    Family History  Family History   Problem Relation Name Age of Onset    Sickle cell trait  Sister      Multiple sclerosis Brother      Breast cancer Maternal Grandmother      Breast cancer Other paternal cousin     Sickle cell trait Child          Allergies  Patient has no known allergies.    Review of Systems  As above     Physical Exam  Constitutional: Well-developed, well-nourished, alert and oriented, appeared comfortable, but complains of 10/10 pain everywhere.  Skin: Warm and dry, no lesions, no rashes, no jaundice  HEENT: Normocephalic, atraumatic, EOMI, no scleral icterus, eyes have no redness or swelling or discharge, external inspection of ears and nose is normal, mucous membranes moist  Neck: Soft, nontender, no mass or adenopathy  Cardiac: Regular rate and rhythm, no murmur  Chest: Patent airway, clear to auscultation, normal breath sounds with good chest expansion, no wheezes or rales or rhonchi noted, thorax symmetric  Abdomen: Nondistended, laparoscopy scars, few bowel sounds, soft, moderate generalized tenderness, no mass  Rectal: Not performed  Extremities: Generalized moderate tenderness of all extremities  Lymphatic: No cervical adenopathy  Musculoskeletal: Range of motion intact, no joint swelling  Neurological: Alert and oriented x3, intact sensory and motor function, no obvious focal neurologic abnormalities  Psychological: Appropriate mood and behavior     Last Recorded Vitals  Blood pressure 136/70, pulse 100, temperature 36.6 °C (97.9 °F), temperature source Temporal, resp. rate 18, weight 68 kg (150 lb), SpO2 100%.    Relevant Results  Labs: WBC 14.1, hemoglobin 9.6, platelet 438  Electrolytes, BUN and creatinine normal  Beta-hCG negative    I reviewed the CT abdomen/pelvis report and images.  IMPRESSION:  Constipation. Appendix is slightly prominent with minimal  periappendiceal inflammation. Early appendicitis should be considered.    Degree of stool limiting assessment of colonic mucosa.    Features of sickle cell disease.     Assessment/Plan   44-year-old female with history  of sickle cell disease who has nausea/vomiting and pain everywhere.  She has generalized abdominal tenderness as well as back and extremity tenderness.  She feels as if she is having a sickle cell crisis.  She was discharged from [facility] 5 days ago following treatment for sickle cell crisis.  CT scan today is concerning for early appendicitis.  I reviewed a CT scan from March 10, 2022 at which time the appendix appeared normal.  She was given Zosyn in the emergency room.  I discussed the patient with Dr. Juan Maldonado from hospitalist service and Dr. Italo Ramirez from anesthesia.  We all feel that the patient would be best served by transfer to [facility] where her hematologist is located for further management of both her appendicitis and sickle cell disease.        Antonio Corral MD

## 2024-12-04 LAB
ALBUMIN SERPL BCP-MCNC: 2.6 G/DL (ref 3.4–5)
ALP SERPL-CCNC: 42 U/L (ref 33–110)
ALT SERPL W P-5'-P-CCNC: 14 U/L (ref 7–45)
ANION GAP SERPL CALC-SCNC: 11 MMOL/L (ref 10–20)
AST SERPL W P-5'-P-CCNC: 18 U/L (ref 9–39)
BASOPHILS # BLD AUTO: 0.05 X10*3/UL (ref 0–0.1)
BASOPHILS NFR BLD AUTO: 0.3 %
BILIRUB DIRECT SERPL-MCNC: 0.5 MG/DL (ref 0–0.3)
BILIRUB SERPL-MCNC: 1.8 MG/DL (ref 0–1.2)
BUN SERPL-MCNC: 16 MG/DL (ref 6–23)
CALCIUM SERPL-MCNC: 9.2 MG/DL (ref 8.6–10.6)
CHLORIDE SERPL-SCNC: 105 MMOL/L (ref 98–107)
CO2 SERPL-SCNC: 28 MMOL/L (ref 21–32)
CREAT SERPL-MCNC: 1.01 MG/DL (ref 0.5–1.05)
EGFRCR SERPLBLD CKD-EPI 2021: 71 ML/MIN/1.73M*2
EOSINOPHIL # BLD AUTO: 0.02 X10*3/UL (ref 0–0.7)
EOSINOPHIL NFR BLD AUTO: 0.1 %
ERYTHROCYTE [DISTWIDTH] IN BLOOD BY AUTOMATED COUNT: 14.6 % (ref 11.5–14.5)
ERYTHROCYTE [DISTWIDTH] IN BLOOD BY AUTOMATED COUNT: 15.4 % (ref 11.5–14.5)
GLUCOSE SERPL-MCNC: 81 MG/DL (ref 74–99)
HAPTOGLOB SERPL NEPH-MCNC: <30 MG/DL (ref 30–200)
HCT VFR BLD AUTO: 18.4 % (ref 36–46)
HCT VFR BLD AUTO: 23 % (ref 36–46)
HGB BLD-MCNC: 6.6 G/DL (ref 12–16)
HGB BLD-MCNC: 7.8 G/DL (ref 12–16)
HGB RETIC QN: 32 PG (ref 28–38)
IMM GRANULOCYTES # BLD AUTO: 0.07 X10*3/UL (ref 0–0.7)
IMM GRANULOCYTES NFR BLD AUTO: 0.4 % (ref 0–0.9)
IMMATURE RETIC FRACTION: 4.4 %
LDH SERPL L TO P-CCNC: 169 U/L (ref 84–246)
LYMPHOCYTES # BLD AUTO: 4.99 X10*3/UL (ref 1.2–4.8)
LYMPHOCYTES NFR BLD AUTO: 25.9 %
MAGNESIUM SERPL-MCNC: 1.35 MG/DL (ref 1.6–2.4)
MCH RBC QN AUTO: 29.7 PG (ref 26–34)
MCH RBC QN AUTO: 31.4 PG (ref 26–34)
MCHC RBC AUTO-ENTMCNC: 33.9 G/DL (ref 32–36)
MCHC RBC AUTO-ENTMCNC: 35.9 G/DL (ref 32–36)
MCV RBC AUTO: 88 FL (ref 80–100)
MCV RBC AUTO: 88 FL (ref 80–100)
MONOCYTES # BLD AUTO: 1.28 X10*3/UL (ref 0.1–1)
MONOCYTES NFR BLD AUTO: 6.6 %
NEUTROPHILS # BLD AUTO: 12.88 X10*3/UL (ref 1.2–7.7)
NEUTROPHILS NFR BLD AUTO: 66.7 %
NRBC BLD-RTO: 0 /100 WBCS (ref 0–0)
NRBC BLD-RTO: 0 /100 WBCS (ref 0–0)
PHOSPHATE SERPL-MCNC: 3.7 MG/DL (ref 2.5–4.9)
PLATELET # BLD AUTO: 343 X10*3/UL (ref 150–450)
PLATELET # BLD AUTO: 352 X10*3/UL (ref 150–450)
POTASSIUM SERPL-SCNC: 4.2 MMOL/L (ref 3.5–5.3)
PROT SERPL-MCNC: 4.3 G/DL (ref 6.4–8.2)
RBC # BLD AUTO: 2.1 X10*6/UL (ref 4–5.2)
RBC # BLD AUTO: 2.63 X10*6/UL (ref 4–5.2)
RETICS #: 0.01 X10*6/UL (ref 0.02–0.08)
RETICS/RBC NFR AUTO: 0.6 % (ref 0.5–2)
SODIUM SERPL-SCNC: 140 MMOL/L (ref 136–145)
WBC # BLD AUTO: 16.7 X10*3/UL (ref 4.4–11.3)
WBC # BLD AUTO: 19.3 X10*3/UL (ref 4.4–11.3)

## 2024-12-04 PROCEDURE — 85025 COMPLETE CBC W/AUTO DIFF WBC: CPT

## 2024-12-04 PROCEDURE — 80053 COMPREHEN METABOLIC PANEL: CPT

## 2024-12-04 PROCEDURE — 1170000001 HC PRIVATE ONCOLOGY ROOM DAILY

## 2024-12-04 PROCEDURE — 82248 BILIRUBIN DIRECT: CPT

## 2024-12-04 PROCEDURE — 99223 1ST HOSP IP/OBS HIGH 75: CPT

## 2024-12-04 PROCEDURE — 36430 TRANSFUSION BLD/BLD COMPNT: CPT | Performed by: REGISTERED NURSE

## 2024-12-04 PROCEDURE — 2500000001 HC RX 250 WO HCPCS SELF ADMINISTERED DRUGS (ALT 637 FOR MEDICARE OP)

## 2024-12-04 PROCEDURE — S4993 CONTRACEPTIVE PILLS FOR BC: HCPCS

## 2024-12-04 PROCEDURE — 83735 ASSAY OF MAGNESIUM: CPT

## 2024-12-04 PROCEDURE — 2500000005 HC RX 250 GENERAL PHARMACY W/O HCPCS

## 2024-12-04 PROCEDURE — 85045 AUTOMATED RETICULOCYTE COUNT: CPT

## 2024-12-04 PROCEDURE — 83010 ASSAY OF HAPTOGLOBIN QUANT: CPT

## 2024-12-04 PROCEDURE — 2500000004 HC RX 250 GENERAL PHARMACY W/ HCPCS (ALT 636 FOR OP/ED)

## 2024-12-04 PROCEDURE — 84100 ASSAY OF PHOSPHORUS: CPT

## 2024-12-04 PROCEDURE — 83615 LACTATE (LD) (LDH) ENZYME: CPT

## 2024-12-04 PROCEDURE — 2500000002 HC RX 250 W HCPCS SELF ADMINISTERED DRUGS (ALT 637 FOR MEDICARE OP, ALT 636 FOR OP/ED)

## 2024-12-04 PROCEDURE — P9016 RBC LEUKOCYTES REDUCED: HCPCS

## 2024-12-04 PROCEDURE — S0109 METHADONE ORAL 5MG: HCPCS

## 2024-12-04 PROCEDURE — 2500000001 HC RX 250 WO HCPCS SELF ADMINISTERED DRUGS (ALT 637 FOR MEDICARE OP): Performed by: NURSE PRACTITIONER

## 2024-12-04 PROCEDURE — 2500000004 HC RX 250 GENERAL PHARMACY W/ HCPCS (ALT 636 FOR OP/ED): Performed by: NURSE PRACTITIONER

## 2024-12-04 PROCEDURE — 85027 COMPLETE CBC AUTOMATED: CPT

## 2024-12-04 PROCEDURE — 99231 SBSQ HOSP IP/OBS SF/LOW 25: CPT | Performed by: STUDENT IN AN ORGANIZED HEALTH CARE EDUCATION/TRAINING PROGRAM

## 2024-12-04 PROCEDURE — 80048 BASIC METABOLIC PNL TOTAL CA: CPT

## 2024-12-04 RX ORDER — TRAZODONE HYDROCHLORIDE 50 MG/1
100 TABLET ORAL NIGHTLY
Status: DISPENSED | OUTPATIENT
Start: 2024-12-04

## 2024-12-04 RX ORDER — ACETAMINOPHEN 325 MG/1
650 TABLET ORAL EVERY 6 HOURS PRN
Status: CANCELLED | OUTPATIENT
Start: 2024-12-04

## 2024-12-04 RX ORDER — HYDROMORPHONE HYDROCHLORIDE 1 MG/ML
2 INJECTION, SOLUTION INTRAMUSCULAR; INTRAVENOUS; SUBCUTANEOUS
Status: DISCONTINUED | OUTPATIENT
Start: 2024-12-04 | End: 2024-12-04

## 2024-12-04 RX ORDER — MAGNESIUM SULFATE HEPTAHYDRATE 40 MG/ML
4 INJECTION, SOLUTION INTRAVENOUS ONCE
Status: COMPLETED | OUTPATIENT
Start: 2024-12-04 | End: 2024-12-04

## 2024-12-04 RX ORDER — ACETAMINOPHEN 325 MG/1
650 TABLET ORAL EVERY 6 HOURS PRN
Status: DISCONTINUED | OUTPATIENT
Start: 2024-12-04 | End: 2024-12-04

## 2024-12-04 RX ORDER — ACETAMINOPHEN 325 MG/1
650 TABLET ORAL EVERY 6 HOURS
Status: DISCONTINUED | OUTPATIENT
Start: 2024-12-04 | End: 2024-12-08

## 2024-12-04 RX ORDER — HYDROMORPHONE HYDROCHLORIDE 4 MG/1
4 TABLET ORAL EVERY 4 HOURS PRN
Status: DISCONTINUED | OUTPATIENT
Start: 2024-12-04 | End: 2024-12-04

## 2024-12-04 RX ORDER — SODIUM CHLORIDE, SODIUM LACTATE, POTASSIUM CHLORIDE, CALCIUM CHLORIDE 600; 310; 30; 20 MG/100ML; MG/100ML; MG/100ML; MG/100ML
108 INJECTION, SOLUTION INTRAVENOUS CONTINUOUS
Status: ACTIVE | OUTPATIENT
Start: 2024-12-04 | End: 2024-12-05

## 2024-12-04 RX ORDER — HYDROMORPHONE HYDROCHLORIDE 1 MG/ML
2 INJECTION, SOLUTION INTRAMUSCULAR; INTRAVENOUS; SUBCUTANEOUS EVERY 2 HOUR PRN
Status: DISCONTINUED | OUTPATIENT
Start: 2024-12-04 | End: 2024-12-05

## 2024-12-04 RX ORDER — CALCIUM CARBONATE 200(500)MG
500 TABLET,CHEWABLE ORAL 4 TIMES DAILY PRN
Status: DISPENSED | OUTPATIENT
Start: 2024-12-04

## 2024-12-04 RX ORDER — METHOCARBAMOL 500 MG/1
500 TABLET, FILM COATED ORAL EVERY 6 HOURS SCHEDULED
Status: DISPENSED | OUTPATIENT
Start: 2024-12-04

## 2024-12-04 RX ORDER — PANTOPRAZOLE SODIUM 40 MG/1
40 TABLET, DELAYED RELEASE ORAL
Status: DISPENSED | OUTPATIENT
Start: 2024-12-04

## 2024-12-04 RX ORDER — METHOCARBAMOL 500 MG/1
500 TABLET, FILM COATED ORAL EVERY 6 HOURS SCHEDULED
Status: DISCONTINUED | OUTPATIENT
Start: 2024-12-04 | End: 2024-12-04

## 2024-12-04 ASSESSMENT — COGNITIVE AND FUNCTIONAL STATUS - GENERAL
DAILY ACTIVITIY SCORE: 24
MOBILITY SCORE: 24

## 2024-12-04 ASSESSMENT — PAIN SCALES - GENERAL
PAINLEVEL_OUTOF10: 9
PAINLEVEL_OUTOF10: 10 - WORST POSSIBLE PAIN
PAINLEVEL_OUTOF10: 9
PAINLEVEL_OUTOF10: 10 - WORST POSSIBLE PAIN
PAINLEVEL_OUTOF10: 9

## 2024-12-04 ASSESSMENT — PAIN - FUNCTIONAL ASSESSMENT
PAIN_FUNCTIONAL_ASSESSMENT: 0-10
PAIN_FUNCTIONAL_ASSESSMENT: 0-10

## 2024-12-04 NOTE — CARE PLAN
44F with HbSS on hydroxyurea 500 (S/T/Th/S)-1000 (M/W/F) with nocturnal hypoxia on 2-3L as needed, c/b transfusion-related iron overload on Deferoxamine, provoked PE 2009 (postpartum) treated with AC for 6 months, cerebral aneurysm, NICHOL/MDD, presenting with N/V then developed the pain everywhere including her extremities, back, and abdomen like her sickle cell crisis. CT showing appendicitis s/p appendectomy 12/3.  Hematology was consulted for her sickle cell disease.    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 The patient was discussed with Dr Reed. We'll continue to follow.     Alfredo Del Rosario MD, PhD  Hem-Onc Fellow  [id number] or Epic

## 2024-12-04 NOTE — CARE PLAN
The patient's goals for the shift include      The clinical goals for the shift include  remain HDS    Over the shift, the patient remained HDS.

## 2024-12-04 NOTE — CARE PLAN
The patient's goals for the shift include      The clinical goals for the shift include remain HDS by end of shift    Barriers uncontrolled pain. Interventions include monitoring pain and prn pain medication    Problem: Pain  Goal: Takes deep breaths with improved pain control throughout the shift  Outcome: Not Progressing  Goal: Turns in bed with improved pain control throughout the shift  Outcome: Not Progressing  Goal: Walks with improved pain control throughout the shift  Outcome: Not Progressing  Goal: Performs ADL's with improved pain control throughout shift  Outcome: Not Progressing  Goal: Participates in PT with improved pain control throughout the shift  Outcome: Not Progressing  Goal: Free from opioid side effects throughout the shift  Outcome: Not Progressing  Goal: Free from acute confusion related to pain meds throughout the shift  Outcome: Not Progressing

## 2024-12-04 NOTE — NURSING NOTE
Rapid Response Nurse Note: GAVIN 6    Pager time: [phone]  Arrival time: 1350  Event end time: 1404  Location: S5  [x] Triage by secure messaging    Rapid response initiated by:  [] Rapid response RN [] Family [] Nursing Supervisor [] Physician   [x] RADAR auto page [] Sepsis auto-page [] RN [] RT   [] NP/PA [] Other:     Primary reason for call:   [] BAT [] New CPAP/BiPAP [] Bleeding [] Change in mental status   [] Chest pain [] Code blue [] FiO2 >/= 50% [] HR </= 40 bpm   [] HR >/= 130 bpm [] Hyperglycemia [] Hypoglycemia [x] RADAR    [] RR </= 8 bpm [] RR >/= 30 bpm [] SBP </= 90 mmHg [] SpO2 < 90%   [] Seizure [] Sepsis [] Shortness of breath  [] Staff concern: see comments     Interventions:  [x] None [] ABG/VBG [] Assist w/ICU transfer [] BAT paged    [] Bag mask [] Blood [] Cardioversion [] Code Blue   [] Code blue for intubation [] Code status changed [] Chest x-ray [] EKG   [] IV fluid/bolus [] KUB x-ray [] Labs/cultures [] Medication   [] Nebulizer treatment [] NIPPV (CPAP/BiPAP) [] Oxygen [] Oral airway   [] Peripheral IV [] Palliative care consult [] CT/MRI [] Sepsis protocol    [] Suctioned [] Other:     Outcome:  [] Coded and  [] Code blue for intubation [] Coded and transferred to ICU []  on division   [x] Remained on division (no change) [] Remained on division + additional monitoring [] Remained in ED [] Transferred to ED   [] Transferred to ICU [] Transferred to inpatient status [] Transferred for interventions (procedure) [] Transferred to ICU stepdown    [] Transferred to surgery [] Transferred to telemetry [] Sepsis protocol [] STEMI protocol   [] Stroke protocol       Additional Comments:      Notified nurse of GAVIN page: 36.5 [phone] 16 141/76 91%.  No rapid response assistance needed at this time; encouraged to call a rapid response as needed.

## 2024-12-04 NOTE — SIGNIFICANT EVENT
S:    POD 0 from laparoscopic appendectomy. Patient states she is in pain from both her surgery and her ongoing sickle cell crisis and would like access to some of her home meds such as lyrica to augment her pain control.     O:   Vital signs are stable, normotensive, afebrile, no new or worsening oxygen requirement, not tachycardic  Visit Vitals  /79 (BP Location: Left arm, Patient Position: Lying)   Pulse 82   Temp 36.3 °C (97.3 °F) (Temporal)   Resp 16        Constitutional: no acute distress  Skin: warm and dry overall   Neuro: A/O x4, no gross deficits   HEENT: Atraumatic, no scleral icterus  Cardiac: RRR  Pulmonary: Unlabored respirations on 2L nasal canula, baseline.   Abdomen: Non distended, non tender  GI: Voiding freely  Surgical Site: Dermabond-closed incisions c/d/I and well-approximated.     A/P:  Overall, patient is doing well postoperatively with no acute concerns.  Will continue to optimize pain control as needed.  Will obtain med rec to clarify patient's home doses of pain meds.   Will continue to monitor clinical exam, vitals, I&O's, and labs when available.  Will follow up on the patient in the a.m. or sooner as needed.

## 2024-12-04 NOTE — CARE PLAN
The patient's goals for the shift include      The clinical goals for the shift include patient will remain safe      Problem: Pain  Goal: Takes deep breaths with improved pain control throughout the shift  Outcome: Progressing  Goal: Turns in bed with improved pain control throughout the shift  Outcome: Progressing  Goal: Walks with improved pain control throughout the shift  Outcome: Progressing  Goal: Performs ADL's with improved pain control throughout shift  Outcome: Progressing  Goal: Participates in PT with improved pain control throughout the shift  Outcome: Progressing  Goal: Free from opioid side effects throughout the shift  Outcome: Progressing  Goal: Free from acute confusion related to pain meds throughout the shift  Outcome: Progressing

## 2024-12-04 NOTE — ED PROVIDER NOTES
CC: Vomiting and Abdominal Pain     History provided by: Patient  Limitations to History: None    HPI:    Patient is a 44-year-old female with a PMH of sickle cell disease who presents as a transfer from [facility] for chief complaint of concerns for acute appendicitis.  Patient reports that she awoken this morning and had severe right lower quadrant abdominal pain with associated nausea and vomiting.  The vomiting was nonbloody nonbilious.  The patient presented to [facility] where a CT abdomen pelvis was remarkable for possible early appendicitis.  She was transferred to [facility] for further management.  Additionally the patient is having bilateral upper and lower extremity pain which is consistent with her usual sites of sickle cell pain.  The patient denies fevers, chills, shortness of breath, or chest pain.    External Records Reviewed: Previous ED records, inpatient records, and outpatient records  ???????????????????????????????????????????????????????????????  Triage Vitals:  T 37 °C (98.6 °F)  HR 93  /73  RR 18  O2 98 % None (Room air)    Physical Exam  Constitutional:       General: She is awake.      Appearance: She is not ill-appearing, toxic-appearing or diaphoretic.      Comments: Appears in distress due to pain   Cardiovascular:      Rate and Rhythm: Normal rate and regular rhythm.      Pulses:           Radial pulses are 2+ on the right side and 2+ on the left side.        Dorsalis pedis pulses are 2+ on the right side and 2+ on the left side.      Heart sounds: Normal heart sounds, S1 normal and S2 normal. Heart sounds not distant. No murmur heard.     No friction rub. No gallop.   Pulmonary:      Effort: Pulmonary effort is normal. No respiratory distress.      Breath sounds: Normal breath sounds.      Comments: Lungs are clear to auscultation with evidence of wheezing, crackles, rhonchi  Abdominal:      General: Abdomen is flat. There is no distension.      Palpations: Abdomen is soft.       Tenderness: There is abdominal tenderness in the right lower quadrant. There is guarding and rebound.   Musculoskeletal:      Right lower leg: No edema.      Left lower leg: No edema.   Skin:     General: Skin is warm and dry.      Capillary Refill: Capillary refill takes less than 2 seconds.   Neurological:      Mental Status: She is alert.   Psychiatric:         Behavior: Behavior is cooperative.        ???????????????????????????????????????????????????????????????  ED Course/Treatment/Medical Decision Making    Differential diagnoses considered include but ar not limited to: Appendicitis, sickle cell pain crisis    Social Determinants Limiting Care:  None identified         ED Course:  Diagnoses as of 12/04/24 1701   Acute appendicitis, unspecified acute appendicitis type   Sickle cell disease with crisis and other complication       MDM:    Patient is a 44-year-old female with above PMH who presents to the emergency department for chief complaint of concerns for appendicitis.  Upon patient arrival vital signs remarkable for hypertension and she appears in acute distress due to pain.  The patient does have right lower quadrant tenderness with guarding and rebound tenderness.  Acute care surgery was consulted upon the patient's arrival.  She will be given Zofran for nausea and Dilaudid for pain.  She will be given 1 L of lactated Ringer's.  ACS evaluate the patient and she will be taken to the operating room for management.    Impression:  Acute appendicitis  Sickle cell disease    Disposition:  Sent to operating room    Pacheco King DO   Emergency Medicine, PGY-2      Procedures ? SmartLinks last updated 12/4/2024 5:01 PM          Pacheco King,   Resident  12/04/24 1706

## 2024-12-04 NOTE — PROGRESS NOTES
Postop Pain HPI -   Palliative: relieved with IV analgesics and regional local anesthetics  Provocative: movement  Quality:  burning and aching  Radiation:  none  Severity:  9/10  Timing: constant    24-HOUR OPIOID CONSUMPTION:  Tylenol x1, methadone x1, dilaudid 1mg PO x3 4mg PO x1, oxy 5 x1, lyrica x1, tizanadine x1    Scheduled medications  acetaminophen, 650 mg, oral, q6h  enoxaparin, 40 mg, subcutaneous, q24h AMARA  FLUoxetine, 10 mg, oral, Daily  lidocaine, 1 patch, transdermal, Daily  melatonin, 5 mg, oral, Nightly  [Held by provider] meloxicam, 15 mg, oral, Daily  methadone, 10 mg, oral, q12h  norethindrone, 5 mg, oral, Daily  pantoprazole, 40 mg, oral, Daily before breakfast  traZODone, 100 mg, oral, Nightly      Continuous medications  lactated Ringer's, 108 mL/hr, Last Rate: 108 mL/hr (12/03/24 2148)  ropivacaine (PF) in NS cmpd, 14 mL/hr, Last Rate: 14 mL/hr (12/03/24 1247)      PRN medications  PRN medications: calcium carbonate, diphenhydrAMINE, HYDROmorphone, HYDROmorphone, hydrOXYzine HCL, ibuprofen, naloxone, oxyCODONE, oxyCODONE, oxygen, pregabalin, prochlorperazine, tiZANidine     Physical Exam:  Constitutional:  no distress, alert and cooperative  Eyes: clear sclera  Head/Neck: No apparent injury, trachea midline  Respiratory/Thorax: Patent airways, thorax symmetric, breathing comfortably  Cardiovascular: no pitting edema  Gastrointestinal: Nondistended  Musculoskeletal: ROM intact  Extremities: no clubbing  Neurological: alert, alexander x4  Psychological: Appropriate affect    Results for orders placed or performed during the hospital encounter of 12/03/24 (from the past 24 hours)   Type and Screen   Result Value Ref Range    ABO TYPE B     Rh TYPE POS     ANTIBODY SCREEN NEG      *Note: Due to a large number of results and/or encounters for the requested time period, some results have not been displayed. A complete set of results can be found in Results Review.       Mary Alice Aragon is a 44 y.o. year  old female patient who presents for Appendectomy laparoscopy, possible open with Dr. Waters on 12/3. Acute Pain consulted for block for postoperative pain control.      Plan:  - Bilateral ASHIL blocks with catheters performed preoperatively on 12/3  - Ambit ball with Ropivacaine 0.2%/NaCl 0.9% 500mL, Rate 7 cc/hr bilaterally   - Bolus 5cc of 0.5% ropi bilaterally and refill bag  - Recommend 1g magnesium q8 x3 doses  - Ambit medication will not interfere with pain medication prescribed by the primary team.   - Please be aware of local anesthetic toxic dose and absorption variability before considering lidocaine patches  - Acute pain service will follow while catheters in place  - Rest of pain management per primary team     Acute Pain Resident  pg [phone] ph [phone]

## 2024-12-04 NOTE — PROGRESS NOTES
Pharmacy Medication History Review    Mary Alice Aragon is a 44 y.o. female admitted for Acute appendicitis. Pharmacy reviewed the patient's [id number] medications and allergies for accuracy.    The list below reflects the updated PTA list.   Comments regarding how patient may be taking   medications differently can be found   in the Admit Orders Activity  Prior to Admission Medications   Prescriptions  Patient / Chart Reported?   HYDROmorphone (Dilaudid) 8 mg tablet  Yes   Sig: Take 0.5 tablets (4 mg) by mouth 3 times a day as   needed for severe pain (7 - 10).  --> Last Fill 11/27/24, #20 / 14 day   ascorbic acid (Vitamin C) 500 mg tablet  Yes   Sig: Take 2 tablets (1,000 mg) by mouth once daily.   FLUoxetine (PROzac) 10 mg capsule  Yes   Sig: Take 1 capsule (10 mg) by mouth once daily.  --> Last Fill 9/26/24, #30 / 30; pt states still taking.      capsaicin (Zostrix) 0.025 % cream  Yes   Sig: Apply 1 Application topically if needed. (PRN use)   cholecalciferol (Vitamin D-3) 50 MCG (2000 UT) tablet  Yes   Sig: Take 1 tablet (2,000 Units) by mouth daily   deferasirox (Jadenu) 180 mg tablet  Yes   Sig: Take 1 tablet (180 mg) by mouth once daily.  Only takes tablet if she does not have the Desferal (Pump)  --> No pump inpatient (?); Pt unsure of dosing;   See chart review; chart history up to 3 tabs daily (?)   deferoxamine (Desferal) 2 gram injection  Yes   Sig: Infuse 2000mg subcutaneously once daily over   8 hours via [place] Pump   diclofenac sodium (Voltaren) 1 % gel  Yes   Sig: Apply 4.5 inches (4 g) topically 4 times a day as needed (pain).   diphenhydrAMINE (BENADryl) 25 mg capsule  Yes   Sig: Take 1 capsule (25 mg) by mouth every 8 hours if needed.   docusate sodium (Colace) 100 mg capsule  Yes   Sig: Take 1 capsule (100 mg) by mouth every other day.   folic acid (Folvite) 1 mg tablet  Yes   Sig: Take 1 tablet (1 mg) by mouth once daily.   hydrOXYzine HCL (Atarax) 25 mg tablet  Yes   Sig: Take 1  tablet (25 mg) by mouth 2 times a day   as needed for anxiety.   hydroxyurea (Hydrea) 500 mg capsule  Yes   Sig: Per instructions AS DIRECTED (route: oral)   Patient taking differently: Take 2 capsules (1,000 mg total) by   mouth once a day on Monday, Wednesday, and Friday.  --> Last Fill in April 2024 (?) per Dispense Report.   hydroxyurea (Hydrea) 500 mg capsule  Yes   Sig: Take 1 capsule (500 mg total) by mouth once a day   on Sunday, Tuesday, Thursday, and Saturday.  --> Last Fill in April 2024 (?) per Dispense Report.   ibuprofen 800 mg tablet  Yes   Sig: Take 1 tablet (800 mg) by mouth every 8 hours if   needed for moderate pain (4 - 6).  --> Not to be taken with meloxicam.   ketamine (Ketalar) 10 mg/mL injection  Yes   Sig: Infuse 1 mL (10 mg) into a venous catheter every   30 (thirty) days. Not administered in the home.  --> Pt states still receiving; last dose September.    lidocaine (Lidoderm) 5 % patch  Yes   Sig: Place 1 patch on the skin once daily as   needed for mild pain (1 - 3).      melatonin 5 mg tablet  Yes   Sig: Take 1 tablet (5 mg) by mouth once daily at bedtime.   meloxicam (Mobic) 15 mg tablet  Yes   Sig: Take 1 tablet (15 mg) by mouth once daily.  --> Not taken with ibuprofen.  --> Last Fill 10/15/24, #30 / 30 day   methadone (Dolophine) 10 mg tablet  Yes   Sig: Take 1 tablet (10 mg) by mouth every 12 hours.  --> Last Fill 10/31/24, #60 / 30 day   multivitamin tablet  Yes   Sig: Take 1 tablet by mouth once daily.   naloxone (Narcan) 4 mg/0.1 mL nasal spray  Yes   Sig: Administer 1 spray (4 mg) into affected nostril(s) if needed   for opioid reversal or respiratory depression. May repeat every   2-3 minutes if needed, alternating nostrils, until medical   assistance becomes available.   norethindrone (Aygestin) 5 mg tablet  Yes   Sig: Take 1 tablet (5 mg) by mouth once daily.      peg 400/hypromellose/glycerin (DRY EYE RELIEF OPHT)  Yes   Sig: Administer 1 drop into each once daily as  needed.  --> Pt states uses daily.   pramoxine (Proctofoam) 1 % foam  Yes   Sig: Insert 1 Application into the rectum every 6 hours   if needed for irritation (2nd line itching).   pregabalin (Lyrica) 25 mg capsule  Yes   Sig: Take 1 capsule (25 mg) by mouth 2 times a day as needed  --> Last Fill 24, #60 / 30 day; pt states PRN use.   prochlorperazine (Compazine) 10 mg tablet  Yes   Si tablet EVERY 8 HOURS as needed   senna 8.6 mg tablet  Yes   Sig: Take 1 tablet (8.6 mg) by mouth 2 times a day as   needed for constipation.   tiZANidine (Zanaflex) 2 mg tablet  Yes   Sig: Take 1-2 tablets (2-4 mg) by mouth every 8 hours if   needed for muscle spasms.  --> Last Fill 24, #40 / 6 day   traZODone (Desyrel) 100 mg tablet  Yes   Sig: Take 1-2 tablets (100-200 mg) by mouth once daily at bedtime.   trolamine salicylate (Aspercreme) 10 % cream  Yes   Sig: Apply 1 Application topically if needed. (PRN use)   valACYclovir (Valtrex) 500 mg tablet  Yes   Sig: Take 1 tablet (500 mg) by mouth once daily.  --> No recent fill activity (?); previously reported pt gets thru clinic.  --> Pt states taking 1-2 tabs daily.              The list below reflects the updated allergy list. Please review each documented allergy for additional clarification and justification.  Allergies  Reviewed by Shirin Shi RN on 12/3/2024   No Known Allergies       Patient accepts M2B at discharge.     Sources used to complete the med history include:  Patient interview (awake/alert; good historian; able to recognize/confirm medications using name prompting; we go through each in currently entered PTA List in Epic.)  Epic Dispense Report (Pharmacy Fill History)  [facility] Ambulatory Medications List, Chart Review, Recent Encounters  Recent 24 DC Summary and 24 Med Rec Completed  [facility] (hydromorphone, pregabalin, methadone; last fill dates in PTA Table above)    Below are additional concerns with the patient's PTA list:   See  additional comments/notes/last fill dates where appropriate in PTA Table above. No Changes made to currently entered PTA List.    ------------------------------  Magnus Soriano, Michelle, [facility]  Transitions of Care Pharmacist  Medication reconciliation complete  Please reach out via [employer] Secure Chat for questions,   or if no response call [employer] or [employer].  [facility]

## 2024-12-04 NOTE — CONSULTS
Name: Mary Alice Aragon  MRN: [id number]  Admit Date: 12/3/2024  Encounter Date: 12/4/2024  PCP: Nyasia Mcdaniel MD    Reason for consult: Sickle cell disease  Attending provider: Maria Isabel Waters MD  Consult attending provider: Dr. 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 CT showing appendicitis s/p appendectomy 12/3.  Hematology was consulted for her sickle cell disease.      Heme History   From Dr Saleem's note 10/21/2024    Mary Alice Aragon is 44 y.o. 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 She has a HB F of ~ 20%   Continue hydroxyurea 1500 mg X 3 days in a week and 1000 X 4 days in a week       Chronic anemia secondary to SCD with a hemoglobin of 7.4 g/dl which is around her baseline    Daily folic acid 1 mg      Nocturnal hypoxia on 3L supplemental night time oxygen   Continue night supplemental oxygen        Constipation, well controlled  Continue current laxatives with senna and miralax as needed      Iron overload  secondary to chronic transfusions, on double chelation therapy. Ferritin level is 2285 ng/mL    Continue current chelation therapy with desferal 2 g daily subcutaneous and oral jadenu 360 mg daily  Liver MRI to assess iron overload      Elevated creatinine of 1.08 and eGFR of 66   Repeat renal panel and patient to stay very well hydrated       History of cerebral aneurysm. 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 Right frontal encephalomalacia secondary to sequela of right middle cerebral artery infarction.   History of lead poisoning with hydrocephalus status post right ventriculostomy shunt catheter, and subsequent removal.    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 She has been evaluated by Neurosurgery and is stable with no required intervention.   Low-grade squamous epithelial lesion on Pap smear in May 2011.   Pulmonary embolism in  in the postpartum phase, anticoagulated for 6 months.   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    Status post cholecystectomy.    Pneumonia in .    Chronic pain: Currently managed with monthly ketamine infusions with good response.   Nocturnal oxygen dependence    Oncology History    No history exists.       Past Medical History     Past Medical History:   Diagnosis Date    Sickle cell anemia (Multi)     Unspecified astigmatism, bilateral 2020    Astigmatism, bilateral         Past Surgical History     Past Surgical History:   Procedure Laterality Date      SECTION, CLASSIC  2016     Section    CHOLECYSTECTOMY  2016    Cholecystectomy    MR HEAD ANGIO W AND WO IV CONTRAST  4/15/2013    MR HEAD ANGIO W AND WO IV CONTRAST 4/15/2013 [facility] CLINICAL LEGACY    MR HEAD ANGIO W AND WO IV CONTRAST  2013    MR HEAD ANGIO W AND WO IV CONTRAST 2013 [facility] INPATIENT LEGACY    MR HEAD ANGIO WO IV CONTRAST  2014    MR HEAD ANGIO WO IV CONTRAST 2014 [facility] ANCILLARY LEGACY    MR HEAD ANGIO WO IV CONTRAST  2016    MR HEAD ANGIO WO IV CONTRAST 2016 [facility] ANCILLARY LEGACY    MR HEAD ANGIO WO IV CONTRAST  2019    MR HEAD ANGIO WO IV CONTRAST 2019 [facility] ANCILLARY LEGACY    MR HEAD ANGIO WO IV CONTRAST  2017    MR HEAD ANGIO WO IV CONTRAST 2017 [facility] INPATIENT LEGACY    MR NECK ANGIO WO IV CONTRAST  2013    MR NECK ANGIO WO IV CONTRAST 2013 [facility] INPATIENT LEGACY    MR NECK ANGIO WO IV CONTRAST  2014    MR NECK ANGIO WO IV CONTRAST 2014 [facility] [place] ANCILLARY LEGACY    OTHER SURGICAL HISTORY  2016    Brain Surgery    TUBAL LIGATION  04/10/2017    Tubal Ligation         Family History      Family History   Problem Relation Name Age of Onset    Sickle cell trait Sister      Multiple sclerosis Brother      Breast cancer Maternal Grandmother      Breast cancer Other paternal cousin     Sickle cell trait Child           Social History     Social History     Socioeconomic History    Marital status: Single   Tobacco Use    Smoking status: Never    Smokeless tobacco: Never   Vaping Use    Vaping status: Never Used   Substance and Sexual Activity    Alcohol use: Not Currently    Drug use: Not Currently   Social History Narrative    ONCOLOGY/HEMATOLOGY PSYCHOSOCIAL ASSESSMENT         Demographic Information    Mary Alice Aragon                       1980                      [id number]    Assessment Type:      Date of assessment: 24    Person(s) present during assessment:     Did patient participate in assessment:  yes    If no,  why not:    Primary language: English    Interpretive services used:     Provider: Adriana Coelho CNP     Diagnosis: Sickle Cell SS                    Marital Status / Family / Household    Status:   single    Family composition: Patient resides with two minor children    Support systems: Pt reports limited support system    Primary caregiver:  self         Current employment status:  employed and disability    Work history/type of work:       Comments: Pt is currently on leave from employement. SW assisted pt with paperwork for insurance company         Environmental Supports    Current living situation:   house    Patient's home environment:          Functional Status     Functional status:  Independent, assistance needed during pain crises    Other health issues that the patient is experiencing:     What supports are in place to assist the patient:     What community resources have been offered: Support group    Transportation:  self    Psychosocial risk factors impacting the patient:     Limited social supports         Assistive Devices    Patient has the following equipment: n/a    Patient currently ambulates:          Finances/Insurance    Primary insurance: Medicare    Secondary insurance: [employer]    Prescription insurance:    Does the patient have any pending insurance applications: No     Patient's current income source:  Employment disability    Does the patient have any financial concerns:  denies      Comments:          Advance Directives    power of  for healthcare on file    Advanced directives status:  Not activated    Legal decision maker, when applicable: Maryanne Yu-Sister         Taoism or Spiritual Identity    Comments:         Mental Health    Mental health history and status details:  Patient follows with Dr. May. Open to therapy referral           Social Drivers of Health     Financial Resource Strain: Low Risk  (12/3/2024)    Overall Financial Resource  Strain (CARDIA)     Difficulty of Paying Living Expenses: Not hard at all   Food Insecurity: No Food Insecurity (12/3/2024)    Hunger Vital Sign     Worried About Running Out of Food in the Last Year: Never true     Ran Out of Food in the Last Year: Never true   Transportation Needs: No Transportation Needs (12/3/2024)    PRAPARE - Transportation     Lack of Transportation (Medical): No     Lack of Transportation (Non-Medical): No   Social Connections: Feeling Socially Integrated (8/1/2024)    OASIS : Social Isolation     Frequency of experiencing loneliness or isolation: Never   Intimate Partner Violence: Not At Risk (12/3/2024)    Humiliation, Afraid, Rape, and Kick questionnaire     Fear of Current or Ex-Partner: No     Emotionally Abused: No     Physically Abused: No     Sexually Abused: No   Housing Stability: Low Risk  (12/3/2024)    Housing Stability Vital Sign     Unable to Pay for Housing in the Last Year: No     Number of Times Moved in the Last Year: 1     Homeless in the Last Year: No         Allergies   No Known Allergies    Medications   acetaminophen, 650 mg, q6h  [Held by provider] enoxaparin, 40 mg, q24h AMARA  FLUoxetine, 10 mg, Daily  melatonin, 5 mg, Nightly  [Held by provider] meloxicam, 15 mg, Daily  methadone, 10 mg, q12h  methocarbamol, 500 mg, q6h AMARA  norethindrone, 5 mg, Daily  pantoprazole, 40 mg, Daily before breakfast  traZODone, 100 mg, Nightly      lactated Ringer's, Last Rate: 108 mL/hr (12/03/24 2148)  lactated Ringer's  ropivacaine (PF) in NS cmpd, Last Rate: 14 mL/hr (12/04/24 1402)      calcium carbonate, 500 mg, 4x daily PRN  diphenhydrAMINE, 25 mg, q8h PRN  HYDROmorphone, 2 mg, q2h PRN  hydrOXYzine HCL, 25 mg, BID PRN  ibuprofen, 600 mg, q6h PRN  naloxone, 0.2 mg, q5 min PRN  [Held by provider] oxyCODONE, 10 mg, q6h PRN  [Held by provider] oxyCODONE, 5 mg, q4h PRN  oxygen, , Continuous PRN - O2/gases  pregabalin, 25 mg, BID PRN  prochlorperazine, 10 mg, q8h PRN        Review  "of Systems   12-point ROS negative, except as specified in the HPI.    Physical Exam   /73 (BP Location: Left arm, Patient Position: Lying)   Pulse 85   Temp 36.6 °C (97.9 °F) (Temporal)   Resp 16   Ht 1.6 m (5' 3\")   Wt 68 kg (149 lb 14.6 oz)   LMP  (LMP Unknown) Comment: tubal and hormones to prevent menses  SpO2 99%   BMI 26.56 kg/m²   Weight:   Vitals:    12/03/24 1104   Weight: 68 kg (149 lb 14.6 oz)       BSA: 1.74 meters squared    Alert and oriented x4, not in acute distress  Clear lung sound w/o rale, no wheezing  RHB w/o murmur, normal S1/S2  Soft and flat abdomen w/o tenderness, no hepatosplenomegaly  No pitting edema    Labs   Reviewed      Imaging   Reviewed    Assessment/Plan     44F with HbSS on hydroxyurea 500 (S/T/Th/S)-1000 (M/W/F) with nocturnal hypoxia on 2-3L as needed, c/b transfusion-related iron overload on Deferoxamine, provoked PE 2009 (postpartum) treated with AC for 6 months, cerebral aneurysm, NICHOL/MDD, presenting with N/V then developed the pain everywhere including her extremities, back, and abdomen like her sickle cell crisis. CT showing appendicitis s/p appendectomy 12/3. She's been stable post-operatively     # Sickle cell disease, HbSS  # Transfusion-related iron overload on Deferoxamine  # Appendicitis s/p appendectomy 12/3     Recommendations:  -c/w current pain regimen  -c/w DVT ppx  -continue to monitor daily CBC; would transfuse for Hgb>7  -closely monitor respiration status     Thank you for this consult, and hematology consult team will sign-off.  Patient was seen, examined, and discussed with Dr. Reed.    Alfredo Del Rosario MD, PhD  Hematology/Oncology Fellow [id number] or Epic  12/4/2024      "

## 2024-12-04 NOTE — PROGRESS NOTES
"[facility]  ACUTE CARE SURGERY - PROGRESS NOTE    Patient Name: Mary Alice Aragon  MRN: [id number]  Admit Date: 1203  : 1980  AGE: 44 y.o.   GENDER: female  ==============================================================================  TODAY'S ASSESSMENT AND PLAN OF CARE:  Primary  44F presents with acute appendicitis.     12/3: lap appy  Edited by: Jovana Sanchez MD at 2024 0555    NEURO:   - Multimodal pain control with:   > Ropivacaine 14cc/hr AMBIT pump   > acetaminophen 650mg q6   > Methadone 10mg q12 (for chronic opioid use)   > robaxin 500mg q6   > PRN:    > Dilaudid 2mg q2hr prn    > Per Heme: recommend 0.5mg dilaudid IVP for breakthrough  CV:  - No needs  PULM:  - Incentive spirometry, PRN O2  GI:  - Regular Diet, tolerating well   :  - Replete electrolytes to potassium of 4.0, magnesium of 2.0.  - Voiding spontaneously.  HEME/ID:  - Sickle Cell Team/Jake on board, appreciate their care and recommendations  - q6 CBC: Hgb 6.6 today, 2U of blood given (to dilute HbS)  ENDO:  - No needs  MSK/SKIN:  - Laparoscopic sites closed with intradermal sutures then with dermabond    F:  LR @ 108mL/hr   E: Replete as needed   N: Regular diet   GI ppx: Protonix 40 daily  DVT ppx: Lovenox 40 daily , currently held, to be re-evaluated on a daily basis    Dispo: Continue current level of care.    Subjective   ==============================================================================  CHIEF COMPLAINT / EVENTS LAST 24HRS / HPI:  No acute events overnight. Patient seen and evaluated this AM during team rounds and this afternoon. This morning patient reported that her abdominal pain was significant and felt \"worse than [her] sickle cell pain\". She had positive guarding, was soft, and non peritonitic. Upon afternoon rounds, states her tenderness has improved. She stated she noted a \"40-50% reduction in her pain\".   This morning Hgb was 6.6, which while within her baseline is " a three point drop from her previous CBCs, so serial abdominal exams are planned with q6 CBCs.   A conversation was had with Dr. Saleem (heme/onc attending) regarding her care and Joseph team also engaged.     MEDICAL HISTORY / ROS:   Admission history reviewed. Pertinent changes as follows:  None.    A 12-point review of systems was performed, and was negative except as above.     Objective   Vital Signs past 24h:  Heart Rate:  []   Temp:  [36 °C (96.8 °F)-36.7 °C (98.1 °F)]   Resp:  [10-18]   BP: (101-149)/(56-90)   SpO2:  [91 %-100 %]     I/O past 24h:  I/O last 2 completed shifts:  In: 1676.3 (24.7 mL/kg) [I.V.:176.3 (2.6 mL/kg); IV Piggyback:1500]  Out: 10 (0.1 mL/kg) [Blood:10]  Weight: 68 kg      PHYSICAL EXAM:  GEN: No acute distress. Alert, awake and conversive.  HEENT: Sclera anicteric. Moist mucous membranes.  RESP: Breathing non-labored, equal chest rise. On RA.  CV: Tachycardic to 108 , normotensive  GI: Abdomen soft, mildly distended, appropriately tender for postoperative course.   : Voiding spontaneously.  MSK: No gross deformities. Moves all extremities spontaneously.  NEURO: Alert and oriented x3. No focal deficits.  PSYCH: Appropriate mood and affect.  SKIN: Port sites well-approximated with dermabond.    Labs past 24h:  Results for orders placed or performed during the hospital encounter of 12/03/24 (from the past 24 hours)   Phosphorus   Result Value Ref Range    Phosphorus 3.7 2.5 - 4.9 mg/dL   Basic Metabolic Panel   Result Value Ref Range    Glucose 81 74 - 99 mg/dL    Sodium 140 136 - 145 mmol/L    Potassium 4.2 3.5 - 5.3 mmol/L    Chloride 105 98 - 107 mmol/L    Bicarbonate 28 21 - 32 mmol/L    Anion Gap 11 10 - 20 mmol/L    Urea Nitrogen 16 6 - 23 mg/dL    Creatinine 1.01 0.50 - 1.05 mg/dL    eGFR 71 >60 mL/min/1.73m*2    Calcium 9.2 8.6 - 10.6 mg/dL   CBC and Auto Differential   Result Value Ref Range    WBC 19.3 (H) 4.4 - 11.3 x10*3/uL    nRBC 0.0 0.0 - 0.0 /100 WBCs    RBC 2.10  (L) 4.00 - 5.20 x10*6/uL    Hemoglobin 6.6 (L) 12.0 - 16.0 g/dL    Hematocrit 18.4 (L) 36.0 - 46.0 %    MCV 88 80 - 100 fL    MCH 31.4 26.0 - 34.0 pg    MCHC 35.9 32.0 - 36.0 g/dL    RDW 14.6 (H) 11.5 - 14.5 %    Platelets 343 150 - 450 x10*3/uL    Neutrophils % 66.7 40.0 - 80.0 %    Immature Granulocytes %, Automated 0.4 0.0 - 0.9 %    Lymphocytes % 25.9 13.0 - 44.0 %    Monocytes % 6.6 2.0 - 10.0 %    Eosinophils % 0.1 0.0 - 6.0 %    Basophils % 0.3 0.0 - 2.0 %    Neutrophils Absolute 12.88 (H) 1.20 - 7.70 x10*3/uL    Immature Granulocytes Absolute, Automated 0.07 0.00 - 0.70 x10*3/uL    Lymphocytes Absolute 4.99 (H) 1.20 - 4.80 x10*3/uL    Monocytes Absolute 1.28 (H) 0.10 - 1.00 x10*3/uL    Eosinophils Absolute 0.02 0.00 - 0.70 x10*3/uL    Basophils Absolute 0.05 0.00 - 0.10 x10*3/uL   Magnesium   Result Value Ref Range    Magnesium 1.35 (L) 1.60 - 2.40 mg/dL   Hepatic function panel   Result Value Ref Range    Albumin 2.6 (L) 3.4 - 5.0 g/dL    Bilirubin, Total 1.8 (H) 0.0 - 1.2 mg/dL    Bilirubin, Direct 0.5 (H) 0.0 - 0.3 mg/dL    Alkaline Phosphatase 42 33 - 110 U/L    ALT 14 7 - 45 U/L    AST 18 9 - 39 U/L    Total Protein 4.3 (L) 6.4 - 8.2 g/dL   Lactate dehydrogenase   Result Value Ref Range     84 - 246 U/L   Reticulocytes   Result Value Ref Range    Retic % 0.6 0.5 - 2.0 %    Retic Absolute 0.013 (L) 0.018 - 0.083 x10*6/uL    Reticulocyte Hemoglobin 32 28 - 38 pg    Immature Retic fraction 4.4 <=16.0 %   Haptoglobin   Result Value Ref Range    Haptoglobin <30 (L) 30 - 200 mg/dL   Prepare RBC: 2 Units   Result Value Ref Range    PRODUCT CODE P6407X18     Unit Number [id number]     Unit ABO O     Unit RH NEG     XM INTEP COMP     Dispense Status XM     Blood Expiration Date 12/24/2024 11:59:00 PM EST     PRODUCT BLOOD TYPE 9500     UNIT VOLUME 350     PRODUCT CODE O8544D90     Unit Number [id number]     Unit ABO O     Unit RH NEG     XM INTEP COMP     Dispense Status IS     Blood Expiration  Date 12/15/2024 11:59:00 PM EST     PRODUCT BLOOD TYPE 9500     UNIT VOLUME 271      *Note: Due to a large number of results and/or encounters for the requested time period, some results have not been displayed. A complete set of results can be found in Results Review.       Imaging within past 24h:  CT abdomen pelvis w IV contrast    Result Date: 12/2/2024  Interpreted By:  Román Bella, STUDY: CT ABDOMEN PELVIS W IV CONTRAST;  12/2/2024 7:37 pm   INDICATION: Signs/Symptoms:rlq abdominal pain.     COMPARISON: None.   ACCESSION NUMBER(S): [id number]   ORDERING CLINICIAN: ANNIKA BREEN   TECHNIQUE: CT of the abdomen and pelvis was performed.  Standard contiguous axial images were obtained at 3 mm slice thickness through the abdomen and pelvis. Coronal and sagittal reconstructions at 3 mm slice thickness were performed.  75 ML of Omnipaque 350 was administered intravenously without immediate complication.   FINDINGS: There does appear to be mild inflammatory stranding seen in the right lower quadrant with a slightly thickened appendix. Reference image 78. Early appendicitis should be considered. No abscess. No free air. No free fluid   Constipation detected. Colonic wall difficult to assess given degree of constipation   Peripheral calcification noted along the margin of the spleen   Hepatic steatosis. Gallbladder is been removed. Unremarkable adrenal glands and pancreas. 1 cm right renal cyst. No no pelvic masses. Thick-walled urinary bladder versus poor distention noted. Correlation with urinalysis advised.   Stigmata of sickle cell disease noted in the bones.       Constipation. Appendix is slightly prominent with minimal periappendiceal inflammation. Early appendicitis should be considered.   Degree of stool limiting assessment of colonic mucosa.   Features of sickle cell disease.   MACRO: None   Signed by: Román Bella 12/2/2024 8:15 PM Dictation workstation:   [id number]     I have reviewed the imaging  above as it pertains to the patient's surgical concerns and agree with the radiologist's interpretation.     Patient's exam, labs, and findings discussed and seen with  Dr. Waters , who agrees with the plan as described above.    Jovana Sanchez MD  PGY-1 General Surgery  Acute Care Surgery [id number]

## 2024-12-05 ENCOUNTER — SOCIAL WORK (OUTPATIENT)
Dept: HEMATOLOGY/ONCOLOGY | Facility: HOSPITAL | Age: 44
End: 2024-12-05
Payer: COMMERCIAL

## 2024-12-05 ENCOUNTER — APPOINTMENT (OUTPATIENT)
Dept: RADIOLOGY | Facility: HOSPITAL | Age: 44
DRG: 335 | End: 2024-12-05
Payer: MEDICARE

## 2024-12-05 LAB
ALBUMIN SERPL BCP-MCNC: 3.8 G/DL (ref 3.4–5)
ANION GAP SERPL CALC-SCNC: 12 MMOL/L (ref 10–20)
BASOPHILS # BLD AUTO: 0.05 X10*3/UL (ref 0–0.1)
BASOPHILS NFR BLD AUTO: 0.4 %
BUN SERPL-MCNC: 12 MG/DL (ref 6–23)
CALCIUM SERPL-MCNC: 8.4 MG/DL (ref 8.6–10.6)
CHLORIDE SERPL-SCNC: 107 MMOL/L (ref 98–107)
CO2 SERPL-SCNC: 27 MMOL/L (ref 21–32)
CREAT SERPL-MCNC: 1.01 MG/DL (ref 0.5–1.05)
EGFRCR SERPLBLD CKD-EPI 2021: 71 ML/MIN/1.73M*2
EOSINOPHIL # BLD AUTO: 0.09 X10*3/UL (ref 0–0.7)
EOSINOPHIL NFR BLD AUTO: 0.8 %
ERYTHROCYTE [DISTWIDTH] IN BLOOD BY AUTOMATED COUNT: 15.5 % (ref 11.5–14.5)
ERYTHROCYTE [DISTWIDTH] IN BLOOD BY AUTOMATED COUNT: 15.6 % (ref 11.5–14.5)
GLUCOSE SERPL-MCNC: 97 MG/DL (ref 74–99)
HCT VFR BLD AUTO: 19.6 % (ref 36–46)
HCT VFR BLD AUTO: 21.4 % (ref 36–46)
HGB BLD-MCNC: 6.7 G/DL (ref 12–16)
HGB BLD-MCNC: 7.5 G/DL (ref 12–16)
IMM GRANULOCYTES # BLD AUTO: 0.04 X10*3/UL (ref 0–0.7)
IMM GRANULOCYTES NFR BLD AUTO: 0.3 % (ref 0–0.9)
LYMPHOCYTES # BLD AUTO: 4.68 X10*3/UL (ref 1.2–4.8)
LYMPHOCYTES NFR BLD AUTO: 39.8 %
MAGNESIUM SERPL-MCNC: 2.32 MG/DL (ref 1.6–2.4)
MCH RBC QN AUTO: 29.8 PG (ref 26–34)
MCH RBC QN AUTO: 30.1 PG (ref 26–34)
MCHC RBC AUTO-ENTMCNC: 34.2 G/DL (ref 32–36)
MCHC RBC AUTO-ENTMCNC: 35 G/DL (ref 32–36)
MCV RBC AUTO: 86 FL (ref 80–100)
MCV RBC AUTO: 87 FL (ref 80–100)
MONOCYTES # BLD AUTO: 0.56 X10*3/UL (ref 0.1–1)
MONOCYTES NFR BLD AUTO: 4.8 %
NEUTROPHILS # BLD AUTO: 6.35 X10*3/UL (ref 1.2–7.7)
NEUTROPHILS NFR BLD AUTO: 53.9 %
NRBC BLD-RTO: 0 /100 WBCS (ref 0–0)
NRBC BLD-RTO: 0 /100 WBCS (ref 0–0)
PHOSPHATE SERPL-MCNC: 3.6 MG/DL (ref 2.5–4.9)
PLATELET # BLD AUTO: 290 X10*3/UL (ref 150–450)
PLATELET # BLD AUTO: 337 X10*3/UL (ref 150–450)
POTASSIUM SERPL-SCNC: 3.9 MMOL/L (ref 3.5–5.3)
RBC # BLD AUTO: 2.25 X10*6/UL (ref 4–5.2)
RBC # BLD AUTO: 2.49 X10*6/UL (ref 4–5.2)
SODIUM SERPL-SCNC: 142 MMOL/L (ref 136–145)
WBC # BLD AUTO: 11.8 X10*3/UL (ref 4.4–11.3)
WBC # BLD AUTO: 12.7 X10*3/UL (ref 4.4–11.3)

## 2024-12-05 PROCEDURE — 2500000001 HC RX 250 WO HCPCS SELF ADMINISTERED DRUGS (ALT 637 FOR MEDICARE OP)

## 2024-12-05 PROCEDURE — 85027 COMPLETE CBC AUTOMATED: CPT

## 2024-12-05 PROCEDURE — 80069 RENAL FUNCTION PANEL: CPT

## 2024-12-05 PROCEDURE — 99231 SBSQ HOSP IP/OBS SF/LOW 25: CPT | Performed by: STUDENT IN AN ORGANIZED HEALTH CARE EDUCATION/TRAINING PROGRAM

## 2024-12-05 PROCEDURE — 2550000001 HC RX 255 CONTRASTS: Performed by: STUDENT IN AN ORGANIZED HEALTH CARE EDUCATION/TRAINING PROGRAM

## 2024-12-05 PROCEDURE — S4993 CONTRACEPTIVE PILLS FOR BC: HCPCS

## 2024-12-05 PROCEDURE — 74174 CTA ABD&PLVS W/CONTRAST: CPT

## 2024-12-05 PROCEDURE — 83735 ASSAY OF MAGNESIUM: CPT

## 2024-12-05 PROCEDURE — 2500000004 HC RX 250 GENERAL PHARMACY W/ HCPCS (ALT 636 FOR OP/ED)

## 2024-12-05 PROCEDURE — 1170000001 HC PRIVATE ONCOLOGY ROOM DAILY

## 2024-12-05 PROCEDURE — 85025 COMPLETE CBC W/AUTO DIFF WBC: CPT

## 2024-12-05 PROCEDURE — 74174 CTA ABD&PLVS W/CONTRAST: CPT | Performed by: RADIOLOGY

## 2024-12-05 PROCEDURE — 2500000002 HC RX 250 W HCPCS SELF ADMINISTERED DRUGS (ALT 637 FOR MEDICARE OP, ALT 636 FOR OP/ED)

## 2024-12-05 PROCEDURE — S0109 METHADONE ORAL 5MG: HCPCS

## 2024-12-05 RX ORDER — SENNOSIDES 8.6 MG/1
1 TABLET ORAL NIGHTLY
Status: DISCONTINUED | OUTPATIENT
Start: 2024-12-05 | End: 2024-12-08

## 2024-12-05 RX ORDER — POLYETHYLENE GLYCOL 3350 17 G/17G
17 POWDER, FOR SOLUTION ORAL DAILY
Status: DISPENSED | OUTPATIENT
Start: 2024-12-05

## 2024-12-05 RX ORDER — HYDROMORPHONE HCL/0.9% NACL/PF 15 MG/30ML
PATIENT CONTROLLED ANALGESIA SYRINGE INTRAVENOUS CONTINUOUS
Status: DISCONTINUED | OUTPATIENT
Start: 2024-12-05 | End: 2024-12-05

## 2024-12-05 RX ORDER — BISACODYL 10 MG/1
10 SUPPOSITORY RECTAL DAILY PRN
Status: DISPENSED | OUTPATIENT
Start: 2024-12-05

## 2024-12-05 RX ORDER — NALOXONE HYDROCHLORIDE 0.4 MG/ML
0.2 INJECTION, SOLUTION INTRAMUSCULAR; INTRAVENOUS; SUBCUTANEOUS AS NEEDED
Status: ACTIVE | OUTPATIENT
Start: 2024-12-05

## 2024-12-05 RX ORDER — IBUPROFEN 600 MG/1
600 TABLET ORAL EVERY 6 HOURS PRN
Status: DISCONTINUED | OUTPATIENT
Start: 2024-12-05 | End: 2024-12-06

## 2024-12-05 RX ORDER — HYDROMORPHONE HCL/0.9% NACL/PF 15 MG/30ML
PATIENT CONTROLLED ANALGESIA SYRINGE INTRAVENOUS CONTINUOUS
Status: DISCONTINUED | OUTPATIENT
Start: 2024-12-05 | End: 2024-12-06

## 2024-12-05 ASSESSMENT — PAIN SCALES - GENERAL
PAINLEVEL_OUTOF10: 10 - WORST POSSIBLE PAIN

## 2024-12-05 ASSESSMENT — COGNITIVE AND FUNCTIONAL STATUS - GENERAL
CLIMB 3 TO 5 STEPS WITH RAILING: A LITTLE
MOVING TO AND FROM BED TO CHAIR: A LITTLE
STANDING UP FROM CHAIR USING ARMS: A LITTLE
WALKING IN HOSPITAL ROOM: A LITTLE
MOBILITY SCORE: 20

## 2024-12-05 ASSESSMENT — PAIN SCALES - WONG BAKER: WONGBAKER_NUMERICALRESPONSE: HURTS WORST

## 2024-12-05 NOTE — SIGNIFICANT EVENT
Patient seen at bedside to monitor pain. Patient resting comfortably in bed, asleep, with no complaints. PM CBC without evidence of bleeding, with appropriate response to 1 unit PRBCs. Vitals stable without tachycardia. At this time, elected to allow patient to continue sleeping given patient's need for healthful sleep habits and overall low concern for intraabdominal process.     Brandon Beck MD

## 2024-12-05 NOTE — PROGRESS NOTES
Postop Pain HPI -   Palliative: relieved with IV analgesics and regional local anesthetics  Provocative: movement  Quality:  burning and aching  Radiation:  none  Severity:  10/10  Timing: constant    24-HOUR OPIOID CONSUMPTION:  Tylenol x4, methadone x2, robaxin x4    Scheduled medications  acetaminophen, 650 mg, oral, q6h  [Held by provider] enoxaparin, 40 mg, subcutaneous, q24h AMARA  FLUoxetine, 10 mg, oral, Daily  melatonin, 5 mg, oral, Nightly  [Held by provider] meloxicam, 15 mg, oral, Daily  methadone, 10 mg, oral, q12h  methocarbamol, 500 mg, oral, q6h AMARA  norethindrone, 5 mg, oral, Daily  pantoprazole, 40 mg, oral, Daily before breakfast  traZODone, 100 mg, oral, Nightly      Continuous medications  lactated Ringer's, 108 mL/hr  ropivacaine (PF) in NS cmpd, 14 mL/hr, Last Rate: 14 mL/hr (12/04/24 1402)      PRN medications  PRN medications: calcium carbonate, diphenhydrAMINE, HYDROmorphone, hydrOXYzine HCL, ibuprofen, naloxone, [Held by provider] oxyCODONE, [Held by provider] oxyCODONE, oxygen, pregabalin, prochlorperazine     Physical Exam:  Constitutional:  no distress, alert and cooperative  Eyes: clear sclera  Head/Neck: No apparent injury, trachea midline  Respiratory/Thorax: Patent airways, thorax symmetric, breathing comfortably  Cardiovascular: no pitting edema  Gastrointestinal: Nondistended  Musculoskeletal: ROM intact  Extremities: no clubbing  Neurological: alert, alexander x4  Psychological: Appropriate affect    Results for orders placed or performed during the hospital encounter of 12/03/24 (from the past 24 hours)   Prepare RBC: 2 Units   Result Value Ref Range    PRODUCT CODE J5960J55     Unit Number [id number]     Unit ABO O     Unit RH NEG     XM INTEP COMP     Dispense Status XM     Blood Expiration Date 12/24/2024 11:59:00 PM EST     PRODUCT BLOOD TYPE 9500     UNIT VOLUME 350     PRODUCT CODE L9285M90     Unit Number [id number]     Unit ABO O     Unit RH NEG     XM INTEP COMP      Dispense Status TR     Blood Expiration Date 12/15/2024 11:59:00 PM EST     PRODUCT BLOOD TYPE 9500     UNIT VOLUME 271    CBC   Result Value Ref Range    WBC 16.7 (H) 4.4 - 11.3 x10*3/uL    nRBC 0.0 0.0 - 0.0 /100 WBCs    RBC 2.63 (L) 4.00 - 5.20 x10*6/uL    Hemoglobin 7.8 (L) 12.0 - 16.0 g/dL    Hematocrit 23.0 (L) 36.0 - 46.0 %    MCV 88 80 - 100 fL    MCH 29.7 26.0 - 34.0 pg    MCHC 33.9 32.0 - 36.0 g/dL    RDW 15.4 (H) 11.5 - 14.5 %    Platelets 352 150 - 450 x10*3/uL   CBC and Auto Differential   Result Value Ref Range    WBC 11.8 (H) 4.4 - 11.3 x10*3/uL    nRBC 0.0 0.0 - 0.0 /100 WBCs    RBC 2.25 (L) 4.00 - 5.20 x10*6/uL    Hemoglobin 6.7 (L) 12.0 - 16.0 g/dL    Hematocrit 19.6 (L) 36.0 - 46.0 %    MCV 87 80 - 100 fL    MCH 29.8 26.0 - 34.0 pg    MCHC 34.2 32.0 - 36.0 g/dL    RDW 15.6 (H) 11.5 - 14.5 %    Platelets 290 150 - 450 x10*3/uL    Neutrophils % 53.9 40.0 - 80.0 %    Immature Granulocytes %, Automated 0.3 0.0 - 0.9 %    Lymphocytes % 39.8 13.0 - 44.0 %    Monocytes % 4.8 2.0 - 10.0 %    Eosinophils % 0.8 0.0 - 6.0 %    Basophils % 0.4 0.0 - 2.0 %    Neutrophils Absolute 6.35 1.20 - 7.70 x10*3/uL    Immature Granulocytes Absolute, Automated 0.04 0.00 - 0.70 x10*3/uL    Lymphocytes Absolute 4.68 1.20 - 4.80 x10*3/uL    Monocytes Absolute 0.56 0.10 - 1.00 x10*3/uL    Eosinophils Absolute 0.09 0.00 - 0.70 x10*3/uL    Basophils Absolute 0.05 0.00 - 0.10 x10*3/uL   Renal function panel   Result Value Ref Range    Glucose 97 74 - 99 mg/dL    Sodium 142 136 - 145 mmol/L    Potassium 3.9 3.5 - 5.3 mmol/L    Chloride 107 98 - 107 mmol/L    Bicarbonate 27 21 - 32 mmol/L    Anion Gap 12 10 - 20 mmol/L    Urea Nitrogen 12 6 - 23 mg/dL    Creatinine 1.01 0.50 - 1.05 mg/dL    eGFR 71 >60 mL/min/1.73m*2    Calcium 8.4 (L) 8.6 - 10.6 mg/dL    Phosphorus 3.6 2.5 - 4.9 mg/dL    Albumin 3.8 3.4 - 5.0 g/dL   Magnesium   Result Value Ref Range    Magnesium 2.32 1.60 - 2.40 mg/dL     *Note: Due to a large number of results  and/or encounters for the requested time period, some results have not been displayed. A complete set of results can be found in Results Review.        Mary Alice Aragon is a 44 y.o. year old female patient who presents for Appendectomy laparoscopy, possible open with Dr. Waters on 12/3. Acute Pain consulted for block for postoperative pain control.      Plan:  - Bilateral SAHIL blocks with catheters performed preoperatively on 12/3  - Ambit ball with Ropivacaine 0.2%/NaCl 0.9% 500mL, Rate 7 cc/hr bilaterally   - Bolus 5cc of 0.5% ropi bilaterally and then removed catheters  - Acute pain to sign off  - Pain management per primary team including dilaudid PCA     Acute Pain Resident  pg [phone] ph [phone]

## 2024-12-05 NOTE — CARE PLAN
The patient's goals for the shift include  Pain Management     The clinical goals for the shift include Pain Management    Over the shift, the patient did make progress toward the following goals.       Problem: Pain  Goal: Takes deep breaths with improved pain control throughout the shift  Outcome: Progressing  Goal: Turns in bed with improved pain control throughout the shift  Outcome: Progressing  Goal: Walks with improved pain control throughout the shift  Outcome: Progressing  Goal: Performs ADL's with improved pain control throughout shift  Outcome: Progressing  Goal: Participates in PT with improved pain control throughout the shift  Outcome: Progressing  Goal: Free from opioid side effects throughout the shift  Outcome: Progressing  Goal: Free from acute confusion related to pain meds throughout the shift  Outcome: Progressing

## 2024-12-05 NOTE — PROGRESS NOTES
[facility]  ACUTE CARE SURGERY - PROGRESS NOTE    Patient Name: Mary Alice Aragon  MRN: [id number]  Admit Date: 1203  : 1980  AGE: 44 y.o.   GENDER: female  ==============================================================================  TODAY'S ASSESSMENT AND PLAN OF CARE:  Primary  44 year-old female with a past medical history significant sickle cell anemia and chronic pain who presented to ED on  with abdominal pain, found to have acute appendicitis.     OR Course:  12/3: laparoscopic appendectomy    NEURO: sickle cell pain crisis, postoperative pain  - Multimodal pain control with:   > Start PCA today given uncontrolled pain   > Ropivacaine 14cc/hr AMBIT pump   > acetaminophen 650mg q6   > Methadone 10mg q12 (for chronic opioid use)   > robaxin 500mg q6    CV: No acute issues    PULM:  - Incentive spirometry, PRN O2 (on 3L O2 at home for sickle cell)    GI:  - Regular Diet  - Serial abdominal exams  - Bowel regimen    :  - Replete electrolytes to goal K>4, Mg>2, Phos>3    HEME: Hx of sickle cell anemia, concern for postop bleed  -Sickle Cell Team/Jake underwood, appreciate recs  -s/p 1u pRBC  for sickle cell pain crisis and Hgb 6.6 (baseline Hgb is 6.7-6.8)  -Plan for CTA abd/pel today to rule out acute intraabdominal bleed  - Holding DVT ppx until bleeding ruled out    ENDO: no acute issues    ID: no acute issues  - reactive leukocytosis trending down    MSK/SKIN:  - Laparoscopic sites closed with intradermal sutures then with dermabond    F:  LR @ 108mL/hr   E: Replete as needed   N: Regular diet   GI ppx: Protonix 40 daily  DVT ppx: lovenox held, continue SCDs    Dispo: RNF    Discussed with attending Dr. Evelin Perez MD  General Surgery Resident  PGY-2  ACS [id number]  ==============================================================================  CHIEF COMPLAINT / EVENTS LAST 24HRS / HPI:  No acute events overnight. Patient received 1u pRBC with  appropriate Hgb increment 6.7 -> 7.8. Pain symptoms also improved with transfusion. Denies dizziness, shortness of breath, chest pain. Endorses right upper and lower abdominal pain. Patient is tolerating diet, has gotten out of bed. Her goal is to walk to door and back.     MEDICAL HISTORY / ROS:   Admission history reviewed. Pertinent changes as follows:  None.    A 12-point review of systems was performed, and was negative except as above.     Objective   Vital Signs past 24h:  Heart Rate:  []   Temp:  [36.1 °C (97 °F)-36.7 °C (98.1 °F)]   Resp:  [14-18]   BP: ()/(61-76)   SpO2:  [91 %-100 %]     I/O past 24h:  I/O last 2 completed shifts:  In: 1590.7 (23.4 mL/kg) [P.O.:420; I.V.:870.4 (12.8 mL/kg); Blood:146.3]  Out: 1700 (25 mL/kg) [Urine:1700 (1 mL/kg/hr)]  Weight: 68 kg      PHYSICAL EXAM:  GEN: No acute distress  NEURO: Alert and oriented x3. No focal deficits.  HEENT: Sclera anicteric. Moist mucous membranes.  RESP: unlabored breathing on room air, equal chest rise.   CV: RRR, normotensive  GI: Abdomen soft, mildly distended, tender palpation RUQ and RLQ, no rebound tenderness, no peritonitis, +voluntary guarding  : Voiding spontaneously.  MSK: RICO x4  SKIN: Port sites well-approximated with dermabond.    Labs past 24h:  Results for orders placed or performed during the hospital encounter of 12/03/24 (from the past 24 hours)   Prepare RBC: 2 Units   Result Value Ref Range    PRODUCT CODE E2093A05     Unit Number [id number]     Unit ABO O     Unit RH NEG     XM INTEP COMP     Dispense Status XM     Blood Expiration Date 12/24/2024 11:59:00 PM EST     PRODUCT BLOOD TYPE 9500     UNIT VOLUME 350     PRODUCT CODE H3853U28     Unit Number [id number]     Unit ABO O     Unit RH NEG     XM INTEP COMP     Dispense Status TR     Blood Expiration Date 12/15/2024 11:59:00 PM EST     PRODUCT BLOOD TYPE 9500     UNIT VOLUME 271    CBC   Result Value Ref Range    WBC 16.7 (H) 4.4 - 11.3 x10*3/uL     nRBC 0.0 0.0 - 0.0 /100 WBCs    RBC 2.63 (L) 4.00 - 5.20 x10*6/uL    Hemoglobin 7.8 (L) 12.0 - 16.0 g/dL    Hematocrit 23.0 (L) 36.0 - 46.0 %    MCV 88 80 - 100 fL    MCH 29.7 26.0 - 34.0 pg    MCHC 33.9 32.0 - 36.0 g/dL    RDW 15.4 (H) 11.5 - 14.5 %    Platelets 352 150 - 450 x10*3/uL   CBC and Auto Differential   Result Value Ref Range    WBC 11.8 (H) 4.4 - 11.3 x10*3/uL    nRBC 0.0 0.0 - 0.0 /100 WBCs    RBC 2.25 (L) 4.00 - 5.20 x10*6/uL    Hemoglobin 6.7 (L) 12.0 - 16.0 g/dL    Hematocrit 19.6 (L) 36.0 - 46.0 %    MCV 87 80 - 100 fL    MCH 29.8 26.0 - 34.0 pg    MCHC 34.2 32.0 - 36.0 g/dL    RDW 15.6 (H) 11.5 - 14.5 %    Platelets 290 150 - 450 x10*3/uL    Neutrophils % 53.9 40.0 - 80.0 %    Immature Granulocytes %, Automated 0.3 0.0 - 0.9 %    Lymphocytes % 39.8 13.0 - 44.0 %    Monocytes % 4.8 2.0 - 10.0 %    Eosinophils % 0.8 0.0 - 6.0 %    Basophils % 0.4 0.0 - 2.0 %    Neutrophils Absolute 6.35 1.20 - 7.70 x10*3/uL    Immature Granulocytes Absolute, Automated 0.04 0.00 - 0.70 x10*3/uL    Lymphocytes Absolute 4.68 1.20 - 4.80 x10*3/uL    Monocytes Absolute 0.56 0.10 - 1.00 x10*3/uL    Eosinophils Absolute 0.09 0.00 - 0.70 x10*3/uL    Basophils Absolute 0.05 0.00 - 0.10 x10*3/uL   Renal function panel   Result Value Ref Range    Glucose 97 74 - 99 mg/dL    Sodium 142 136 - 145 mmol/L    Potassium 3.9 3.5 - 5.3 mmol/L    Chloride 107 98 - 107 mmol/L    Bicarbonate 27 21 - 32 mmol/L    Anion Gap 12 10 - 20 mmol/L    Urea Nitrogen 12 6 - 23 mg/dL    Creatinine 1.01 0.50 - 1.05 mg/dL    eGFR 71 >60 mL/min/1.73m*2    Calcium 8.4 (L) 8.6 - 10.6 mg/dL    Phosphorus 3.6 2.5 - 4.9 mg/dL    Albumin 3.8 3.4 - 5.0 g/dL   Magnesium   Result Value Ref Range    Magnesium 2.32 1.60 - 2.40 mg/dL     *Note: Due to a large number of results and/or encounters for the requested time period, some results have not been displayed. A complete set of results can be found in Results Review.       Imaging within past 24h:  No results  found.    I have reviewed the imaging above as it pertains to the patient's surgical concerns and agree with the radiologist's interpretation.     Patient's exam, labs, and findings discussed and seen with  Dr. Waters , who agrees with the plan as described above.

## 2024-12-05 NOTE — PROGRESS NOTES
Social Work Note    Referral Source: JESÚS Askew RN  Meeting Location: In-Person  Person(s) Present: Patient and pt's daughter  Identified Needs: Medicaid and emotional support  Impression and Plan: LISW met with patient during outpatient sickle cell appointment for ongoing assessment and support. Pt utlized time with LISW to process her recent admissions and emergency surgery. LISW provided pt with active listening and support throughout conversation. Additionally, pt states she received her medicaid renewal in the mail before admission and is concerned she will miss the deadline. LISW provided pt contact information for [employer] and encouraged pt to submit recertification as soon as possible. Pt agreeable to plan. Patient denies any further psychosocial or support service needs at this time. Patient encouraged to contact LISW if any needs arise.   Interventions Provided: Assessment, Empowerment/Coaching, Referral to Community Resource, and Supportive Counseling  Estimated Time Spent: 45 min    SW will continue to follow as needed.

## 2024-12-05 NOTE — SIGNIFICANT EVENT
Patient seen at bedside for monitoring of pain and serial abdominal exam. Abdomen is soft, nontender, nondistended. Patient is complaining of pain in her legs and back; patient presumes pain is secondary to ongoing sickle-cell pain crisis. Will continue to monitor.    Brandon Beck MD

## 2024-12-06 LAB
ABO GROUP (TYPE) IN BLOOD: NORMAL
ABO GROUP (TYPE) IN BLOOD: NORMAL
ALBUMIN SERPL BCP-MCNC: 3.8 G/DL (ref 3.4–5)
ANION GAP SERPL CALC-SCNC: 10 MMOL/L (ref 10–20)
ANTIBODY SCREEN: NORMAL
APPEARANCE UR: CLEAR
BASOPHILS # BLD AUTO: 0.06 X10*3/UL (ref 0–0.1)
BASOPHILS NFR BLD AUTO: 0.6 %
BILIRUB UR STRIP.AUTO-MCNC: NEGATIVE MG/DL
BLOOD EXPIRATION DATE: NORMAL
BLOOD EXPIRATION DATE: NORMAL
BUN SERPL-MCNC: 12 MG/DL (ref 6–23)
CALCIUM SERPL-MCNC: 8.7 MG/DL (ref 8.6–10.6)
CHLORIDE SERPL-SCNC: 105 MMOL/L (ref 98–107)
CO2 SERPL-SCNC: 29 MMOL/L (ref 21–32)
COLOR UR: YELLOW
CREAT SERPL-MCNC: 1 MG/DL (ref 0.5–1.05)
DISPENSE STATUS: NORMAL
DISPENSE STATUS: NORMAL
EGFRCR SERPLBLD CKD-EPI 2021: 72 ML/MIN/1.73M*2
EOSINOPHIL # BLD AUTO: 0.24 X10*3/UL (ref 0–0.7)
EOSINOPHIL NFR BLD AUTO: 2.3 %
ERYTHROCYTE [DISTWIDTH] IN BLOOD BY AUTOMATED COUNT: 15.1 % (ref 11.5–14.5)
GLUCOSE SERPL-MCNC: 93 MG/DL (ref 74–99)
GLUCOSE UR STRIP.AUTO-MCNC: NORMAL MG/DL
HCT VFR BLD AUTO: 19.5 % (ref 36–46)
HGB BLD-MCNC: 6.8 G/DL (ref 12–16)
IMM GRANULOCYTES # BLD AUTO: 0.03 X10*3/UL (ref 0–0.7)
IMM GRANULOCYTES NFR BLD AUTO: 0.3 % (ref 0–0.9)
KETONES UR STRIP.AUTO-MCNC: NEGATIVE MG/DL
LEUKOCYTE ESTERASE UR QL STRIP.AUTO: NEGATIVE
LYMPHOCYTES # BLD AUTO: 4.03 X10*3/UL (ref 1.2–4.8)
LYMPHOCYTES NFR BLD AUTO: 39.2 %
MAGNESIUM SERPL-MCNC: 2.31 MG/DL (ref 1.6–2.4)
MCH RBC QN AUTO: 30.4 PG (ref 26–34)
MCHC RBC AUTO-ENTMCNC: 34.9 G/DL (ref 32–36)
MCV RBC AUTO: 87 FL (ref 80–100)
MONOCYTES # BLD AUTO: 0.48 X10*3/UL (ref 0.1–1)
MONOCYTES NFR BLD AUTO: 4.7 %
MUCOUS THREADS #/AREA URNS AUTO: NORMAL /LPF
NEUTROPHILS # BLD AUTO: 5.43 X10*3/UL (ref 1.2–7.7)
NEUTROPHILS NFR BLD AUTO: 52.9 %
NITRITE UR QL STRIP.AUTO: NEGATIVE
NRBC BLD-RTO: 0 /100 WBCS (ref 0–0)
PH UR STRIP.AUTO: 6 [PH]
PHOSPHATE SERPL-MCNC: 3.9 MG/DL (ref 2.5–4.9)
PLATELET # BLD AUTO: 302 X10*3/UL (ref 150–450)
POTASSIUM SERPL-SCNC: 4 MMOL/L (ref 3.5–5.3)
PRODUCT BLOOD TYPE: 9500
PRODUCT BLOOD TYPE: 9500
PRODUCT CODE: NORMAL
PRODUCT CODE: NORMAL
PROT UR STRIP.AUTO-MCNC: NORMAL MG/DL
RBC # BLD AUTO: 2.24 X10*6/UL (ref 4–5.2)
RBC # UR STRIP.AUTO: NEGATIVE /UL
RBC #/AREA URNS AUTO: NORMAL /HPF
RH FACTOR (ANTIGEN D): NORMAL
RH FACTOR (ANTIGEN D): NORMAL
SODIUM SERPL-SCNC: 140 MMOL/L (ref 136–145)
SP GR UR STRIP.AUTO: 1.02
SQUAMOUS #/AREA URNS AUTO: NORMAL /HPF
UNIT ABO: NORMAL
UNIT ABO: NORMAL
UNIT NUMBER: NORMAL
UNIT NUMBER: NORMAL
UNIT RH: NORMAL
UNIT RH: NORMAL
UNIT VOLUME: 271
UNIT VOLUME: 350
UROBILINOGEN UR STRIP.AUTO-MCNC: NORMAL MG/DL
WBC # BLD AUTO: 10.3 X10*3/UL (ref 4.4–11.3)
WBC #/AREA URNS AUTO: NORMAL /HPF
XM INTEP: NORMAL
XM INTEP: NORMAL

## 2024-12-06 PROCEDURE — 2500000005 HC RX 250 GENERAL PHARMACY W/O HCPCS

## 2024-12-06 PROCEDURE — S0109 METHADONE ORAL 5MG: HCPCS

## 2024-12-06 PROCEDURE — 99233 SBSQ HOSP IP/OBS HIGH 50: CPT | Performed by: PHYSICIAN ASSISTANT

## 2024-12-06 PROCEDURE — 2500000004 HC RX 250 GENERAL PHARMACY W/ HCPCS (ALT 636 FOR OP/ED): Performed by: PHYSICIAN ASSISTANT

## 2024-12-06 PROCEDURE — 1170000001 HC PRIVATE ONCOLOGY ROOM DAILY

## 2024-12-06 PROCEDURE — 2500000004 HC RX 250 GENERAL PHARMACY W/ HCPCS (ALT 636 FOR OP/ED)

## 2024-12-06 PROCEDURE — 86850 RBC ANTIBODY SCREEN: CPT

## 2024-12-06 PROCEDURE — 2500000001 HC RX 250 WO HCPCS SELF ADMINISTERED DRUGS (ALT 637 FOR MEDICARE OP)

## 2024-12-06 PROCEDURE — 2500000002 HC RX 250 W HCPCS SELF ADMINISTERED DRUGS (ALT 637 FOR MEDICARE OP, ALT 636 FOR OP/ED)

## 2024-12-06 PROCEDURE — 2500000001 HC RX 250 WO HCPCS SELF ADMINISTERED DRUGS (ALT 637 FOR MEDICARE OP): Performed by: PHYSICIAN ASSISTANT

## 2024-12-06 PROCEDURE — 83735 ASSAY OF MAGNESIUM: CPT

## 2024-12-06 PROCEDURE — 83021 HEMOGLOBIN CHROMOTOGRAPHY: CPT | Performed by: PHYSICIAN ASSISTANT

## 2024-12-06 PROCEDURE — 80069 RENAL FUNCTION PANEL: CPT

## 2024-12-06 PROCEDURE — 86920 COMPATIBILITY TEST SPIN: CPT

## 2024-12-06 PROCEDURE — 81003 URINALYSIS AUTO W/O SCOPE: CPT | Performed by: PHYSICIAN ASSISTANT

## 2024-12-06 PROCEDURE — 86901 BLOOD TYPING SEROLOGIC RH(D): CPT

## 2024-12-06 PROCEDURE — 2500000004 HC RX 250 GENERAL PHARMACY W/ HCPCS (ALT 636 FOR OP/ED): Performed by: STUDENT IN AN ORGANIZED HEALTH CARE EDUCATION/TRAINING PROGRAM

## 2024-12-06 PROCEDURE — 36430 TRANSFUSION BLD/BLD COMPNT: CPT | Performed by: REGISTERED NURSE

## 2024-12-06 PROCEDURE — 85025 COMPLETE CBC W/AUTO DIFF WBC: CPT

## 2024-12-06 PROCEDURE — S4993 CONTRACEPTIVE PILLS FOR BC: HCPCS

## 2024-12-06 PROCEDURE — P9016 RBC LEUKOCYTES REDUCED: HCPCS

## 2024-12-06 PROCEDURE — 81001 URINALYSIS AUTO W/SCOPE: CPT | Performed by: PHYSICIAN ASSISTANT

## 2024-12-06 RX ORDER — IBUPROFEN 600 MG/1
600 TABLET ORAL EVERY 6 HOURS PRN
Status: DISCONTINUED | OUTPATIENT
Start: 2024-12-06 | End: 2024-12-06

## 2024-12-06 RX ORDER — KETOROLAC TROMETHAMINE 15 MG/ML
15 INJECTION, SOLUTION INTRAMUSCULAR; INTRAVENOUS EVERY 6 HOURS SCHEDULED
Status: COMPLETED | OUTPATIENT
Start: 2024-12-06 | End: 2024-12-07

## 2024-12-06 RX ORDER — DEFEROXAMINE MESYLATE 2 G/1
2000 INJECTION, POWDER, LYOPHILIZED, FOR SOLUTION INTRAMUSCULAR; INTRAVENOUS; SUBCUTANEOUS DAILY
Status: DISCONTINUED | OUTPATIENT
Start: 2024-12-06 | End: 2024-12-06

## 2024-12-06 RX ORDER — HYDROXYUREA 500 MG/1
1000 CAPSULE ORAL DAILY
Status: DISCONTINUED | OUTPATIENT
Start: 2024-12-06 | End: 2024-12-06

## 2024-12-06 RX ORDER — LIDOCAINE 560 MG/1
2 PATCH PERCUTANEOUS; TOPICAL; TRANSDERMAL DAILY
Status: DISPENSED | OUTPATIENT
Start: 2024-12-06

## 2024-12-06 RX ORDER — CHOLECALCIFEROL (VITAMIN D3) 25 MCG
2000 TABLET ORAL DAILY
Status: DISPENSED | OUTPATIENT
Start: 2024-12-06

## 2024-12-06 RX ORDER — HYDROXYUREA 500 MG/1
500 CAPSULE ORAL
Status: DISCONTINUED | OUTPATIENT
Start: 2024-12-07 | End: 2024-12-06

## 2024-12-06 RX ORDER — VALGANCICLOVIR 450 MG/1
500 TABLET, FILM COATED ORAL DAILY
Status: DISCONTINUED | OUTPATIENT
Start: 2024-12-06 | End: 2024-12-06

## 2024-12-06 RX ORDER — HYDROXYUREA 500 MG/1
1000 CAPSULE ORAL
Status: DISCONTINUED | OUTPATIENT
Start: 2024-12-06 | End: 2024-12-06

## 2024-12-06 RX ORDER — HYDROMORPHONE HYDROCHLORIDE 1 MG/ML
2 INJECTION, SOLUTION INTRAMUSCULAR; INTRAVENOUS; SUBCUTANEOUS EVERY 2 HOUR PRN
Status: DISPENSED | OUTPATIENT
Start: 2024-12-06

## 2024-12-06 RX ORDER — FOLIC ACID 1 MG/1
1 TABLET ORAL DAILY
Status: DISPENSED | OUTPATIENT
Start: 2024-12-06

## 2024-12-06 RX ORDER — DEFERASIROX 360 MG/1
360 TABLET, FILM COATED ORAL DAILY
Status: DISCONTINUED | OUTPATIENT
Start: 2024-12-06 | End: 2024-12-06

## 2024-12-06 ASSESSMENT — PAIN SCALES - GENERAL
PAINLEVEL_OUTOF10: 10 - WORST POSSIBLE PAIN
PAINLEVEL_OUTOF10: 8
PAINLEVEL_OUTOF10: 10 - WORST POSSIBLE PAIN

## 2024-12-06 ASSESSMENT — PAIN SCALES - WONG BAKER
WONGBAKER_NUMERICALRESPONSE: HURTS WORST
WONGBAKER_NUMERICALRESPONSE: HURTS WORST

## 2024-12-06 ASSESSMENT — COGNITIVE AND FUNCTIONAL STATUS - GENERAL
STANDING UP FROM CHAIR USING ARMS: A LITTLE
WALKING IN HOSPITAL ROOM: A LITTLE
MOBILITY SCORE: 20
MOVING TO AND FROM BED TO CHAIR: A LITTLE
CLIMB 3 TO 5 STEPS WITH RAILING: A LITTLE

## 2024-12-06 NOTE — PROGRESS NOTES
"Mary Alice Aragon is a 44 y.o. female on day 3 of admission presenting with Acute appendicitis.    Subjective   Seen at bedside, c/o legs and abd at incision site not well controlled. We discussed doing IV pushes of Dilaudid instead of dPCA and re-eval. States that pain feels more like pain crisis than pain from surgery and assured her that she could hav e gone into a crisis from having surgery. Had 1 BM yesterday and was encouraged to keep up with bowel regimen. Denies fever/chills, N/V/D, weakness, headache, chest pain, SOB. Used 3L of oxygen at home for nocturnal hypoxia and also uses oxygen for comfort when admitted for pain crisis.     Objective     Physical Exam  Constitutional:       Appearance: Normal appearance.   HENT:      Head: Normocephalic and atraumatic.   Eyes:      Extraocular Movements: Extraocular movements intact.   Cardiovascular:      Rate and Rhythm: Normal rate and regular rhythm.   Pulmonary:      Effort: Pulmonary effort is normal.      Breath sounds: Normal breath sounds.      Comments: 3L Oxygen  Abdominal:      General: Abdomen is flat. Bowel sounds are normal.      Palpations: Abdomen is soft.      Comments: Abdominal incision clean and dry, no discharge   Musculoskeletal:         General: No swelling or tenderness.   Skin:     General: Skin is warm and dry.   Neurological:      General: No focal deficit present.      Mental Status: She is alert and oriented to person, place, and time.   Psychiatric:         Mood and Affect: Mood normal.         Behavior: Behavior normal.         Last Recorded Vitals  Blood pressure 107/71, pulse 82, temperature 36.4 °C (97.5 °F), temperature source Temporal, resp. rate 18, height 1.6 m (5' 3\"), weight 68 kg (149 lb 14.6 oz), SpO2 93%.  Intake/Output last 3 Shifts:  I/O last 3 completed shifts:  In: 2052.4 (30.2 mL/kg) [P.O.:750; I.V.:1302.4 (19.2 mL/kg)]  Out: - (0 mL/kg)   Weight: 68 kg     Relevant Results  CT angio abdomen pelvis w and or wo IV IV " contrast  Result Date: 12/5/2024  VASCULAR: 1. No evidence of contrast extravasation to suggest active gastrointestinal bleed.   ABDOMEN PELVIS: 1. Trace pneumoperitoneum and air in the abdominal wall soft tissues likely related to recent surgical procedure. 2. Thickening of the bladder wall which appears similar to prior imaging, likely related to underdistention however correlation with urinalysis recommended if there is concern for urinary tract infection. 3. Sequela of sickle cell disease including lobulated spleen and mottled appearance of vertebral bodies 4. Additional incidental non-acute findings as detailed above.   I personally reviewed the images/study and I agree with the findings as stated by Dr. Patricia Garcia. This study was interpreted at [place], [place].   MACRO: None.   Signed by: Jamel Cabrera 12/5/2024 11:56 AM Dictation workstation:   [id number]    CT abdomen pelvis w IV contrast  Result Date: 12/2/2024      Constipation. Appendix is slightly prominent with minimal periappendiceal inflammation. Early appendicitis should be considered.   Degree of stool limiting assessment of colonic mucosa.   Features of sickle cell disease.   MACRO: None   Signed by: Román Bella 12/2/2024 8:15 PM Dictation workstation:   [id number]      This patient has a central line   Reason for the central line remaining today? Meds       Assessment/Plan   Assessment & Plan  Acute appendicitis  44 year-old female with PMH of HbSS on hydroxyurea 1500 (S/T/Th/S)-1000 (M/W/F), nocturnal hypoxia on 2-3L as needed,  c/b transfusion-related iron overload on Deferoxamine, provoked PE 2009 (postpartum) treated with AC for 6 months, cerebral aneurysm, NICHOL/MDD  who presented to ED on 12/2 with abdominal pain, found to have acute appendicitis. She is now s/p laparoscopic appendectomy on 12/3. During this hospital stay he dropped her hgb and was transfusion 1 unit of PRBC on 12/6,  initial concern for intraabdominal bleed, CTA done showed on 12/5: no active bleed, low suspicion for intraabdominal bleed. On 12/6 she was transfer from ACS service for management of sickle cell pain.      # Abdominal pain from acute appendicitis  - s/p laparoscopic appendectomy on 12/3  - Hgb dropped to 6.8 from 7.5, initial concern for intraabdominal bleed, CTA done showed on 12/5: no active bleed, low suspicion for intraabdominal bleed  - s/P 1 unit of PRBC 12/6, will follow up post transfusion hgb    # Sickle cell pain crisis   - [facility] reviewed, last script for Dilaudid 8mg X14 days was filled on 11/27   - Care path 12/4/2024 reviewed: Rec SQ/IV Dilaudid 2 mg every 2 hours as needed for severe pain. May wean if improved and rotate with his home oral pain regimen prior to discharge   - Hgb baseline 6.7-6.8, received 2 units of 12/4 and 12/6 post op   - Methadone 10mg q12 for chronic pain   - Added Voltaren 15mg AMARA X1 day, DC Ibuprofen   - Cont home Lyrica 25 mg BID   - Robaxin 500mg q6   - Cont home Hydrea   -  for pain management will DC dPCA and start IV Dilaudid 2mg q2 hrs PRN   - Tylenol 650 mg every 6 hours as needed   - Cont Folic acid   - Bowel regimen while on narcotics  - Benadryl for opioid induced pruritus   - Zofran for Opioid induced N/V  - Continue monthly ketamine infusions with pain service and last received this on 10/9/24   - Incentive spirometry, PRN O2 (on 3L O2 at home nocturnal hypoxia), usually takes 3L continuous for comfort with her pain crisis, will discontinue prior to discharge    # Iron overload   - secondary to chronic transfusions  - Ferritin level is 2285 ng/mL   - Continue current chelation therapy with desferal 2 g daily subcutaneous and oral jadenu 360 mg daily    # GI/DVT ppx  - Protonix 40 daily  - lovenox, continue SCDs    # H/O cerebral aneurysm  - She has not seen neurosurgery in a while    - Will need to follow up outpatient     # History of NICHOL and MDD  - Follow up with  behavioral health as scheduled  - Continue Prozac and hydroxyzine   - Cont Trazodone for sleep      # Dispo:  - Full code  - Discharge when pain control is optimized   DIAN (obstructive sleep apnea)    Acute appendicitis, unspecified acute appendicitis type           I spent 60 minutes in the professional and overall care of this patient.      Casey Andrews PA-C

## 2024-12-06 NOTE — PROGRESS NOTES
[facility]  ACUTE CARE SURGERY - PROGRESS NOTE    Patient Name: Mary Alice Aragon  MRN: [id number]  Admit Date: 1203  : 1980  AGE: 44 y.o.   GENDER: female  ==============================================================================  TODAY'S ASSESSMENT AND PLAN OF CARE:  Primary  44 year-old female with a past medical history significant sickle cell anemia and chronic pain who presented to ED on  with abdominal pain, found to have acute appendicitis.     OR Course:  12/3: laparoscopic appendectomy    NEURO: sickle cell pain crisis, postoperative pain  - Multimodal pain control with:   > Continue PCA   > Ropivacaine 14cc/hr AMBIT pump   > acetaminophen 650mg q6   > Methadone 10mg q12 (for chronic opioid use)   > robaxin 500mg q6    CV: No acute issues    PULM:  - Incentive spirometry, PRN O2 (on 3L O2 at home for sickle cell)    GI:  - Regular Diet  - Serial abdominal exams  - Bowel regimen    :  - Replete electrolytes to goal K>4, Mg>2, Phos>3    HEME: Hx of sickle cell anemia, concern for postop bleed  -Sickle Cell Team/Jake following, appreciate recs  -s/p 1u pRBC  for sickle cell pain crisis and Hgb 6.6 (baseline Hgb is 6.7-6.8)  - will transfuse 1u pRBC today   -CTA obtained : no active bleed, low suspicion for intraabdominal bleed  -OK to resume DVT ppx    ENDO: no acute issues    ID: no acute issues  - reactive leukocytosis trending down    MSK/SKIN:  - Laparoscopic sites closed with intradermal sutures then with dermabond    F:  LR @ 108mL/hr   E: Replete as needed   N: Regular diet   GI ppx: Protonix 40 daily  DVT ppx: lovenox, continue SCDs    Dispo: Will transfer patient Joseph/[facility] service for management of sickle cell disease w/ sickle cell pain crisis. Appreciate their assistance.     Discussed with attending Dr. Evelin Perez MD  General Surgery Resident  PGY-2  ACS  [id number]  ==============================================================================  CHIEF COMPLAINT / EVENTS LAST 24HRS / HPI:  No acute events overnight. Still endorses pain from sickle cell. Pain localized to extremities and abdomen.     MEDICAL HISTORY / ROS:   Admission history reviewed. Pertinent changes as follows:  None.    A 12-point review of systems was performed, and was negative except as above.     Objective   Vital Signs past 24h:  Heart Rate:  [76-96]   Temp:  [36.4 °C (97.5 °F)-36.7 °C (98.1 °F)]   Resp:  [16-18]   BP: ()/(58-77)   SpO2:  [96 %-99 %]     I/O past 24h:  I/O last 2 completed shifts:  In: 2052.4 (30.2 mL/kg) [P.O.:750; I.V.:1302.4 (19.2 mL/kg)]  Out: - (0 mL/kg)   Weight: 68 kg      PHYSICAL EXAM:  GEN: No acute distress  NEURO: Alert and oriented x3. No focal deficits.  HEENT: Sclera anicteric. Moist mucous membranes.  RESP: unlabored breathing on room air, equal chest rise.   CV: RRR, normotensive  GI: Abdomen soft, mildly distended, tender palpation RUQ and RLQ, no rebound tenderness, no peritonitis, +voluntary guarding  : Voiding spontaneously.  MSK: RICO x4  SKIN: Port sites well-approximated with dermabond.    Labs past 24h:  Results for orders placed or performed during the hospital encounter of 12/03/24 (from the past 24 hours)   CBC   Result Value Ref Range    WBC 12.7 (H) 4.4 - 11.3 x10*3/uL    nRBC 0.0 0.0 - 0.0 /100 WBCs    RBC 2.49 (L) 4.00 - 5.20 x10*6/uL    Hemoglobin 7.5 (L) 12.0 - 16.0 g/dL    Hematocrit 21.4 (L) 36.0 - 46.0 %    MCV 86 80 - 100 fL    MCH 30.1 26.0 - 34.0 pg    MCHC 35.0 32.0 - 36.0 g/dL    RDW 15.5 (H) 11.5 - 14.5 %    Platelets 337 150 - 450 x10*3/uL     *Note: Due to a large number of results and/or encounters for the requested time period, some results have not been displayed. A complete set of results can be found in Results Review.       Imaging within past 24h:  CT angio abdomen pelvis w and or wo IV IV contrast    Result Date:  12/5/2024  Interpreted By:  Jamel Cabrera and Dulla Kireeti STUDY: CT ANGIO ABDOMEN PELVIS W AND/OR WO IV IV CONTRAST;  12/5/2024 10:24 am   INDICATION: Signs/Symptoms:Intraabdominal Bleed.     COMPARISON: None.   ACCESSION NUMBER(S): [id number]   ORDERING CLINICIAN: JENNIFER SLOAN   TECHNIQUE: Contiguous non-contrast axial images of the abdomen and pelvis were initially obtained.   Thin-section axial images of the abdomen and pelvis were obtained in the arterial phase after intravenous administration of 75 mL Omnipaque 350 contrast. Sagittal and coronal reformatted images were provided. MIP images and 3D reconstructions were created on an independent workstation and reviewed.   FINDINGS: VASCULATURE:   THORACIC AORTA: Non-contrast images show no evidence of acute intramural hematoma. No thoracic aortic aneurysm or dissection. No significant thoracic aortic atherosclerosis.   ABDOMINAL AORTA: No abdominal aortic aneurysm or dissection. No significant abdominal aortic atherosclerosis.   ABDOMINAL AND PELVIC ARTERIES:   Celiac artery demonstrates no significant focal stenosis.   Superior mesenteric artery demonstrates no significant focal stenosis.   Inferior mesenteric artery demonstrates no significant focal stenosis.   There is a single right renal artery.  Right renal artery demonstrates no significant focal stenosis.   There is a single left renal artery.  Left renal artery demonstrates no significant focal stenosis.   RIGHT LEG: Right common iliac artery is widely patent with no significant stenosis. Right external iliac artery is widely patent with no significant stenosis. Right internal iliac artery is widely patent with no significant stenosis. Right common femoral artery is widely patent with no significant stenosis. Visualized proximal right profunda femoris artery is widely patent with no significant stenosis. Visualized proximal right superficial femoral artery is widely patent with no significant  stenosis.   LEFT LEG: Left common iliac artery is widely patent with no significant stenosis. Left external iliac artery is widely patent with no significant stenosis. Left internal iliac artery is widely patent with no significant stenosis. Left common femoral artery is widely patent with no significant stenosis. Visualized proximal left profunda femoris artery is widely patent with no significant stenosis. Visualized proximal left superficial femoral artery is widely patent with no significant stenosis.     CT LOWER CHEST: The visualized lung base is unremarkable. The heart is normal in size without pericardial effusion. No pleural effusion is present. Double-lumen catheter is partially visualized and seen terminating in the right atrium. Visualized distal esophagus appears normal.   CT ABDOMEN/PELVIS:   LIVER: The liver is normal in size measuring 17.3 cm in cranial caudal dimensions without evidence of focal liver lesions.   BILE DUCTS: The intrahepatic and extrahepatic ducts are not dilated.   GALLBLADDER: Gallbladder is surgically absent.   PANCREAS: The pancreas appears unremarkable without evidence of ductal dilatation or masses.   SPLEEN: And   ADRENAL GLANDS: Bilateral adrenal glands appear normal.   KIDNEYS AND URETERS: The kidneys are normal in size and enhance symmetrically. No hydroureteronephrosis or nephroureterolithiasis is identified.     BLADDER: Thickening of the bladder wall which may related to urinary tract infection versus underdistention.   REPRODUCTIVE ORGANS: Lobulated appearance of the uterus likely corresponding to uterine fibroids.   BOWEL: The stomach, small and large bowel are normal in appearance without wall thickening or obstruction. The small and large bowel are normal in caliber. Patient is status post appendectomy   VESSELS: Please see vasculature above. The IVC appears normal.   PERITONEUM/RETROPERITONEUM/LYMPH NODES: There is trace pneumoperitoneum which is likely related to  patient's recent surgery.. The retroperitoneum appears normal. No abdominopelvic lymphadenopathy is present by CT criteria.   BONES AND ABDOMINAL WALL: Mottled appearance of the vertebral bodies and H-shaped appearance consistent with sequela of sickle cell disease. Fat stranding and small foci of gas within the abdominal wall soft tissue likely related to recent laparoscopic access..       VASCULAR: 1. No evidence of contrast extravasation to suggest active gastrointestinal bleed.   ABDOMEN PELVIS: 1. Trace pneumoperitoneum and air in the abdominal wall soft tissues likely related to recent surgical procedure. 2. Thickening of the bladder wall which appears similar to prior imaging, likely related to underdistention however correlation with urinalysis recommended if there is concern for urinary tract infection. 3. Sequela of sickle cell disease including lobulated spleen and mottled appearance of vertebral bodies 4. Additional incidental non-acute findings as detailed above.     I personally reviewed the images/study and I agree with the findings as stated by Dr. Patricia Garcia. This study was interpreted at [facility], [place], [place].   MACRO: None.   Signed by: Jamel Cabrera 12/5/2024 11:56 AM Dictation workstation:   [id number]     I have reviewed the imaging above as it pertains to the patient's surgical concerns and agree with the radiologist's interpretation.     Patient's exam, labs, and findings discussed and seen with  Dr. Waters , who agrees with the plan as described above.

## 2024-12-06 NOTE — PROGRESS NOTES
[facility]  ACUTE CARE SURGERY - PROGRESS NOTE    Patient Name: Mary Alice Aragon  MRN: [id number]  Admit Date: 1203  : 1980  AGE: 44 y.o.   GENDER: female  ==============================================================================  TODAY'S ASSESSMENT AND PLAN OF CARE:  Primary  44 year-old female with a past medical history significant sickle cell anemia and chronic pain who presented to ED on  with abdominal pain, found to have acute appendicitis.     OR Course:  12/3: laparoscopic appendectomy    NEURO: sickle cell pain crisis, postoperative pain  - Multimodal pain control with:   > PCA today given uncontrolled pain   > Ropivacaine 14cc/hr AMBIT pump   > acetaminophen 650mg q6   > Methadone 10mg q12 (for chronic opioid use)   > robaxin 500mg q6    CV: No acute issues    PULM:  - Incentive spirometry, PRN O2 (on 3L O2 at home for sickle cell)    GI:  - Regular Diet  - Serial abdominal exams  - Bowel regimen    :  - Replete electrolytes to goal K>4, Mg>2, Phos>3    HEME: Hx of sickle cell anemia, concern for postop bleed  -Sickle Cell Team/Jake underwood, appreciate recs  -s/p 1u pRBC  for sickle cell pain crisis and Hgb 6.6 (baseline Hgb is 6.7-6.8)  -Plan for CTA abd/pel today to rule out acute intraabdominal bleed - negative  - Anemia due to sickle cell crisis - continue to transfuse Hgb >7   - restarted lovenox ppx      ENDO: no acute issues    ID: no acute issues  - reactive leukocytosis trending down    MSK/SKIN:  - Laparoscopic sites closed with intradermal sutures then with dermabond    F:  LR @ 108mL/hr   E: Replete as needed   N: Regular diet   GI ppx: Protonix 40 daily  DVT ppx: lovenox held, continue SCDs    Dispo:  Transfer to hematology service     Discussed with attending Dr. Waters  AET  ==============================================================================  CHIEF COMPLAINT / EVENTS LAST 24HRS / HPI:  No acute events overnight. Patient  received 1u pRBC with appropriate Hgb increment 6.7 -> 7.8. Pain symptoms also improved with transfusion. Denies dizziness, shortness of breath, chest pain. Endorses right upper and lower abdominal pain. Patient is tolerating diet, has gotten out of bed. Her goal is to walk to door and back.     MEDICAL HISTORY / ROS:   Admission history reviewed. Pertinent changes as follows:  None.    A 12-point review of systems was performed, and was negative except as above.     Objective   Vital Signs past 24h:  Heart Rate:  [69-96]   Temp:  [36.3 °C (97.3 °F)-36.6 °C (97.9 °F)]   Resp:  [16-18]   BP: ()/(58-77)   SpO2:  [94 %-99 %]     I/O past 24h:  I/O last 2 completed shifts:  In: 1314 (19.3 mL/kg) [P.O.:450; I.V.:864 (12.7 mL/kg)]  Out: - (0 mL/kg)   Weight: 68 kg      PHYSICAL EXAM:  GEN: No acute distress  NEURO: Alert and oriented x3. No focal deficits.  HEENT: Sclera anicteric. Moist mucous membranes.  RESP: unlabored breathing on room air, equal chest rise.   CV: RRR, normotensive  GI: Abdomen soft, mildly distended, tender palpation RUQ and RLQ, no rebound tenderness, no peritonitis  : Voiding spontaneously.  MSK: RICO x4  SKIN: Port sites well-approximated with dermabond.    Labs past 24h:  Results for orders placed or performed during the hospital encounter of 12/03/24 (from the past 24 hours)   CBC   Result Value Ref Range    WBC 12.7 (H) 4.4 - 11.3 x10*3/uL    nRBC 0.0 0.0 - 0.0 /100 WBCs    RBC 2.49 (L) 4.00 - 5.20 x10*6/uL    Hemoglobin 7.5 (L) 12.0 - 16.0 g/dL    Hematocrit 21.4 (L) 36.0 - 46.0 %    MCV 86 80 - 100 fL    MCH 30.1 26.0 - 34.0 pg    MCHC 35.0 32.0 - 36.0 g/dL    RDW 15.5 (H) 11.5 - 14.5 %    Platelets 337 150 - 450 x10*3/uL   CBC and Auto Differential   Result Value Ref Range    WBC 10.3 4.4 - 11.3 x10*3/uL    nRBC 0.0 0.0 - 0.0 /100 WBCs    RBC 2.24 (L) 4.00 - 5.20 x10*6/uL    Hemoglobin 6.8 (L) 12.0 - 16.0 g/dL    Hematocrit 19.5 (L) 36.0 - 46.0 %    MCV 87 80 - 100 fL    MCH 30.4 26.0  - 34.0 pg    MCHC 34.9 32.0 - 36.0 g/dL    RDW 15.1 (H) 11.5 - 14.5 %    Platelets 302 150 - 450 x10*3/uL    Neutrophils % 52.9 40.0 - 80.0 %    Immature Granulocytes %, Automated 0.3 0.0 - 0.9 %    Lymphocytes % 39.2 13.0 - 44.0 %    Monocytes % 4.7 2.0 - 10.0 %    Eosinophils % 2.3 0.0 - 6.0 %    Basophils % 0.6 0.0 - 2.0 %    Neutrophils Absolute 5.43 1.20 - 7.70 x10*3/uL    Immature Granulocytes Absolute, Automated 0.03 0.00 - 0.70 x10*3/uL    Lymphocytes Absolute 4.03 1.20 - 4.80 x10*3/uL    Monocytes Absolute 0.48 0.10 - 1.00 x10*3/uL    Eosinophils Absolute 0.24 0.00 - 0.70 x10*3/uL    Basophils Absolute 0.06 0.00 - 0.10 x10*3/uL   Renal function panel   Result Value Ref Range    Glucose 93 74 - 99 mg/dL    Sodium 140 136 - 145 mmol/L    Potassium 4.0 3.5 - 5.3 mmol/L    Chloride 105 98 - 107 mmol/L    Bicarbonate 29 21 - 32 mmol/L    Anion Gap 10 10 - 20 mmol/L    Urea Nitrogen 12 6 - 23 mg/dL    Creatinine 1.00 0.50 - 1.05 mg/dL    eGFR 72 >60 mL/min/1.73m*2    Calcium 8.7 8.6 - 10.6 mg/dL    Phosphorus 3.9 2.5 - 4.9 mg/dL    Albumin 3.8 3.4 - 5.0 g/dL   Magnesium   Result Value Ref Range    Magnesium 2.31 1.60 - 2.40 mg/dL     *Note: Due to a large number of results and/or encounters for the requested time period, some results have not been displayed. A complete set of results can be found in Results Review.       Imaging within past 24h:  CT angio abdomen pelvis w and or wo IV IV contrast    Result Date: 12/5/2024  Interpreted By:  Jamel Cabrera  and Radha Gomez STUDY: CT ANGIO ABDOMEN PELVIS W AND/OR WO IV IV CONTRAST;  12/5/2024 10:24 am   INDICATION: Signs/Symptoms:Intraabdominal Bleed.     COMPARISON: None.   ACCESSION NUMBER(S): [id number]   ORDERING CLINICIAN: JENNIFER SLOAN   TECHNIQUE: Contiguous non-contrast axial images of the abdomen and pelvis were initially obtained.   Thin-section axial images of the abdomen and pelvis were obtained in the arterial phase after intravenous  administration of 75 mL Omnipaque 350 contrast. Sagittal and coronal reformatted images were provided. MIP images and 3D reconstructions were created on an independent workstation and reviewed.   FINDINGS: VASCULATURE:   THORACIC AORTA: Non-contrast images show no evidence of acute intramural hematoma. No thoracic aortic aneurysm or dissection. No significant thoracic aortic atherosclerosis.   ABDOMINAL AORTA: No abdominal aortic aneurysm or dissection. No significant abdominal aortic atherosclerosis.   ABDOMINAL AND PELVIC ARTERIES:   Celiac artery demonstrates no significant focal stenosis.   Superior mesenteric artery demonstrates no significant focal stenosis.   Inferior mesenteric artery demonstrates no significant focal stenosis.   There is a single right renal artery.  Right renal artery demonstrates no significant focal stenosis.   There is a single left renal artery.  Left renal artery demonstrates no significant focal stenosis.   RIGHT LEG: Right common iliac artery is widely patent with no significant stenosis. Right external iliac artery is widely patent with no significant stenosis. Right internal iliac artery is widely patent with no significant stenosis. Right common femoral artery is widely patent with no significant stenosis. Visualized proximal right profunda femoris artery is widely patent with no significant stenosis. Visualized proximal right superficial femoral artery is widely patent with no significant stenosis.   LEFT LEG: Left common iliac artery is widely patent with no significant stenosis. Left external iliac artery is widely patent with no significant stenosis. Left internal iliac artery is widely patent with no significant stenosis. Left common femoral artery is widely patent with no significant stenosis. Visualized proximal left profunda femoris artery is widely patent with no significant stenosis. Visualized proximal left superficial femoral artery is widely patent with no  significant stenosis.     CT LOWER CHEST: The visualized lung base is unremarkable. The heart is normal in size without pericardial effusion. No pleural effusion is present. Double-lumen catheter is partially visualized and seen terminating in the right atrium. Visualized distal esophagus appears normal.   CT ABDOMEN/PELVIS:   LIVER: The liver is normal in size measuring 17.3 cm in cranial caudal dimensions without evidence of focal liver lesions.   BILE DUCTS: The intrahepatic and extrahepatic ducts are not dilated.   GALLBLADDER: Gallbladder is surgically absent.   PANCREAS: The pancreas appears unremarkable without evidence of ductal dilatation or masses.   SPLEEN: And   ADRENAL GLANDS: Bilateral adrenal glands appear normal.   KIDNEYS AND URETERS: The kidneys are normal in size and enhance symmetrically. No hydroureteronephrosis or nephroureterolithiasis is identified.     BLADDER: Thickening of the bladder wall which may related to urinary tract infection versus underdistention.   REPRODUCTIVE ORGANS: Lobulated appearance of the uterus likely corresponding to uterine fibroids.   BOWEL: The stomach, small and large bowel are normal in appearance without wall thickening or obstruction. The small and large bowel are normal in caliber. Patient is status post appendectomy   VESSELS: Please see vasculature above. The IVC appears normal.   PERITONEUM/RETROPERITONEUM/LYMPH NODES: There is trace pneumoperitoneum which is likely related to patient's recent surgery.. The retroperitoneum appears normal. No abdominopelvic lymphadenopathy is present by CT criteria.   BONES AND ABDOMINAL WALL: Mottled appearance of the vertebral bodies and H-shaped appearance consistent with sequela of sickle cell disease. Fat stranding and small foci of gas within the abdominal wall soft tissue likely related to recent laparoscopic access..       VASCULAR: 1. No evidence of contrast extravasation to suggest active gastrointestinal bleed.    ABDOMEN PELVIS: 1. Trace pneumoperitoneum and air in the abdominal wall soft tissues likely related to recent surgical procedure. 2. Thickening of the bladder wall which appears similar to prior imaging, likely related to underdistention however correlation with urinalysis recommended if there is concern for urinary tract infection. 3. Sequela of sickle cell disease including lobulated spleen and mottled appearance of vertebral bodies 4. Additional incidental non-acute findings as detailed above.     I personally reviewed the images/study and I agree with the findings as stated by Dr. Patricia Garcia. This study was interpreted at [place], [place].   MACRO: None.   Signed by: Jamel Cabrera 12/5/2024 11:56 AM Dictation workstation:   [id number]     I have reviewed the imaging above as it pertains to the patient's surgical concerns and agree with the radiologist's interpretation.     Patient's exam, labs, and findings discussed and seen with  Dr. Waters , who agrees with the plan as described above.

## 2024-12-06 NOTE — ASSESSMENT & PLAN NOTE
44 year-old female with PMH of HbSS on hydroxyurea 1500 (S/T/Th/S)-1000 (M/W/F), nocturnal hypoxia on 2-3L as needed,  c/b transfusion-related iron overload on Deferoxamine, provoked PE 2009 (postpartum) treated with AC for 6 months, cerebral aneurysm, NICHOL/MDD  who presented to ED on 12/2 with abdominal pain, found to have acute appendicitis. She is now s/p laparoscopic appendectomy on 12/3. During this hospital stay he dropped her hgb and was transfusion 1 unit of PRBC on 12/6, initial concern for intraabdominal bleed, CTA done showed on 12/5: no active bleed, low suspicion for intraabdominal bleed. On 12/6 she was transfer from [facility] service for management of sickle cell pain.      # Abdominal pain from acute appendicitis  - s/p laparoscopic appendectomy on 12/3  - Hgb dropped to 6.8 from 7.5, initial concern for intraabdominal bleed, CTA done showed on 12/5: no active bleed, low suspicion for intraabdominal bleed  - s/P 1 unit of PRBC 12/6, will follow up post transfusion hgb    # Sickle cell pain crisis   - [facility]AR[facility] reviewed, last script for Dilaudid 8mg X14 days was filled on 11/27   - Care path 12/4/2024 reviewed: Rec SQ/IV Dilaudid 2 mg every 2 hours as needed for severe pain. May wean if improved and rotate with his home oral pain regimen prior to discharge   - Hgb baseline 6.7-6.8, received 2 units of 12/4 and 12/6 post op   - Methadone 10mg q12 for chronic pain   - Added Voltaren 15mg AMARA X1 day, DC Ibuprofen   - Cont home Lyrica 25 mg BID   - Robaxin 500mg q6   - Cont home Hydrea   -  for pain management will DC dPCA and start IV Dilaudid 2mg q2 hrs PRN   - Tylenol 650 mg every 6 hours as needed   - Cont Folic acid   - Bowel regimen while on narcotics  - Benadryl for opioid induced pruritus   - Zofran for Opioid induced N/V  - Continue monthly ketamine infusions with pain service and last received this on 10/9/24   - Incentive spirometry, PRN O2 (on 3L O2 at home nocturnal hypoxia), usually takes 3L continuous  for comfort with her pain crisis, will discontinue prior to discharge    # Iron overload   - secondary to chronic transfusions  - Ferritin level is 2285 ng/mL   - Continue current chelation therapy with desferal 2 g daily subcutaneous and oral jadenu 360 mg daily    # GI/DVT ppx  - Protonix 40 daily  - lovenox, continue SCDs    # H/O cerebral aneurysm  - She has not seen neurosurgery in a while    - Will need to follow up outpatient     # History of NICHOL and MDD  - Follow up with behavioral health as scheduled  - Continue Prozac and hydroxyzine   - Cont Trazodone for sleep      # Dispo:  - Full code  - Discharge when pain control is optimized

## 2024-12-07 LAB
ALBUMIN SERPL BCP-MCNC: 3.8 G/DL (ref 3.4–5)
ALP SERPL-CCNC: 65 U/L (ref 33–110)
ALT SERPL W P-5'-P-CCNC: 16 U/L (ref 7–45)
ANION GAP SERPL CALC-SCNC: 10 MMOL/L (ref 10–20)
AST SERPL W P-5'-P-CCNC: 20 U/L (ref 9–39)
ATRIAL RATE: 79 BPM
BILIRUB SERPL-MCNC: 1 MG/DL (ref 0–1.2)
BLOOD EXPIRATION DATE: NORMAL
BUN SERPL-MCNC: 14 MG/DL (ref 6–23)
CALCIUM SERPL-MCNC: 8.7 MG/DL (ref 8.6–10.6)
CHLORIDE SERPL-SCNC: 104 MMOL/L (ref 98–107)
CO2 SERPL-SCNC: 28 MMOL/L (ref 21–32)
CREAT SERPL-MCNC: 0.97 MG/DL (ref 0.5–1.05)
DISPENSE STATUS: NORMAL
EGFRCR SERPLBLD CKD-EPI 2021: 75 ML/MIN/1.73M*2
ERYTHROCYTE [DISTWIDTH] IN BLOOD BY AUTOMATED COUNT: 14.9 % (ref 11.5–14.5)
GLUCOSE SERPL-MCNC: 104 MG/DL (ref 74–99)
HCT VFR BLD AUTO: 23.1 % (ref 36–46)
HGB BLD-MCNC: 7.9 G/DL (ref 12–16)
HGB RETIC QN: 33 PG (ref 28–38)
HOLD SPECIMEN: NORMAL
IMMATURE RETIC FRACTION: 5.7 %
LDH SERPL L TO P-CCNC: 195 U/L (ref 84–246)
MCH RBC QN AUTO: 29.7 PG (ref 26–34)
MCHC RBC AUTO-ENTMCNC: 34.2 G/DL (ref 32–36)
MCV RBC AUTO: 87 FL (ref 80–100)
NRBC BLD-RTO: 0 /100 WBCS (ref 0–0)
P AXIS: 69 DEGREES
PLATELET # BLD AUTO: 328 X10*3/UL (ref 150–450)
POTASSIUM SERPL-SCNC: 4.4 MMOL/L (ref 3.5–5.3)
PR INTERVAL: 178 MS
PRODUCT BLOOD TYPE: 1700
PRODUCT CODE: NORMAL
PROT SERPL-MCNC: 6.5 G/DL (ref 6.4–8.2)
Q ONSET: 251 MS
QRS COUNT: 12 BEATS
QRS DURATION: 97 MS
QT INTERVAL: 374 MS
QTC CALCULATION(BAZETT): 432 MS
QTC FREDERICIA: 412 MS
R AXIS: 55 DEGREES
RBC # BLD AUTO: 2.66 X10*6/UL (ref 4–5.2)
RETICS #: 0.01 X10*6/UL (ref 0.02–0.08)
RETICS/RBC NFR AUTO: 0.4 % (ref 0.5–2)
SODIUM SERPL-SCNC: 138 MMOL/L (ref 136–145)
T AXIS: 61 DEGREES
T OFFSET: 438 MS
UNIT ABO: NORMAL
UNIT NUMBER: NORMAL
UNIT RH: NORMAL
UNIT VOLUME: 284
VENTRICULAR RATE: 80 BPM
WBC # BLD AUTO: 8.5 X10*3/UL (ref 4.4–11.3)
XM INTEP: NORMAL

## 2024-12-07 PROCEDURE — 2500000004 HC RX 250 GENERAL PHARMACY W/ HCPCS (ALT 636 FOR OP/ED)

## 2024-12-07 PROCEDURE — 2500000005 HC RX 250 GENERAL PHARMACY W/O HCPCS

## 2024-12-07 PROCEDURE — S0109 METHADONE ORAL 5MG: HCPCS

## 2024-12-07 PROCEDURE — 83615 LACTATE (LD) (LDH) ENZYME: CPT | Performed by: PHYSICIAN ASSISTANT

## 2024-12-07 PROCEDURE — 99233 SBSQ HOSP IP/OBS HIGH 50: CPT | Performed by: INTERNAL MEDICINE

## 2024-12-07 PROCEDURE — 85045 AUTOMATED RETICULOCYTE COUNT: CPT | Performed by: PHYSICIAN ASSISTANT

## 2024-12-07 PROCEDURE — 2500000001 HC RX 250 WO HCPCS SELF ADMINISTERED DRUGS (ALT 637 FOR MEDICARE OP)

## 2024-12-07 PROCEDURE — 2500000001 HC RX 250 WO HCPCS SELF ADMINISTERED DRUGS (ALT 637 FOR MEDICARE OP): Performed by: PHYSICIAN ASSISTANT

## 2024-12-07 PROCEDURE — 85027 COMPLETE CBC AUTOMATED: CPT | Performed by: PHYSICIAN ASSISTANT

## 2024-12-07 PROCEDURE — 2500000002 HC RX 250 W HCPCS SELF ADMINISTERED DRUGS (ALT 637 FOR MEDICARE OP, ALT 636 FOR OP/ED)

## 2024-12-07 PROCEDURE — 2500000004 HC RX 250 GENERAL PHARMACY W/ HCPCS (ALT 636 FOR OP/ED): Performed by: PHYSICIAN ASSISTANT

## 2024-12-07 PROCEDURE — 1170000001 HC PRIVATE ONCOLOGY ROOM DAILY

## 2024-12-07 PROCEDURE — 80053 COMPREHEN METABOLIC PANEL: CPT | Performed by: PHYSICIAN ASSISTANT

## 2024-12-07 PROCEDURE — 2500000004 HC RX 250 GENERAL PHARMACY W/ HCPCS (ALT 636 FOR OP/ED): Performed by: STUDENT IN AN ORGANIZED HEALTH CARE EDUCATION/TRAINING PROGRAM

## 2024-12-07 PROCEDURE — S4993 CONTRACEPTIVE PILLS FOR BC: HCPCS

## 2024-12-07 RX ORDER — DICLOFENAC SODIUM 10 MG/G
4 GEL TOPICAL 4 TIMES DAILY PRN
Status: DISPENSED | OUTPATIENT
Start: 2024-12-07

## 2024-12-07 RX ORDER — HYDROXYUREA 500 MG/1
500 CAPSULE ORAL
Status: DISPENSED | OUTPATIENT
Start: 2024-12-07

## 2024-12-07 RX ORDER — HYDROXYUREA 500 MG/1
1000 CAPSULE ORAL
Status: DISPENSED | OUTPATIENT
Start: 2024-12-09

## 2024-12-07 ASSESSMENT — COGNITIVE AND FUNCTIONAL STATUS - GENERAL
DAILY ACTIVITIY SCORE: 24
WALKING IN HOSPITAL ROOM: A LITTLE
DAILY ACTIVITIY SCORE: 24
MOBILITY SCORE: 20
STANDING UP FROM CHAIR USING ARMS: A LITTLE
WALKING IN HOSPITAL ROOM: A LITTLE
MOVING TO AND FROM BED TO CHAIR: A LITTLE
MOVING TO AND FROM BED TO CHAIR: A LITTLE
STANDING UP FROM CHAIR USING ARMS: A LITTLE
MOBILITY SCORE: 20
CLIMB 3 TO 5 STEPS WITH RAILING: A LITTLE
CLIMB 3 TO 5 STEPS WITH RAILING: A LITTLE

## 2024-12-07 ASSESSMENT — PAIN SCALES - GENERAL
PAINLEVEL_OUTOF10: 5 - MODERATE PAIN
PAINLEVEL_OUTOF10: 9
PAINLEVEL_OUTOF10: 7
PAINLEVEL_OUTOF10: 8
PAINLEVEL_OUTOF10: 5 - MODERATE PAIN
PAINLEVEL_OUTOF10: 10 - WORST POSSIBLE PAIN
PAINLEVEL_OUTOF10: 6
PAINLEVEL_OUTOF10: 9
PAINLEVEL_OUTOF10: 7
PAINLEVEL_OUTOF10: 5 - MODERATE PAIN

## 2024-12-07 ASSESSMENT — PAIN - FUNCTIONAL ASSESSMENT
PAIN_FUNCTIONAL_ASSESSMENT: 0-10

## 2024-12-07 ASSESSMENT — PAIN DESCRIPTION - DESCRIPTORS
DESCRIPTORS: ACHING
DESCRIPTORS: ACHING

## 2024-12-07 ASSESSMENT — PAIN DESCRIPTION - LOCATION: LOCATION: GENERALIZED

## 2024-12-07 NOTE — DISCHARGE INSTRUCTIONS
Please follow up with your primary care provider at next available appointment following your hospital stay.      To schedule or re-schedule a follow up appointment with the Acute Care Surgery/ Trauma clinic, please call [phone] to schedule follow up appointment. This is not an emergency number, so if you have an emergency, please go to the Emergency Room. However, if you have questions regarding your care, upcoming appointments, etc, please do not hesitate to call the above number. Thank you!      Please go to the nearest hospital emergency room if you are experiencing: worsening abdominal pain, increasing abdominal distention, fevers, inability to tolerate food and drink (vomit every time you eat), nausea/vomiting, loss of bowel function (unable to pass gas or have bowel movements).      If you notice increased redness, tenderness or drainage around your surgical sites/wounds, or if you notice foul smelling drainage from your wounds please call the trauma clinic or go to the nearest to the emergency room.

## 2024-12-07 NOTE — ASSESSMENT & PLAN NOTE
44 year-old female with PMH of HbSS on hydroxyurea 1500 (S/T/Th/S)-1000 (M/W/F), nocturnal hypoxia on 2-3L as needed,  c/b transfusion-related iron overload on Deferoxamine, provoked PE 2009 (postpartum) treated with AC for 6 months, cerebral aneurysm, NICHOL/MDD  who presented to ED on 12/2 with abdominal pain, found to have acute appendicitis. She is now s/p laparoscopic appendectomy on 12/3. During this hospital stay he dropped her hgb and was transfusion 1 unit of PRBC on 12/6, initial concern for intraabdominal bleed, CTA done showed on 12/5: no active bleed, low suspicion for intraabdominal bleed. On 12/6 she was transfer from [facility] service for management of sickle cell pain. For pain management she was switched from dPCA to IV Dilaudid 2mg q2 hrs PRN. Discharge pending improvement in pain.     Update 12/7:  - Added Voltaren gel   - Post transfusion hgb 7.9   - Last BM 2 days ago with enema, if no BM tomorrow may need another enema   - On 2L NC for comfort/crisis, plan to remove prior to discharge      # Abdominal pain from acute appendicitis  - s/p laparoscopic appendectomy on 12/3  - Hgb dropped to 6.8 from 7.5, initial concern for intraabdominal bleed, CTA done showed on 12/5: no active bleed, low suspicion for intraabdominal bleed  - s/P 1 unit of PRBC 12/6,  post transfusion hgb 7.9  - Post up visit on 12/26    # Sickle cell pain crisis   - OARRS reviewed, last script for Dilaudid 8mg X14 days was filled on 11/27   - Care path 12/4/2024 reviewed: Rec SQ/IV Dilaudid 2 mg every 2 hours as needed for severe pain. May wean if improved and rotate with his home oral pain regimen prior to discharge   - Hgb baseline 6.7-6.8, received 2 units of 12/4 and 12/6 post op   - Methadone 10mg q12 for chronic pain   - Added Voltaren 15mg AMARA X1 day, DC Ibuprofen   - Cont home Lyrica 25 mg BID   - Robaxin 500mg q6   - Cont home Hydrea, 1000 mg on MWF and 500 mg on SunTuThSa   -  for pain management will DC dPCA and start IV  Dilaudid 2mg q2 hrs PRN (12/6--)  - Tylenol 650 mg every 6 hours as needed   - Cont Folic acid   - Bowel regimen while on narcotics  - Benadryl for opioid induced pruritus   - Zofran for Opioid induced N/V  - Continue monthly ketamine infusions with pain service and last received this on 10/9/24   - Incentive spirometry, PRN O2 (on 3L O2 at home nocturnal hypoxia), usually takes 3L continuous for comfort with her pain crisis, will discontinue prior to discharge    # Iron overload   - secondary to chronic transfusions  - Ferritin level is 2285 ng/mL   - Continue current chelation therapy with desferal 2 g daily, can cont home regimen on discharge    # GI/DVT ppx  - Protonix 40 daily  - lovenox, continue SCDs    # H/O cerebral aneurysm  - She has not seen neurosurgery in a while    - Will need to follow up outpatient     # History of NICHOL and MDD  - Follow up with behavioral health as scheduled  - Continue Prozac and hydroxyzine   - Cont Trazodone for sleep      # Dispo:  - Full code  - Post op FUV on 12/26, FUV with sickle cell clinic on 12/18.  - Discharge when pain control is optimized to resume nocturnal oxygen, no needs

## 2024-12-07 NOTE — CARE PLAN
The patient's goals for the shift include      The clinical goals for the shift include safety maintained     29-May-2024 06:46

## 2024-12-07 NOTE — PROGRESS NOTES
"Mary Alice Aragon is a 44 y.o. female on day 4 of admission presenting with Acute appendicitis.    Subjective   Seen at bedside, pain mostly in her legs and back today, pain is well controlled on IV Dilaudid.  States that pain feels more like pain crisis than pain from surgery and assured her that she could have gone into a crisis from having surgery. Had 1 BM 2 days ago with enema, advised that we may have to do another enema tomorrow if no BM. We also discussed adding Voltaren gel to her pain regimen today.yesterday and was encouraged to keep up with bowel regimen. She was able to walk around the hallways yesterday. Denies fever/chills, N/V/D, weakness, headache, chest pain, SOB. Used 3L of oxygen at home for nocturnal hypoxia and also uses oxygen for comfort when admitted for pain crisis 2L. Plan to remove oxygen prior to discharge.     Objective     Physical Exam  Constitutional:       Appearance: Normal appearance.   HENT:      Head: Normocephalic and atraumatic.   Eyes:      Extraocular Movements: Extraocular movements intact.   Cardiovascular:      Rate and Rhythm: Normal rate and regular rhythm.   Pulmonary:      Effort: Pulmonary effort is normal.      Breath sounds: Normal breath sounds.      Comments: 2L Oxygen  Abdominal:      General: Abdomen is flat. Bowel sounds are normal.      Palpations: Abdomen is soft.      Comments: Abdominal incision clean and dry, no discharge   Musculoskeletal:         General: No swelling or tenderness.   Skin:     General: Skin is warm and dry.   Neurological:      General: No focal deficit present.      Mental Status: She is alert and oriented to person, place, and time.   Psychiatric:         Mood and Affect: Mood normal.         Behavior: Behavior normal.         Last Recorded Vitals  Blood pressure 113/70, pulse 90, temperature 36.2 °C (97.2 °F), temperature source Temporal, resp. rate 16, height 1.6 m (5' 3\"), weight 68 kg (149 lb 14.6 oz), SpO2 97%.  Intake/Output last " 3 Shifts:  I/O last 3 completed shifts:  In: 930 (13.7 mL/kg) [P.O.:580; Blood:350]  Out: 600 (8.8 mL/kg) [Urine:600 (0.2 mL/kg/hr)]  Weight: 68 kg     Relevant Results  CT angio abdomen pelvis w and or wo IV IV contrast  Result Date: 12/5/2024  VASCULAR: 1. No evidence of contrast extravasation to suggest active gastrointestinal bleed.   ABDOMEN PELVIS: 1. Trace pneumoperitoneum and air in the abdominal wall soft tissues likely related to recent surgical procedure. 2. Thickening of the bladder wall which appears similar to prior imaging, likely related to underdistention however correlation with urinalysis recommended if there is concern for urinary tract infection. 3. Sequela of sickle cell disease including lobulated spleen and mottled appearance of vertebral bodies 4. Additional incidental non-acute findings as detailed above.   I personally reviewed the images/study and I agree with the findings as stated by Dr. Patricia Garcia. This study was interpreted at [place], [place].   MACRO: None.   Signed by: Jamel Cabrera 12/5/2024 11:56 AM Dictation workstation:   [id number]    CT abdomen pelvis w IV contrast  Result Date: 12/2/2024      Constipation. Appendix is slightly prominent with minimal periappendiceal inflammation. Early appendicitis should be considered.   Degree of stool limiting assessment of colonic mucosa.   Features of sickle cell disease.   MACRO: None   Signed by: Román Bella 12/2/2024 8:15 PM Dictation workstation:   [id number]      This patient has a central line   Reason for the central line remaining today? Meds       Assessment/Plan   Assessment & Plan  Acute appendicitis  44 year-old female with PMH of HbSS on hydroxyurea 1500 (S/T/Th/S)-1000 (M/W/F), nocturnal hypoxia on 2-3L as needed,  c/b transfusion-related iron overload on Deferoxamine, provoked PE 2009 (postpartum) treated with AC for 6 months, cerebral aneurysm, NICHOL/MDD  who  presented to ED on 12/2 with abdominal pain, found to have acute appendicitis. She is now s/p laparoscopic appendectomy on 12/3. During this hospital stay he dropped her hgb and was transfusion 1 unit of PRBC on 12/6, initial concern for intraabdominal bleed, CTA done showed on 12/5: no active bleed, low suspicion for intraabdominal bleed. On 12/6 she was transfer from [facility] service for management of sickle cell pain. For pain management she was switched from dPCA to IV Dilaudid 2mg q2 hrs PRN. Discharge pending improvement in pain.     Update 12/7:  - Added Voltaren gel   - Post transfusion hgb 7.9   - Last BM 2 days ago with enema, if no BM tomorrow may need another enema   - On 2L NC for comfort/crisis, plan to remove prior to discharge      # Abdominal pain from acute appendicitis  - s/p laparoscopic appendectomy on 12/3  - Hgb dropped to 6.8 from 7.5, initial concern for intraabdominal bleed, CTA done showed on 12/5: no active bleed, low suspicion for intraabdominal bleed  - s/P 1 unit of PRBC 12/6,  post transfusion hgb 7.9  - Post up visit on 12/26    # Sickle cell pain crisis   - OARRS reviewed, last script for Dilaudid 8mg X14 days was filled on 11/27   - Care path 12/4/2024 reviewed: Rec SQ/IV Dilaudid 2 mg every 2 hours as needed for severe pain. May wean if improved and rotate with his home oral pain regimen prior to discharge   - Hgb baseline 6.7-6.8, received 2 units of 12/4 and 12/6 post op   - Methadone 10mg q12 for chronic pain   - Added Voltaren 15mg AMARA X1 day, DC Ibuprofen   - Cont home Lyrica 25 mg BID   - Robaxin 500mg q6   - Cont home Hydrea, 1000 mg on MWF and 500 mg on SunTuThSa   -  for pain management will DC dPCA and start IV Dilaudid 2mg q2 hrs PRN (12/6--)  - Tylenol 650 mg every 6 hours as needed   - Cont Folic acid   - Bowel regimen while on narcotics  - Benadryl for opioid induced pruritus   - Zofran for Opioid induced N/V  - Continue monthly ketamine infusions with pain service and  last received this on 10/9/24   - Incentive spirometry, PRN O2 (on 3L O2 at home nocturnal hypoxia), usually takes 3L continuous for comfort with her pain crisis, will discontinue prior to discharge    # Iron overload   - secondary to chronic transfusions  - Ferritin level is 2285 ng/mL   - Continue current chelation therapy with desferal 2 g daily, can cont home regimen on discharge    # GI/DVT ppx  - Protonix 40 daily  - lovenox, continue SCDs    # H/O cerebral aneurysm  - She has not seen neurosurgery in a while    - Will need to follow up outpatient     # History of NICHOL and MDD  - Follow up with behavioral health as scheduled  - Continue Prozac and hydroxyzine   - Cont Trazodone for sleep      # Dispo:  - Full code  - Post op FUV on 12/26, FUV with sickle cell clinic on 12/18.  - Discharge when pain control is optimized to resume nocturnal oxygen, no needs   DIAN (obstructive sleep apnea)    Acute appendicitis, unspecified acute appendicitis type           I spent 60 minutes in the professional and overall care of this patient.      Casey Andrews PA-C

## 2024-12-08 VITALS
TEMPERATURE: 97.7 F | RESPIRATION RATE: 18 BRPM | OXYGEN SATURATION: 97 % | SYSTOLIC BLOOD PRESSURE: 112 MMHG | DIASTOLIC BLOOD PRESSURE: 76 MMHG | HEIGHT: 63 IN | BODY MASS INDEX: 26.56 KG/M2 | HEART RATE: 89 BPM | WEIGHT: 149.91 LBS

## 2024-12-08 LAB
ALBUMIN SERPL BCP-MCNC: 3.4 G/DL (ref 3.4–5)
ALP SERPL-CCNC: 58 U/L (ref 33–110)
ALT SERPL W P-5'-P-CCNC: 12 U/L (ref 7–45)
ANION GAP SERPL CALC-SCNC: 9 MMOL/L (ref 10–20)
AST SERPL W P-5'-P-CCNC: 13 U/L (ref 9–39)
BILIRUB SERPL-MCNC: 0.7 MG/DL (ref 0–1.2)
BLOOD EXPIRATION DATE: NORMAL
BUN SERPL-MCNC: 16 MG/DL (ref 6–23)
CALCIUM SERPL-MCNC: 8.5 MG/DL (ref 8.6–10.6)
CHLORIDE SERPL-SCNC: 109 MMOL/L (ref 98–107)
CO2 SERPL-SCNC: 27 MMOL/L (ref 21–32)
CREAT SERPL-MCNC: 0.95 MG/DL (ref 0.5–1.05)
DISPENSE STATUS: NORMAL
EGFRCR SERPLBLD CKD-EPI 2021: 77 ML/MIN/1.73M*2
ERYTHROCYTE [DISTWIDTH] IN BLOOD BY AUTOMATED COUNT: 14.7 % (ref 11.5–14.5)
GLUCOSE SERPL-MCNC: 103 MG/DL (ref 74–99)
HCT VFR BLD AUTO: 20.9 % (ref 36–46)
HGB BLD-MCNC: 7 G/DL (ref 12–16)
HGB RETIC QN: 33 PG (ref 28–38)
IMMATURE RETIC FRACTION: 7.5 %
LDH SERPL L TO P-CCNC: 163 U/L (ref 84–246)
MCH RBC QN AUTO: 29.4 PG (ref 26–34)
MCHC RBC AUTO-ENTMCNC: 33.5 G/DL (ref 32–36)
MCV RBC AUTO: 88 FL (ref 80–100)
NRBC BLD-RTO: 0 /100 WBCS (ref 0–0)
PLATELET # BLD AUTO: 320 X10*3/UL (ref 150–450)
POTASSIUM SERPL-SCNC: 4.1 MMOL/L (ref 3.5–5.3)
PRODUCT BLOOD TYPE: 5100
PRODUCT CODE: NORMAL
PROT SERPL-MCNC: 6.1 G/DL (ref 6.4–8.2)
RBC # BLD AUTO: 2.38 X10*6/UL (ref 4–5.2)
RETICS #: 0.01 X10*6/UL (ref 0.02–0.08)
RETICS/RBC NFR AUTO: 0.4 % (ref 0.5–2)
SODIUM SERPL-SCNC: 141 MMOL/L (ref 136–145)
UNIT ABO: NORMAL
UNIT NUMBER: NORMAL
UNIT RH: NORMAL
UNIT VOLUME: 350
WBC # BLD AUTO: 8.1 X10*3/UL (ref 4.4–11.3)
XM INTEP: NORMAL

## 2024-12-08 PROCEDURE — 1170000001 HC PRIVATE ONCOLOGY ROOM DAILY

## 2024-12-08 PROCEDURE — 2500000001 HC RX 250 WO HCPCS SELF ADMINISTERED DRUGS (ALT 637 FOR MEDICARE OP)

## 2024-12-08 PROCEDURE — 2500000004 HC RX 250 GENERAL PHARMACY W/ HCPCS (ALT 636 FOR OP/ED): Performed by: STUDENT IN AN ORGANIZED HEALTH CARE EDUCATION/TRAINING PROGRAM

## 2024-12-08 PROCEDURE — 85045 AUTOMATED RETICULOCYTE COUNT: CPT | Performed by: PHYSICIAN ASSISTANT

## 2024-12-08 PROCEDURE — 2500000002 HC RX 250 W HCPCS SELF ADMINISTERED DRUGS (ALT 637 FOR MEDICARE OP, ALT 636 FOR OP/ED)

## 2024-12-08 PROCEDURE — 83615 LACTATE (LD) (LDH) ENZYME: CPT | Performed by: PHYSICIAN ASSISTANT

## 2024-12-08 PROCEDURE — S4993 CONTRACEPTIVE PILLS FOR BC: HCPCS

## 2024-12-08 PROCEDURE — 85027 COMPLETE CBC AUTOMATED: CPT | Performed by: PHYSICIAN ASSISTANT

## 2024-12-08 PROCEDURE — S0109 METHADONE ORAL 5MG: HCPCS

## 2024-12-08 PROCEDURE — 99233 SBSQ HOSP IP/OBS HIGH 50: CPT

## 2024-12-08 PROCEDURE — 36430 TRANSFUSION BLD/BLD COMPNT: CPT

## 2024-12-08 PROCEDURE — P9016 RBC LEUKOCYTES REDUCED: HCPCS

## 2024-12-08 PROCEDURE — 2500000004 HC RX 250 GENERAL PHARMACY W/ HCPCS (ALT 636 FOR OP/ED): Performed by: PHYSICIAN ASSISTANT

## 2024-12-08 PROCEDURE — 84075 ASSAY ALKALINE PHOSPHATASE: CPT | Performed by: PHYSICIAN ASSISTANT

## 2024-12-08 PROCEDURE — 2500000001 HC RX 250 WO HCPCS SELF ADMINISTERED DRUGS (ALT 637 FOR MEDICARE OP): Performed by: PHYSICIAN ASSISTANT

## 2024-12-08 PROCEDURE — 2500000004 HC RX 250 GENERAL PHARMACY W/ HCPCS (ALT 636 FOR OP/ED)

## 2024-12-08 PROCEDURE — 2500000005 HC RX 250 GENERAL PHARMACY W/O HCPCS

## 2024-12-08 RX ORDER — DIPHENHYDRAMINE HCL 25 MG
25 CAPSULE ORAL ONCE
Status: COMPLETED | OUTPATIENT
Start: 2024-12-08 | End: 2024-12-08

## 2024-12-08 RX ORDER — ACETAMINOPHEN 325 MG/1
975 TABLET ORAL ONCE
Status: COMPLETED | OUTPATIENT
Start: 2024-12-08 | End: 2024-12-08

## 2024-12-08 RX ORDER — AMOXICILLIN 250 MG
2 CAPSULE ORAL 2 TIMES DAILY
Status: DISPENSED | OUTPATIENT
Start: 2024-12-08

## 2024-12-08 RX ORDER — LACTULOSE 10 G/15ML
20 SOLUTION ORAL ONCE
Status: COMPLETED | OUTPATIENT
Start: 2024-12-08 | End: 2024-12-08

## 2024-12-08 ASSESSMENT — COGNITIVE AND FUNCTIONAL STATUS - GENERAL
STANDING UP FROM CHAIR USING ARMS: A LITTLE
DAILY ACTIVITIY SCORE: 24
MOBILITY SCORE: 20
MOBILITY SCORE: 20
STANDING UP FROM CHAIR USING ARMS: A LITTLE
MOVING TO AND FROM BED TO CHAIR: A LITTLE
MOVING TO AND FROM BED TO CHAIR: A LITTLE
DAILY ACTIVITIY SCORE: 24
WALKING IN HOSPITAL ROOM: A LITTLE
WALKING IN HOSPITAL ROOM: A LITTLE
CLIMB 3 TO 5 STEPS WITH RAILING: A LITTLE
CLIMB 3 TO 5 STEPS WITH RAILING: A LITTLE

## 2024-12-08 ASSESSMENT — PAIN SCALES - GENERAL
PAINLEVEL_OUTOF10: 3
PAINLEVEL_OUTOF10: 7
PAINLEVEL_OUTOF10: 10 - WORST POSSIBLE PAIN
PAINLEVEL_OUTOF10: 9
PAINLEVEL_OUTOF10: 10 - WORST POSSIBLE PAIN
PAINLEVEL_OUTOF10: 8
PAINLEVEL_OUTOF10: 7

## 2024-12-08 ASSESSMENT — PAIN DESCRIPTION - LOCATION
LOCATION: GENERALIZED
LOCATION: GENERALIZED

## 2024-12-08 ASSESSMENT — PAIN - FUNCTIONAL ASSESSMENT
PAIN_FUNCTIONAL_ASSESSMENT: 0-10

## 2024-12-08 ASSESSMENT — PAIN DESCRIPTION - DESCRIPTORS: DESCRIPTORS: ACHING

## 2024-12-08 NOTE — PROGRESS NOTES
Mary Alice Aragon is a 44 y.o. female on day 5 of admission presenting with Acute appendicitis.    Subjective   Seen at bedside this AM. Patient states pain is improving. She has more incisional pain in abdomen and soreness that is improving daily. She has more sickle cell type pain in legs and arms. She does not feel ready to wean today, discussed maybe rotating tomorrow if she is feeling better. She states her legs feel/ look swollen and she is endorsing tenderness in her calves . R>L. Discussed ordering BLE duplex to r/o DVTs, pt agreeable. Her last BM was 12/5 via enema. Discussed ordering more stool softeners today. We also discussed her down trending hgb today, she denies blood in urine or worsening abd pain. She is unable to tell if stool is dark or bloody since it has been days since last BM. Discussed ordering 1 unit pRBCs today. Carlos was agreeable to plan and had no further questions.     Objective     Physical Exam  Vitals reviewed.   Constitutional:       Appearance: Normal appearance.   HENT:      Head: Normocephalic and atraumatic.      Nose: Nose normal.      Mouth/Throat:      Mouth: Mucous membranes are moist.      Pharynx: Oropharynx is clear.   Eyes:      Extraocular Movements: Extraocular movements intact.      Pupils: Pupils are equal, round, and reactive to light.   Cardiovascular:      Rate and Rhythm: Normal rate and regular rhythm.      Pulses: Normal pulses.      Heart sounds: Normal heart sounds.   Pulmonary:      Effort: Pulmonary effort is normal.      Breath sounds: Normal breath sounds.   Abdominal:      General: Bowel sounds are normal.      Palpations: Abdomen is soft.      Comments: Abdominal incision x2 clean and dry, no discharge    Musculoskeletal:         General: Normal range of motion.      Right lower leg: Tenderness present. 1+ Edema present.      Left lower leg: Tenderness present. 1+ Edema present.   Skin:     General: Skin is warm.   Neurological:      General: No focal  "deficit present.      Mental Status: She is alert and oriented to person, place, and time. Mental status is at baseline.   Psychiatric:         Mood and Affect: Mood normal.         Behavior: Behavior normal.         Last Recorded Vitals  Blood pressure 116/71, pulse 91, temperature 37 °C (98.6 °F), temperature source Temporal, resp. rate 16, height 1.6 m (5' 3\"), weight 68 kg (149 lb 14.6 oz), SpO2 95%.  Intake/Output last 3 Shifts:  I/O last 3 completed shifts:  In: 6033.8 (88.7 mL/kg) [P.O.:940; I.V.:104 (1.5 mL/kg); Blood:4643.8]  Out: 600 (8.8 mL/kg) [Urine:600 (0.2 mL/kg/hr)]  Weight: 68 kg     Relevant Results  Scheduled medications  acetaminophen, 650 mg, oral, q6h  cholecalciferol, 2,000 Units, oral, Daily  deferoxamine, 2,000 mg, intravenous, Daily  enoxaparin, 40 mg, subcutaneous, q24h AMARA  FLUoxetine, 10 mg, oral, Daily  folic acid, 1 mg, oral, Daily  [START ON 12/9/2024] hydroxyurea, 1,000 mg, oral, Every Mon/Wed/Fri  hydroxyurea, 500 mg, oral, Every Sun/Tues/Thur/Sat  lidocaine, 2 patch, transdermal, Daily  melatonin, 5 mg, oral, Nightly  methadone, 10 mg, oral, q12h  methocarbamol, 500 mg, oral, q6h AMARA  norethindrone, 5 mg, oral, Daily  pantoprazole, 40 mg, oral, Daily before breakfast  polyethylene glycol, 17 g, oral, Daily  sennosides-docusate sodium, 2 tablet, oral, BID  traZODone, 100 mg, oral, Nightly    Continuous medications  ropivacaine (PF) in NS cmpd, 14 mL/hr, Last Rate: 14 mL/hr (12/07/24 1059)      PRN medications  PRN medications: bisacodyl, calcium carbonate, diclofenac sodium, diphenhydrAMINE, HYDROmorphone, hydrOXYzine HCL, naloxone, naloxone, oxygen, pregabalin, prochlorperazine     This patient has a central line   Reason for the central line remaining today? Hemodynamic monitoring      Assessment/Plan   Assessment & Plan  Acute appendicitis    DIAN (obstructive sleep apnea)    Acute appendicitis, unspecified acute appendicitis type    44 year-old female with PMH of HbSS on " hydroxyurea 1500 (S/T/Th/S)-1000 (M/W/F), nocturnal hypoxia on 2-3L as needed,  c/b transfusion-related iron overload on Deferoxamine, provoked PE 2009 (postpartum) treated with AC for 6 months, cerebral aneurysm, NICHOL/MDD  who presented to ED on 12/2 with abdominal pain, found to have acute appendicitis. She is now s/p laparoscopic appendectomy on 12/3. During this hospital stay he dropped her hgb and was transfusion 1 unit of PRBC on 12/6, initial concern for intraabdominal bleed, CTA done showed on 12/5: no active bleed, low suspicion for intraabdominal bleed. On 12/6 she was transfer from [facility] service for management of sickle cell pain. For pain management 12/6 she was switched from dPCA to IV Dilaudid 2mg q2 hrs PRN. 12/8 hgb 7.0; ordered 1 unit pRBCs. BLE edema and calf tenderness, R>L, BLE duplex pending. Last BM 12/5, increased stool softener and ordered dose of lactulose, if no BM 12/9 plan to order additional enema. Discharge pending improvement in pain.      Update 12/8:  - hgb 7.0; ordered 1 unit pRBCs  - last BM 12/5, increased stool softener and ordered dose of lactulose, if no BM 12/9 order additional enema   - BLE edema and calf tenderness, R>L, BLE duplex pending   - plan to start rotating pain medications 12/9 if able      # Abdominal pain from acute appendicitis  - s/p laparoscopic appendectomy on 12/3  - Hgb dropped to 6.8 from 7.5, initial concern for intraabdominal bleed, CTA done showed on 12/5: no active bleed, low suspicion for intraabdominal bleed  - s/P 1 unit of PRBC 12/6, post transfusion hgb 7.9 (12/7)  - hgb 7.0 (12/8), low suspicion for bleed as abdomen is non tender and HDS, most likely related to sickle cell process; ordered additional 1 unit pRBCs 12/8   - Post up visit on 12/26     # Sickle cell pain crisis   - OARRS reviewed, last script for Dilaudid 8mg X14 days was filled on 11/27   - Care path 12/4/2024 reviewed: Rec SQ/IV Dilaudid 2 mg every 2 hours as needed for severe pain.  May wean if improved and rotate with his home oral pain regimen prior to discharge   - Hgb baseline 6.7-6.8, received 2 units of pRBCs 12/4 and 12/6 post op; additional unit ordered 12/8 for hgb 7.0   - Methadone 10mg q12 for chronic pain   - Added Voltaren 15mg AMARA X1 day, DC Ibuprofen   - Cont home Lyrica 25 mg BID   - Robaxin 500mg q6   - Cont home Hydrea, 1000 mg on MWF and 500 mg on SunTuThSa   -  for pain management will DC dPCA and start IV Dilaudid 2mg q2 hrs PRN (12/6--), plan to start rotating 12/9 if able   - Tylenol 650 mg every 6 hours as needed; DC 12/8   - Cont Folic acid   - Bowel regimen while on narcotics; LBM 12/5 with enema s/p dose of lactulose and increased stool regimen on 12/8; if no BM 12/9 order additional enema   - Benadryl for opioid induced pruritus   - Zofran for Opioid induced N/V  - Continue monthly ketamine infusions with pain service and last received this on 10/9/24   - Incentive spirometry, PRN O2 (on 3L O2 at home nocturnal hypoxia), usually takes 3L continuous for comfort with her pain crisis, will discontinue prior to discharge; on room air 12/8   - 12/8 BLE edema and calf tenderness, R>L, BLE duplex pending      # Iron overload   - secondary to chronic transfusions  - Ferritin level is 2285 ng/mL   - Continue current chelation therapy with desferal 2 g daily, can cont home regimen on discharge     # GI/DVT ppx  - Protonix 40 daily  - lovenox, continue SCDs     # H/O cerebral aneurysm  - She has not seen neurosurgery in a while    - Will need to follow up outpatient      # History of NICHOL and MDD  - Follow up with behavioral health as scheduled  - Continue Prozac and hydroxyzine   - Cont Trazodone for sleep      # Dispo:  - Full code  - Post op FUV on 12/26, FUV with sickle cell clinic on 12/18.  - Discharge when pain control is optimized to resume nocturnal oxygen, no needs       I spent 60 minutes in the professional and overall care of this patient.    Assessment and plan as  above discussed with attending physician Dr. Ca Eric, APRN-CNP

## 2024-12-08 NOTE — CARE PLAN
Problem: Pain  Goal: Takes deep breaths with improved pain control throughout the shift  Outcome: Progressing  Goal: Turns in bed with improved pain control throughout the shift  Outcome: Progressing  Goal: Walks with improved pain control throughout the shift  Outcome: Progressing  Goal: Performs ADL's with improved pain control throughout shift  Outcome: Progressing  Goal: Participates in PT with improved pain control throughout the shift  Outcome: Progressing  Goal: Free from opioid side effects throughout the shift  Outcome: Progressing  Goal: Free from acute confusion related to pain meds throughout the shift  Outcome: Progressing    The clinical goals for the shift include pan control

## 2024-12-09 ENCOUNTER — APPOINTMENT (OUTPATIENT)
Dept: VASCULAR MEDICINE | Facility: HOSPITAL | Age: 44
DRG: 335 | End: 2024-12-09
Payer: MEDICARE

## 2024-12-09 LAB
ALBUMIN SERPL BCP-MCNC: 3.8 G/DL (ref 3.4–5)
ALP SERPL-CCNC: 63 U/L (ref 33–110)
ALT SERPL W P-5'-P-CCNC: 17 U/L (ref 7–45)
ANION GAP SERPL CALC-SCNC: 10 MMOL/L (ref 10–20)
AST SERPL W P-5'-P-CCNC: 18 U/L (ref 9–39)
BASOPHILS # BLD AUTO: 0.06 X10*3/UL (ref 0–0.1)
BASOPHILS NFR BLD AUTO: 0.5 %
BILIRUB SERPL-MCNC: 0.9 MG/DL (ref 0–1.2)
BUN SERPL-MCNC: 15 MG/DL (ref 6–23)
CALCIUM SERPL-MCNC: 8.8 MG/DL (ref 8.6–10.6)
CHLORIDE SERPL-SCNC: 107 MMOL/L (ref 98–107)
CO2 SERPL-SCNC: 26 MMOL/L (ref 21–32)
CREAT SERPL-MCNC: 0.97 MG/DL (ref 0.5–1.05)
EGFRCR SERPLBLD CKD-EPI 2021: 75 ML/MIN/1.73M*2
EOSINOPHIL # BLD AUTO: 0.31 X10*3/UL (ref 0–0.7)
EOSINOPHIL NFR BLD AUTO: 2.6 %
ERYTHROCYTE [DISTWIDTH] IN BLOOD BY AUTOMATED COUNT: 15.6 % (ref 11.5–14.5)
GLUCOSE SERPL-MCNC: 104 MG/DL (ref 74–99)
HCT VFR BLD AUTO: 25.2 % (ref 36–46)
HGB BLD-MCNC: 8.5 G/DL (ref 12–16)
HGB RETIC QN: 31 PG (ref 28–38)
IMM GRANULOCYTES # BLD AUTO: 0.06 X10*3/UL (ref 0–0.7)
IMM GRANULOCYTES NFR BLD AUTO: 0.5 % (ref 0–0.9)
IMMATURE RETIC FRACTION: 6.6 %
LDH SERPL L TO P-CCNC: 159 U/L (ref 84–246)
LYMPHOCYTES # BLD AUTO: 3.51 X10*3/UL (ref 1.2–4.8)
LYMPHOCYTES NFR BLD AUTO: 30 %
MCH RBC QN AUTO: 29.3 PG (ref 26–34)
MCHC RBC AUTO-ENTMCNC: 33.7 G/DL (ref 32–36)
MCV RBC AUTO: 87 FL (ref 80–100)
MONOCYTES # BLD AUTO: 0.44 X10*3/UL (ref 0.1–1)
MONOCYTES NFR BLD AUTO: 3.8 %
NEUTROPHILS # BLD AUTO: 7.33 X10*3/UL (ref 1.2–7.7)
NEUTROPHILS NFR BLD AUTO: 62.6 %
NRBC BLD-RTO: 0 /100 WBCS (ref 0–0)
PLATELET # BLD AUTO: 342 X10*3/UL (ref 150–450)
POTASSIUM SERPL-SCNC: 4.2 MMOL/L (ref 3.5–5.3)
PROT SERPL-MCNC: 6.6 G/DL (ref 6.4–8.2)
RBC # BLD AUTO: 2.9 X10*6/UL (ref 4–5.2)
RETICS #: 0.01 X10*6/UL (ref 0.02–0.08)
RETICS/RBC NFR AUTO: 0.4 % (ref 0.5–2)
SODIUM SERPL-SCNC: 139 MMOL/L (ref 136–145)
WBC # BLD AUTO: 11.7 X10*3/UL (ref 4.4–11.3)

## 2024-12-09 PROCEDURE — 2500000001 HC RX 250 WO HCPCS SELF ADMINISTERED DRUGS (ALT 637 FOR MEDICARE OP): Performed by: PHYSICIAN ASSISTANT

## 2024-12-09 PROCEDURE — 2500000001 HC RX 250 WO HCPCS SELF ADMINISTERED DRUGS (ALT 637 FOR MEDICARE OP)

## 2024-12-09 PROCEDURE — 2500000004 HC RX 250 GENERAL PHARMACY W/ HCPCS (ALT 636 FOR OP/ED): Performed by: PHYSICIAN ASSISTANT

## 2024-12-09 PROCEDURE — 80053 COMPREHEN METABOLIC PANEL: CPT | Performed by: PHYSICIAN ASSISTANT

## 2024-12-09 PROCEDURE — 83615 LACTATE (LD) (LDH) ENZYME: CPT | Performed by: PHYSICIAN ASSISTANT

## 2024-12-09 PROCEDURE — 85045 AUTOMATED RETICULOCYTE COUNT: CPT | Performed by: PHYSICIAN ASSISTANT

## 2024-12-09 PROCEDURE — 85025 COMPLETE CBC W/AUTO DIFF WBC: CPT

## 2024-12-09 PROCEDURE — 2500000004 HC RX 250 GENERAL PHARMACY W/ HCPCS (ALT 636 FOR OP/ED)

## 2024-12-09 PROCEDURE — S4993 CONTRACEPTIVE PILLS FOR BC: HCPCS

## 2024-12-09 PROCEDURE — 93970 EXTREMITY STUDY: CPT | Performed by: SURGERY

## 2024-12-09 PROCEDURE — 99233 SBSQ HOSP IP/OBS HIGH 50: CPT

## 2024-12-09 PROCEDURE — 1170000001 HC PRIVATE ONCOLOGY ROOM DAILY

## 2024-12-09 PROCEDURE — 2500000005 HC RX 250 GENERAL PHARMACY W/O HCPCS

## 2024-12-09 PROCEDURE — 93970 EXTREMITY STUDY: CPT

## 2024-12-09 PROCEDURE — 2500000004 HC RX 250 GENERAL PHARMACY W/ HCPCS (ALT 636 FOR OP/ED): Performed by: STUDENT IN AN ORGANIZED HEALTH CARE EDUCATION/TRAINING PROGRAM

## 2024-12-09 PROCEDURE — 2500000002 HC RX 250 W HCPCS SELF ADMINISTERED DRUGS (ALT 637 FOR MEDICARE OP, ALT 636 FOR OP/ED)

## 2024-12-09 PROCEDURE — S0109 METHADONE ORAL 5MG: HCPCS

## 2024-12-09 RX ORDER — LACTULOSE 10 G/15ML
20 SOLUTION ORAL ONCE
Status: COMPLETED | OUTPATIENT
Start: 2024-12-09 | End: 2024-12-09

## 2024-12-09 RX ORDER — POLYETHYLENE GLYCOL 3350 17 G/17G
17 POWDER, FOR SOLUTION ORAL ONCE
Status: COMPLETED | OUTPATIENT
Start: 2024-12-09 | End: 2024-12-09

## 2024-12-09 RX ORDER — BISACODYL 10 MG/1
10 SUPPOSITORY RECTAL DAILY PRN
Status: DISCONTINUED | OUTPATIENT
Start: 2024-12-09 | End: 2024-12-14 | Stop reason: HOSPADM

## 2024-12-09 RX ORDER — BISACODYL 10 MG/1
10 SUPPOSITORY RECTAL ONCE
Status: DISCONTINUED | OUTPATIENT
Start: 2024-12-09 | End: 2024-12-09

## 2024-12-09 RX ORDER — BISACODYL 10 MG/1
10 SUPPOSITORY RECTAL ONCE
Status: COMPLETED | OUTPATIENT
Start: 2024-12-09 | End: 2024-12-09

## 2024-12-09 ASSESSMENT — COGNITIVE AND FUNCTIONAL STATUS - GENERAL
MOVING TO AND FROM BED TO CHAIR: A LITTLE
DAILY ACTIVITIY SCORE: 24
WALKING IN HOSPITAL ROOM: A LITTLE
CLIMB 3 TO 5 STEPS WITH RAILING: A LITTLE
STANDING UP FROM CHAIR USING ARMS: A LITTLE
MOBILITY SCORE: 20

## 2024-12-09 ASSESSMENT — PAIN SCALES - GENERAL
PAINLEVEL_OUTOF10: 8
PAINLEVEL_OUTOF10: 7
PAINLEVEL_OUTOF10: 7

## 2024-12-09 NOTE — PROGRESS NOTES
Mary Alice Aragon is a 44 y.o. female on day 6 of admission presenting with Acute appendicitis.    Subjective   Seen at bedside this AM after coming back from US. Discussed negative preliminary results. States pain in left leg is worse from them pushing on leg during US. She is still interested in rotating today but would like provider to come back in afternoon to re-assess pain. She also did not have a BM yesterday, requesting another dose of lactulose and if no BM but this afternoon we discussed ordering a suppository. She has more incisional pain in abdomen and soreness that is improving daily. Patient was agreeable to plan and had no further questions.     Objective     Physical Exam  Vitals reviewed.   Constitutional:       Appearance: Normal appearance.   HENT:      Head: Normocephalic and atraumatic.      Nose: Nose normal.      Mouth/Throat:      Mouth: Mucous membranes are moist.      Pharynx: Oropharynx is clear.   Eyes:      Extraocular Movements: Extraocular movements intact.      Pupils: Pupils are equal, round, and reactive to light.   Cardiovascular:      Rate and Rhythm: Normal rate and regular rhythm.      Pulses: Normal pulses.      Heart sounds: Normal heart sounds.   Pulmonary:      Effort: Pulmonary effort is normal.      Breath sounds: Normal breath sounds.   Abdominal:      General: Bowel sounds are normal.      Palpations: Abdomen is soft.      Comments: Abdominal incision x2 clean and dry, no discharge    Musculoskeletal:         General: Normal range of motion.      Right lower leg: Tenderness present. 1+ Edema present.      Left lower leg: Tenderness present. 1+ Edema present.   Skin:     General: Skin is warm.   Neurological:      General: No focal deficit present.      Mental Status: She is alert and oriented to person, place, and time. Mental status is at baseline.   Psychiatric:         Mood and Affect: Mood normal.         Behavior: Behavior normal.         Last Recorded Vitals  Blood  "pressure 100/63, pulse 91, temperature 36.9 °C (98.4 °F), temperature source Temporal, resp. rate 18, height 1.6 m (5' 3\"), weight 68 kg (149 lb 14.6 oz), SpO2 97%.  Intake/Output last 3 Shifts:  I/O last 3 completed shifts:  In: 5270.5 (77.5 mL/kg) [P.O.:1520; Blood:3750.5]  Out: 1300 (19.1 mL/kg) [Urine:1300 (0.5 mL/kg/hr)]  Weight: 68 kg     Relevant Results  Scheduled medications  cholecalciferol, 2,000 Units, oral, Daily  deferoxamine, 2,000 mg, intravenous, Daily  enoxaparin, 40 mg, subcutaneous, q24h AMARA  FLUoxetine, 10 mg, oral, Daily  folic acid, 1 mg, oral, Daily  hydroxyurea, 1,000 mg, oral, Every Mon/Wed/Fri  hydroxyurea, 500 mg, oral, Every Sun/Tues/Thur/Sat  lactulose, 20 g, oral, Once  lidocaine, 2 patch, transdermal, Daily  melatonin, 5 mg, oral, Nightly  methadone, 10 mg, oral, q12h  methocarbamol, 500 mg, oral, q6h AMARA  norethindrone, 5 mg, oral, Daily  pantoprazole, 40 mg, oral, Daily before breakfast  polyethylene glycol, 17 g, oral, Daily  sennosides-docusate sodium, 2 tablet, oral, BID  traZODone, 100 mg, oral, Nightly    Continuous medications  ropivacaine (PF) in NS cmpd, 14 mL/hr, Last Rate: 14 mL/hr (12/07/24 1059)      PRN medications  PRN medications: bisacodyl, calcium carbonate, diclofenac sodium, diphenhydrAMINE, HYDROmorphone, hydrOXYzine HCL, naloxone, naloxone, oxygen, pregabalin, prochlorperazine     This patient has a central line   Reason for the central line remaining today? 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 She is now s/p laparoscopic appendectomy on 12/3. During this hospital stay he dropped her hgb and was transfusion 1 unit of PRBC on 12/6, initial concern for intraabdominal bleed, CTA done showed on 12/5: no active bleed, low suspicion for intraabdominal bleed. On 12/6 she was transfer from [facility] service for management of sickle cell pain. For pain management 12/6 she was switched from dPCA to IV Dilaudid 2mg q2 hrs PRN. 12/8 hgb 7.0; s/p 1 unit pRBCs, hgb 8.5 12/9. BLE edema and calf tenderness, R>L, BLE duplex (12/9) negative for DVTs. Last BM 12/5, increased stool softener and ordered dose of lactulose, gave additional lactulose 12/9 and will order suppository in afternoon if still no BM (pt would like to avoid another enema). Discharge pending improvement in pain.      Update 12/9:  - hgb 8.5 s/p 1 unit pRBCs 12/8  - prelim BLE duplex negative for DVT  - Last BM 12/5 with enema; increased bowel regimen 12/8, gave additional lactulose today and will order suppository in afternoon if still no BM (pt would like to avoid another enema)      # Abdominal pain from acute appendicitis  - s/p laparoscopic appendectomy on 12/3  - Hgb dropped to 6.8 from 7.5, initial concern for intraabdominal bleed, CTA done showed on 12/5: no active bleed, low suspicion for intraabdominal bleed  - s/P 1 unit of PRBC 12/6, post transfusion hgb 7.9 (12/7)  - hgb 7.0 (12/8), low suspicion for bleed as abdomen is non tender and HDS, most likely related to sickle cell process; ordered additional 1 unit pRBCs 12/8, hgb incremented to 8.5 12/9 post transfusion   - Post up visit on 12/26     # Sickle cell pain crisis   - [facility] reviewed, last script for Dilaudid 8mg X14 days was filled on 11/27   - Care path 12/4/2024 reviewed: Rec SQ/IV Dilaudid 2 mg every 2 hours as needed for severe pain. May wean if improved and rotate with his home oral pain regimen prior to discharge   - Hgb baseline 6.7-6.8, received 2 units of pRBCs 12/4 and  12/6 post op; additional unit ordered 12/8 for hgb 7.0 hgb incremented to 8.5 12/9 post transfusion  - Methadone 10mg q12 for chronic pain   - Added Voltaren 15mg AMARA X1 day, DC Ibuprofen   - Cont home Lyrica 25 mg BID   - Robaxin 500mg q6   - Cont home Hydrea, 1000 mg on MWF and 500 mg on SunTuThSa   -  for pain management will DC dPCA and start IV Dilaudid 2mg q2 hrs PRN (12/6--), plan to start rotating 12/9 if able   - Tylenol 650 mg every 6 hours as needed; DC 12/8   - Cont Folic acid   - Bowel regimen while on narcotics; LBM 12/5 with enema s/p dose of lactulose and increased stool regimen on 12/8; still no BM 12/9, gave another lactulose and will order suppository in afternoon if needed (pt would like to avoid enema if able)   - Benadryl for opioid induced pruritus   - Zofran for Opioid induced N/V  - Continue monthly ketamine infusions with pain service and last received this on 10/9/24   - Incentive spirometry, PRN O2 (on 3L O2 at home nocturnal hypoxia), usually takes 3L continuous for comfort with her pain crisis, will discontinue prior to discharge; on room air 12/8   - 12/8 BLE edema and calf tenderness, R>L, BLE duplex (12/9) negative for DVT      # Iron overload   - secondary to chronic transfusions  - Ferritin level is 2285 ng/mL   - Continue current chelation therapy with desferal 2 g daily, can cont home regimen on discharge     # GI/DVT ppx  - Protonix 40 daily  - lovenox, continue SCDs     # H/O cerebral aneurysm  - She has not seen neurosurgery in a while    - Will need to follow up outpatient      # History of NICHOL and MDD  - Follow up with behavioral health as scheduled  - Continue Prozac and hydroxyzine   - Cont Trazodone for sleep      # Dispo:  - Full code  - Post op FUV on 12/26, FUV with sickle cell clinic on 12/18.  - Discharge when pain control is optimized to resume nocturnal oxygen, no needs       I spent 60 minutes in the professional and overall care of this patient.    Assessment and  plan as above discussed with attending physician Dr. Ca Eric, APRN-CNP

## 2024-12-10 ENCOUNTER — APPOINTMENT (OUTPATIENT)
Dept: RADIOLOGY | Facility: HOSPITAL | Age: 44
DRG: 335 | End: 2024-12-10
Payer: MEDICARE

## 2024-12-10 LAB
ALBUMIN SERPL BCP-MCNC: 3.8 G/DL (ref 3.4–5)
ALP SERPL-CCNC: 60 U/L (ref 33–110)
ALT SERPL W P-5'-P-CCNC: 42 U/L (ref 7–45)
ANION GAP SERPL CALC-SCNC: 11 MMOL/L (ref 10–20)
AST SERPL W P-5'-P-CCNC: 43 U/L (ref 9–39)
BASOPHILS # BLD AUTO: 0.05 X10*3/UL (ref 0–0.1)
BASOPHILS NFR BLD AUTO: 0.5 %
BILIRUB SERPL-MCNC: 1 MG/DL (ref 0–1.2)
BUN SERPL-MCNC: 16 MG/DL (ref 6–23)
CALCIUM SERPL-MCNC: 8.9 MG/DL (ref 8.6–10.6)
CHLORIDE SERPL-SCNC: 105 MMOL/L (ref 98–107)
CO2 SERPL-SCNC: 27 MMOL/L (ref 21–32)
CREAT SERPL-MCNC: 0.81 MG/DL (ref 0.5–1.05)
EGFRCR SERPLBLD CKD-EPI 2021: >90 ML/MIN/1.73M*2
EOSINOPHIL # BLD AUTO: 0.23 X10*3/UL (ref 0–0.7)
EOSINOPHIL NFR BLD AUTO: 2.5 %
ERYTHROCYTE [DISTWIDTH] IN BLOOD BY AUTOMATED COUNT: 15.1 % (ref 11.5–14.5)
GLUCOSE SERPL-MCNC: 98 MG/DL (ref 74–99)
HCT VFR BLD AUTO: 24.3 % (ref 36–46)
HEMOGLOBIN A2: 2.2 % (ref 2–3.5)
HEMOGLOBIN A: 46.9 % (ref 95.8–98)
HEMOGLOBIN F: 13.3 % (ref 0–2)
HEMOGLOBIN IDENTIFICATION INTERPRETATION: ABNORMAL
HEMOGLOBIN S: 37.6 %
HGB BLD-MCNC: 8.3 G/DL (ref 12–16)
HGB RETIC QN: 32 PG (ref 28–38)
IMM GRANULOCYTES # BLD AUTO: 0.03 X10*3/UL (ref 0–0.7)
IMM GRANULOCYTES NFR BLD AUTO: 0.3 % (ref 0–0.9)
IMMATURE RETIC FRACTION: 8.4 %
LDH SERPL L TO P-CCNC: 166 U/L (ref 84–246)
LYMPHOCYTES # BLD AUTO: 2.82 X10*3/UL (ref 1.2–4.8)
LYMPHOCYTES NFR BLD AUTO: 30.8 %
MCH RBC QN AUTO: 29 PG (ref 26–34)
MCHC RBC AUTO-ENTMCNC: 34.2 G/DL (ref 32–36)
MCV RBC AUTO: 85 FL (ref 80–100)
MONOCYTES # BLD AUTO: 0.47 X10*3/UL (ref 0.1–1)
MONOCYTES NFR BLD AUTO: 5.1 %
NEUTROPHILS # BLD AUTO: 5.56 X10*3/UL (ref 1.2–7.7)
NEUTROPHILS NFR BLD AUTO: 60.8 %
NRBC BLD-RTO: 0 /100 WBCS (ref 0–0)
PATH REVIEW-HGB IDENTIFICATION: ABNORMAL
PLATELET # BLD AUTO: 331 X10*3/UL (ref 150–450)
POTASSIUM SERPL-SCNC: 4.2 MMOL/L (ref 3.5–5.3)
PROT SERPL-MCNC: 6.5 G/DL (ref 6.4–8.2)
RBC # BLD AUTO: 2.86 X10*6/UL (ref 4–5.2)
RETICS #: 0.01 X10*6/UL (ref 0.02–0.08)
RETICS/RBC NFR AUTO: 0.2 % (ref 0.5–2)
SODIUM SERPL-SCNC: 139 MMOL/L (ref 136–145)
WBC # BLD AUTO: 9.2 X10*3/UL (ref 4.4–11.3)

## 2024-12-10 PROCEDURE — 2500000001 HC RX 250 WO HCPCS SELF ADMINISTERED DRUGS (ALT 637 FOR MEDICARE OP)

## 2024-12-10 PROCEDURE — 2500000004 HC RX 250 GENERAL PHARMACY W/ HCPCS (ALT 636 FOR OP/ED)

## 2024-12-10 PROCEDURE — 2500000004 HC RX 250 GENERAL PHARMACY W/ HCPCS (ALT 636 FOR OP/ED): Performed by: PHYSICIAN ASSISTANT

## 2024-12-10 PROCEDURE — 99233 SBSQ HOSP IP/OBS HIGH 50: CPT

## 2024-12-10 PROCEDURE — 85025 COMPLETE CBC W/AUTO DIFF WBC: CPT

## 2024-12-10 PROCEDURE — 73030 X-RAY EXAM OF SHOULDER: CPT | Mod: RIGHT SIDE | Performed by: RADIOLOGY

## 2024-12-10 PROCEDURE — 2500000002 HC RX 250 W HCPCS SELF ADMINISTERED DRUGS (ALT 637 FOR MEDICARE OP, ALT 636 FOR OP/ED)

## 2024-12-10 PROCEDURE — 80053 COMPREHEN METABOLIC PANEL: CPT | Performed by: PHYSICIAN ASSISTANT

## 2024-12-10 PROCEDURE — 2500000005 HC RX 250 GENERAL PHARMACY W/O HCPCS

## 2024-12-10 PROCEDURE — 1170000001 HC PRIVATE ONCOLOGY ROOM DAILY

## 2024-12-10 PROCEDURE — 2500000004 HC RX 250 GENERAL PHARMACY W/ HCPCS (ALT 636 FOR OP/ED): Performed by: STUDENT IN AN ORGANIZED HEALTH CARE EDUCATION/TRAINING PROGRAM

## 2024-12-10 PROCEDURE — 73080 X-RAY EXAM OF ELBOW: CPT | Mod: RIGHT SIDE | Performed by: RADIOLOGY

## 2024-12-10 PROCEDURE — 85045 AUTOMATED RETICULOCYTE COUNT: CPT | Performed by: PHYSICIAN ASSISTANT

## 2024-12-10 PROCEDURE — S0109 METHADONE ORAL 5MG: HCPCS

## 2024-12-10 PROCEDURE — 73110 X-RAY EXAM OF WRIST: CPT | Mod: RT

## 2024-12-10 PROCEDURE — S4993 CONTRACEPTIVE PILLS FOR BC: HCPCS

## 2024-12-10 PROCEDURE — 2500000001 HC RX 250 WO HCPCS SELF ADMINISTERED DRUGS (ALT 637 FOR MEDICARE OP): Performed by: PHYSICIAN ASSISTANT

## 2024-12-10 PROCEDURE — 73030 X-RAY EXAM OF SHOULDER: CPT | Mod: RT

## 2024-12-10 PROCEDURE — 73080 X-RAY EXAM OF ELBOW: CPT | Mod: RT

## 2024-12-10 PROCEDURE — 73110 X-RAY EXAM OF WRIST: CPT | Mod: RIGHT SIDE | Performed by: RADIOLOGY

## 2024-12-10 PROCEDURE — 83615 LACTATE (LD) (LDH) ENZYME: CPT | Performed by: PHYSICIAN ASSISTANT

## 2024-12-10 RX ORDER — ACETAMINOPHEN 325 MG/1
975 TABLET ORAL ONCE
Status: COMPLETED | OUTPATIENT
Start: 2024-12-10 | End: 2024-12-10

## 2024-12-10 RX ORDER — CAPSAICIN 0.75 MG/G
CREAM TOPICAL 4 TIMES DAILY
Status: DISCONTINUED | OUTPATIENT
Start: 2024-12-10 | End: 2024-12-14 | Stop reason: HOSPADM

## 2024-12-10 RX ORDER — HYDROMORPHONE HYDROCHLORIDE 4 MG/1
4 TABLET ORAL EVERY 4 HOURS PRN
Status: DISCONTINUED | OUTPATIENT
Start: 2024-12-10 | End: 2024-12-11

## 2024-12-10 RX ORDER — HYDROMORPHONE HYDROCHLORIDE 1 MG/ML
2 INJECTION, SOLUTION INTRAMUSCULAR; INTRAVENOUS; SUBCUTANEOUS EVERY 4 HOURS PRN
Status: DISCONTINUED | OUTPATIENT
Start: 2024-12-10 | End: 2024-12-11

## 2024-12-10 RX ORDER — BISACODYL 10 MG/1
10 SUPPOSITORY RECTAL ONCE
Status: COMPLETED | OUTPATIENT
Start: 2024-12-10 | End: 2024-12-10

## 2024-12-10 ASSESSMENT — COGNITIVE AND FUNCTIONAL STATUS - GENERAL
MOVING TO AND FROM BED TO CHAIR: A LITTLE
CLIMB 3 TO 5 STEPS WITH RAILING: A LITTLE
WALKING IN HOSPITAL ROOM: A LITTLE
STANDING UP FROM CHAIR USING ARMS: A LITTLE
MOBILITY SCORE: 20

## 2024-12-10 ASSESSMENT — PAIN SCALES - GENERAL
PAINLEVEL_OUTOF10: 8
PAINLEVEL_OUTOF10: 8
PAINLEVEL_OUTOF10: 7
PAINLEVEL_OUTOF10: 8
PAINLEVEL_OUTOF10: 8
PAINLEVEL_OUTOF10: 7
PAINLEVEL_OUTOF10: 7
PAINLEVEL_OUTOF10: 9
PAINLEVEL_OUTOF10: 7
PAINLEVEL_OUTOF10: 10 - WORST POSSIBLE PAIN

## 2024-12-10 ASSESSMENT — PAIN - FUNCTIONAL ASSESSMENT
PAIN_FUNCTIONAL_ASSESSMENT: 0-10

## 2024-12-10 ASSESSMENT — PAIN DESCRIPTION - LOCATION: LOCATION: GENERALIZED

## 2024-12-10 NOTE — PROGRESS NOTES
Mary Alice Aragon is a 44 y.o. female on day 7 of admission presenting with Acute appendicitis.    Subjective   Seen at bedside this AM. Reported severe 10/10 right arm pain from shoulder to wrist. Pain was minimal in right arm yesterday. No swelling but did report previous IV site in right forearm that was removed a couple days ago that was causing pain and has been tender at site since removal. Dicussed it could be from IV site but since this is more joint pain will order XR's as well. Educated that if arm becomes more swollen or tender to tell her RN and team can consider duplex if needed. Although her pain is heightened in her arm, she states her leg pain is slightly better. She is agreeable to start rotating today with home dose of IV pain medications. Patient was able to have a small BM last night, requesting additional suppository this AM. Patient was agreeable to plan and had no further questions. Otherwise denies N/V, CP, SOB, abdominal pain, fever, chills.     Objective     Physical Exam  Vitals reviewed.   Constitutional:       Appearance: Normal appearance.   HENT:      Head: Normocephalic and atraumatic.      Nose: Nose normal.      Mouth/Throat:      Mouth: Mucous membranes are moist.      Pharynx: Oropharynx is clear.   Eyes:      Extraocular Movements: Extraocular movements intact.      Pupils: Pupils are equal, round, and reactive to light.   Cardiovascular:      Rate and Rhythm: Normal rate and regular rhythm.      Pulses: Normal pulses.      Heart sounds: Normal heart sounds.   Pulmonary:      Effort: Pulmonary effort is normal.      Breath sounds: Normal breath sounds.   Abdominal:      General: Bowel sounds are normal.      Palpations: Abdomen is soft.      Comments: Abdominal incision x2 clean and dry, no discharge    Musculoskeletal:      Right shoulder: Tenderness present.      Right elbow: Tenderness present.      Right forearm: Tenderness (at previous IV site) present.      Right lower leg:  "Tenderness present. 1+ Edema present.      Left lower leg: Tenderness present. 1+ Edema present.   Skin:     General: Skin is warm.   Neurological:      General: No focal deficit present.      Mental Status: She is alert and oriented to person, place, and time. Mental status is at baseline.   Psychiatric:         Mood and Affect: Mood normal.         Behavior: Behavior normal.       Last Recorded Vitals  Blood pressure 112/81, pulse 78, temperature 37 °C (98.6 °F), temperature source Temporal, resp. rate 16, height 1.6 m (5' 3\"), weight 68 kg (149 lb 14.6 oz), SpO2 94%.  Intake/Output last 3 Shifts:  I/O last 3 completed shifts:  In: 1748 (25.7 mL/kg) [P.O.:560; Blood:688; IV Piggyback:500]  Out: 600 (8.8 mL/kg) [Urine:600 (0.2 mL/kg/hr)]  Weight: 68 kg     Relevant Results  Scheduled medications  cholecalciferol, 2,000 Units, oral, Daily  deferoxamine, 2,000 mg, intravenous, Daily  enoxaparin, 40 mg, subcutaneous, q24h AMARA  FLUoxetine, 10 mg, oral, Daily  folic acid, 1 mg, oral, Daily  hydroxyurea, 1,000 mg, oral, Every Mon/Wed/Fri  hydroxyurea, 500 mg, oral, Every Sun/Tues/Thur/Sat  lidocaine, 2 patch, transdermal, Daily  melatonin, 5 mg, oral, Nightly  methadone, 10 mg, oral, q12h  methocarbamol, 500 mg, oral, q6h AMARA  norethindrone, 5 mg, oral, Daily  pantoprazole, 40 mg, oral, Daily before breakfast  polyethylene glycol, 17 g, oral, Daily  sennosides-docusate sodium, 2 tablet, oral, BID  traZODone, 100 mg, oral, Nightly    Continuous medications  ropivacaine (PF) in NS cmpd, 14 mL/hr, Last Rate: 14 mL/hr (12/07/24 1059)      PRN medications  PRN medications: bisacodyl, calcium carbonate, diclofenac sodium, diphenhydrAMINE, HYDROmorphone, HYDROmorphone, hydrOXYzine HCL, naloxone, naloxone, oxygen, pregabalin, prochlorperazine     This patient has a central line   Reason for the central line remaining today? Hemodynamic monitoring      Assessment/Plan   Assessment & Plan  Acute appendicitis    DIAN (obstructive " sleep apnea)    Acute appendicitis, unspecified acute appendicitis type    44 year-old female with PMH of HbSS on hydroxyurea 1500 (S/T/Th/S)-1000 (M/W/F), nocturnal hypoxia on 2-3L as needed,  c/b transfusion-related iron overload on Deferoxamine, provoked PE 2009 (postpartum) treated with AC for 6 months, cerebral aneurysm, NICHOL/MDD  who presented to ED on 12/2 with abdominal pain, found to have acute appendicitis. She is now s/p laparoscopic appendectomy on 12/3. During this hospital stay he dropped her hgb and was transfusion 1 unit of PRBC on 12/6, initial concern for intraabdominal bleed, CTA done showed on 12/5: no active bleed, low suspicion for intraabdominal bleed. On 12/6 she was transfer from [facility] service for management of sickle cell pain.  12/8 hgb 7.0; s/p 1 unit pRBCs, hgb 8.5 12/9. BLE edema and calf tenderness, R>L, BLE duplex (12/9) negative for DVTs. 12/10 ordered XR right shoulder, elbow, wrist d/t severe pain negative for acute process or AVN. For pain management 12/6 she was switched from dPCA to IV Dilaudid 2mg q2 hrs PRN and started rotating with home PO dilaudid on 12/10. Discharge pending improvement in pain.      Update 12/10:  - R shoulder, elbow, wrist XR negative for AVN   - Started rotating with home PO dilaudid and IVP dilaudid     # Abdominal pain from acute appendicitis  - s/p laparoscopic appendectomy on 12/3  - Hgb dropped to 6.8 from 7.5, initial concern for intraabdominal bleed, CTA done showed on 12/5: no active bleed, low suspicion for intraabdominal bleed  - s/P 1 unit of PRBC 12/6, post transfusion hgb 7.9 (12/7)  - hgb 7.0 (12/8), low suspicion for bleed as abdomen is non tender and HDS, most likely related to sickle cell process; ordered additional 1 unit pRBCs 12/8, hgb incremented to 8.5 12/9 post transfusion   - Post up visit on 12/26     # Sickle cell pain crisis   - [facility] reviewed, last script for Dilaudid 8mg X14 days was filled on 11/27   - Care path 12/4/2024  reviewed: Rec SQ/IV Dilaudid 2 mg every 2 hours as needed for severe pain. May wean if improved and rotate with his home oral pain regimen prior to discharge   - Hgb baseline 6.7-6.8, received 2 units of pRBCs 12/4 and 12/6 post op; additional unit ordered 12/8 for hgb 7.0 hgb incremented to 8.5 12/9 post transfusion  - Chronically receives ketamine infusions (at [facility], [place], operated by [facility]) but missed infusion in November; since infusion was missed this might be what is causing her elevated pain response; discussed this with her on 12/10   - Methadone 10mg q12 for chronic pain   - Added Voltaren 15mg AMARA X1 day, DC Ibuprofen   - Cont home Lyrica 25 mg BID   - Robaxin 500mg q6   - Cont home Hydrea, 1000 mg on MWF and 500 mg on SunTuThSa   -  for pain management will DC dPCA and start IV Dilaudid 2mg q2 hrs PRN (12/6-12/10)  - Started rotating with home PO dilaudid (4mg Q4hrs) and 2mg IVP dilaudid Q4hrs (12/10)   - Cont Folic acid   - Bowel regimen while on narcotics; LBM 12/5 with enema s/p dose of lactulose and increased stool regimen on 12/8; was able to have small BM 12/9; ordered additional suppository 12/10 and made suppositories PRN   - Benadryl for opioid induced pruritus   - Zofran for Opioid induced N/V  - Continue monthly ketamine infusions with pain service and last received this on 10/9/24   - Incentive spirometry, PRN O2 (on 3L O2 at home nocturnal hypoxia), usually takes 3L continuous for comfort with her pain crisis, will discontinue prior to discharge; on room air 12/8   - 12/8 BLE edema and calf tenderness, R>L, BLE duplex (12/9) negative for DVT   - 12/10 XR R shoulder, wrist, elbow d/t severe pain negative for acute process or AVN; had a previous IV in right forearm that has been causing tenderness to the site; monitor for worsening tenderness or swelling      # Iron overload   - secondary to chronic transfusions  - Ferritin level is 2285 ng/mL   - Continue current chelation therapy with desferal 2 g daily, can cont  home regimen on discharge     # GI/DVT ppx  - Protonix 40 daily  - lovenox, continue SCDs     # H/O cerebral aneurysm  - She has not seen neurosurgery in a while    - Will need to follow up outpatient      # History of NICHOL and MDD  - Follow up with behavioral health as scheduled  - Continue Prozac and hydroxyzine   - Cont Trazodone for sleep      # Dispo:  - Full code  - Post op FUV on 12/26, FUV with sickle cell clinic on 12/18.  - Discharge when pain control is optimized to resume nocturnal oxygen, no needs       I spent 60 minutes in the professional and overall care of this patient.    Assessment and plan as above discussed with attending physician Dr. Ca Eric, APRN-CNP

## 2024-12-10 NOTE — CARE PLAN
Problem: Pain  Goal: Takes deep breaths with improved pain control throughout the shift  Outcome: Progressing  Goal: Turns in bed with improved pain control throughout the shift  Outcome: Progressing  Goal: Walks with improved pain control throughout the shift  Outcome: Progressing  Goal: Performs ADL's with improved pain control throughout shift  Outcome: Progressing  Goal: Participates in PT with improved pain control throughout the shift  Outcome: Progressing  Goal: Free from opioid side effects throughout the shift  Outcome: Progressing  Goal: Free from acute confusion related to pain meds throughout the shift  Outcome: Progressing   The patient's goals for the shift include  rest and comfort    The clinical goals for the shift include pain control    Over the shift, the patient did not make progress toward the following goals. Barriers to progression include pain. Recommendations to address these barriers include prn meds.

## 2024-12-11 ENCOUNTER — APPOINTMENT (OUTPATIENT)
Dept: RADIOLOGY | Facility: HOSPITAL | Age: 44
DRG: 335 | End: 2024-12-11
Payer: MEDICARE

## 2024-12-11 LAB
ALBUMIN SERPL BCP-MCNC: 3.8 G/DL (ref 3.4–5)
ALP SERPL-CCNC: 64 U/L (ref 33–110)
ALT SERPL W P-5'-P-CCNC: 57 U/L (ref 7–45)
ANION GAP SERPL CALC-SCNC: 9 MMOL/L (ref 10–20)
AST SERPL W P-5'-P-CCNC: 48 U/L (ref 9–39)
BASOPHILS # BLD AUTO: 0.05 X10*3/UL (ref 0–0.1)
BASOPHILS NFR BLD AUTO: 0.5 %
BILIRUB SERPL-MCNC: 0.9 MG/DL (ref 0–1.2)
BUN SERPL-MCNC: 16 MG/DL (ref 6–23)
CALCIUM SERPL-MCNC: 8.6 MG/DL (ref 8.6–10.6)
CHLORIDE SERPL-SCNC: 107 MMOL/L (ref 98–107)
CO2 SERPL-SCNC: 27 MMOL/L (ref 21–32)
CREAT SERPL-MCNC: 0.93 MG/DL (ref 0.5–1.05)
EGFRCR SERPLBLD CKD-EPI 2021: 79 ML/MIN/1.73M*2
EOSINOPHIL # BLD AUTO: 0.15 X10*3/UL (ref 0–0.7)
EOSINOPHIL NFR BLD AUTO: 1.6 %
ERYTHROCYTE [DISTWIDTH] IN BLOOD BY AUTOMATED COUNT: 14.9 % (ref 11.5–14.5)
GLUCOSE SERPL-MCNC: 110 MG/DL (ref 74–99)
HCT VFR BLD AUTO: 23.9 % (ref 36–46)
HGB BLD-MCNC: 8.3 G/DL (ref 12–16)
HGB RETIC QN: 32 PG (ref 28–38)
IMM GRANULOCYTES # BLD AUTO: 0.03 X10*3/UL (ref 0–0.7)
IMM GRANULOCYTES NFR BLD AUTO: 0.3 % (ref 0–0.9)
IMMATURE RETIC FRACTION: 7.4 %
LDH SERPL L TO P-CCNC: 151 U/L (ref 84–246)
LYMPHOCYTES # BLD AUTO: 2.8 X10*3/UL (ref 1.2–4.8)
LYMPHOCYTES NFR BLD AUTO: 30.5 %
MCH RBC QN AUTO: 29.6 PG (ref 26–34)
MCHC RBC AUTO-ENTMCNC: 34.7 G/DL (ref 32–36)
MCV RBC AUTO: 85 FL (ref 80–100)
MONOCYTES # BLD AUTO: 0.44 X10*3/UL (ref 0.1–1)
MONOCYTES NFR BLD AUTO: 4.8 %
NEUTROPHILS # BLD AUTO: 5.72 X10*3/UL (ref 1.2–7.7)
NEUTROPHILS NFR BLD AUTO: 62.3 %
NRBC BLD-RTO: 0 /100 WBCS (ref 0–0)
PLATELET # BLD AUTO: 344 X10*3/UL (ref 150–450)
POTASSIUM SERPL-SCNC: 4 MMOL/L (ref 3.5–5.3)
PROT SERPL-MCNC: 6.9 G/DL (ref 6.4–8.2)
RBC # BLD AUTO: 2.8 X10*6/UL (ref 4–5.2)
RETICS #: 0.01 X10*6/UL (ref 0.02–0.08)
RETICS/RBC NFR AUTO: 0.3 % (ref 0.5–2)
SODIUM SERPL-SCNC: 139 MMOL/L (ref 136–145)
WBC # BLD AUTO: 9.2 X10*3/UL (ref 4.4–11.3)

## 2024-12-11 PROCEDURE — 1170000001 HC PRIVATE ONCOLOGY ROOM DAILY

## 2024-12-11 PROCEDURE — 2500000004 HC RX 250 GENERAL PHARMACY W/ HCPCS (ALT 636 FOR OP/ED): Performed by: STUDENT IN AN ORGANIZED HEALTH CARE EDUCATION/TRAINING PROGRAM

## 2024-12-11 PROCEDURE — 2500000004 HC RX 250 GENERAL PHARMACY W/ HCPCS (ALT 636 FOR OP/ED)

## 2024-12-11 PROCEDURE — 99233 SBSQ HOSP IP/OBS HIGH 50: CPT

## 2024-12-11 PROCEDURE — 2500000004 HC RX 250 GENERAL PHARMACY W/ HCPCS (ALT 636 FOR OP/ED): Performed by: PHYSICIAN ASSISTANT

## 2024-12-11 PROCEDURE — 85025 COMPLETE CBC W/AUTO DIFF WBC: CPT

## 2024-12-11 PROCEDURE — 73562 X-RAY EXAM OF KNEE 3: CPT | Mod: RIGHT SIDE | Performed by: RADIOLOGY

## 2024-12-11 PROCEDURE — 83615 LACTATE (LD) (LDH) ENZYME: CPT | Performed by: PHYSICIAN ASSISTANT

## 2024-12-11 PROCEDURE — S4993 CONTRACEPTIVE PILLS FOR BC: HCPCS

## 2024-12-11 PROCEDURE — 2500000001 HC RX 250 WO HCPCS SELF ADMINISTERED DRUGS (ALT 637 FOR MEDICARE OP)

## 2024-12-11 PROCEDURE — 2500000001 HC RX 250 WO HCPCS SELF ADMINISTERED DRUGS (ALT 637 FOR MEDICARE OP): Performed by: PHYSICIAN ASSISTANT

## 2024-12-11 PROCEDURE — 85045 AUTOMATED RETICULOCYTE COUNT: CPT | Performed by: PHYSICIAN ASSISTANT

## 2024-12-11 PROCEDURE — S0109 METHADONE ORAL 5MG: HCPCS

## 2024-12-11 PROCEDURE — 80053 COMPREHEN METABOLIC PANEL: CPT | Performed by: PHYSICIAN ASSISTANT

## 2024-12-11 PROCEDURE — 2500000002 HC RX 250 W HCPCS SELF ADMINISTERED DRUGS (ALT 637 FOR MEDICARE OP, ALT 636 FOR OP/ED)

## 2024-12-11 PROCEDURE — 73562 X-RAY EXAM OF KNEE 3: CPT | Mod: RT

## 2024-12-11 RX ORDER — HYDROMORPHONE HYDROCHLORIDE 4 MG/1
8 TABLET ORAL EVERY 2 HOUR PRN
Status: DISCONTINUED | OUTPATIENT
Start: 2024-12-11 | End: 2024-12-12

## 2024-12-11 RX ORDER — HYDROMORPHONE HYDROCHLORIDE 1 MG/ML
2 INJECTION, SOLUTION INTRAMUSCULAR; INTRAVENOUS; SUBCUTANEOUS EVERY 4 HOURS PRN
Status: DISCONTINUED | OUTPATIENT
Start: 2024-12-11 | End: 2024-12-12

## 2024-12-11 ASSESSMENT — PAIN DESCRIPTION - LOCATION
LOCATION: ARM
LOCATION: GENERALIZED

## 2024-12-11 ASSESSMENT — PAIN SCALES - GENERAL
PAINLEVEL_OUTOF10: 9
PAINLEVEL_OUTOF10: 9
PAINLEVEL_OUTOF10: 8
PAINLEVEL_OUTOF10: 9
PAINLEVEL_OUTOF10: 7
PAINLEVEL_OUTOF10: 8
PAINLEVEL_OUTOF10: 10 - WORST POSSIBLE PAIN
PAINLEVEL_OUTOF10: 8
PAINLEVEL_OUTOF10: 9
PAINLEVEL_OUTOF10: 10 - WORST POSSIBLE PAIN
PAINLEVEL_OUTOF10: 9
PAINLEVEL_OUTOF10: 8
PAINLEVEL_OUTOF10: 9
PAINLEVEL_OUTOF10: 10 - WORST POSSIBLE PAIN

## 2024-12-11 ASSESSMENT — PAIN - FUNCTIONAL ASSESSMENT
PAIN_FUNCTIONAL_ASSESSMENT: 0-10

## 2024-12-11 NOTE — PROGRESS NOTES
"Mary Alice Aragon is a 44 y.o. female on day 8 of admission presenting with Acute appendicitis.    Subjective   Patient seen resting in bed. Reports 8/10 pain this morning in b/l LE (R>L) and continued pain in her b/l upper extremities but feels that upper extremity pain has improved today. We discussed negative right upper extremity x-ray from 12/10. She describes pain in her RLE as severe and \"crackling\" and feels like \"theres air inside the knee.\" We discussed getting x-ray of RLE today. For pain regimen, we discussed trialing po dilaudid 8mg q2 and keeping IVP breakthrough for severe pain. [facility], patient to reschedule today. She otherwise denies shortness of breath, chest pain, N/V/D, F/C, H/a.        Objective     Physical Exam  Constitutional:       Appearance: Normal appearance.   HENT:      Mouth/Throat:      Mouth: Mucous membranes are moist.   Eyes:      Pupils: Pupils are equal, round, and reactive to light.   Cardiovascular:      Rate and Rhythm: Normal rate and regular rhythm.      Pulses: Normal pulses.   Pulmonary:      Effort: Pulmonary effort is normal.      Breath sounds: Normal breath sounds.   Abdominal:      General: Abdomen is flat. Bowel sounds are normal. There is no distension.      Palpations: Abdomen is soft.      Tenderness: There is no abdominal tenderness. There is no guarding or rebound.   Musculoskeletal:         General: Normal range of motion.      Cervical back: Normal range of motion and neck supple.   Skin:     General: Skin is warm.   Neurological:      General: No focal deficit present.      Mental Status: She is alert.   Psychiatric:         Mood and Affect: Mood normal.         Last Recorded Vitals  Blood pressure 100/65, pulse 80, temperature 36.3 °C (97.3 °F), temperature source Temporal, resp. rate 18, height 1.6 m (5' 3\"), weight 68 kg (149 lb 14.6 oz), SpO2 96%.  Intake/Output last 3 Shifts:  I/O last 3 completed " shifts:  In: 1500 (22.1 mL/kg) [P.O.:500; IV Piggyback:1000]  Out: - (0 mL/kg)   Weight: 68 kg     Relevant Results                 Scheduled medications  capsicum, , Topical, 4x daily  cholecalciferol, 2,000 Units, oral, Daily  deferoxamine, 2,000 mg, intravenous, Daily  enoxaparin, 40 mg, subcutaneous, q24h AMARA  FLUoxetine, 10 mg, oral, Daily  folic acid, 1 mg, oral, Daily  hydroxyurea, 1,000 mg, oral, Every Mon/Wed/Fri  hydroxyurea, 500 mg, oral, Every Sun/Tues/Thur/Sat  lidocaine, 2 patch, transdermal, Daily  melatonin, 5 mg, oral, Nightly  methadone, 10 mg, oral, q12h  methocarbamol, 500 mg, oral, q6h AMARA  norethindrone, 5 mg, oral, Daily  pantoprazole, 40 mg, oral, Daily before breakfast  polyethylene glycol, 17 g, oral, Daily  sennosides-docusate sodium, 2 tablet, oral, BID  traZODone, 100 mg, oral, Nightly      Continuous medications  ropivacaine (PF) in NS cmpd, 14 mL/hr, Last Rate: 14 mL/hr (12/07/24 1059)      PRN medications  PRN medications: bisacodyl, calcium carbonate, diclofenac sodium, diphenhydrAMINE, HYDROmorphone, HYDROmorphone, hydrOXYzine HCL, naloxone, naloxone, oxygen, pregabalin, prochlorperazine               Assessment/Plan   Assessment & Plan  Acute appendicitis  Mary Alice Aragon is a 44 year-old female with PMH of HbSS on hydroxyurea 1500 (S/T/Th/S)-1000 (M/W/F), nocturnal hypoxia on 2-3L as needed,  c/b transfusion-related iron overload on Deferoxamine, provoked PE 2009 (postpartum) treated with AC for 6 months, cerebral aneurysm, NICHOL/MDD  who presented to ED on 12/2 with abdominal pain, found to have acute appendicitis. She is now s/p laparoscopic appendectomy on 12/3. During this hospital stay he dropped her hgb and was transfusion 1 unit of PRBC on 12/6, initial concern for intraabdominal bleed, CTA done showed on 12/5: no active bleed, low suspicion for intraabdominal bleed. On 12/6 she was transfer from [facility] service for management of sickle cell pain.  12/8 hgb 7.0; s/p 1 unit pRBCs,  hgb 8.5 12/9. BLE edema and calf tenderness, R>L, BLE duplex (12/9) negative for DVTs. 12/11 ordered XR of right knee for increased pain, read pending. 12/10 ordered XR right shoulder, elbow, wrist d/t severe pain negative for acute process or AVN. For pain management 12/6 she was switched from dPCA to IV Dilaudid 2mg q2 hrs PRN and started rotating with home PO dilaudid on 12/10. Discharge pending improvement in pain.      Update 12/11:  - R knee x-ray ordered and pending   - Elevated ALT, AST - continue to monitor   - consulted integrative med   - Started po dilaudid 8mg q2 hours with IV breakthrough ordered for severe pain, encouraged patient to trial POs only today and reserve IV only for severe pain in anticipation for home going needs      # Abdominal pain from acute appendicitis  - s/p laparoscopic appendectomy on 12/3  - Hgb dropped to 6.8 from 7.5, initial concern for intraabdominal bleed, CTA done showed on 12/5: no active bleed, low suspicion for intraabdominal bleed  - s/P 1 unit of PRBC 12/6, post transfusion hgb 7.9 (12/7)  - hgb 7.0 (12/8), low suspicion for bleed as abdomen is non tender and HDS, most likely related to sickle cell process; ordered additional 1 unit pRBCs 12/8, hgb incremented to 8.5 12/9 post transfusion and has remained stable since   - Post up visit on 12/26    # Elevated AST / ALT   - ALT 57, AST 48 (12/11)   - Tbili WNL   - Patient without any abdominal pain, physical exam unremarkable   - continue to monitor     # Sickle cell pain crisis   - [facility] reviewed, last script for Dilaudid 8mg X14 days was filled on 11/27   - Care path 12/4/2024 reviewed: Rec SQ/IV Dilaudid 2 mg every 2 hours as needed for severe pain. May wean if improved and rotate with his home oral pain regimen prior to discharge   - Hgb baseline 6.7-6.8, received 2 units of pRBCs 12/4 and 12/6 post op; additional unit ordered 12/8 for hgb 7.0 hgb incremented to 8.5 12/9 post transfusion  - Chronically receives  ketamine infusions (at [facility]) but missed infusion in November; since infusion was missed this might be what is causing her elevated pain response; discussed this with her on 12/10   - Methadone 10mg q12 for chronic pain   - Added Voltaren 15mg AMARA X1 day, DC Ibuprofen   - Cont home Lyrica 25 mg BID   - Robaxin 500mg q6   - Cont home Hydrea, 1000 mg on MWF and 500 mg on SunTuThSa   - s/p dPCA and start IV Dilaudid 2mg q2 hrs PRN (12/6-12/10)  - s/p home PO dilaudid (4mg Q4hrs) and 2mg IVP dilaudid Q4hrs (12/10)    - 12/11 start PO dilaudid 8mg q2 hours with IVP ordered only for breakthrough. Encouraged PO use only in anticipation for home going needs   - Cont Folic acid   - Bowel regimen while on narcotics; LBM 12/5 with enema s/p dose of lactulose and increased stool regimen on 12/8; was able to have small BM 12/9; ordered additional suppository 12/10 and made suppositories PRN   - Benadryl for opioid induced pruritus   - Zofran for Opioid induced N/V  - Continue monthly ketamine infusions with pain service and last received this on 10/9/24   - Incentive spirometry, PRN O2 (on 3L O2 at home nocturnal hypoxia), usually takes 3L continuous for comfort with her pain crisis, will discontinue prior to discharge; on room air 12/8   - 12/8 BLE edema and calf tenderness, R>L, BLE duplex (12/9) negative for DVT   - 12/10 XR R shoulder, wrist, elbow d/t severe pain negative for acute process or AVN; had a previous IV in right forearm that has been causing tenderness to the site; monitor for worsening tenderness or swelling    - 12/11 XR R knee ordered and pending      # Iron overload   - secondary to chronic transfusions  - Ferritin level is 2285 ng/mL   - Continue current chelation therapy with desferal 2 g daily, can cont home regimen on discharge     # GI/DVT ppx  - Protonix 40 daily  - lovenox, continue SCDs     # H/O cerebral aneurysm  - She has not seen neurosurgery in a while    - Will need to follow up outpatient       # History of NICHOL and MDD  - Follow up with behavioral health as scheduled  - Continue Prozac and hydroxyzine   - Cont Trazodone for sleep      # Dispo:  - Full code  - Post op FUV on 12/26, FUV with sickle cell clinic on 12/18. [facility]   - Discharge when pain control is optimized to resume nocturnal oxygen, no needs        DIAN (obstructive sleep apnea)    Acute appendicitis, unspecified acute appendicitis type           I spent 60 minutes in the professional and overall care of this patient.    Assessment and plan as above discussed with attending physician, Dr. Nayely Mauricio PA-C

## 2024-12-11 NOTE — ASSESSMENT & PLAN NOTE
Mary Alice Aragon is a 44 year-old female with PMH of HbSS on hydroxyurea 1500 (S/T/Th/S)-1000 (M/W/F), nocturnal hypoxia on 2-3L as needed,  c/b transfusion-related iron overload on Deferoxamine, provoked PE 2009 (postpartum) treated with AC for 6 months, cerebral aneurysm, NICHOL/MDD  who presented to ED on 12/2 with abdominal pain, found to have acute appendicitis. She is now s/p laparoscopic appendectomy on 12/3. During this hospital stay he dropped her hgb and was transfusion 1 unit of PRBC on 12/6, initial concern for intraabdominal bleed, CTA done showed on 12/5: no active bleed, low suspicion for intraabdominal bleed. On 12/6 she was transfer from [facility] service for management of sickle cell pain.  12/8 hgb 7.0; s/p 1 unit pRBCs, hgb 8.5 12/9. BLE edema and calf tenderness, R>L, BLE duplex (12/9) negative for DVTs. 12/11 ordered XR of right knee for increased pain, read pending. 12/10 ordered XR right shoulder, elbow, wrist d/t severe pain negative for acute process or AVN. For pain management 12/6 she was switched from dPCA to IV Dilaudid 2mg q2 hrs PRN and started rotating with home PO dilaudid on 12/10. Discharge pending improvement in pain.      Update 12/11:  - R knee x-ray ordered and pending   - Elevated ALT, AST - continue to monitor   - consulted integrative med   - Started po dilaudid 8mg q2 hours with IV breakthrough ordered for severe pain, encouraged patient to trial POs only today and reserve IV only for severe pain in anticipation for home going needs      # Abdominal pain from acute appendicitis  - s/p laparoscopic appendectomy on 12/3  - Hgb dropped to 6.8 from 7.5, initial concern for intraabdominal bleed, CTA done showed on 12/5: no active bleed, low suspicion for intraabdominal bleed  - s/P 1 unit of PRBC 12/6, post transfusion hgb 7.9 (12/7)  - hgb 7.0 (12/8), low suspicion for bleed as abdomen is non tender and HDS, most likely related to sickle cell process; ordered additional 1 unit pRBCs 12/8,  hgb incremented to 8.5 12/9 post transfusion and has remained stable since   - Post up visit on 12/26    # Elevated AST / ALT   - ALT 57, AST 48 (12/11)   - Tbili WNL   - Patient without any abdominal pain, physical exam unremarkable   - continue to monitor     # Sickle cell pain crisis   - [facility] reviewed, last script for Dilaudid 8mg X14 days was filled on 11/27   - Care path 12/4/2024 reviewed: Rec SQ/IV Dilaudid 2 mg every 2 hours as needed for severe pain. May wean if improved and rotate with his home oral pain regimen prior to discharge   - Hgb baseline 6.7-6.8, received 2 units of pRBCs 12/4 and 12/6 post op; additional unit ordered 12/8 for hgb 7.0 hgb incremented to 8.5 12/9 post transfusion  - Chronically receives ketamine infusions (at [facility]) but missed infusion in November; since infusion was missed this might be what is causing her elevated pain response; discussed this with her on 12/10   - Methadone 10mg q12 for chronic pain   - Added Voltaren 15mg AMARA X1 day, DC Ibuprofen   - Cont home Lyrica 25 mg BID   - Robaxin 500mg q6   - Cont home Hydrea, 1000 mg on MWF and 500 mg on SunTuThSa   - s/p dPCA and start IV Dilaudid 2mg q2 hrs PRN (12/6-12/10)  - s/p home PO dilaudid (4mg Q4hrs) and 2mg IVP dilaudid Q4hrs (12/10)    - 12/11 start PO dilaudid 8mg q2 hours with IVP ordered only for breakthrough. Encouraged PO use only in anticipation for home going needs   - Cont Folic acid   - Bowel regimen while on narcotics; LBM 12/5 with enema s/p dose of lactulose and increased stool regimen on 12/8; was able to have small BM 12/9; ordered additional suppository 12/10 and made suppositories PRN   - Benadryl for opioid induced pruritus   - Zofran for Opioid induced N/V  - Continue monthly ketamine infusions with pain service and last received this on 10/9/24   - Incentive spirometry, PRN O2 (on 3L O2 at home nocturnal hypoxia), usually takes 3L continuous for comfort with her pain crisis, will discontinue prior to  discharge; on room air 12/8   - 12/8 BLE edema and calf tenderness, R>L, BLE duplex (12/9) negative for DVT   - 12/10 XR R shoulder, wrist, elbow d/t severe pain negative for acute process or AVN; had a previous IV in right forearm that has been causing tenderness to the site; monitor for worsening tenderness or swelling    - 12/11 XR R knee ordered and pending      # Iron overload   - secondary to chronic transfusions  - Ferritin level is 2285 ng/mL   - Continue current chelation therapy with desferal 2 g daily, can cont home regimen on discharge     # GI/DVT ppx  - Protonix 40 daily  - lovenox, continue SCDs     # H/O cerebral aneurysm  - She has not seen neurosurgery in a while    - Will need to follow up outpatient      # History of NICHOL and MDD  - Follow up with behavioral health as scheduled  - Continue Prozac and hydroxyzine   - Cont Trazodone for sleep      # Dispo:  - Full code  - Post op FUV on 12/26, FUV with sickle cell clinic on 12/18. [facility]   - Discharge when pain control is optimized to resume nocturnal oxygen, no needs

## 2024-12-12 DIAGNOSIS — D57.00 SICKLE CELL DISEASE WITH CRISIS (MULTI): Primary | ICD-10-CM

## 2024-12-12 LAB
ALBUMIN SERPL BCP-MCNC: 4.1 G/DL (ref 3.4–5)
ALP SERPL-CCNC: 68 U/L (ref 33–110)
ALT SERPL W P-5'-P-CCNC: 48 U/L (ref 7–45)
ANION GAP SERPL CALC-SCNC: 12 MMOL/L (ref 10–20)
AST SERPL W P-5'-P-CCNC: 33 U/L (ref 9–39)
BASOPHILS # BLD AUTO: 0.07 X10*3/UL (ref 0–0.1)
BASOPHILS NFR BLD AUTO: 0.7 %
BILIRUB SERPL-MCNC: 0.9 MG/DL (ref 0–1.2)
BUN SERPL-MCNC: 18 MG/DL (ref 6–23)
CALCIUM SERPL-MCNC: 9 MG/DL (ref 8.6–10.6)
CHLORIDE SERPL-SCNC: 106 MMOL/L (ref 98–107)
CO2 SERPL-SCNC: 26 MMOL/L (ref 21–32)
CREAT SERPL-MCNC: 0.92 MG/DL (ref 0.5–1.05)
EGFRCR SERPLBLD CKD-EPI 2021: 80 ML/MIN/1.73M*2
EOSINOPHIL # BLD AUTO: 0.15 X10*3/UL (ref 0–0.7)
EOSINOPHIL NFR BLD AUTO: 1.5 %
ERYTHROCYTE [DISTWIDTH] IN BLOOD BY AUTOMATED COUNT: 14.8 % (ref 11.5–14.5)
GLUCOSE SERPL-MCNC: 85 MG/DL (ref 74–99)
HCT VFR BLD AUTO: 24.6 % (ref 36–46)
HGB BLD-MCNC: 8.2 G/DL (ref 12–16)
HGB RETIC QN: 31 PG (ref 28–38)
IMM GRANULOCYTES # BLD AUTO: 0.03 X10*3/UL (ref 0–0.7)
IMM GRANULOCYTES NFR BLD AUTO: 0.3 % (ref 0–0.9)
IMMATURE RETIC FRACTION: 10.6 %
LDH SERPL L TO P-CCNC: 151 U/L (ref 84–246)
LYMPHOCYTES # BLD AUTO: 3.05 X10*3/UL (ref 1.2–4.8)
LYMPHOCYTES NFR BLD AUTO: 31.2 %
MCH RBC QN AUTO: 28.9 PG (ref 26–34)
MCHC RBC AUTO-ENTMCNC: 33.3 G/DL (ref 32–36)
MCV RBC AUTO: 87 FL (ref 80–100)
MONOCYTES # BLD AUTO: 0.48 X10*3/UL (ref 0.1–1)
MONOCYTES NFR BLD AUTO: 4.9 %
NEUTROPHILS # BLD AUTO: 6.01 X10*3/UL (ref 1.2–7.7)
NEUTROPHILS NFR BLD AUTO: 61.4 %
NRBC BLD-RTO: 0 /100 WBCS (ref 0–0)
PLATELET # BLD AUTO: 331 X10*3/UL (ref 150–450)
POTASSIUM SERPL-SCNC: 4.4 MMOL/L (ref 3.5–5.3)
PROT SERPL-MCNC: 7 G/DL (ref 6.4–8.2)
RBC # BLD AUTO: 2.84 X10*6/UL (ref 4–5.2)
RETICS #: 0.01 X10*6/UL (ref 0.02–0.08)
RETICS/RBC NFR AUTO: 0.3 % (ref 0.5–2)
SODIUM SERPL-SCNC: 140 MMOL/L (ref 136–145)
WBC # BLD AUTO: 9.8 X10*3/UL (ref 4.4–11.3)

## 2024-12-12 PROCEDURE — 99233 SBSQ HOSP IP/OBS HIGH 50: CPT

## 2024-12-12 PROCEDURE — 2500000001 HC RX 250 WO HCPCS SELF ADMINISTERED DRUGS (ALT 637 FOR MEDICARE OP)

## 2024-12-12 PROCEDURE — 2500000004 HC RX 250 GENERAL PHARMACY W/ HCPCS (ALT 636 FOR OP/ED)

## 2024-12-12 PROCEDURE — 85045 AUTOMATED RETICULOCYTE COUNT: CPT

## 2024-12-12 PROCEDURE — 85025 COMPLETE CBC W/AUTO DIFF WBC: CPT

## 2024-12-12 PROCEDURE — S0109 METHADONE ORAL 5MG: HCPCS

## 2024-12-12 PROCEDURE — 2500000004 HC RX 250 GENERAL PHARMACY W/ HCPCS (ALT 636 FOR OP/ED): Performed by: PHYSICIAN ASSISTANT

## 2024-12-12 PROCEDURE — 1170000001 HC PRIVATE ONCOLOGY ROOM DAILY

## 2024-12-12 PROCEDURE — S4993 CONTRACEPTIVE PILLS FOR BC: HCPCS

## 2024-12-12 PROCEDURE — 2500000002 HC RX 250 W HCPCS SELF ADMINISTERED DRUGS (ALT 637 FOR MEDICARE OP, ALT 636 FOR OP/ED)

## 2024-12-12 PROCEDURE — 80053 COMPREHEN METABOLIC PANEL: CPT

## 2024-12-12 PROCEDURE — 2500000001 HC RX 250 WO HCPCS SELF ADMINISTERED DRUGS (ALT 637 FOR MEDICARE OP): Performed by: PHYSICIAN ASSISTANT

## 2024-12-12 PROCEDURE — 2500000004 HC RX 250 GENERAL PHARMACY W/ HCPCS (ALT 636 FOR OP/ED): Performed by: STUDENT IN AN ORGANIZED HEALTH CARE EDUCATION/TRAINING PROGRAM

## 2024-12-12 PROCEDURE — 83615 LACTATE (LD) (LDH) ENZYME: CPT

## 2024-12-12 RX ORDER — HYDROMORPHONE HYDROCHLORIDE 1 MG/ML
2 INJECTION, SOLUTION INTRAMUSCULAR; INTRAVENOUS; SUBCUTANEOUS ONCE
Status: COMPLETED | OUTPATIENT
Start: 2024-12-12 | End: 2024-12-12

## 2024-12-12 RX ORDER — HYDROMORPHONE HYDROCHLORIDE 1 MG/ML
2 INJECTION, SOLUTION INTRAMUSCULAR; INTRAVENOUS; SUBCUTANEOUS EVERY 4 HOURS PRN
Status: DISCONTINUED | OUTPATIENT
Start: 2024-12-12 | End: 2024-12-12

## 2024-12-12 RX ORDER — HYDROMORPHONE HYDROCHLORIDE 4 MG/1
4 TABLET ORAL
Status: DISCONTINUED | OUTPATIENT
Start: 2024-12-12 | End: 2024-12-14 | Stop reason: HOSPADM

## 2024-12-12 RX ORDER — HYDROMORPHONE HYDROCHLORIDE 1 MG/ML
2 INJECTION, SOLUTION INTRAMUSCULAR; INTRAVENOUS; SUBCUTANEOUS EVERY 4 HOURS PRN
Status: COMPLETED | OUTPATIENT
Start: 2024-12-12 | End: 2024-12-13

## 2024-12-12 ASSESSMENT — PAIN - FUNCTIONAL ASSESSMENT
PAIN_FUNCTIONAL_ASSESSMENT: 0-10

## 2024-12-12 ASSESSMENT — PAIN SCALES - GENERAL
PAINLEVEL_OUTOF10: 9
PAINLEVEL_OUTOF10: 9
PAINLEVEL_OUTOF10: 8
PAINLEVEL_OUTOF10: 9
PAINLEVEL_OUTOF10: 9
PAINLEVEL_OUTOF10: 8
PAINLEVEL_OUTOF10: 8
PAINLEVEL_OUTOF10: 9
PAINLEVEL_OUTOF10: 8

## 2024-12-12 ASSESSMENT — PAIN DESCRIPTION - ORIENTATION: ORIENTATION: RIGHT;LEFT

## 2024-12-12 ASSESSMENT — PAIN DESCRIPTION - LOCATION
LOCATION: LEG
LOCATION: LEG

## 2024-12-12 NOTE — ASSESSMENT & PLAN NOTE
Mary Alice Aragon is a 44 year-old female with PMH of HbSS on hydroxyurea 1500 (S/T/Th/S)-1000 (M/W/F), nocturnal hypoxia on 2-3L as needed,  c/b transfusion-related iron overload on Deferoxamine, provoked PE 2009 (postpartum) treated with AC for 6 months, cerebral aneurysm, NICHOL/MDD  who presented to ED on 12/2 with abdominal pain, found to have acute appendicitis. She is now s/p laparoscopic appendectomy on 12/3. During this hospital stay he dropped her hgb and was transfusion 1 unit of PRBC on 12/6, initial concern for intraabdominal bleed, CTA done showed on 12/5: no active bleed, low suspicion for intraabdominal bleed. On 12/6 she was transfer from [facility] service for management of sickle cell pain.  12/8 hgb 7.0; s/p 1 unit pRBCs, hgb 8.5 12/9. BLE edema and calf tenderness, R>L, BLE duplex (12/9) negative for DVTs. 12/11 XR of right knee for increased pain, negative for AVN or acute process. 12/10 ordered XR right shoulder, elbow, wrist d/t severe pain negative for acute process or AVN. For pain management 12/6 she was switched from dPCA to IV Dilaudid 2mg q2 hrs PRN and started rotating with home PO dilaudid on 12/10. 12/12 plan to transition to home po dilaudid 4mg q3 hours with 3 additional IVP doses for breakthrough to be used until discharge. DC pending improvement of pain.      Update 12/12:  - Pain improving, plant to transition to home oral regimen of po dilaudid 4mg q3 hours with 3 additional IVP breakthrough doses of IVP dilaudid 2mg to be used until time of discharge with possible discharge on 12/13     # Abdominal pain from acute appendicitis  - s/p laparoscopic appendectomy on 12/3  - Hgb dropped to 6.8 from 7.5, initial concern for intraabdominal bleed, CTA done showed on 12/5: no active bleed, low suspicion for intraabdominal bleed  - s/P 1 unit of PRBC 12/6, post transfusion hgb 7.9 (12/7)  - hgb 7.0 (12/8), low suspicion for bleed as abdomen is non tender and HDS, most likely related to sickle cell  process; ordered additional 1 unit pRBCs 12/8, hgb incremented to 8.5 12/9 post transfusion and has remained stable since   - Post up visit on 12/26     # Sickle cell pain crisis   - [facility] reviewed, last script for Dilaudid 8mg X14 days was filled on 11/27   - Care path 12/4/2024 reviewed: Rec SQ/IV Dilaudid 2 mg every 2 hours as needed for severe pain. May wean if improved and rotate with his home oral pain regimen prior to discharge   - Hgb baseline 6.7-6.8, received 2 units of pRBCs 12/4 and 12/6 post op; additional unit ordered 12/8 for hgb 7.0 hgb incremented to 8.5 12/9 post transfusion and has remained stable since   - Chronically receives ketamine infusions (at [facility]) but missed infusion in November; since infusion was missed this might be what is causing her elevated pain response; discussed this with her on 12/10   - Methadone 10mg q12 for chronic pain   - Added Voltaren 15mg AMARA X1 day, DC Ibuprofen   - Cont home Lyrica 25 mg BID   - Robaxin 500mg q6   - Cont home Hydrea, 1000 mg on MWF and 500 mg on SunTuThSa   - s/p dPCA and start IV Dilaudid 2mg q2 hrs PRN (12/6-12/10)  - s/p home PO dilaudid (4mg Q4hrs) and 2mg IVP dilaudid Q4hrs (12/10)   - continue PO dilaudid 8mg q2 hours with IVP ordered only for breakthrough. Encouraged PO use only in anticipation for home going needs (12/11-planned stop 12/12)   - 12/12 plan to start home regimen with po dilaudid 4mg q3 hours with 3 additional IVP 2mg dilaudid doses to be used until the time of discharge   - 12/8 BLE edema and calf tenderness, R>L, BLE duplex (12/9) negative for DVT   - 12/10 XR R shoulder, wrist, elbow d/t severe pain negative for acute process or AVN; had a previous IV in right forearm that has been causing tenderness to the site; monitor for worsening tenderness or swelling    - 12/11 XR R knee negative for acute process or AVN   - Cont Folic acid   - Bowel regimen while on narcotics; LBM 12/5 with enema s/p dose of lactulose and increased  stool regimen on 12/8; was able to have small BM 12/9; ordered additional suppository 12/10 and made suppositories PRN   - Benadryl for opioid induced pruritus   - Zofran for Opioid induced N/V  - Continue monthly ketamine infusions with pain service and last received this on 10/9/24   - Incentive spirometry, PRN O2 (on 3L O2 at home nocturnal hypoxia), usually takes 3L continuous for comfort with her pain crisis, will discontinue prior to discharge; on room air 12/8     # Elevated AST / ALT   - ALT 57, AST 48 (12/11) --> ALT 48, AST 33   - Tbili WNL   - Patient without any abdominal pain, physical exam unremarkable   - continue to monitor      # Iron overload   - secondary to chronic transfusions  - Ferritin level is 2285 ng/mL   - Continue current chelation therapy with desferal 2 g daily, can cont home regimen on discharge     # GI/DVT ppx  - Protonix 40 daily  - lovenox, continue SCDs     # H/O cerebral aneurysm  - She has not seen neurosurgery in a while    - Will need to follow up outpatient      # History of NICHOL and MDD  - Follow up with behavioral health as scheduled  - Continue Prozac and hydroxyzine   - Cont Trazodone for sleep      # Dispo:  - Full code  - Post op FUV on 12/26, FUV with sickle cell clinic on 12/18. [facility]   - Discharge when pain control is optimized to resume nocturnal oxygen, no needs, poss 12/13

## 2024-12-12 NOTE — PROGRESS NOTES
"Mary Alice Aragon is a 44 y.o. female on day 9 of admission presenting with Acute appendicitis.    Subjective   Patient seen resting in bed. Reports improvement of pain today. LE pain continues to be more severe than upper extremity and chest pain. Patient has been able to ambulate the halls without severe pain in the past 24 hours. We discussed negative XR of R. Knee completed yesterday. Plan to transition to po only regimen of home dilaudid 4mg this afternoon with 3 additional IV breakthrough doses that patient is able to use until time of discharge. She is agreeable to plan. Otherwise denies any shortness of breath, chest pain, abdominal pain, N/V/D, F/C, H/a.        Objective     Physical Exam  Constitutional:       Appearance: Normal appearance.   HENT:      Mouth/Throat:      Mouth: Mucous membranes are moist.   Eyes:      Pupils: Pupils are equal, round, and reactive to light.   Cardiovascular:      Rate and Rhythm: Normal rate and regular rhythm.      Pulses: Normal pulses.      Heart sounds: Normal heart sounds.   Pulmonary:      Effort: Pulmonary effort is normal.      Breath sounds: Normal breath sounds.      Comments: Home 2L O2 in place for comfort   Abdominal:      General: Abdomen is flat. Bowel sounds are normal. There is no distension.      Palpations: Abdomen is soft.      Tenderness: There is no abdominal tenderness. There is no guarding.   Musculoskeletal:         General: Normal range of motion.      Cervical back: Normal range of motion.   Skin:     General: Skin is warm.   Neurological:      General: No focal deficit present.      Mental Status: She is alert.   Psychiatric:         Mood and Affect: Mood normal.       Last Recorded Vitals  Blood pressure 98/61, pulse 74, temperature 36.5 °C (97.7 °F), temperature source Temporal, resp. rate 18, height 1.6 m (5' 3\"), weight 68 kg (149 lb 14.6 oz), SpO2 93%.  Intake/Output last 3 Shifts:  I/O last 3 completed shifts:  In: 880 (12.9 mL/kg) " [P.O.:880]  Out: - (0 mL/kg)   Weight: 68 kg     Relevant Results               This patient has a central line   Reason for the central line remaining today? Dialysis/Hemapheresis                 Assessment/Plan   Assessment & Plan  Acute appendicitis    Mary Alice Aragon is a 44 year-old female with PMH of HbSS on hydroxyurea 1500 (S/T/Th/S)-1000 (M/W/F), nocturnal hypoxia on 2-3L as needed,  c/b transfusion-related iron overload on Deferoxamine, provoked PE 2009 (postpartum) treated with AC for 6 months, cerebral aneurysm, NICHOL/MDD  who presented to ED on 12/2 with abdominal pain, found to have acute appendicitis. She is now s/p laparoscopic appendectomy on 12/3. During this hospital stay he dropped her hgb and was transfusion 1 unit of PRBC on 12/6, initial concern for intraabdominal bleed, CTA done showed on 12/5: no active bleed, low suspicion for intraabdominal bleed. On 12/6 she was transfer from [facility] service for management of sickle cell pain.  12/8 hgb 7.0; s/p 1 unit pRBCs, hgb 8.5 12/9. BLE edema and calf tenderness, R>L, BLE duplex (12/9) negative for DVTs. 12/11 XR of right knee for increased pain, negative for AVN or acute process. 12/10 ordered XR right shoulder, elbow, wrist d/t severe pain negative for acute process or AVN. For pain management 12/6 she was switched from dPCA to IV Dilaudid 2mg q2 hrs PRN and started rotating with home PO dilaudid on 12/10. 12/12 plan to transition to home po dilaudid 4mg q3 hours with 3 additional IVP doses for breakthrough to be used until discharge. DC pending improvement of pain.      Update 12/12:  - Pain improving, plant to transition to home oral regimen of po dilaudid 4mg q3 hours with 3 additional IVP breakthrough doses of IVP dilaudid 2mg to be used until time of discharge with possible discharge on 12/13     # Abdominal pain from acute appendicitis  - s/p laparoscopic appendectomy on 12/3  - Hgb dropped to 6.8 from 7.5, initial concern for intraabdominal bleed,  CTA done showed on 12/5: no active bleed, low suspicion for intraabdominal bleed  - s/P 1 unit of PRBC 12/6, post transfusion hgb 7.9 (12/7)  - hgb 7.0 (12/8), low suspicion for bleed as abdomen is non tender and HDS, most likely related to sickle cell process; ordered additional 1 unit pRBCs 12/8, hgb incremented to 8.5 12/9 post transfusion and has remained stable since   - Post up visit on 12/26     # Sickle cell pain crisis   - OARRS reviewed, last script for Dilaudid 8mg X14 days was filled on 11/27   - Care path 12/4/2024 reviewed: Rec SQ/IV Dilaudid 2 mg every 2 hours as needed for severe pain. May wean if improved and rotate with his home oral pain regimen prior to discharge   - Hgb baseline 6.7-6.8, received 2 units of pRBCs 12/4 and 12/6 post op; additional unit ordered 12/8 for hgb 7.0 hgb incremented to 8.5 12/9 post transfusion and has remained stable since   - Chronically receives ketamine infusions (at [facility]) but missed infusion in November; since infusion was missed this might be what is causing her elevated pain response; discussed this with her on 12/10   - Methadone 10mg q12 for chronic pain   - Added Voltaren 15mg AMARA X1 day, DC Ibuprofen   - Cont home Lyrica 25 mg BID   - Robaxin 500mg q6   - Cont home Hydrea, 1000 mg on MWF and 500 mg on SunTuThSa   - s/p dPCA and start IV Dilaudid 2mg q2 hrs PRN (12/6-12/10)  - s/p home PO dilaudid (4mg Q4hrs) and 2mg IVP dilaudid Q4hrs (12/10)   - continue PO dilaudid 8mg q2 hours with IVP ordered only for breakthrough. Encouraged PO use only in anticipation for home going needs (12/11-planned stop 12/12)   - 12/12 plan to start home regimen with po dilaudid 4mg q3 hours with 3 additional IVP 2mg dilaudid doses to be used until the time of discharge   - 12/8 BLE edema and calf tenderness, R>L, BLE duplex (12/9) negative for DVT   - 12/10 XR R shoulder, wrist, elbow d/t severe pain negative for acute process or AVN; had a previous IV in right forearm that has  been causing tenderness to the site; monitor for worsening tenderness or swelling    - 12/11 XR R knee negative for acute process or AVN   - Cont Folic acid   - Bowel regimen while on narcotics; LBM 12/5 with enema s/p dose of lactulose and increased stool regimen on 12/8; was able to have small BM 12/9; ordered additional suppository 12/10 and made suppositories PRN   - Benadryl for opioid induced pruritus   - Zofran for Opioid induced N/V  - Continue monthly ketamine infusions with pain service and last received this on 10/9/24   - Incentive spirometry, PRN O2 (on 3L O2 at home nocturnal hypoxia), usually takes 3L continuous for comfort with her pain crisis, will discontinue prior to discharge; on room air 12/8     # Elevated AST / ALT   - ALT 57, AST 48 (12/11) --> ALT 48, AST 33   - Tbili WNL   - Patient without any abdominal pain, physical exam unremarkable   - continue to monitor      # Iron overload   - secondary to chronic transfusions  - Ferritin level is 2285 ng/mL   - Continue current chelation therapy with desferal 2 g daily, can cont home regimen on discharge     # GI/DVT ppx  - Protonix 40 daily  - lovenox, continue SCDs     # H/O cerebral aneurysm  - She has not seen neurosurgery in a while    - Will need to follow up outpatient      # History of NICHOL and MDD  - Follow up with behavioral health as scheduled  - Continue Prozac and hydroxyzine   - Cont Trazodone for sleep      # Dispo:  - Full code  - Post op FUV on 12/26, FUV with sickle cell clinic on 12/18. [facility]   - Discharge when pain control is optimized to resume nocturnal oxygen, no needs, poss 12/13      DIAN (obstructive sleep apnea)    Acute appendicitis, unspecified acute appendicitis type           I spent 60 minutes in the professional and overall care of this patient.    Assessment and plan as above discussed with attending physician, Dr. Nayely Mauricio PA-C

## 2024-12-12 NOTE — SIGNIFICANT EVENT
Followed by Dr. Christian       Rapid Response Nurse Note: RADAR alert: 7    Pager time:   Arrival time: 131  Event end time: 132  Location: [place] [address]  [x] Triage by phone or secure messaging    Rapid response initiated by:  [] Rapid response RN [] Family [] Nursing Supervisor [] Physician   [x] RADAR auto page [] Sepsis auto-page [] RN [] RT   [] NP/PA [] Other:     Primary reason for call:   [] BAT [] New CPAP/BiPAP [] Bleeding [] Change in mental status   [] Chest pain [] Code blue [] FiO2 >/= 50% [] HR </= 40 bpm   [] HR >/= 130 bpm [] Hyperglycemia [] Hypoglycemia [x] RADAR    [] RR </= 8 bpm [] RR >/= 30 bpm [] SBP </= 90 mmHg [] SpO2 < 90%   [] Seizure [] Sepsis [] Shortness of breath  [] Staff concern: see comments     Initial VS and/or RADAR VS: T 36.0 °C; HR 72; RR 18; BP 90/50; SPO2 94%.    Providers present at bedside (if applicable):     Name of ICU Provider contacted (if applicable):     Interventions:  [x] None [] ABG/VBG [] Assist w/ICU transfer [] BAT paged    [] Bag mask [] Blood [] Cardioversion [] Code Blue   [] Code blue for intubation [] Code status changed [] Chest x-ray [] EKG   [] IV fluid/bolus [] KUB x-ray [] Labs/cultures [] Medication   [] Nebulizer treatment [] NIPPV (CPAP/BiPAP) [] Oxygen [] Oral airway   [] Peripheral IV [] Palliative care consult [] CT/MRI [] Sepsis protocol    [] Suctioned [] Other:     Outcome:  [] Coded and  [] Code blue for intubation [] Coded and transferred to ICU []  on division   [x] Remained on division (no change) [] Remained on division + additional monitoring [] Remained in ED [] Transferred to ED   [] Transferred to ICU [] Transferred to inpatient status [] Transferred for interventions (procedure) [] Transferred to ICU stepdown    [] Transferred to surgery [] Transferred to telemetry [] Sepsis protocol [] STEMI protocol   [] Stroke protocol [x] Bedside nurse instructed to page rapid response for any concerns or acute change in condition/VS     Additional Comments:      Radar auto-page received for a radar score of 6 with the above listed vital signs.  Vital signs were confirmed and reviewed with primary RN.  She is at her current baseline and RN has no current concerns.  There are no indications for interventions by Rapid Response at this time.  RN to contact Rapid Response with any future concerns or signs of clinical decompensation.

## 2024-12-13 ENCOUNTER — APPOINTMENT (OUTPATIENT)
Dept: RESPIRATORY THERAPY | Facility: HOSPITAL | Age: 44
DRG: 335 | End: 2024-12-13
Payer: MEDICARE

## 2024-12-13 LAB
ALBUMIN SERPL BCP-MCNC: 3.9 G/DL (ref 3.4–5)
ALP SERPL-CCNC: 71 U/L (ref 33–110)
ALT SERPL W P-5'-P-CCNC: 36 U/L (ref 7–45)
ANION GAP SERPL CALC-SCNC: 11 MMOL/L (ref 10–20)
AST SERPL W P-5'-P-CCNC: 25 U/L (ref 9–39)
BASOPHILS # BLD AUTO: 0.06 X10*3/UL (ref 0–0.1)
BASOPHILS NFR BLD AUTO: 0.6 %
BILIRUB SERPL-MCNC: 0.9 MG/DL (ref 0–1.2)
BUN SERPL-MCNC: 16 MG/DL (ref 6–23)
CALCIUM SERPL-MCNC: 8.9 MG/DL (ref 8.6–10.6)
CHLORIDE SERPL-SCNC: 105 MMOL/L (ref 98–107)
CO2 SERPL-SCNC: 28 MMOL/L (ref 21–32)
CREAT SERPL-MCNC: 0.96 MG/DL (ref 0.5–1.05)
EGFRCR SERPLBLD CKD-EPI 2021: 76 ML/MIN/1.73M*2
EOSINOPHIL # BLD AUTO: 0.15 X10*3/UL (ref 0–0.7)
EOSINOPHIL NFR BLD AUTO: 1.6 %
ERYTHROCYTE [DISTWIDTH] IN BLOOD BY AUTOMATED COUNT: 14.6 % (ref 11.5–14.5)
GLUCOSE SERPL-MCNC: 84 MG/DL (ref 74–99)
HCT VFR BLD AUTO: 23.8 % (ref 36–46)
HGB BLD-MCNC: 8.1 G/DL (ref 12–16)
HGB RETIC QN: 32 PG (ref 28–38)
IMM GRANULOCYTES # BLD AUTO: 0.03 X10*3/UL (ref 0–0.7)
IMM GRANULOCYTES NFR BLD AUTO: 0.3 % (ref 0–0.9)
IMMATURE RETIC FRACTION: 5.7 %
LABORATORY COMMENT REPORT: NORMAL
LDH SERPL L TO P-CCNC: 154 U/L (ref 84–246)
LYMPHOCYTES # BLD AUTO: 2.53 X10*3/UL (ref 1.2–4.8)
LYMPHOCYTES NFR BLD AUTO: 26.7 %
MCH RBC QN AUTO: 29.6 PG (ref 26–34)
MCHC RBC AUTO-ENTMCNC: 34 G/DL (ref 32–36)
MCV RBC AUTO: 87 FL (ref 80–100)
MONOCYTES # BLD AUTO: 0.42 X10*3/UL (ref 0.1–1)
MONOCYTES NFR BLD AUTO: 4.4 %
NEUTROPHILS # BLD AUTO: 6.29 X10*3/UL (ref 1.2–7.7)
NEUTROPHILS NFR BLD AUTO: 66.4 %
NRBC BLD-RTO: 0 /100 WBCS (ref 0–0)
PATH REPORT.FINAL DX SPEC: NORMAL
PATH REPORT.GROSS SPEC: NORMAL
PATH REPORT.RELEVANT HX SPEC: NORMAL
PATH REPORT.TOTAL CANCER: NORMAL
PLATELET # BLD AUTO: 318 X10*3/UL (ref 150–450)
POTASSIUM SERPL-SCNC: 4.5 MMOL/L (ref 3.5–5.3)
PROT SERPL-MCNC: 6.8 G/DL (ref 6.4–8.2)
RBC # BLD AUTO: 2.74 X10*6/UL (ref 4–5.2)
RETICS #: 0.01 X10*6/UL (ref 0.02–0.08)
RETICS/RBC NFR AUTO: 0.3 % (ref 0.5–2)
SODIUM SERPL-SCNC: 139 MMOL/L (ref 136–145)
WBC # BLD AUTO: 9.5 X10*3/UL (ref 4.4–11.3)

## 2024-12-13 PROCEDURE — 2500000002 HC RX 250 W HCPCS SELF ADMINISTERED DRUGS (ALT 637 FOR MEDICARE OP, ALT 636 FOR OP/ED)

## 2024-12-13 PROCEDURE — 99233 SBSQ HOSP IP/OBS HIGH 50: CPT

## 2024-12-13 PROCEDURE — 1170000001 HC PRIVATE ONCOLOGY ROOM DAILY

## 2024-12-13 PROCEDURE — 2500000004 HC RX 250 GENERAL PHARMACY W/ HCPCS (ALT 636 FOR OP/ED)

## 2024-12-13 PROCEDURE — 2500000001 HC RX 250 WO HCPCS SELF ADMINISTERED DRUGS (ALT 637 FOR MEDICARE OP): Performed by: PHYSICIAN ASSISTANT

## 2024-12-13 PROCEDURE — 2500000004 HC RX 250 GENERAL PHARMACY W/ HCPCS (ALT 636 FOR OP/ED): Performed by: STUDENT IN AN ORGANIZED HEALTH CARE EDUCATION/TRAINING PROGRAM

## 2024-12-13 PROCEDURE — 85045 AUTOMATED RETICULOCYTE COUNT: CPT

## 2024-12-13 PROCEDURE — 2500000004 HC RX 250 GENERAL PHARMACY W/ HCPCS (ALT 636 FOR OP/ED): Performed by: PHYSICIAN ASSISTANT

## 2024-12-13 PROCEDURE — 2500000001 HC RX 250 WO HCPCS SELF ADMINISTERED DRUGS (ALT 637 FOR MEDICARE OP)

## 2024-12-13 PROCEDURE — S0109 METHADONE ORAL 5MG: HCPCS

## 2024-12-13 PROCEDURE — S4993 CONTRACEPTIVE PILLS FOR BC: HCPCS

## 2024-12-13 PROCEDURE — 84075 ASSAY ALKALINE PHOSPHATASE: CPT

## 2024-12-13 PROCEDURE — 2500000004 HC RX 250 GENERAL PHARMACY W/ HCPCS (ALT 636 FOR OP/ED): Performed by: NURSE PRACTITIONER

## 2024-12-13 PROCEDURE — 83615 LACTATE (LD) (LDH) ENZYME: CPT

## 2024-12-13 PROCEDURE — 85025 COMPLETE CBC W/AUTO DIFF WBC: CPT

## 2024-12-13 RX ORDER — HYDROMORPHONE HYDROCHLORIDE 1 MG/ML
2 INJECTION, SOLUTION INTRAMUSCULAR; INTRAVENOUS; SUBCUTANEOUS ONCE
Status: COMPLETED | OUTPATIENT
Start: 2024-12-13 | End: 2024-12-13

## 2024-12-13 RX ORDER — GUAIFENESIN 600 MG/1
600 TABLET, EXTENDED RELEASE ORAL 2 TIMES DAILY PRN
Status: DISCONTINUED | OUTPATIENT
Start: 2024-12-13 | End: 2024-12-14 | Stop reason: HOSPADM

## 2024-12-13 RX ORDER — HYDROMORPHONE HYDROCHLORIDE 1 MG/ML
1 INJECTION, SOLUTION INTRAMUSCULAR; INTRAVENOUS; SUBCUTANEOUS ONCE
Status: COMPLETED | OUTPATIENT
Start: 2024-12-13 | End: 2024-12-13

## 2024-12-13 ASSESSMENT — PAIN - FUNCTIONAL ASSESSMENT
PAIN_FUNCTIONAL_ASSESSMENT: 0-10

## 2024-12-13 ASSESSMENT — PAIN SCALES - GENERAL
PAINLEVEL_OUTOF10: 8
PAINLEVEL_OUTOF10: 8
PAINLEVEL_OUTOF10: 9
PAINLEVEL_OUTOF10: 8
PAINLEVEL_OUTOF10: 8
PAINLEVEL_OUTOF10: 4
PAINLEVEL_OUTOF10: 9
PAINLEVEL_OUTOF10: 10 - WORST POSSIBLE PAIN
PAINLEVEL_OUTOF10: 8
PAINLEVEL_OUTOF10: 8
PAINLEVEL_OUTOF10: 7
PAINLEVEL_OUTOF10: 10 - WORST POSSIBLE PAIN

## 2024-12-13 ASSESSMENT — PAIN DESCRIPTION - ORIENTATION
ORIENTATION: RIGHT;LEFT

## 2024-12-13 ASSESSMENT — PAIN DESCRIPTION - LOCATION
LOCATION: LEG

## 2024-12-13 ASSESSMENT — PAIN SCALES - PAIN ASSESSMENT IN ADVANCED DEMENTIA (PAINAD): TOTALSCORE: MEDICATION (SEE MAR);HEAT APPLIED

## 2024-12-13 NOTE — PROGRESS NOTES
"Mary Alice Aragon is a 44 y.o. female on day 10 of admission presenting with Acute appendicitis.    Subjective   Patient seen resting in bed. Reports severe, 8/10 pain this morning in b/l LE. Chest pain has resolved and b/l upper extremities pain has improved but still present. Overnight patient reports that she only received IVP dilaudid overnight, though from chart review it appear that patient did receive po dilaudid. Patient does not feel that her pain is adequately controlled for DC today. We discussed keeping home po dilaudid today with 3 additionally breakthrough doses with DC planned 12/14. Patient agreeable. Denies chest pain, shortness of breath, abdominal pain, N/V/D, F/C, H/a.        Objective     Physical Exam  Constitutional:       Appearance: Normal appearance.   HENT:      Mouth/Throat:      Mouth: Mucous membranes are moist.   Eyes:      Pupils: Pupils are equal, round, and reactive to light.   Cardiovascular:      Rate and Rhythm: Normal rate and regular rhythm.      Pulses: Normal pulses.      Heart sounds: Normal heart sounds.   Pulmonary:      Effort: Pulmonary effort is normal.      Breath sounds: Normal breath sounds.   Abdominal:      General: Abdomen is flat. Bowel sounds are normal.      Palpations: Abdomen is soft.   Musculoskeletal:         General: Normal range of motion.      Cervical back: Normal range of motion and neck supple.   Skin:     General: Skin is warm.   Neurological:      General: No focal deficit present.      Mental Status: She is alert.   Psychiatric:         Mood and Affect: Mood normal.         Last Recorded Vitals  Blood pressure 103/68, pulse 83, temperature 36.2 °C (97.2 °F), temperature source Temporal, resp. rate 16, height 1.6 m (5' 3\"), weight 68 kg (149 lb 14.6 oz), SpO2 95%.  Intake/Output last 3 Shifts:  I/O last 3 completed shifts:  In: 2080 (30.6 mL/kg) [P.O.:1580; IV Piggyback:500]  Out: 1400 (20.6 mL/kg) [Urine:1400 (0.6 mL/kg/hr)]  Weight: 68 kg "     Relevant Results                 Scheduled medications  capsicum, , Topical, 4x daily  cholecalciferol, 2,000 Units, oral, Daily  deferoxamine, 2,000 mg, intravenous, Daily  enoxaparin, 40 mg, subcutaneous, q24h AMARA  FLUoxetine, 10 mg, oral, Daily  folic acid, 1 mg, oral, Daily  hydroxyurea, 1,000 mg, oral, Every Mon/Wed/Fri  hydroxyurea, 500 mg, oral, Every Sun/Tues/Thur/Sat  lidocaine, 2 patch, transdermal, Daily  melatonin, 5 mg, oral, Nightly  methadone, 10 mg, oral, q12h  methocarbamol, 500 mg, oral, q6h AMARA  norethindrone, 5 mg, oral, Daily  pantoprazole, 40 mg, oral, Daily before breakfast  polyethylene glycol, 17 g, oral, Daily  sennosides-docusate sodium, 2 tablet, oral, BID  traZODone, 100 mg, oral, Nightly      Continuous medications  ropivacaine (PF) in NS cmpd, 14 mL/hr, Last Rate: 14 mL/hr (12/07/24 1059)      PRN medications  PRN medications: bisacodyl, calcium carbonate, diclofenac sodium, diphenhydrAMINE, HYDROmorphone, hydrOXYzine HCL, naloxone, naloxone, oxygen, pregabalin, prochlorperazine               Assessment/Plan   Assessment & Plan  Acute appendicitis    Mary Alice Aragon is a 44 year-old female with PMH of HbSS on hydroxyurea 1500 (S/T/Th/S)-1000 (M/W/F), nocturnal hypoxia on 2-3L as needed,  c/b transfusion-related iron overload on Deferoxamine, provoked PE 2009 (postpartum) treated with AC for 6 months, cerebral aneurysm, NICHOL/MDD  who presented to ED on 12/2 with abdominal pain, found to have acute appendicitis. She is now s/p laparoscopic appendectomy on 12/3. During this hospital stay he dropped her hgb and was transfusion 1 unit of PRBC on 12/6, initial concern for intraabdominal bleed, CTA done showed on 12/5: no active bleed, low suspicion for intraabdominal bleed. On 12/6 she was transfer from [facility] service for management of sickle cell pain.  12/8 hgb 7.0; s/p 1 unit pRBCs, hgb 8.5 12/9. BLE edema and calf tenderness, R>L, BLE duplex (12/9) negative for DVTs. 12/11 XR of right knee  for increased pain, negative for AVN or acute process. 12/10 ordered XR right shoulder, elbow, wrist d/t severe pain negative for acute process or AVN. For pain management 12/6 she was switched from dPCA to IV Dilaudid 2mg q2 hrs PRN and started rotating with home PO dilaudid on 12/10. 12/12 transitioned to home po dilaudid 4mg q3 hours with 3 additional IVP doses for breakthrough to be used until discharge. DC pending improvement of pain.      Update 12/13:  - Worsening pain overnight, continue home po dilaudid 4mg q3 hours with 3 additional IVP doses      # Abdominal pain from acute appendicitis  - s/p laparoscopic appendectomy on 12/3  - Hgb dropped to 6.8 from 7.5, initial concern for intraabdominal bleed, CTA done showed on 12/5: no active bleed, low suspicion for intraabdominal bleed  - s/P 1 unit of PRBC 12/6, post transfusion hgb 7.9 (12/7)  - hgb 7.0 (12/8), low suspicion for bleed as abdomen is non tender and HDS, most likely related to sickle cell process; ordered additional 1 unit pRBCs 12/8, hgb incremented to 8.5 12/9 post transfusion and has remained stable since   - Post up visit on 12/26     # Sickle cell pain crisis   - OARRS reviewed, last script for Dilaudid 8mg X14 days was filled on 11/27   - Care path 12/4/2024 reviewed: Rec SQ/IV Dilaudid 2 mg every 2 hours as needed for severe pain. May wean if improved and rotate with his home oral pain regimen prior to discharge   - Hgb baseline 6.7-6.8, received 2 units of pRBCs 12/4 and 12/6 post op; additional unit ordered 12/8 for hgb 7.0 hgb incremented to 8.5 12/9 post transfusion and has remained stable since   - Chronically receives ketamine infusions (at [facility]) but missed infusion in November; since infusion was missed this might be what is causing her elevated pain response; discussed this with her on 12/10   - Methadone 10mg q12 for chronic pain   - Added Voltaren 15mg AMARA X1 day, DC Ibuprofen   - Cont home Lyrica 25 mg BID   - Robaxin 500mg q6    - Cont home Hydrea, 1000 mg on MWF and 500 mg on SunTuThSa   - s/p dPCA and start IV Dilaudid 2mg q2 hrs PRN (12/6-12/10)  - s/p home PO dilaudid (4mg Q4hrs) and 2mg IVP dilaudid Q4hrs (12/10)   - s/p PO dilaudid 8mg q2 hours with IVP ordered only for breakthrough. Encouraged PO use only in anticipation for home going needs (12/11-12/12)   - continue home regimen with po dilaudid 4mg q3 hours with 3 additional IVP 2mg dilaudid doses to be used until the time of discharge   - 12/8 BLE edema and calf tenderness, R>L, BLE duplex (12/9) negative for DVT   - 12/10 XR R shoulder, wrist, elbow d/t severe pain negative for acute process or AVN; had a previous IV in right forearm that has been causing tenderness to the site; monitor for worsening tenderness or swelling    - 12/11 XR R knee negative for acute process or AVN   - Cont Folic acid   - Bowel regimen while on narcotics; LBM 12/5 with enema s/p dose of lactulose and increased stool regimen on 12/8; was able to have small BM 12/9; ordered additional suppository 12/10 and made suppositories PRN   - Benadryl for opioid induced pruritus   - Zofran for Opioid induced N/V  - Continue monthly ketamine infusions with pain service and last received this on 10/9/24   - Incentive spirometry, PRN O2 (on 3L O2 at home nocturnal hypoxia), usually takes 3L continuous for comfort with her pain crisis, will discontinue prior to discharge    # Elevated AST / ALT, resolved   - ALT 57, AST 48 (12/11) --> ALT 48, AST 33   - Tbili WNL   - Patient without any abdominal pain, physical exam unremarkable   - continue to monitor      # Iron overload   - secondary to chronic transfusions  - Ferritin level is 2285 ng/mL   - Continue current chelation therapy with desferal 2 g daily, can cont home regimen on discharge     # GI/DVT ppx  - Protonix 40 daily  - lovenox, continue SCDs     # H/O cerebral aneurysm  - She has not seen neurosurgery in a while    - Will need to follow up outpatient       # History of NICHOL and MDD  - Follow up with behavioral health as scheduled  - Continue Prozac and hydroxyzine   - Cont Trazodone for sleep      # Dispo:  - Full code  - Post op FUV on 12/26, FUV with sickle cell clinic on 12/18. [facility]   - Discharge when pain control is optimized to resume nocturnal oxygen, no needs, poss 12/14      DIAN (obstructive sleep apnea)    Acute appendicitis, unspecified acute appendicitis type           I spent 60 minutes in the professional and overall care of this patient.    Assessment and plan as above discussed with attending physician, Dr. Nayely Mauricio PA-C

## 2024-12-13 NOTE — ASSESSMENT & PLAN NOTE
Mary Alice Aragon is a 44 year-old female with PMH of HbSS on hydroxyurea 1500 (S/T/Th/S)-1000 (M/W/F), nocturnal hypoxia on 2-3L as needed,  c/b transfusion-related iron overload on Deferoxamine, provoked PE 2009 (postpartum) treated with AC for 6 months, cerebral aneurysm, NICHOL/MDD  who presented to ED on 12/2 with abdominal pain, found to have acute appendicitis. She is now s/p laparoscopic appendectomy on 12/3. During this hospital stay he dropped her hgb and was transfusion 1 unit of PRBC on 12/6, initial concern for intraabdominal bleed, CTA done showed on 12/5: no active bleed, low suspicion for intraabdominal bleed. On 12/6 she was transfer from [facility] service for management of sickle cell pain.  12/8 hgb 7.0; s/p 1 unit pRBCs, hgb 8.5 12/9. BLE edema and calf tenderness, R>L, BLE duplex (12/9) negative for DVTs. 12/11 XR of right knee for increased pain, negative for AVN or acute process. 12/10 ordered XR right shoulder, elbow, wrist d/t severe pain negative for acute process or AVN. For pain management 12/6 she was switched from dPCA to IV Dilaudid 2mg q2 hrs PRN and started rotating with home PO dilaudid on 12/10. 12/12 transitioned to home po dilaudid 4mg q3 hours with 3 additional IVP doses for breakthrough to be used until discharge. DC pending improvement of pain.      Update 12/13:  - Worsening pain overnight, continue home po dilaudid 4mg q3 hours with 3 additional IVP doses      # Abdominal pain from acute appendicitis  - s/p laparoscopic appendectomy on 12/3  - Hgb dropped to 6.8 from 7.5, initial concern for intraabdominal bleed, CTA done showed on 12/5: no active bleed, low suspicion for intraabdominal bleed  - s/P 1 unit of PRBC 12/6, post transfusion hgb 7.9 (12/7)  - hgb 7.0 (12/8), low suspicion for bleed as abdomen is non tender and HDS, most likely related to sickle cell process; ordered additional 1 unit pRBCs 12/8, hgb incremented to 8.5 12/9 post transfusion and has remained stable since   -  Post up visit on 12/26     # Sickle cell pain crisis   - OARRS reviewed, last script for Dilaudid 8mg X14 days was filled on 11/27   - Care path 12/4/2024 reviewed: Rec SQ/IV Dilaudid 2 mg every 2 hours as needed for severe pain. May wean if improved and rotate with his home oral pain regimen prior to discharge   - Hgb baseline 6.7-6.8, received 2 units of pRBCs 12/4 and 12/6 post op; additional unit ordered 12/8 for hgb 7.0 hgb incremented to 8.5 12/9 post transfusion and has remained stable since   - Chronically receives ketamine infusions (at [facility]) but missed infusion in November; since infusion was missed this might be what is causing her elevated pain response; discussed this with her on 12/10   - Methadone 10mg q12 for chronic pain   - Added Voltaren 15mg AMARA X1 day, DC Ibuprofen   - Cont home Lyrica 25 mg BID   - Robaxin 500mg q6   - Cont home Hydrea, 1000 mg on MWF and 500 mg on SunTuThSa   - s/p dPCA and start IV Dilaudid 2mg q2 hrs PRN (12/6-12/10)  - s/p home PO dilaudid (4mg Q4hrs) and 2mg IVP dilaudid Q4hrs (12/10)   - s/p PO dilaudid 8mg q2 hours with IVP ordered only for breakthrough. Encouraged PO use only in anticipation for home going needs (12/11-12/12)   - continue home regimen with po dilaudid 4mg q3 hours with 3 additional IVP 2mg dilaudid doses to be used until the time of discharge   - 12/8 BLE edema and calf tenderness, R>L, BLE duplex (12/9) negative for DVT   - 12/10 XR R shoulder, wrist, elbow d/t severe pain negative for acute process or AVN; had a previous IV in right forearm that has been causing tenderness to the site; monitor for worsening tenderness or swelling    - 12/11 XR R knee negative for acute process or AVN   - Cont Folic acid   - Bowel regimen while on narcotics; LBM 12/5 with enema s/p dose of lactulose and increased stool regimen on 12/8; was able to have small BM 12/9; ordered additional suppository 12/10 and made suppositories PRN   - Benadryl for opioid induced  pruritus   - Zofran for Opioid induced N/V  - Continue monthly ketamine infusions with pain service and last received this on 10/9/24   - Incentive spirometry, PRN O2 (on 3L O2 at home nocturnal hypoxia), usually takes 3L continuous for comfort with her pain crisis, will discontinue prior to discharge    # Elevated AST / ALT, resolved   - ALT 57, AST 48 (12/11) --> ALT 48, AST 33   - Tbili WNL   - Patient without any abdominal pain, physical exam unremarkable   - continue to monitor      # Iron overload   - secondary to chronic transfusions  - Ferritin level is 2285 ng/mL   - Continue current chelation therapy with desferal 2 g daily, can cont home regimen on discharge     # GI/DVT ppx  - Protonix 40 daily  - lovenox, continue SCDs     # H/O cerebral aneurysm  - She has not seen neurosurgery in a while    - Will need to follow up outpatient      # History of NICHOL and MDD  - Follow up with behavioral health as scheduled  - Continue Prozac and hydroxyzine   - Cont Trazodone for sleep      # Dispo:  - Full code  - Post op FUV on 12/26, FUV with sickle cell clinic on 12/18. [facility]   - Discharge when pain control is optimized to resume nocturnal oxygen, no needs, poss 12/14

## 2024-12-14 ENCOUNTER — PHARMACY VISIT (OUTPATIENT)
Dept: PHARMACY | Facility: CLINIC | Age: 44
End: 2024-12-14
Payer: COMMERCIAL

## 2024-12-14 VITALS
TEMPERATURE: 97 F | HEART RATE: 79 BPM | WEIGHT: 149.91 LBS | DIASTOLIC BLOOD PRESSURE: 73 MMHG | OXYGEN SATURATION: 96 % | BODY MASS INDEX: 26.56 KG/M2 | HEIGHT: 63 IN | SYSTOLIC BLOOD PRESSURE: 117 MMHG | RESPIRATION RATE: 18 BRPM

## 2024-12-14 LAB
ALBUMIN SERPL BCP-MCNC: 4 G/DL (ref 3.4–5)
ALP SERPL-CCNC: 67 U/L (ref 33–110)
ALT SERPL W P-5'-P-CCNC: 32 U/L (ref 7–45)
ANION GAP SERPL CALC-SCNC: 11 MMOL/L (ref 10–20)
AST SERPL W P-5'-P-CCNC: 22 U/L (ref 9–39)
BASOPHILS # BLD AUTO: 0.04 X10*3/UL (ref 0–0.1)
BASOPHILS NFR BLD AUTO: 0.5 %
BILIRUB SERPL-MCNC: 0.9 MG/DL (ref 0–1.2)
BUN SERPL-MCNC: 17 MG/DL (ref 6–23)
CALCIUM SERPL-MCNC: 8.8 MG/DL (ref 8.6–10.6)
CHLORIDE SERPL-SCNC: 103 MMOL/L (ref 98–107)
CO2 SERPL-SCNC: 25 MMOL/L (ref 21–32)
CREAT SERPL-MCNC: 0.85 MG/DL (ref 0.5–1.05)
EGFRCR SERPLBLD CKD-EPI 2021: 88 ML/MIN/1.73M*2
EOSINOPHIL # BLD AUTO: 0.13 X10*3/UL (ref 0–0.7)
EOSINOPHIL NFR BLD AUTO: 1.7 %
ERYTHROCYTE [DISTWIDTH] IN BLOOD BY AUTOMATED COUNT: 14.6 % (ref 11.5–14.5)
GLUCOSE SERPL-MCNC: 90 MG/DL (ref 74–99)
HCT VFR BLD AUTO: 23.4 % (ref 36–46)
HGB BLD-MCNC: 8 G/DL (ref 12–16)
HGB RETIC QN: 33 PG (ref 28–38)
IMM GRANULOCYTES # BLD AUTO: 0.09 X10*3/UL (ref 0–0.7)
IMM GRANULOCYTES NFR BLD AUTO: 1.2 % (ref 0–0.9)
IMMATURE RETIC FRACTION: 4.4 %
LDH SERPL L TO P-CCNC: 145 U/L (ref 84–246)
LYMPHOCYTES # BLD AUTO: 2.85 X10*3/UL (ref 1.2–4.8)
LYMPHOCYTES NFR BLD AUTO: 37 %
MAGNESIUM SERPL-MCNC: 2.22 MG/DL (ref 1.6–2.4)
MCH RBC QN AUTO: 29.6 PG (ref 26–34)
MCHC RBC AUTO-ENTMCNC: 34.2 G/DL (ref 32–36)
MCV RBC AUTO: 87 FL (ref 80–100)
MONOCYTES # BLD AUTO: 0.31 X10*3/UL (ref 0.1–1)
MONOCYTES NFR BLD AUTO: 4 %
NEUTROPHILS # BLD AUTO: 4.29 X10*3/UL (ref 1.2–7.7)
NEUTROPHILS NFR BLD AUTO: 55.6 %
NRBC BLD-RTO: 0 /100 WBCS (ref 0–0)
PHOSPHATE SERPL-MCNC: 3.9 MG/DL (ref 2.5–4.9)
PLATELET # BLD AUTO: 304 X10*3/UL (ref 150–450)
POTASSIUM SERPL-SCNC: 4.4 MMOL/L (ref 3.5–5.3)
PROT SERPL-MCNC: 6.9 G/DL (ref 6.4–8.2)
RBC # BLD AUTO: 2.7 X10*6/UL (ref 4–5.2)
RETICS #: 0.01 X10*6/UL (ref 0.02–0.08)
RETICS/RBC NFR AUTO: 0.2 % (ref 0.5–2)
SODIUM SERPL-SCNC: 135 MMOL/L (ref 136–145)
WBC # BLD AUTO: 7.7 X10*3/UL (ref 4.4–11.3)

## 2024-12-14 PROCEDURE — S0109 METHADONE ORAL 5MG: HCPCS

## 2024-12-14 PROCEDURE — 83735 ASSAY OF MAGNESIUM: CPT

## 2024-12-14 PROCEDURE — 2500000004 HC RX 250 GENERAL PHARMACY W/ HCPCS (ALT 636 FOR OP/ED)

## 2024-12-14 PROCEDURE — 2500000001 HC RX 250 WO HCPCS SELF ADMINISTERED DRUGS (ALT 637 FOR MEDICARE OP)

## 2024-12-14 PROCEDURE — 2500000001 HC RX 250 WO HCPCS SELF ADMINISTERED DRUGS (ALT 637 FOR MEDICARE OP): Performed by: PHYSICIAN ASSISTANT

## 2024-12-14 PROCEDURE — RXMED WILLOW AMBULATORY MEDICATION CHARGE

## 2024-12-14 PROCEDURE — 83615 LACTATE (LD) (LDH) ENZYME: CPT

## 2024-12-14 PROCEDURE — 2500000005 HC RX 250 GENERAL PHARMACY W/O HCPCS

## 2024-12-14 PROCEDURE — 85025 COMPLETE CBC W/AUTO DIFF WBC: CPT

## 2024-12-14 PROCEDURE — 2500000004 HC RX 250 GENERAL PHARMACY W/ HCPCS (ALT 636 FOR OP/ED): Performed by: PHYSICIAN ASSISTANT

## 2024-12-14 PROCEDURE — S4993 CONTRACEPTIVE PILLS FOR BC: HCPCS

## 2024-12-14 PROCEDURE — 84075 ASSAY ALKALINE PHOSPHATASE: CPT

## 2024-12-14 PROCEDURE — 2500000002 HC RX 250 W HCPCS SELF ADMINISTERED DRUGS (ALT 637 FOR MEDICARE OP, ALT 636 FOR OP/ED)

## 2024-12-14 PROCEDURE — 85045 AUTOMATED RETICULOCYTE COUNT: CPT

## 2024-12-14 PROCEDURE — 84100 ASSAY OF PHOSPHORUS: CPT

## 2024-12-14 PROCEDURE — 99239 HOSP IP/OBS DSCHRG MGMT >30: CPT

## 2024-12-14 RX ORDER — PREGABALIN 25 MG/1
25 CAPSULE ORAL 2 TIMES DAILY PRN
Qty: 60 CAPSULE | Refills: 0 | Status: SHIPPED | OUTPATIENT
Start: 2024-12-14 | End: 2025-01-13

## 2024-12-14 RX ORDER — HEPARIN 100 UNIT/ML
1 SYRINGE INTRAVENOUS AS NEEDED
Status: DISCONTINUED | OUTPATIENT
Start: 2024-12-14 | End: 2024-12-14 | Stop reason: HOSPADM

## 2024-12-14 RX ORDER — TIZANIDINE 2 MG/1
2 TABLET ORAL EVERY 8 HOURS PRN
Qty: 40 TABLET | Refills: 1 | Status: SHIPPED | OUTPATIENT
Start: 2024-12-14 | End: 2025-01-13

## 2024-12-14 RX ORDER — HYDROXYUREA 500 MG/1
500 CAPSULE ORAL
Qty: 8 CAPSULE | Refills: 2 | Status: SHIPPED | OUTPATIENT
Start: 2024-12-14 | End: 2025-01-25

## 2024-12-14 ASSESSMENT — PAIN - FUNCTIONAL ASSESSMENT
PAIN_FUNCTIONAL_ASSESSMENT: 0-10

## 2024-12-14 ASSESSMENT — COGNITIVE AND FUNCTIONAL STATUS - GENERAL
STANDING UP FROM CHAIR USING ARMS: A LITTLE
WALKING IN HOSPITAL ROOM: A LITTLE
MOVING TO AND FROM BED TO CHAIR: A LITTLE
CLIMB 3 TO 5 STEPS WITH RAILING: A LITTLE
DAILY ACTIVITIY SCORE: 24
MOBILITY SCORE: 20

## 2024-12-14 ASSESSMENT — PAIN SCALES - GENERAL
PAINLEVEL_OUTOF10: 5 - MODERATE PAIN
PAINLEVEL_OUTOF10: 9
PAINLEVEL_OUTOF10: 5 - MODERATE PAIN
PAINLEVEL_OUTOF10: 5 - MODERATE PAIN

## 2024-12-14 ASSESSMENT — PAIN DESCRIPTION - ORIENTATION: ORIENTATION: RIGHT;LEFT

## 2024-12-14 ASSESSMENT — PAIN DESCRIPTION - LOCATION: LOCATION: LEG

## 2024-12-14 NOTE — DISCHARGE SUMMARY
Discharge Diagnosis  Acute appendicitis    Issues Requiring Follow-Up  Sickle cell follow up - Dr Saleem 12/18   General surgery follow up - 12/26    Discharge Meds     Medication List      CHANGE how you take these medications     diclofenac sodium 1 % gel; Commonly known as: Voltaren; Per instructions   4 TIMES DAILY (route: topical); What changed: how much to take, how to   take this, when to take this, reasons to take this, additional   instructions   * hydroxyurea 500 mg capsule; Commonly known as: Hydrea; What changed:   Another medication with the same name was changed. Make sure you   understand how and when to take each.   * hydroxyurea 500 mg capsule; Commonly known as: Hydrea; Per   instructions AS DIRECTED (route: oral); What changed: how much to take,   how to take this, when to take this, additional instructions   prochlorperazine 10 mg tablet; Commonly known as: Compazine; 1 tablet   EVERY 8 HOURS (route: oral); What changed: how much to take, how to take   this, when to take this, reasons to take this   traZODone 100 mg tablet; Commonly known as: Desyrel; TAKE 1 TO 2   TABLETS(100  MG) BY MOUTH AT BEDTIME AS NEEDED FOR SLEEP; What   changed: how much to take, how to take this, when to take this, additional   instructions  * This list has 2 medication(s) that are the same as other medications   prescribed for you. Read the directions carefully, and ask your doctor or   other care provider to review them with you.     CONTINUE taking these medications     Aspercreme 10 % cream; Generic drug: trolamine salicylate   capsaicin 0.025 % cream; Commonly known as: Zostrix   cholecalciferol 50 MCG (2000 UT) tablet; Commonly known as: Vitamin D-3;   Take 1 tablet (2,000 Units) by mouth early in the morning..   deferasirox 180 mg tablet; Commonly known as: Jadenu   deferoxamine 2 gram injection; Commonly known as: Desferal; Infuse   2000mg subcutaneously once daily over 8 hours via [place] [facility]    diphenhydrAMINE 25 mg capsule; Commonly known as: BENADryl   docusate sodium 100 mg capsule; Commonly known as: Colace   DRY EYE RELIEF OPHT   FLUoxetine 10 mg capsule; Commonly known as: PROzac; Take 1 capsule (10   mg) by mouth once daily.   folic acid 1 mg tablet; Commonly known as: Folvite   HYDROmorphone 8 mg tablet; Commonly known as: Dilaudid; Take 0.5 tablets   (4 mg) by mouth 3 times a day as needed for severe pain (7 - 10).   hydrOXYzine HCL 25 mg tablet; Commonly known as: Atarax; Take 1 tablet   (25 mg) by mouth 2 times a day as needed for anxiety.   lidocaine 5 % patch; Commonly known as: Lidoderm   melatonin 5 mg tablet   meloxicam 15 mg tablet; Commonly known as: Mobic; Take 1 tablet (15 mg)   by mouth once daily.   methadone 10 mg tablet; Commonly known as: Dolophine; Take 1 tablet (10   mg) by mouth every 12 hours.   multivitamin tablet   naloxone 4 mg/0.1 mL nasal spray; Commonly known as: Narcan; Administer   1 spray (4 mg) into affected nostril(s) if needed for opioid reversal or   respiratory depression. May repeat every 2-3 minutes if needed,   alternating nostrils, until medical assistance becomes available.   norethindrone 5 mg tablet; Commonly known as: Aygestin; Take 1 tablet (5   mg) by mouth once daily.   oxygen gas therapy; Commonly known as: O2   pramoxine 1 % foam; Commonly known as: Proctofoam; Insert 1 Application   into the rectum every 6 hours if needed for irritation (2nd line itching).   pregabalin 25 mg capsule; Commonly known as: Lyrica; Take 1 capsule (25   mg) by mouth 2 times a day.   senna 8.6 mg tablet; Generic drug: sennosides; Take 1 tablet (8.6 mg) by   mouth 2 times a day as needed for constipation.   tiZANidine 2 mg tablet; Commonly known as: Zanaflex; Take 1-2 tablets   (2-4 mg) by mouth every 8 hours if needed for muscle spasms.   valACYclovir 500 mg tablet; Commonly known as: Valtrex   Vitamin C 500 mg tablet; Generic drug: ascorbic acid     STOP taking these  medications     ibuprofen 800 mg tablet   Tums 200 mg calcium chewable tablet; Generic drug: calcium carbonate     ASK your doctor about these medications     ketamine 10 mg/mL injection; Commonly known as: Ketalar   loratadine 10 mg tablet; Commonly known as: Claritin       Test Results Pending At Discharge  Pending Labs       No current pending labs.            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 She is now s/p laparoscopic appendectomy on 12/3. During this hospital stay he dropped her hgb and was transfusion 1 unit of PRBC on 12/6, initial concern for intraabdominal bleed, CTA done showed on 12/5: no active bleed, low suspicion for intraabdominal bleed. On 12/6 she was transferred from ACS service for management of sickle cell pain. 12/8 hgb 7.0; s/p 1 unit pRBCs, hgb 8.5 12/9. Discharge Hb 8. BLE edema and calf tenderness, R>L, BLE duplex (12/9) negative for DVTs. 12/11 XR of right knee for increased pain, negative for AVN or acute process. 12/10 ordered XR right shoulder, elbow, wrist d/t severe pain negative for acute process or AVN. For pain management 12/6 she was switched from dPCA to IV Dilaudid 2mg q2 hrs PRN and started rotating with home PO dilaudid on 12/10. 12/12 transitioned to home po dilaudid 4mg q3 hours with 3 additional IVP doses for breakthrough. Discharged with sickle cell follow-up and ACS follow-up.    Pertinent Physical Exam At Time of Discharge  Physical Exam  Constitutional:       Appearance: Normal appearance.   HENT:      Mouth/Throat:      Mouth: Mucous membranes are moist.   Eyes:      Pupils: Pupils are equal, round, and reactive to light.    Cardiovascular:      Rate and Rhythm: Normal rate and regular rhythm.      Pulses: Normal pulses.      Heart sounds: Normal heart sounds.   Pulmonary:      Effort: Pulmonary effort is normal.      Breath sounds: Normal breath sounds.   Abdominal:      General: Abdomen is flat. Bowel sounds are normal.      Palpations: Abdomen is soft.   Musculoskeletal:         General: Normal range of motion.      Cervical back: Normal range of motion and neck supple.   Skin:     General: Skin is warm.   Neurological:      General: No focal deficit present.      Mental Status: She is alert.   Psychiatric:         Mood and Affect: Mood normal.     Outpatient Follow-Up  Future Appointments   Date Time Provider Department Center   12/18/2024 11:40 AM [facility] CENTRAL LINE 01 [id number] Academic   12/18/2024 12:00 PM Gio Saleem MD [id number] [facility]   12/20/2024  3:30 PM [facility] [id number] PFT RM 3 [id number] [facility]   12/26/2024 11:00 AM [facility] [id number] [facility] CL[facility]IC [id number] [facility]   2/14/2025 11:30 AM Rachel Grady DPM [id number] [place]   3/13/2025  9:10 AM Ania Merchant DO [id number] [place]         Eli Call MD

## 2024-12-14 NOTE — NURSING NOTE
Patient was walked out by nurse . No s/s of  distress noted. Port de-accessed . Iv removed. All belonging taken. Discharge instructions explained and all questions answered.

## 2024-12-14 NOTE — CARE PLAN
The patient's goals for the shift include pain management and rest.      The clinical goals for the shift include Pt will report adequate pain management throughout shift.    Over the shift, the patient did not make progress toward the following goals. Barriers to progression include . Recommendations to address these barriers include .      Problem: Pain  Goal: Takes deep breaths with improved pain control throughout the shift  Outcome: Progressing  Goal: Turns in bed with improved pain control throughout the shift  Outcome: Progressing  Goal: Walks with improved pain control throughout the shift  Outcome: Progressing  Goal: Performs ADL's with improved pain control throughout shift  Outcome: Progressing  Goal: Participates in PT with improved pain control throughout the shift  Outcome: Progressing  Goal: Free from opioid side effects throughout the shift  Outcome: Progressing  Goal: Free from acute confusion related to pain meds throughout the shift  Outcome: Progressing

## 2024-12-17 ENCOUNTER — HOME INFUSION (OUTPATIENT)
Dept: INFUSION THERAPY | Age: 44
End: 2024-12-17
Payer: COMMERCIAL

## 2024-12-17 NOTE — PROGRESS NOTES
Review of chart. Patient was discharged on 12/14/24 with no update sent to [facility].   Discharge Summary reviewed and no changes to deferoxamine orders. Patient with orders for deferoxamine subcutaneous infusions over 8 hours daily for transfusional iron overload. Current orders are good thru 7/25/25 and will be kept as the appropriate order. No labs are ordered and Nursing is not involved at this time..       Tel call to patient. Confirmed discharge and no changes to her treatment plan. Patient knows that she is iron overloaded at this time and needs a deferoxamine infusion. Patient will be leaving [facility] later this week and will not return until 12/29. She is agreeable to delivery of another 11 days today with standard supplies. Deliver by 8pm.      **REMINDER: DON'T send a delivery if not able to confirm with patient (Per patient request)**     Review of entries in Epic. No changes in therapy.     Processed refill for 11 x deferoxamine doses for mix 12/17 and straight delivery to cover doses 12/17 thru 12/27/24. One dose off ice and at top of box.     Follow up 12/30/24 with next fill straight. Check progress and only send if patient is contacted. Will need bag for Monday night.

## 2024-12-18 ENCOUNTER — APPOINTMENT (OUTPATIENT)
Dept: HEMATOLOGY/ONCOLOGY | Facility: HOSPITAL | Age: 44
End: 2024-12-18
Payer: MEDICARE

## 2024-12-18 ENCOUNTER — TELEPHONE (OUTPATIENT)
Dept: HEMATOLOGY/ONCOLOGY | Facility: HOSPITAL | Age: 44
End: 2024-12-18
Payer: COMMERCIAL

## 2024-12-18 RX ORDER — PROCHLORPERAZINE MALEATE 10 MG
10 TABLET ORAL EVERY 8 HOURS PRN
Qty: 30 TABLET | Refills: 1 | Status: SHIPPED | OUTPATIENT
Start: 2024-12-18

## 2024-12-18 RX ORDER — METHADONE HYDROCHLORIDE 10 MG/1
10 TABLET ORAL EVERY 12 HOURS
Qty: 60 TABLET | Refills: 0 | Status: SHIPPED | OUTPATIENT
Start: 2024-12-18

## 2024-12-18 RX ORDER — HYDROMORPHONE HYDROCHLORIDE 4 MG/1
4 TABLET ORAL 3 TIMES DAILY PRN
Qty: 40 TABLET | Refills: 0 | Status: SHIPPED | OUTPATIENT
Start: 2024-12-18

## 2024-12-20 ENCOUNTER — APPOINTMENT (OUTPATIENT)
Dept: RESPIRATORY THERAPY | Facility: HOSPITAL | Age: 44
End: 2024-12-20
Payer: COMMERCIAL

## 2024-12-26 ENCOUNTER — APPOINTMENT (OUTPATIENT)
Dept: SURGERY | Facility: CLINIC | Age: 44
End: 2024-12-26
Payer: COMMERCIAL

## 2024-12-27 ENCOUNTER — HOME INFUSION (OUTPATIENT)
Dept: INFUSION THERAPY | Age: 44
End: 2024-12-27
Payer: COMMERCIAL

## 2024-12-27 NOTE — PROGRESS NOTES
Review of chart. Patient with order for deferoxamine subcutaneous infusions over 8 hours daily for transfusional iron overload. Current orders are good thru 7/25/25. No labs are ordered, nursing not involved.     Tel call to patient. She will return 12/29 and will need a delivery for 12/30. She is agreeable to delivery of another 8 days on Monday, she will need a dose for Monday so requesting delivery ASAP. Agreeable to delivery by 2pm. Same supplies as last time.  to call on the way.      *REMINDER: DON'T send a delivery if not able to confirm with patient (Per patient request)*     Review of entries in Epic. No changes in therapy.     Processed refill for 8x deferoxamine doses for mix and delivery 12/30 to cover doses 12/30 thru 1/6/25     Follow up 1/6/25 with next fill OVN. Check progress and only send if patient is contacted.

## 2024-12-30 ENCOUNTER — DOCUMENTATION (OUTPATIENT)
Dept: PHARMACY | Facility: CLINIC | Age: 44
End: 2024-12-30

## 2024-12-30 NOTE — PROGRESS NOTES
SPOKE W/ PT - DELIVERY IS SCHEDULED FOR MONDAY BY 2 PM.  ASKED PT IF THERE ARE ANY QUESTIONS TO A PHARMACIST. PT SAID: NO QUESTIONS.

## 2025-01-03 ENCOUNTER — TELEPHONE (OUTPATIENT)
Dept: ADMISSION | Facility: HOSPITAL | Age: 45
End: 2025-01-03
Payer: COMMERCIAL

## 2025-01-03 DIAGNOSIS — D57.00 SICKLE CELL DISEASE WITH CRISIS (MULTI): ICD-10-CM

## 2025-01-03 DIAGNOSIS — G60.9 IDIOPATHIC PERIPHERAL NEUROPATHY: ICD-10-CM

## 2025-01-03 DIAGNOSIS — F33.0 MILD EPISODE OF RECURRENT MAJOR DEPRESSIVE DISORDER (CMS-HCC): ICD-10-CM

## 2025-01-03 RX ORDER — TRAZODONE HYDROCHLORIDE 100 MG/1
TABLET ORAL
Qty: 180 TABLET | Refills: 0 | Status: SHIPPED | OUTPATIENT
Start: 2025-01-03

## 2025-01-03 NOTE — TELEPHONE ENCOUNTER
Pt is requesting refills of trazodone 100mg, 1-2 tabs at bedtime prn and ibuprofen 800mg q8 prn.  Preferred pharmacy is [facility] on [address], [place].    She also requested refill of sertraline which looks to have been discontinued.  Call placed to inform pt to contact Dr. May regarding this medication.  No answer and voicemail is full.

## 2025-01-03 NOTE — TELEPHONE ENCOUNTER
Unable to reach pt to inform that ibuprofen was discontinued and pt has meloxicam 15mg which is in the same class of medication and she cannot take both per EMMA Osman.   Also wanted to inform that Dr. May manages trazodone and anti-depressant.

## 2025-01-06 ENCOUNTER — DOCUMENTATION (OUTPATIENT)
Dept: INFUSION THERAPY | Age: 45
End: 2025-01-06
Payer: COMMERCIAL

## 2025-01-06 ENCOUNTER — HOME INFUSION (OUTPATIENT)
Dept: INFUSION THERAPY | Age: 45
End: 2025-01-06
Payer: COMMERCIAL

## 2025-01-06 ENCOUNTER — TELEPHONE (OUTPATIENT)
Dept: ADMISSION | Facility: HOSPITAL | Age: 45
End: 2025-01-06
Payer: COMMERCIAL

## 2025-01-06 DIAGNOSIS — D57.00 SICKLE CELL DISEASE WITH CRISIS (MULTI): ICD-10-CM

## 2025-01-06 RX ORDER — IBUPROFEN 600 MG/1
600 TABLET ORAL EVERY 8 HOURS PRN
Qty: 30 TABLET | Refills: 3 | Status: SHIPPED | OUTPATIENT
Start: 2025-01-06 | End: 2025-01-16

## 2025-01-06 RX ORDER — HYDROMORPHONE HYDROCHLORIDE 4 MG/1
4 TABLET ORAL 3 TIMES DAILY PRN
Qty: 40 TABLET | Refills: 0 | Status: SHIPPED | OUTPATIENT
Start: 2025-01-06

## 2025-01-06 NOTE — PROGRESS NOTES
Review of chart. Patient with order for deferoxamine subcutaneous infusions over 8 hours daily for transfusional iron overload. Current orders are good thru 7/25/25. No labs are ordered, nursing not involved.    [facility] call to patient, agreeable to delivery 1/7 of medication and all supplies. Please send a new sharps container with this delivery    Pharmacy to mix 1/6 and deliver 1/7 with supplies to match:  7x deferoxamine bags  DOS: 1/8 - 1/14    Follow up 1/13 - check needs, deliver OVN    *REMINDER: DON'T send a delivery if not able to confirm with patient (Per patient request)*

## 2025-01-06 NOTE — TELEPHONE ENCOUNTER
Mary Alice Aragon called the refill line for Dilaudid and Ibuprofen. Requesting refills be sent to [facility] pharmacy; message sent to Sickle Cell team to submit.

## 2025-01-06 NOTE — PROGRESS NOTES
PER [facility] PT AGREED TO ANOTHER WEEK OF DFO FOR DELIVERY TOMORROW... OK FOR USUAL SUPPLIES BUT ADD A NEW SHARPS CONTAINER...  SPOKE WITH PT AND SHE CHANGES BATTERIES WITH EVERY OTHER BAG, HAS PLENTY AT [facility], AND OK TO SKIP BATTERIES WITH THIS SHIPMENT... PT REQUESTED 2GAL SHARPS CONTAINER...

## 2025-01-09 ENCOUNTER — TELEPHONE (OUTPATIENT)
Dept: ADMISSION | Facility: HOSPITAL | Age: 45
End: 2025-01-09
Payer: COMMERCIAL

## 2025-01-09 RX ORDER — PREGABALIN 25 MG/1
25 CAPSULE ORAL 2 TIMES DAILY PRN
Qty: 60 CAPSULE | Refills: 0 | Status: SHIPPED | OUTPATIENT
Start: 2025-01-14 | End: 2025-02-13

## 2025-01-09 RX ORDER — TIZANIDINE 2 MG/1
2 TABLET ORAL EVERY 8 HOURS PRN
Qty: 40 TABLET | Refills: 1 | Status: SHIPPED | OUTPATIENT
Start: 2025-01-14 | End: 2025-02-13

## 2025-01-09 NOTE — TELEPHONE ENCOUNTER
Mary Alice Aragon called the refill line for Tizanidine 2mg and Pregabalin 25mg. Requesting refills be sent to [facility] pharmacy; message sent to Sickle Cell team to submit.

## 2025-01-13 ENCOUNTER — HOME INFUSION (OUTPATIENT)
Dept: INFUSION THERAPY | Age: 45
End: 2025-01-13
Payer: COMMERCIAL

## 2025-01-13 NOTE — PROGRESS NOTES
Review of chart. Patient with order for deferoxamine subcutaneous infusions over 8 hours daily for transfusional iron overload. Current orders are good thru 7/25/25. No labs are ordered, nursing not involved.     [facility] call to patient, not agreeable to delivery st this time due to fridge issues. Asks for call on Wednesday and hopefully send at that time.      Follow up 1/15 - check needs, deliver straight     *REMINDER: DON'T send a delivery if not able to confirm with patient (Per patient request)*

## 2025-01-14 ENCOUNTER — TELEPHONE (OUTPATIENT)
Dept: HEMATOLOGY/ONCOLOGY | Facility: HOSPITAL | Age: 45
End: 2025-01-14

## 2025-01-15 ENCOUNTER — HOME INFUSION (OUTPATIENT)
Dept: INFUSION THERAPY | Age: 45
End: 2025-01-15
Payer: COMMERCIAL

## 2025-01-15 NOTE — PROGRESS NOTES
Review of chart. Patient with order for deferoxamine subcutaneous infusions over 8 hours daily for transfusional iron overload. Current orders are good thru 7/25/25. No labs are ordered, nursing not involved.     [facility] call to patient, fridge still not delivered, is agreeable to delivery 1/15 of 3 days of medication and all supplies. Med will be sent as a ROOM TEMP medication with 4 day dating     Pharmacy to mix 1/6 and deliver straight with supplies to match:  3x deferoxamine bags  DOS: 1/15 - 1/17     Follow up 1/17 - check needs and if fridge is functioning, deliver OVN     *REMINDER: DON'T send a delivery if not able to confirm with patient (Per patient request)*

## 2025-01-17 ENCOUNTER — DOCUMENTATION (OUTPATIENT)
Dept: PHARMACY | Facility: CLINIC | Age: 45
End: 2025-01-17

## 2025-01-17 ENCOUNTER — HOME INFUSION (OUTPATIENT)
Dept: INFUSION THERAPY | Age: 45
End: 2025-01-17
Payer: COMMERCIAL

## 2025-01-17 NOTE — PROGRESS NOTES
Telephone call with patient- still awaiting part for refrigerator repair.  Agreed to delivery of another 3 days of DFO to be stored at room temp     Dispnese x3 DFO infusions for cmpd and delivery on 1/17    Infusions 1/18 through 1/20    NF 1/20 - follow up with patient first

## 2025-01-17 NOTE — PROGRESS NOTES
SPOKE W/ PT - DELIVERY IS SCHEDULED FOR FRIDAY BY 6 PM.  ASKED PT IF THERE ARE ANY QUESTIONS TO A PHARMACIST. PT SAID: NO QUESTIONS.

## 2025-01-20 ENCOUNTER — HOME INFUSION (OUTPATIENT)
Dept: INFUSION THERAPY | Age: 45
End: 2025-01-20
Payer: COMMERCIAL

## 2025-01-20 ENCOUNTER — DOCUMENTATION (OUTPATIENT)
Dept: PHARMACY | Facility: CLINIC | Age: 45
End: 2025-01-20

## 2025-01-20 NOTE — PROGRESS NOTES
Telephone call with patient- still awaiting part for refrigerator repair.  Agreed to delivery of another 3 days of DFO to be stored at room temp. Likely will be without refrig thru the end of this week.     Dispnese x3 DFO infusions for cmpd and delivery on 1/20     Infusions 1/21 through 1/23     NF 1/23 - follow up with patient first

## 2025-01-22 ENCOUNTER — HOME INFUSION (OUTPATIENT)
Dept: INFUSION THERAPY | Age: 45
End: 2025-01-22
Payer: COMMERCIAL

## 2025-01-22 NOTE — PROGRESS NOTES
Review of chart. Patient with order for deferoxamine subcutaneous infusions over 8 hours daily for transfusional iron overload. Current orders are good thru 7/25/25. No labs are ordered, nursing not involved.    [facility] call to patient, she received the part she needed for her fridge today, it is working now. Has one bag left for tomorrow 1/23. Agreeable to delivery of 6 days of medication tomorrow any time to get her back to her usual Wednesday deliveries.     Pharmacy to mix 1/23 and deliver straight with supplies to match:  6x deferoxamine bags  DOS: 1/24 - 1/30     Follow up 1/29 - check needs, deliver OVN     *REMINDER: DON'T send a delivery if not able to confirm with patient (Per patient request)*

## 2025-01-23 ENCOUNTER — LAB (OUTPATIENT)
Dept: HEMATOLOGY/ONCOLOGY | Facility: HOSPITAL | Age: 45
End: 2025-01-23
Payer: COMMERCIAL

## 2025-01-23 ENCOUNTER — DOCUMENTATION (OUTPATIENT)
Dept: PHARMACY | Facility: CLINIC | Age: 45
End: 2025-01-23

## 2025-01-23 ENCOUNTER — APPOINTMENT (OUTPATIENT)
Dept: HEMATOLOGY/ONCOLOGY | Facility: HOSPITAL | Age: 45
End: 2025-01-23
Payer: COMMERCIAL

## 2025-01-23 DIAGNOSIS — D57.00 SICKLE CELL DISEASE WITH CRISIS (MULTI): ICD-10-CM

## 2025-01-23 DIAGNOSIS — D57.1 SICKLE CELL DISEASE WITHOUT CRISIS (MULTI): ICD-10-CM

## 2025-01-23 LAB
ALBUMIN SERPL BCP-MCNC: 4.1 G/DL (ref 3.4–5)
ALP SERPL-CCNC: 69 U/L (ref 33–110)
ALT SERPL W P-5'-P-CCNC: 7 U/L (ref 7–45)
ANION GAP SERPL CALC-SCNC: 12 MMOL/L (ref 10–20)
AST SERPL W P-5'-P-CCNC: 15 U/L (ref 9–39)
BASOPHILS # BLD AUTO: 0.05 X10*3/UL (ref 0–0.1)
BASOPHILS NFR BLD AUTO: 0.5 %
BILIRUB SERPL-MCNC: 1.8 MG/DL (ref 0–1.2)
BUN SERPL-MCNC: 11 MG/DL (ref 6–23)
CALCIUM SERPL-MCNC: 8.8 MG/DL (ref 8.6–10.3)
CHLORIDE SERPL-SCNC: 107 MMOL/L (ref 98–107)
CO2 SERPL-SCNC: 25 MMOL/L (ref 21–32)
CREAT SERPL-MCNC: 0.87 MG/DL (ref 0.5–1.05)
EGFRCR SERPLBLD CKD-EPI 2021: 85 ML/MIN/1.73M*2
EOSINOPHIL # BLD AUTO: 0.08 X10*3/UL (ref 0–0.7)
EOSINOPHIL NFR BLD AUTO: 0.8 %
ERYTHROCYTE [DISTWIDTH] IN BLOOD BY AUTOMATED COUNT: 17 % (ref 11.5–14.5)
FERRITIN SERPL-MCNC: 2253 NG/ML (ref 8–150)
GLUCOSE SERPL-MCNC: 95 MG/DL (ref 74–99)
HCT VFR BLD AUTO: 21.9 % (ref 36–46)
HGB BLD-MCNC: 7.4 G/DL (ref 12–16)
HGB RETIC QN: 36 PG (ref 28–38)
IMM GRANULOCYTES # BLD AUTO: 0.04 X10*3/UL (ref 0–0.7)
IMM GRANULOCYTES NFR BLD AUTO: 0.4 % (ref 0–0.9)
IMMATURE RETIC FRACTION: 38.1 %
LDH SERPL L TO P-CCNC: 189 U/L (ref 84–246)
LYMPHOCYTES # BLD AUTO: 3.11 X10*3/UL (ref 1.2–4.8)
LYMPHOCYTES NFR BLD AUTO: 29.5 %
MCH RBC QN AUTO: 29.8 PG (ref 26–34)
MCHC RBC AUTO-ENTMCNC: 33.8 G/DL (ref 32–36)
MCV RBC AUTO: 88 FL (ref 80–100)
MONOCYTES # BLD AUTO: 0.37 X10*3/UL (ref 0.1–1)
MONOCYTES NFR BLD AUTO: 3.5 %
NEUTROPHILS # BLD AUTO: 6.91 X10*3/UL (ref 1.2–7.7)
NEUTROPHILS NFR BLD AUTO: 65.3 %
NRBC BLD-RTO: 0.2 /100 WBCS (ref 0–0)
PLATELET # BLD AUTO: 304 X10*3/UL (ref 150–450)
POTASSIUM SERPL-SCNC: 3.6 MMOL/L (ref 3.5–5.3)
PROT SERPL-MCNC: 7.4 G/DL (ref 6.4–8.2)
RBC # BLD AUTO: 2.48 X10*6/UL (ref 4–5.2)
RETICS #: 0.16 X10*6/UL (ref 0.02–0.08)
RETICS/RBC NFR AUTO: 6.5 % (ref 0.5–2)
SODIUM SERPL-SCNC: 140 MMOL/L (ref 136–145)
WBC # BLD AUTO: 10.6 X10*3/UL (ref 4.4–11.3)

## 2025-01-23 PROCEDURE — 82728 ASSAY OF FERRITIN: CPT

## 2025-01-23 PROCEDURE — 83615 LACTATE (LD) (LDH) ENZYME: CPT

## 2025-01-23 PROCEDURE — 2500000004 HC RX 250 GENERAL PHARMACY W/ HCPCS (ALT 636 FOR OP/ED): Performed by: NURSE PRACTITIONER

## 2025-01-23 PROCEDURE — 36591 DRAW BLOOD OFF VENOUS DEVICE: CPT

## 2025-01-23 PROCEDURE — 80053 COMPREHEN METABOLIC PANEL: CPT

## 2025-01-23 PROCEDURE — 85045 AUTOMATED RETICULOCYTE COUNT: CPT

## 2025-01-23 PROCEDURE — 85025 COMPLETE CBC W/AUTO DIFF WBC: CPT

## 2025-01-23 RX ORDER — HEPARIN 100 UNIT/ML
500 SYRINGE INTRAVENOUS AS NEEDED
Status: DISCONTINUED | OUTPATIENT
Start: 2025-01-23 | End: 2025-01-23 | Stop reason: HOSPADM

## 2025-01-23 RX ORDER — HEPARIN 100 UNIT/ML
500 SYRINGE INTRAVENOUS AS NEEDED
OUTPATIENT
Start: 2025-01-23

## 2025-01-23 RX ORDER — HEPARIN SODIUM,PORCINE/PF 10 UNIT/ML
50 SYRINGE (ML) INTRAVENOUS AS NEEDED
OUTPATIENT
Start: 2025-01-23

## 2025-01-23 RX ADMIN — HEPARIN 500 UNITS: 100 SYRINGE at 09:25

## 2025-01-28 ENCOUNTER — TELEPHONE (OUTPATIENT)
Dept: ADMISSION | Facility: HOSPITAL | Age: 45
End: 2025-01-28
Payer: COMMERCIAL

## 2025-01-28 ENCOUNTER — HOME INFUSION (OUTPATIENT)
Dept: INFUSION THERAPY | Age: 45
End: 2025-01-28
Payer: COMMERCIAL

## 2025-01-28 DIAGNOSIS — G60.9 IDIOPATHIC PERIPHERAL NEUROPATHY: ICD-10-CM

## 2025-01-28 DIAGNOSIS — D57.00 SICKLE CELL DISEASE WITH CRISIS (MULTI): ICD-10-CM

## 2025-01-28 RX ORDER — HYDROMORPHONE HYDROCHLORIDE 4 MG/1
4 TABLET ORAL 3 TIMES DAILY PRN
Qty: 40 TABLET | Refills: 0 | Status: SHIPPED | OUTPATIENT
Start: 2025-01-28 | End: 2025-02-11

## 2025-01-28 RX ORDER — PREGABALIN 25 MG/1
25 CAPSULE ORAL 2 TIMES DAILY PRN
Qty: 60 CAPSULE | Refills: 0 | Status: SHIPPED | OUTPATIENT
Start: 2025-01-28 | End: 2025-02-27

## 2025-01-28 NOTE — TELEPHONE ENCOUNTER
Pt requesting refills   Pregabalin 25mg. 1 capsule BID - pt states she is out of medication.   Dilaudid 4mg. 1 tablet TID prn  Pharmacy: [address] in [place]  Last FUV 10/21, next FUV 2/17

## 2025-01-28 NOTE — PROGRESS NOTES
Review of chart. Patient with order for deferoxamine subcutaneous infusions over 8 hours daily for transfusional iron overload. Current orders are good thru 7/25/25. No labs are ordered, nursing not involved.     RPh call to patient, has one bag left for tomorrow 1/29. Agreeable to delivery of 7 days of medication tomorrow any time. No questions for Rph at this time.     Pharmacy to mix 1/28 and deliver 1/29 with supplies to match:  7x deferoxamine bags  DOS 1/30-2/5     Follow up 2/4 - check needs, deliver OVN     *REMINDER: DON'T send a delivery if not able to confirm with patient (Per patient request)*

## 2025-02-07 ENCOUNTER — HOME INFUSION (OUTPATIENT)
Dept: INFUSION THERAPY | Age: 45
End: 2025-02-07
Payer: COMMERCIAL

## 2025-02-07 DIAGNOSIS — D57.00 SICKLE CELL DISEASE WITH CRISIS (MULTI): ICD-10-CM

## 2025-02-07 RX ORDER — PROCHLORPERAZINE MALEATE 10 MG
10 TABLET ORAL EVERY 8 HOURS PRN
Qty: 30 TABLET | Refills: 1 | Status: SHIPPED | OUTPATIENT
Start: 2025-02-07

## 2025-02-07 NOTE — PROGRESS NOTES
Review of chart. Patient with order for deferoxamine subcutaneous infusions over 8 hours daily for transfusional iron overload. Current orders are good thru 7/25/25. No labs are ordered, nursing not involved.     RPh call to patient, hasn't been feeling well this week. Agreeable to delivery of 7 days of medication today any time. No questions for RPh at this time.     Pharmacy to mix 2/7 and deliver straight with supplies to match:  7x deferoxamine bags  DOS 2/8-2/14     Follow up 2/13 - check needs, deliver OVN     *REMINDER: DON'T send a delivery if not able to confirm with patient (Per patient request)*

## 2025-02-14 ENCOUNTER — APPOINTMENT (OUTPATIENT)
Dept: PODIATRY | Facility: CLINIC | Age: 45
End: 2025-02-14
Payer: COMMERCIAL

## 2025-02-14 ENCOUNTER — HOME INFUSION (OUTPATIENT)
Dept: INFUSION THERAPY | Age: 45
End: 2025-02-14
Payer: COMMERCIAL

## 2025-02-14 NOTE — PROGRESS NOTES
Review of chart. Patient with order for deferoxamine subcutaneous infusions over 8 hours daily for transfusional iron overload. Current orders are good thru 7/25/25. No labs are ordered, nursing not involved.     Rph call to pt: Agreeable to delivery of 7 days of medication today any time. No questions for RPh at this time.     Pharmacy to mix 2/14 and deliver straight with supplies to match:  7x deferoxamine bags  DOS 2/15 - 2/21     Follow up 2/20 - check needs, deliver OVN     *REMINDER: DON'T send a delivery if not able to confirm with patient (Per patient request)*

## 2025-02-17 ENCOUNTER — APPOINTMENT (OUTPATIENT)
Dept: HEMATOLOGY/ONCOLOGY | Facility: HOSPITAL | Age: 45
End: 2025-02-17
Payer: COMMERCIAL

## 2025-02-21 ENCOUNTER — HOME INFUSION (OUTPATIENT)
Dept: INFUSION THERAPY | Age: 45
End: 2025-02-21
Payer: COMMERCIAL

## 2025-02-21 NOTE — PROGRESS NOTES
Review of chart. Patient with order for deferoxamine subcutaneous infusions over 8 hours daily for transfusional iron overload. Current orders are good thru 7/25/25. No labs are ordered, nursing not involved.     Rph call to pt: Agreeable to delivery of 7 days of medication today any time. No questions for RPh at this time.     Pt requesting extra bandaids with this delivery    Pharmacy to mix 2/21 and deliver straight with supplies to match:  7x deferoxamine bags  DOS 2/22 - 2/28     Follow up 2/27 - check needs, deliver OVN     *REMINDER: DON'T send a delivery if not able to confirm with patient (Per patient request)*

## 2025-02-24 ENCOUNTER — OFFICE VISIT (OUTPATIENT)
Dept: HEMATOLOGY/ONCOLOGY | Facility: HOSPITAL | Age: 45
End: 2025-02-24
Payer: COMMERCIAL

## 2025-02-24 VITALS
BODY MASS INDEX: 25.86 KG/M2 | SYSTOLIC BLOOD PRESSURE: 111 MMHG | DIASTOLIC BLOOD PRESSURE: 58 MMHG | TEMPERATURE: 97.7 F | WEIGHT: 146 LBS | OXYGEN SATURATION: 100 % | HEART RATE: 84 BPM | RESPIRATION RATE: 22 BRPM

## 2025-02-24 DIAGNOSIS — D57.00 SICKLE CELL DISEASE WITH CRISIS (MULTI): ICD-10-CM

## 2025-02-24 DIAGNOSIS — Z51.81 ENCOUNTER FOR MONITORING OF HYDROXYUREA THERAPY: ICD-10-CM

## 2025-02-24 DIAGNOSIS — E83.19 IRON OVERLOAD: ICD-10-CM

## 2025-02-24 DIAGNOSIS — G60.9 IDIOPATHIC PERIPHERAL NEUROPATHY: ICD-10-CM

## 2025-02-24 DIAGNOSIS — Z79.64 ENCOUNTER FOR MONITORING OF HYDROXYUREA THERAPY: ICD-10-CM

## 2025-02-24 DIAGNOSIS — G47.34 NOCTURNAL HYPOXIA: ICD-10-CM

## 2025-02-24 DIAGNOSIS — D57.00 HB-SS DISEASE WITH CRISIS: ICD-10-CM

## 2025-02-24 DIAGNOSIS — H91.92 DECREASED HEARING OF LEFT EAR: Primary | ICD-10-CM

## 2025-02-24 DIAGNOSIS — G89.4 CHRONIC PAIN SYNDROME: ICD-10-CM

## 2025-02-24 PROCEDURE — 99215 OFFICE O/P EST HI 40 MIN: CPT | Performed by: PEDIATRICS

## 2025-02-24 RX ORDER — TIZANIDINE 2 MG/1
2 TABLET ORAL EVERY 8 HOURS PRN
Qty: 40 TABLET | Refills: 1 | Status: SHIPPED | OUTPATIENT
Start: 2025-02-24 | End: 2025-03-26

## 2025-02-24 RX ORDER — PREGABALIN 25 MG/1
25 CAPSULE ORAL 2 TIMES DAILY PRN
Qty: 60 CAPSULE | Refills: 0 | Status: SHIPPED | OUTPATIENT
Start: 2025-02-24 | End: 2025-03-26

## 2025-02-24 RX ORDER — HYDROMORPHONE HYDROCHLORIDE 4 MG/1
4 TABLET ORAL EVERY 6 HOURS PRN
COMMUNITY
End: 2025-02-25 | Stop reason: SDUPTHER

## 2025-02-24 ASSESSMENT — PAIN SCALES - GENERAL: PAINLEVEL_OUTOF10: 3

## 2025-02-25 ENCOUNTER — TELEPHONE (OUTPATIENT)
Dept: HEMATOLOGY/ONCOLOGY | Facility: HOSPITAL | Age: 45
End: 2025-02-25
Payer: COMMERCIAL

## 2025-02-25 DIAGNOSIS — D57.00 SICKLE CELL DISEASE WITH CRISIS (MULTI): ICD-10-CM

## 2025-02-25 RX ORDER — HYDROXYUREA 500 MG/1
CAPSULE ORAL
Qty: 68 CAPSULE | Refills: 2 | Status: SHIPPED | OUTPATIENT
Start: 2025-02-25

## 2025-02-25 RX ORDER — HYDROMORPHONE HYDROCHLORIDE 4 MG/1
4 TABLET ORAL EVERY 6 HOURS PRN
Qty: 40 TABLET | Refills: 0 | Status: SHIPPED | OUTPATIENT
Start: 2025-02-25

## 2025-02-25 RX ORDER — METHADONE HYDROCHLORIDE 10 MG/1
10 TABLET ORAL EVERY 12 HOURS
Qty: 60 TABLET | Refills: 0 | Status: SHIPPED | OUTPATIENT
Start: 2025-02-25

## 2025-02-25 ASSESSMENT — ENCOUNTER SYMPTOMS
DEPRESSION: 0
ABDOMINAL PAIN: 0
SORE THROAT: 0
NERVOUS/ANXIOUS: 0
CHEST TIGHTNESS: 0
COUGH: 0
DIARRHEA: 0
SHORTNESS OF BREATH: 0
NAUSEA: 0
HEMOPTYSIS: 0
VOMITING: 0
HEADACHES: 0
FEVER: 0
DIZZINESS: 0
LEG SWELLING: 0
SCLERAL ICTERUS: 0
LIGHT-HEADEDNESS: 0
ARTHRALGIAS: 1
FATIGUE: 0
MYALGIAS: 0
BACK PAIN: 1
BRUISES/BLEEDS EASILY: 0
WHEEZING: 0
PALPITATIONS: 0
WOUND: 0
EYE PROBLEMS: 0
CONSTIPATION: 0

## 2025-02-25 NOTE — PROGRESS NOTES
SICKLE CELL OUTPATIENT NOTE  Patient ID: Mary Alice Aragon   Visit Type: Follow up visit     ASSESSMENT AND PLAN  Mary Alice Aragon is 44 y.o. 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  Peripheral neuropathy is currently well-controlled.  No changes in her current home oral pain medications, and this will be with  Oral Tylenol 650 mg every 6 hours as needed and or ibuprofen 600 mg every 8 hours as needed for mild to moderate pain  Oral dilaudid 4 mg every 4-6 hours as needed for moderate, severe and breakthrough pain   Methadone 10 mg BID for chronic pain   Lyrica 25 mg BID for peripheral neuropathy and chronic pain  She last received ketamine infusion about 4 months ago, and will call pain service to reschedule this.    Topical capsaicin and diclofenac gel as needed   Meloxicam as needed   Non pharmacologic methods of pain control  Reviewed [facility]    Encounter for management of disease modifying therapy with hydroxyurea . She has a HB F of ~ 20%.  She has not been compliant with medication, for the past couple of weeks.  Resume hydroxyurea at appropriate dose of 1500 mg X 3 days in a week and 1000 X 4 days in a week      Chronic anemia secondary to SCD with a hemoglobin of 7.4 g/dl which is around her baseline of 7 to 8.5 g/dL  Daily folic acid 1 mg     Nocturnal hypoxia on 3L supplemental night time oxygen   Continue night supplemental oxygen       Constipation, well controlled  Continue current laxatives with senna and miralax as needed     Iron overload secondary to chronic transfusions, on double chelation therapy. Ferritin level is 2253 ng/mL.  She has not received chelation therapy for the past several weeks  Resume current chelation therapy with desferal 2 g daily subcutaneous and oral jadenu 360 mg daily  Liver MRI to assess iron overload at a later date    History of cerebral aneurysm, and yet to follow-up with neurology and neurosurgery    She will call  to reschedule appointment     History of NICHOL and MDD  Follow up with behavioral health as scheduled  Continue Prozac and hydroxyzine     Worsening decreased hearing, bilaterally  Audiology assessment, to evaluate for worsening hearing loss     Follow up office visit will be in 3 months time      Chief Complaint: Follow up for HB SS SCD      Interval History: Mary Alice is present in clinic today for follow-up of her hemoglobin SS sickle cell disease.  She complains of pain of about 6 out of 10 in her lower back, as well as large joints.  She last took pain medication last night, and has not taking any this morning.  This is above her chronic pain level of 3/10.  Peripheral neuropathy is significantly improved with Lyrica.  Her last discharge from hospital was on 12/14/2020 for, and has not had any ED, or urgent care visits.  She has not received ketamine infusions for the past 4 months since October 2024.  Her mood and affect at her baseline, and is yet to follow-up with behavioral health.  She is also not followed up with neurology and neurosurgery, for evaluation of cerebral aneurysms.  Chronic migraines are otherwise overall well-controlled.  She remains on oral hydroxyurea and currently taking 1000 mg for 3 days, and 500 mg daily for 4 days, which is below her prescribed dose.  She is not taking any of her chelation therapy for the past couple of weeks.  This is because she has been feeling unwell and has had a little bit more pain.    Her bowel movements have been regular, and denies constipation, or diarrhea.  She has been compliant with supplemental nighttime oxygen of 3 L.  She denies focal neurologic deficits, or leg ulcers.  She has been experiencing intermittent lower leg edema, but does not have any in clinic today.      Review of System:   Review of Systems   Constitutional:  Negative for fatigue and fever.   HENT:   Negative for mouth sores, nosebleeds and sore throat.    Eyes:  Negative for eye problems  and icterus.   Respiratory:  Negative for chest tightness, cough, hemoptysis, shortness of breath and wheezing.    Cardiovascular:  Negative for chest pain, leg swelling and palpitations.   Gastrointestinal:  Negative for abdominal pain, constipation, diarrhea, nausea and vomiting.   Genitourinary: Negative.     Musculoskeletal:  Positive for arthralgias and back pain. Negative for myalgias.   Skin:  Negative for wound.   Neurological:  Negative for dizziness, headaches and light-headedness.   Hematological:  Does not bruise/bleed easily.   Psychiatric/Behavioral:  Negative for depression. The patient is not nervous/anxious.       Allergies:   No Known Allergies    Current Medications:   Outpatient Encounter Medications as of 2/24/2025   Medication Sig    ascorbic acid (Vitamin C) 500 mg tablet Take 2 tablets (1,000 mg) by mouth once daily.    capsaicin (Zostrix) 0.025 % cream Apply 1 Application topically if needed.    deferasirox (Jadenu) 180 mg tablet Take 1 tablet (180 mg) by mouth once daily.    deferoxamine (Desferal) 2 gram injection Infuse 2000mg subcutaneously once daily over 8 hours via [place] Pump    diclofenac sodium (Voltaren) 1 % gel Per instructions 4 TIMES DAILY (route: topical)    diphenhydrAMINE (BENADryl) 25 mg capsule Take 1 capsule (25 mg) by mouth every 8 hours if needed.    docusate sodium (Colace) 100 mg capsule Take 1 capsule (100 mg) by mouth every other day.    folic acid (Folvite) 1 mg tablet Take 1 tablet (1 mg) by mouth once daily.    hydroxyurea (Hydrea) 500 mg capsule Take 1 capsule (500 mg total) by mouth once a day on Sunday, Tuesday, Thursday, and Saturday.    lidocaine (Lidoderm) 5 % patch Place 1 patch on the skin once daily as needed for mild pain (1 - 3).    melatonin 5 mg tablet Take 1 tablet (5 mg) by mouth once daily at bedtime.    meloxicam (Mobic) 15 mg tablet Take 1 tablet (15 mg) by mouth once daily.    methadone (Dolophine) 10 mg tablet Take 1 tablet (10 mg) by  mouth every 12 hours.    multivitamin tablet Take 1 tablet by mouth once daily.    naloxone (Narcan) 4 mg/0.1 mL nasal spray Administer 1 spray (4 mg) into affected nostril(s) if needed for opioid reversal or respiratory depression. May repeat every 2-3 minutes if needed, alternating nostrils, until medical assistance becomes available.    oxygen (O2) gas therapy Inhale 3 L/min once daily at bedtime.    peg 400/hypromellose/glycerin (DRY EYE RELIEF OPHT) Administer 1-2 drops into both eyes once daily as needed.    prochlorperazine (Compazine) 10 mg tablet Take 1 tablet (10 mg) by mouth every 8 hours if needed for nausea or vomiting. 1 tablet EVERY 8 HOURS (route: oral)    senna 8.6 mg tablet Take 1 tablet (8.6 mg) by mouth 2 times a day as needed for constipation.    traZODone (Desyrel) 100 mg tablet TAKE 1 TO 2 TABLETS(100  MG) BY MOUTH AT BEDTIME AS NEEDED FOR SLEEP please schedule FUV    trolamine salicylate (Aspercreme) 10 % cream Apply 1 Application topically if needed.    valACYclovir (Valtrex) 500 mg tablet Take 1 tablet (500 mg) by mouth once daily.    FLUoxetine (PROzac) 10 mg capsule Take 1 capsule (10 mg) by mouth once daily. (Patient not taking: Reported on 2/24/2025)    HYDROmorphone (Dilaudid) 4 mg tablet Take 1 tablet (4 mg) by mouth every 6 hours if needed for severe pain (7 - 10).    hydroxyurea (Hydrea) 500 mg capsule Per instructions AS DIRECTED (route: oral)    hydrOXYzine HCL (Atarax) 25 mg tablet Take 1 tablet (25 mg) by mouth 2 times a day as needed for anxiety.    ketamine (Ketalar) 10 mg/mL injection Infuse 1 mL (10 mg) into a venous catheter every 30 (thirty) days. Not administered in the home. (Patient not taking: Reported on 12/3/2024)    loratadine (Claritin) 10 mg tablet Take 1 tablet (10 mg) by mouth once daily as needed for allergies. (Patient not taking: Reported on 12/3/2024)    norethindrone (Aygestin) 5 mg tablet Take 1 tablet (5 mg) by mouth once daily.    pramoxine  (Proctofoam) 1 % foam Insert 1 Application into the rectum every 6 hours if needed for irritation (2nd line itching).    pregabalin (Lyrica) 25 mg capsule Take 1 capsule (25 mg) by mouth 2 times a day as needed (spasms).    tiZANidine (Zanaflex) 2 mg tablet Take 1 tablet (2 mg) by mouth every 8 hours if needed for muscle spasms.     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 Right frontal encephalomalacia secondary to sequela of right middle cerebral artery infarction.   History of lead poisoning with hydrocephalus status post right ventriculostomy shunt catheter, and subsequent removal.    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 She has been evaluated by Neurosurgery and is stable with no required intervention.   Low-grade squamous epithelial lesion on Pap smear in May 2011.   Pulmonary embolism in  in the postpartum phase, anticoagulated for 6 months.   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    Status post cholecystectomy.    Pneumonia in .    Chronic pain: Currently managed with monthly ketamine infusions with good response.   Nocturnal oxygen dependence    Past Surgical History:    has a past surgical history that includes Cholecystectomy (2016);  section, classic (2016); Other surgical history (2016); Tubal ligation (04/10/2017); MR angio head w and wo IV contrast (4/15/2013); MR angio neck wo IV contrast (2013); MR angio head w and wo IV contrast (2013); MR angio head wo IV contrast (2014); MR angio neck wo IV contrast (2014); MR angio head wo IV contrast (2016); MR angio head wo IV contrast (2019); and MR angio head wo IV contrast (2017).    Family History:   Family History   Problem Relation Name Age of Onset    Sickle  cell trait Sister      Multiple sclerosis Brother      Breast cancer Maternal Grandmother      Breast cancer Other paternal cousin     Sickle cell trait Child         Social History:   Mary Alice Aragon  reports that she has never smoked. She has never used smokeless tobacco.  reports that she does not currently use drugs.    EXAMINATION FINDINGS   /58 (BP Location: Left arm, Patient Position: Sitting, BP Cuff Size: Adult)   Pulse 84   Temp 36.5 °C (97.7 °F) (Temporal)   Resp 22   Wt 66.2 kg (146 lb)   SpO2 100%   BMI 25.86 kg/m²   1.72 meters squared    Physical Exam  Vitals reviewed.   Constitutional:       General: She is not in acute distress.     Appearance: Normal appearance.   HENT:      Mouth/Throat:      Mouth: Mucous membranes are moist.      Pharynx: No oropharyngeal exudate or posterior oropharyngeal erythema.   Eyes:      General: No scleral icterus.     Extraocular Movements: Extraocular movements intact.      Pupils: Pupils are equal, round, and reactive to light.   Cardiovascular:      Rate and Rhythm: Normal rate and regular rhythm.      Pulses: Normal pulses.      Heart sounds: No murmur heard.     No gallop.   Pulmonary:      Effort: Pulmonary effort is normal. No respiratory distress.      Breath sounds: Normal breath sounds. No wheezing or rales.   Abdominal:      General: Bowel sounds are normal.      Palpations: Abdomen is soft.      Tenderness: There is no abdominal tenderness.   Musculoskeletal:      Cervical back: Normal range of motion and neck supple.      Right lower leg: No edema.      Left lower leg: No edema.   Skin:     General: Skin is warm.      Capillary Refill: Capillary refill takes less than 2 seconds.      Findings: No lesion or rash.   Neurological:      General: No focal deficit present.      Mental Status: She is alert and oriented to person, place, and time. Mental status is at baseline.      Motor: No weakness.      Gait: Gait normal.   Psychiatric:         Mood  and Affect: Mood normal.         Behavior: Behavior normal.       LABS   Lab on 01/23/2025   Component Date Value Ref Range Status    WBC 01/23/2025 10.6  4.4 - 11.3 x10*3/uL Final    nRBC 01/23/2025 0.2 (H)  0.0 - 0.0 /100 WBCs Final    RBC 01/23/2025 2.48 (L)  4.00 - 5.20 x10*6/uL Final    Hemoglobin 01/23/2025 7.4 (L)  12.0 - 16.0 g/dL Final    Hematocrit 01/23/2025 21.9 (L)  36.0 - 46.0 % Final    MCV 01/23/2025 88  80 - 100 fL Final    MCH 01/23/2025 29.8  26.0 - 34.0 pg Final    MCHC 01/23/2025 33.8  32.0 - 36.0 g/dL Final    RDW 01/23/2025 17.0 (H)  11.5 - 14.5 % Final    Platelets 01/23/2025 304  150 - 450 x10*3/uL Final    Neutrophils % 01/23/2025 65.3  40.0 - 80.0 % Final    Immature Granulocytes %, Automated 01/23/2025 0.4  0.0 - 0.9 % Final    Immature Granulocyte Count (IG) includes promyelocytes, myelocytes and metamyelocytes but does not include bands. Percent differential counts (%) should be interpreted in the context of the absolute cell counts (cells/UL).    Lymphocytes % 01/23/2025 29.5  13.0 - 44.0 % Final    Monocytes % 01/23/2025 3.5  2.0 - 10.0 % Final    Eosinophils % 01/23/2025 0.8  0.0 - 6.0 % Final    Basophils % 01/23/2025 0.5  0.0 - 2.0 % Final    Neutrophils Absolute 01/23/2025 6.91  1.20 - 7.70 x10*3/uL Final    Percent differential counts (%) should be interpreted in the context of the absolute cell counts (cells/uL).    Immature Granulocytes Absolute, Au* 01/23/2025 0.04  0.00 - 0.70 x10*3/uL Final    Lymphocytes Absolute 01/23/2025 3.11  1.20 - 4.80 x10*3/uL Final    Monocytes Absolute 01/23/2025 0.37  0.10 - 1.00 x10*3/uL Final    Eosinophils Absolute 01/23/2025 0.08  0.00 - 0.70 x10*3/uL Final    Basophils Absolute 01/23/2025 0.05  0.00 - 0.10 x10*3/uL Final    Glucose 01/23/2025 95  74 - 99 mg/dL Final    Sodium 01/23/2025 140  136 - 145 mmol/L Final    Potassium 01/23/2025 3.6  3.5 - 5.3 mmol/L Final    Chloride 01/23/2025 107  98 - 107 mmol/L Final    Bicarbonate 01/23/2025 25  " 21 - 32 mmol/L Final    Anion Gap 01/23/2025 12  10 - 20 mmol/L Final    Urea Nitrogen 01/23/2025 11  6 - 23 mg/dL Final    Creatinine 01/23/2025 0.87  0.50 - 1.05 mg/dL Final    eGFR 01/23/2025 85  >60 mL/min/1.73m*2 Final    Calculations of estimated GFR are performed using the 2021 CKD-EPI Study Refit equation without the race variable for the IDMS-Traceable creatinine methods.  https://jasn.asnjournals.org/content/early/2021/09/22/ASN.0644328099    Calcium 01/23/2025 8.8  8.6 - 10.3 mg/dL Final    Albumin 01/23/2025 4.1  3.4 - 5.0 g/dL Final    Alkaline Phosphatase 01/23/2025 69  33 - 110 U/L Final    Total Protein 01/23/2025 7.4  6.4 - 8.2 g/dL Final    AST 01/23/2025 15  9 - 39 U/L Final    Bilirubin, Total 01/23/2025 1.8 (H)  0.0 - 1.2 mg/dL Final    ALT 01/23/2025 7  7 - 45 U/L Final    Patients treated with Sulfasalazine may generate falsely decreased results for ALT.    Ferritin 01/23/2025 2,253 (H)  8 - 150 ng/mL Final    LDH 01/23/2025 189  84 - 246 U/L Final    Retic % 01/23/2025 6.5 (H)  0.5 - 2.0 % Final    Retic Absolute 01/23/2025 0.161 (H)  0.018 - 0.083 x10*6/uL Final    Reticulocyte Hemoglobin 01/23/2025 36  28 - 38 pg Final    Immature Retic fraction 01/23/2025 38.1 (H)  <=16.0 % Final    Reticulocytes are measured based on a fluorescent technique. The IRF, or immature reticulocyte fraction, is the percent of reticulocytes that show medium (MFR) or high (HFR) fluorescence.  This value can be used to assess the relative maturity of the reticulocyte population in response to anemia. The \"shift reticulocytes\" are not measured by this technique, eliminating the need for their correction in the reticulocyte index.                 Gio Saleem MD                         "

## 2025-02-27 ENCOUNTER — HOME INFUSION (OUTPATIENT)
Dept: INFUSION THERAPY | Age: 45
End: 2025-02-27
Payer: COMMERCIAL

## 2025-02-27 NOTE — PROGRESS NOTES
Review of chart. Patient with order for deferoxamine subcutaneous infusions over 8 hours daily for transfusional iron overload. Current orders are good thru 7/25/25. No labs are ordered, nursing not involved.     [facility] call to pt: Agreeable to delivery of 7 days of medication today any time. No questions for RPh at this time. Please send a replacement pump with delivery on 2/28 - current pump continues to alarm high pressure - Patient will leave old pump in a zip lock bag in the mailbox by her front door     Pharmacy to mix 7 infusions on  2/27 and deliver 2/28 with supplies to match:    DOS 3/1 - 3/7     Follow up 3/6 - check needs, deliver OVN     *REMINDER: DON'T send a delivery if not able to confirm with patient (Per patient request)*

## 2025-02-28 DIAGNOSIS — D57.00 SICKLE CELL DISEASE WITH CRISIS (MULTI): ICD-10-CM

## 2025-02-28 RX ORDER — DICLOFENAC SODIUM 10 MG/G
GEL TOPICAL
Qty: 350 G | Refills: 2 | Status: SHIPPED | OUTPATIENT
Start: 2025-02-28

## 2025-03-07 ENCOUNTER — HOME INFUSION (OUTPATIENT)
Dept: INFUSION THERAPY | Age: 45
End: 2025-03-07
Payer: COMMERCIAL

## 2025-03-07 NOTE — PROGRESS NOTES
Review of chart. Patient with order for deferoxamine subcutaneous infusions over 8 hours daily for transfusional iron overload. Current orders are good thru 7/25/25. No labs are ordered, nursing not involved.    [facility] call to patient x3 this week with no answer, voicemail full. Per previous request from patient - no delivery unless confirmed with her.    No delivery made at this time, will contact again next week if patient has not called back sooner.    Follow up 3/13

## 2025-03-10 ENCOUNTER — TELEPHONE (OUTPATIENT)
Dept: HEMATOLOGY/ONCOLOGY | Facility: HOSPITAL | Age: 45
End: 2025-03-10
Payer: COMMERCIAL

## 2025-03-10 DIAGNOSIS — D57.00 SICKLE CELL DISEASE WITH CRISIS (MULTI): ICD-10-CM

## 2025-03-10 DIAGNOSIS — G60.9 IDIOPATHIC PERIPHERAL NEUROPATHY: ICD-10-CM

## 2025-03-10 RX ORDER — HYDROMORPHONE HYDROCHLORIDE 4 MG/1
4 TABLET ORAL EVERY 6 HOURS PRN
Qty: 40 TABLET | Refills: 0 | Status: SHIPPED | OUTPATIENT
Start: 2025-03-10

## 2025-03-10 RX ORDER — PREGABALIN 25 MG/1
25 CAPSULE ORAL 3 TIMES DAILY
Qty: 90 CAPSULE | Refills: 0 | Status: SHIPPED | OUTPATIENT
Start: 2025-03-20 | End: 2025-04-19

## 2025-03-10 NOTE — TELEPHONE ENCOUNTER
Called patient who complaints of increased neuropathic pain. She has an appointment with neurology coming up in 2 days time. We will increase pregabalin to 25 mg tid for now.

## 2025-03-12 ENCOUNTER — HOME INFUSION (OUTPATIENT)
Dept: INFUSION THERAPY | Age: 45
End: 2025-03-12
Payer: COMMERCIAL

## 2025-03-12 NOTE — PROGRESS NOTES
Review of chart. Patient with order for deferoxamine subcutaneous infusions over 8 hours daily for transfusional iron overload. Current orders are good thru 7/25/25. No labs are ordered, nursing not involved.     Patient's insurance changed at the first of the month, pharmacy was unaware. Pt now has [employer] Dual coverage. Insurance and revenue cycle notified. Prior claims will need to be backdated.    [facility] call to patient, requesting delivery whenever we can get her insurance figured out. Received ok per Rx manager to send meds today and work on billing.     Pharmacy to mix 3/13 for delivery straight and supplies to match:  7x deferoxamine bags  DOS 3/14-3/20     Follow up 3/19 check progress, OVN

## 2025-03-13 ENCOUNTER — APPOINTMENT (OUTPATIENT)
Dept: NEUROLOGY | Facility: CLINIC | Age: 45
End: 2025-03-13
Payer: MEDICARE

## 2025-03-18 ENCOUNTER — HOME INFUSION (OUTPATIENT)
Dept: INFUSION THERAPY | Age: 45
End: 2025-03-18
Payer: COMMERCIAL

## 2025-03-18 NOTE — PROGRESS NOTES
Review of chart. Patient with order for deferoxamine subcutaneous infusions over 8 hours daily for transfusional iron overload. Current orders are good thru 7/25/25. No labs are ordered, nursing not involved.     [facility] call to patient, infusions going well and inventory correct. Sounds fatigued. Agreeable to delivery on Thursday with meds and supplies.     Pharmacy to mix 3/19 for OVN delivery and supplies to match:  7x deferoxamine bags  DOS 3/21-3/27     Follow up 3/26 check progress, OVN

## 2025-03-25 ENCOUNTER — HOME INFUSION (OUTPATIENT)
Dept: INFUSION THERAPY | Age: 45
End: 2025-03-25
Payer: COMMERCIAL

## 2025-03-25 NOTE — PROGRESS NOTES
Review of chart. Patient with order for deferoxamine subcutaneous infusions over 8 hours daily for transfusional iron overload. Current orders are good thru 7/25/25. No labs are ordered, nursing not involved.     [facility] call to patient, infusions going well and inventory correct. Agreeable to delivery on Thursday with meds and supplies.     Pharmacy to mix 3/26 for OVN delivery and supplies to match:  7x deferoxamine bags  DOS 3/28-4/3     Follow up 4/2 check progress, OVN

## 2025-03-26 DIAGNOSIS — D57.00 SICKLE CELL DISEASE WITH CRISIS (MULTI): ICD-10-CM

## 2025-03-26 RX ORDER — TIZANIDINE 2 MG/1
2 TABLET ORAL EVERY 8 HOURS PRN
Qty: 40 TABLET | Refills: 1 | Status: SHIPPED | OUTPATIENT
Start: 2025-03-26 | End: 2025-04-25

## 2025-03-27 ENCOUNTER — TELEPHONE (OUTPATIENT)
Dept: ADMISSION | Facility: HOSPITAL | Age: 45
End: 2025-03-27
Payer: COMMERCIAL

## 2025-03-27 DIAGNOSIS — D57.00 SICKLE CELL DISEASE WITH CRISIS (MULTI): Primary | ICD-10-CM

## 2025-03-27 DIAGNOSIS — D57.00 SICKLE CELL DISEASE WITH CRISIS (MULTI): ICD-10-CM

## 2025-03-27 RX ORDER — HYDROMORPHONE HYDROCHLORIDE 4 MG/1
4 TABLET ORAL EVERY 6 HOURS PRN
Qty: 40 TABLET | Refills: 0 | Status: SHIPPED | OUTPATIENT
Start: 2025-03-27

## 2025-03-27 NOTE — TELEPHONE ENCOUNTER
Pt is requesting refills on her Dilaudid 4mg and Ibuprofen to be sent to [facility] on file. Message sent to the team.

## 2025-03-31 ENCOUNTER — HOME INFUSION (OUTPATIENT)
Dept: INFUSION THERAPY | Age: 45
End: 2025-03-31
Payer: COMMERCIAL

## 2025-03-31 ENCOUNTER — TELEPHONE (OUTPATIENT)
Dept: ADMISSION | Facility: HOSPITAL | Age: 45
End: 2025-03-31
Payer: COMMERCIAL

## 2025-03-31 DIAGNOSIS — D57.00 SICKLE-CELL CRISIS (MULTI): Primary | ICD-10-CM

## 2025-03-31 NOTE — TELEPHONE ENCOUNTER
I called Mary Alice and let her know I made her a port flush appointment with a Central Line nurse on 4/3@ 10:00. I confirmed this with her and nothing further needed.

## 2025-03-31 NOTE — TELEPHONE ENCOUNTER
Mary Alice called and said she has called multiple times about getting her port flushed since hasn't been since Feb. I will place order for port flush and get her scheduled with a central line nurse and call her back with appointment time and day.

## 2025-03-31 NOTE — PROGRESS NOTES
Review of chart. Patient with order for deferoxamine subcutaneous infusions over 8 hours daily for transfusional iron overload. Current orders are good thru 7/25/25. No labs are ordered, nursing not involved.    [facility] rec'd call from patient, previous delivery was left out and not refrigerated as pt did not receive notification of delivery. Pt discarded all doses and needs delivery tonight to continue infusions. Agreeable to delivery by 8 pm with 1 bag off ice.    Pt still has supplies from previous delivery, only requesting a roll of tape with this delivery.    Send standard supplies with 4/3 delivery    Pharmacy to mix 3/31 and deliver straight:  4x deferoxamine bags (1 off ice)  DOS: 3/31 - 4/3    Pharmacy to mix 4/2 and deliver 4/3 with supplies to match:  7x deferoxamine bags  DOS: 4/4 - 4/10    Follow up 4/9 - check progress, deliver OVN

## 2025-04-03 ENCOUNTER — LAB (OUTPATIENT)
Dept: HEMATOLOGY/ONCOLOGY | Facility: HOSPITAL | Age: 45
End: 2025-04-03
Payer: COMMERCIAL

## 2025-04-03 DIAGNOSIS — D57.00 SICKLE-CELL CRISIS (MULTI): ICD-10-CM

## 2025-04-03 DIAGNOSIS — D57.00 SICKLE CELL DISEASE WITH CRISIS (MULTI): ICD-10-CM

## 2025-04-03 DIAGNOSIS — D57.1 SICKLE CELL DISEASE WITHOUT CRISIS (MULTI): ICD-10-CM

## 2025-04-03 PROCEDURE — 2500000004 HC RX 250 GENERAL PHARMACY W/ HCPCS (ALT 636 FOR OP/ED): Performed by: NURSE PRACTITIONER

## 2025-04-03 PROCEDURE — 96523 IRRIG DRUG DELIVERY DEVICE: CPT

## 2025-04-03 RX ORDER — HEPARIN 100 UNIT/ML
500 SYRINGE INTRAVENOUS AS NEEDED
Status: DISCONTINUED | OUTPATIENT
Start: 2025-04-03 | End: 2025-04-03 | Stop reason: HOSPADM

## 2025-04-03 RX ORDER — HEPARIN 100 UNIT/ML
500 SYRINGE INTRAVENOUS AS NEEDED
OUTPATIENT
Start: 2025-04-03

## 2025-04-03 RX ORDER — HEPARIN SODIUM,PORCINE/PF 10 UNIT/ML
50 SYRINGE (ML) INTRAVENOUS AS NEEDED
OUTPATIENT
Start: 2025-04-03

## 2025-04-03 RX ADMIN — HEPARIN 500 UNITS: 100 SYRINGE at 10:23

## 2025-04-10 ENCOUNTER — HOME INFUSION (OUTPATIENT)
Dept: INFUSION THERAPY | Age: 45
End: 2025-04-10
Payer: COMMERCIAL

## 2025-04-10 NOTE — PROGRESS NOTES
Review of chart. Patient with order for deferoxamine subcutaneous infusions over 8 hours daily for transfusional iron overload. Current orders are good thru 7/25/25. No labs are ordered, nursing not involved.     [facility] call to pt: Agreeable to delivery of 7 days of medication today any time. No questions for RPh at this time.     Pharmacy to mix 4/10 and deliver straight with supplies to match:  7x deferoxamine bags  DOS 4/11 - 4/17     Follow up 4/16 - check needs, deliver OVN     *REMINDER: DON'T send a delivery if not able to confirm with patient (Per patient request)*

## 2025-04-14 ENCOUNTER — TELEPHONE (OUTPATIENT)
Dept: ADMISSION | Facility: HOSPITAL | Age: 45
End: 2025-04-14
Payer: COMMERCIAL

## 2025-04-14 DIAGNOSIS — D57.00 SICKLE CELL DISEASE WITH CRISIS (MULTI): ICD-10-CM

## 2025-04-14 RX ORDER — HYDROMORPHONE HYDROCHLORIDE 4 MG/1
4 TABLET ORAL EVERY 6 HOURS PRN
Qty: 40 TABLET | Refills: 0 | Status: SHIPPED | OUTPATIENT
Start: 2025-04-14

## 2025-04-14 NOTE — TELEPHONE ENCOUNTER
Pt is requesting refills of dilaudid 4mg q6 prn, #40.  Last prescription was written 3/27/25.  She also requested ibuprofen 800mg prn  Preferred pharmacy is [facility] on [address], [place].

## 2025-04-16 ENCOUNTER — HOME INFUSION (OUTPATIENT)
Dept: INFUSION THERAPY | Age: 45
End: 2025-04-16
Payer: COMMERCIAL

## 2025-04-16 NOTE — PROGRESS NOTES
Review of chart. Patient with order for deferoxamine subcutaneous infusions over 8 hours daily for transfusional iron overload. Current orders are good thru 7/25/25. No labs are ordered, nursing not involved.     [facility] call to pt: Agreeable to delivery of 7 days of medications any time tomorrow. Requesting paper tape with this delivery - something with her current setup is causing itching. Also requesting extra bandaids. No questions for RPh at this time.     Pharmacy to mix 4/16 and deliver OVN with supplies to match:  7x deferoxamine bags  DOS 4/18 - 4/24     Follow up 4/23 - check needs, deliver OVN     *REMINDER: DON'T send a delivery if not able to confirm with patient (Per patient request)*

## 2025-04-18 ENCOUNTER — OFFICE VISIT (OUTPATIENT)
Dept: HEMATOLOGY/ONCOLOGY | Facility: HOSPITAL | Age: 45
End: 2025-04-18
Payer: COMMERCIAL

## 2025-04-18 ENCOUNTER — APPOINTMENT (OUTPATIENT)
Dept: RADIOLOGY | Facility: HOSPITAL | Age: 45
DRG: 812 | End: 2025-04-18
Payer: COMMERCIAL

## 2025-04-18 ENCOUNTER — HOSPITAL ENCOUNTER (OUTPATIENT)
Dept: RADIOLOGY | Facility: HOSPITAL | Age: 45
DRG: 812 | End: 2025-04-18
Payer: COMMERCIAL

## 2025-04-18 ENCOUNTER — HOSPITAL ENCOUNTER (INPATIENT)
Facility: HOSPITAL | Age: 45
DRG: 812 | End: 2025-04-18
Attending: HOSPITALIST | Admitting: HOSPITALIST
Payer: COMMERCIAL

## 2025-04-18 ENCOUNTER — TELEPHONE (OUTPATIENT)
Dept: HEMATOLOGY/ONCOLOGY | Facility: HOSPITAL | Age: 45
End: 2025-04-18

## 2025-04-18 VITALS
TEMPERATURE: 98.2 F | RESPIRATION RATE: 18 BRPM | OXYGEN SATURATION: 98 % | HEART RATE: 72 BPM | SYSTOLIC BLOOD PRESSURE: 95 MMHG | DIASTOLIC BLOOD PRESSURE: 55 MMHG

## 2025-04-18 DIAGNOSIS — D57.00 SICKLE CELL ANEMIA WITH CRISIS: Primary | ICD-10-CM

## 2025-04-18 DIAGNOSIS — D57.00 SICKLE CELL CRISIS (MULTI): Primary | ICD-10-CM

## 2025-04-18 DIAGNOSIS — D57.1 HEMOGLOBIN SS DISEASE WITHOUT CRISIS (MULTI): ICD-10-CM

## 2025-04-18 DIAGNOSIS — D57.00 SICKLE CELL PAIN CRISIS (MULTI): ICD-10-CM

## 2025-04-18 DIAGNOSIS — M25.571 ACUTE RIGHT ANKLE PAIN: ICD-10-CM

## 2025-04-18 DIAGNOSIS — D57.00 SICKLE CELL DISEASE WITH CRISIS (MULTI): ICD-10-CM

## 2025-04-18 DIAGNOSIS — G60.9 IDIOPATHIC PERIPHERAL NEUROPATHY: ICD-10-CM

## 2025-04-18 LAB
ALBUMIN SERPL BCP-MCNC: 4.3 G/DL (ref 3.4–5)
ALP SERPL-CCNC: 63 U/L (ref 33–110)
ALT SERPL W P-5'-P-CCNC: 22 U/L (ref 7–45)
ANION GAP SERPL CALC-SCNC: 10 MMOL/L (ref 10–20)
AST SERPL W P-5'-P-CCNC: 23 U/L (ref 9–39)
BASOPHILS # BLD AUTO: 0.09 X10*3/UL (ref 0–0.1)
BASOPHILS NFR BLD AUTO: 0.7 %
BILIRUB SERPL-MCNC: 2.8 MG/DL (ref 0–1.2)
BUN SERPL-MCNC: 19 MG/DL (ref 6–23)
CALCIUM SERPL-MCNC: 8.8 MG/DL (ref 8.6–10.3)
CHLORIDE SERPL-SCNC: 109 MMOL/L (ref 98–107)
CO2 SERPL-SCNC: 23 MMOL/L (ref 21–32)
CREAT SERPL-MCNC: 1.15 MG/DL (ref 0.5–1.05)
CRP SERPL-MCNC: 0.48 MG/DL
EGFRCR SERPLBLD CKD-EPI 2021: 61 ML/MIN/1.73M*2
EOSINOPHIL # BLD AUTO: 0.15 X10*3/UL (ref 0–0.7)
EOSINOPHIL NFR BLD AUTO: 1.2 %
ERYTHROCYTE [DISTWIDTH] IN BLOOD BY AUTOMATED COUNT: 16.6 % (ref 11.5–14.5)
ERYTHROCYTE [SEDIMENTATION RATE] IN BLOOD BY WESTERGREN METHOD: 4 MM/H (ref 0–20)
GLUCOSE SERPL-MCNC: 109 MG/DL (ref 74–99)
HCT VFR BLD AUTO: 19 % (ref 36–46)
HGB BLD-MCNC: 6.5 G/DL (ref 12–16)
HGB RETIC QN: 34 PG (ref 28–38)
IMM GRANULOCYTES # BLD AUTO: 0.05 X10*3/UL (ref 0–0.7)
IMM GRANULOCYTES NFR BLD AUTO: 0.4 % (ref 0–0.9)
IMMATURE RETIC FRACTION: 26.8 %
LDH SERPL L TO P-CCNC: 249 U/L (ref 84–246)
LYMPHOCYTES # BLD AUTO: 4.96 X10*3/UL (ref 1.2–4.8)
LYMPHOCYTES NFR BLD AUTO: 40.2 %
MCH RBC QN AUTO: 31.6 PG (ref 26–34)
MCHC RBC AUTO-ENTMCNC: 34.2 G/DL (ref 32–36)
MCV RBC AUTO: 92 FL (ref 80–100)
MONOCYTES # BLD AUTO: 0.75 X10*3/UL (ref 0.1–1)
MONOCYTES NFR BLD AUTO: 6.1 %
NEUTROPHILS # BLD AUTO: 6.33 X10*3/UL (ref 1.2–7.7)
NEUTROPHILS NFR BLD AUTO: 51.4 %
NRBC BLD-RTO: 0.2 /100 WBCS (ref 0–0)
PLATELET # BLD AUTO: 398 X10*3/UL (ref 150–450)
POTASSIUM SERPL-SCNC: 4.2 MMOL/L (ref 3.5–5.3)
PROT SERPL-MCNC: 7.4 G/DL (ref 6.4–8.2)
RBC # BLD AUTO: 2.06 X10*6/UL (ref 4–5.2)
RETICS #: 0.16 X10*6/UL (ref 0.02–0.08)
RETICS/RBC NFR AUTO: 7.9 % (ref 0.5–2)
SODIUM SERPL-SCNC: 138 MMOL/L (ref 136–145)
WBC # BLD AUTO: 12.3 X10*3/UL (ref 4.4–11.3)

## 2025-04-18 PROCEDURE — 85025 COMPLETE CBC W/AUTO DIFF WBC: CPT

## 2025-04-18 PROCEDURE — 2500000004 HC RX 250 GENERAL PHARMACY W/ HCPCS (ALT 636 FOR OP/ED)

## 2025-04-18 PROCEDURE — 73610 X-RAY EXAM OF ANKLE: CPT | Mod: RT

## 2025-04-18 PROCEDURE — 73630 X-RAY EXAM OF FOOT: CPT | Mod: RIGHT SIDE | Performed by: RADIOLOGY

## 2025-04-18 PROCEDURE — 84075 ASSAY ALKALINE PHOSPHATASE: CPT

## 2025-04-18 PROCEDURE — 96376 TX/PRO/DX INJ SAME DRUG ADON: CPT

## 2025-04-18 PROCEDURE — 85652 RBC SED RATE AUTOMATED: CPT

## 2025-04-18 PROCEDURE — 73610 X-RAY EXAM OF ANKLE: CPT | Mod: RIGHT SIDE | Performed by: RADIOLOGY

## 2025-04-18 PROCEDURE — 2500000002 HC RX 250 W HCPCS SELF ADMINISTERED DRUGS (ALT 637 FOR MEDICARE OP, ALT 636 FOR OP/ED)

## 2025-04-18 PROCEDURE — 99223 1ST HOSP IP/OBS HIGH 75: CPT

## 2025-04-18 PROCEDURE — 73630 X-RAY EXAM OF FOOT: CPT | Mod: RT

## 2025-04-18 PROCEDURE — 99417 PROLNG OP E/M EACH 15 MIN: CPT

## 2025-04-18 PROCEDURE — 1170000001 HC PRIVATE ONCOLOGY ROOM DAILY

## 2025-04-18 PROCEDURE — 86140 C-REACTIVE PROTEIN: CPT

## 2025-04-18 PROCEDURE — 96374 THER/PROPH/DIAG INJ IV PUSH: CPT | Mod: INF

## 2025-04-18 PROCEDURE — 99215 OFFICE O/P EST HI 40 MIN: CPT | Mod: 25

## 2025-04-18 PROCEDURE — 2500000004 HC RX 250 GENERAL PHARMACY W/ HCPCS (ALT 636 FOR OP/ED): Mod: JZ,TB

## 2025-04-18 PROCEDURE — 85045 AUTOMATED RETICULOCYTE COUNT: CPT

## 2025-04-18 PROCEDURE — 2500000005 HC RX 250 GENERAL PHARMACY W/O HCPCS

## 2025-04-18 PROCEDURE — S4993 CONTRACEPTIVE PILLS FOR BC: HCPCS

## 2025-04-18 PROCEDURE — 2500000001 HC RX 250 WO HCPCS SELF ADMINISTERED DRUGS (ALT 637 FOR MEDICARE OP)

## 2025-04-18 PROCEDURE — 83615 LACTATE (LD) (LDH) ENZYME: CPT

## 2025-04-18 RX ORDER — LIDOCAINE 560 MG/1
1 PATCH PERCUTANEOUS; TOPICAL; TRANSDERMAL DAILY PRN
Status: DISCONTINUED | OUTPATIENT
Start: 2025-04-18 | End: 2025-04-20

## 2025-04-18 RX ORDER — PREGABALIN 25 MG/1
25 CAPSULE ORAL 3 TIMES DAILY
Status: DISCONTINUED | OUTPATIENT
Start: 2025-04-18 | End: 2025-04-20

## 2025-04-18 RX ORDER — TRAZODONE HYDROCHLORIDE 50 MG/1
100 TABLET ORAL NIGHTLY PRN
Status: DISCONTINUED | OUTPATIENT
Start: 2025-04-18 | End: 2025-04-24 | Stop reason: HOSPADM

## 2025-04-18 RX ORDER — NORETHINDRONE 5 MG/1
5 TABLET ORAL DAILY
Status: DISCONTINUED | OUTPATIENT
Start: 2025-04-18 | End: 2025-04-24 | Stop reason: HOSPADM

## 2025-04-18 RX ORDER — HYDROMORPHONE HYDROCHLORIDE 1 MG/ML
1 INJECTION, SOLUTION INTRAMUSCULAR; INTRAVENOUS; SUBCUTANEOUS EVERY 30 MIN PRN
Status: DISCONTINUED | OUTPATIENT
Start: 2025-04-18 | End: 2025-04-18

## 2025-04-18 RX ORDER — ACETAMINOPHEN 500 MG
5 TABLET ORAL NIGHTLY
Status: DISCONTINUED | OUTPATIENT
Start: 2025-04-18 | End: 2025-04-24 | Stop reason: HOSPADM

## 2025-04-18 RX ORDER — NORETHINDRONE 5 MG/1
5 TABLET ORAL DAILY
Status: DISCONTINUED | OUTPATIENT
Start: 2025-04-19 | End: 2025-04-18

## 2025-04-18 RX ORDER — CYCLOBENZAPRINE HCL 10 MG
5 TABLET ORAL ONCE
Status: COMPLETED | OUTPATIENT
Start: 2025-04-18 | End: 2025-04-18

## 2025-04-18 RX ORDER — DEFEROXAMINE MESYLATE 2 G/1
2000 INJECTION, POWDER, LYOPHILIZED, FOR SOLUTION INTRAMUSCULAR; INTRAVENOUS; SUBCUTANEOUS DAILY
Status: DISCONTINUED | OUTPATIENT
Start: 2025-04-18 | End: 2025-04-18

## 2025-04-18 RX ORDER — ENOXAPARIN SODIUM 100 MG/ML
40 INJECTION SUBCUTANEOUS EVERY 24 HOURS
Status: DISCONTINUED | OUTPATIENT
Start: 2025-04-19 | End: 2025-04-24 | Stop reason: HOSPADM

## 2025-04-18 RX ORDER — DIPHENHYDRAMINE HCL 25 MG
25 CAPSULE ORAL ONCE
Status: COMPLETED | OUTPATIENT
Start: 2025-04-18 | End: 2025-04-18

## 2025-04-18 RX ORDER — NALOXONE HYDROCHLORIDE 0.4 MG/ML
0.4 INJECTION, SOLUTION INTRAMUSCULAR; INTRAVENOUS; SUBCUTANEOUS
Status: DISCONTINUED | OUTPATIENT
Start: 2025-04-18 | End: 2025-04-18 | Stop reason: HOSPADM

## 2025-04-18 RX ORDER — HYDROMORPHONE HYDROCHLORIDE 1 MG/ML
1 INJECTION, SOLUTION INTRAMUSCULAR; INTRAVENOUS; SUBCUTANEOUS EVERY 30 MIN PRN
Status: COMPLETED | OUTPATIENT
Start: 2025-04-18 | End: 2025-04-18

## 2025-04-18 RX ORDER — PROCHLORPERAZINE MALEATE 10 MG
10 TABLET ORAL EVERY 8 HOURS PRN
Status: DISCONTINUED | OUTPATIENT
Start: 2025-04-18 | End: 2025-04-24 | Stop reason: HOSPADM

## 2025-04-18 RX ORDER — FOLIC ACID 1 MG/1
1 TABLET ORAL DAILY
Status: DISCONTINUED | OUTPATIENT
Start: 2025-04-19 | End: 2025-04-24 | Stop reason: HOSPADM

## 2025-04-18 RX ORDER — SENNOSIDES 8.6 MG/1
1 TABLET ORAL 2 TIMES DAILY PRN
Status: DISCONTINUED | OUTPATIENT
Start: 2025-04-18 | End: 2025-04-21

## 2025-04-18 RX ORDER — HYDROXYUREA 500 MG/1
1000 CAPSULE ORAL
Status: DISCONTINUED | OUTPATIENT
Start: 2025-04-19 | End: 2025-04-24 | Stop reason: HOSPADM

## 2025-04-18 RX ORDER — PRAMOXINE HYDROCHLORIDE 10 MG/ML
LOTION TOPICAL EVERY 6 HOURS PRN
Status: DISCONTINUED | OUTPATIENT
Start: 2025-04-18 | End: 2025-04-24 | Stop reason: HOSPADM

## 2025-04-18 RX ORDER — ONDANSETRON 4 MG/1
4 TABLET, FILM COATED ORAL ONCE
Status: COMPLETED | OUTPATIENT
Start: 2025-04-18 | End: 2025-04-18

## 2025-04-18 RX ORDER — ASCORBIC ACID 500 MG
1000 TABLET ORAL DAILY
Status: DISCONTINUED | OUTPATIENT
Start: 2025-04-19 | End: 2025-04-24 | Stop reason: HOSPADM

## 2025-04-18 RX ORDER — DICLOFENAC SODIUM 10 MG/G
4 GEL TOPICAL 4 TIMES DAILY PRN
Status: DISCONTINUED | OUTPATIENT
Start: 2025-04-18 | End: 2025-04-24 | Stop reason: HOSPADM

## 2025-04-18 RX ORDER — DOCUSATE SODIUM 100 MG/1
100 CAPSULE, LIQUID FILLED ORAL EVERY OTHER DAY
Status: DISCONTINUED | OUTPATIENT
Start: 2025-04-19 | End: 2025-04-21 | Stop reason: SDUPTHER

## 2025-04-18 RX ORDER — HYDROXYUREA 500 MG/1
1500 CAPSULE ORAL
Status: DISCONTINUED | OUTPATIENT
Start: 2025-04-18 | End: 2025-04-24 | Stop reason: HOSPADM

## 2025-04-18 RX ADMIN — HYDROMORPHONE HYDROCHLORIDE 1 MG: 1 INJECTION, SOLUTION INTRAMUSCULAR; INTRAVENOUS; SUBCUTANEOUS at 14:02

## 2025-04-18 RX ADMIN — DIPHENHYDRAMINE HYDROCHLORIDE 25 MG: 25 CAPSULE ORAL at 12:17

## 2025-04-18 RX ADMIN — NORETHINDRONE ACETATE 5 MG: 5 TABLET ORAL at 23:13

## 2025-04-18 RX ADMIN — Medication 5 MG: at 21:56

## 2025-04-18 RX ADMIN — HYDROMORPHONE HYDROCHLORIDE 2 MG: 2 INJECTION, SOLUTION INTRAMUSCULAR; INTRAVENOUS; SUBCUTANEOUS at 23:58

## 2025-04-18 RX ADMIN — HYDROMORPHONE HYDROCHLORIDE 2 MG: 2 INJECTION, SOLUTION INTRAMUSCULAR; INTRAVENOUS; SUBCUTANEOUS at 17:39

## 2025-04-18 RX ADMIN — Medication 3 L/MIN: at 23:13

## 2025-04-18 RX ADMIN — PREGABALIN 25 MG: 25 CAPSULE ORAL at 21:56

## 2025-04-18 RX ADMIN — DEFEROXAMINE MESYLATE 2000 MG: 95 INJECTION, POWDER, LYOPHILIZED, FOR SOLUTION INTRAMUSCULAR; INTRAVENOUS; SUBCUTANEOUS at 23:13

## 2025-04-18 RX ADMIN — DIPHENHYDRAMINE HYDROCHLORIDE 25 MG: 25 CAPSULE ORAL at 14:42

## 2025-04-18 RX ADMIN — DIPHENHYDRAMINE HYDROCHLORIDE 25 MG: 25 CAPSULE ORAL at 23:58

## 2025-04-18 RX ADMIN — LIDOCAINE 4% 1 PATCH: 40 PATCH TOPICAL at 22:05

## 2025-04-18 RX ADMIN — CYCLOBENZAPRINE 5 MG: 10 TABLET, FILM COATED ORAL at 12:17

## 2025-04-18 RX ADMIN — HYDROMORPHONE HYDROCHLORIDE 1 MG: 1 INJECTION, SOLUTION INTRAMUSCULAR; INTRAVENOUS; SUBCUTANEOUS at 12:17

## 2025-04-18 RX ADMIN — HYDROMORPHONE HYDROCHLORIDE 2 MG: 2 INJECTION, SOLUTION INTRAMUSCULAR; INTRAVENOUS; SUBCUTANEOUS at 19:49

## 2025-04-18 RX ADMIN — HYDROMORPHONE HYDROCHLORIDE 2 MG: 2 INJECTION, SOLUTION INTRAMUSCULAR; INTRAVENOUS; SUBCUTANEOUS at 15:14

## 2025-04-18 RX ADMIN — HYDROMORPHONE HYDROCHLORIDE 2 MG: 2 INJECTION, SOLUTION INTRAMUSCULAR; INTRAVENOUS; SUBCUTANEOUS at 21:55

## 2025-04-18 RX ADMIN — TRAZODONE HYDROCHLORIDE 100 MG: 50 TABLET ORAL at 21:55

## 2025-04-18 RX ADMIN — HYDROMORPHONE HYDROCHLORIDE 1 MG: 1 INJECTION, SOLUTION INTRAMUSCULAR; INTRAVENOUS; SUBCUTANEOUS at 12:53

## 2025-04-18 RX ADMIN — ONDANSETRON HYDROCHLORIDE 4 MG: 4 TABLET, FILM COATED ORAL at 12:17

## 2025-04-18 RX ADMIN — HYDROXYUREA 1500 MG: 500 CAPSULE ORAL at 21:55

## 2025-04-18 SDOH — SOCIAL STABILITY: SOCIAL INSECURITY: DO YOU FEEL UNSAFE GOING BACK TO THE PLACE WHERE YOU ARE LIVING?: YES

## 2025-04-18 SDOH — ECONOMIC STABILITY: FOOD INSECURITY: WITHIN THE PAST 12 MONTHS, THE FOOD YOU BOUGHT JUST DIDN'T LAST AND YOU DIDN'T HAVE MONEY TO GET MORE.: NEVER TRUE

## 2025-04-18 SDOH — ECONOMIC STABILITY: FOOD INSECURITY: HOW HARD IS IT FOR YOU TO PAY FOR THE VERY BASICS LIKE FOOD, HOUSING, MEDICAL CARE, AND HEATING?: NOT VERY HARD

## 2025-04-18 SDOH — SOCIAL STABILITY: SOCIAL INSECURITY: WITHIN THE LAST YEAR, HAVE YOU BEEN AFRAID OF YOUR PARTNER OR EX-PARTNER?: NO

## 2025-04-18 SDOH — SOCIAL STABILITY: SOCIAL INSECURITY: ARE YOU OR HAVE YOU BEEN THREATENED OR ABUSED PHYSICALLY, EMOTIONALLY, OR SEXUALLY BY ANYONE?: YES

## 2025-04-18 SDOH — HEALTH STABILITY: PHYSICAL HEALTH: ON AVERAGE, HOW MANY DAYS PER WEEK DO YOU ENGAGE IN MODERATE TO STRENUOUS EXERCISE (LIKE A BRISK WALK)?: 3 DAYS

## 2025-04-18 SDOH — SOCIAL STABILITY: SOCIAL INSECURITY: ABUSE: ADULT

## 2025-04-18 SDOH — SOCIAL STABILITY: SOCIAL INSECURITY: WITHIN THE LAST YEAR, HAVE YOU BEEN HUMILIATED OR EMOTIONALLY ABUSED IN OTHER WAYS BY YOUR PARTNER OR EX-PARTNER?: NO

## 2025-04-18 SDOH — ECONOMIC STABILITY: HOUSING INSECURITY: IN THE PAST 12 MONTHS, HOW MANY TIMES HAVE YOU MOVED WHERE YOU WERE LIVING?: 1

## 2025-04-18 SDOH — HEALTH STABILITY: PHYSICAL HEALTH: ON AVERAGE, HOW MANY MINUTES DO YOU ENGAGE IN EXERCISE AT THIS LEVEL?: 30 MIN

## 2025-04-18 SDOH — SOCIAL STABILITY: SOCIAL INSECURITY: HAVE YOU HAD THOUGHTS OF HARMING ANYONE ELSE?: NO

## 2025-04-18 SDOH — ECONOMIC STABILITY: INCOME INSECURITY: IN THE PAST 12 MONTHS HAS THE ELECTRIC, GAS, OIL, OR WATER COMPANY THREATENED TO SHUT OFF SERVICES IN YOUR HOME?: NO

## 2025-04-18 SDOH — ECONOMIC STABILITY: HOUSING INSECURITY: IN THE LAST 12 MONTHS, WAS THERE A TIME WHEN YOU WERE NOT ABLE TO PAY THE MORTGAGE OR RENT ON TIME?: YES

## 2025-04-18 SDOH — ECONOMIC STABILITY: FOOD INSECURITY: WITHIN THE PAST 12 MONTHS, YOU WORRIED THAT YOUR FOOD WOULD RUN OUT BEFORE YOU GOT THE MONEY TO BUY MORE.: NEVER TRUE

## 2025-04-18 SDOH — ECONOMIC STABILITY: HOUSING INSECURITY: AT ANY TIME IN THE PAST 12 MONTHS, WERE YOU HOMELESS OR LIVING IN A SHELTER (INCLUDING NOW)?: NO

## 2025-04-18 SDOH — SOCIAL STABILITY: SOCIAL INSECURITY: HAS ANYONE EVER THREATENED TO HURT YOUR FAMILY OR YOUR PETS?: NO

## 2025-04-18 SDOH — SOCIAL STABILITY: SOCIAL INSECURITY: ARE THERE ANY APPARENT SIGNS OF INJURIES/BEHAVIORS THAT COULD BE RELATED TO ABUSE/NEGLECT?: NO

## 2025-04-18 SDOH — SOCIAL STABILITY: SOCIAL INSECURITY: HAVE YOU HAD ANY THOUGHTS OF HARMING ANYONE ELSE?: NO

## 2025-04-18 SDOH — SOCIAL STABILITY: SOCIAL INSECURITY: DO YOU FEEL ANYONE HAS EXPLOITED OR TAKEN ADVANTAGE OF YOU FINANCIALLY OR OF YOUR PERSONAL PROPERTY?: NO

## 2025-04-18 SDOH — SOCIAL STABILITY: SOCIAL INSECURITY: DOES ANYONE TRY TO KEEP YOU FROM HAVING/CONTACTING OTHER FRIENDS OR DOING THINGS OUTSIDE YOUR HOME?: NO

## 2025-04-18 SDOH — SOCIAL STABILITY: SOCIAL INSECURITY: WERE YOU ABLE TO COMPLETE ALL THE BEHAVIORAL HEALTH SCREENINGS?: YES

## 2025-04-18 SDOH — ECONOMIC STABILITY: TRANSPORTATION INSECURITY: IN THE PAST 12 MONTHS, HAS LACK OF TRANSPORTATION KEPT YOU FROM MEDICAL APPOINTMENTS OR FROM GETTING MEDICATIONS?: NO

## 2025-04-18 ASSESSMENT — PAIN SCALES - GENERAL
PAINLEVEL_OUTOF10: 9
PAINLEVEL_OUTOF10: 8
PAINLEVEL_OUTOF10: 7
PAINLEVEL_OUTOF10: 7
PAINLEVEL_OUTOF10: 8
PAINLEVEL_OUTOF10: 6

## 2025-04-18 ASSESSMENT — COGNITIVE AND FUNCTIONAL STATUS - GENERAL
CLIMB 3 TO 5 STEPS WITH RAILING: A LITTLE
STANDING UP FROM CHAIR USING ARMS: A LITTLE
DRESSING REGULAR LOWER BODY CLOTHING: A LITTLE
DAILY ACTIVITIY SCORE: 21
MOBILITY SCORE: 20
TOILETING: A LITTLE
TOILETING: A LITTLE
WALKING IN HOSPITAL ROOM: A LITTLE
CLIMB 3 TO 5 STEPS WITH RAILING: A LITTLE
DRESSING REGULAR LOWER BODY CLOTHING: A LITTLE
MOVING TO AND FROM BED TO CHAIR: A LITTLE
STANDING UP FROM CHAIR USING ARMS: A LITTLE
WALKING IN HOSPITAL ROOM: A LITTLE
MOBILITY SCORE: 20
HELP NEEDED FOR BATHING: A LITTLE
MOVING TO AND FROM BED TO CHAIR: A LITTLE
DAILY ACTIVITIY SCORE: 21
PATIENT BASELINE BEDBOUND: NO
HELP NEEDED FOR BATHING: A LITTLE

## 2025-04-18 ASSESSMENT — ACTIVITIES OF DAILY LIVING (ADL)
ADEQUATE_TO_COMPLETE_ADL: YES
JUDGMENT_ADEQUATE_SAFELY_COMPLETE_DAILY_ACTIVITIES: YES
PATIENT'S MEMORY ADEQUATE TO SAFELY COMPLETE DAILY ACTIVITIES?: YES
HEARING - RIGHT EAR: FUNCTIONAL
BATHING: NEEDS ASSISTANCE
ADEQUATE_TO_COMPLETE_ADL: YES
HEARING - LEFT EAR: FUNCTIONAL
WALKS IN HOME: INDEPENDENT
DRESSING YOURSELF: INDEPENDENT
TOILETING: INDEPENDENT
LACK_OF_TRANSPORTATION: NO
FEEDING YOURSELF: INDEPENDENT
TOILETING: NEEDS ASSISTANCE
PATIENT'S MEMORY ADEQUATE TO SAFELY COMPLETE DAILY ACTIVITIES?: YES
BATHING: INDEPENDENT
ASSISTIVE_DEVICE: EYEGLASSES;CANE
GROOMING: INDEPENDENT
WALKS IN HOME: INDEPENDENT
GROOMING: NEEDS ASSISTANCE
JUDGMENT_ADEQUATE_SAFELY_COMPLETE_DAILY_ACTIVITIES: YES
LACK_OF_TRANSPORTATION: NO
DRESSING YOURSELF: NEEDS ASSISTANCE
FEEDING YOURSELF: INDEPENDENT
LACK_OF_TRANSPORTATION: NO

## 2025-04-18 ASSESSMENT — PAIN DESCRIPTION - ORIENTATION
ORIENTATION: LEFT
ORIENTATION: LEFT
ORIENTATION: RIGHT

## 2025-04-18 ASSESSMENT — LIFESTYLE VARIABLES
AUDIT-C TOTAL SCORE: 2
SKIP TO QUESTIONS 9-10: 1
HOW OFTEN DO YOU HAVE A DRINK CONTAINING ALCOHOL: 2-4 TIMES A MONTH
HOW OFTEN DO YOU HAVE 6 OR MORE DRINKS ON ONE OCCASION: NEVER
HOW MANY STANDARD DRINKS CONTAINING ALCOHOL DO YOU HAVE ON A TYPICAL DAY: 1 OR 2
AUDIT-C TOTAL SCORE: 2

## 2025-04-18 ASSESSMENT — PATIENT HEALTH QUESTIONNAIRE - PHQ9
SUM OF ALL RESPONSES TO PHQ9 QUESTIONS 1 & 2: 0
1. LITTLE INTEREST OR PLEASURE IN DOING THINGS: NOT AT ALL
2. FEELING DOWN, DEPRESSED OR HOPELESS: NOT AT ALL

## 2025-04-18 ASSESSMENT — PAIN - FUNCTIONAL ASSESSMENT
PAIN_FUNCTIONAL_ASSESSMENT: 0-10

## 2025-04-18 ASSESSMENT — PAIN DESCRIPTION - LOCATION
LOCATION: FOOT
LOCATION: FOOT
LOCATION: ANKLE

## 2025-04-18 NOTE — PROGRESS NOTES
Patient ID:  Mary Alice Aragon is a 45 y.o. female.  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 Pt states this pain has been prevalent for over a week with no pain relief with home pain medication. Pt had in grown toe nails removed last week and was given topical abx and 5 days worth of cephalexin BID, pt has taken two days worth. Denies chest pain, SOB, N/V/D/C, fever, chills, s/sx of bleeding, abdominal pain. ROS: A complete review of systems was performed and is negative except for as mentioned above in the HPI     Allergies  RX Allergies[1]     Medications  Current Outpatient Medications   Medication Instructions    ascorbic acid (Vitamin C) 500 mg tablet Take 2 tablets (1,000 mg) by mouth once daily.    capsaicin (Zostrix) 0.025 % cream 1 Application, As needed    deferasirox (Jadenu) 180 mg tablet Take 1 tablet (180 mg) by mouth once daily.    deferoxamine (Desferal) 2 gram injection Infuse 2000mg subcutaneously once daily over 8 hours via [place] Pump    diclofenac sodium (Voltaren) 1 % gel Per instructions 4 TIMES DAILY (route: topical)    diphenhydrAMINE (BENADryl) 25 mg capsule Take 1 capsule (25 mg) by mouth every 8 hours if needed.    docusate sodium (Colace) 100 mg capsule Take 1 capsule (100 mg) by mouth every other day.    FLUoxetine (PROZAC) 10 mg, oral, Daily    folic acid (Folvite) 1 mg tablet Take 1 tablet (1 mg) by mouth once daily.    HYDROmorphone (DILAUDID) 4 mg, oral, Every 6 hours PRN    hydroxyurea (Hydrea) 500 mg capsule Per instructions AS DIRECTED (route: oral)    hydroxyurea (HYDREA) 500 mg, Every Sun/Tues/Thur/Sat     hydrOXYzine HCL (ATARAX) 25 mg, oral, 2 times daily PRN    ketamine (KETALAR) 10 mg, Every 30 days    lidocaine (Lidoderm) 5 % patch 1 patch, Daily PRN    loratadine (Claritin) 10 mg tablet 1 tablet, Daily PRN    melatonin 5 mg, Nightly    meloxicam (MOBIC) 15 mg, oral, Daily    methadone (DOLOPHINE) 10 mg, oral, Every 12 hours    multivitamin tablet Take 1 tablet by mouth once daily.    naloxone (NARCAN) 4 mg, nasal, As needed, May repeat every 2-3 minutes if needed, alternating nostrils, until medical assistance becomes available.    norethindrone (AYGESTIN) 5 mg, oral, Daily    oxygen (O2) 3 L/min, Nightly    peg 400/hypromellose/glycerin (DRY EYE RELIEF OPHT) 1-2 drops, Daily PRN    pramoxine (Proctofoam) 1 % foam 1 Application, rectal, Every 6 hours PRN    pregabalin (LYRICA) 25 mg, oral, 3 times daily    prochlorperazine (COMPAZINE) 10 mg, oral, Every 8 hours PRN, 1 tablet EVERY 8 HOURS (route: oral)    senna 8.6 mg, oral, 2 times daily PRN    tiZANidine (ZANAFLEX) 2 mg, oral, Every 8 hours PRN    traZODone (Desyrel) 100 mg tablet TAKE 1 TO 2 TABLETS(100  MG) BY MOUTH AT BEDTIME AS NEEDED FOR SLEEP please schedule FUV    trolamine salicylate (Aspercreme) 10 % cream Apply 1 Application topically if needed.    valACYclovir (VALTREX) 500 mg, Daily        Past Medical History:   Past Medical History:  No date: Sickle cell anemia (Multi)  05/11/2020: Unspecified astigmatism, bilateral      Comment:  Astigmatism, bilateral   Surgical History:    Surgical History[2]   Family History:    Family History[3]  Family Oncology History:    Cancer-related family history includes Breast cancer in her maternal grandmother and another family member.  Social History:    Social History[4]     Objective   Vitals: BP 95/55   Pulse 72   Temp 36.8 °C (98.2 °F) (Temporal)   Resp 18   SpO2 98%   Weight: There were no vitals filed for this visit.    Physical Exam  Constitutional:       General: She is in acute distress.    HENT:      Mouth/Throat:      Mouth: Mucous membranes are moist.   Eyes:      Pupils: Pupils are equal, round, and reactive to light.   Cardiovascular:      Rate and Rhythm: Normal rate.   Pulmonary:      Effort: Pulmonary effort is normal.   Abdominal:      General: Abdomen is flat.   Musculoskeletal:         General: Tenderness present.      Cervical back: Normal range of motion.        Feet:    Feet:      Comments: Tenderness on palpation to R heel/ankle with minor swelling, ingrown toenails removed bilat        Diagnostic Results     Labs  Results from last 7 days   Lab Units 04/18/25  1207   WBC AUTO x10*3/uL 12.3*   HEMOGLOBIN g/dL 6.5*   HEMATOCRIT % 19.0*   PLATELETS AUTO x10*3/uL 398   NEUTROS ABS x10*3/uL 6.33   LYMPHS ABS AUTO x10*3/uL 4.96*   MONOS ABS AUTO x10*3/uL 0.75   EOS ABS AUTO x10*3/uL 0.15   NEUTROS PCT AUTO % 51.4   LYMPHS PCT AUTO % 40.2   MONOS PCT AUTO % 6.1   EOS PCT AUTO % 1.2      Results from last 7 days   Lab Units 04/18/25  1207   GLUCOSE mg/dL 109*   SODIUM mmol/L 138   POTASSIUM mmol/L 4.2   CHLORIDE mmol/L 109*   CO2 mmol/L 23   BUN mg/dL 19   CREATININE mg/dL 1.15*   EGFR mL/min/1.73m*2 61   CALCIUM mg/dL 8.8   ALBUMIN g/dL 4.3   PROTEIN TOTAL g/dL 7.4     Results from last 7 days   Lab Units 04/18/25  1207   BILIRUBIN TOTAL mg/dL 2.8*   ALK PHOS U/L 63   ALT U/L 22   AST U/L 23         Results from last 7 days   Lab Units 04/18/25  1207   RETIC CT PCT % 7.9*   RETIC CT ABS x10*6/uL 0.162*   IMMATURE RETIC FRACTION % 26.8*   RETIC HGB pg 34     Results from last 7 days   Lab Units 04/18/25  1207   SED RATE mm/h 4   CRP mg/dL 0.48   LD U/L 249*         Lab Results   Component Value Date    HGB 6.5 (LL) 04/18/2025    HGB 7.4 (L) 01/23/2025    HGB 8.0 (L) 12/14/2024     04/18/2025     01/23/2025     12/14/2024     (H) 04/18/2025     01/23/2025     12/14/2024       Images  === 12/03/24 ===    XR KNEE 3 VIEWS RIGHT    - Impression -  No  abnormality seen at the right knee.    I personally reviewed the images/study and I agree with the findings  as stated by resident physician Hugh Hadley MD. This study was  interpreted at [place], [place].    MACRO:  None    Signed by: London Hernandez 12/11/2024 12:25 PM  Dictation workstation:   [id number]   === 12/03/24 ===    CT ABDOMEN PELVIS ANGIOGRAM W AND/OR WO IV CONTRAST    - Impression -  VASCULAR:  1. No evidence of contrast extravasation to suggest active  gastrointestinal bleed.    ABDOMEN PELVIS:  1. Trace pneumoperitoneum and air in the abdominal wall soft tissues  likely related to recent surgical procedure.  2. Thickening of the bladder wall which appears similar to prior  imaging, likely related to underdistention however correlation with  urinalysis recommended if there is concern for urinary tract  infection.  3. Sequela of sickle cell disease including lobulated spleen and  mottled appearance of vertebral bodies  4. Additional incidental non-acute findings as detailed above.      I personally reviewed the images/study and I agree with the findings  as stated by Dr. Patricia Garcia. This study was interpreted at  [place], [place].    MACRO:  None.    Signed by: Jamel Cabrera 12/5/2024 11:56 AM  Dictation workstation:   [id number]     No echocardiogram results found for the past 12 months       Assessment/Plan   Mary Alice Aragon is a 45 year-old female with PMH of HbSS on hydroxyurea 1500 (S/T/Th/S)-1000 (M/W/F), nocturnal hypoxia on 2-3L as needed, c/b transfusion-related iron overload on Deferoxamine, provoked PE 2009 (postpartum) treated with AC for 6 months, cerebral aneurysm, NICHOL/MDD who presented to the [facility] (4/18) for uncontrolled pain in her R ankle/heel.     [facility] Course  - VSS  - Labs consist of hgb 6.5, sCr 1.15, tbili 2.8, , WBC 12.3  - Ferritin 2253 (1/23)  - Simple transfusion held I/s/o hx  of iron overload, discussed plan with Dr. Saleem and he agrees to hold off on simple transfusion  - Repeat ferritin pending ()  - ESR 4, CRP 0.48 ()  - Toradol held I/s/o JOSE JUAN  - encourage increased PO intake of fluids, hold all nephrotoxic medications  - Flexeril 5mg given once  - IV 1mg dilaudid [id number] PRN x 3 doses given for sickle cell related pain  - started inpatient care path of IV dilaudid 2mg q2h PRN for severe pain prior to transfer to inpatient bed  - PO Zofran 4mg once for opioid induced nausea/vomiting  - PO Benadryl 25mg x 2 given for opioid induced pruritus   - had in grown toe nails removed on hallux bilat, currently on topical abx and finished 2 out of 5 days of PO cephalexin BID  - Xray R foot/ankle pending for pain, r/o AVN vs septic arthritis - can consider MRI pending results   - Accepted and admitted inpatient by Dr. Adler for higher level of care    Reena Estes PA-C          [1] No Known Allergies  [2]   Past Surgical History:  Procedure Laterality Date     SECTION, CLASSIC  2016     Section    CHOLECYSTECTOMY  2016    Cholecystectomy    MR HEAD ANGIO W AND WO IV CONTRAST  4/15/2013    MR HEAD ANGIO W AND WO IV CONTRAST 4/15/2013 [facility] CLINICAL LEGACY    MR HEAD ANGIO W AND WO IV CONTRAST  2013    MR HEAD ANGIO W AND WO IV CONTRAST 2013 [facility] INPATIENT LEGACY    MR HEAD ANGIO WO IV CONTRAST  2014    MR HEAD ANGIO WO IV CONTRAST 2014 [facility] ANCILLARY LEGACY    MR HEAD ANGIO WO IV CONTRAST  2016    MR HEAD ANGIO WO IV CONTRAST 2016 [facility] ANCILLARY LEGACY    MR HEAD ANGIO WO IV CONTRAST  2019    MR HEAD ANGIO WO IV CONTRAST 2019 [facility] ANCILLARY LEGACY    MR HEAD ANGIO WO IV CONTRAST  2017    MR HEAD ANGIO WO IV CONTRAST 2017 [facility] INPATIENT LEGACY    MR NECK ANGIO WO IV CONTRAST  2013    MR NECK ANGIO WO IV CONTRAST 2013 [facility] INPATIENT LEGACY    MR NECK ANGIO WO IV CONTRAST  2014    MR NECK ANGIO WO IV  CONTRAST 11/1/2014 [facility] ANCILLARY LEGACY    OTHER SURGICAL HISTORY  06/16/2016    Brain Surgery    TUBAL LIGATION  04/10/2017    Tubal Ligation   [3]   Family History  Problem Relation Name Age of Onset    Sickle cell trait Sister      Multiple sclerosis Brother      Breast cancer Maternal Grandmother      Breast cancer Other paternal cousin     Sickle cell trait Child     [4]   Social History  Tobacco Use    Smoking status: Never    Smokeless tobacco: Never   Vaping Use    Vaping status: Never Used   Substance Use Topics    Alcohol use: Not Currently    Drug use: Not Currently

## 2025-04-18 NOTE — PROGRESS NOTES
Patient in [facility] for pain crisis. Patient was found to need to be admitted. Report was called and patient was taken to [place] [id number]

## 2025-04-18 NOTE — H&P
History Of Present Illness  Mary Alice Aragon is a 45 y.o. female with HbSS c/b transfusion-related iron overload, cerebral aneurysm, NICHOL/MDD that presented to urgent care on  for pain in her R heel and R elbow/wrist. In ACC, labs revealed hemoglobin 6.5, creatinine 1.15, T. bili 2.8, , WBC 12.3, ferritin 2253.  Blood transfusion held after discussion with Dr. Saleem due to history of iron overload.  She was given a dose of Flexeril as well as Zofran for nausea and Benadryl for opioid-related pruritus and started on IV Dilaudid per her pain path care plan and was admitted.  She had a right foot x-ray which revealed no acute findings with unchanged region of sclerosis consistent with her known history of osteonecrosis.    On evaluation, patient states she has had that heel pain since end of March but it worsened last night.  Her home pain meds were not effective and states when she gets the pain in her right elbow and wrist it is typically difficult to treat with her home medications.  She reports some improvement in pain with the IV Dilaudid.     Past Medical History  She has a past medical history of Sickle cell anemia (Multi) and Unspecified astigmatism, bilateral (2020).    Surgical History  She has a past surgical history that includes Cholecystectomy (2016);  section, classic (2016); Other surgical history (2016); Tubal ligation (04/10/2017); MR angio head w and wo IV contrast (4/15/2013); MR angio neck wo IV contrast (2013); MR angio head w and wo IV contrast (2013); MR angio head wo IV contrast (2014); MR angio neck wo IV contrast (2014); MR angio head wo IV contrast (2016); MR angio head wo IV contrast (2019); and MR angio head wo IV contrast (2017).     Social History  She reports that she has never smoked. She has never used smokeless tobacco. She reports that she does not currently use alcohol. She reports that she does not currently  use drugs.    Family History  Family History[1]     Allergies  Patient has no known allergies.     Physical Exam  Vitals reviewed.   Constitutional:       General: She is not in acute distress.     Appearance: Normal appearance.   HENT:      Mouth/Throat:      Mouth: Mucous membranes are moist.      Pharynx: Oropharynx is clear.   Eyes:      Extraocular Movements: Extraocular movements intact.      Conjunctiva/sclera: Conjunctivae normal.      Pupils: Pupils are equal, round, and reactive to light.   Cardiovascular:      Rate and Rhythm: Normal rate and regular rhythm.      Pulses: Normal pulses.   Pulmonary:      Effort: Pulmonary effort is normal. No respiratory distress.      Breath sounds: Normal breath sounds. No wheezing or rhonchi.   Abdominal:      General: Abdomen is flat. Bowel sounds are normal.      Palpations: Abdomen is soft.      Tenderness: There is no abdominal tenderness.   Musculoskeletal:         General: No swelling or signs of injury.      Comments: Tenderness of R heel to light touch, tenderness of R arm   No asymmetry or injury    Skin:     General: Skin is warm and dry.   Neurological:      General: No focal deficit present.      Mental Status: She is alert and oriented to person, place, and time.          Last Recorded Vitals  /69 (BP Location: Left arm, Patient Position: Sitting)   Pulse 81   Temp 36.3 °C (97.3 °F) (Temporal)   Resp 18   SpO2 95%     Relevant Results  Scheduled medications  Scheduled Medications[2]  Continuous medications  Continuous Medications[3]  PRN medications  PRN Medications[4]    Results for orders placed or performed in visit on 04/18/25 (from the past 24 hours)   CBC and Auto Differential   Result Value Ref Range    WBC 12.3 (H) 4.4 - 11.3 x10*3/uL    nRBC 0.2 (H) 0.0 - 0.0 /100 WBCs    RBC 2.06 (L) 4.00 - 5.20 x10*6/uL    Hemoglobin 6.5 (LL) 12.0 - 16.0 g/dL    Hematocrit 19.0 (L) 36.0 - 46.0 %    MCV 92 80 - 100 fL    MCH 31.6 26.0 - 34.0 pg    MCHC  34.2 32.0 - 36.0 g/dL    RDW 16.6 (H) 11.5 - 14.5 %    Platelets 398 150 - 450 x10*3/uL    Neutrophils % 51.4 40.0 - 80.0 %    Immature Granulocytes %, Automated 0.4 0.0 - 0.9 %    Lymphocytes % 40.2 13.0 - 44.0 %    Monocytes % 6.1 2.0 - 10.0 %    Eosinophils % 1.2 0.0 - 6.0 %    Basophils % 0.7 0.0 - 2.0 %    Neutrophils Absolute 6.33 1.20 - 7.70 x10*3/uL    Immature Granulocytes Absolute, Automated 0.05 0.00 - 0.70 x10*3/uL    Lymphocytes Absolute 4.96 (H) 1.20 - 4.80 x10*3/uL    Monocytes Absolute 0.75 0.10 - 1.00 x10*3/uL    Eosinophils Absolute 0.15 0.00 - 0.70 x10*3/uL    Basophils Absolute 0.09 0.00 - 0.10 x10*3/uL   Comprehensive Metabolic Panel   Result Value Ref Range    Glucose 109 (H) 74 - 99 mg/dL    Sodium 138 136 - 145 mmol/L    Potassium 4.2 3.5 - 5.3 mmol/L    Chloride 109 (H) 98 - 107 mmol/L    Bicarbonate 23 21 - 32 mmol/L    Anion Gap 10 10 - 20 mmol/L    Urea Nitrogen 19 6 - 23 mg/dL    Creatinine 1.15 (H) 0.50 - 1.05 mg/dL    eGFR 61 >60 mL/min/1.73m*2    Calcium 8.8 8.6 - 10.3 mg/dL    Albumin 4.3 3.4 - 5.0 g/dL    Alkaline Phosphatase 63 33 - 110 U/L    Total Protein 7.4 6.4 - 8.2 g/dL    AST 23 9 - 39 U/L    Bilirubin, Total 2.8 (H) 0.0 - 1.2 mg/dL    ALT 22 7 - 45 U/L   Lactate Dehydrogenase   Result Value Ref Range     (H) 84 - 246 U/L   Reticulocytes   Result Value Ref Range    Retic % 7.9 (H) 0.5 - 2.0 %    Retic Absolute 0.162 (H) 0.018 - 0.083 x10*6/uL    Reticulocyte Hemoglobin 34 28 - 38 pg    Immature Retic fraction 26.8 (H) <=16.0 %   Sedimentation rate, automated   Result Value Ref Range    Sedimentation Rate 4 0 - 20 mm/h   C-reactive protein   Result Value Ref Range    C-Reactive Protein 0.48 <1.00 mg/dL     XR foot right 3+ views  Result Date: 4/18/2025  Interpreted By:  Mikie Davalos, STUDY: XR FOOT RIGHT 3+ VIEWS; XR ANKLE RIGHT 3+ VIEWS   INDICATION: Signs/Symptoms:Right heel pain/ ankle pain, hx of AVN; Signs/Symptoms:R heel/ankle pain, hx of AVN.   COMPARISON:  November 5, 2024   ACCESSION NUMBER(S): [id number]; [id number]   ORDERING CLINICIAN: LYNNETTE SEGURA   FINDINGS: Small region of sclerosis of the distal right tibial metaphysis unchanged from prior consistent with known history of osteonecrosis.   No evidence of acute right foot fracture or malalignment.         Small region of sclerosis of the distal right tibial metaphysis unchanged from prior consistent with known history of osteonecrosis.   No evidence of acute right foot fracture or malalignment.   Signed by: Mikie Davalos 4/18/2025 5:27 PM Dictation workstation:   [id number]    XR ankle right 3+ views  Result Date: 4/18/2025  Interpreted By:  Mikie Davalos, STUDY: XR FOOT RIGHT 3+ VIEWS; XR ANKLE RIGHT 3+ VIEWS   INDICATION: Signs/Symptoms:Right heel pain/ ankle pain, hx of AVN; Signs/Symptoms:R heel/ankle pain, hx of AVN.   COMPARISON: November 5, 2024   ACCESSION NUMBER(S): [id number]; [id number]   ORDERING CLINICIAN: LYNNETTE SEGURA   FINDINGS: Small region of sclerosis of the distal right tibial metaphysis unchanged from prior consistent with known history of osteonecrosis.   No evidence of acute right foot fracture or malalignment.         Small region of sclerosis of the distal right tibial metaphysis unchanged from prior consistent with known history of osteonecrosis.   No evidence of acute right foot fracture or malalignment.   Signed by: Mikie Davalos 4/18/2025 5:27 PM Dictation workstation:   [id number]    XR foot right 3+ views  Result Date: 4/18/2025  Interpreted By:  Mikie Davalos, STUDY: XR FOOT RIGHT 3+ VIEWS; XR ANKLE RIGHT 3+ VIEWS   INDICATION: Signs/Symptoms:Right heel pain/ ankle pain, hx of AVN; Signs/Symptoms:R heel/ankle pain, hx of AVN.   COMPARISON: November 5, 2024   ACCESSION NUMBER(S): [id number]; [id number]   ORDERING CLINICIAN: LYNNETTE SEGURA   FINDINGS: Small region of sclerosis of the distal right tibial metaphysis unchanged from prior consistent with known history of  osteonecrosis.   No evidence of acute right foot fracture or malalignment.         Small region of sclerosis of the distal right tibial metaphysis unchanged from prior consistent with known history of osteonecrosis.   No evidence of acute right foot fracture or malalignment.   Signed by: Mikie Davalos 4/18/2025 5:27 PM Dictation workstation:   [id number]    XR ankle right 3+ views  Result Date: 4/18/2025  Interpreted By:  Mikie Davalos, STUDY: XR FOOT RIGHT 3+ VIEWS; XR ANKLE RIGHT 3+ VIEWS   INDICATION: Signs/Symptoms:Right heel pain/ ankle pain, hx of AVN; Signs/Symptoms:R heel/ankle pain, hx of AVN.   COMPARISON: November 5, 2024   ACCESSION NUMBER(S): [id number]; [id number]   ORDERING CLINICIAN: LYNNETTE SEGURA   FINDINGS: Small region of sclerosis of the distal right tibial metaphysis unchanged from prior consistent with known history of osteonecrosis.   No evidence of acute right foot fracture or malalignment.         Small region of sclerosis of the distal right tibial metaphysis unchanged from prior consistent with known history of osteonecrosis.   No evidence of acute right foot fracture or malalignment.   Signed by: Mikie Davalos 4/18/2025 5:27 PM Dictation workstation:   [id number]       Assessment/Plan   Assessment & Plan  Sickle cell pain crisis (Multi)      Mary Alice Aragon is a 45 y.o. female with HbSS c/b transfusion-related iron overload, cerebral aneurysm, NICHOL/MDD that presents with sickle cell pain crisis with pain in R heel and R wrist/elbow.     #HbSS w/ pain crisis  -Continue hydroxyurea 1500mg M/W/F and 1000mg T/Th/Sat/Sun  -Continue folic acid   -IV dilaudid 2mg q2h PRN with bowel regimen   -Repeat labs pending     #Transfusion-mediated iron overload, chronic  -Patient did not bring her [place] pump  -Continue deferoxamine 2g daily IV infused over 8 hours     #Acute on chronic anemia  -Hold transfusion after discussion with Dr. Saleem given iron overload   -Monitor CBC     Code Status:  Full Code  NOK: mother Peenlope Bain [phone]       Cinthia Carson MD         [1]   Family History  Problem Relation Name Age of Onset    Sickle cell trait Sister      Multiple sclerosis Brother      Breast cancer Maternal Grandmother      Breast cancer Other paternal cousin     Sickle cell trait Child     [2] [START ON 4/19/2025] ascorbic acid, 1,000 mg, oral, Daily  deferoxamine, 2,000 mg, intravenous, Daily  deferoxamine, 2,000 mg, intravenous, Daily  [START ON 4/19/2025] docusate sodium, 100 mg, oral, Every other day  [START ON 4/19/2025] folic acid, 1 mg, oral, Daily  [START ON 4/19/2025] hydroxyurea, 1,000 mg, oral, Every Sun/Tues/Thur/Sat  hydroxyurea, 1,500 mg, oral, Once per day on Monday Wednesday Friday  melatonin, 5 mg, oral, Nightly  [START ON 4/19/2025] norethindrone, 5 mg, oral, Daily  oxygen, 3 L/min, inhalation, Nightly  pregabalin, 25 mg, oral, TID     [3]    [4] PRN medications: diclofenac sodium, HYDROmorphone, lidocaine, prochlorperazine, sennosides, traZODone

## 2025-04-19 LAB
ABO GROUP (TYPE) IN BLOOD: NORMAL
ALBUMIN SERPL BCP-MCNC: 4.3 G/DL (ref 3.4–5)
ALP SERPL-CCNC: 63 U/L (ref 33–110)
ALT SERPL W P-5'-P-CCNC: 21 U/L (ref 7–45)
ANION GAP SERPL CALC-SCNC: 10 MMOL/L (ref 10–20)
ANTIBODY SCREEN: NORMAL
AST SERPL W P-5'-P-CCNC: 23 U/L (ref 9–39)
BASOPHILS # BLD MANUAL: 0.26 X10*3/UL (ref 0–0.1)
BASOPHILS NFR BLD MANUAL: 1.7 %
BILIRUB DIRECT SERPL-MCNC: 0.6 MG/DL (ref 0–0.3)
BILIRUB SERPL-MCNC: 2.8 MG/DL (ref 0–1.2)
BLASTS # BLD MANUAL: 0 X10*3/UL
BLASTS NFR BLD MANUAL: 0 %
BUN SERPL-MCNC: 17 MG/DL (ref 6–23)
CALCIUM SERPL-MCNC: 9 MG/DL (ref 8.6–10.6)
CHLORIDE SERPL-SCNC: 109 MMOL/L (ref 98–107)
CO2 SERPL-SCNC: 25 MMOL/L (ref 21–32)
CREAT SERPL-MCNC: 1.04 MG/DL (ref 0.5–1.05)
EGFRCR SERPLBLD CKD-EPI 2021: 69 ML/MIN/1.73M*2
EOSINOPHIL # BLD MANUAL: 0 X10*3/UL (ref 0–0.7)
EOSINOPHIL NFR BLD MANUAL: 0 %
ERYTHROCYTE [DISTWIDTH] IN BLOOD BY AUTOMATED COUNT: 17.2 % (ref 11.5–14.5)
GLUCOSE SERPL-MCNC: 97 MG/DL (ref 74–99)
HCT VFR BLD AUTO: 18.2 % (ref 36–46)
HGB BLD-MCNC: 6.2 G/DL (ref 12–16)
HGB RETIC QN: 33 PG (ref 28–38)
IMM GRANULOCYTES # BLD AUTO: 0.08 X10*3/UL (ref 0–0.7)
IMM GRANULOCYTES NFR BLD AUTO: 0.5 % (ref 0–0.9)
IMMATURE RETIC FRACTION: 31 %
LDH SERPL L TO P-CCNC: 270 U/L (ref 84–246)
LYMPHOCYTES # BLD MANUAL: 5.36 X10*3/UL (ref 1.2–4.8)
LYMPHOCYTES NFR BLD MANUAL: 35.7 %
MCH RBC QN AUTO: 32.1 PG (ref 26–34)
MCHC RBC AUTO-ENTMCNC: 34.1 G/DL (ref 32–36)
MCV RBC AUTO: 94 FL (ref 80–100)
METAMYELOCYTES # BLD MANUAL: 0 X10*3/UL
METAMYELOCYTES NFR BLD MANUAL: 0 %
MONOCYTES # BLD MANUAL: 0.39 X10*3/UL (ref 0.1–1)
MONOCYTES NFR BLD MANUAL: 2.6 %
MYELOCYTES # BLD MANUAL: 0 X10*3/UL
MYELOCYTES NFR BLD MANUAL: 0 %
NEUTROPHILS # BLD MANUAL: 8.87 X10*3/UL (ref 1.2–7.7)
NEUTS BAND # BLD MANUAL: 0 X10*3/UL (ref 0–0.7)
NEUTS BAND NFR BLD MANUAL: 0 %
NEUTS SEG # BLD MANUAL: 8.87 X10*3/UL (ref 1.2–7)
NEUTS SEG NFR BLD MANUAL: 59.1 %
NRBC BLD MANUAL-RTO: 0 % (ref 0–0)
NRBC BLD-RTO: 0.3 /100 WBCS (ref 0–0)
OVALOCYTES BLD QL SMEAR: ABNORMAL
PLASMA CELLS # BLD MANUAL: 0 X10*3/UL
PLASMA CELLS NFR BLD MANUAL: 0 %
PLATELET # BLD AUTO: 442 X10*3/UL (ref 150–450)
POLYCHROMASIA BLD QL SMEAR: ABNORMAL
POTASSIUM SERPL-SCNC: 4.3 MMOL/L (ref 3.5–5.3)
PROMYELOCYTES # BLD MANUAL: 0 X10*3/UL
PROMYELOCYTES NFR BLD MANUAL: 0 %
PROT SERPL-MCNC: 7.3 G/DL (ref 6.4–8.2)
RBC # BLD AUTO: 1.93 X10*6/UL (ref 4–5.2)
RBC MORPH BLD: ABNORMAL
RETICS #: 0.15 X10*6/UL (ref 0.02–0.08)
RETICS/RBC NFR AUTO: 8 % (ref 0.5–2)
RH FACTOR (ANTIGEN D): NORMAL
SODIUM SERPL-SCNC: 140 MMOL/L (ref 136–145)
TARGETS BLD QL SMEAR: ABNORMAL
TOTAL CELLS COUNTED BLD: 115
URATE SERPL-MCNC: 5.7 MG/DL (ref 2.3–6.7)
VARIANT LYMPHS # BLD MANUAL: 0.14 X10*3/UL (ref 0–0.5)
VARIANT LYMPHS NFR BLD: 0.9 %
WBC # BLD AUTO: 15 X10*3/UL (ref 4.4–11.3)

## 2025-04-19 PROCEDURE — 2500000004 HC RX 250 GENERAL PHARMACY W/ HCPCS (ALT 636 FOR OP/ED): Mod: JZ,TB

## 2025-04-19 PROCEDURE — 84075 ASSAY ALKALINE PHOSPHATASE: CPT

## 2025-04-19 PROCEDURE — 2500000004 HC RX 250 GENERAL PHARMACY W/ HCPCS (ALT 636 FOR OP/ED): Mod: JZ

## 2025-04-19 PROCEDURE — 2500000002 HC RX 250 W HCPCS SELF ADMINISTERED DRUGS (ALT 637 FOR MEDICARE OP, ALT 636 FOR OP/ED)

## 2025-04-19 PROCEDURE — 99233 SBSQ HOSP IP/OBS HIGH 50: CPT

## 2025-04-19 PROCEDURE — 2500000001 HC RX 250 WO HCPCS SELF ADMINISTERED DRUGS (ALT 637 FOR MEDICARE OP)

## 2025-04-19 PROCEDURE — 86901 BLOOD TYPING SEROLOGIC RH(D): CPT

## 2025-04-19 PROCEDURE — 2500000005 HC RX 250 GENERAL PHARMACY W/O HCPCS

## 2025-04-19 PROCEDURE — 85007 BL SMEAR W/DIFF WBC COUNT: CPT

## 2025-04-19 PROCEDURE — 83615 LACTATE (LD) (LDH) ENZYME: CPT

## 2025-04-19 PROCEDURE — 1170000001 HC PRIVATE ONCOLOGY ROOM DAILY

## 2025-04-19 PROCEDURE — 84550 ASSAY OF BLOOD/URIC ACID: CPT

## 2025-04-19 PROCEDURE — 82248 BILIRUBIN DIRECT: CPT

## 2025-04-19 PROCEDURE — 85027 COMPLETE CBC AUTOMATED: CPT

## 2025-04-19 PROCEDURE — 85045 AUTOMATED RETICULOCYTE COUNT: CPT

## 2025-04-19 PROCEDURE — 86920 COMPATIBILITY TEST SPIN: CPT

## 2025-04-19 RX ORDER — TIZANIDINE 2 MG/1
2 TABLET ORAL ONCE
Status: COMPLETED | OUTPATIENT
Start: 2025-04-19 | End: 2025-04-19

## 2025-04-19 RX ORDER — KETOROLAC TROMETHAMINE 15 MG/ML
15 INJECTION, SOLUTION INTRAMUSCULAR; INTRAVENOUS ONCE
Status: COMPLETED | OUTPATIENT
Start: 2025-04-19 | End: 2025-04-19

## 2025-04-19 RX ORDER — TIZANIDINE 2 MG/1
2 TABLET ORAL EVERY 8 HOURS PRN
Status: DISCONTINUED | OUTPATIENT
Start: 2025-04-19 | End: 2025-04-21

## 2025-04-19 RX ORDER — CEPHALEXIN 500 MG/1
500 CAPSULE ORAL EVERY 12 HOURS SCHEDULED
Status: COMPLETED | OUTPATIENT
Start: 2025-04-19 | End: 2025-04-21

## 2025-04-19 RX ORDER — CEPHALEXIN 500 MG/1
500 CAPSULE ORAL EVERY 6 HOURS SCHEDULED
Status: DISCONTINUED | OUTPATIENT
Start: 2025-04-19 | End: 2025-04-19

## 2025-04-19 RX ORDER — DIPHENHYDRAMINE HCL 25 MG
25 CAPSULE ORAL EVERY 6 HOURS PRN
Status: DISCONTINUED | OUTPATIENT
Start: 2025-04-19 | End: 2025-04-24 | Stop reason: HOSPADM

## 2025-04-19 RX ORDER — HYDROMORPHONE HYDROCHLORIDE 1 MG/ML
1 INJECTION, SOLUTION INTRAMUSCULAR; INTRAVENOUS; SUBCUTANEOUS ONCE
Status: COMPLETED | OUTPATIENT
Start: 2025-04-19 | End: 2025-04-19

## 2025-04-19 RX ORDER — ACETAMINOPHEN 325 MG/1
650 TABLET ORAL EVERY 6 HOURS PRN
Status: DISCONTINUED | OUTPATIENT
Start: 2025-04-19 | End: 2025-04-24 | Stop reason: HOSPADM

## 2025-04-19 RX ORDER — CEPHALEXIN 500 MG/1
500 CAPSULE ORAL EVERY 12 HOURS SCHEDULED
Status: DISCONTINUED | OUTPATIENT
Start: 2025-04-19 | End: 2025-04-19 | Stop reason: DRUGHIGH

## 2025-04-19 RX ADMIN — TIZANIDINE 2 MG: 2 TABLET ORAL at 19:49

## 2025-04-19 RX ADMIN — KETOROLAC TROMETHAMINE 15 MG: 15 INJECTION, SOLUTION INTRAMUSCULAR; INTRAVENOUS at 20:08

## 2025-04-19 RX ADMIN — TIZANIDINE 2 MG: 2 TABLET ORAL at 12:17

## 2025-04-19 RX ADMIN — HYDROMORPHONE HYDROCHLORIDE 2 MG: 2 INJECTION, SOLUTION INTRAMUSCULAR; INTRAVENOUS; SUBCUTANEOUS at 08:17

## 2025-04-19 RX ADMIN — FOLIC ACID 1 MG: 1 TABLET ORAL at 08:17

## 2025-04-19 RX ADMIN — DIPHENHYDRAMINE HYDROCHLORIDE 25 MG: 25 CAPSULE ORAL at 16:29

## 2025-04-19 RX ADMIN — HYDROMORPHONE HYDROCHLORIDE 2 MG: 2 INJECTION, SOLUTION INTRAMUSCULAR; INTRAVENOUS; SUBCUTANEOUS at 03:57

## 2025-04-19 RX ADMIN — HYDROMORPHONE HYDROCHLORIDE 2 MG: 2 INJECTION, SOLUTION INTRAMUSCULAR; INTRAVENOUS; SUBCUTANEOUS at 05:56

## 2025-04-19 RX ADMIN — ENOXAPARIN SODIUM 40 MG: 100 INJECTION SUBCUTANEOUS at 08:18

## 2025-04-19 RX ADMIN — HYDROMORPHONE HYDROCHLORIDE 1 MG: 1 INJECTION, SOLUTION INTRAMUSCULAR; INTRAVENOUS; SUBCUTANEOUS at 22:03

## 2025-04-19 RX ADMIN — HYDROMORPHONE HYDROCHLORIDE 2 MG: 2 INJECTION, SOLUTION INTRAMUSCULAR; INTRAVENOUS; SUBCUTANEOUS at 16:47

## 2025-04-19 RX ADMIN — PROCHLORPERAZINE MALEATE 10 MG: 10 TABLET, FILM COATED ORAL at 03:57

## 2025-04-19 RX ADMIN — HYDROMORPHONE HYDROCHLORIDE 2 MG: 2 INJECTION, SOLUTION INTRAMUSCULAR; INTRAVENOUS; SUBCUTANEOUS at 10:19

## 2025-04-19 RX ADMIN — TRAZODONE HYDROCHLORIDE 100 MG: 50 TABLET ORAL at 22:23

## 2025-04-19 RX ADMIN — DEFEROXAMINE MESYLATE 2000 MG: 95 INJECTION, POWDER, LYOPHILIZED, FOR SOLUTION INTRAMUSCULAR; INTRAVENOUS; SUBCUTANEOUS at 22:03

## 2025-04-19 RX ADMIN — DOCUSATE SODIUM 100 MG: 100 CAPSULE, LIQUID FILLED ORAL at 08:17

## 2025-04-19 RX ADMIN — PREGABALIN 25 MG: 25 CAPSULE ORAL at 14:39

## 2025-04-19 RX ADMIN — PREGABALIN 25 MG: 25 CAPSULE ORAL at 08:17

## 2025-04-19 RX ADMIN — SODIUM CHLORIDE, SODIUM LACTATE, POTASSIUM CHLORIDE, AND CALCIUM CHLORIDE 500 ML: .6; .31; .03; .02 INJECTION, SOLUTION INTRAVENOUS at 12:16

## 2025-04-19 RX ADMIN — HYDROXYUREA 1000 MG: 500 CAPSULE ORAL at 20:53

## 2025-04-19 RX ADMIN — Medication 3 L/MIN: at 20:58

## 2025-04-19 RX ADMIN — DICLOFENAC SODIUM 4 G: 10 GEL TOPICAL at 10:12

## 2025-04-19 RX ADMIN — PREGABALIN 25 MG: 25 CAPSULE ORAL at 20:51

## 2025-04-19 RX ADMIN — KETOROLAC TROMETHAMINE 15 MG: 15 INJECTION, SOLUTION INTRAMUSCULAR; INTRAVENOUS at 12:16

## 2025-04-19 RX ADMIN — Medication 5 MG: at 22:22

## 2025-04-19 RX ADMIN — HYDROMORPHONE HYDROCHLORIDE 2 MG: 2 INJECTION, SOLUTION INTRAMUSCULAR; INTRAVENOUS; SUBCUTANEOUS at 20:50

## 2025-04-19 RX ADMIN — LIDOCAINE 4% 1 PATCH: 40 PATCH TOPICAL at 22:03

## 2025-04-19 RX ADMIN — HYDROMORPHONE HYDROCHLORIDE 2 MG: 2 INJECTION, SOLUTION INTRAMUSCULAR; INTRAVENOUS; SUBCUTANEOUS at 01:59

## 2025-04-19 RX ADMIN — HYDROMORPHONE HYDROCHLORIDE 2 MG: 2 INJECTION, SOLUTION INTRAMUSCULAR; INTRAVENOUS; SUBCUTANEOUS at 14:42

## 2025-04-19 RX ADMIN — CEPHALEXIN 500 MG: 500 CAPSULE ORAL at 20:58

## 2025-04-19 RX ADMIN — HYDROMORPHONE HYDROCHLORIDE 2 MG: 2 INJECTION, SOLUTION INTRAMUSCULAR; INTRAVENOUS; SUBCUTANEOUS at 12:26

## 2025-04-19 RX ADMIN — HYDROMORPHONE HYDROCHLORIDE 2 MG: 2 INJECTION, SOLUTION INTRAMUSCULAR; INTRAVENOUS; SUBCUTANEOUS at 18:48

## 2025-04-19 RX ADMIN — OXYCODONE HYDROCHLORIDE AND ACETAMINOPHEN 1000 MG: 500 TABLET ORAL at 08:17

## 2025-04-19 ASSESSMENT — COGNITIVE AND FUNCTIONAL STATUS - GENERAL
DRESSING REGULAR LOWER BODY CLOTHING: A LITTLE
HELP NEEDED FOR BATHING: A LITTLE
STANDING UP FROM CHAIR USING ARMS: A LITTLE
TOILETING: A LITTLE
CLIMB 3 TO 5 STEPS WITH RAILING: A LOT
DRESSING REGULAR LOWER BODY CLOTHING: A LITTLE
DAILY ACTIVITIY SCORE: 21
TOILETING: A LITTLE
STANDING UP FROM CHAIR USING ARMS: A LITTLE
DAILY ACTIVITIY SCORE: 21
HELP NEEDED FOR BATHING: A LITTLE
MOVING TO AND FROM BED TO CHAIR: A LITTLE
MOVING TO AND FROM BED TO CHAIR: A LITTLE
CLIMB 3 TO 5 STEPS WITH RAILING: A LOT
WALKING IN HOSPITAL ROOM: A LITTLE
MOBILITY SCORE: 19
WALKING IN HOSPITAL ROOM: A LITTLE
MOBILITY SCORE: 19

## 2025-04-19 ASSESSMENT — PAIN - FUNCTIONAL ASSESSMENT
PAIN_FUNCTIONAL_ASSESSMENT: 0-10

## 2025-04-19 ASSESSMENT — PAIN DESCRIPTION - LOCATION
LOCATION: FOOT

## 2025-04-19 ASSESSMENT — PAIN SCALES - GENERAL
PAINLEVEL_OUTOF10: 9
PAINLEVEL_OUTOF10: 8

## 2025-04-19 ASSESSMENT — ACTIVITIES OF DAILY LIVING (ADL): LACK_OF_TRANSPORTATION: NO

## 2025-04-19 ASSESSMENT — PAIN DESCRIPTION - ORIENTATION
ORIENTATION: RIGHT

## 2025-04-19 NOTE — CARE PLAN
The patient's goals for the shift include      The clinical goals for the shift include Pt will report adequate pain control by end of shift 4/19 0700

## 2025-04-19 NOTE — PROGRESS NOTES
Mary Alice Aragon is a 45 y.o. female on day 1 of admission presenting with Sickle cell anemia with crisis.      Subjective   Patients reports having whole body spasms this morning. States her pain is 8/10 in the R heel and is unable to bear weight on it. Requesting her home tizanidine. Also states she recently had ingrown toe nails removed bilaterally earlier this month on 4/9 and was suppose to be taking Cephexlin 500 mg BID after the procedure, which she only started taking recently.          Objective     Last Recorded Vitals  BP 99/58 (BP Location: Right arm, Patient Position: Lying)   Pulse 97   Temp 37.4 °C (99.3 °F) (Temporal)   Resp 20   Wt 67.5 kg (148 lb 12.8 oz)   SpO2 95%   Intake/Output last 3 Shifts:  No intake or output data in the 24 hours ending 04/19/25 0907    Admission Weight  Weight: 67.5 kg (148 lb 12.8 oz) (04/19/25 0624)    Daily Weight  04/19/25 : 67.5 kg (148 lb 12.8 oz)    Image Results  XR foot right 3+ views, XR ankle right 3+ views  Narrative: Interpreted By:  Mikie Davalos,   STUDY:  XR FOOT RIGHT 3+ VIEWS; XR ANKLE RIGHT 3+ VIEWS      INDICATION:  Signs/Symptoms:Right heel pain/ ankle pain, hx of AVN;  Signs/Symptoms:R heel/ankle pain, hx of AVN.      COMPARISON:  November 5, 2024      ACCESSION NUMBER(S):  [id number]; [id number]      ORDERING CLINICIAN:  LYNNETTE SEGURA      FINDINGS:  Small region of sclerosis of the distal right tibial metaphysis  unchanged from prior consistent with known history of osteonecrosis.      No evidence of acute right foot fracture or malalignment.      Impression:     Small region of sclerosis of the distal right tibial metaphysis  unchanged from prior consistent with known history of osteonecrosis.      No evidence of acute right foot fracture or malalignment.      Signed by: Mikie Davalos 4/18/2025 5:27 PM  Dictation workstation:   [id number]      Physical Exam  General: NAD  HEENT: NCAT  CV: RRR  PULM: non-labored respirations   ABD: soft,  NT, ND  EXT: no LE edema, R ankle wrapped in ace wrap   SKIN: no rashes noted   NEURO: A&Ox4, no gross motor or sensory deficits  PSYCH: pleasant mood, appropriate affect      Relevant Results    Labs    Results from last 7 days   Lab Units 04/19/25  0405 04/18/25  1207   WBC AUTO x10*3/uL 15.0* 12.3*   HEMOGLOBIN g/dL 6.2* 6.5*   HEMATOCRIT % 18.2* 19.0*   PLATELETS AUTO x10*3/uL 442 398            Results from last 7 days   Lab Units 04/19/25  0405 04/18/25  1207   SODIUM mmol/L 140 138   POTASSIUM mmol/L 4.3 4.2   CHLORIDE mmol/L 109* 109*   CO2 mmol/L 25 23   BUN mg/dL 17 19   CREATININE mg/dL 1.04 1.15*   CALCIUM mg/dL 9.0 8.8     Results from last 7 days   Lab Units 04/19/25  0405 04/18/25  1207   ALK PHOS U/L 63 63   BILIRUBIN TOTAL mg/dL 2.8* 2.8*   PROTEIN TOTAL g/dL 7.3 7.4   ALT U/L 21 22   AST U/L 23 23             Imaging  No orders to display      Scheduled medications  Scheduled Medications[1]  Continuous medications  Continuous Medications[2]  PRN medications  PRN Medications[3]       This patient has a central line   Reason for the central line remaining today? Dialysis/Hemapheresis      Assessment & Plan  Sickle cell pain crisis (Multi)    Sickle cell anemia with crisis      Mary Alice Aragon is a 45 y.o. female with HbSS c/b transfusion-related iron overload, cerebral aneurysm, NICHOL/MDD that presents with sickle cell pain crisis with pain in R heel and R wrist/elbow. Continues to have pain this morning along with spasms. Likely uncomplicated sickle cell pain vs gout.       #HbSS w/ pain crisis  ::likely uncomplicated sickle cell pain vs gout   -LDH, % retic elevated, hgb decreased compared to baseline   -Continue hydroxyurea 1500mg M/W/F and 1000mg T/Th/Sat/Sun  -Continue folic acid   -IV dilaudid 2mg q2h PRN with bowel regimen   -Order home tizanidine 2mg Q8H PRN and tylenlol 650 mg PRN   -Diclofenac gel PRN  -check uric acid, direct bili      #Transfusion-mediated iron overload, chronic  -Patient  did not bring her cadd vicente pump  -Continue deferoxamine 2g daily IV infused over 8 hours      #Acute on chronic anemia  -Hgb on arrival 6.5 -> 6.2   -Hold transfusion after discussion with Dr. Saleem given iron overload   -Monitor CBC   -Active Type and screen     #Hx of ingrown toe nails   -Reports having a procedure on 4/9 and was suppose to be taking Cephalexin 500 mg BID, which she did not start immediately after the procedure and still on the antibiotic  -Continue inpatient to finish course      Code Status: Full Code  NOK: mother Penelope Bain [phone]        Cadence Maldonado MD           [1] ascorbic acid, 1,000 mg, oral, Daily  deferoxamine, 2,000 mg, intravenous, Daily  docusate sodium, 100 mg, oral, Every other day  enoxaparin, 40 mg, subcutaneous, q24h  folic acid, 1 mg, oral, Daily  hydroxyurea, 1,000 mg, oral, Every Sun/Tues/Thur/Sat  hydroxyurea, 1,500 mg, oral, Once per day on Monday Wednesday Friday  melatonin, 5 mg, oral, Nightly  norethindrone, 5 mg, oral, Daily  oxygen, 3 L/min, inhalation, Nightly  pregabalin, 25 mg, oral, TID  [2]    [3] PRN medications: diclofenac sodium, HYDROmorphone, lidocaine, pramoxine, prochlorperazine, sennosides, tiZANidine, traZODone

## 2025-04-19 NOTE — PROGRESS NOTES
04/19/25 1215   Discharge Planning   Living Arrangements Children   Support Systems Children   Type of Residence Private residence   Do you have animals or pets at home? No   Who is requesting discharge planning? Provider   Home or Post Acute Services None   Expected Discharge Disposition Home   Does the patient need discharge transport arranged? Yes   RoundTrip coordination needed? No   Has discharge transport been arranged? No   What day is the transport expected? 04/21/25   Financial Resource Strain   How hard is it for you to pay for the very basics like food, housing, medical care, and heating? Not very   Housing Stability   In the last 12 months, was there a time when you were not able to pay the mortgage or rent on time? N   In the past 12 months, how many times have you moved where you were living? 0   At any time in the past 12 months, were you homeless or living in a shelter (including now)? N   Transportation Needs   In the past 12 months, has lack of transportation kept you from medical appointments or from getting medications? no   In the past 12 months, has lack of transportation kept you from meetings, work, or from getting things needed for daily living? No   Patient Choice   Provider Choice list and CMS website (https://medicare.gov/care-compare#search) for post-acute Quality and Resource Measure Data were provided and reviewed with: Patient   Patient / Family choosing to utilize agency / facility established prior to hospitalization Yes   Stroke Family Assessment   Stroke Family Assessment Needed No   Intensity of Service   Intensity of Service 0-30 min        Met with patient to discuss discharge planning s/p admission.  Patient lives home with her children.  Patient is independent in all ADL's. Requires zero assistive devices for ambulation.  Patient does not have active home care or home care needs.  Demographics and contact information confirmed.  Patient states she will need a lyft home.  Patient is on 3L of oxygen. She has oxygen at home but was unable to provide the name of the DME company. Primary TCC will continue to monitor patient for all home going needs.        Payor: [employer]     Discharge disposition:home     Potential Barriers: pain management     ADOD: TBA           Previous Home Care: None  DME: None     Pharmacy: [address]     Falls: None     PCP:  Nyasia Boone, HAYLEY, RADHA-S, RN, TCC.

## 2025-04-19 NOTE — PROGRESS NOTES
Pharmacy Medication History Review    Mary Alice Aragon is a 45 y.o. female admitted for Sickle cell anemia with crisis. Pharmacy reviewed the patient's [id number] medications and allergies for accuracy.    Medications ADDED:  none  Medications CHANGED:  Benadryl from 25 mg to 50 mg  Melatonin from 5 mg to 10 mg  Medications REMOVED:   none     The list below reflects the updated PTA list.   Prior to Admission Medications   Prescriptions Last Dose Informant   FLUoxetine (PROzac) 10 mg capsule  Self   Sig: Take 1 capsule (10 mg) by mouth once daily.   Patient not taking: Reported on 2/24/2025   HYDROmorphone (Dilaudid) 4 mg tablet 4/18/2025 Self   Sig: Take 1 tablet (4 mg) by mouth every 6 hours if needed for severe pain (7 - 10).   ascorbic acid (Vitamin C) 500 mg tablet 4/17/2025 Self   Sig: Take 2 tablets (1,000 mg) by mouth once daily.   capsaicin (Zostrix) 0.025 % cream Past Week Self   Sig: Apply 1 Application topically if needed.   deferasirox (Jadenu) 180 mg tablet Not Taking Self   Sig: Take 1 tablet (180 mg) by mouth once daily.   Patient not taking: Reported on 4/19/2025   deferoxamine (Desferal) 2 gram injection 4/17/2025 Self   Sig: Infuse 2000mg subcutaneously once daily over 8 hours via [place] [place] Pump   diclofenac sodium (Voltaren) 1 % gel 4/17/2025 Self   Sig: Per instructions 4 TIMES DAILY (route: topical)   diphenhydrAMINE (BENADryl) 25 mg capsule Unknown Self   Sig: Take 2 capsules (50 mg) by mouth every 8 hours if needed.   docusate sodium (Colace) 100 mg capsule 4/17/2025 Self   Sig: Take 1 capsule (100 mg) by mouth every other day.   folic acid (Folvite) 1 mg tablet 4/17/2025 Self   Sig: Take 1 tablet (1 mg) by mouth once daily.   hydrOXYzine HCL (Atarax) 25 mg tablet  Self   Sig: Take 1 tablet (25 mg) by mouth 2 times a day as needed for anxiety.   hydroxyurea (Hydrea) 500 mg capsule Past Week Self   Sig: Take 1 capsule (500 mg total) by mouth once a day on Sunday, Tuesday, Thursday, and  Saturday.   hydroxyurea (Hydrea) 500 mg capsule Past Week Self   Sig: Per instructions AS DIRECTED (route: oral)   ketamine (Ketalar) 10 mg/mL injection Past Month Self   Sig: Infuse 1 mL (10 mg) into a venous catheter every 30 (thirty) days. Not administered in the home.   lidocaine (Lidoderm) 5 % patch Past Month Self   Sig: Place 1 patch on the skin once daily as needed for mild pain (1 - 3).   loratadine (Claritin) 10 mg tablet  Self   Sig: Take 1 tablet (10 mg) by mouth once daily as needed for allergies.   melatonin 5 mg tablet 4/17/2025 Self   Sig: Take 2 tablets (10 mg) by mouth once daily at bedtime.   meloxicam (Mobic) 15 mg tablet  Self   Sig: Take 1 tablet (15 mg) by mouth once daily.   methadone (Dolophine) 10 mg tablet 4/18/2025 Self   Sig: Take 1 tablet (10 mg) by mouth every 12 hours.   multivitamin tablet 4/17/2025 Self   Sig: Take 1 tablet by mouth once daily.   naloxone (Narcan) 4 mg/0.1 mL nasal spray Unknown Self   Sig: Administer 1 spray (4 mg) into affected nostril(s) if needed for opioid reversal or respiratory depression. May repeat every 2-3 minutes if needed, alternating nostrils, until medical assistance becomes available.   norethindrone (Aygestin) 5 mg tablet 4/18/2025 Self   Sig: Take 1 tablet (5 mg) by mouth once daily.   oxygen (O2) gas therapy 4/17/2025 Self   Sig: Inhale 3 L/min once daily at bedtime.   peg 400/hypromellose/glycerin (DRY EYE RELIEF OPHT) 4/17/2025 Self   Sig: Administer 1-2 drops into both eyes once daily as needed.   pramoxine (Proctofoam) 1 % foam Not Taking Self   Sig: Insert 1 Application into the rectum every 6 hours if needed for irritation (2nd line itching).   Patient not taking: Reported on 4/19/2025   pregabalin (Lyrica) 25 mg capsule 4/18/2025 Self   Sig: Take 1 capsule (25 mg) by mouth 3 times a day. Do not fill before March 20, 2025.   prochlorperazine (Compazine) 10 mg tablet Past Week Self   Sig: Take 1 tablet (10 mg) by mouth every 8 hours if needed  for nausea or vomiting. 1 tablet EVERY 8 HOURS (route: oral)   senna 8.6 mg tablet Unknown Self   Sig: Take 1 tablet (8.6 mg) by mouth 2 times a day as needed for constipation.   tiZANidine (Zanaflex) 2 mg tablet Past Week Self   Sig: Take 1 tablet (2 mg) by mouth every 8 hours if needed for muscle spasms.   traZODone (Desyrel) 100 mg tablet 4/17/2025 Self   Sig: TAKE 1 TO 2 TABLETS(100  MG) BY MOUTH AT BEDTIME AS NEEDED FOR SLEEP please schedule FUV   trolamine salicylate (Aspercreme) 10 % cream Unknown Self   Sig: Apply 1 Application topically if needed.   valACYclovir (Valtrex) 500 mg tablet Past Week Self   Sig: Take 1 tablet (500 mg) by mouth once daily.      Facility-Administered Medications: None        The list below reflects the updated allergy list. Please review each documented allergy for additional clarification and justification.  Allergies  Reviewed by Niharika Menezes on 4/19/2025   No Known Allergies         Patient accepts M2B at discharge.     Sources:   [facility], [place]  Pharmacy dispense history  Patient Interview Moderate historian  Chart Review  4/18/25 - Office Visit - [facility]      Additional Comments:  Patient reports following dosing schedule for hydroxyurea: Sundays, Tuesdays, Thursdays and Saturdays 1-2 (500-1000 mg) capsules. Mondays, Wednesdays and Fridays 2-3 (0952-7198 mg) capsules.  Patient reports last injection of ketamine was 4/2/2025.  Patient reports taking deferoxamine; there is no fill history. Contacted the [facility]'s [phone] and the pharmacists indicates there is no history of this being filled at any [facility].  Patient reports taking hydroxyzine; last fill 9/26/24 for 30 day supply.  Patient reports taking valacyclovir; there is no fill history.  Patient reports still using Lidocaine patches; there is no fill history.   Patient reports still having the Narcan nasal spray.       NIHARIKA MENEZES  Pharmacy Technician  04/19/25     Secure Chat preferred   If no  "response call [id number] or Vocera \"Med Rec\"    "

## 2025-04-20 ENCOUNTER — APPOINTMENT (OUTPATIENT)
Dept: HEMATOLOGY/ONCOLOGY | Facility: HOSPITAL | Age: 45
DRG: 812 | End: 2025-04-20
Payer: COMMERCIAL

## 2025-04-20 VITALS
HEART RATE: 89 BPM | DIASTOLIC BLOOD PRESSURE: 69 MMHG | TEMPERATURE: 97.5 F | BODY MASS INDEX: 26.36 KG/M2 | SYSTOLIC BLOOD PRESSURE: 107 MMHG | OXYGEN SATURATION: 97 % | WEIGHT: 148.8 LBS | HEIGHT: 63 IN | RESPIRATION RATE: 16 BRPM

## 2025-04-20 LAB
ALBUMIN SERPL BCP-MCNC: 3.9 G/DL (ref 3.4–5)
ALP SERPL-CCNC: 58 U/L (ref 33–110)
ALT SERPL W P-5'-P-CCNC: 17 U/L (ref 7–45)
ANION GAP SERPL CALC-SCNC: 11 MMOL/L (ref 10–20)
AST SERPL W P-5'-P-CCNC: 23 U/L (ref 9–39)
BASOPHILS # BLD AUTO: 0.08 X10*3/UL (ref 0–0.1)
BASOPHILS NFR BLD AUTO: 0.6 %
BILIRUB SERPL-MCNC: 2.1 MG/DL (ref 0–1.2)
BLOOD EXPIRATION DATE: NORMAL
BUN SERPL-MCNC: 14 MG/DL (ref 6–23)
CALCIUM SERPL-MCNC: 8.4 MG/DL (ref 8.6–10.6)
CARDIAC TROPONIN I PNL SERPL HS: <3 NG/L (ref 0–34)
CHLORIDE SERPL-SCNC: 110 MMOL/L (ref 98–107)
CO2 SERPL-SCNC: 23 MMOL/L (ref 21–32)
CREAT SERPL-MCNC: 0.98 MG/DL (ref 0.5–1.05)
DISPENSE STATUS: NORMAL
EGFRCR SERPLBLD CKD-EPI 2021: 74 ML/MIN/1.73M*2
EOSINOPHIL # BLD AUTO: 0.34 X10*3/UL (ref 0–0.7)
EOSINOPHIL NFR BLD AUTO: 2.7 %
ERYTHROCYTE [DISTWIDTH] IN BLOOD BY AUTOMATED COUNT: 16.8 % (ref 11.5–14.5)
GLUCOSE SERPL-MCNC: 93 MG/DL (ref 74–99)
HCT VFR BLD AUTO: 16.4 % (ref 36–46)
HGB BLD-MCNC: 5.4 G/DL (ref 12–16)
HGB RETIC QN: 32 PG (ref 28–38)
IMM GRANULOCYTES # BLD AUTO: 0.09 X10*3/UL (ref 0–0.7)
IMM GRANULOCYTES NFR BLD AUTO: 0.7 % (ref 0–0.9)
IMMATURE RETIC FRACTION: 34.8 %
LDH SERPL L TO P-CCNC: 249 U/L (ref 84–246)
LYMPHOCYTES # BLD AUTO: 5.71 X10*3/UL (ref 1.2–4.8)
LYMPHOCYTES NFR BLD AUTO: 44.7 %
MCH RBC QN AUTO: 31 PG (ref 26–34)
MCHC RBC AUTO-ENTMCNC: 32.9 G/DL (ref 32–36)
MCV RBC AUTO: 94 FL (ref 80–100)
MONOCYTES # BLD AUTO: 0.71 X10*3/UL (ref 0.1–1)
MONOCYTES NFR BLD AUTO: 5.6 %
NEUTROPHILS # BLD AUTO: 5.83 X10*3/UL (ref 1.2–7.7)
NEUTROPHILS NFR BLD AUTO: 45.7 %
NRBC BLD-RTO: 0.5 /100 WBCS (ref 0–0)
PLATELET # BLD AUTO: 403 X10*3/UL (ref 150–450)
POTASSIUM SERPL-SCNC: 3.8 MMOL/L (ref 3.5–5.3)
PRODUCT BLOOD TYPE: 9500
PRODUCT CODE: NORMAL
PROT SERPL-MCNC: 6.8 G/DL (ref 6.4–8.2)
RBC # BLD AUTO: 1.74 X10*6/UL (ref 4–5.2)
RETICS #: 0.18 X10*6/UL (ref 0.02–0.08)
RETICS/RBC NFR AUTO: 10.1 % (ref 0.5–2)
SODIUM SERPL-SCNC: 140 MMOL/L (ref 136–145)
UNIT ABO: NORMAL
UNIT NUMBER: NORMAL
UNIT RH: NORMAL
UNIT VOLUME: 350
WBC # BLD AUTO: 12.8 X10*3/UL (ref 4.4–11.3)
XM INTEP: NORMAL

## 2025-04-20 PROCEDURE — 2500000004 HC RX 250 GENERAL PHARMACY W/ HCPCS (ALT 636 FOR OP/ED): Mod: JZ | Performed by: NURSE PRACTITIONER

## 2025-04-20 PROCEDURE — 2500000005 HC RX 250 GENERAL PHARMACY W/O HCPCS: Performed by: NURSE PRACTITIONER

## 2025-04-20 PROCEDURE — 2500000002 HC RX 250 W HCPCS SELF ADMINISTERED DRUGS (ALT 637 FOR MEDICARE OP, ALT 636 FOR OP/ED)

## 2025-04-20 PROCEDURE — 93010 ELECTROCARDIOGRAM REPORT: CPT | Performed by: INTERNAL MEDICINE

## 2025-04-20 PROCEDURE — 2500000001 HC RX 250 WO HCPCS SELF ADMINISTERED DRUGS (ALT 637 FOR MEDICARE OP)

## 2025-04-20 PROCEDURE — 85045 AUTOMATED RETICULOCYTE COUNT: CPT

## 2025-04-20 PROCEDURE — 1170000001 HC PRIVATE ONCOLOGY ROOM DAILY

## 2025-04-20 PROCEDURE — 2500000004 HC RX 250 GENERAL PHARMACY W/ HCPCS (ALT 636 FOR OP/ED): Mod: JZ,TB

## 2025-04-20 PROCEDURE — S4993 CONTRACEPTIVE PILLS FOR BC: HCPCS

## 2025-04-20 PROCEDURE — 84484 ASSAY OF TROPONIN QUANT: CPT

## 2025-04-20 PROCEDURE — 85025 COMPLETE CBC W/AUTO DIFF WBC: CPT

## 2025-04-20 PROCEDURE — 93005 ELECTROCARDIOGRAM TRACING: CPT

## 2025-04-20 PROCEDURE — P9040 RBC LEUKOREDUCED IRRADIATED: HCPCS

## 2025-04-20 PROCEDURE — 83615 LACTATE (LD) (LDH) ENZYME: CPT

## 2025-04-20 PROCEDURE — 99233 SBSQ HOSP IP/OBS HIGH 50: CPT | Performed by: NURSE PRACTITIONER

## 2025-04-20 PROCEDURE — 2500000005 HC RX 250 GENERAL PHARMACY W/O HCPCS

## 2025-04-20 PROCEDURE — 2500000004 HC RX 250 GENERAL PHARMACY W/ HCPCS (ALT 636 FOR OP/ED): Mod: JZ

## 2025-04-20 PROCEDURE — 36430 TRANSFUSION BLD/BLD COMPNT: CPT

## 2025-04-20 PROCEDURE — 80053 COMPREHEN METABOLIC PANEL: CPT

## 2025-04-20 RX ORDER — LIDOCAINE 40 MG/G
CREAM TOPICAL 2 TIMES DAILY
Status: DISCONTINUED | OUTPATIENT
Start: 2025-04-20 | End: 2025-04-24 | Stop reason: HOSPADM

## 2025-04-20 RX ORDER — PREGABALIN 25 MG/1
25 CAPSULE ORAL 3 TIMES DAILY
Status: DISCONTINUED | OUTPATIENT
Start: 2025-04-20 | End: 2025-04-24 | Stop reason: HOSPADM

## 2025-04-20 RX ORDER — LIDOCAINE 560 MG/1
1 PATCH PERCUTANEOUS; TOPICAL; TRANSDERMAL DAILY
Status: DISCONTINUED | OUTPATIENT
Start: 2025-04-20 | End: 2025-04-24 | Stop reason: HOSPADM

## 2025-04-20 RX ORDER — CAPSAICIN 0.07 G/100G
CREAM TOPICAL 2 TIMES DAILY
Status: DISCONTINUED | OUTPATIENT
Start: 2025-04-20 | End: 2025-04-24 | Stop reason: HOSPADM

## 2025-04-20 RX ORDER — CAPSAICIN 0.07 G/100G
CREAM TOPICAL 2 TIMES DAILY
Status: DISCONTINUED | OUTPATIENT
Start: 2025-04-20 | End: 2025-04-20

## 2025-04-20 RX ORDER — HYDROMORPHONE HYDROCHLORIDE 2 MG/1
2 TABLET ORAL EVERY 6 HOURS PRN
Refills: 0 | Status: DISCONTINUED | OUTPATIENT
Start: 2025-04-20 | End: 2025-04-21

## 2025-04-20 RX ORDER — KETOROLAC TROMETHAMINE 15 MG/ML
15 INJECTION, SOLUTION INTRAMUSCULAR; INTRAVENOUS EVERY 6 HOURS SCHEDULED
Status: COMPLETED | OUTPATIENT
Start: 2025-04-20 | End: 2025-04-22

## 2025-04-20 RX ORDER — POLYETHYLENE GLYCOL 3350 17 G/17G
17 POWDER, FOR SOLUTION ORAL DAILY
Status: DISCONTINUED | OUTPATIENT
Start: 2025-04-20 | End: 2025-04-24 | Stop reason: HOSPADM

## 2025-04-20 RX ADMIN — HYDROMORPHONE HYDROCHLORIDE 2 MG: 2 INJECTION, SOLUTION INTRAMUSCULAR; INTRAVENOUS; SUBCUTANEOUS at 23:24

## 2025-04-20 RX ADMIN — KETOROLAC TROMETHAMINE 15 MG: 15 INJECTION, SOLUTION INTRAMUSCULAR; INTRAVENOUS at 11:14

## 2025-04-20 RX ADMIN — Medication 5 MG: at 23:24

## 2025-04-20 RX ADMIN — HYDROMORPHONE HYDROCHLORIDE 2 MG: 2 INJECTION, SOLUTION INTRAMUSCULAR; INTRAVENOUS; SUBCUTANEOUS at 20:28

## 2025-04-20 RX ADMIN — HYDROMORPHONE HYDROCHLORIDE 2 MG: 2 INJECTION, SOLUTION INTRAMUSCULAR; INTRAVENOUS; SUBCUTANEOUS at 18:21

## 2025-04-20 RX ADMIN — FOLIC ACID 1 MG: 1 TABLET ORAL at 08:38

## 2025-04-20 RX ADMIN — HYDROMORPHONE HYDROCHLORIDE 2 MG: 2 INJECTION, SOLUTION INTRAMUSCULAR; INTRAVENOUS; SUBCUTANEOUS at 08:37

## 2025-04-20 RX ADMIN — TIZANIDINE 2 MG: 2 TABLET ORAL at 20:29

## 2025-04-20 RX ADMIN — HYDROMORPHONE HYDROCHLORIDE 2 MG: 2 INJECTION, SOLUTION INTRAMUSCULAR; INTRAVENOUS; SUBCUTANEOUS at 11:55

## 2025-04-20 RX ADMIN — PROCHLORPERAZINE MALEATE 10 MG: 10 TABLET, FILM COATED ORAL at 11:14

## 2025-04-20 RX ADMIN — KETOROLAC TROMETHAMINE 15 MG: 15 INJECTION, SOLUTION INTRAMUSCULAR; INTRAVENOUS at 18:21

## 2025-04-20 RX ADMIN — POLYETHYLENE GLYCOL 3350 17 G: 17 POWDER, FOR SOLUTION ORAL at 10:39

## 2025-04-20 RX ADMIN — ENOXAPARIN SODIUM 40 MG: 100 INJECTION SUBCUTANEOUS at 08:38

## 2025-04-20 RX ADMIN — HYDROMORPHONE HYDROCHLORIDE 2 MG: 2 INJECTION, SOLUTION INTRAMUSCULAR; INTRAVENOUS; SUBCUTANEOUS at 02:19

## 2025-04-20 RX ADMIN — HYDROMORPHONE HYDROCHLORIDE 2 MG: 2 INJECTION, SOLUTION INTRAMUSCULAR; INTRAVENOUS; SUBCUTANEOUS at 06:37

## 2025-04-20 RX ADMIN — DIPHENHYDRAMINE HYDROCHLORIDE 25 MG: 25 CAPSULE ORAL at 10:39

## 2025-04-20 RX ADMIN — PREGABALIN 25 MG: 25 CAPSULE ORAL at 23:24

## 2025-04-20 RX ADMIN — HYDROMORPHONE HYDROCHLORIDE 2 MG: 2 INJECTION, SOLUTION INTRAMUSCULAR; INTRAVENOUS; SUBCUTANEOUS at 16:11

## 2025-04-20 RX ADMIN — DICLOFENAC SODIUM 4 G: 10 GEL TOPICAL at 20:29

## 2025-04-20 RX ADMIN — LIDOCAINE 4% 1 PATCH: 40 PATCH TOPICAL at 11:05

## 2025-04-20 RX ADMIN — HYDROMORPHONE HYDROCHLORIDE 2 MG: 2 INJECTION, SOLUTION INTRAMUSCULAR; INTRAVENOUS; SUBCUTANEOUS at 00:15

## 2025-04-20 RX ADMIN — Medication 3 L/MIN: at 20:39

## 2025-04-20 RX ADMIN — CAPSAICIN: 0.75 CREAM TOPICAL at 20:39

## 2025-04-20 RX ADMIN — KETOROLAC TROMETHAMINE 15 MG: 15 INJECTION, SOLUTION INTRAMUSCULAR; INTRAVENOUS at 23:24

## 2025-04-20 RX ADMIN — DEFEROXAMINE MESYLATE 2000 MG: 95 INJECTION, POWDER, LYOPHILIZED, FOR SOLUTION INTRAMUSCULAR; INTRAVENOUS; SUBCUTANEOUS at 20:29

## 2025-04-20 RX ADMIN — CEPHALEXIN 500 MG: 500 CAPSULE ORAL at 20:29

## 2025-04-20 RX ADMIN — PROCHLORPERAZINE MALEATE 10 MG: 10 TABLET, FILM COATED ORAL at 00:15

## 2025-04-20 RX ADMIN — CEPHALEXIN 500 MG: 500 CAPSULE ORAL at 08:38

## 2025-04-20 RX ADMIN — PREGABALIN 25 MG: 25 CAPSULE ORAL at 06:37

## 2025-04-20 RX ADMIN — HYDROXYUREA 1000 MG: 500 CAPSULE ORAL at 20:30

## 2025-04-20 RX ADMIN — TIZANIDINE 2 MG: 2 TABLET ORAL at 06:53

## 2025-04-20 RX ADMIN — HYDROMORPHONE HYDROCHLORIDE 2 MG: 2 INJECTION, SOLUTION INTRAMUSCULAR; INTRAVENOUS; SUBCUTANEOUS at 04:21

## 2025-04-20 RX ADMIN — LIDOCAINE 4%: 4 CREAM TOPICAL at 11:07

## 2025-04-20 RX ADMIN — HYDROMORPHONE HYDROCHLORIDE 2 MG: 2 INJECTION, SOLUTION INTRAMUSCULAR; INTRAVENOUS; SUBCUTANEOUS at 13:57

## 2025-04-20 RX ADMIN — PREGABALIN 25 MG: 25 CAPSULE ORAL at 14:01

## 2025-04-20 RX ADMIN — CAPSAICIN: 0.75 CREAM TOPICAL at 13:25

## 2025-04-20 RX ADMIN — NORETHINDRONE ACETATE 5 MG: 5 TABLET ORAL at 08:38

## 2025-04-20 RX ADMIN — OXYCODONE HYDROCHLORIDE AND ACETAMINOPHEN 1000 MG: 500 TABLET ORAL at 08:38

## 2025-04-20 RX ADMIN — LIDOCAINE 4%: 4 CREAM TOPICAL at 20:29

## 2025-04-20 ASSESSMENT — COGNITIVE AND FUNCTIONAL STATUS - GENERAL
DRESSING REGULAR LOWER BODY CLOTHING: A LITTLE
TOILETING: A LITTLE
WALKING IN HOSPITAL ROOM: A LITTLE
DRESSING REGULAR LOWER BODY CLOTHING: A LITTLE
TOILETING: A LITTLE
DAILY ACTIVITIY SCORE: 21
MOBILITY SCORE: 20
HELP NEEDED FOR BATHING: A LITTLE
STANDING UP FROM CHAIR USING ARMS: A LITTLE
CLIMB 3 TO 5 STEPS WITH RAILING: A LOT
MOVING TO AND FROM BED TO CHAIR: A LITTLE
WALKING IN HOSPITAL ROOM: A LITTLE
CLIMB 3 TO 5 STEPS WITH RAILING: A LOT
STANDING UP FROM CHAIR USING ARMS: A LITTLE
HELP NEEDED FOR BATHING: A LITTLE
MOBILITY SCORE: 19

## 2025-04-20 ASSESSMENT — PAIN - FUNCTIONAL ASSESSMENT

## 2025-04-20 ASSESSMENT — PAIN SCALES - GENERAL
PAINLEVEL_OUTOF10: 8
PAINLEVEL_OUTOF10: 7
PAINLEVEL_OUTOF10: 8
PAINLEVEL_OUTOF10: 7
PAINLEVEL_OUTOF10: 7
PAINLEVEL_OUTOF10: 8
PAINLEVEL_OUTOF10: 8
PAINLEVEL_OUTOF10: 7
PAINLEVEL_OUTOF10: 7
PAINLEVEL_OUTOF10: 8
PAINLEVEL_OUTOF10: 9
PAINLEVEL_OUTOF10: 7
PAINLEVEL_OUTOF10: 8
PAINLEVEL_OUTOF10: 7
PAINLEVEL_OUTOF10: 8
PAINLEVEL_OUTOF10: 8
PAINLEVEL_OUTOF10: 7

## 2025-04-20 NOTE — PROGRESS NOTES
Mary Alice Aragon is a 45 y.o. female on day 2 of admission presenting with Sickle cell anemia with crisis.    Subjective   Having a lot of pain in her right ankle/ heel this morning. She recounts multiple experiences during past hospitalizations where she feels she was not heard or acknowledged about her pain and shares her frustrations. Says the IVP dilaudid is not frequent enough, we discussed that nursing unable to give meds more often than q2h. Discussed trialing PO dilaudid as breakthrough as needed in addition to IVP dilaudid. Asking for toradol and capsaicin and inquiring about blood transfusion. Asked for miralax.     Denies any HA, dizziness/lightheadedness, fevers/chills, cough, congestion, rhinorrhea, sore throat, SOB, CP, palpitations, abdominal pain, n/v/d/c, melena, dysuria, urgency/frequency, hematuria, numbness/tingling, bruising/bleeding or rashes. Denies any sick contacts. ROS otherwise negative.        Objective     Physical Exam  Vitals reviewed.   Constitutional:       Appearance: Normal appearance.   HENT:      Head: Normocephalic and atraumatic.      Nose: Nose normal.      Mouth/Throat:      Mouth: Mucous membranes are moist.      Pharynx: Oropharynx is clear.   Eyes:      Extraocular Movements: Extraocular movements intact.      Pupils: Pupils are equal, round, and reactive to light.   Cardiovascular:      Rate and Rhythm: Normal rate and regular rhythm.      Pulses: Normal pulses.      Heart sounds: Normal heart sounds.   Pulmonary:      Effort: Pulmonary effort is normal.      Breath sounds: Normal breath sounds.   Abdominal:      General: Bowel sounds are normal.      Palpations: Abdomen is soft.   Musculoskeletal:         General: Normal range of motion.      Comments: Right ankle wrapped and tender to touch   Skin:     General: Skin is warm.   Neurological:      General: No focal deficit present.      Mental Status: She is alert and oriented to person, place, and time. Mental status is  "at baseline.   Psychiatric:         Mood and Affect: Mood normal.         Behavior: Behavior normal.       Last Recorded Vitals  Blood pressure 103/66, pulse 93, temperature 36.8 °C (98.2 °F), temperature source Temporal, resp. rate 20, height 1.6 m (5' 3\"), weight 67.5 kg (148 lb 12.8 oz), SpO2 94%.  Intake/Output last 3 Shifts:  I/O last 3 completed shifts:  In: 1602.1 (23.7 mL/kg) [P.O.:360; IV Piggyback:1242.1]  Out: - (0 mL/kg)   Weight: 67.5 kg         Assessment & Plan  Sickle cell pain crisis (Multi)    Sickle cell anemia with crisis    Mary Alice Aragon is a 45 y.o. female with PMHx of HbSS c/b transfusion-related iron overload, cerebral aneurysm, NICHOL/MDD who presented to [facility] with sickle cell pain mostly in her right ankle and heel and leg, admitted for further pain management. Hgb 5.4 (4/20), plan 1u prbc. Discharge pending improvement in pain and hgb.     4/20 updates  - Hgb 5.4 (4/20), plan 1u prbc  - add capsaicin and lido topical   - add dilaudid PO 2mg q6h for breakthrough pain per pt request   - cr improved so add toradol x 2 days     #HbSS w/ pain crisis  - pain located in right ankle and heel  - right ankle and foot xray 4/18 shows known AVN, unchanged  - lysis labs mildly elevated from baseline on admit   - hgb at baseline ~ 7.5- 8 - 6.5 on presentation 4/18, blood transfusion was postponed after discussion with Dr. Saleem as pt has transfusion related iron overload  - hgb 5.4 (4/20)- 1u prbc ordered   - continue home Hydrea 1500mg M/W/F and 1000mg T/Th/Sat/Sun - MCV 94  - continue home folic acid 1mg daily   - care plan reviewed from 12/4/24 - may have subcutaneous/IV dilaudid 2mg q2h PRN   - continue dilaudid IVP 2mg q2h PRN (4/18- current) + 4/20 add PO dilaudid 2mg q6h PRN breakthrough pain per pt request   - 4/20 add toradol 15mg q6h sched IV x 2 days   - 4/20 add capsaicin and lido topical to apply prior to capsaicin   - lido patch daily   - bowel regimen for opioid induced constipation, " benadryl PO for opioid induced pruritus  - continue home tizanidine 2mg q8h PRN and tylenol 650mg PRN   - continue home noc O2 (2-3L) for known nocturnal hypoxemia      # transfusion-mediated iron overload, chronic  - patient did not bring her cadd vicente pump  - continue home deferoxamine 2g daily IV infused over 8 hours for iron chelation      #Hx of ingrown toe nails   - reports having procedure on 4/9 and was supposed to be taking Cephalexin 500 mg BID post- procedure, which she did not start immediately after the procedure and is still on the antibiotic  - continue inpatient to finish course 4/19- 4/23 planned stop)    PPX  - lovenox subcutaneous, encourage SCDs and ambulation as able     DISPO  - FULL CODE, confirmed on admission  - discharge home with resumed noc O2  - NOK: mother Penelope Bain [phone]   - FUVs Dr. Saleem 5/19    I spent 60 minutes in the professional and overall care of this patient.      Virgen Benito, APRN-CNP

## 2025-04-20 NOTE — CARE PLAN
Problem: Fall/Injury  Goal: Not fall by end of shift  4/20/2025 0547 by Rissa Lopez RN  Outcome: Progressing  4/20/2025 0547 by Rissa Lopez RN  Outcome: Progressing  Goal: Be free from injury by end of the shift  4/20/2025 0547 by Rissa Lopez RN  Outcome: Progressing  4/20/2025 0547 by Rissa Lopez RN  Outcome: Progressing  Goal: Verbalize understanding of personal risk factors for fall in the hospital  4/20/2025 0547 by Rissa Lopez RN  Outcome: Progressing  4/20/2025 0547 by Rissa Lopez RN  Outcome: Progressing  Goal: Verbalize understanding of risk factor reduction measures to prevent injury from fall in the home  4/20/2025 0547 by Rissa Lopez RN  Outcome: Progressing  4/20/2025 0547 by Rissa Lopez RN  Outcome: Progressing  Goal: Use assistive devices by end of the shift  4/20/2025 0547 by Rissa Lopez RN  Outcome: Progressing  4/20/2025 0547 by Rissa Lopez RN  Outcome: Progressing  Goal: Pace activities to prevent fatigue by end of the shift  4/20/2025 0547 by Rissa Lopez RN  Outcome: Progressing  4/20/2025 0547 by Rissa Lopez RN  Outcome: Progressing     Problem: Pain  Goal: Takes deep breaths with improved pain control throughout the shift  Outcome: Progressing  Goal: Turns in bed with improved pain control throughout the shift  Outcome: Progressing  Goal: Walks with improved pain control throughout the shift  Outcome: Progressing  Goal: Performs ADL's with improved pain control throughout shift  Outcome: Progressing  Goal: Participates in PT with improved pain control throughout the shift  Outcome: Progressing  Goal: Free from opioid side effects throughout the shift  Outcome: Progressing  Goal: Free from acute confusion related to pain meds throughout the shift  Outcome: Progressing       The clinical goals for the shift include pt will rate pain below 7/10 throughout shift

## 2025-04-21 LAB
ALBUMIN SERPL BCP-MCNC: 3.7 G/DL (ref 3.4–5)
ALP SERPL-CCNC: 60 U/L (ref 33–110)
ALT SERPL W P-5'-P-CCNC: 17 U/L (ref 7–45)
ANION GAP SERPL CALC-SCNC: 10 MMOL/L (ref 10–20)
AST SERPL W P-5'-P-CCNC: 20 U/L (ref 9–39)
BASOPHILS # BLD AUTO: 0.09 X10*3/UL (ref 0–0.1)
BASOPHILS NFR BLD AUTO: 0.7 %
BILIRUB SERPL-MCNC: 2.2 MG/DL (ref 0–1.2)
BUN SERPL-MCNC: 15 MG/DL (ref 6–23)
CALCIUM SERPL-MCNC: 8.7 MG/DL (ref 8.6–10.6)
CHLORIDE SERPL-SCNC: 111 MMOL/L (ref 98–107)
CO2 SERPL-SCNC: 23 MMOL/L (ref 21–32)
CREAT SERPL-MCNC: 1 MG/DL (ref 0.5–1.05)
EGFRCR SERPLBLD CKD-EPI 2021: 72 ML/MIN/1.73M*2
EOSINOPHIL # BLD AUTO: 0.35 X10*3/UL (ref 0–0.7)
EOSINOPHIL NFR BLD AUTO: 2.6 %
ERYTHROCYTE [DISTWIDTH] IN BLOOD BY AUTOMATED COUNT: 16.8 % (ref 11.5–14.5)
GLUCOSE SERPL-MCNC: 95 MG/DL (ref 74–99)
HCT VFR BLD AUTO: 18.6 % (ref 36–46)
HGB BLD-MCNC: 6.5 G/DL (ref 12–16)
HGB RETIC QN: 34 PG (ref 28–38)
IMM GRANULOCYTES # BLD AUTO: 0.08 X10*3/UL (ref 0–0.7)
IMM GRANULOCYTES NFR BLD AUTO: 0.6 % (ref 0–0.9)
IMMATURE RETIC FRACTION: 35 %
LDH SERPL L TO P-CCNC: 236 U/L (ref 84–246)
LYMPHOCYTES # BLD AUTO: 4.5 X10*3/UL (ref 1.2–4.8)
LYMPHOCYTES NFR BLD AUTO: 33.6 %
MCH RBC QN AUTO: 32.2 PG (ref 26–34)
MCHC RBC AUTO-ENTMCNC: 34.9 G/DL (ref 32–36)
MCV RBC AUTO: 92 FL (ref 80–100)
MONOCYTES # BLD AUTO: 0.65 X10*3/UL (ref 0.1–1)
MONOCYTES NFR BLD AUTO: 4.9 %
NEUTROPHILS # BLD AUTO: 7.71 X10*3/UL (ref 1.2–7.7)
NEUTROPHILS NFR BLD AUTO: 57.6 %
NRBC BLD-RTO: 1.6 /100 WBCS (ref 0–0)
PLATELET # BLD AUTO: 396 X10*3/UL (ref 150–450)
POTASSIUM SERPL-SCNC: 4.3 MMOL/L (ref 3.5–5.3)
PROT SERPL-MCNC: 6.3 G/DL (ref 6.4–8.2)
RBC # BLD AUTO: 2.02 X10*6/UL (ref 4–5.2)
RETICS #: 0.18 X10*6/UL (ref 0.02–0.08)
RETICS/RBC NFR AUTO: 8.9 % (ref 0.5–2)
SODIUM SERPL-SCNC: 140 MMOL/L (ref 136–145)
WBC # BLD AUTO: 13.4 X10*3/UL (ref 4.4–11.3)

## 2025-04-21 PROCEDURE — 2500000001 HC RX 250 WO HCPCS SELF ADMINISTERED DRUGS (ALT 637 FOR MEDICARE OP)

## 2025-04-21 PROCEDURE — 85025 COMPLETE CBC W/AUTO DIFF WBC: CPT

## 2025-04-21 PROCEDURE — S4993 CONTRACEPTIVE PILLS FOR BC: HCPCS

## 2025-04-21 PROCEDURE — 2500000004 HC RX 250 GENERAL PHARMACY W/ HCPCS (ALT 636 FOR OP/ED): Mod: TB

## 2025-04-21 PROCEDURE — 2500000004 HC RX 250 GENERAL PHARMACY W/ HCPCS (ALT 636 FOR OP/ED): Mod: JZ

## 2025-04-21 PROCEDURE — 80053 COMPREHEN METABOLIC PANEL: CPT

## 2025-04-21 PROCEDURE — 2500000004 HC RX 250 GENERAL PHARMACY W/ HCPCS (ALT 636 FOR OP/ED): Mod: JZ,TB

## 2025-04-21 PROCEDURE — 2500000002 HC RX 250 W HCPCS SELF ADMINISTERED DRUGS (ALT 637 FOR MEDICARE OP, ALT 636 FOR OP/ED)

## 2025-04-21 PROCEDURE — 83615 LACTATE (LD) (LDH) ENZYME: CPT

## 2025-04-21 PROCEDURE — 2500000005 HC RX 250 GENERAL PHARMACY W/O HCPCS

## 2025-04-21 PROCEDURE — 2500000004 HC RX 250 GENERAL PHARMACY W/ HCPCS (ALT 636 FOR OP/ED): Mod: JZ | Performed by: NURSE PRACTITIONER

## 2025-04-21 PROCEDURE — 2500000005 HC RX 250 GENERAL PHARMACY W/O HCPCS: Performed by: NURSE PRACTITIONER

## 2025-04-21 PROCEDURE — 99233 SBSQ HOSP IP/OBS HIGH 50: CPT

## 2025-04-21 PROCEDURE — 1170000001 HC PRIVATE ONCOLOGY ROOM DAILY

## 2025-04-21 PROCEDURE — S0109 METHADONE ORAL 5MG: HCPCS

## 2025-04-21 PROCEDURE — 85045 AUTOMATED RETICULOCYTE COUNT: CPT

## 2025-04-21 RX ORDER — METHADONE HYDROCHLORIDE 10 MG/1
10 TABLET ORAL EVERY 12 HOURS
Refills: 0 | Status: DISCONTINUED | OUTPATIENT
Start: 2025-04-21 | End: 2025-04-24 | Stop reason: HOSPADM

## 2025-04-21 RX ORDER — TIZANIDINE 2 MG/1
2 TABLET ORAL EVERY 8 HOURS
Status: DISCONTINUED | OUTPATIENT
Start: 2025-04-21 | End: 2025-04-24 | Stop reason: HOSPADM

## 2025-04-21 RX ORDER — HYDROMORPHONE HYDROCHLORIDE 1 MG/ML
0.5 INJECTION, SOLUTION INTRAMUSCULAR; INTRAVENOUS; SUBCUTANEOUS ONCE
Status: COMPLETED | OUTPATIENT
Start: 2025-04-21 | End: 2025-04-21

## 2025-04-21 RX ORDER — AMOXICILLIN 250 MG
2 CAPSULE ORAL 2 TIMES DAILY
Status: DISCONTINUED | OUTPATIENT
Start: 2025-04-21 | End: 2025-04-23

## 2025-04-21 RX ORDER — CALCIUM CARBONATE 200(500)MG
500 TABLET,CHEWABLE ORAL 4 TIMES DAILY PRN
Status: DISCONTINUED | OUTPATIENT
Start: 2025-04-21 | End: 2025-04-24 | Stop reason: HOSPADM

## 2025-04-21 RX ADMIN — HYDROMORPHONE HYDROCHLORIDE 2 MG: 2 INJECTION, SOLUTION INTRAMUSCULAR; INTRAVENOUS; SUBCUTANEOUS at 01:23

## 2025-04-21 RX ADMIN — SENNOSIDES AND DOCUSATE SODIUM 2 TABLET: 50; 8.6 TABLET ORAL at 20:44

## 2025-04-21 RX ADMIN — PREGABALIN 25 MG: 25 CAPSULE ORAL at 06:10

## 2025-04-21 RX ADMIN — HYDROMORPHONE HYDROCHLORIDE 2 MG: 2 INJECTION, SOLUTION INTRAMUSCULAR; INTRAVENOUS; SUBCUTANEOUS at 13:16

## 2025-04-21 RX ADMIN — OXYCODONE HYDROCHLORIDE AND ACETAMINOPHEN 1000 MG: 500 TABLET ORAL at 08:25

## 2025-04-21 RX ADMIN — HYDROMORPHONE HYDROCHLORIDE 2 MG: 2 INJECTION, SOLUTION INTRAMUSCULAR; INTRAVENOUS; SUBCUTANEOUS at 10:35

## 2025-04-21 RX ADMIN — HYDROMORPHONE HYDROCHLORIDE 2 MG: 2 INJECTION, SOLUTION INTRAMUSCULAR; INTRAVENOUS; SUBCUTANEOUS at 16:09

## 2025-04-21 RX ADMIN — HYDROMORPHONE HYDROCHLORIDE 2 MG: 2 INJECTION, SOLUTION INTRAMUSCULAR; INTRAVENOUS; SUBCUTANEOUS at 20:45

## 2025-04-21 RX ADMIN — NORETHINDRONE ACETATE 5 MG: 5 TABLET ORAL at 08:25

## 2025-04-21 RX ADMIN — Medication 5 MG: at 22:29

## 2025-04-21 RX ADMIN — LIDOCAINE 4% 1 PATCH: 40 PATCH TOPICAL at 08:25

## 2025-04-21 RX ADMIN — KETOROLAC TROMETHAMINE 15 MG: 15 INJECTION, SOLUTION INTRAMUSCULAR; INTRAVENOUS at 05:13

## 2025-04-21 RX ADMIN — METHADONE HYDROCHLORIDE 10 MG: 10 TABLET ORAL at 11:46

## 2025-04-21 RX ADMIN — HYDROMORPHONE HYDROCHLORIDE 2 MG: 2 INJECTION, SOLUTION INTRAMUSCULAR; INTRAVENOUS; SUBCUTANEOUS at 08:26

## 2025-04-21 RX ADMIN — CEPHALEXIN 500 MG: 500 CAPSULE ORAL at 08:25

## 2025-04-21 RX ADMIN — METHADONE HYDROCHLORIDE 10 MG: 10 TABLET ORAL at 23:37

## 2025-04-21 RX ADMIN — DOCUSATE SODIUM 100 MG: 100 CAPSULE, LIQUID FILLED ORAL at 08:25

## 2025-04-21 RX ADMIN — ENOXAPARIN SODIUM 40 MG: 100 INJECTION SUBCUTANEOUS at 08:25

## 2025-04-21 RX ADMIN — TIZANIDINE 2 MG: 2 TABLET ORAL at 08:25

## 2025-04-21 RX ADMIN — HYDROMORPHONE HYDROCHLORIDE 2 MG: 2 INJECTION, SOLUTION INTRAMUSCULAR; INTRAVENOUS; SUBCUTANEOUS at 23:48

## 2025-04-21 RX ADMIN — HYDROMORPHONE HYDROCHLORIDE 2 MG: 2 INJECTION, SOLUTION INTRAMUSCULAR; INTRAVENOUS; SUBCUTANEOUS at 06:10

## 2025-04-21 RX ADMIN — CEPHALEXIN 500 MG: 500 CAPSULE ORAL at 20:52

## 2025-04-21 RX ADMIN — POLYETHYLENE GLYCOL 3350 17 G: 17 POWDER, FOR SOLUTION ORAL at 08:25

## 2025-04-21 RX ADMIN — TIZANIDINE 2 MG: 2 TABLET ORAL at 15:23

## 2025-04-21 RX ADMIN — FOLIC ACID 1 MG: 1 TABLET ORAL at 08:25

## 2025-04-21 RX ADMIN — HYDROXYUREA 1500 MG: 500 CAPSULE ORAL at 20:43

## 2025-04-21 RX ADMIN — HYDROMORPHONE HYDROCHLORIDE 2 MG: 2 INJECTION, SOLUTION INTRAMUSCULAR; INTRAVENOUS; SUBCUTANEOUS at 18:31

## 2025-04-21 RX ADMIN — DEFEROXAMINE MESYLATE 2000 MG: 95 INJECTION, POWDER, LYOPHILIZED, FOR SOLUTION INTRAMUSCULAR; INTRAVENOUS; SUBCUTANEOUS at 23:37

## 2025-04-21 RX ADMIN — HYDROMORPHONE HYDROCHLORIDE 0.5 MG: 1 INJECTION, SOLUTION INTRAMUSCULAR; INTRAVENOUS; SUBCUTANEOUS at 22:29

## 2025-04-21 RX ADMIN — PREGABALIN 25 MG: 25 CAPSULE ORAL at 15:23

## 2025-04-21 RX ADMIN — Medication 3 L/MIN: at 21:24

## 2025-04-21 RX ADMIN — CARBOXYMETHYLCELLULOSE SODIUM 1 DROP: 5 SOLUTION/ DROPS OPHTHALMIC at 16:09

## 2025-04-21 RX ADMIN — KETOROLAC TROMETHAMINE 15 MG: 15 INJECTION, SOLUTION INTRAMUSCULAR; INTRAVENOUS at 11:46

## 2025-04-21 RX ADMIN — CAPSAICIN: 0.75 CREAM TOPICAL at 08:41

## 2025-04-21 RX ADMIN — PROCHLORPERAZINE MALEATE 10 MG: 10 TABLET, FILM COATED ORAL at 16:09

## 2025-04-21 RX ADMIN — LIDOCAINE 4%: 4 CREAM TOPICAL at 20:44

## 2025-04-21 RX ADMIN — CALCIUM CARBONATE 500 MG: 500 TABLET, CHEWABLE ORAL at 01:23

## 2025-04-21 RX ADMIN — LIDOCAINE 4%: 4 CREAM TOPICAL at 08:42

## 2025-04-21 RX ADMIN — SENNOSIDES AND DOCUSATE SODIUM 2 TABLET: 50; 8.6 TABLET ORAL at 09:39

## 2025-04-21 RX ADMIN — KETOROLAC TROMETHAMINE 15 MG: 15 INJECTION, SOLUTION INTRAMUSCULAR; INTRAVENOUS at 18:31

## 2025-04-21 RX ADMIN — HYDROMORPHONE HYDROCHLORIDE 2 MG: 2 INJECTION, SOLUTION INTRAMUSCULAR; INTRAVENOUS; SUBCUTANEOUS at 03:45

## 2025-04-21 ASSESSMENT — PAIN SCALES - GENERAL
PAINLEVEL_OUTOF10: 7
PAINLEVEL_OUTOF10: 7
PAINLEVEL_OUTOF10: 6
PAINLEVEL_OUTOF10: 4
PAINLEVEL_OUTOF10: 7
PAINLEVEL_OUTOF10: 9
PAINLEVEL_OUTOF10: 9
PAINLEVEL_OUTOF10: 7
PAINLEVEL_OUTOF10: 10 - WORST POSSIBLE PAIN
PAINLEVEL_OUTOF10: 9
PAINLEVEL_OUTOF10: 7
PAINLEVEL_OUTOF10: 7
PAINLEVEL_OUTOF10: 8
PAINLEVEL_OUTOF10: 9

## 2025-04-21 ASSESSMENT — PAIN - FUNCTIONAL ASSESSMENT

## 2025-04-21 ASSESSMENT — COGNITIVE AND FUNCTIONAL STATUS - GENERAL
CLIMB 3 TO 5 STEPS WITH RAILING: A LOT
DAILY ACTIVITIY SCORE: 21
STANDING UP FROM CHAIR USING ARMS: A LITTLE
HELP NEEDED FOR BATHING: A LITTLE
TOILETING: A LITTLE
DRESSING REGULAR LOWER BODY CLOTHING: A LITTLE
WALKING IN HOSPITAL ROOM: A LITTLE
MOBILITY SCORE: 20

## 2025-04-21 ASSESSMENT — ACTIVITIES OF DAILY LIVING (ADL): LACK_OF_TRANSPORTATION: NO

## 2025-04-21 NOTE — CARE PLAN
The patient's goals for the shift include      The clinical goals for the shift include Pt will report a decrease in pain by end of shift 4/21 0700      Problem: Fall/Injury  Goal: Not fall by end of shift  Outcome: Progressing  Goal: Be free from injury by end of the shift  Outcome: Progressing  Goal: Verbalize understanding of personal risk factors for fall in the hospital  Outcome: Progressing  Goal: Verbalize understanding of risk factor reduction measures to prevent injury from fall in the home  Outcome: Progressing  Goal: Use assistive devices by end of the shift  Outcome: Progressing  Goal: Pace activities to prevent fatigue by end of the shift  Outcome: Progressing     Problem: Pain  Goal: Takes deep breaths with improved pain control throughout the shift  Outcome: Progressing  Goal: Turns in bed with improved pain control throughout the shift  Outcome: Progressing  Goal: Walks with improved pain control throughout the shift  Outcome: Progressing  Goal: Performs ADL's with improved pain control throughout shift  Outcome: Progressing  Goal: Free from opioid side effects throughout the shift  Outcome: Progressing  Goal: Free from acute confusion related to pain meds throughout the shift  Outcome: Progressing

## 2025-04-21 NOTE — PROGRESS NOTES
"Mary Alice Aragon is a 45 y.o. female on day 3 of admission presenting with Sickle cell anemia with crisis.    Subjective   Patient seen at bedside. States that she is starting to feel a little bit better today. States that she continues to experience some pain in her right foot pain. Also complains of some bilateral eye drainage that she usually experiences when she gets admitted to the hospital. States that she has been extremely constipated and has not had a BM in about 5 days. Discussed plan to add eye drops for dry eyes as well as some senna for constipation. Discussed rotating pain medications but patient asked for one more day of IV only before rotating.        Objective     Physical Exam  HENT:      Head: Normocephalic.      Nose: Nose normal.      Mouth/Throat:      Mouth: Mucous membranes are moist.   Eyes:      Pupils: Pupils are equal, round, and reactive to light.   Cardiovascular:      Rate and Rhythm: Normal rate.      Pulses: Normal pulses.   Pulmonary:      Effort: Pulmonary effort is normal.   Abdominal:      Palpations: Abdomen is soft.   Musculoskeletal:         General: Normal range of motion.      Cervical back: Normal range of motion.   Skin:     General: Skin is warm.      Capillary Refill: Capillary refill takes less than 2 seconds.   Neurological:      General: No focal deficit present.      Mental Status: She is alert.   Psychiatric:         Mood and Affect: Mood normal.         Behavior: Behavior normal.         Last Recorded Vitals  Blood pressure 109/67, pulse 93, temperature 36.1 °C (97 °F), temperature source Temporal, resp. rate 16, height 1.6 m (5' 3\"), weight 67.5 kg (148 lb 12.8 oz), SpO2 98%.  Intake/Output last 3 Shifts:  I/O last 3 completed shifts:  In: 2792.1 (41.4 mL/kg) [P.O.:600; Blood:350; IV Piggyback:1842.1]  Out: 1 (0 mL/kg) [Urine:1 (0 mL/kg/hr)]  Weight: 67.5 kg     Relevant Results               This patient has a central line   Reason for the central line remaining " today? Dialysis/Hemapheresis    Assessment & Plan  Sickle cell pain crisis (Multi)    Sickle cell anemia with crisis    Mary Alice Aragon is a 45 y.o. female with PMHx of HbSS c/b transfusion-related iron overload, cerebral aneurysm, NICHOL/MDD who presented to [facility] with sickle cell pain mostly in her right ankle and heel and leg, admitted for further pain management. Hgb 5.4 (4/20) s/p 1u prbc. Discharge pending improvement in pain and hgb.        #HbSS w/ pain crisis  - pain located in right ankle and heel  - right ankle and foot xray 4/18 shows known AVN, unchanged  - lysis labs mildly elevated from baseline on admit   - hgb at baseline ~ 7.5- 8 - 6.5 on presentation 4/18, blood transfusion was postponed after discussion with Dr. Saleem as pt has transfusion related iron overload  - hgb 5.4 (4/20)- 1u PRBC  - continue home Hydrea 1500mg M/W/F and 1000mg T/Th/Sat/Sun - MCV 94  - continue home folic acid 1mg daily   - care plan reviewed from 12/4/24 - may have subcutaneous/IV dilaudid 2mg q2h PRN   - continue dilaudid IVP 2mg q2h PRN (4/18- current). Plan to start rotating with PO 4/22  - 4/20 add toradol 15mg q6h sched IV x 2 days   - Last Ketamine infusion 4/1  - Continue home Methadone 10mg BID  - 4/20 add capsaicin and lido topical to apply prior to capsaicin   - lido patch daily   - bowel regimen for opioid induced constipation, benadryl PO for opioid induced pruritus  - continue home tizanidine 2mg q8h PRN and tylenol 650mg PRN   - continue home noc O2 (2-3L) for known nocturnal hypoxemia      # transfusion-mediated iron overload, chronic  - patient did not bring her cadd vicente pump  - continue home deferoxamine 2g daily IV infused over 8 hours for iron chelation      #Hx of ingrown toe nails   - reports having procedure on 4/9 and was supposed to be taking Cephalexin 500 mg BID post- procedure, which she did not start immediately after the procedure and is still on the antibiotic  - continue inpatient to finish  course 4/19- 4/23 planned stop)    #Dry eyes/drainage   - Encourage warm compresses   - lubricating eye drops q6 hours PRN      PPX  - lovenox subcutaneous, encourage SCDs and ambulation as able     DISPO  - FULL CODE, confirmed on admission  - discharge home with resumed noc O2  - NOK: mother Penelope Bain [phone]   - FUVs Dr. Saleem 5/19      I spent 60 minutes in the professional and overall care of this patient.      MICHELLE Liang-CNP  Patient discussed with Dr. Penaloza

## 2025-04-21 NOTE — PROGRESS NOTES
04/21/25 1500   Discharge Planning   Living Arrangements Children   Support Systems Children;Family members;Friends/neighbors   Type of Residence Private residence   Number of Stairs to Enter Residence 6   Number of Stairs Within Residence 0   Do you have animals or pets at home? Yes   Type of Animals or Pets 2 dogs and a cat   Who is requesting discharge planning? Provider   Home or Post Acute Services None   Expected Discharge Disposition Home   Does the patient need discharge transport arranged? Yes   RoundTrip coordination needed? Yes   Financial Resource Strain   How hard is it for you to pay for the very basics like food, housing, medical care, and heating? Not very   Housing Stability   In the last 12 months, was there a time when you were not able to pay the mortgage or rent on time? N   At any time in the past 12 months, were you homeless or living in a shelter (including now)? N   Transportation Needs   In the past 12 months, has lack of transportation kept you from medical appointments or from getting medications? no   In the past 12 months, has lack of transportation kept you from meetings, work, or from getting things needed for daily living? No   Intensity of Service   Intensity of Service >30 min     Care Transitions Note  04/21/25  Plan per Medical/Surgical Team: sickle cell anemia with crisis  Status: Inpatient  Payor Source:  [facility] Complete  Discharge disposition: Home, likely NN  Expected date of discharge: few days, 04/23?  Barriers: pain  PCP / Primary Oncologist: follows with Dr. Saleem outpatient     Met with pt at bedside, introduced self and role. Demographics and insurance verifed with patient. Pt lives at home with her two kids ages 15 and 14, both in high school. They live in a one story house with 4-6 GABRIEL. Pt usually has no issue getting around at home, able to complete all ADLs. No DME or O2. No recent falls, feels safe at home. States she has some social support from  "her best friend and her best friend's sister, but most of her family support is \"touch and go.\" She primarily needs help with driving her kids to school when she is in crisis. Pt endorses prioritizing her kids over her own health, which leads to increased pain for her at times. Pt has gone to the [facility] in [facility] past which she finds helpful. Pt shares she cares a lot for her pets and loves their company. Pt denies any discharge needs at this time, SW available as needs arise.   Esme Alejandre, MSW, LSW   "

## 2025-04-21 NOTE — CONSULTS
"Nutrition Initial Assessment:   Nutrition Assessment    Reason for Assessment: Admission nursing screening    Patient is a 45 y.o. female presenting with Sickle Cell Anemia with pain crisis, admitted for further pain management, discharge pending improvement in pain and hgb.     PMH: transfusion-related iron overload, cerebral aneurysm, NICHOL/MDD, osteonecrosis     Nutrition History:  Food and Nutrient History: Attempted to meet with pt 3x today, however, pt meeting with other services. Per [facility], pt is ordering >3 meals per day, flow sheets indicate % intake (of the meals charted). Constipation per flowsheets.  Vitamin/Herbal Supplement Use: ascorbic acid, MVI per home med list       Anthropometrics:  Height: 160 cm (5' 3\")   Weight: 67.5 kg (148 lb 12.8 oz)   BMI (Calculated): 26.37  IBW/kg (Dietitian Calculated): 52.3 kg  Percent of IBW: 129 %      Weight History:   04/19/25 67.5 kg (148 lb 12.8 oz)   02/24/25 66.2 kg (146 lb)   12/03/24 68 kg (149 lb 14.6 oz)   12/02/24 68 kg (150 lb)   11/24/24 68 kg (150 lb)   11/21/24 68 kg (150 lb)   11/14/24 66.7 kg (147 lb)   11/05/24 66.2 kg (146 lb)   10/21/24 64.8 kg (142 lb 13.7 oz)   10/02/24 64.4 kg (142 lb)   08/28/24 62.9 kg (138 lb 11.2 oz)   07/05/24 68 kg (150 lb)   06/26/24 64.8 kg (142 lb 14.4 oz)   04/23/24 65.7 kg (144 lb 13.5 oz)   04/22/24 64.9 kg (143 lb 1.3 oz)       Weight Change %:  Weight History / % Weight Change: No significant weight changes per chart.  Significant Weight Loss: No    Nutrition Focused Physical Exam Findings:    Subcutaneous Fat Loss:   Defer Subcutaneous Fat Loss Assessment: Defer all  Defer All Reason: unable to meet with pt at this time  Muscle Wasting:  Defer Muscle Wasting Assessment: Defer all  Defer All Reason: unable to meet with pt at this time  Edema:  Edema: none  Physical Findings:  Skin: Negative    Nutrition Significant Labs:  Lab Results   Component Value Date    WBC 13.4 (H) 04/21/2025    HGB 6.5 (LL) " 04/21/2025    HCT 18.6 (L) 04/21/2025    MCV 92 04/21/2025     04/21/2025     BMP Trend:   Results from last 7 days   Lab Units 04/21/25  0513 04/20/25  0658 04/19/25  0405 04/18/25  1207   GLUCOSE mg/dL 95 93 97 109*   CALCIUM mg/dL 8.7 8.4* 9.0 8.8   SODIUM mmol/L 140 140 140 138   POTASSIUM mmol/L 4.3 3.8 4.3 4.2   CO2 mmol/L 23 23 25 23   CHLORIDE mmol/L 111* 110* 109* 109*   BUN mg/dL 15 14 17 19   CREATININE mg/dL 1.00 0.98 1.04 1.15*   Vit D:   Lab Results   Component Value Date    VITD25 11 (A) 12/28/2022   Vit B12:   Lab Results   Component Value Date    EIYXWGVF69 405 12/28/2022       Nutrition Specific Medications:  ascorbic acid, 1,000 mg, oral, Daily  cephalexin, 500 mg, oral, q12h AMARA  deferoxamine, 2,000 mg, intravenous, Daily  enoxaparin, 40 mg, subcutaneous, q24h  folic acid, 1 mg, oral, Daily  hydroxyurea, 1,000 mg, oral, Every Sun/Tues/Thur/Sat  hydroxyurea, 1,500 mg, oral, Once per day on Monday Wednesday Friday  methadone, 10 mg, oral, q12h  polyethylene glycol, 17 g, oral, Daily  sennosides-docusate sodium, 2 tablet, oral, BID    PRN medications  PRN medications: acetaminophen, calcium carbonate, diclofenac sodium, diphenhydrAMINE, HYDROmorphone, lubricating eye drops, pramoxine, prochlorperazine, traZODone      I/O:    ;      Dietary Orders (From admission, onward)       Start     Ordered    04/18/25 1819  May Participate in Room Service  ( ROOM SERVICE MAY PARTICIPATE)  Once        Question:  .  Answer:  Yes    04/18/25 1818 04/18/25 1727  Adult diet Regular  Diet effective now        Question:  Diet type  Answer:  Regular    04/18/25 1726                     Estimated Needs:   Total Energy Estimated Needs in 24 hours (kCal): 1900 kCal  Method for Estimating Needs: 28kcal/kg x CBW (67.5kg)  Total Protein Estimated Needs in 24 Hours (g): 88 g  Method for Estimating 24 Hour Protein Needs: 1.3g/kg x CBW (67.5kg)  Method for Estimating 24 Hour Fluid Needs: 1mL/kcal or per team         Nutrition Diagnosis   Malnutrition Diagnosis  Patient has Malnutrition Diagnosis:  (unable to fully assess at this time)    Nutrition Diagnosis  Patient has Nutrition Diagnosis: Yes  Nutrition Diagnosis 1: Increased nutrient needs  Related to (1): increased metabolic demand  As Evidenced by (1): sickle cell anemia  Additional Assessment Information (1): Unable to fully assess nutrition status at this time. Given increased nutrient needs, pt may be at risk for malnutrition. Pt may benefit from ONS for added calories and protein, will order Ensure Plus 1x/day.       Nutrition Interventions/Recommendations   Nutrition prescription for oral nutrition    Nutrition Recommendations:  1. Continue Regular Diet, only as tolerated    ---Given increased nutrient needs, pt would likely benefit from ONS while in-house. Will order Ensure Plus (350kcal, 13g protein) 1x/day for added nutrition       2. Adjust bowel regimen as needed given report of constipation       3. Please check vitamin D level and supplement as needed       4. Please obtain daily weights, standing preferred, if feasible    Nutrition Interventions/Goals:   Meals and Snacks: General healthful diet  Goal: at least 75% of 3 meals per day  Medical Food Supplement: Commercial beverage medical food supplement therapy  Goal: 100% of 1 Ensure Plus daily       Nutrition Monitoring and Evaluation   Food/Nutrient Related History Monitoring  Monitoring and Evaluation Plan: Estimated Energy Intake  Estimated Energy Intake: Energy intake greater or equal to 75% of estimated energy needs    Anthropometric Measurements  Monitoring and Evaluation Plan: Body weight  Body Weight: Body weight - Maintain stable weight              Goal Status: New goal(s) identified    Time Spent (min): 60 minutes

## 2025-04-22 ENCOUNTER — PHARMACY VISIT (OUTPATIENT)
Dept: PHARMACY | Facility: CLINIC | Age: 45
End: 2025-04-22
Payer: COMMERCIAL

## 2025-04-22 ENCOUNTER — HOME INFUSION (OUTPATIENT)
Dept: INFUSION THERAPY | Age: 45
End: 2025-04-22
Payer: COMMERCIAL

## 2025-04-22 DIAGNOSIS — D57.00 SICKLE CELL DISEASE WITH CRISIS (MULTI): ICD-10-CM

## 2025-04-22 LAB
ALBUMIN SERPL BCP-MCNC: 3.7 G/DL (ref 3.4–5)
ALP SERPL-CCNC: 57 U/L (ref 33–110)
ALT SERPL W P-5'-P-CCNC: 21 U/L (ref 7–45)
ANION GAP SERPL CALC-SCNC: 11 MMOL/L (ref 10–20)
AST SERPL W P-5'-P-CCNC: 23 U/L (ref 9–39)
BASOPHILS # BLD AUTO: 0.05 X10*3/UL (ref 0–0.1)
BASOPHILS NFR BLD AUTO: 0.4 %
BILIRUB SERPL-MCNC: 1.8 MG/DL (ref 0–1.2)
BUN SERPL-MCNC: 14 MG/DL (ref 6–23)
CALCIUM SERPL-MCNC: 8.4 MG/DL (ref 8.6–10.6)
CHLORIDE SERPL-SCNC: 111 MMOL/L (ref 98–107)
CO2 SERPL-SCNC: 22 MMOL/L (ref 21–32)
CREAT SERPL-MCNC: 0.97 MG/DL (ref 0.5–1.05)
EGFRCR SERPLBLD CKD-EPI 2021: 75 ML/MIN/1.73M*2
EOSINOPHIL # BLD AUTO: 0.24 X10*3/UL (ref 0–0.7)
EOSINOPHIL NFR BLD AUTO: 2.1 %
ERYTHROCYTE [DISTWIDTH] IN BLOOD BY AUTOMATED COUNT: 17.5 % (ref 11.5–14.5)
GLUCOSE SERPL-MCNC: 102 MG/DL (ref 74–99)
HCT VFR BLD AUTO: 19.3 % (ref 36–46)
HGB BLD-MCNC: 6.6 G/DL (ref 12–16)
HGB RETIC QN: 36 PG (ref 28–38)
IMM GRANULOCYTES # BLD AUTO: 0.04 X10*3/UL (ref 0–0.7)
IMM GRANULOCYTES NFR BLD AUTO: 0.3 % (ref 0–0.9)
IMMATURE RETIC FRACTION: 41.7 %
LDH SERPL L TO P-CCNC: 230 U/L (ref 84–246)
LYMPHOCYTES # BLD AUTO: 4.27 X10*3/UL (ref 1.2–4.8)
LYMPHOCYTES NFR BLD AUTO: 36.6 %
MCH RBC QN AUTO: 32.2 PG (ref 26–34)
MCHC RBC AUTO-ENTMCNC: 34.2 G/DL (ref 32–36)
MCV RBC AUTO: 94 FL (ref 80–100)
MONOCYTES # BLD AUTO: 0.54 X10*3/UL (ref 0.1–1)
MONOCYTES NFR BLD AUTO: 4.6 %
NEUTROPHILS # BLD AUTO: 6.54 X10*3/UL (ref 1.2–7.7)
NEUTROPHILS NFR BLD AUTO: 56 %
NRBC BLD-RTO: 3.3 /100 WBCS (ref 0–0)
PLATELET # BLD AUTO: 413 X10*3/UL (ref 150–450)
POTASSIUM SERPL-SCNC: 3.9 MMOL/L (ref 3.5–5.3)
PROT SERPL-MCNC: 6.7 G/DL (ref 6.4–8.2)
RBC # BLD AUTO: 2.05 X10*6/UL (ref 4–5.2)
RETICS #: 0.2 X10*6/UL (ref 0.02–0.08)
RETICS/RBC NFR AUTO: 9.9 % (ref 0.5–2)
SODIUM SERPL-SCNC: 140 MMOL/L (ref 136–145)
WBC # BLD AUTO: 11.7 X10*3/UL (ref 4.4–11.3)

## 2025-04-22 PROCEDURE — 2500000005 HC RX 250 GENERAL PHARMACY W/O HCPCS

## 2025-04-22 PROCEDURE — 2500000005 HC RX 250 GENERAL PHARMACY W/O HCPCS: Performed by: NURSE PRACTITIONER

## 2025-04-22 PROCEDURE — 2500000002 HC RX 250 W HCPCS SELF ADMINISTERED DRUGS (ALT 637 FOR MEDICARE OP, ALT 636 FOR OP/ED)

## 2025-04-22 PROCEDURE — 2500000001 HC RX 250 WO HCPCS SELF ADMINISTERED DRUGS (ALT 637 FOR MEDICARE OP): Performed by: NURSE PRACTITIONER

## 2025-04-22 PROCEDURE — 2500000004 HC RX 250 GENERAL PHARMACY W/ HCPCS (ALT 636 FOR OP/ED): Mod: JZ

## 2025-04-22 PROCEDURE — 85025 COMPLETE CBC W/AUTO DIFF WBC: CPT

## 2025-04-22 PROCEDURE — 99233 SBSQ HOSP IP/OBS HIGH 50: CPT | Performed by: NURSE PRACTITIONER

## 2025-04-22 PROCEDURE — 2500000001 HC RX 250 WO HCPCS SELF ADMINISTERED DRUGS (ALT 637 FOR MEDICARE OP)

## 2025-04-22 PROCEDURE — S4993 CONTRACEPTIVE PILLS FOR BC: HCPCS

## 2025-04-22 PROCEDURE — 1170000001 HC PRIVATE ONCOLOGY ROOM DAILY

## 2025-04-22 PROCEDURE — 2500000004 HC RX 250 GENERAL PHARMACY W/ HCPCS (ALT 636 FOR OP/ED): Mod: JZ,TB

## 2025-04-22 PROCEDURE — 2500000004 HC RX 250 GENERAL PHARMACY W/ HCPCS (ALT 636 FOR OP/ED): Mod: JZ,TB | Performed by: NURSE PRACTITIONER

## 2025-04-22 PROCEDURE — 84075 ASSAY ALKALINE PHOSPHATASE: CPT

## 2025-04-22 PROCEDURE — 85045 AUTOMATED RETICULOCYTE COUNT: CPT

## 2025-04-22 PROCEDURE — S0109 METHADONE ORAL 5MG: HCPCS

## 2025-04-22 PROCEDURE — 83615 LACTATE (LD) (LDH) ENZYME: CPT

## 2025-04-22 PROCEDURE — 2500000004 HC RX 250 GENERAL PHARMACY W/ HCPCS (ALT 636 FOR OP/ED): Performed by: NURSE PRACTITIONER

## 2025-04-22 PROCEDURE — RXMED WILLOW AMBULATORY MEDICATION CHARGE

## 2025-04-22 RX ORDER — HYDROMORPHONE HYDROCHLORIDE 1 MG/ML
1 INJECTION, SOLUTION INTRAMUSCULAR; INTRAVENOUS; SUBCUTANEOUS ONCE
Status: COMPLETED | OUTPATIENT
Start: 2025-04-22 | End: 2025-04-22

## 2025-04-22 RX ORDER — METHADONE HYDROCHLORIDE 10 MG/1
10 TABLET ORAL EVERY 12 HOURS
Qty: 60 TABLET | Refills: 0 | Status: SHIPPED | OUTPATIENT
Start: 2025-04-22

## 2025-04-22 RX ORDER — IBUPROFEN 600 MG/1
600 TABLET ORAL EVERY 6 HOURS PRN
Qty: 30 TABLET | Refills: 0 | Status: SHIPPED | OUTPATIENT
Start: 2025-04-22 | End: 2025-05-02

## 2025-04-22 RX ORDER — LACTULOSE 10 G/15ML
20 SOLUTION ORAL DAILY
Status: DISCONTINUED | OUTPATIENT
Start: 2025-04-22 | End: 2025-04-24 | Stop reason: HOSPADM

## 2025-04-22 RX ORDER — HYDROXYUREA 500 MG/1
CAPSULE ORAL
Qty: 68 CAPSULE | Refills: 2 | Status: SHIPPED | OUTPATIENT
Start: 2025-04-22

## 2025-04-22 RX ORDER — PREGABALIN 25 MG/1
25 CAPSULE ORAL 3 TIMES DAILY
Qty: 90 CAPSULE | Refills: 0 | Status: SHIPPED | OUTPATIENT
Start: 2025-04-22 | End: 2025-05-22

## 2025-04-22 RX ORDER — TIZANIDINE 2 MG/1
2 TABLET ORAL EVERY 8 HOURS PRN
Qty: 90 TABLET | Refills: 0 | Status: SHIPPED | OUTPATIENT
Start: 2025-04-22 | End: 2025-05-22

## 2025-04-22 RX ADMIN — LACTULOSE 20 G: 20 SOLUTION ORAL at 13:09

## 2025-04-22 RX ADMIN — TIZANIDINE 2 MG: 2 TABLET ORAL at 16:44

## 2025-04-22 RX ADMIN — TIZANIDINE 2 MG: 2 TABLET ORAL at 00:35

## 2025-04-22 RX ADMIN — DEFEROXAMINE MESYLATE 2000 MG: 95 INJECTION, POWDER, LYOPHILIZED, FOR SOLUTION INTRAMUSCULAR; INTRAVENOUS; SUBCUTANEOUS at 20:44

## 2025-04-22 RX ADMIN — PREGABALIN 25 MG: 25 CAPSULE ORAL at 16:44

## 2025-04-22 RX ADMIN — HYDROMORPHONE HYDROCHLORIDE 2 MG: 2 INJECTION, SOLUTION INTRAMUSCULAR; INTRAVENOUS; SUBCUTANEOUS at 04:42

## 2025-04-22 RX ADMIN — CAPSAICIN: 0.75 CREAM TOPICAL at 20:49

## 2025-04-22 RX ADMIN — METHADONE HYDROCHLORIDE 10 MG: 10 TABLET ORAL at 23:28

## 2025-04-22 RX ADMIN — HYDROMORPHONE HYDROCHLORIDE 2 MG: 2 INJECTION, SOLUTION INTRAMUSCULAR; INTRAVENOUS; SUBCUTANEOUS at 11:28

## 2025-04-22 RX ADMIN — FOLIC ACID 1 MG: 1 TABLET ORAL at 08:45

## 2025-04-22 RX ADMIN — PREGABALIN 25 MG: 25 CAPSULE ORAL at 00:35

## 2025-04-22 RX ADMIN — Medication 3 L/MIN: at 21:00

## 2025-04-22 RX ADMIN — CALCIUM CARBONATE 500 MG: 500 TABLET, CHEWABLE ORAL at 20:44

## 2025-04-22 RX ADMIN — LIDOCAINE 4% 1 PATCH: 40 PATCH TOPICAL at 08:45

## 2025-04-22 RX ADMIN — SENNOSIDES AND DOCUSATE SODIUM 2 TABLET: 50; 8.6 TABLET ORAL at 20:43

## 2025-04-22 RX ADMIN — LIDOCAINE 4%: 4 CREAM TOPICAL at 09:12

## 2025-04-22 RX ADMIN — PREGABALIN 25 MG: 25 CAPSULE ORAL at 08:44

## 2025-04-22 RX ADMIN — KETOROLAC TROMETHAMINE 15 MG: 15 INJECTION, SOLUTION INTRAMUSCULAR; INTRAVENOUS at 00:35

## 2025-04-22 RX ADMIN — HYDROMORPHONE HYDROCHLORIDE 2 MG: 2 INJECTION, SOLUTION INTRAMUSCULAR; INTRAVENOUS; SUBCUTANEOUS at 18:37

## 2025-04-22 RX ADMIN — HYDROMORPHONE HYDROCHLORIDE 2 MG: 2 INJECTION, SOLUTION INTRAMUSCULAR; INTRAVENOUS; SUBCUTANEOUS at 20:44

## 2025-04-22 RX ADMIN — NORETHINDRONE ACETATE 5 MG: 5 TABLET ORAL at 08:45

## 2025-04-22 RX ADMIN — TIZANIDINE 2 MG: 2 TABLET ORAL at 08:44

## 2025-04-22 RX ADMIN — HYDROMORPHONE HYDROCHLORIDE 2 MG: 2 INJECTION, SOLUTION INTRAMUSCULAR; INTRAVENOUS; SUBCUTANEOUS at 22:48

## 2025-04-22 RX ADMIN — CALCIUM CARBONATE 500 MG: 500 TABLET, CHEWABLE ORAL at 02:00

## 2025-04-22 RX ADMIN — CARBOXYMETHYLCELLULOSE SODIUM 1 DROP: 5 SOLUTION/ DROPS OPHTHALMIC at 16:05

## 2025-04-22 RX ADMIN — ENOXAPARIN SODIUM 40 MG: 100 INJECTION SUBCUTANEOUS at 08:44

## 2025-04-22 RX ADMIN — OXYCODONE HYDROCHLORIDE AND ACETAMINOPHEN 1000 MG: 500 TABLET ORAL at 08:44

## 2025-04-22 RX ADMIN — HYDROMORPHONE HYDROCHLORIDE 2 MG: 2 INJECTION, SOLUTION INTRAMUSCULAR; INTRAVENOUS; SUBCUTANEOUS at 01:59

## 2025-04-22 RX ADMIN — HYDROMORPHONE HYDROCHLORIDE 2 MG: 2 INJECTION, SOLUTION INTRAMUSCULAR; INTRAVENOUS; SUBCUTANEOUS at 16:43

## 2025-04-22 RX ADMIN — LIDOCAINE 4%: 4 CREAM TOPICAL at 20:43

## 2025-04-22 RX ADMIN — POLYETHYLENE GLYCOL 3350 17 G: 17 POWDER, FOR SOLUTION ORAL at 08:45

## 2025-04-22 RX ADMIN — HYDROMORPHONE HYDROCHLORIDE 2 MG: 2 INJECTION, SOLUTION INTRAMUSCULAR; INTRAVENOUS; SUBCUTANEOUS at 06:40

## 2025-04-22 RX ADMIN — PROCHLORPERAZINE MALEATE 10 MG: 10 TABLET, FILM COATED ORAL at 08:45

## 2025-04-22 RX ADMIN — HYDROMORPHONE HYDROCHLORIDE 2 MG: 2 INJECTION, SOLUTION INTRAMUSCULAR; INTRAVENOUS; SUBCUTANEOUS at 08:44

## 2025-04-22 RX ADMIN — SENNOSIDES AND DOCUSATE SODIUM 2 TABLET: 50; 8.6 TABLET ORAL at 08:45

## 2025-04-22 RX ADMIN — HYDROMORPHONE HYDROCHLORIDE 2 MG: 2 INJECTION, SOLUTION INTRAMUSCULAR; INTRAVENOUS; SUBCUTANEOUS at 14:45

## 2025-04-22 RX ADMIN — KETOROLAC TROMETHAMINE 15 MG: 15 INJECTION, SOLUTION INTRAMUSCULAR; INTRAVENOUS at 06:40

## 2025-04-22 RX ADMIN — HYDROXYUREA 1000 MG: 500 CAPSULE ORAL at 20:43

## 2025-04-22 RX ADMIN — HYDROMORPHONE HYDROCHLORIDE 1 MG: 1 INJECTION, SOLUTION INTRAMUSCULAR; INTRAVENOUS; SUBCUTANEOUS at 13:04

## 2025-04-22 RX ADMIN — Medication 5 MG: at 22:48

## 2025-04-22 RX ADMIN — METHADONE HYDROCHLORIDE 10 MG: 10 TABLET ORAL at 11:29

## 2025-04-22 RX ADMIN — CAPSAICIN: 0.75 CREAM TOPICAL at 08:44

## 2025-04-22 ASSESSMENT — PAIN SCALES - GENERAL
PAINLEVEL_OUTOF10: 8
PAINLEVEL_OUTOF10: 8
PAINLEVEL_OUTOF10: 7
PAINLEVEL_OUTOF10: 7
PAINLEVEL_OUTOF10: 10 - WORST POSSIBLE PAIN
PAINLEVEL_OUTOF10: 8
PAINLEVEL_OUTOF10: 10 - WORST POSSIBLE PAIN
PAINLEVEL_OUTOF10: 7
PAINLEVEL_OUTOF10: 9
PAINLEVEL_OUTOF10: 10 - WORST POSSIBLE PAIN

## 2025-04-22 ASSESSMENT — COGNITIVE AND FUNCTIONAL STATUS - GENERAL
MOVING TO AND FROM BED TO CHAIR: A LITTLE
MOBILITY SCORE: 20
WALKING IN HOSPITAL ROOM: A LITTLE
MOBILITY SCORE: 20
DRESSING REGULAR LOWER BODY CLOTHING: A LITTLE
TOILETING: A LITTLE
HELP NEEDED FOR BATHING: A LITTLE
DAILY ACTIVITIY SCORE: 20
DRESSING REGULAR UPPER BODY CLOTHING: A LITTLE
MOVING TO AND FROM BED TO CHAIR: A LITTLE
TOILETING: A LITTLE
DAILY ACTIVITIY SCORE: 20
CLIMB 3 TO 5 STEPS WITH RAILING: A LITTLE
STANDING UP FROM CHAIR USING ARMS: A LITTLE
WALKING IN HOSPITAL ROOM: A LITTLE
DRESSING REGULAR LOWER BODY CLOTHING: A LITTLE
CLIMB 3 TO 5 STEPS WITH RAILING: A LITTLE
HELP NEEDED FOR BATHING: A LITTLE
STANDING UP FROM CHAIR USING ARMS: A LITTLE
DRESSING REGULAR UPPER BODY CLOTHING: A LITTLE

## 2025-04-22 ASSESSMENT — PAIN - FUNCTIONAL ASSESSMENT
PAIN_FUNCTIONAL_ASSESSMENT: 0-10
PAIN_FUNCTIONAL_ASSESSMENT: UNABLE TO SELF-REPORT
PAIN_FUNCTIONAL_ASSESSMENT: 0-10
PAIN_FUNCTIONAL_ASSESSMENT: 0-10

## 2025-04-22 NOTE — PROGRESS NOTES
"Mary Alice Aragon is a 45 y.o. female on day 4 of admission presenting with Sickle cell anemia with crisis.    Subjective   Seen this AM at the bedside. Pt reported improved pain and was initially planned for dc today however she reported worsening R foot pain and difficulty ambulating. Pt to have PT/OT eval today and likely dc tomorrow. She denies any fevers/chills, SOB, or CP.    Objective     Physical Exam  HENT:      Head: Normocephalic.      Nose: Nose normal.      Mouth/Throat:      Mouth: Mucous membranes are moist.   Eyes:      Pupils: Pupils are equal, round, and reactive to light.   Cardiovascular:      Rate and Rhythm: Normal rate.      Pulses: Normal pulses.   Pulmonary:      Effort: Pulmonary effort is normal.   Abdominal:      Palpations: Abdomen is soft.   Musculoskeletal:         General: Normal range of motion.      Cervical back: Normal range of motion.   Skin:     General: Skin is warm.      Capillary Refill: Capillary refill takes less than 2 seconds.   Neurological:      General: No focal deficit present.      Mental Status: She is alert.   Psychiatric:         Mood and Affect: Mood normal.         Behavior: Behavior normal.       Last Recorded Vitals  Blood pressure 92/53, pulse 88, temperature 36.2 °C (97.2 °F), temperature source Temporal, resp. rate 16, height 1.6 m (5' 3\"), weight 67.5 kg (148 lb 12.8 oz), SpO2 99%.  Intake/Output last 3 Shifts:  I/O last 3 completed shifts:  In: 720 (10.7 mL/kg) [P.O.:120; IV Piggyback:600]  Out: - (0 mL/kg)   Weight: 67.5 kg     Results for orders placed or performed during the hospital encounter of 04/18/25 (from the past 24 hours)   CBC and Auto Differential   Result Value Ref Range    WBC 11.7 (H) 4.4 - 11.3 x10*3/uL    nRBC 3.3 (H) 0.0 - 0.0 /100 WBCs    RBC 2.05 (L) 4.00 - 5.20 x10*6/uL    Hemoglobin 6.6 (L) 12.0 - 16.0 g/dL    Hematocrit 19.3 (L) 36.0 - 46.0 %    MCV 94 80 - 100 fL    MCH 32.2 26.0 - 34.0 pg    MCHC 34.2 32.0 - 36.0 g/dL    RDW 17.5 " (H) 11.5 - 14.5 %    Platelets 413 150 - 450 x10*3/uL    Neutrophils % 56.0 40.0 - 80.0 %    Immature Granulocytes %, Automated 0.3 0.0 - 0.9 %    Lymphocytes % 36.6 13.0 - 44.0 %    Monocytes % 4.6 2.0 - 10.0 %    Eosinophils % 2.1 0.0 - 6.0 %    Basophils % 0.4 0.0 - 2.0 %    Neutrophils Absolute 6.54 1.20 - 7.70 x10*3/uL    Immature Granulocytes Absolute, Automated 0.04 0.00 - 0.70 x10*3/uL    Lymphocytes Absolute 4.27 1.20 - 4.80 x10*3/uL    Monocytes Absolute 0.54 0.10 - 1.00 x10*3/uL    Eosinophils Absolute 0.24 0.00 - 0.70 x10*3/uL    Basophils Absolute 0.05 0.00 - 0.10 x10*3/uL   Comprehensive Metabolic Panel   Result Value Ref Range    Glucose 102 (H) 74 - 99 mg/dL    Sodium 140 136 - 145 mmol/L    Potassium 3.9 3.5 - 5.3 mmol/L    Chloride 111 (H) 98 - 107 mmol/L    Bicarbonate 22 21 - 32 mmol/L    Anion Gap 11 10 - 20 mmol/L    Urea Nitrogen 14 6 - 23 mg/dL    Creatinine 0.97 0.50 - 1.05 mg/dL    eGFR 75 >60 mL/min/1.73m*2    Calcium 8.4 (L) 8.6 - 10.6 mg/dL    Albumin 3.7 3.4 - 5.0 g/dL    Alkaline Phosphatase 57 33 - 110 U/L    Total Protein 6.7 6.4 - 8.2 g/dL    AST 23 9 - 39 U/L    Bilirubin, Total 1.8 (H) 0.0 - 1.2 mg/dL    ALT 21 7 - 45 U/L   Reticulocytes   Result Value Ref Range    Retic % 9.9 (H) 0.5 - 2.0 %    Retic Absolute 0.202 (H) 0.018 - 0.083 x10*6/uL    Reticulocyte Hemoglobin 36 28 - 38 pg    Immature Retic fraction 41.7 (H) <=16.0 %   Lactate dehydrogenase   Result Value Ref Range     84 - 246 U/L     *Note: Due to a large number of results and/or encounters for the requested time period, some results have not been displayed. A complete set of results can be found in Results Review.       Scheduled medications  Scheduled Medications[1]  Continuous medications  Continuous Medications[2]  PRN medications  PRN Medications[3]      Assessment & Plan  Sickle cell pain crisis (Multi)    Sickle cell anemia with crisis  Mary Alice Aragon is a 45 y.o. female with PMHx of HbSS c/b  transfusion-related iron overload, cerebral aneurysm, NICHOL/MDD who presented to [facility] with sickle cell pain mostly in her right ankle and heel and leg, admitted for further pain management. Started on IV dilaudid. Hgb 5.4 (4/20) s/p 1u prbc. Plan to start rotating pain regimen 4/22. PT/OT consulted 4/22 d/t ongoing R ankle/foot pain and inability to bear weight. Ankle brace ordered 4/22. Discharge pending improvement in pain, likely tomorrow 4/23.     Updates 4/22:  - PT/OT eval for R ankle pain/difficulty bearing weight  - Ankle brace ordered for R ankle    # HbSS w/ pain crisis  - Pain located in right ankle and heel  - Right ankle and foot xray 4/18 shows known AVN, unchanged  - Hgb ~7.5-8; Hgb 6.5 on presentation 4/18, blood transfusion was postponed after discussion with Dr. Saleem as pt has transfusion related iron overload  - Hgb 5.4 (4/20)- s/p 1u PRBC--> Hgb 6.6 (4/22); will monitor  - Tbili BL ~2; Tbili 1.8 (4/22)  - LDH BL ~200;  (4/22)  - Care plan reviewed from 12/4/24 - may have subcutaneous/IV dilaudid 2mg q2h PRN   - Continue dilaudid IVP 2mg q2h PRN (4/18- current). Plan to start rotating today 4/22  - 4/20 add toradol 15mg q6h sched IV x 2 days   - Last Ketamine infusion 4/1  - Continue home Hydrea 1500mg M/W/F and 1000mg T/Th/Sat/Sun - MCV 94  - Continue home Methadone 10mg BID  - Continue home Tizanidine 2mg q8hrs scheduled, Tylenol 650mg q6hrs PRN mild pain, and Folic Acid 1mg daily  - 4/20 add capsaicin and lido topical to apply prior to capsaicin   - Added Lidocaine patch daily  - Bowel regimen for opioid-induced constipation with DocuSenna 2tabs BID and Miralax daily  - PO Benadryl PRN for opioid-induced pruritus, PO Compazine PRN for opioid-induced nausea  - Continue home noc O2 (2-3L) for known nocturnal hypoxemia   - PT/OT consulted 4/22 d/t ongoing R ankle/foot pain, R ankle brace ordered 4/22     # Transfusion-mediated iron overload, chronic  - Patient did not bring her cadd vicente  pump  - Continue home deferoxamine 2g daily IV infused over 8 hours for iron chelation      # Hx of ingrown toe nails   - reports having procedure on 4/9 and was supposed to be taking Cephalexin 500 mg BID post- procedure, which she did not start immediately after the procedure and is still on the antibiotic  - Now completed inpatient to finish course 4/19- 4/22    # Dry eyes/drainage   - Encourage warm compresses   - Lubricating eye drops q6 hours PRN      DVT prophy: Lovenox SQ, encourage SCDs, and ambulation as able     DISPO:  - FULL CODE, confirmed on admission  - Discharge pending improvement in pain, likely tomorrow 4/23  - NOK: mother Penelope Bain [phone]   - Line Draw + Sickle Cell FUV 5/19    I spent >60 minutes on the professional and overall care of this patient    Assessment and plan as above discussed with attending physician Dr. Pastor Mendosa, APRN-CNP       [1] ascorbic acid, 1,000 mg, oral, Daily  capsicum, , Topical, BID  deferoxamine, 2,000 mg, intravenous, Daily  enoxaparin, 40 mg, subcutaneous, q24h  folic acid, 1 mg, oral, Daily  hydroxyurea, 1,000 mg, oral, Every Sun/Tues/Thur/Sat  hydroxyurea, 1,500 mg, oral, Once per day on Monday Wednesday Friday  lactulose, 20 g, oral, Daily  lidocaine, , Topical, BID  lidocaine, 1 patch, transdermal, Daily  melatonin, 5 mg, oral, Nightly  methadone, 10 mg, oral, q12h  norethindrone, 5 mg, oral, Daily  oxygen, 3 L/min, inhalation, Nightly  polyethylene glycol, 17 g, oral, Daily  pregabalin, 25 mg, oral, TID  sennosides-docusate sodium, 2 tablet, oral, BID  tiZANidine, 2 mg, oral, q8h  [2]    [3] PRN medications: acetaminophen, calcium carbonate, diclofenac sodium, diphenhydrAMINE, HYDROmorphone, lubricating eye drops, pramoxine, prochlorperazine, traZODone

## 2025-04-22 NOTE — CARE PLAN
Problem: Fall/Injury  Goal: Not fall by end of shift  Outcome: Progressing  Goal: Be free from injury by end of the shift  Outcome: Progressing  Goal: Verbalize understanding of personal risk factors for fall in the hospital  Outcome: Progressing  Goal: Verbalize understanding of risk factor reduction measures to prevent injury from fall in the home  Outcome: Progressing  Goal: Use assistive devices by end of the shift  Outcome: Progressing  Goal: Pace activities to prevent fatigue by end of the shift  Outcome: Progressing     Problem: Pain  Goal: Takes deep breaths with improved pain control throughout the shift  Outcome: Progressing  Goal: Turns in bed with improved pain control throughout the shift  Outcome: Progressing  Goal: Walks with improved pain control throughout the shift  Outcome: Progressing  Goal: Performs ADL's with improved pain control throughout shift  Outcome: Progressing  Goal: Participates in PT with improved pain control throughout the shift  Outcome: Progressing  Goal: Free from opioid side effects throughout the shift  Outcome: Progressing  Goal: Free from acute confusion related to pain meds throughout the shift  Outcome: Progressing   The patient's goals for the shift include      The clinical goals for the shift include pt will have decreased pain throughout shift on 4/21/25 at 0700    Patient did not have decreased pain throughout shift. Patient received one dose of breakthrough and was having difficulty controlling spasms. Patient was pleasant during shift. No nausea or vomiting, continuing to treat pain.

## 2025-04-22 NOTE — ASSESSMENT & PLAN NOTE
Mary Alice Aragon is a 45 y.o. female with PMHx of HbSS c/b transfusion-related iron overload, cerebral aneurysm, NICHOL/MDD who presented to [facility] with sickle cell pain mostly in her right ankle and heel and leg, admitted for further pain management. Started on IV dilaudid. Hgb 5.4 (4/20) s/p 1u prbc. Plan to start rotating pain regimen 4/22. PT/OT consulted 4/22 d/t ongoing R ankle/foot pain and inability to bear weight. Ankle brace ordered 4/22. Discharge pending improvement in pain, likely tomorrow 4/23.     Updates 4/22:  - PT/OT eval for R ankle pain/difficulty bearing weight  - Ankle brace ordered for R ankle    # HbSS w/ pain crisis  - Pain located in right ankle and heel  - Right ankle and foot xray 4/18 shows known AVN, unchanged  - Hgb ~7.5-8; Hgb 6.5 on presentation 4/18, blood transfusion was postponed after discussion with Dr. Saleem as pt has transfusion related iron overload  - Hgb 5.4 (4/20)- s/p 1u PRBC--> Hgb 6.6 (4/22); will monitor  - Tbili BL ~2; Tbili 1.8 (4/22)  - LDH BL ~200;  (4/22)  - Care plan reviewed from 12/4/24 - may have subcutaneous/IV dilaudid 2mg q2h PRN   - Continue dilaudid IVP 2mg q2h PRN (4/18- current). Plan to start rotating today 4/22  - 4/20 add toradol 15mg q6h sched IV x 2 days   - Last Ketamine infusion 4/1  - Continue home Hydrea 1500mg M/W/F and 1000mg T/Th/Sat/Sun - MCV 94  - Continue home Methadone 10mg BID  - Continue home Tizanidine 2mg q8hrs scheduled, Tylenol 650mg q6hrs PRN mild pain, and Folic Acid 1mg daily  - 4/20 add capsaicin and lido topical to apply prior to capsaicin   - Added Lidocaine patch daily  - Bowel regimen for opioid-induced constipation with DocuSenna 2tabs BID and Miralax daily  - PO Benadryl PRN for opioid-induced pruritus, PO Compazine PRN for opioid-induced nausea  - Continue home noc O2 (2-3L) for known nocturnal hypoxemia   - PT/OT consulted 4/22 d/t ongoing R ankle/foot pain, R ankle brace ordered 4/22     # Transfusion-mediated iron  overload, chronic  - Patient did not bring her cadd vicente pump  - Continue home deferoxamine 2g daily IV infused over 8 hours for iron chelation      # Hx of ingrown toe nails   - reports having procedure on 4/9 and was supposed to be taking Cephalexin 500 mg BID post- procedure, which she did not start immediately after the procedure and is still on the antibiotic  - Now completed inpatient to finish course 4/19- 4/22    # Dry eyes/drainage   - Encourage warm compresses   - Lubricating eye drops q6 hours PRN      DVT prophy: Lovenox SQ, encourage SCDs, and ambulation as able     DISPO:  - FULL CODE, confirmed on admission  - Discharge pending improvement in pain, likely tomorrow 4/23  - NOK: mother Penelope Bain [phone]   - Line Draw + Sickle Cell FUV 5/19

## 2025-04-22 NOTE — CARE PLAN
The patient's goals for the shift include      The clinical goals for the shift include Pt will remain HDS and safe and free from injury through shift 4/22/25 @ 1900      Problem: Fall/Injury  Goal: Not fall by end of shift  Outcome: Progressing  Goal: Be free from injury by end of the shift  Outcome: Progressing  Goal: Verbalize understanding of personal risk factors for fall in the hospital  Outcome: Progressing  Goal: Verbalize understanding of risk factor reduction measures to prevent injury from fall in the home  Outcome: Progressing  Goal: Use assistive devices by end of the shift  Outcome: Progressing  Goal: Pace activities to prevent fatigue by end of the shift  Outcome: Progressing     Problem: Pain  Goal: Takes deep breaths with improved pain control throughout the shift  Outcome: Progressing  Goal: Turns in bed with improved pain control throughout the shift  Outcome: Progressing  Goal: Walks with improved pain control throughout the shift  Outcome: Progressing  Goal: Performs ADL's with improved pain control throughout shift  Outcome: Progressing  Goal: Participates in PT with improved pain control throughout the shift  Outcome: Progressing  Goal: Free from opioid side effects throughout the shift  Outcome: Progressing  Goal: Free from acute confusion related to pain meds throughout the shift  Outcome: Progressing

## 2025-04-22 NOTE — PROGRESS NOTES
04/21/25 1500   Discharge Planning   Living Arrangements Children   Support Systems Children;Family members;Friends/neighbors   Type of Residence Private residence   Number of Stairs to Enter Residence 6   Number of Stairs Within Residence 0   Do you have animals or pets at home? Yes   Type of Animals or Pets 2 dogs and a cat   Who is requesting discharge planning? Provider   Home or Post Acute Services None   Expected Discharge Disposition Home   Does the patient need discharge transport arranged? Yes   RoundTrip coordination needed? Yes   Financial Resource Strain   How hard is it for you to pay for the very basics like food, housing, medical care, and heating? Not very   Housing Stability   In the last 12 months, was there a time when you were not able to pay the mortgage or rent on time? N   At any time in the past 12 months, were you homeless or living in a shelter (including now)? N   Transportation Needs   In the past 12 months, has lack of transportation kept you from medical appointments or from getting medications? no   In the past 12 months, has lack of transportation kept you from meetings, work, or from getting things needed for daily living? No   Intensity of Service   Intensity of Service >30 min     Care Transitions Note  04/21/25  Plan per Medical/Surgical Team: sickle cell anemia with crisis  Status: Inpatient  Payor Source:  [facility] Complete  Discharge disposition: Home, likely NN  Expected date of discharge: few days, 04/23?  Barriers: pain  PCP / Primary Oncologist: follows with Dr. Saleem outpatient     Met with pt at bedside, introduced self and role. Demographics and insurance verifed with patient. Pt lives at home with her two kids ages 15 and 14, both in high school. They live in a one story house with 4-6 GABRIEL. Pt usually has no issue getting around at home, able to complete all ADLs. No DME or O2. No recent falls, feels safe at home. States she has some social support from  "her best friend and her best friend's sister, but most of her family support is \"touch and go.\" She primarily needs help with driving her kids to school when she is in crisis. Pt endorses prioritizing her kids over her own health, which leads to increased pain for her at times. Pt has gone to [facility] in [facility] past which she finds helpful. Pt shares she cares a lot for her pets and loves their company. Pt denies any discharge needs at this time, SW available as needs arise.   HAYLEY Wetzel, HAMZAH     04/22/25  Patient active with [employer] for O2. Sent referral to [facility] for face mask vs NC for O2, as NC dries out her face. Awaiting response from [facility]. Pt also requesting home OT, team to place orders. SW following. Plan for dc today.     [facility] will send a face mask to pt home later today. Made provider aware.   HAYLEY Wetzel, TOSHAW   "

## 2025-04-23 ENCOUNTER — HOME HEALTH ADMISSION (OUTPATIENT)
Dept: HOME HEALTH SERVICES | Facility: HOME HEALTH | Age: 45
End: 2025-04-23
Payer: COMMERCIAL

## 2025-04-23 LAB
ABO GROUP (TYPE) IN BLOOD: NORMAL
ALBUMIN SERPL BCP-MCNC: 3.8 G/DL (ref 3.4–5)
ALP SERPL-CCNC: 54 U/L (ref 33–110)
ALT SERPL W P-5'-P-CCNC: 30 U/L (ref 7–45)
ANION GAP SERPL CALC-SCNC: 10 MMOL/L (ref 10–20)
ANTIBODY SCREEN: NORMAL
AST SERPL W P-5'-P-CCNC: 28 U/L (ref 9–39)
BASOPHILS # BLD AUTO: 0.05 X10*3/UL (ref 0–0.1)
BASOPHILS NFR BLD AUTO: 0.5 %
BILIRUB SERPL-MCNC: 1.5 MG/DL (ref 0–1.2)
BUN SERPL-MCNC: 12 MG/DL (ref 6–23)
CALCIUM SERPL-MCNC: 8.1 MG/DL (ref 8.6–10.6)
CHLORIDE SERPL-SCNC: 111 MMOL/L (ref 98–107)
CO2 SERPL-SCNC: 22 MMOL/L (ref 21–32)
CREAT SERPL-MCNC: 0.91 MG/DL (ref 0.5–1.05)
EGFRCR SERPLBLD CKD-EPI 2021: 80 ML/MIN/1.73M*2
EOSINOPHIL # BLD AUTO: 0.2 X10*3/UL (ref 0–0.7)
EOSINOPHIL NFR BLD AUTO: 2.1 %
ERYTHROCYTE [DISTWIDTH] IN BLOOD BY AUTOMATED COUNT: 17.8 % (ref 11.5–14.5)
GLUCOSE SERPL-MCNC: 88 MG/DL (ref 74–99)
HCT VFR BLD AUTO: 18.8 % (ref 36–46)
HGB BLD-MCNC: 6.2 G/DL (ref 12–16)
HGB RETIC QN: 37 PG (ref 28–38)
IMM GRANULOCYTES # BLD AUTO: 0.04 X10*3/UL (ref 0–0.7)
IMM GRANULOCYTES NFR BLD AUTO: 0.4 % (ref 0–0.9)
IMMATURE RETIC FRACTION: 28.1 %
LDH SERPL L TO P-CCNC: 236 U/L (ref 84–246)
LYMPHOCYTES # BLD AUTO: 3.82 X10*3/UL (ref 1.2–4.8)
LYMPHOCYTES NFR BLD AUTO: 39.3 %
MCH RBC QN AUTO: 31.5 PG (ref 26–34)
MCHC RBC AUTO-ENTMCNC: 33 G/DL (ref 32–36)
MCV RBC AUTO: 95 FL (ref 80–100)
MONOCYTES # BLD AUTO: 0.47 X10*3/UL (ref 0.1–1)
MONOCYTES NFR BLD AUTO: 4.8 %
NEUTROPHILS # BLD AUTO: 5.15 X10*3/UL (ref 1.2–7.7)
NEUTROPHILS NFR BLD AUTO: 52.9 %
NRBC BLD-RTO: 2.9 /100 WBCS (ref 0–0)
PLATELET # BLD AUTO: 370 X10*3/UL (ref 150–450)
POTASSIUM SERPL-SCNC: 4 MMOL/L (ref 3.5–5.3)
PROT SERPL-MCNC: 6.5 G/DL (ref 6.4–8.2)
RBC # BLD AUTO: 1.97 X10*6/UL (ref 4–5.2)
RETICS #: 0.18 X10*6/UL (ref 0.02–0.08)
RETICS/RBC NFR AUTO: 9 % (ref 0.5–2)
RH FACTOR (ANTIGEN D): NORMAL
SODIUM SERPL-SCNC: 139 MMOL/L (ref 136–145)
WBC # BLD AUTO: 9.7 X10*3/UL (ref 4.4–11.3)

## 2025-04-23 PROCEDURE — 1170000001 HC PRIVATE ONCOLOGY ROOM DAILY

## 2025-04-23 PROCEDURE — 2500000005 HC RX 250 GENERAL PHARMACY W/O HCPCS: Performed by: NURSE PRACTITIONER

## 2025-04-23 PROCEDURE — 2500000002 HC RX 250 W HCPCS SELF ADMINISTERED DRUGS (ALT 637 FOR MEDICARE OP, ALT 636 FOR OP/ED)

## 2025-04-23 PROCEDURE — 2500000001 HC RX 250 WO HCPCS SELF ADMINISTERED DRUGS (ALT 637 FOR MEDICARE OP): Performed by: NURSE PRACTITIONER

## 2025-04-23 PROCEDURE — 83615 LACTATE (LD) (LDH) ENZYME: CPT

## 2025-04-23 PROCEDURE — 97535 SELF CARE MNGMENT TRAINING: CPT | Mod: GO

## 2025-04-23 PROCEDURE — 99233 SBSQ HOSP IP/OBS HIGH 50: CPT | Performed by: NURSE PRACTITIONER

## 2025-04-23 PROCEDURE — 97165 OT EVAL LOW COMPLEX 30 MIN: CPT | Mod: GO

## 2025-04-23 PROCEDURE — 97161 PT EVAL LOW COMPLEX 20 MIN: CPT | Mod: GP | Performed by: PHYSICAL THERAPIST

## 2025-04-23 PROCEDURE — 2500000004 HC RX 250 GENERAL PHARMACY W/ HCPCS (ALT 636 FOR OP/ED): Mod: JZ,TB | Performed by: NURSE PRACTITIONER

## 2025-04-23 PROCEDURE — 85045 AUTOMATED RETICULOCYTE COUNT: CPT

## 2025-04-23 PROCEDURE — 2500000004 HC RX 250 GENERAL PHARMACY W/ HCPCS (ALT 636 FOR OP/ED): Mod: JZ,TB

## 2025-04-23 PROCEDURE — 86901 BLOOD TYPING SEROLOGIC RH(D): CPT | Performed by: NURSE PRACTITIONER

## 2025-04-23 PROCEDURE — 84075 ASSAY ALKALINE PHOSPHATASE: CPT

## 2025-04-23 PROCEDURE — 2500000001 HC RX 250 WO HCPCS SELF ADMINISTERED DRUGS (ALT 637 FOR MEDICARE OP)

## 2025-04-23 PROCEDURE — 2500000004 HC RX 250 GENERAL PHARMACY W/ HCPCS (ALT 636 FOR OP/ED)

## 2025-04-23 PROCEDURE — S4993 CONTRACEPTIVE PILLS FOR BC: HCPCS

## 2025-04-23 PROCEDURE — 85025 COMPLETE CBC W/AUTO DIFF WBC: CPT

## 2025-04-23 PROCEDURE — 2500000004 HC RX 250 GENERAL PHARMACY W/ HCPCS (ALT 636 FOR OP/ED): Performed by: NURSE PRACTITIONER

## 2025-04-23 PROCEDURE — S0109 METHADONE ORAL 5MG: HCPCS

## 2025-04-23 PROCEDURE — 2500000005 HC RX 250 GENERAL PHARMACY W/O HCPCS

## 2025-04-23 RX ORDER — AMOXICILLIN 250 MG
3 CAPSULE ORAL 2 TIMES DAILY
Status: DISCONTINUED | OUTPATIENT
Start: 2025-04-23 | End: 2025-04-24 | Stop reason: HOSPADM

## 2025-04-23 RX ORDER — KETOROLAC TROMETHAMINE 30 MG/ML
30 INJECTION, SOLUTION INTRAMUSCULAR; INTRAVENOUS ONCE
Status: COMPLETED | OUTPATIENT
Start: 2025-04-23 | End: 2025-04-23

## 2025-04-23 RX ADMIN — LIDOCAINE 4%: 4 CREAM TOPICAL at 08:28

## 2025-04-23 RX ADMIN — ENOXAPARIN SODIUM 40 MG: 100 INJECTION SUBCUTANEOUS at 08:27

## 2025-04-23 RX ADMIN — METHADONE HYDROCHLORIDE 10 MG: 10 TABLET ORAL at 11:01

## 2025-04-23 RX ADMIN — TIZANIDINE 2 MG: 2 TABLET ORAL at 08:27

## 2025-04-23 RX ADMIN — HYDROMORPHONE HYDROCHLORIDE 6 MG: 2 TABLET ORAL at 16:18

## 2025-04-23 RX ADMIN — HYDROMORPHONE HYDROCHLORIDE 2 MG: 2 INJECTION, SOLUTION INTRAMUSCULAR; INTRAVENOUS; SUBCUTANEOUS at 03:16

## 2025-04-23 RX ADMIN — HYDROMORPHONE HYDROCHLORIDE 2 MG: 2 INJECTION, SOLUTION INTRAMUSCULAR; INTRAVENOUS; SUBCUTANEOUS at 14:13

## 2025-04-23 RX ADMIN — SENNOSIDES AND DOCUSATE SODIUM 3 TABLET: 50; 8.6 TABLET ORAL at 08:28

## 2025-04-23 RX ADMIN — HYDROMORPHONE HYDROCHLORIDE 2 MG: 2 INJECTION, SOLUTION INTRAMUSCULAR; INTRAVENOUS; SUBCUTANEOUS at 05:33

## 2025-04-23 RX ADMIN — FOLIC ACID 1 MG: 1 TABLET ORAL at 08:28

## 2025-04-23 RX ADMIN — HYDROMORPHONE HYDROCHLORIDE 6 MG: 2 TABLET ORAL at 11:01

## 2025-04-23 RX ADMIN — LIDOCAINE 4%: 4 CREAM TOPICAL at 21:27

## 2025-04-23 RX ADMIN — LIDOCAINE 4% 1 PATCH: 40 PATCH TOPICAL at 08:27

## 2025-04-23 RX ADMIN — NORETHINDRONE ACETATE 5 MG: 5 TABLET ORAL at 08:27

## 2025-04-23 RX ADMIN — PREGABALIN 25 MG: 25 CAPSULE ORAL at 16:18

## 2025-04-23 RX ADMIN — CAPSAICIN: 0.75 CREAM TOPICAL at 08:28

## 2025-04-23 RX ADMIN — HYDROXYUREA 1500 MG: 500 CAPSULE ORAL at 21:27

## 2025-04-23 RX ADMIN — Medication 5 MG: at 21:26

## 2025-04-23 RX ADMIN — HYDROMORPHONE HYDROCHLORIDE 2 MG: 2 INJECTION, SOLUTION INTRAMUSCULAR; INTRAVENOUS; SUBCUTANEOUS at 01:06

## 2025-04-23 RX ADMIN — KETOROLAC TROMETHAMINE 30 MG: 30 INJECTION, SOLUTION INTRAMUSCULAR; INTRAVENOUS at 12:37

## 2025-04-23 RX ADMIN — POLYETHYLENE GLYCOL 3350 17 G: 17 POWDER, FOR SOLUTION ORAL at 08:28

## 2025-04-23 RX ADMIN — PREGABALIN 25 MG: 25 CAPSULE ORAL at 08:27

## 2025-04-23 RX ADMIN — CARBOXYMETHYLCELLULOSE SODIUM 1 DROP: 5 SOLUTION/ DROPS OPHTHALMIC at 14:21

## 2025-04-23 RX ADMIN — LACTULOSE 20 G: 20 SOLUTION ORAL at 13:28

## 2025-04-23 RX ADMIN — HYDROMORPHONE HYDROCHLORIDE 2 MG: 2 INJECTION, SOLUTION INTRAMUSCULAR; INTRAVENOUS; SUBCUTANEOUS at 08:27

## 2025-04-23 RX ADMIN — HYDROMORPHONE HYDROCHLORIDE 2 MG: 2 INJECTION, SOLUTION INTRAMUSCULAR; INTRAVENOUS; SUBCUTANEOUS at 19:21

## 2025-04-23 RX ADMIN — HYDROMORPHONE HYDROCHLORIDE 6 MG: 2 TABLET ORAL at 21:26

## 2025-04-23 RX ADMIN — TIZANIDINE 2 MG: 2 TABLET ORAL at 16:18

## 2025-04-23 RX ADMIN — TIZANIDINE 2 MG: 2 TABLET ORAL at 01:06

## 2025-04-23 RX ADMIN — DEFEROXAMINE MESYLATE 2000 MG: 95 INJECTION, POWDER, LYOPHILIZED, FOR SOLUTION INTRAMUSCULAR; INTRAVENOUS; SUBCUTANEOUS at 21:26

## 2025-04-23 RX ADMIN — PREGABALIN 25 MG: 25 CAPSULE ORAL at 01:06

## 2025-04-23 RX ADMIN — CAPSAICIN: 0.75 CREAM TOPICAL at 21:26

## 2025-04-23 RX ADMIN — OXYCODONE HYDROCHLORIDE AND ACETAMINOPHEN 1000 MG: 500 TABLET ORAL at 08:28

## 2025-04-23 ASSESSMENT — PAIN SCALES - GENERAL
PAINLEVEL_OUTOF10: 10 - WORST POSSIBLE PAIN
PAINLEVEL_OUTOF10: 9
PAINLEVEL_OUTOF10: 8
PAINLEVEL_OUTOF10: 10 - WORST POSSIBLE PAIN
PAINLEVEL_OUTOF10: 8
PAINLEVEL_OUTOF10: 8
PAINLEVEL_OUTOF10: 10 - WORST POSSIBLE PAIN
PAINLEVEL_OUTOF10: 8
PAINLEVEL_OUTOF10: 7
PAINLEVEL_OUTOF10: 8
PAINLEVEL_OUTOF10: 8
PAINLEVEL_OUTOF10: 10 - WORST POSSIBLE PAIN
PAINLEVEL_OUTOF10: 6
PAINLEVEL_OUTOF10: 9
PAINLEVEL_OUTOF10: 7
PAINLEVEL_OUTOF10: 7

## 2025-04-23 ASSESSMENT — COGNITIVE AND FUNCTIONAL STATUS - GENERAL
WALKING IN HOSPITAL ROOM: A LITTLE
STANDING UP FROM CHAIR USING ARMS: A LITTLE
TOILETING: A LITTLE
TOILETING: A LITTLE
CLIMB 3 TO 5 STEPS WITH RAILING: A LOT
TOILETING: A LITTLE
WALKING IN HOSPITAL ROOM: A LOT
DRESSING REGULAR LOWER BODY CLOTHING: A LITTLE
WALKING IN HOSPITAL ROOM: A LOT
DRESSING REGULAR UPPER BODY CLOTHING: A LITTLE
DRESSING REGULAR LOWER BODY CLOTHING: A LITTLE
MOVING TO AND FROM BED TO CHAIR: A LITTLE
MOBILITY SCORE: 16
DAILY ACTIVITIY SCORE: 20
HELP NEEDED FOR BATHING: A LITTLE
DRESSING REGULAR UPPER BODY CLOTHING: A LITTLE
MOBILITY SCORE: 18
DRESSING REGULAR LOWER BODY CLOTHING: A LITTLE
STANDING UP FROM CHAIR USING ARMS: A LITTLE
TURNING FROM BACK TO SIDE WHILE IN FLAT BAD: A LITTLE
HELP NEEDED FOR BATHING: A LITTLE
PERSONAL GROOMING: A LITTLE
DAILY ACTIVITIY SCORE: 19
MOBILITY SCORE: 18
DRESSING REGULAR UPPER BODY CLOTHING: A LITTLE
CLIMB 3 TO 5 STEPS WITH RAILING: A LOT
MOVING TO AND FROM BED TO CHAIR: A LITTLE
PERSONAL GROOMING: A LITTLE
CLIMB 3 TO 5 STEPS WITH RAILING: TOTAL
HELP NEEDED FOR BATHING: A LITTLE
MOVING FROM LYING ON BACK TO SITTING ON SIDE OF FLAT BED WITH BEDRAILS: A LITTLE
DAILY ACTIVITIY SCORE: 19
STANDING UP FROM CHAIR USING ARMS: A LITTLE
MOVING TO AND FROM BED TO CHAIR: A LITTLE

## 2025-04-23 ASSESSMENT — PAIN - FUNCTIONAL ASSESSMENT

## 2025-04-23 ASSESSMENT — ACTIVITIES OF DAILY LIVING (ADL)
HOME_MANAGEMENT_TIME_ENTRY: 14
BATHING_ASSISTANCE: MINIMAL
ADL_ASSISTANCE: INDEPENDENT
ADL_ASSISTANCE: INDEPENDENT

## 2025-04-23 NOTE — PROGRESS NOTES
Occupational Therapy    Evaluation and Treatment    Patient Name: Mary Alice Aragon  MRN: [id number]  Today's Date: 4/23/2025  Room: [address] [place] [address]  Time Calculation  Start Time: 0839  Stop Time: 0908  Time Calculation (min): 29 min    Assessment  IP OT Assessment  Prognosis: Good  Barriers to Discharge Home: No anticipated barriers  Evaluation/Treatment Tolerance: Patient tolerated treatment well  End of Session Communication: Bedside nurse  End of Session Patient Position: Up in chair, Alarm off, not on at start of session  Plan:  Inpatient Plan  Treatment Interventions: ADL retraining, Compensatory technique education  OT Frequency: 2 times per week  OT Discharge Recommendations: Low intensity level of continued care  Equipment Recommended upon Discharge:  (shower chair)  OT Recommended Transfer Status: Assist of 1  OT - OK to Discharge: Yes  OT Assessment  OT Assessment Results: Decreased ADL status, Decreased functional mobility, Decreased IADLs  Prognosis: Good  Evaluation/Treatment Tolerance: Patient tolerated treatment well    Subjective   Current Problem:  1. Sickle cell anemia with crisis        2. Sickle cell disease with crisis (Multi)  pregabalin (Lyrica) 25 mg capsule    tiZANidine (Zanaflex) 2 mg tablet      3. Idiopathic peripheral neuropathy  pregabalin (Lyrica) 25 mg capsule      4. Sickle cell pain crisis (Multi)  ibuprofen 600 mg tablet      5. Hemoglobin SS disease without crisis (Multi)  oxygen (O2) gas therapy      6. Acute right ankle pain  Referral to Orthopedics and Sports Medicine    Referral to [facility]        General:  Reason for Referral: who presents with sickle cell pain mostly in her right ankle and heel and leg, admitted for further pain management. Right ankle and foot xray 4/18 shows known AVN, unchanged  Past Medical History Relevant to Rehab: PMHx of HbSS c/b transfusion-related iron overload, cerebral aneurysm, NICHOL/MDD  Prior to Session Communication: Bedside nurse  Patient  Position Received: Bed, 2 rail up, Alarm off, not on at start of session  Family/Caregiver Present: Yes  Caregiver Feedback: supportive daughter present  General Comment: Pt in supine on arrival, pleasant and cooperative   Precautions:  Medical Precautions: Fall precautions    Pain:  Pain Assessment  Pain Assessment: 0-10  0-10 (Numeric) Pain Score: 8  Pain Type: Acute pain  Pain Location:  (ankle)  Pain Orientation: Right  Pain Interventions: Elevated, Repositioned  Lines/Tubes/Drains:  Implantable Port 07/18/24 Right Chest Single lumen port (Active)   Number of days: 279         Objective   Cognition:  Overall Cognitive Status: Within Functional Limits  Orientation Level: Oriented X4           Home Living:  Type of Home: House  Lives With:  (daughter and son)  Home Adaptive Equipment: Cane  Home Layout: One level  Home Access: Stairs to enter with rails  Entrance Stairs-Number of Steps: 6  Bathroom Shower/Tub: Tub/shower unit   Prior Function:  Level of [place]: Independent with ADLs and functional transfers, Independent with homemaking with ambulation  ADL Assistance: Independent  Homemaking Assistance: Independent (uses cane during flare ups)  Ambulatory Assistance: Independent  Vocational: On disability  Prior Function Comments: 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, + driving, does not work, spends a lot of time taking care of her kids       ADL:  Eating Assistance: Independent  Grooming Assistance: Stand by  Grooming Deficit: Setup  Bathing Assistance: Minimal (anticipate)  UE Dressing Assistance: Stand by  UE Dressing Deficit: Setup  LE Dressing Assistance: Minimal  LE Dressing Deficit: Don/doff R sock, Thread RLE into pants, Steadying  Toileting Assistance with Device: Stand by  Toileting Deficit: Verbal cueing, Steadying  Activity Tolerance:  Endurance: Decreased tolerance for upright activites       Bed Mobility/Transfers: Bed Mobility/Transfers: Bed Mobility  Bed Mobility: Yes  Bed  Mobility 1  Bed Mobility 1: Supine to sitting  Level of Assistance 1: Close supervision  Functional Mobility  Functional Mobility Performed: Yes  Functional Mobility 1  Surface 1: Level tile  Device 1: Rolling walker  Assistance 1: Contact guard, Minimal verbal cues  Comments 1: to/from bathroom, pt kept R foot FFWB d/t pain   and Transfers  Transfer: Yes  Transfer 1  Transfer From 1: Sit to, Stand to  Transfer to 1: Stand, Sit  Technique 1: Sit to stand, Stand to sit  Transfer Device 1: Walker  Transfer Level of Assistance 1: Contact guard, Minimal verbal cues       Vision: Vision - Basic Assessment  Current Vision: No visual deficits   and    Sensation:  Light Touch: No apparent deficits  Strength:  Strength Comments: WFL        Hand Function:  Hand Function  Gross Grasp: Functional  Coordination: Functional  Extremities:   RUE   RUE : Within Functional Limits, LUE   LUE: Within Functional Limits, RLE   RLE : Exceptions to WFL  Strength RLE  RLE Overall Strength: Deficits, Due to pain, and LLE   LLE : Within Functional Limits      Outcome Measures: [facility] Daily Activity  Putting on and taking off regular lower body clothing: A little  Bathing (including washing, rinsing, drying): A little  Putting on and taking off regular upper body clothing: A little  Toileting, which includes using toilet, bedpan or urinal: A little  Taking care of personal grooming such as brushing teeth: A little  Eating Meals: None  Daily Activity - Total Score: 19         ,     OT Adult Other Outcome Measures  4AT: negative    Education Documentation  Precautions, taught by Dawna Sandoval OT at 4/23/2025 11:47 AM.  Learner: Patient  Readiness: Acceptance  Method: Explanation  Response: Verbalizes Understanding    Body Mechanics, taught by Dawna Sandoval OT at 4/23/2025 11:47 AM.  Learner: Patient  Readiness: Acceptance  Method: Explanation  Response: Verbalizes Understanding    ADL Training, taught by Dawna Sandoval OT at 4/23/2025  11:47 AM.  Learner: Patient  Readiness: Acceptance  Method: Explanation  Response: Verbalizes Understanding    Education Comments  No comments found.        Goals:   Encounter Problems       Encounter Problems (Active)       ADLs       Patient will perform UB and LB bathing with independent level of assistance and grab bars. (Progressing)       Start:  04/23/25    Expected End:  05/07/25            Patient with complete upper body dressing with independent level of assistance donning all UE clothes with no adaptive equipment while edge of bed  (Progressing)       Start:  04/23/25    Expected End:  05/07/25            Patient with complete lower body dressing with modified independent level of assistance donning and doffing all LE clothes  with PRN adaptive equipment while edge of bed  (Progressing)       Start:  04/23/25    Expected End:  05/07/25            Patient will complete daily grooming tasks brushing teeth and washing face/hair with independent level of assistance and PRN adaptive equipment while standing. (Progressing)       Start:  04/23/25    Expected End:  05/07/25            Patient will complete toileting including hygiene clothing management/hygiene with independent level of assistance and grab bars. (Progressing)       Start:  04/23/25    Expected End:  05/07/25               MOBILITY       Patient will perform Functional mobility min Household distances/Community Distances with modified independent level of assistance and least restrictive device in order to improve safety and functional mobility. (Progressing)       Start:  04/23/25    Expected End:  05/07/25               TRANSFERS       Patient will complete sit to stand transfer with modified independent level of assistance and least restrictive device in order to improve safety and prepare for out of bed mobility. (Progressing)       Start:  04/23/25    Expected End:  05/07/25                   Treatment Completed on  Evaluation      Therapy/Activity:     Therapeutic Activity  Therapeutic Activity Performed: Yes  Therapeutic Activity 1: Pt participated in functional transfers/mobility in room using WW. Pt provided verbal cues on safe walker use and keeping R foot FFWB to limit pain in R ankle. Pt completed toileting in bathroom with SBA. Required CGA to complete safe toilet transfer with verbal cues to use grab bar and back up to toilet prior to sitting.            04/23/25 at 11:49 AM   Dawna Sandoval OT   Rehab Office: [phone]

## 2025-04-23 NOTE — ASSESSMENT & PLAN NOTE
Mary Alice Aragon is a 45 y.o. female with PMHx of HbSS c/b transfusion-related iron overload, cerebral aneurysm, NICHOL/MDD who presented to [facility] with sickle cell pain mostly in her right ankle and heel and leg, admitted for further pain management. Right ankle and foot xray 4/18 shows known AVN, unchanged. Uric acid (4/19) WNL. Started on IV dilaudid and Toradol for pain management. Hgb 5.4 (4/20) s/p 1u prbc. Started rotating pain regimen with PO/IV dilaudid 4/23. Ankle brace ordered 4/22. PT/OT consulted 4/23 d/t ongoing R ankle/foot pain and inability to bear weight. Discharge pending improvement in pain, likely tomorrow 4/24.     Updates 4/23:  - PT/OT eval for R ankle pain/difficulty bearing weight  - Start rotating pain regimen with PO dilaudid 6mg q4hrs PRN severe pain with IV dilaudid 2mg q4hrs PRN severe pain to rotate for q2hr pain meds if needed    # HbSS w/ pain crisis  - Pain located in right ankle and heel  - Right ankle and foot xray 4/18 shows known AVN, unchanged  - Hgb ~7.5-8; Hgb 6.5 on presentation 4/18, blood transfusion was postponed after discussion with Dr. Saleem as pt has transfusion-related iron overload  - Hgb 5.4 (4/20)- s/p 1u PRBC--> Hgb 6.6 (4/22)--> 6.2 (4/24); will hold off on transfusion for now given significant hx of transfusion-related iron overload  - Tbili BL ~2; Tbili 1.8 (4/22)--> 1.5 (4/23)  - LDH BL ~200;  (4/22)--> 236 (4/23)  - Care plan reviewed from 12/4/24 - may have subcutaneous/IV dilaudid 2mg q2h PRN   - s/p IV dilaudid 2mg q2h PRN (4/18-4/23)  - Start rotating pain regimen with PO dilaudid 6mg q4hrs PRN severe pain with IV dilaudid 2mg q4hrs PRN severe pain to rotate for q2hr pain meds if needed (4/23-)  - s/p IV Toradol 15mg q6h sched x2 days (4/20-4/22)  - Last Ketamine infusion 4/1  - Continue home Hydrea 1500mg M/W/F and 1000mg T/Th/Sat/Sun - MCV 94  - Continue home Methadone 10mg BID  - Continue home Tizanidine 2mg q8hrs scheduled, Tylenol 650mg q6hrs PRN  mild pain, and Folic Acid 1mg daily  - 4/20 add capsaicin and lido topical to apply prior to capsaicin   - Added Lidocaine patch daily  - Bowel regimen for opioid-induced constipation with DocuSenna 2tabs BID (increased to 3 tabs BDI 4/23 d/t reported hard stools) and Miralax daily; LBM 4/23  - PO Benadryl PRN for opioid-induced pruritus, PO Compazine PRN for opioid-induced nausea  - Continue home noc O2 (2-3L) for known nocturnal hypoxemia   - PT/OT consulted 4/23 d/t ongoing R ankle/foot pain (recs pending), R ankle brace ordered 4/22     # Transfusion-mediated iron overload, chronic  - Patient did not bring her cadd vicente pump  - Continue home deferoxamine 2g daily IV infused over 8 hours for iron chelation      # Hx of ingrown toe nails   - Reports having procedure on 4/9 and was supposed to be taking Cephalexin 500 mg BID post- procedure, which she did not start immediately after the procedure and is still on the antibiotic  - Now completed Cephalexin inpatient to finish course from 4/19- 4/22    # Dry eyes/drainage   - Encourage warm compresses   - Lubricating eye drops q6 hours PRN      DVT prophy: Lovenox SQ, encourage SCDs, and ambulation as able     DISPO:  - FULL CODE, confirmed on admission  - Discharge pending improvement in pain, likely tomorrow 4/24  - NOK: mother Penelope Bain [phone]   - Line Draw + Sickle Cell FUV 5/19

## 2025-04-23 NOTE — CARE PLAN
Problem: Fall/Injury  Goal: Not fall by end of shift  Outcome: Progressing  Goal: Be free from injury by end of the shift  Outcome: Progressing  Goal: Verbalize understanding of personal risk factors for fall in the hospital  Outcome: Progressing  Goal: Verbalize understanding of risk factor reduction measures to prevent injury from fall in the home  Outcome: Progressing  Goal: Use assistive devices by end of the shift  Outcome: Progressing  Goal: Pace activities to prevent fatigue by end of the shift  Outcome: Progressing     Problem: Pain  Goal: Takes deep breaths with improved pain control throughout the shift  Outcome: Progressing  Goal: Turns in bed with improved pain control throughout the shift  Outcome: Progressing  Goal: Walks with improved pain control throughout the shift  Outcome: Progressing  Goal: Performs ADL's with improved pain control throughout shift  Outcome: Progressing  Goal: Participates in PT with improved pain control throughout the shift  Outcome: Progressing  Goal: Free from opioid side effects throughout the shift  Outcome: Progressing  Goal: Free from acute confusion related to pain meds throughout the shift  Outcome: Progressing   The patient's goals for the shift include      The clinical goals for the shift include pt will have decreased pain throughout shift on 4/23/25 at 0700    Patient had slight decrease of pain throughout shift. Patient rated pain 9 to 10/10 during shift and given pain meds during night to keep comfortable.

## 2025-04-23 NOTE — PROGRESS NOTES
"Mary Alice Aragon is a 45 y.o. female on day 5 of admission presenting with Sickle cell anemia with crisis.    Subjective   Seen this AM at the bedside. Pt reports she feels her right ankle pain is slowly improving. We discussed plan for PT/OT eval today. Also discussed plan to start rotating pain regimen today. She is unsure if she will be able to discharge home this afternoon. She denies any fevers/chills, SOB, or CP.    Objective     Physical Exam  HENT:      Head: Normocephalic.      Nose: Nose normal.      Mouth/Throat:      Mouth: Mucous membranes are moist.   Eyes:      Pupils: Pupils are equal, round, and reactive to light.   Cardiovascular:      Rate and Rhythm: Normal rate.      Pulses: Normal pulses.   Pulmonary:      Effort: Pulmonary effort is normal.   Abdominal:      Palpations: Abdomen is soft.   Musculoskeletal:         General: Normal range of motion.      Cervical back: Normal range of motion.      Comments: + R ankle swelling   Skin:     General: Skin is warm.      Capillary Refill: Capillary refill takes less than 2 seconds.   Neurological:      General: No focal deficit present.      Mental Status: She is alert.      Comments: RLE weakness   Psychiatric:         Mood and Affect: Mood normal.         Behavior: Behavior normal.       Last Recorded Vitals  Blood pressure 111/60, pulse 77, temperature 37.3 °C (99.1 °F), temperature source Temporal, resp. rate 16, height 1.6 m (5' 3\"), weight 67.5 kg (148 lb 12.8 oz), SpO2 97%.  Intake/Output last 3 Shifts:  I/O last 3 completed shifts:  In: 600 (8.9 mL/kg) [IV Piggyback:600]  Out: - (0 mL/kg)   Weight: 67.5 kg     Results for orders placed or performed during the hospital encounter of 04/18/25 (from the past 24 hours)   CBC and Auto Differential   Result Value Ref Range    WBC 9.7 4.4 - 11.3 x10*3/uL    nRBC 2.9 (H) 0.0 - 0.0 /100 WBCs    RBC 1.97 (L) 4.00 - 5.20 x10*6/uL    Hemoglobin 6.2 (LL) 12.0 - 16.0 g/dL    Hematocrit 18.8 (L) 36.0 - 46.0 %    " MCV 95 80 - 100 fL    MCH 31.5 26.0 - 34.0 pg    MCHC 33.0 32.0 - 36.0 g/dL    RDW 17.8 (H) 11.5 - 14.5 %    Platelets 370 150 - 450 x10*3/uL    Neutrophils % 52.9 40.0 - 80.0 %    Immature Granulocytes %, Automated 0.4 0.0 - 0.9 %    Lymphocytes % 39.3 13.0 - 44.0 %    Monocytes % 4.8 2.0 - 10.0 %    Eosinophils % 2.1 0.0 - 6.0 %    Basophils % 0.5 0.0 - 2.0 %    Neutrophils Absolute 5.15 1.20 - 7.70 x10*3/uL    Immature Granulocytes Absolute, Automated 0.04 0.00 - 0.70 x10*3/uL    Lymphocytes Absolute 3.82 1.20 - 4.80 x10*3/uL    Monocytes Absolute 0.47 0.10 - 1.00 x10*3/uL    Eosinophils Absolute 0.20 0.00 - 0.70 x10*3/uL    Basophils Absolute 0.05 0.00 - 0.10 x10*3/uL   Comprehensive Metabolic Panel   Result Value Ref Range    Glucose 88 74 - 99 mg/dL    Sodium 139 136 - 145 mmol/L    Potassium 4.0 3.5 - 5.3 mmol/L    Chloride 111 (H) 98 - 107 mmol/L    Bicarbonate 22 21 - 32 mmol/L    Anion Gap 10 10 - 20 mmol/L    Urea Nitrogen 12 6 - 23 mg/dL    Creatinine 0.91 0.50 - 1.05 mg/dL    eGFR 80 >60 mL/min/1.73m*2    Calcium 8.1 (L) 8.6 - 10.6 mg/dL    Albumin 3.8 3.4 - 5.0 g/dL    Alkaline Phosphatase 54 33 - 110 U/L    Total Protein 6.5 6.4 - 8.2 g/dL    AST 28 9 - 39 U/L    Bilirubin, Total 1.5 (H) 0.0 - 1.2 mg/dL    ALT 30 7 - 45 U/L   Reticulocytes   Result Value Ref Range    Retic % 9.0 (H) 0.5 - 2.0 %    Retic Absolute 0.177 (H) 0.018 - 0.083 x10*6/uL    Reticulocyte Hemoglobin 37 28 - 38 pg    Immature Retic fraction 28.1 (H) <=16.0 %   Lactate dehydrogenase   Result Value Ref Range     84 - 246 U/L     *Note: Due to a large number of results and/or encounters for the requested time period, some results have not been displayed. A complete set of results can be found in Results Review.       Scheduled medications  Scheduled Medications[1]  Continuous medications  Continuous Medications[2]  PRN medications  PRN Medications[3]      Assessment & Plan  Sickle cell pain crisis (Multi)    Sickle cell anemia  with crisis  Mary Alice Aragon is a 45 y.o. female with PMHx of HbSS c/b transfusion-related iron overload, cerebral aneurysm, NICHOL/MDD who presented to [facility] with sickle cell pain mostly in her right ankle and heel and leg, admitted for further pain management. Right ankle and foot xray 4/18 shows known AVN, unchanged. Uric acid (4/19) WNL. Started on IV dilaudid and Toradol for pain management. Hgb 5.4 (4/20) s/p 1u prbc. Started rotating pain regimen with PO/IV dilaudid 4/23. Ankle brace ordered 4/22. PT/OT consulted 4/23 d/t ongoing R ankle/foot pain and inability to bear weight. Discharge pending improvement in pain, likely tomorrow 4/24.     Updates 4/23:  - PT/OT eval for R ankle pain/difficulty bearing weight  - Start rotating pain regimen with PO dilaudid 6mg q4hrs PRN severe pain with IV dilaudid 2mg q4hrs PRN severe pain to rotate for q2hr pain meds if needed    # HbSS w/ pain crisis  - Pain located in right ankle and heel  - Right ankle and foot xray 4/18 shows known AVN, unchanged  - Hgb ~7.5-8; Hgb 6.5 on presentation 4/18, blood transfusion was postponed after discussion with Dr. Saleem as pt has transfusion-related iron overload  - Hgb 5.4 (4/20)- s/p 1u PRBC--> Hgb 6.6 (4/22)--> 6.2 (4/24); will hold off on transfusion for now given significant hx of transfusion-related iron overload  - Tbili BL ~2; Tbili 1.8 (4/22)--> 1.5 (4/23)  - LDH BL ~200;  (4/22)--> 236 (4/23)  - Care plan reviewed from 12/4/24 - may have subcutaneous/IV dilaudid 2mg q2h PRN   - s/p IV dilaudid 2mg q2h PRN (4/18-4/23)  - Start rotating pain regimen with PO dilaudid 6mg q4hrs PRN severe pain with IV dilaudid 2mg q4hrs PRN severe pain to rotate for q2hr pain meds if needed (4/23-)  - s/p IV Toradol 15mg q6h sched x2 days (4/20-4/22)  - Last Ketamine infusion 4/1  - Continue home Hydrea 1500mg M/W/F and 1000mg T/Th/Sat/Sun - MCV 94  - Continue home Methadone 10mg BID  - Continue home Tizanidine 2mg q8hrs scheduled, Tylenol 650mg  q6hrs PRN mild pain, and Folic Acid 1mg daily  - 4/20 add capsaicin and lido topical to apply prior to capsaicin   - Added Lidocaine patch daily  - Bowel regimen for opioid-induced constipation with DocuSenna 2tabs BID (increased to 3 tabs BDI 4/23 d/t reported hard stools) and Miralax daily; LBM 4/23  - PO Benadryl PRN for opioid-induced pruritus, PO Compazine PRN for opioid-induced nausea  - Continue home noc O2 (2-3L) for known nocturnal hypoxemia   - PT/OT consulted 4/23 d/t ongoing R ankle/foot pain (recs pending), R ankle brace ordered 4/22     # Transfusion-mediated iron overload, chronic  - Patient did not bring her cadd vicente pump  - Continue home deferoxamine 2g daily IV infused over 8 hours for iron chelation      # Hx of ingrown toe nails   - Reports having procedure on 4/9 and was supposed to be taking Cephalexin 500 mg BID post- procedure, which she did not start immediately after the procedure and is still on the antibiotic  - Now completed Cephalexin inpatient to finish course from 4/19- 4/22    # Dry eyes/drainage   - Encourage warm compresses   - Lubricating eye drops q6 hours PRN      DVT prophy: Lovenox SQ, encourage SCDs, and ambulation as able     DISPO:  - FULL CODE, confirmed on admission  - Discharge pending improvement in pain, likely tomorrow 4/24  - NOK: mother Penelope Bain [phone]   - Line Draw + Sickle Cell FUV 5/19    I spent >60 minutes on the professional and overall care of this patient    Assessment and plan as above discussed with attending physician Dr. Pastor Mendosa, APRN-CNP       [1] ascorbic acid, 1,000 mg, oral, Daily  capsicum, , Topical, BID  deferoxamine, 2,000 mg, intravenous, Daily  enoxaparin, 40 mg, subcutaneous, q24h  folic acid, 1 mg, oral, Daily  hydroxyurea, 1,000 mg, oral, Every Sun/Tues/Thur/Sat  hydroxyurea, 1,500 mg, oral, Once per day on Monday Wednesday Friday  lactulose, 20 g, oral, Daily  lidocaine, , Topical, BID  lidocaine, 1  patch, transdermal, Daily  melatonin, 5 mg, oral, Nightly  methadone, 10 mg, oral, q12h  norethindrone, 5 mg, oral, Daily  oxygen, 3 L/min, inhalation, Nightly  polyethylene glycol, 17 g, oral, Daily  pregabalin, 25 mg, oral, TID  sennosides-docusate sodium, 3 tablet, oral, BID  tiZANidine, 2 mg, oral, q8h     [2]    [3] PRN medications: acetaminophen, calcium carbonate, diclofenac sodium, diphenhydrAMINE, HYDROmorphone, HYDROmorphone, lubricating eye drops, pramoxine, prochlorperazine, traZODone

## 2025-04-23 NOTE — CARE PLAN
The patient's goals for the shift include      The clinical goals for the shift include Pt will remain HDS and safe from injury through shift 4/23/25 @ 1900      Problem: Fall/Injury  Goal: Not fall by end of shift  Outcome: Progressing  Goal: Be free from injury by end of the shift  Outcome: Progressing  Goal: Verbalize understanding of personal risk factors for fall in the hospital  Outcome: Progressing  Goal: Verbalize understanding of risk factor reduction measures to prevent injury from fall in the home  Outcome: Progressing  Goal: Use assistive devices by end of the shift  Outcome: Progressing  Goal: Pace activities to prevent fatigue by end of the shift  Outcome: Progressing     Problem: Pain  Goal: Takes deep breaths with improved pain control throughout the shift  Outcome: Progressing  Goal: Turns in bed with improved pain control throughout the shift  Outcome: Progressing  Goal: Walks with improved pain control throughout the shift  Outcome: Progressing  Goal: Performs ADL's with improved pain control throughout shift  Outcome: Progressing  Goal: Participates in PT with improved pain control throughout the shift  Outcome: Progressing  Goal: Free from opioid side effects throughout the shift  Outcome: Progressing  Goal: Free from acute confusion related to pain meds throughout the shift  Outcome: Progressing

## 2025-04-23 NOTE — PROGRESS NOTES
Physical Therapy    Physical Therapy Evaluation    Patient Name: Mary Alice Aragon  MRN: [id number]  Department: [facility]  Room: [facility]-  Today's Date: 4/23/2025   Time Calculation  Start Time: 1100  Stop Time: 1116  Time Calculation (min): 16 min    Assessment/Plan   PT Assessment  PT Assessment Results: Decreased strength, Decreased endurance, Impaired balance, Decreased mobility, Pain  Rehab Prognosis: Good  Barriers to Discharge Home: No anticipated barriers  Strengths: Attitude of self, Ability to acquire knowledge  Barriers to Participation: Comorbidities  End of Session Communication: Bedside nurse  End of Session Patient Position: Bed, 3 rail up, Alarm off, not on at start of session  IP OR SWING BED PT PLAN  Inpatient or Swing Bed: Inpatient  PT Plan  Treatment/Interventions: Bed mobility, Transfer training, Gait training, Stair training, Balance training, Strengthening, Endurance training, Therapeutic exercise, Therapeutic activity, Positioning  PT Plan: Ongoing PT  PT Frequency: 3 times per week  PT Discharge Recommendations: Low intensity level of continued care  Equipment Recommended upon Discharge: Wheeled walker  PT Recommended Transfer Status: Assist x1, Assistive device  PT - OK to Discharge: Yes    Subjective   General Visit Information:  General  Reason for Referral: who presents with sickle cell pain mostly in her right ankle and heel and leg, admitted for further pain management. Right ankle and foot xray 4/18 shows known AVN, unchanged  Past Medical History Relevant to Rehab: PMHx of HbSS c/b transfusion-related iron overload, cerebral aneurysm, NICHOL/MDD  Family/Caregiver Present: Yes  Caregiver Feedback: supportive daughter present  Prior to Session Communication: Bedside nurse  Patient Position Received: Bed, 3 rail up (Seated EOB)  Preferred Learning Style: verbal  General Comment: Pt in supine on arrival, pleasant and willing to participate. Limited by pain. Medicated prior session. RN present  in room initially.  Home Living:  Home Living  Type of Home: House  Lives With:  (Dtr and son)  Home Adaptive Equipment: Cane  Home Layout: One level, Laundry in basement  Home Access: Stairs to enter with rails  Entrance Stairs-Rails: Both (B rails apart)  Entrance Stairs-Number of Steps: 6  Bathroom Shower/Tub: Tub/shower unit  Prior Level of Function:  Prior Function Per Pt/Caregiver Report  Level of [place]: Independent with ADLs and functional transfers, Independent with homemaking with ambulation  ADL Assistance: Independent  Ambulatory Assistance: Independent (uses cane during flare ups)  Vocational: On disability  Prior Function Comments: +drives, denies falls in the past 6 months, 2 pet dogs, cat  Precautions:  Precautions  Medical Precautions: Fall precautions  Precautions Comment: R ankle brace (not present in the room)      Date/Time Vitals Session Patient Position Pulse Resp SpO2 BP MAP (mmHg)    04/23/25 1100 During PT  Sitting  80  --  99 %  101/64  --                 Objective   Pain:  Pain Assessment  Pain Assessment: 0-10  0-10 (Numeric) Pain Score: 7 (10/10 with mobility)  Pain Type: Acute pain  Pain Location: Ankle  Pain Orientation: Right  Pain Interventions: Repositioned, Emotional support, Elevated  Cognition:  Cognition  Overall Cognitive Status: Within Functional Limits  Arousal/Alertness: Appropriate responses to stimuli  Orientation Level: Oriented X4  Following Commands: Follows all commands and directions without difficulty    General Assessments:      Activity Tolerance  Endurance: Decreased tolerance for upright activites    Sensation  Light Touch: No apparent deficits    Strength  Strength Comments: LLE 4/5 and R LE 3+/5 except R ankle 3/5 based on mobility      Static Sitting Balance  Static Sitting-Level of Assistance: Distant supervision  Dynamic Sitting Balance  Dynamic Sitting-Level of Assistance: Close supervision    Static Standing Balance  Static Standing-Level of  Assistance: Contact guard (RW)  Dynamic Standing Balance  Dynamic Standing-Level of Assistance: Contact guard (RW)  Functional Assessments:  Bed Mobility  Bed Mobility: Yes  Bed Mobility 1  Bed Mobility 1: Sitting to supine  Level of Assistance 1: Close supervision    Transfers  Transfer: Yes  Transfer 1  Transfer From 1: Sit to, Stand to  Transfer to 1: Stand, Sit  Technique 1: Sit to stand, Stand to sit  Transfer Device 1: Walker  Transfer Level of Assistance 1: Contact guard, Minimal verbal cues  Trials/Comments 1: cues for safe hand placement    Ambulation/Gait Training  Ambulation/Gait Training Performed: Yes  Ambulation/Gait Training 1  Surface 1: Level tile  Device 1: Rolling walker  Assistance 1: Contact guard, Minimal verbal cues  Quality of Gait 1: Inconsistent stride length, Decreased step length  Comments/Distance (ft) 1: ~ 10 ft, more limited by pain, only minimal WB on RLE 2/2 pain (cues for walker management, WBAT RLE, sequencing and use of walker)    Stairs  Stairs: No    Outcome Measures:  [facility] Basic Mobility  Turning from your back to your side while in a flat bed without using bedrails: A little  Moving from lying on your back to sitting on the side of a flat bed without using bedrails: A little  Moving to and from bed to chair (including a wheelchair): A little  Standing up from a chair using your arms (e.g. wheelchair or bedside chair): A little  To walk in hospital room: A little  Climbing 3-5 steps with railing: Total  Basic Mobility - Total Score: 16    Encounter Problems       Encounter Problems (Active)       Mobility       Pt will be [place] ascend/descend 6 steps with 1 handrail (Progressing)       Start:  04/23/25    Expected End:  05/07/25            Pt will be [place] ambulation 100 ft with LRAD (Progressing)       Start:  04/23/25    Expected End:  05/07/25               PT Transfers       Pt will be [place] with sit to stand and bed to chair transfers with LRAD (Progressing)       Start:   04/23/25    Expected End:  05/07/25            Pt will be [place] with bed mobility (Progressing)       Start:  04/23/25    Expected End:  05/07/25                   Education Documentation  Precautions, taught by Shyanne Castro PT at 4/23/2025 12:10 PM.  Learner: Patient  Readiness: Acceptance  Method: Explanation  Response: Verbalizes Understanding, Needs Reinforcement    Body Mechanics, taught by Shyanne Castro PT at 4/23/2025 12:10 PM.  Learner: Patient  Readiness: Acceptance  Method: Explanation  Response: Verbalizes Understanding, Needs Reinforcement    Mobility Training, taught by Shyanne Castro PT at 4/23/2025 12:10 PM.  Learner: Patient  Readiness: Acceptance  Method: Explanation  Response: Verbalizes Understanding, Needs Reinforcement    Education Comments  No comments found.

## 2025-04-24 ENCOUNTER — DOCUMENTATION (OUTPATIENT)
Dept: HOME HEALTH SERVICES | Facility: HOME HEALTH | Age: 45
End: 2025-04-24
Payer: COMMERCIAL

## 2025-04-24 VITALS
BODY MASS INDEX: 26.36 KG/M2 | RESPIRATION RATE: 18 BRPM | DIASTOLIC BLOOD PRESSURE: 66 MMHG | OXYGEN SATURATION: 97 % | TEMPERATURE: 98.2 F | HEIGHT: 63 IN | WEIGHT: 148.8 LBS | HEART RATE: 98 BPM | SYSTOLIC BLOOD PRESSURE: 105 MMHG

## 2025-04-24 PROBLEM — D57.00 SICKLE CELL ANEMIA WITH CRISIS: Status: RESOLVED | Noted: 2025-04-18 | Resolved: 2025-04-24

## 2025-04-24 PROBLEM — D57.00 SICKLE CELL PAIN CRISIS (MULTI): Status: RESOLVED | Noted: 2024-11-21 | Resolved: 2025-04-24

## 2025-04-24 LAB
ALBUMIN SERPL BCP-MCNC: 3.8 G/DL (ref 3.4–5)
ALP SERPL-CCNC: 57 U/L (ref 33–110)
ALT SERPL W P-5'-P-CCNC: 30 U/L (ref 7–45)
ANION GAP SERPL CALC-SCNC: 11 MMOL/L (ref 10–20)
AST SERPL W P-5'-P-CCNC: 26 U/L (ref 9–39)
BASOPHILS # BLD AUTO: 0.03 X10*3/UL (ref 0–0.1)
BASOPHILS NFR BLD AUTO: 0.3 %
BILIRUB SERPL-MCNC: 1.3 MG/DL (ref 0–1.2)
BUN SERPL-MCNC: 12 MG/DL (ref 6–23)
CALCIUM SERPL-MCNC: 8.3 MG/DL (ref 8.6–10.6)
CHLORIDE SERPL-SCNC: 111 MMOL/L (ref 98–107)
CO2 SERPL-SCNC: 20 MMOL/L (ref 21–32)
CREAT SERPL-MCNC: 0.98 MG/DL (ref 0.5–1.05)
EGFRCR SERPLBLD CKD-EPI 2021: 74 ML/MIN/1.73M*2
EOSINOPHIL # BLD AUTO: 0.12 X10*3/UL (ref 0–0.7)
EOSINOPHIL NFR BLD AUTO: 1.2 %
ERYTHROCYTE [DISTWIDTH] IN BLOOD BY AUTOMATED COUNT: 17.8 % (ref 11.5–14.5)
GLUCOSE SERPL-MCNC: 84 MG/DL (ref 74–99)
HCT VFR BLD AUTO: 18.8 % (ref 36–46)
HGB BLD-MCNC: 6.4 G/DL (ref 12–16)
HGB RETIC QN: 36 PG (ref 28–38)
IMM GRANULOCYTES # BLD AUTO: 0.04 X10*3/UL (ref 0–0.7)
IMM GRANULOCYTES NFR BLD AUTO: 0.4 % (ref 0–0.9)
IMMATURE RETIC FRACTION: 19.7 %
LDH SERPL L TO P-CCNC: 224 U/L (ref 84–246)
LYMPHOCYTES # BLD AUTO: 3.22 X10*3/UL (ref 1.2–4.8)
LYMPHOCYTES NFR BLD AUTO: 33.2 %
MCH RBC QN AUTO: 32 PG (ref 26–34)
MCHC RBC AUTO-ENTMCNC: 34 G/DL (ref 32–36)
MCV RBC AUTO: 94 FL (ref 80–100)
MONOCYTES # BLD AUTO: 0.39 X10*3/UL (ref 0.1–1)
MONOCYTES NFR BLD AUTO: 4 %
NEUTROPHILS # BLD AUTO: 5.9 X10*3/UL (ref 1.2–7.7)
NEUTROPHILS NFR BLD AUTO: 60.9 %
NRBC BLD-RTO: 0.8 /100 WBCS (ref 0–0)
PLATELET # BLD AUTO: 372 X10*3/UL (ref 150–450)
POTASSIUM SERPL-SCNC: 4 MMOL/L (ref 3.5–5.3)
PROT SERPL-MCNC: 6.9 G/DL (ref 6.4–8.2)
RBC # BLD AUTO: 2 X10*6/UL (ref 4–5.2)
RETICS #: 0.13 X10*6/UL (ref 0.02–0.08)
RETICS/RBC NFR AUTO: 6.4 % (ref 0.5–2)
SODIUM SERPL-SCNC: 138 MMOL/L (ref 136–145)
WBC # BLD AUTO: 9.7 X10*3/UL (ref 4.4–11.3)

## 2025-04-24 PROCEDURE — 2500000002 HC RX 250 W HCPCS SELF ADMINISTERED DRUGS (ALT 637 FOR MEDICARE OP, ALT 636 FOR OP/ED)

## 2025-04-24 PROCEDURE — 85045 AUTOMATED RETICULOCYTE COUNT: CPT

## 2025-04-24 PROCEDURE — 2500000001 HC RX 250 WO HCPCS SELF ADMINISTERED DRUGS (ALT 637 FOR MEDICARE OP): Performed by: NURSE PRACTITIONER

## 2025-04-24 PROCEDURE — 83615 LACTATE (LD) (LDH) ENZYME: CPT

## 2025-04-24 PROCEDURE — 2500000001 HC RX 250 WO HCPCS SELF ADMINISTERED DRUGS (ALT 637 FOR MEDICARE OP)

## 2025-04-24 PROCEDURE — S4993 CONTRACEPTIVE PILLS FOR BC: HCPCS

## 2025-04-24 PROCEDURE — 85025 COMPLETE CBC W/AUTO DIFF WBC: CPT

## 2025-04-24 PROCEDURE — 2500000004 HC RX 250 GENERAL PHARMACY W/ HCPCS (ALT 636 FOR OP/ED): Mod: JZ

## 2025-04-24 PROCEDURE — 2500000005 HC RX 250 GENERAL PHARMACY W/O HCPCS: Performed by: NURSE PRACTITIONER

## 2025-04-24 PROCEDURE — 2500000004 HC RX 250 GENERAL PHARMACY W/ HCPCS (ALT 636 FOR OP/ED): Mod: JZ,TB | Performed by: NURSE PRACTITIONER

## 2025-04-24 PROCEDURE — 80053 COMPREHEN METABOLIC PANEL: CPT

## 2025-04-24 PROCEDURE — 2500000004 HC RX 250 GENERAL PHARMACY W/ HCPCS (ALT 636 FOR OP/ED): Mod: JZ | Performed by: NURSE PRACTITIONER

## 2025-04-24 PROCEDURE — S0109 METHADONE ORAL 5MG: HCPCS

## 2025-04-24 PROCEDURE — 2500000005 HC RX 250 GENERAL PHARMACY W/O HCPCS

## 2025-04-24 PROCEDURE — 99238 HOSP IP/OBS DSCHRG MGMT 30/<: CPT | Performed by: NURSE PRACTITIONER

## 2025-04-24 RX ORDER — HEPARIN 100 UNIT/ML
5 SYRINGE INTRAVENOUS AS NEEDED
Status: DISCONTINUED | OUTPATIENT
Start: 2025-04-24 | End: 2025-04-24 | Stop reason: HOSPADM

## 2025-04-24 RX ORDER — FLUCONAZOLE 150 MG/1
150 TABLET ORAL ONCE
Status: COMPLETED | OUTPATIENT
Start: 2025-04-24 | End: 2025-04-24

## 2025-04-24 RX ADMIN — CAPSAICIN: 0.75 CREAM TOPICAL at 09:20

## 2025-04-24 RX ADMIN — HYDROMORPHONE HYDROCHLORIDE 2 MG: 2 INJECTION, SOLUTION INTRAMUSCULAR; INTRAVENOUS; SUBCUTANEOUS at 00:18

## 2025-04-24 RX ADMIN — HYDROMORPHONE HYDROCHLORIDE 6 MG: 2 TABLET ORAL at 09:12

## 2025-04-24 RX ADMIN — NORETHINDRONE ACETATE 5 MG: 5 TABLET ORAL at 09:13

## 2025-04-24 RX ADMIN — Medication 3 L/MIN: at 00:35

## 2025-04-24 RX ADMIN — LIDOCAINE 4% 1 PATCH: 40 PATCH TOPICAL at 09:13

## 2025-04-24 RX ADMIN — FLUCONAZOLE 150 MG: 150 TABLET ORAL at 11:15

## 2025-04-24 RX ADMIN — METHADONE HYDROCHLORIDE 10 MG: 10 TABLET ORAL at 11:15

## 2025-04-24 RX ADMIN — LACTULOSE 20 G: 20 SOLUTION ORAL at 09:13

## 2025-04-24 RX ADMIN — TIZANIDINE 2 MG: 2 TABLET ORAL at 00:18

## 2025-04-24 RX ADMIN — PREGABALIN 25 MG: 25 CAPSULE ORAL at 09:12

## 2025-04-24 RX ADMIN — LIDOCAINE 4%: 4 CREAM TOPICAL at 09:12

## 2025-04-24 RX ADMIN — HYDROMORPHONE HYDROCHLORIDE 2 MG: 2 INJECTION, SOLUTION INTRAMUSCULAR; INTRAVENOUS; SUBCUTANEOUS at 11:23

## 2025-04-24 RX ADMIN — POLYETHYLENE GLYCOL 3350 17 G: 17 POWDER, FOR SOLUTION ORAL at 09:12

## 2025-04-24 RX ADMIN — ENOXAPARIN SODIUM 40 MG: 100 INJECTION SUBCUTANEOUS at 09:12

## 2025-04-24 RX ADMIN — FOLIC ACID 1 MG: 1 TABLET ORAL at 09:12

## 2025-04-24 RX ADMIN — HYDROMORPHONE HYDROCHLORIDE 2 MG: 2 INJECTION, SOLUTION INTRAMUSCULAR; INTRAVENOUS; SUBCUTANEOUS at 05:35

## 2025-04-24 RX ADMIN — METHADONE HYDROCHLORIDE 10 MG: 10 TABLET ORAL at 00:18

## 2025-04-24 RX ADMIN — PREGABALIN 25 MG: 25 CAPSULE ORAL at 00:18

## 2025-04-24 RX ADMIN — HYDROMORPHONE HYDROCHLORIDE 6 MG: 2 TABLET ORAL at 03:17

## 2025-04-24 RX ADMIN — OXYCODONE HYDROCHLORIDE AND ACETAMINOPHEN 1000 MG: 500 TABLET ORAL at 09:12

## 2025-04-24 RX ADMIN — SENNOSIDES AND DOCUSATE SODIUM 3 TABLET: 50; 8.6 TABLET ORAL at 09:12

## 2025-04-24 RX ADMIN — HEPARIN 500 UNITS: 100 SYRINGE at 14:31

## 2025-04-24 RX ADMIN — TIZANIDINE 2 MG: 2 TABLET ORAL at 09:13

## 2025-04-24 ASSESSMENT — COGNITIVE AND FUNCTIONAL STATUS - GENERAL
HELP NEEDED FOR BATHING: A LITTLE
CLIMB 3 TO 5 STEPS WITH RAILING: A LOT
MOVING TO AND FROM BED TO CHAIR: A LITTLE
MOBILITY SCORE: 18
TOILETING: A LITTLE
DAILY ACTIVITIY SCORE: 20
WALKING IN HOSPITAL ROOM: A LOT
DRESSING REGULAR UPPER BODY CLOTHING: A LITTLE
STANDING UP FROM CHAIR USING ARMS: A LITTLE
DRESSING REGULAR LOWER BODY CLOTHING: A LITTLE

## 2025-04-24 ASSESSMENT — PAIN SCALES - GENERAL
PAINLEVEL_OUTOF10: 7
PAINLEVEL_OUTOF10: 7
PAINLEVEL_OUTOF10: 9
PAINLEVEL_OUTOF10: 8
PAINLEVEL_OUTOF10: 7
PAINLEVEL_OUTOF10: 6
PAINLEVEL_OUTOF10: 8
PAINLEVEL_OUTOF10: 8

## 2025-04-24 ASSESSMENT — PAIN - FUNCTIONAL ASSESSMENT
PAIN_FUNCTIONAL_ASSESSMENT: 0-10

## 2025-04-24 NOTE — PROGRESS NOTES
04/21/25 1500   Discharge Planning   Living Arrangements Children   Support Systems Children;Family members;Friends/neighbors   Type of Residence Private residence   Number of Stairs to Enter Residence 6   Number of Stairs Within Residence 0   Do you have animals or pets at home? Yes   Type of Animals or Pets 2 dogs and a cat   Who is requesting discharge planning? Provider   Home or Post Acute Services None   Expected Discharge Disposition Home   Does the patient need discharge transport arranged? Yes   RoundTrip coordination needed? Yes   Financial Resource Strain   How hard is it for you to pay for the very basics like food, housing, medical care, and heating? Not very   Housing Stability   In the last 12 months, was there a time when you were not able to pay the mortgage or rent on time? N   At any time in the past 12 months, were you homeless or living in a shelter (including now)? N   Transportation Needs   In the past 12 months, has lack of transportation kept you from medical appointments or from getting medications? no   In the past 12 months, has lack of transportation kept you from meetings, work, or from getting things needed for daily living? No   Intensity of Service   Intensity of Service >30 min     Care Transitions Note  04/21/25  Plan per Medical/Surgical Team: sickle cell anemia with crisis  Status: Inpatient  Payor Source:  [facility] Complete  Discharge disposition: Home, likely NN  Expected date of discharge: few days, 04/23?  Barriers: pain  PCP / Primary Oncologist: follows with Dr. Saleem outpatient     Met with pt at bedside, introduced self and role. Demographics and insurance verifed with patient. Pt lives at home with her two kids ages 15 and 14, both in high school. They live in a one story house with 4-6 GABRIEL. Pt usually has no issue getting around at home, able to complete all ADLs. No DME or O2. No recent falls, feels safe at home. States she has some social support from  "her best friend and her best friend's sister, but most of her family support is \"touch and go.\" She primarily needs help with driving her kids to school when she is in crisis. Pt endorses prioritizing her kids over her own health, which leads to increased pain for her at times. Pt has gone to [facility] in the past which she finds helpful. Pt shares she cares a lot for her pets and loves their company. Pt denies any discharge needs at this time, SW available as needs arise.   HAYLEY Wetzel, HAMZAH     04/22/25  Patient active with [facility] for O2. Sent referral to [facility] for face mask vs [place] for O2, as NC dries out her face. Awaiting response from [facility]. Pt also requesting home OT, team to place orders. SW following. Plan for dc today.     [facility] will send a face mask to pt home later today. Made provider aware.   HAYLEY Wetzel LSW     04/24/25  Walker given at bedside this AM through [facility]. Pt to dc with [facility]-PT/OT. [facility] to deliver face mask for O2 to pt home. Pt declines additional needs at this time.   HAYLEY Wetzel, HAMZAH   "

## 2025-04-24 NOTE — HH CARE COORDINATION
Home Care received a Referral for Physical Therapy and Occupational Therapy. We have processed the referral for a Start of Care on 04/25/2058.     If you have any questions or concerns, please feel free to contact us at [phone]. Follow the prompts, enter your five digit zip code, and you will be directed to your care team on [facility] 3.

## 2025-04-24 NOTE — DISCHARGE SUMMARY
Discharge Diagnosis  Sickle cell anemia with crisis    Hospital Course   Mary Alice Aragon is a 45 y.o. female with PMHx of HbSS c/b transfusion-related iron overload, cerebral aneurysm, NICHOL/MDD who presented to [facility] with sickle cell pain mostly in her right ankle and heel and leg, admitted for further pain management. Right ankle and foot xray 4/18 shows known AVN, unchanged. Uric acid (4/19) WNL. Started on IV dilaudid and Toradol for pain management. Hgb 5.4 (4/20) s/p 1u prbc with appropriate incrementation. Started rotating pain regimen with PO/IV dilaudid 4/23. Ankle brace ordered 4/22. PT/OT consulted 4/23 d/t ongoing R ankle/foot pain and inability to bear weight, rec'd wheeled walker and otpt PT services- [facility] for PT/OT placed. Walker given to pt prior to discharge. Discharged home as pain back to baseline. Hgb 6.4 on DOD.      Line Draw + Sickle Cell FUV 5/19, Ortho Surg apt (for R ankle AVN-related pain) 4/30.     Methadone, Hydrea, Pregabalin, Ibuprofen, and Tizanidine delivered to pt's bedside prior to discharge.     Pt with resumed home noc O2- simple face mask ordered to be delivered to home from [place].     Pertinent Physical Exam At Time of Discharge  Physical Exam  Vitals reviewed.   Constitutional:       Appearance: Normal appearance.   HENT:      Head: Normocephalic and atraumatic.      Nose: Nose normal.      Mouth/Throat:      Mouth: Mucous membranes are moist.      Pharynx: Oropharynx is clear.   Eyes:      Extraocular Movements: Extraocular movements intact.      Pupils: Pupils are equal, round, and reactive to light.   Cardiovascular:      Rate and Rhythm: Normal rate and regular rhythm.      Pulses: Normal pulses.      Heart sounds: Normal heart sounds.   Pulmonary:      Effort: Pulmonary effort is normal.      Breath sounds: Normal breath sounds.   Abdominal:      General: Bowel sounds are normal.      Palpations: Abdomen is soft.   Musculoskeletal:         General: Normal range  of motion.   Skin:     General: Skin is warm.   Neurological:      General: No focal deficit present.      Mental Status: She is alert and oriented to person, place, and time. Mental status is at baseline.   Psychiatric:         Mood and Affect: Mood normal.         Behavior: Behavior normal.       Home Medications     Medication List      START taking these medications     ibuprofen 600 mg tablet; Take 1 tablet (600 mg) by mouth every 6 hours   if needed for mild pain (1 - 3) for up to 10 days. Do not take at the same   time as meloxicam   oxygen gas therapy; Commonly known as: O2; Inhale 1 each continuously.     CONTINUE taking these medications     Aspercreme 10 % cream; Generic drug: trolamine salicylate   capsaicin 0.025 % cream; Commonly known as: Zostrix   deferoxamine 2 gram injection; Commonly known as: Desferal; Infuse   2000mg subcutaneously once daily over 8 hours via [place] [facility]   diclofenac sodium 1 % gel; Commonly known as: Voltaren; Per instructions   4 TIMES DAILY (route: topical)   diphenhydrAMINE 25 mg capsule; Commonly known as: BENADryl   docusate sodium 100 mg capsule; Commonly known as: Colace   DRY EYE RELIEF OPHT   folic acid 1 mg tablet; Commonly known as: Folvite   HYDROmorphone 4 mg tablet; Commonly known as: Dilaudid; Take 1 tablet (4   mg) by mouth every 6 hours if needed for severe pain (7 - 10).   * hydroxyurea 500 mg capsule; Commonly known as: Hydrea   * hydroxyurea 500 mg capsule; Commonly known as: Hydrea; Take 3 capsules   by mouth daily on Monday Wednesday and Friday and take 2 capsules daily on   Tuesday, Thursday, Saturday and Sunday; (Per instructions AS DIRECTED   (route: oral))   ketamine 10 mg/mL injection; Commonly known as: Ketalar   lidocaine 5 % patch; Commonly known as: Lidoderm   loratadine 10 mg tablet; Commonly known as: Claritin   melatonin 5 mg tablet   meloxicam 15 mg tablet; Commonly known as: Mobic; Take 1 tablet (15 mg)   by mouth once daily.   methadone  10 mg tablet; Commonly known as: Dolophine; Take 1 tablet   (10mg) by mouth every 12 hours; (Take 1 tablet (10 mg) by mouth every 12   hours.)   multivitamin tablet   naloxone 4 mg/0.1 mL nasal spray; Commonly known as: Narcan; Administer   1 spray (4 mg) into affected nostril(s) if needed for opioid reversal or   respiratory depression. May repeat every 2-3 minutes if needed,   alternating nostrils, until medical assistance becomes available.   norethindrone 5 mg tablet; Commonly known as: Aygestin; Take 1 tablet (5   mg) by mouth once daily.   oxygen gas therapy; Commonly known as: O2   pregabalin 25 mg capsule; Commonly known as: Lyrica; Take 1 capsule (25   mg) by mouth 3 times a day.   prochlorperazine 10 mg tablet; Commonly known as: Compazine; Take 1   tablet (10 mg) by mouth every 8 hours if needed for nausea or vomiting. 1   tablet EVERY 8 HOURS (route: oral)   senna 8.6 mg tablet; Generic drug: sennosides; Take 1 tablet (8.6 mg) by   mouth 2 times a day as needed for constipation.   tiZANidine 2 mg tablet; Commonly known as: Zanaflex; Take 1 tablet (2   mg) by mouth every 8 hours if needed for muscle spasms.   traZODone 100 mg tablet; Commonly known as: Desyrel; TAKE 1 TO 2   TABLETS(100  MG) BY MOUTH AT BEDTIME AS NEEDED FOR SLEEP please   schedule FUV   valACYclovir 500 mg tablet; Commonly known as: Valtrex   Vitamin C 500 mg tablet; Generic drug: ascorbic acid  * This list has 2 medication(s) that are the same as other medications   prescribed for you. Read the directions carefully, and ask your doctor or   other care provider to review them with you.     STOP taking these medications     deferasirox 180 mg tablet; Commonly known as: Jadenu   FLUoxetine 10 mg capsule; Commonly known as: PROzac   hydrOXYzine HCL 25 mg tablet; Commonly known as: Atarax   pramoxine 1 % foam; Commonly known as: Proctofoam       Outpatient Follow-Up  Future Appointments   Date Time Provider Department Center    4/30/2025 10:20 AM Han Gaines DO [id number] [facility]   5/19/2025 10:40 AM [facility] CENTRAL LINE 02 [id number] Academic   5/19/2025 11:30 AM Gio Saleem MD [id number] Academic       Virgen Benito, APRN-CNP

## 2025-04-24 NOTE — CARE PLAN
Problem: Fall/Injury  Goal: Not fall by end of shift  Outcome: Progressing  Goal: Be free from injury by end of the shift  Outcome: Progressing  Goal: Verbalize understanding of personal risk factors for fall in the hospital  Outcome: Progressing  Goal: Verbalize understanding of risk factor reduction measures to prevent injury from fall in the home  Outcome: Progressing  Goal: Use assistive devices by end of the shift  Outcome: Progressing  Goal: Pace activities to prevent fatigue by end of the shift  Outcome: Progressing     Problem: Pain  Goal: Takes deep breaths with improved pain control throughout the shift  Outcome: Progressing  Goal: Turns in bed with improved pain control throughout the shift  Outcome: Progressing  Goal: Walks with improved pain control throughout the shift  Outcome: Progressing  Goal: Performs ADL's with improved pain control throughout shift  Outcome: Progressing  Goal: Participates in PT with improved pain control throughout the shift  Outcome: Progressing  Goal: Free from opioid side effects throughout the shift  Outcome: Progressing  Goal: Free from acute confusion related to pain meds throughout the shift  Outcome: Progressing       The clinical goals for the shift include pt will report pain below 8/10 throughout shift

## 2025-04-25 ENCOUNTER — HOME CARE VISIT (OUTPATIENT)
Dept: HOME HEALTH SERVICES | Facility: HOME HEALTH | Age: 45
End: 2025-04-25

## 2025-04-25 NOTE — HOME HEALTH
upon arrival for scheduled appt there was no answer at the door . patient called and she said she was lying down and not feeling well and deffered admit until next week

## 2025-04-28 ENCOUNTER — HOME CARE VISIT (OUTPATIENT)
Dept: HOME HEALTH SERVICES | Facility: HOME HEALTH | Age: 45
End: 2025-04-28

## 2025-04-28 NOTE — CASE COMMUNICATION
Arrived at patient's home for scheduled OT admission visit, no answer by door. I texted patient and she said she wasn't home, she tried to text me but the message didn't go through.

## 2025-04-28 NOTE — HOME HEALTH
Arrived at patient's home for scheduled OT admission visit. No answer at door. I texted patient and she said she wasn't able to do the admission at this date, she states she tried to text me but the message didn't go through.

## 2025-04-29 ENCOUNTER — HOME CARE VISIT (OUTPATIENT)
Dept: HOME HEALTH SERVICES | Facility: HOME HEALTH | Age: 45
End: 2025-04-29

## 2025-04-30 ENCOUNTER — APPOINTMENT (OUTPATIENT)
Dept: ORTHOPEDIC SURGERY | Facility: CLINIC | Age: 45
End: 2025-04-30
Payer: COMMERCIAL

## 2025-04-30 LAB
ATRIAL RATE: 96 BPM
P AXIS: 60 DEGREES
P OFFSET: 188 MS
P ONSET: 112 MS
PR INTERVAL: 184 MS
Q ONSET: 219 MS
QRS COUNT: 15 BEATS
QRS DURATION: 84 MS
QT INTERVAL: 356 MS
QTC CALCULATION(BAZETT): 449 MS
QTC FREDERICIA: 416 MS
R AXIS: 27 DEGREES
T AXIS: 31 DEGREES
T OFFSET: 397 MS
VENTRICULAR RATE: 96 BPM

## 2025-05-09 ENCOUNTER — TELEPHONE (OUTPATIENT)
Dept: ADMISSION | Facility: HOSPITAL | Age: 45
End: 2025-05-09
Payer: COMMERCIAL

## 2025-05-09 DIAGNOSIS — D57.00 SICKLE CELL DISEASE WITH CRISIS (MULTI): ICD-10-CM

## 2025-05-09 RX ORDER — HYDROMORPHONE HYDROCHLORIDE 4 MG/1
4 TABLET ORAL EVERY 6 HOURS PRN
Qty: 40 TABLET | Refills: 0 | Status: SHIPPED | OUTPATIENT
Start: 2025-05-09

## 2025-05-14 ENCOUNTER — TELEPHONE (OUTPATIENT)
Dept: ADMISSION | Facility: HOSPITAL | Age: 45
End: 2025-05-14
Payer: COMMERCIAL

## 2025-05-14 DIAGNOSIS — D57.00 SICKLE CELL DISEASE WITH CRISIS (MULTI): ICD-10-CM

## 2025-05-14 DIAGNOSIS — G60.9 IDIOPATHIC PERIPHERAL NEUROPATHY: ICD-10-CM

## 2025-05-14 RX ORDER — TIZANIDINE 2 MG/1
2 TABLET ORAL EVERY 8 HOURS PRN
Qty: 90 TABLET | Refills: 0 | Status: SHIPPED | OUTPATIENT
Start: 2025-05-14 | End: 2025-06-13

## 2025-05-14 RX ORDER — PREGABALIN 25 MG/1
25 CAPSULE ORAL 3 TIMES DAILY
Qty: 90 CAPSULE | Refills: 0 | Status: SHIPPED | OUTPATIENT
Start: 2025-05-14 | End: 2025-06-13

## 2025-05-16 DIAGNOSIS — D57.00 SICKLE CELL ANEMIA WITH CRISIS: Primary | ICD-10-CM

## 2025-05-19 ENCOUNTER — OFFICE VISIT (OUTPATIENT)
Dept: HEMATOLOGY/ONCOLOGY | Facility: HOSPITAL | Age: 45
End: 2025-05-19
Payer: COMMERCIAL

## 2025-05-19 ENCOUNTER — LAB (OUTPATIENT)
Dept: HEMATOLOGY/ONCOLOGY | Facility: HOSPITAL | Age: 45
End: 2025-05-19
Payer: COMMERCIAL

## 2025-05-19 ENCOUNTER — APPOINTMENT (OUTPATIENT)
Dept: HEMATOLOGY/ONCOLOGY | Facility: HOSPITAL | Age: 45
End: 2025-05-19
Payer: COMMERCIAL

## 2025-05-19 VITALS
BODY MASS INDEX: 26.95 KG/M2 | SYSTOLIC BLOOD PRESSURE: 124 MMHG | WEIGHT: 152.12 LBS | TEMPERATURE: 97.5 F | RESPIRATION RATE: 17 BRPM | OXYGEN SATURATION: 100 % | HEART RATE: 82 BPM | DIASTOLIC BLOOD PRESSURE: 64 MMHG

## 2025-05-19 DIAGNOSIS — Z79.64 ENCOUNTER FOR MONITORING OF HYDROXYUREA THERAPY: ICD-10-CM

## 2025-05-19 DIAGNOSIS — D57.00 SICKLE CELL DISEASE WITH CRISIS (MULTI): ICD-10-CM

## 2025-05-19 DIAGNOSIS — D57.00 SICKLE CELL ANEMIA WITH CRISIS: ICD-10-CM

## 2025-05-19 DIAGNOSIS — Z51.81 ENCOUNTER FOR MONITORING OF HYDROXYUREA THERAPY: ICD-10-CM

## 2025-05-19 DIAGNOSIS — D57.1 SICKLE CELL DISEASE WITHOUT CRISIS (MULTI): ICD-10-CM

## 2025-05-19 DIAGNOSIS — E83.19 IRON OVERLOAD: Primary | ICD-10-CM

## 2025-05-19 DIAGNOSIS — G89.4 CHRONIC PAIN SYNDROME: ICD-10-CM

## 2025-05-19 LAB
ALBUMIN SERPL BCP-MCNC: 4.5 G/DL (ref 3.4–5)
ALP SERPL-CCNC: 66 U/L (ref 33–110)
ALT SERPL W P-5'-P-CCNC: 9 U/L (ref 7–45)
ANION GAP SERPL CALC-SCNC: 12 MMOL/L (ref 10–20)
AST SERPL W P-5'-P-CCNC: 15 U/L (ref 9–39)
BASOPHILS # BLD AUTO: 0.07 X10*3/UL (ref 0–0.1)
BASOPHILS NFR BLD AUTO: 0.4 %
BILIRUB SERPL-MCNC: 2.2 MG/DL (ref 0–1.2)
BUN SERPL-MCNC: 9 MG/DL (ref 6–23)
CALCIUM SERPL-MCNC: 9.1 MG/DL (ref 8.6–10.3)
CHLORIDE SERPL-SCNC: 108 MMOL/L (ref 98–107)
CO2 SERPL-SCNC: 23 MMOL/L (ref 21–32)
CREAT SERPL-MCNC: 1.03 MG/DL (ref 0.5–1.05)
EGFRCR SERPLBLD CKD-EPI 2021: 69 ML/MIN/1.73M*2
EOSINOPHIL # BLD AUTO: 0.11 X10*3/UL (ref 0–0.7)
EOSINOPHIL NFR BLD AUTO: 0.6 %
ERYTHROCYTE [DISTWIDTH] IN BLOOD BY AUTOMATED COUNT: 16.7 % (ref 11.5–14.5)
FERRITIN SERPL-MCNC: 3023 NG/ML (ref 8–150)
GLUCOSE SERPL-MCNC: 91 MG/DL (ref 74–99)
HCT VFR BLD AUTO: 21.5 % (ref 36–46)
HGB BLD-MCNC: 7.5 G/DL (ref 12–16)
HGB RETIC QN: 36 PG (ref 28–38)
HYPOCHROMIA BLD QL SMEAR: NORMAL
IMM GRANULOCYTES # BLD AUTO: 0.08 X10*3/UL (ref 0–0.7)
IMM GRANULOCYTES NFR BLD AUTO: 0.5 % (ref 0–0.9)
IMMATURE RETIC FRACTION: 37.9 %
LDH SERPL L TO P-CCNC: 198 U/L (ref 84–246)
LYMPHOCYTES # BLD AUTO: 6.37 X10*3/UL (ref 1.2–4.8)
LYMPHOCYTES NFR BLD AUTO: 35.9 %
MCH RBC QN AUTO: 33.6 PG (ref 26–34)
MCHC RBC AUTO-ENTMCNC: 34.9 G/DL (ref 32–36)
MCV RBC AUTO: 96 FL (ref 80–100)
MONOCYTES # BLD AUTO: 0.57 X10*3/UL (ref 0.1–1)
MONOCYTES NFR BLD AUTO: 3.2 %
NEUTROPHILS # BLD AUTO: 10.53 X10*3/UL (ref 1.2–7.7)
NEUTROPHILS NFR BLD AUTO: 59.4 %
NRBC BLD-RTO: 0.1 /100 WBCS (ref 0–0)
PAPPENHEIMER BOD BLD QL SMEAR: PRESENT
PLATELET # BLD AUTO: 340 X10*3/UL (ref 150–450)
POLYCHROMASIA BLD QL SMEAR: NORMAL
POTASSIUM SERPL-SCNC: 3.1 MMOL/L (ref 3.5–5.3)
PROT SERPL-MCNC: 7.7 G/DL (ref 6.4–8.2)
RBC # BLD AUTO: 2.23 X10*6/UL (ref 4–5.2)
RBC MORPH BLD: NORMAL
RETICS #: 0.2 X10*6/UL (ref 0.02–0.08)
RETICS/RBC NFR AUTO: 9.1 % (ref 0.5–2)
SICKLE CELLS BLD QL SMEAR: NORMAL
SODIUM SERPL-SCNC: 140 MMOL/L (ref 136–145)
TARGETS BLD QL SMEAR: NORMAL
WBC # BLD AUTO: 17.7 X10*3/UL (ref 4.4–11.3)

## 2025-05-19 PROCEDURE — 85045 AUTOMATED RETICULOCYTE COUNT: CPT

## 2025-05-19 PROCEDURE — 2500000004 HC RX 250 GENERAL PHARMACY W/ HCPCS (ALT 636 FOR OP/ED): Mod: JZ | Performed by: NURSE PRACTITIONER

## 2025-05-19 PROCEDURE — G2211 COMPLEX E/M VISIT ADD ON: HCPCS | Performed by: PEDIATRICS

## 2025-05-19 PROCEDURE — 80053 COMPREHEN METABOLIC PANEL: CPT

## 2025-05-19 PROCEDURE — 85025 COMPLETE CBC W/AUTO DIFF WBC: CPT

## 2025-05-19 PROCEDURE — 83615 LACTATE (LD) (LDH) ENZYME: CPT

## 2025-05-19 PROCEDURE — 99215 OFFICE O/P EST HI 40 MIN: CPT | Performed by: PEDIATRICS

## 2025-05-19 PROCEDURE — 36591 DRAW BLOOD OFF VENOUS DEVICE: CPT

## 2025-05-19 PROCEDURE — 82728 ASSAY OF FERRITIN: CPT

## 2025-05-19 RX ORDER — HEPARIN SODIUM,PORCINE/PF 10 UNIT/ML
50 SYRINGE (ML) INTRAVENOUS AS NEEDED
OUTPATIENT
Start: 2025-05-19

## 2025-05-19 RX ORDER — MELOXICAM 15 MG/1
15 TABLET ORAL DAILY
Qty: 30 TABLET | Refills: 2 | Status: SHIPPED | OUTPATIENT
Start: 2025-05-19

## 2025-05-19 RX ORDER — HEPARIN 100 UNIT/ML
500 SYRINGE INTRAVENOUS AS NEEDED
Status: DISCONTINUED | OUTPATIENT
Start: 2025-05-19 | End: 2025-05-19 | Stop reason: HOSPADM

## 2025-05-19 RX ORDER — HEPARIN 100 UNIT/ML
500 SYRINGE INTRAVENOUS AS NEEDED
OUTPATIENT
Start: 2025-05-19

## 2025-05-19 RX ADMIN — HEPARIN 500 UNITS: 100 SYRINGE at 11:26

## 2025-05-19 ASSESSMENT — PAIN SCALES - GENERAL: PAINLEVEL_OUTOF10: 3

## 2025-05-28 ENCOUNTER — TELEPHONE (OUTPATIENT)
Dept: ADMISSION | Facility: HOSPITAL | Age: 45
End: 2025-05-28
Payer: COMMERCIAL

## 2025-05-28 NOTE — TELEPHONE ENCOUNTER
Refill Request  Hydromorphone (Dilaudid) 4mg every 6 hrs PRN    Preferred Pharmacy  [facility] #[id number] [place]

## 2025-05-29 ENCOUNTER — OFFICE VISIT (OUTPATIENT)
Dept: PRIMARY CARE | Facility: CLINIC | Age: 45
End: 2025-05-29
Payer: COMMERCIAL

## 2025-05-29 VITALS
OXYGEN SATURATION: 100 % | SYSTOLIC BLOOD PRESSURE: 110 MMHG | WEIGHT: 147 LBS | HEIGHT: 63 IN | DIASTOLIC BLOOD PRESSURE: 64 MMHG | BODY MASS INDEX: 26.05 KG/M2 | TEMPERATURE: 97.9 F | HEART RATE: 77 BPM | RESPIRATION RATE: 16 BRPM

## 2025-05-29 DIAGNOSIS — D57.00 SICKLE CELL DISEASE WITH CRISIS (MULTI): ICD-10-CM

## 2025-05-29 DIAGNOSIS — D57.00 HEMOGLOBIN SS DISEASE WITH CRISIS (MULTI): ICD-10-CM

## 2025-05-29 DIAGNOSIS — Z12.31 ENCOUNTER FOR SCREENING MAMMOGRAM FOR MALIGNANT NEOPLASM OF BREAST: ICD-10-CM

## 2025-05-29 DIAGNOSIS — Z00.00 ROUTINE GENERAL MEDICAL EXAMINATION AT HEALTH CARE FACILITY: Primary | ICD-10-CM

## 2025-05-29 PROCEDURE — 99215 OFFICE O/P EST HI 40 MIN: CPT | Performed by: INTERNAL MEDICINE

## 2025-05-29 PROCEDURE — G0444 DEPRESSION SCREEN ANNUAL: HCPCS | Performed by: INTERNAL MEDICINE

## 2025-05-29 PROCEDURE — 99214 OFFICE O/P EST MOD 30 MIN: CPT | Performed by: INTERNAL MEDICINE

## 2025-05-29 PROCEDURE — 1036F TOBACCO NON-USER: CPT | Performed by: INTERNAL MEDICINE

## 2025-05-29 PROCEDURE — 3008F BODY MASS INDEX DOCD: CPT | Performed by: INTERNAL MEDICINE

## 2025-05-29 PROCEDURE — G0439 PPPS, SUBSEQ VISIT: HCPCS | Performed by: INTERNAL MEDICINE

## 2025-05-29 PROCEDURE — 99214 OFFICE O/P EST MOD 30 MIN: CPT | Mod: 25 | Performed by: INTERNAL MEDICINE

## 2025-05-29 RX ORDER — HYDROMORPHONE HYDROCHLORIDE 4 MG/1
4 TABLET ORAL EVERY 6 HOURS PRN
Qty: 40 TABLET | Refills: 0 | Status: SHIPPED | OUTPATIENT
Start: 2025-05-29

## 2025-05-29 SDOH — ECONOMIC STABILITY: FOOD INSECURITY: WITHIN THE PAST 12 MONTHS, YOU WORRIED THAT YOUR FOOD WOULD RUN OUT BEFORE YOU GOT MONEY TO BUY MORE.: NEVER TRUE

## 2025-05-29 SDOH — ECONOMIC STABILITY: FOOD INSECURITY: WITHIN THE PAST 12 MONTHS, THE FOOD YOU BOUGHT JUST DIDN'T LAST AND YOU DIDN'T HAVE MONEY TO GET MORE.: NEVER TRUE

## 2025-05-29 ASSESSMENT — ENCOUNTER SYMPTOMS
FEVER: 0
SHORTNESS OF BREATH: 0
VOMITING: 0
LOSS OF SENSATION IN FEET: 0
ABDOMINAL PAIN: 0
NAUSEA: 0
OCCASIONAL FEELINGS OF UNSTEADINESS: 0
CHILLS: 0
DEPRESSION: 0

## 2025-05-29 ASSESSMENT — ACTIVITIES OF DAILY LIVING (ADL)
GROCERY_SHOPPING: INDEPENDENT
MANAGING_FINANCES: INDEPENDENT
BATHING: INDEPENDENT
DOING_HOUSEWORK: INDEPENDENT
DRESSING: INDEPENDENT
TAKING_MEDICATION: NEEDS ASSISTANCE

## 2025-05-29 ASSESSMENT — PATIENT HEALTH QUESTIONNAIRE - PHQ9
1. LITTLE INTEREST OR PLEASURE IN DOING THINGS: NOT AT ALL
2. FEELING DOWN, DEPRESSED OR HOPELESS: NOT AT ALL
SUM OF ALL RESPONSES TO PHQ9 QUESTIONS 1 AND 2: 0
SUM OF ALL RESPONSES TO PHQ9 QUESTIONS 1 AND 2: 0
2. FEELING DOWN, DEPRESSED OR HOPELESS: NOT AT ALL
1. LITTLE INTEREST OR PLEASURE IN DOING THINGS: NOT AT ALL

## 2025-05-29 ASSESSMENT — LIFESTYLE VARIABLES: HOW MANY STANDARD DRINKS CONTAINING ALCOHOL DO YOU HAVE ON A TYPICAL DAY: PATIENT DOES NOT DRINK

## 2025-05-29 NOTE — PROGRESS NOTES
Subjective   Reason for Visit: Mary Alice Aragon is an 45 y.o. female here for a Medicare Wellness visit.          Reviewed all medications by prescribing practitioner or clinical pharmacist (such as prescriptions, OTCs, herbal therapies and supplements) and documented in the medical record.    Presents for Medicare annual wellness exam. Hasn't been seen in over 2 years. Has a form for me to fill out for employment. No acute issues today.        Patient Care Team:  Nyasia Mcdaniel MD as PCP - General (Internal Medicine)  SHYAM Mercado as PCP - [facility] PCP  Gio Saleem MD as Consulting Physician (Pediatric Hematology and Oncology)  RADHA Lawrence as  ()  SHYAM Mercado as Nurse Practitioner (Hematology and Oncology)  SHYAM Mercado as Nurse Practitioner (Hematology and Oncology)  Gio Saleem MD as Consulting Physician (Pediatric Hematology and Oncology)  Kiera Adler MD as Consulting Physician (Hospitalist)     Review of Systems   Constitutional:  Negative for chills and fever.   Respiratory:  Negative for shortness of breath.    Cardiovascular:  Negative for chest pain.   Gastrointestinal:  Negative for abdominal pain, nausea and vomiting.       Objective   Vitals:  There were no vitals taken for this visit.      Physical Exam  Gen appearance: well groomed in NAD  A & O: alert and oriented x 3  CV: RRR   Lungs: CTA bilaterally  Extr: no edema   Assessment & Plan  Routine general medical examination at health care facility    Employment paperwork filled out.    Orders:    1 Year Follow Up In Primary Care - Wellness Exam; Future    Hemoglobin SS disease with crisis (Multi)  Follow with heme       Encounter for screening mammogram for malignant neoplasm of breast    Orders:    BI mammo bilateral screening tomosynthesis; Future       Depression Screening  5 - 10 minutes were spent screening for depression.     RTO 6  mos    Greater than 40 minutes were spent on the care and coordination of care of the patient.

## 2025-05-29 NOTE — ASSESSMENT & PLAN NOTE
Employment paperwork filled out.    Orders:    1 Year Follow Up In Primary Care - Wellness Exam; Future

## 2025-06-05 ENCOUNTER — HOME INFUSION (OUTPATIENT)
Dept: INFUSION THERAPY | Age: 45
End: 2025-06-05
Payer: COMMERCIAL

## 2025-06-05 NOTE — PROGRESS NOTES
Review of chart. Patient with order for deferoxamine subcutaneous infusions over 8 hours daily for transfusional iron overload. Current orders are good thru 7/5/25. No labs are ordered, nursing not involved.    [facility] call to patient - she was hospitalized at the end of April then discharged with no changes to orders. She took a long break from desferal (last delivery 4/17). It makes her stomach upset when she infuses it. Still agreeable to delivery of a week of medications tomorrow. She will need all supplies with this delivery. No questions for [facility] at this time.    Pharmacy to mix 6/5 and deliver OVN with supplies to match:  7x deferoxamine bags  DOS 6/7 - 6/13     Follow up 6/12 - check needs, deliver OVN     *REMINDER: DON'T send a delivery if not able to confirm with patient (Per patient request)*

## 2025-06-12 DIAGNOSIS — N93.9 ABNORMAL UTERINE BLEEDING: ICD-10-CM

## 2025-06-12 RX ORDER — NORETHINDRONE 5 MG/1
5 TABLET ORAL DAILY
Qty: 90 TABLET | Refills: 0 | Status: SHIPPED | OUTPATIENT
Start: 2025-06-12

## 2025-06-14 ASSESSMENT — ENCOUNTER SYMPTOMS
CONSTIPATION: 0
NERVOUS/ANXIOUS: 0
BRUISES/BLEEDS EASILY: 0
ARTHRALGIAS: 1
VOMITING: 0
LEG SWELLING: 0
PALPITATIONS: 0
DIZZINESS: 0
WHEEZING: 0
EYE PROBLEMS: 0
DIARRHEA: 0
SORE THROAT: 0
COUGH: 0
MYALGIAS: 1
SHORTNESS OF BREATH: 0
NAUSEA: 0
SCLERAL ICTERUS: 1
LIGHT-HEADEDNESS: 0
ABDOMINAL PAIN: 0
FATIGUE: 1
WOUND: 0
DEPRESSION: 0
HEADACHES: 0
FEVER: 0
CHEST TIGHTNESS: 0
BACK PAIN: 1
HEMOPTYSIS: 0

## 2025-06-14 NOTE — PROGRESS NOTES
SICKLE CELL OUTPATIENT NOTE  Patient ID: Mary Alice Aragon   Visit Type: Follow up visit     ASSESSMENT AND PLAN  Mary Alice Aragon is 45 y.o. female with     History of Hb SS SCD recently posthospital discharge for acute sickle cell pain episode and severe anemia requiring packed red blood cell transfusion.  She has been going acute sickle cell pain, which continues to improve with current home regimen and also continues to experience muscle spasms in the back of her neck.  Peripheral neuropathy is otherwise well-controlled.   Oral Tylenol 650 mg every 6 hours as needed and or ibuprofen 600 mg every 8 hours as needed for mild to moderate pain  Oral dilaudid 4 mg every 4-6 hours as needed for moderate, severe and breakthrough pain   Increase tizanidine to 4 mg every 8 hours as needed for muscle spasms  Methadone 10 mg BID for chronic pain   Lyrica 25 mg BID for peripheral neuropathy and chronic pain  Topical capsaicin Lidoderm patch and diclofenac gel as needed   Meloxicam as needed   Non pharmacologic methods of pain control.  Judicious use of this in the setting of NSAID use  Reviewed OARRS and there are no prescriptions of concern  Daily folic acid 1 mg    Encounter for management of disease modifying therapy with hydroxyurea   Continue hydroxyurea at current dose of 1500 mg X 3 days in a week and 1000 X 4 days in a week      Nocturnal hypoxia on 3L supplemental night time oxygen   Continue night supplemental oxygen       Constipation, well controlled  Continue current laxatives with senna and miralax as needed     Iron overload secondary to chronic transfusions, she has been noncompliant with oral Jadenu, and intermittently compliant with Desferal.   Discussed importance of continuing Desferal and being compliant at 2 g daily.  Will hold off restarting oral Jadenu for now since she has not been compliant with medication      Follow up office visit will be in 3 months time      Chief Complaint: Hospital follow up for  HB SS SCD      Interval History: Mary Alice is present in clinic today for hospital follow-up.  She was discharged on 4/24/2025, following admission for acute sickle cell pain, and severe anemia.  She received 1 unit of packed red blood cells during this admission.  She has overall been doing well since discharge, but continues to have pain and spasms mainly in the back of her neck, as well as her right ankle.  She rates pain in clinic today as 4 out of 10, and is well-controlled with her current home oral pain regimen.  She denies chest pain, chest tightness, shortness of breath, tingling in her fingers, cough, runny nose, or congestion.  Her appetite and energy levels have been low, but these are improving.  Peripheral neuropathy continues to be well-controlled with Lyrica.  Her mood and affect at her baseline, and denies feeling depressed or anxious.  Mary Alice continues to tolerate oral hydroxyurea without any major side effects.  She however has not been taking Jadenu as prescribed and hopes to start this soon.  This intermittently compliant with subcu Desferal and misses more than 2 to 3 days a week.  She has been compliant with supplemental nighttime oxygen 3 L.  She is stooling normally with her current laxatives.  She has not had any focal neurologic deficits, bilateral lower extremity swelling, or leg ulcers.    Review of System:   Review of Systems   Constitutional:  Positive for fatigue. Negative for fever.   HENT:   Negative for mouth sores, nosebleeds and sore throat.    Eyes:  Positive for icterus. Negative for eye problems.   Respiratory:  Negative for chest tightness, cough, hemoptysis, shortness of breath and wheezing.    Cardiovascular:  Negative for chest pain, leg swelling and palpitations.   Gastrointestinal:  Negative for abdominal pain, constipation, diarrhea, nausea and vomiting.   Genitourinary: Negative.     Musculoskeletal:  Positive for arthralgias, back pain and myalgias.   Skin:  Negative for  wound.   Neurological:  Negative for dizziness, headaches and light-headedness.   Hematological:  Does not bruise/bleed easily.   Psychiatric/Behavioral:  Negative for depression and suicidal ideas. The patient is not nervous/anxious.       Allergies:   No Known Allergies    Current Medications:   Medication Documentation Review Audit            Medication Order Taking? Sig Documenting Provider Last Dose Status   ascorbic acid (Vitamin C) 500 mg tablet [id number] Yes Take 2 tablets (1,000 mg) by mouth once daily. Monica Rosales MD 4/17/2025 Active   capsaicin (Zostrix) 0.025 % cream [id number] Yes Apply 1 Application topically if needed. Historical Provider, MD Past Week Active   deferoxamine (Desferal) 2 gram injection [id number] Yes Infuse 2000mg subcutaneously once daily over 8 hours via [place] Pump MICHELLE Mercado-CNP 4/17/2025 Active   diclofenac sodium (Voltaren) 1 % gel [id number] Yes Per instructions 4 TIMES DAILY (route: topical) Gio Saleem MD 4/17/2025 Active   diphenhydrAMINE (BENADryl) 25 mg capsule [id number] Yes Take 2 capsules (50 mg) by mouth every 8 hours if needed. Historical Provider, MD Unknown Active   docusate sodium (Colace) 100 mg capsule [id number] Yes Take 1 capsule (100 mg) by mouth every other day. Monica Rosales MD 4/17/2025 Active   folic acid (Folvite) 1 mg tablet [id number] Yes Take 1 tablet (1 mg) by mouth once daily. Monica Rosales MD 4/17/2025 Active     Discontinued 05/29/25 0744   HYDROmorphone (Dilaudid) 4 mg tablet [id number] Yes Take 1 tablet (4 mg) by mouth every 6 hours if needed for severe pain (7 - 10). MICHELLE Ohara-CNP  Active   hydroxyurea (Hydrea) 500 mg capsule [id number] Yes Take 1 capsule (500 mg total) by mouth once a day on Sunday, Tuesday, Thursday, and Saturday. Historical Provider, MD Past Week Active   hydroxyurea (Hydrea) 500 mg capsule [id number] Yes Per instructions AS DIRECTED (route: oral) Gio Saleem MD  Active    ketamine (Ketalar) 10 mg/mL injection [id number] Yes Infuse 1 mL (10 mg) into a venous catheter every 30 (thirty) days. Not administered in the home. Monica Provider, MD Past Month Active   lidocaine (Lidoderm) 5 % patch [id number] Yes Place 1 patch on the skin once daily as needed for mild pain (1 - 3). Monica Provider, MD Past Month Active   loratadine (Claritin) 10 mg tablet [id number] Yes Take 1 tablet (10 mg) by mouth once daily as needed for allergies. Monica Provider, MD  Active   melatonin 5 mg tablet [id number] Yes Take 2 tablets (10 mg) by mouth once daily at bedtime. Monica Rosales MD 4/17/2025 Active   meloxicam (Mobic) 15 mg tablet [id number] Yes Take 1 tablet (15 mg) by mouth once daily. Gio Saleem MD  Active   methadone (Dolophine) 10 mg tablet [id number] Yes Take 1 tablet (10 mg) by mouth every 12 hours. Gio Saleem MD  Active   multivitamin tablet [id number] Yes Take 1 tablet by mouth once daily. Monica Rosales MD 4/17/2025 Active   naloxone (Narcan) 4 mg/0.1 mL nasal spray [id number] Yes Administer 1 spray (4 mg) into affected nostril(s) if needed for opioid reversal or respiratory depression. May repeat every 2-3 minutes if needed, alternating nostrils, until medical assistance becomes available. Gio Saleem MD Unknown Active   norethindrone (Aygestin) 5 mg tablet [id number] Yes Take 1 tablet (5 mg) by mouth once daily. Mary Ann Bolanos MD 4/18/2025 Active   oxygen (O2) gas therapy [id number] Yes Inhale 3 L/min once daily at bedtime. Monica Rosales MD 4/17/2025 Active   oxygen (O2) gas therapy [id number] Yes Inhale 1 each continuously. Allyson Mendosa, APRN-CNP  Active   peg 400/hypromellose/glycerin (DRY EYE RELIEF OPHT) [id number] Yes Administer 1-2 drops into both eyes once daily as needed. Monica Rosales MD 4/17/2025 Active   pregabalin (Lyrica) 25 mg capsule [id number] Yes Take 1 capsule (25 mg) by mouth 3 times a day. Gio Saleem MD  Active    prochlorperazine (Compazine) 10 mg tablet [id number] Yes Take 1 tablet (10 mg) by mouth every 8 hours if needed for nausea or vomiting. 1 tablet EVERY 8 HOURS (route: oral) Gio Saleem MD Past Week Active   senna 8.6 mg tablet [id number] Yes Take 1 tablet (8.6 mg) by mouth 2 times a day as needed for constipation. Gio Saleem MD Unknown Active   tiZANidine (Zanaflex) 2 mg tablet [id number] Yes Take 1 tablet (2-4 mg) by mouth every 8 hours if needed for muscle spasms. Gio Saleem MD  Active   traZODone (Desyrel) 100 mg tablet [id number] Yes TAKE 1 TO 2 TABLETS(100  MG) BY MOUTH AT BEDTIME AS NEEDED FOR SLEEP please schedule FUV Cinthia May MD 4/17/2025 Active   trolamine salicylate (Aspercreme) 10 % cream [id number] Yes Apply 1 Application topically if needed. Historical Provider, MD Unknown Active   valACYclovir (Valtrex) 500 mg tablet [id number] Yes Take 1 tablet (500 mg) by mouth once daily. 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 Right frontal encephalomalacia secondary to sequela of right middle cerebral artery infarction.   History of lead poisoning with hydrocephalus status post right ventriculostomy shunt catheter, and subsequent removal.    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 She has been evaluated by Neurosurgery and is stable with no required intervention.   Low-grade squamous epithelial lesion on Pap smear in May 2011.   Pulmonary embolism in 2009 in the postpartum phase, anticoagulated for 6 months.   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    Status post cholecystectomy.    Pneumonia in 2006.    Chronic pain: Currently managed with monthly ketamine infusions with good response.   Nocturnal  oxygen dependence    Past Surgical History:    has a past surgical history that includes Cholecystectomy (2016);  section, classic (2016); Other surgical history (2016); Tubal ligation (04/10/2017); MR angio head w and wo IV contrast (4/15/2013); MR angio neck wo IV contrast (2013); MR angio head w and wo IV contrast (2013); MR angio head wo IV contrast (2014); MR angio neck wo IV contrast (2014); MR angio head wo IV contrast (2016); MR angio head wo IV contrast (2019); and MR angio head wo IV contrast (2017).    Family History:   Family History   Problem Relation Name Age of Onset    Sickle cell trait Sister      Multiple sclerosis Brother      Breast cancer Maternal Grandmother      Breast cancer Other paternal cousin     Sickle cell trait Child         Social History:   Mary Alice Aragon  reports that she has never smoked. She has never used smokeless tobacco.  reports that she does not currently use drugs.    EXAMINATION FINDINGS   /64 (BP Location: Left arm, Patient Position: Sitting, BP Cuff Size: Adult)   Pulse 82   Temp 36.4 °C (97.5 °F) (Skin)   Resp 17   Wt 69 kg (152 lb 1.9 oz)   SpO2 100%   BMI 26.95 kg/m²   1.75 meters squared    Physical Exam  Vitals reviewed.   Constitutional:       General: She is not in acute distress.     Appearance: Normal appearance.   HENT:      Mouth/Throat:      Mouth: Mucous membranes are moist.      Pharynx: No oropharyngeal exudate or posterior oropharyngeal erythema.   Eyes:      General: No scleral icterus.     Extraocular Movements: Extraocular movements intact.      Pupils: Pupils are equal, round, and reactive to light.   Cardiovascular:      Rate and Rhythm: Normal rate and regular rhythm.      Pulses: Normal pulses.      Heart sounds: No murmur heard.     No gallop.   Pulmonary:      Effort: Pulmonary effort is normal. No respiratory distress.      Breath sounds: Normal breath sounds. No wheezing or  rales.   Abdominal:      General: Bowel sounds are normal.      Palpations: Abdomen is soft.      Tenderness: There is no abdominal tenderness.   Musculoskeletal:      Cervical back: Normal range of motion and neck supple.      Right lower leg: No edema.      Left lower leg: No edema.   Skin:     General: Skin is warm.      Capillary Refill: Capillary refill takes less than 2 seconds.      Findings: No lesion or rash.   Neurological:      General: No focal deficit present.      Mental Status: She is alert and oriented to person, place, and time. Mental status is at baseline.      Motor: No weakness.      Gait: Gait normal.   Psychiatric:         Mood and Affect: Mood normal.         Behavior: Behavior normal.       LABS   Lab on 05/19/2025   Component Date Value Ref Range Status    WBC 05/19/2025 17.7 (H)  4.4 - 11.3 x10*3/uL Final    nRBC 05/19/2025 0.1 (H)  0.0 - 0.0 /100 WBCs Final    RBC 05/19/2025 2.23 (L)  4.00 - 5.20 x10*6/uL Final    Hemoglobin 05/19/2025 7.5 (L)  12.0 - 16.0 g/dL Final    Hematocrit 05/19/2025 21.5 (L)  36.0 - 46.0 % Final    MCV 05/19/2025 96  80 - 100 fL Final    MCH 05/19/2025 33.6  26.0 - 34.0 pg Final    MCHC 05/19/2025 34.9  32.0 - 36.0 g/dL Final    RDW 05/19/2025 16.7 (H)  11.5 - 14.5 % Final    Platelets 05/19/2025 340  150 - 450 x10*3/uL Final    Neutrophils % 05/19/2025 59.4  40.0 - 80.0 % Final    Immature Granulocytes %, Automated 05/19/2025 0.5  0.0 - 0.9 % Final    Immature Granulocyte Count (IG) includes promyelocytes, myelocytes and metamyelocytes but does not include bands. Percent differential counts (%) should be interpreted in the context of the absolute cell counts (cells/UL).    Lymphocytes % 05/19/2025 35.9  13.0 - 44.0 % Final    Monocytes % 05/19/2025 3.2  2.0 - 10.0 % Final    Eosinophils % 05/19/2025 0.6  0.0 - 6.0 % Final    Basophils % 05/19/2025 0.4  0.0 - 2.0 % Final    Neutrophils Absolute 05/19/2025 10.53 (H)  1.20 - 7.70 x10*3/uL Final    Percent  differential counts (%) should be interpreted in the context of the absolute cell counts (cells/uL).    Immature Granulocytes Absolute, Au* 05/19/2025 0.08  0.00 - 0.70 x10*3/uL Final    Lymphocytes Absolute 05/19/2025 6.37 (H)  1.20 - 4.80 x10*3/uL Final    Monocytes Absolute 05/19/2025 0.57  0.10 - 1.00 x10*3/uL Final    Eosinophils Absolute 05/19/2025 0.11  0.00 - 0.70 x10*3/uL Final    Basophils Absolute 05/19/2025 0.07  0.00 - 0.10 x10*3/uL Final    Automated WBC differential has been confirmed by manual smear review    Glucose 05/19/2025 91  74 - 99 mg/dL Final    Sodium 05/19/2025 140  136 - 145 mmol/L Final    Potassium 05/19/2025 3.1 (L)  3.5 - 5.3 mmol/L Final    Chloride 05/19/2025 108 (H)  98 - 107 mmol/L Final    Bicarbonate 05/19/2025 23  21 - 32 mmol/L Final    Anion Gap 05/19/2025 12  10 - 20 mmol/L Final    Urea Nitrogen 05/19/2025 9  6 - 23 mg/dL Final    Creatinine 05/19/2025 1.03  0.50 - 1.05 mg/dL Final    eGFR 05/19/2025 69  >60 mL/min/1.73m*2 Final    Calculations of estimated GFR are performed using the 2021 CKD-EPI Study Refit equation without the race variable for the IDMS-Traceable creatinine methods.  https://jasn.asnjournals.org/content/early/2021/09/22/ASN.0348208633    Calcium 05/19/2025 9.1  8.6 - 10.3 mg/dL Final    Albumin 05/19/2025 4.5  3.4 - 5.0 g/dL Final    Alkaline Phosphatase 05/19/2025 66  33 - 110 U/L Final    Total Protein 05/19/2025 7.7  6.4 - 8.2 g/dL Final    AST 05/19/2025 15  9 - 39 U/L Final    Bilirubin, Total 05/19/2025 2.2 (H)  0.0 - 1.2 mg/dL Final    ALT 05/19/2025 9  7 - 45 U/L Final    Patients treated with Sulfasalazine may generate falsely decreased results for ALT.    LDH 05/19/2025 198  84 - 246 U/L Final    Retic % 05/19/2025 9.1 (H)  0.5 - 2.0 % Final    Retic Absolute 05/19/2025 0.204 (H)  0.018 - 0.083 x10*6/uL Final    Reticulocyte Hemoglobin 05/19/2025 36  28 - 38 pg Final    Immature Retic fraction 05/19/2025 37.9 (H)  <=16.0 % Final     "Reticulocytes are measured based on a fluorescent technique. The IRF, or immature reticulocyte fraction, is the percent of reticulocytes that show medium (MFR) or high (HFR) fluorescence.  This value can be used to assess the relative maturity of the reticulocyte population in response to anemia. The \"shift reticulocytes\" are not measured by this technique, eliminating the need for their correction in the reticulocyte index.    Ferritin 05/19/2025 3,023 (H)  8 - 150 ng/mL Final    RBC Morphology 05/19/2025 See Below   Final    Polychromasia 05/19/2025 Mild   Final    Hypochromia 05/19/2025 Mild   Final    Sickle Cells 05/19/2025 Few   Final    Target Cells 05/19/2025 Few   Final    Pappenheimer Bodies 05/19/2025 Present   Final                 Gio Saleem MD                         "

## 2025-06-17 ENCOUNTER — APPOINTMENT (OUTPATIENT)
Dept: ORTHOPEDIC SURGERY | Facility: CLINIC | Age: 45
End: 2025-06-17
Payer: COMMERCIAL

## 2025-06-17 ENCOUNTER — TELEPHONE (OUTPATIENT)
Dept: ADMISSION | Facility: HOSPITAL | Age: 45
End: 2025-06-17

## 2025-06-17 DIAGNOSIS — D57.00 SICKLE CELL DISEASE WITH CRISIS (MULTI): ICD-10-CM

## 2025-06-17 RX ORDER — HYDROMORPHONE HYDROCHLORIDE 4 MG/1
4 TABLET ORAL EVERY 6 HOURS PRN
Qty: 40 TABLET | Refills: 0 | Status: SHIPPED | OUTPATIENT
Start: 2025-06-17

## 2025-06-17 NOTE — TELEPHONE ENCOUNTER
Pt requesting refill   Dilaudid 4mg. 1 tablet every 6 hrs prn  Ph[facility] on [address] in [place]  Last FUV 5/19, next FUV 8/18

## 2025-07-08 ENCOUNTER — TELEPHONE (OUTPATIENT)
Dept: HEMATOLOGY/ONCOLOGY | Facility: HOSPITAL | Age: 45
End: 2025-07-08
Payer: COMMERCIAL

## 2025-07-08 DIAGNOSIS — G60.9 IDIOPATHIC PERIPHERAL NEUROPATHY: ICD-10-CM

## 2025-07-08 DIAGNOSIS — D57.00 SICKLE CELL DISEASE WITH CRISIS (MULTI): ICD-10-CM

## 2025-07-08 RX ORDER — TIZANIDINE 2 MG/1
2 TABLET ORAL EVERY 8 HOURS PRN
Qty: 90 TABLET | Refills: 0 | Status: ON HOLD | OUTPATIENT
Start: 2025-07-08 | End: 2025-08-07

## 2025-07-08 RX ORDER — PREGABALIN 25 MG/1
25 CAPSULE ORAL 3 TIMES DAILY
Qty: 90 CAPSULE | Refills: 0 | Status: ON HOLD | OUTPATIENT
Start: 2025-07-08 | End: 2025-08-07

## 2025-07-08 RX ORDER — HYDROMORPHONE HYDROCHLORIDE 4 MG/1
4 TABLET ORAL EVERY 6 HOURS PRN
Qty: 40 TABLET | Refills: 0 | Status: ON HOLD | OUTPATIENT
Start: 2025-07-08

## 2025-07-08 NOTE — TELEPHONE ENCOUNTER
Refill requests received for the following medications:    Tizanidine 2mg  Pregabalin 25mg  Hydromorphone 4mg    Preferred pharmacy is [facility] at [address] in [place].    Message sent to Sickle Cell team.

## 2025-07-10 DIAGNOSIS — D57.00 SICKLE CELL DISEASE WITH CRISIS (MULTI): ICD-10-CM

## 2025-07-11 ENCOUNTER — CLINICAL SUPPORT (OUTPATIENT)
Dept: EMERGENCY MEDICINE | Facility: HOSPITAL | Age: 45
End: 2025-07-11
Payer: COMMERCIAL

## 2025-07-11 ENCOUNTER — HOME INFUSION (OUTPATIENT)
Dept: INFUSION THERAPY | Age: 45
End: 2025-07-11
Payer: COMMERCIAL

## 2025-07-11 ENCOUNTER — HOSPITAL ENCOUNTER (INPATIENT)
Facility: HOSPITAL | Age: 45
End: 2025-07-11
Attending: STUDENT IN AN ORGANIZED HEALTH CARE EDUCATION/TRAINING PROGRAM
Payer: COMMERCIAL

## 2025-07-11 ENCOUNTER — NURSE TRIAGE (OUTPATIENT)
Dept: ADMISSION | Facility: HOSPITAL | Age: 45
End: 2025-07-11
Payer: COMMERCIAL

## 2025-07-11 DIAGNOSIS — N17.9 AKI (ACUTE KIDNEY INJURY): ICD-10-CM

## 2025-07-11 DIAGNOSIS — D57.00 SICKLE CELL CRISIS (MULTI): ICD-10-CM

## 2025-07-11 DIAGNOSIS — E83.111 IRON OVERLOAD, TRANSFUSIONAL: ICD-10-CM

## 2025-07-11 DIAGNOSIS — D57.00 SICKLE CELL PAIN CRISIS (MULTI): Primary | ICD-10-CM

## 2025-07-11 DIAGNOSIS — R22.43 LOCALIZED SWELLING, MASS AND LUMP, LOWER LIMB, BILATERAL: ICD-10-CM

## 2025-07-11 PROCEDURE — 99285 EMERGENCY DEPT VISIT HI MDM: CPT | Performed by: STUDENT IN AN ORGANIZED HEALTH CARE EDUCATION/TRAINING PROGRAM

## 2025-07-11 PROCEDURE — 84702 CHORIONIC GONADOTROPIN TEST: CPT

## 2025-07-11 PROCEDURE — 93005 ELECTROCARDIOGRAM TRACING: CPT

## 2025-07-11 PROCEDURE — 2500000001 HC RX 250 WO HCPCS SELF ADMINISTERED DRUGS (ALT 637 FOR MEDICARE OP)

## 2025-07-11 PROCEDURE — 80053 COMPREHEN METABOLIC PANEL: CPT

## 2025-07-11 PROCEDURE — 85025 COMPLETE CBC W/AUTO DIFF WBC: CPT

## 2025-07-11 PROCEDURE — 83615 LACTATE (LD) (LDH) ENZYME: CPT

## 2025-07-11 PROCEDURE — 2500000004 HC RX 250 GENERAL PHARMACY W/ HCPCS (ALT 636 FOR OP/ED): Mod: JZ,TB

## 2025-07-11 PROCEDURE — 85045 AUTOMATED RETICULOCYTE COUNT: CPT

## 2025-07-11 PROCEDURE — 93010 ELECTROCARDIOGRAM REPORT: CPT | Performed by: STUDENT IN AN ORGANIZED HEALTH CARE EDUCATION/TRAINING PROGRAM

## 2025-07-11 RX ORDER — DIPHENHYDRAMINE HCL 25 MG
25 CAPSULE ORAL EVERY 6 HOURS PRN
Status: DISCONTINUED | OUTPATIENT
Start: 2025-07-11 | End: 2025-07-17 | Stop reason: HOSPADM

## 2025-07-11 RX ORDER — KETOROLAC TROMETHAMINE 15 MG/ML
15 INJECTION, SOLUTION INTRAMUSCULAR; INTRAVENOUS ONCE
Status: COMPLETED | OUTPATIENT
Start: 2025-07-11 | End: 2025-07-11

## 2025-07-11 RX ORDER — ONDANSETRON 4 MG/1
4 TABLET, ORALLY DISINTEGRATING ORAL EVERY 8 HOURS PRN
Status: DISCONTINUED | OUTPATIENT
Start: 2025-07-11 | End: 2025-07-12

## 2025-07-11 RX ORDER — DEFEROXAMINE MESYLATE 2 G/1
2000 INJECTION, POWDER, LYOPHILIZED, FOR SOLUTION INTRAMUSCULAR; INTRAVENOUS; SUBCUTANEOUS DAILY
Qty: 1 EACH | Refills: 11 | Status: SHIPPED | OUTPATIENT
Start: 2025-07-11 | End: 2025-07-11 | Stop reason: SDUPTHER

## 2025-07-11 RX ORDER — DEFEROXAMINE MESYLATE 2 G/1
2000 INJECTION, POWDER, LYOPHILIZED, FOR SOLUTION INTRAMUSCULAR; INTRAVENOUS; SUBCUTANEOUS DAILY
Qty: 30 EACH | Refills: 11 | Status: ON HOLD | OUTPATIENT
Start: 2025-07-11 | End: 2025-07-17

## 2025-07-11 RX ORDER — HYDROMORPHONE HYDROCHLORIDE 1 MG/ML
1 INJECTION, SOLUTION INTRAMUSCULAR; INTRAVENOUS; SUBCUTANEOUS EVERY 30 MIN PRN
Status: DISCONTINUED | OUTPATIENT
Start: 2025-07-11 | End: 2025-07-12

## 2025-07-11 RX ORDER — KETOROLAC TROMETHAMINE 15 MG/ML
15 INJECTION, SOLUTION INTRAMUSCULAR; INTRAVENOUS ONCE
Status: DISCONTINUED | OUTPATIENT
Start: 2025-07-11 | End: 2025-07-11

## 2025-07-11 RX ADMIN — HYDROMORPHONE HYDROCHLORIDE 1 MG: 1 INJECTION, SOLUTION INTRAMUSCULAR; INTRAVENOUS; SUBCUTANEOUS at 23:56

## 2025-07-11 RX ADMIN — DIPHENHYDRAMINE HYDROCHLORIDE 25 MG: 25 CAPSULE ORAL at 23:26

## 2025-07-11 RX ADMIN — KETOROLAC TROMETHAMINE 15 MG: 15 INJECTION, SOLUTION INTRAMUSCULAR; INTRAVENOUS at 23:21

## 2025-07-11 RX ADMIN — HYDROMORPHONE HYDROCHLORIDE 1 MG: 1 INJECTION, SOLUTION INTRAMUSCULAR; INTRAVENOUS; SUBCUTANEOUS at 23:21

## 2025-07-11 RX ADMIN — ONDANSETRON 4 MG: 4 TABLET, ORALLY DISINTEGRATING ORAL at 23:25

## 2025-07-11 ASSESSMENT — PAIN SCALES - GENERAL: PAINLEVEL_OUTOF10: 6

## 2025-07-11 ASSESSMENT — PAIN DESCRIPTION - PAIN TYPE: TYPE: ACUTE PAIN

## 2025-07-11 ASSESSMENT — PAIN - FUNCTIONAL ASSESSMENT: PAIN_FUNCTIONAL_ASSESSMENT: 0-10

## 2025-07-11 NOTE — TELEPHONE ENCOUNTER
Patient wanted the office to know that she has uncontrolled sickle cell pain for the last couple of days especially in her right foot and she is going to go to the ED for pain management. ED made aware via disposition.

## 2025-07-11 NOTE — PROGRESS NOTES
Review of chart. Patient with order for deferoxamine subcutaneous infusions over 8 hours daily for transfusional iron overload. New orders received for this year - valid thru 7/10/26. No labs are ordered, nursing not involved.     RPh call to patient has been holding doses for ~1 month as it makes her stomach upset when she infuses it. She is now agreeable to delivery of a week of medications today. She will need all supplies with this delivery. No questions for [facility] at this time.     Pharmacy to mix 7/11 and deliver straight with supplies to match:  7x deferoxamine bags  DOS 7/12 - 7/18     Follow up 7/17 - check needs, deliver OVN     *REMINDER: DON'T send a delivery if not able to confirm with patient (Per patient request)*

## 2025-07-11 NOTE — ED TRIAGE NOTES
Pt presented to ED for c/o sickle cell pain mostly in her R leg and R arm that started a couple of days ago and is unrelieved by medication. Denies CP/SOB.

## 2025-07-12 ENCOUNTER — APPOINTMENT (OUTPATIENT)
Dept: RADIOLOGY | Facility: HOSPITAL | Age: 45
End: 2025-07-12
Payer: COMMERCIAL

## 2025-07-12 ENCOUNTER — APPOINTMENT (OUTPATIENT)
Dept: HEMATOLOGY/ONCOLOGY | Facility: HOSPITAL | Age: 45
End: 2025-07-12
Payer: COMMERCIAL

## 2025-07-12 PROBLEM — D57.00 SICKLE CELL PAIN CRISIS (MULTI): Status: ACTIVE | Noted: 2025-07-12

## 2025-07-12 LAB
ABO GROUP (TYPE) IN BLOOD: NORMAL
ALBUMIN SERPL BCP-MCNC: 4.3 G/DL (ref 3.4–5)
ALBUMIN SERPL BCP-MCNC: 4.6 G/DL (ref 3.4–5)
ALP SERPL-CCNC: 64 U/L (ref 33–110)
ALP SERPL-CCNC: 65 U/L (ref 33–110)
ALT SERPL W P-5'-P-CCNC: 10 U/L (ref 7–45)
ALT SERPL W P-5'-P-CCNC: 11 U/L (ref 7–45)
AMPHETAMINES UR QL SCN: ABNORMAL
ANION GAP SERPL CALC-SCNC: 12 MMOL/L (ref 10–20)
ANION GAP SERPL CALC-SCNC: 14 MMOL/L (ref 10–20)
ANTIBODY SCREEN: NORMAL
AST SERPL W P-5'-P-CCNC: 21 U/L (ref 9–39)
AST SERPL W P-5'-P-CCNC: 23 U/L (ref 9–39)
ATRIAL RATE: 69 BPM
B-HCG SERPL-ACNC: <3 MIU/ML
BARBITURATES UR QL SCN: ABNORMAL
BASO STIPL BLD QL SMEAR: PRESENT
BASOPHILS # BLD AUTO: 0.11 X10*3/UL (ref 0–0.1)
BASOPHILS # BLD AUTO: 0.11 X10*3/UL (ref 0–0.1)
BASOPHILS NFR BLD AUTO: 0.6 %
BASOPHILS NFR BLD AUTO: 0.7 %
BILIRUB SERPL-MCNC: 2.8 MG/DL (ref 0–1.2)
BILIRUB SERPL-MCNC: 3.7 MG/DL (ref 0–1.2)
BUN SERPL-MCNC: 14 MG/DL (ref 6–23)
BUN SERPL-MCNC: 17 MG/DL (ref 6–23)
BZE UR QL SCN: ABNORMAL
CALCIUM SERPL-MCNC: 9 MG/DL (ref 8.6–10.6)
CALCIUM SERPL-MCNC: 9.1 MG/DL (ref 8.6–10.6)
CANNABINOIDS UR QL SCN: ABNORMAL
CHLORIDE SERPL-SCNC: 107 MMOL/L (ref 98–107)
CHLORIDE SERPL-SCNC: 107 MMOL/L (ref 98–107)
CO2 SERPL-SCNC: 21 MMOL/L (ref 21–32)
CO2 SERPL-SCNC: 23 MMOL/L (ref 21–32)
CREAT SERPL-MCNC: 0.91 MG/DL (ref 0.5–1.05)
CREAT SERPL-MCNC: 1.12 MG/DL (ref 0.5–1.05)
CREAT UR-MCNC: 151.8 MG/DL (ref 20–320)
CRP SERPL-MCNC: 0.67 MG/DL
EGFRCR SERPLBLD CKD-EPI 2021: 63 ML/MIN/1.73M*2
EGFRCR SERPLBLD CKD-EPI 2021: 80 ML/MIN/1.73M*2
EOSINOPHIL # BLD AUTO: 0.18 X10*3/UL (ref 0–0.7)
EOSINOPHIL # BLD AUTO: 0.18 X10*3/UL (ref 0–0.7)
EOSINOPHIL NFR BLD AUTO: 1 %
EOSINOPHIL NFR BLD AUTO: 1.1 %
ERYTHROCYTE [DISTWIDTH] IN BLOOD BY AUTOMATED COUNT: 16.5 % (ref 11.5–14.5)
ERYTHROCYTE [DISTWIDTH] IN BLOOD BY AUTOMATED COUNT: 17 % (ref 11.5–14.5)
ERYTHROCYTE [SEDIMENTATION RATE] IN BLOOD BY WESTERGREN METHOD: 4 MM/H (ref 0–20)
FERRITIN SERPL-MCNC: 2117 NG/ML (ref 8–150)
GLUCOSE SERPL-MCNC: 83 MG/DL (ref 74–99)
GLUCOSE SERPL-MCNC: 96 MG/DL (ref 74–99)
HCT VFR BLD AUTO: 17.1 % (ref 36–46)
HCT VFR BLD AUTO: 20.4 % (ref 36–46)
HGB BLD-MCNC: 6.2 G/DL (ref 12–16)
HGB BLD-MCNC: 7.3 G/DL (ref 12–16)
HGB RETIC QN: 33 PG (ref 28–38)
HGB RETIC QN: 34 PG (ref 28–38)
IMM GRANULOCYTES # BLD AUTO: 0.06 X10*3/UL (ref 0–0.7)
IMM GRANULOCYTES # BLD AUTO: 0.07 X10*3/UL (ref 0–0.7)
IMM GRANULOCYTES NFR BLD AUTO: 0.3 % (ref 0–0.9)
IMM GRANULOCYTES NFR BLD AUTO: 0.4 % (ref 0–0.9)
IMMATURE RETIC FRACTION: 29.3 %
IMMATURE RETIC FRACTION: 31.7 %
LDH SERPL L TO P-CCNC: 299 U/L (ref 84–246)
LDH SERPL L TO P-CCNC: 307 U/L (ref 84–246)
LYMPHOCYTES # BLD AUTO: 6.94 X10*3/UL (ref 1.2–4.8)
LYMPHOCYTES # BLD AUTO: 7.11 X10*3/UL (ref 1.2–4.8)
LYMPHOCYTES NFR BLD AUTO: 39.7 %
LYMPHOCYTES NFR BLD AUTO: 41.2 %
MCH RBC QN AUTO: 30.7 PG (ref 26–34)
MCH RBC QN AUTO: 31.6 PG (ref 26–34)
MCHC RBC AUTO-ENTMCNC: 35.8 G/DL (ref 32–36)
MCHC RBC AUTO-ENTMCNC: 36.3 G/DL (ref 32–36)
MCV RBC AUTO: 85 FL (ref 80–100)
MCV RBC AUTO: 88 FL (ref 80–100)
MONOCYTES # BLD AUTO: 0.77 X10*3/UL (ref 0.1–1)
MONOCYTES # BLD AUTO: 0.95 X10*3/UL (ref 0.1–1)
MONOCYTES NFR BLD AUTO: 4.3 %
MONOCYTES NFR BLD AUTO: 5.6 %
NEUTROPHILS # BLD AUTO: 8.61 X10*3/UL (ref 1.2–7.7)
NEUTROPHILS # BLD AUTO: 9.66 X10*3/UL (ref 1.2–7.7)
NEUTROPHILS NFR BLD AUTO: 51 %
NEUTROPHILS NFR BLD AUTO: 54.1 %
NEUTS VAC BLD QL SMEAR: PRESENT
NRBC BLD-RTO: 0.3 /100 WBCS (ref 0–0)
NRBC BLD-RTO: 0.4 /100 WBCS (ref 0–0)
OVALOCYTES BLD QL SMEAR: NORMAL
P AXIS: 57 DEGREES
P OFFSET: 183 MS
P ONSET: 126 MS
PCP UR QL SCN: ABNORMAL
PLATELET # BLD AUTO: 351 X10*3/UL (ref 150–450)
PLATELET # BLD AUTO: 473 X10*3/UL (ref 150–450)
POLYCHROMASIA BLD QL SMEAR: NORMAL
POLYCHROMASIA BLD QL SMEAR: NORMAL
POTASSIUM SERPL-SCNC: 3.7 MMOL/L (ref 3.5–5.3)
POTASSIUM SERPL-SCNC: 3.7 MMOL/L (ref 3.5–5.3)
PR INTERVAL: 186 MS
PROT SERPL-MCNC: 7.7 G/DL (ref 6.4–8.2)
PROT SERPL-MCNC: 7.9 G/DL (ref 6.4–8.2)
Q ONSET: 219 MS
QRS COUNT: 12 BEATS
QRS DURATION: 82 MS
QT INTERVAL: 388 MS
QTC CALCULATION(BAZETT): 415 MS
QTC FREDERICIA: 406 MS
R AXIS: 38 DEGREES
RBC # BLD AUTO: 2.02 X10*6/UL (ref 4–5.2)
RBC # BLD AUTO: 2.31 X10*6/UL (ref 4–5.2)
RBC MORPH BLD: NORMAL
RBC MORPH BLD: NORMAL
RETICS #: 0.21 X10*6/UL (ref 0.02–0.08)
RETICS #: 0.26 X10*6/UL (ref 0.02–0.08)
RETICS/RBC NFR AUTO: 10.5 % (ref 0.5–2)
RETICS/RBC NFR AUTO: 11.4 % (ref 0.5–2)
RH FACTOR (ANTIGEN D): NORMAL
SCHISTOCYTES BLD QL SMEAR: NORMAL
SICKLE CELLS BLD QL SMEAR: NORMAL
SODIUM SERPL-SCNC: 138 MMOL/L (ref 136–145)
SODIUM SERPL-SCNC: 138 MMOL/L (ref 136–145)
STOMATOCYTES BLD QL SMEAR: NORMAL
T AXIS: 53 DEGREES
T OFFSET: 413 MS
TARGETS BLD QL SMEAR: NORMAL
TARGETS BLD QL SMEAR: NORMAL
VENTRICULAR RATE: 69 BPM
WBC # BLD AUTO: 16.9 X10*3/UL (ref 4.4–11.3)
WBC # BLD AUTO: 17.9 X10*3/UL (ref 4.4–11.3)

## 2025-07-12 PROCEDURE — 2500000002 HC RX 250 W HCPCS SELF ADMINISTERED DRUGS (ALT 637 FOR MEDICARE OP, ALT 636 FOR OP/ED)

## 2025-07-12 PROCEDURE — 85025 COMPLETE CBC W/AUTO DIFF WBC: CPT

## 2025-07-12 PROCEDURE — 2500000004 HC RX 250 GENERAL PHARMACY W/ HCPCS (ALT 636 FOR OP/ED)

## 2025-07-12 PROCEDURE — 2500000001 HC RX 250 WO HCPCS SELF ADMINISTERED DRUGS (ALT 637 FOR MEDICARE OP)

## 2025-07-12 PROCEDURE — 73610 X-RAY EXAM OF ANKLE: CPT | Mod: RT

## 2025-07-12 PROCEDURE — 73030 X-RAY EXAM OF SHOULDER: CPT | Mod: RT

## 2025-07-12 PROCEDURE — 73630 X-RAY EXAM OF FOOT: CPT | Mod: RIGHT SIDE | Performed by: RADIOLOGY

## 2025-07-12 PROCEDURE — 2500000004 HC RX 250 GENERAL PHARMACY W/ HCPCS (ALT 636 FOR OP/ED): Mod: JZ,TB

## 2025-07-12 PROCEDURE — 83615 LACTATE (LD) (LDH) ENZYME: CPT

## 2025-07-12 PROCEDURE — 80346 BENZODIAZEPINES1-12: CPT

## 2025-07-12 PROCEDURE — 84075 ASSAY ALKALINE PHOSPHATASE: CPT

## 2025-07-12 PROCEDURE — S0109 METHADONE ORAL 5MG: HCPCS

## 2025-07-12 PROCEDURE — 80349 CANNABINOIDS NATURAL: CPT

## 2025-07-12 PROCEDURE — 93010 ELECTROCARDIOGRAM REPORT: CPT | Performed by: INTERNAL MEDICINE

## 2025-07-12 PROCEDURE — 82728 ASSAY OF FERRITIN: CPT

## 2025-07-12 PROCEDURE — 86850 RBC ANTIBODY SCREEN: CPT

## 2025-07-12 PROCEDURE — S4993 CONTRACEPTIVE PILLS FOR BC: HCPCS

## 2025-07-12 PROCEDURE — 80307 DRUG TEST PRSMV CHEM ANLYZR: CPT

## 2025-07-12 PROCEDURE — 99223 1ST HOSP IP/OBS HIGH 75: CPT

## 2025-07-12 PROCEDURE — 85045 AUTOMATED RETICULOCYTE COUNT: CPT

## 2025-07-12 PROCEDURE — 83021 HEMOGLOBIN CHROMOTOGRAPHY: CPT

## 2025-07-12 PROCEDURE — 73030 X-RAY EXAM OF SHOULDER: CPT | Mod: RIGHT SIDE | Performed by: RADIOLOGY

## 2025-07-12 PROCEDURE — 93005 ELECTROCARDIOGRAM TRACING: CPT

## 2025-07-12 PROCEDURE — 73630 X-RAY EXAM OF FOOT: CPT | Mod: RT

## 2025-07-12 PROCEDURE — 85652 RBC SED RATE AUTOMATED: CPT

## 2025-07-12 PROCEDURE — 2500000005 HC RX 250 GENERAL PHARMACY W/O HCPCS

## 2025-07-12 PROCEDURE — 73610 X-RAY EXAM OF ANKLE: CPT | Mod: RIGHT SIDE | Performed by: RADIOLOGY

## 2025-07-12 PROCEDURE — 86140 C-REACTIVE PROTEIN: CPT

## 2025-07-12 PROCEDURE — 86920 COMPATIBILITY TEST SPIN: CPT

## 2025-07-12 PROCEDURE — 1170000001 HC PRIVATE ONCOLOGY ROOM DAILY

## 2025-07-12 RX ORDER — ENOXAPARIN SODIUM 100 MG/ML
40 INJECTION SUBCUTANEOUS DAILY
Status: DISCONTINUED | OUTPATIENT
Start: 2025-07-12 | End: 2025-07-17 | Stop reason: HOSPADM

## 2025-07-12 RX ORDER — ASCORBIC ACID 500 MG
1000 TABLET ORAL DAILY
Status: DISCONTINUED | OUTPATIENT
Start: 2025-07-12 | End: 2025-07-17 | Stop reason: HOSPADM

## 2025-07-12 RX ORDER — DICLOFENAC SODIUM 10 MG/G
4 GEL TOPICAL 4 TIMES DAILY
Status: DISCONTINUED | OUTPATIENT
Start: 2025-07-12 | End: 2025-07-17 | Stop reason: HOSPADM

## 2025-07-12 RX ORDER — PREGABALIN 25 MG/1
25 CAPSULE ORAL 3 TIMES DAILY
Status: DISCONTINUED | OUTPATIENT
Start: 2025-07-12 | End: 2025-07-17 | Stop reason: HOSPADM

## 2025-07-12 RX ORDER — HYDROXYUREA 500 MG/1
1000 CAPSULE ORAL
Status: DISCONTINUED | OUTPATIENT
Start: 2025-07-13 | End: 2025-07-17 | Stop reason: HOSPADM

## 2025-07-12 RX ORDER — VALACYCLOVIR HYDROCHLORIDE 500 MG/1
500 TABLET, FILM COATED ORAL DAILY
Status: DISCONTINUED | OUTPATIENT
Start: 2025-07-12 | End: 2025-07-17 | Stop reason: HOSPADM

## 2025-07-12 RX ORDER — ACETAMINOPHEN 325 MG/1
975 TABLET ORAL EVERY 8 HOURS PRN
Status: DISCONTINUED | OUTPATIENT
Start: 2025-07-12 | End: 2025-07-17 | Stop reason: HOSPADM

## 2025-07-12 RX ORDER — ACETAMINOPHEN 500 MG
10 TABLET ORAL NIGHTLY
Status: DISCONTINUED | OUTPATIENT
Start: 2025-07-12 | End: 2025-07-17 | Stop reason: HOSPADM

## 2025-07-12 RX ORDER — PREGABALIN 25 MG/1
25 CAPSULE ORAL 3 TIMES DAILY
Status: DISCONTINUED | OUTPATIENT
Start: 2025-07-12 | End: 2025-07-12

## 2025-07-12 RX ORDER — HYDROMORPHONE HYDROCHLORIDE 1 MG/ML
1 INJECTION, SOLUTION INTRAMUSCULAR; INTRAVENOUS; SUBCUTANEOUS ONCE
Status: COMPLETED | OUTPATIENT
Start: 2025-07-12 | End: 2025-07-12

## 2025-07-12 RX ORDER — DOCUSATE SODIUM 100 MG/1
100 CAPSULE, LIQUID FILLED ORAL EVERY OTHER DAY
Status: DISCONTINUED | OUTPATIENT
Start: 2025-07-12 | End: 2025-07-17 | Stop reason: HOSPADM

## 2025-07-12 RX ORDER — CETIRIZINE HYDROCHLORIDE 10 MG/1
10 TABLET ORAL DAILY
Status: DISCONTINUED | OUTPATIENT
Start: 2025-07-12 | End: 2025-07-17 | Stop reason: HOSPADM

## 2025-07-12 RX ORDER — DEXTROSE MONOHYDRATE AND SODIUM CHLORIDE 5; .45 G/100ML; G/100ML
75 INJECTION, SOLUTION INTRAVENOUS CONTINUOUS
Status: DISCONTINUED | OUTPATIENT
Start: 2025-07-12 | End: 2025-07-12

## 2025-07-12 RX ORDER — HYDROXYUREA 500 MG/1
500 CAPSULE ORAL
Status: DISCONTINUED | OUTPATIENT
Start: 2025-07-12 | End: 2025-07-12

## 2025-07-12 RX ORDER — CYCLOBENZAPRINE HCL 10 MG
5 TABLET ORAL 3 TIMES DAILY PRN
Status: DISCONTINUED | OUTPATIENT
Start: 2025-07-12 | End: 2025-07-12

## 2025-07-12 RX ORDER — HYDROXYUREA 500 MG/1
1500 CAPSULE ORAL
Status: DISCONTINUED | OUTPATIENT
Start: 2025-07-14 | End: 2025-07-17 | Stop reason: HOSPADM

## 2025-07-12 RX ORDER — TRAZODONE HYDROCHLORIDE 50 MG/1
100 TABLET ORAL NIGHTLY PRN
Status: DISCONTINUED | OUTPATIENT
Start: 2025-07-12 | End: 2025-07-17 | Stop reason: HOSPADM

## 2025-07-12 RX ORDER — TIZANIDINE 2 MG/1
2 TABLET ORAL EVERY 8 HOURS PRN
Status: DISCONTINUED | OUTPATIENT
Start: 2025-07-12 | End: 2025-07-17 | Stop reason: HOSPADM

## 2025-07-12 RX ORDER — DEFEROXAMINE MESYLATE 2 G/1
2000 INJECTION, POWDER, LYOPHILIZED, FOR SOLUTION INTRAMUSCULAR; INTRAVENOUS; SUBCUTANEOUS DAILY
Status: DISCONTINUED | OUTPATIENT
Start: 2025-07-12 | End: 2025-07-12

## 2025-07-12 RX ORDER — LIDOCAINE 560 MG/1
1 PATCH PERCUTANEOUS; TOPICAL; TRANSDERMAL DAILY
Status: DISCONTINUED | OUTPATIENT
Start: 2025-07-12 | End: 2025-07-17 | Stop reason: HOSPADM

## 2025-07-12 RX ORDER — PROCHLORPERAZINE MALEATE 10 MG
10 TABLET ORAL EVERY 6 HOURS PRN
Status: DISCONTINUED | OUTPATIENT
Start: 2025-07-12 | End: 2025-07-17 | Stop reason: HOSPADM

## 2025-07-12 RX ORDER — POLYETHYLENE GLYCOL 3350 17 G/17G
17 POWDER, FOR SOLUTION ORAL DAILY PRN
Status: DISCONTINUED | OUTPATIENT
Start: 2025-07-12 | End: 2025-07-13

## 2025-07-12 RX ORDER — NORETHINDRONE 5 MG/1
5 TABLET ORAL DAILY
Status: DISCONTINUED | OUTPATIENT
Start: 2025-07-12 | End: 2025-07-17 | Stop reason: HOSPADM

## 2025-07-12 RX ORDER — CAPSAICIN 0.03 G/100G
1 CREAM TOPICAL 4 TIMES DAILY PRN
Status: DISCONTINUED | OUTPATIENT
Start: 2025-07-12 | End: 2025-07-17 | Stop reason: HOSPADM

## 2025-07-12 RX ORDER — FOLIC ACID 1 MG/1
1 TABLET ORAL DAILY
Status: DISCONTINUED | OUTPATIENT
Start: 2025-07-12 | End: 2025-07-17 | Stop reason: HOSPADM

## 2025-07-12 RX ORDER — PANTOPRAZOLE SODIUM 20 MG/1
20 TABLET, DELAYED RELEASE ORAL
Status: DISCONTINUED | OUTPATIENT
Start: 2025-07-12 | End: 2025-07-17 | Stop reason: HOSPADM

## 2025-07-12 RX ORDER — METHADONE HYDROCHLORIDE 10 MG/1
10 TABLET ORAL EVERY 12 HOURS
Refills: 0 | Status: DISCONTINUED | OUTPATIENT
Start: 2025-07-12 | End: 2025-07-17 | Stop reason: HOSPADM

## 2025-07-12 RX ORDER — AMOXICILLIN 250 MG
2 CAPSULE ORAL 2 TIMES DAILY PRN
Status: DISCONTINUED | OUTPATIENT
Start: 2025-07-12 | End: 2025-07-13

## 2025-07-12 RX ORDER — KETOROLAC TROMETHAMINE 15 MG/ML
15 INJECTION, SOLUTION INTRAMUSCULAR; INTRAVENOUS EVERY 6 HOURS PRN
Status: DISCONTINUED | OUTPATIENT
Start: 2025-07-12 | End: 2025-07-13

## 2025-07-12 RX ORDER — DICLOFENAC SODIUM 10 MG/G
4 GEL TOPICAL 4 TIMES DAILY PRN
Status: DISCONTINUED | OUTPATIENT
Start: 2025-07-12 | End: 2025-07-12

## 2025-07-12 RX ADMIN — DIPHENHYDRAMINE HYDROCHLORIDE 25 MG: 25 CAPSULE ORAL at 19:16

## 2025-07-12 RX ADMIN — DEXTROSE AND SODIUM CHLORIDE 75 ML/HR: 5; .45 INJECTION, SOLUTION INTRAVENOUS at 06:14

## 2025-07-12 RX ADMIN — DICLOFENAC SODIUM 4 G: 10 GEL TOPICAL at 12:35

## 2025-07-12 RX ADMIN — HYDROMORPHONE HYDROCHLORIDE 2 MG: 2 INJECTION, SOLUTION INTRAMUSCULAR; INTRAVENOUS; SUBCUTANEOUS at 12:35

## 2025-07-12 RX ADMIN — Medication 10 MG: at 03:53

## 2025-07-12 RX ADMIN — POLYETHYLENE GLYCOL 3350 17 G: 17 POWDER, FOR SOLUTION ORAL at 22:06

## 2025-07-12 RX ADMIN — LIDOCAINE 1 PATCH: 4 PATCH TOPICAL at 08:18

## 2025-07-12 RX ADMIN — CARBOXYMETHYLCELLULOSE SODIUM 1 DROP: 5 SOLUTION/ DROPS OPHTHALMIC at 15:45

## 2025-07-12 RX ADMIN — HYDROMORPHONE HYDROCHLORIDE 2 MG: 2 INJECTION, SOLUTION INTRAMUSCULAR; INTRAVENOUS; SUBCUTANEOUS at 08:18

## 2025-07-12 RX ADMIN — HYDROMORPHONE HYDROCHLORIDE 2 MG: 2 INJECTION, SOLUTION INTRAMUSCULAR; INTRAVENOUS; SUBCUTANEOUS at 06:01

## 2025-07-12 RX ADMIN — CETIRIZINE HYDROCHLORIDE 10 MG: 10 TABLET, FILM COATED ORAL at 08:17

## 2025-07-12 RX ADMIN — ENOXAPARIN SODIUM 40 MG: 100 INJECTION SUBCUTANEOUS at 08:17

## 2025-07-12 RX ADMIN — DEFEROXAMINE MESYLATE 2000 MG: 95 INJECTION, POWDER, LYOPHILIZED, FOR SOLUTION INTRAMUSCULAR; INTRAVENOUS; SUBCUTANEOUS at 15:12

## 2025-07-12 RX ADMIN — DICLOFENAC SODIUM 4 G: 10 GEL TOPICAL at 22:10

## 2025-07-12 RX ADMIN — TRAZODONE HYDROCHLORIDE 100 MG: 50 TABLET ORAL at 22:06

## 2025-07-12 RX ADMIN — HYDROMORPHONE HYDROCHLORIDE 2 MG: 2 INJECTION, SOLUTION INTRAMUSCULAR; INTRAVENOUS; SUBCUTANEOUS at 19:06

## 2025-07-12 RX ADMIN — DOCUSATE SODIUM 100 MG: 100 CAPSULE, LIQUID FILLED ORAL at 08:17

## 2025-07-12 RX ADMIN — METHADONE HYDROCHLORIDE 10 MG: 10 TABLET ORAL at 22:07

## 2025-07-12 RX ADMIN — HYDROMORPHONE HYDROCHLORIDE 2 MG: 2 INJECTION, SOLUTION INTRAMUSCULAR; INTRAVENOUS; SUBCUTANEOUS at 03:53

## 2025-07-12 RX ADMIN — ACETAMINOPHEN 975 MG: 325 TABLET ORAL at 15:19

## 2025-07-12 RX ADMIN — METHADONE HYDROCHLORIDE 10 MG: 10 TABLET ORAL at 08:17

## 2025-07-12 RX ADMIN — VALACYCLOVIR HYDROCHLORIDE 500 MG: 500 TABLET, FILM COATED ORAL at 08:18

## 2025-07-12 RX ADMIN — TIZANIDINE 2 MG: 2 TABLET ORAL at 15:45

## 2025-07-12 RX ADMIN — BENZOCAINE AND MENTHOL 1 LOZENGE: 15; 3.6 LOZENGE ORAL at 14:47

## 2025-07-12 RX ADMIN — DICLOFENAC SODIUM 4 G: 10 GEL TOPICAL at 17:03

## 2025-07-12 RX ADMIN — NORETHINDRONE ACETATE 5 MG: 5 TABLET ORAL at 09:59

## 2025-07-12 RX ADMIN — HYDROMORPHONE HYDROCHLORIDE 2 MG: 2 INJECTION, SOLUTION INTRAMUSCULAR; INTRAVENOUS; SUBCUTANEOUS at 14:47

## 2025-07-12 RX ADMIN — HYDROMORPHONE HYDROCHLORIDE 2 MG: 2 INJECTION, SOLUTION INTRAMUSCULAR; INTRAVENOUS; SUBCUTANEOUS at 17:02

## 2025-07-12 RX ADMIN — PREGABALIN 25 MG: 25 CAPSULE ORAL at 06:14

## 2025-07-12 RX ADMIN — DOCUSATE SODIUM 50 MG AND SENNOSIDES 8.6 MG 2 TABLET: 8.6; 5 TABLET, FILM COATED ORAL at 22:07

## 2025-07-12 RX ADMIN — HYDROMORPHONE HYDROCHLORIDE 1 MG: 1 INJECTION, SOLUTION INTRAMUSCULAR; INTRAVENOUS; SUBCUTANEOUS at 02:12

## 2025-07-12 RX ADMIN — HYDROMORPHONE HYDROCHLORIDE 2 MG: 2 INJECTION, SOLUTION INTRAMUSCULAR; INTRAVENOUS; SUBCUTANEOUS at 10:27

## 2025-07-12 RX ADMIN — PREGABALIN 25 MG: 25 CAPSULE ORAL at 15:45

## 2025-07-12 RX ADMIN — HYDROMORPHONE HYDROCHLORIDE 1 MG: 1 INJECTION, SOLUTION INTRAMUSCULAR; INTRAVENOUS; SUBCUTANEOUS at 00:33

## 2025-07-12 RX ADMIN — FOLIC ACID 1 MG: 1 TABLET ORAL at 08:17

## 2025-07-12 RX ADMIN — OXYCODONE HYDROCHLORIDE AND ACETAMINOPHEN 1000 MG: 500 TABLET ORAL at 08:17

## 2025-07-12 RX ADMIN — PANTOPRAZOLE SODIUM 20 MG: 20 TABLET, DELAYED RELEASE ORAL at 06:00

## 2025-07-12 RX ADMIN — TIZANIDINE 2 MG: 2 TABLET ORAL at 09:59

## 2025-07-12 RX ADMIN — PREGABALIN 25 MG: 25 CAPSULE ORAL at 22:06

## 2025-07-12 RX ADMIN — Medication 10 MG: at 22:07

## 2025-07-12 RX ADMIN — HYDROXYUREA 500 MG: 500 CAPSULE ORAL at 08:17

## 2025-07-12 RX ADMIN — HYDROMORPHONE HYDROCHLORIDE 2 MG: 2 INJECTION, SOLUTION INTRAMUSCULAR; INTRAVENOUS; SUBCUTANEOUS at 22:06

## 2025-07-12 SDOH — ECONOMIC STABILITY: HOUSING INSECURITY: IN THE PAST 12 MONTHS, HOW MANY TIMES HAVE YOU MOVED WHERE YOU WERE LIVING?: 0

## 2025-07-12 SDOH — SOCIAL STABILITY: SOCIAL INSECURITY: WITHIN THE LAST YEAR, HAVE YOU BEEN AFRAID OF YOUR PARTNER OR EX-PARTNER?: NO

## 2025-07-12 SDOH — ECONOMIC STABILITY: HOUSING INSECURITY: AT ANY TIME IN THE PAST 12 MONTHS, WERE YOU HOMELESS OR LIVING IN A SHELTER (INCLUDING NOW)?: NO

## 2025-07-12 SDOH — SOCIAL STABILITY: SOCIAL INSECURITY: ARE YOU OR HAVE YOU BEEN THREATENED OR ABUSED PHYSICALLY, EMOTIONALLY, OR SEXUALLY BY ANYONE?: NO

## 2025-07-12 SDOH — ECONOMIC STABILITY: TRANSPORTATION INSECURITY: IN THE PAST 12 MONTHS, HAS LACK OF TRANSPORTATION KEPT YOU FROM MEDICAL APPOINTMENTS OR FROM GETTING MEDICATIONS?: NO

## 2025-07-12 SDOH — SOCIAL STABILITY: SOCIAL INSECURITY: WITHIN THE LAST YEAR, HAVE YOU BEEN HUMILIATED OR EMOTIONALLY ABUSED IN OTHER WAYS BY YOUR PARTNER OR EX-PARTNER?: NO

## 2025-07-12 SDOH — SOCIAL STABILITY: SOCIAL INSECURITY: DO YOU FEEL ANYONE HAS EXPLOITED OR TAKEN ADVANTAGE OF YOU FINANCIALLY OR OF YOUR PERSONAL PROPERTY?: NO

## 2025-07-12 SDOH — ECONOMIC STABILITY: FOOD INSECURITY: HOW HARD IS IT FOR YOU TO PAY FOR THE VERY BASICS LIKE FOOD, HOUSING, MEDICAL CARE, AND HEATING?: NOT VERY HARD

## 2025-07-12 SDOH — ECONOMIC STABILITY: INCOME INSECURITY: IN THE PAST 12 MONTHS HAS THE ELECTRIC, GAS, OIL, OR WATER COMPANY THREATENED TO SHUT OFF SERVICES IN YOUR HOME?: NO

## 2025-07-12 SDOH — SOCIAL STABILITY: SOCIAL INSECURITY: HAVE YOU HAD THOUGHTS OF HARMING ANYONE ELSE?: NO

## 2025-07-12 SDOH — HEALTH STABILITY: PHYSICAL HEALTH: ON AVERAGE, HOW MANY MINUTES DO YOU ENGAGE IN EXERCISE AT THIS LEVEL?: 30 MIN

## 2025-07-12 SDOH — ECONOMIC STABILITY: HOUSING INSECURITY: IN THE LAST 12 MONTHS, WAS THERE A TIME WHEN YOU WERE NOT ABLE TO PAY THE MORTGAGE OR RENT ON TIME?: NO

## 2025-07-12 SDOH — ECONOMIC STABILITY: FOOD INSECURITY: WITHIN THE PAST 12 MONTHS, THE FOOD YOU BOUGHT JUST DIDN'T LAST AND YOU DIDN'T HAVE MONEY TO GET MORE.: NEVER TRUE

## 2025-07-12 SDOH — SOCIAL STABILITY: SOCIAL INSECURITY: DO YOU FEEL UNSAFE GOING BACK TO THE PLACE WHERE YOU ARE LIVING?: NO

## 2025-07-12 SDOH — ECONOMIC STABILITY: FOOD INSECURITY: WITHIN THE PAST 12 MONTHS, YOU WORRIED THAT YOUR FOOD WOULD RUN OUT BEFORE YOU GOT THE MONEY TO BUY MORE.: NEVER TRUE

## 2025-07-12 SDOH — SOCIAL STABILITY: SOCIAL INSECURITY: WERE YOU ABLE TO COMPLETE ALL THE BEHAVIORAL HEALTH SCREENINGS?: YES

## 2025-07-12 SDOH — SOCIAL STABILITY: SOCIAL INSECURITY: HAS ANYONE EVER THREATENED TO HURT YOUR FAMILY OR YOUR PETS?: NO

## 2025-07-12 SDOH — HEALTH STABILITY: PHYSICAL HEALTH: ON AVERAGE, HOW MANY DAYS PER WEEK DO YOU ENGAGE IN MODERATE TO STRENUOUS EXERCISE (LIKE A BRISK WALK)?: 3 DAYS

## 2025-07-12 SDOH — SOCIAL STABILITY: SOCIAL INSECURITY: ABUSE: ADULT

## 2025-07-12 SDOH — SOCIAL STABILITY: SOCIAL INSECURITY: ARE THERE ANY APPARENT SIGNS OF INJURIES/BEHAVIORS THAT COULD BE RELATED TO ABUSE/NEGLECT?: NO

## 2025-07-12 SDOH — SOCIAL STABILITY: SOCIAL INSECURITY: DOES ANYONE TRY TO KEEP YOU FROM HAVING/CONTACTING OTHER FRIENDS OR DOING THINGS OUTSIDE YOUR HOME?: NO

## 2025-07-12 ASSESSMENT — COGNITIVE AND FUNCTIONAL STATUS - GENERAL
WALKING IN HOSPITAL ROOM: A LOT
PERSONAL GROOMING: A LITTLE
MOBILITY SCORE: 16
PERSONAL GROOMING: A LITTLE
DRESSING REGULAR LOWER BODY CLOTHING: A LITTLE
CLIMB 3 TO 5 STEPS WITH RAILING: A LOT
HELP NEEDED FOR BATHING: A LITTLE
TOILETING: A LITTLE
STANDING UP FROM CHAIR USING ARMS: A LOT
CLIMB 3 TO 5 STEPS WITH RAILING: A LOT
MOVING TO AND FROM BED TO CHAIR: A LOT
PATIENT BASELINE BEDBOUND: NO
TOILETING: A LITTLE
STANDING UP FROM CHAIR USING ARMS: A LOT
HELP NEEDED FOR BATHING: A LITTLE
MOVING TO AND FROM BED TO CHAIR: A LOT
WALKING IN HOSPITAL ROOM: A LOT
DRESSING REGULAR LOWER BODY CLOTHING: A LITTLE
DAILY ACTIVITIY SCORE: 20
MOBILITY SCORE: 16
DAILY ACTIVITIY SCORE: 20

## 2025-07-12 ASSESSMENT — ENCOUNTER SYMPTOMS
ABDOMINAL PAIN: 0
FEVER: 0
HEMATURIA: 0
DIZZINESS: 0
VOMITING: 0
BRUISES/BLEEDS EASILY: 0
AGITATION: 0
CONSTIPATION: 0
ADENOPATHY: 0
SHORTNESS OF BREATH: 0
ACTIVITY CHANGE: 0
TREMORS: 0
COUGH: 0
APPETITE CHANGE: 0
FATIGUE: 0
MYALGIAS: 1
CONFUSION: 0
ARTHRALGIAS: 1
WEAKNESS: 0
EYES NEGATIVE: 1
WHEEZING: 0
DIFFICULTY URINATING: 0

## 2025-07-12 ASSESSMENT — PAIN SCALES - GENERAL
PAINLEVEL_OUTOF10: 7
PAINLEVEL_OUTOF10: 9
PAINLEVEL_OUTOF10: 7
PAINLEVEL_OUTOF10: 7
PAINLEVEL_OUTOF10: 5 - MODERATE PAIN
PAINLEVEL_OUTOF10: 8
PAINLEVEL_OUTOF10: 7
PAINLEVEL_OUTOF10: 8
PAINLEVEL_OUTOF10: 8
PAINLEVEL_OUTOF10: 9
PAINLEVEL_OUTOF10: 8
PAINLEVEL_OUTOF10: 8
PAINLEVEL_OUTOF10: 9

## 2025-07-12 ASSESSMENT — PAIN - FUNCTIONAL ASSESSMENT
PAIN_FUNCTIONAL_ASSESSMENT: 0-10

## 2025-07-12 ASSESSMENT — PAIN DESCRIPTION - LOCATION
LOCATION: LEG
LOCATION: HEAD

## 2025-07-12 ASSESSMENT — LIFESTYLE VARIABLES
HOW OFTEN DO YOU HAVE 6 OR MORE DRINKS ON ONE OCCASION: NEVER
HOW OFTEN DO YOU HAVE A DRINK CONTAINING ALCOHOL: NEVER
AUDIT-C TOTAL SCORE: 0
AUDIT-C TOTAL SCORE: 0
HOW MANY STANDARD DRINKS CONTAINING ALCOHOL DO YOU HAVE ON A TYPICAL DAY: PATIENT DOES NOT DRINK
SKIP TO QUESTIONS 9-10: 1

## 2025-07-12 ASSESSMENT — ACTIVITIES OF DAILY LIVING (ADL)
TOILETING: NEEDS ASSISTANCE
FEEDING YOURSELF: INDEPENDENT
WALKS IN HOME: INDEPENDENT
HEARING - RIGHT EAR: FUNCTIONAL
LACK_OF_TRANSPORTATION: NO
BATHING: NEEDS ASSISTANCE
GROOMING: NEEDS ASSISTANCE
ADEQUATE_TO_COMPLETE_ADL: YES
HEARING - LEFT EAR: FUNCTIONAL
ASSISTIVE_DEVICE: EYEGLASSES;WALKER
DRESSING YOURSELF: INDEPENDENT
PATIENT'S MEMORY ADEQUATE TO SAFELY COMPLETE DAILY ACTIVITIES?: YES
JUDGMENT_ADEQUATE_SAFELY_COMPLETE_DAILY_ACTIVITIES: YES

## 2025-07-12 ASSESSMENT — PATIENT HEALTH QUESTIONNAIRE - PHQ9
SUM OF ALL RESPONSES TO PHQ9 QUESTIONS 1 & 2: 2
1. LITTLE INTEREST OR PLEASURE IN DOING THINGS: SEVERAL DAYS
2. FEELING DOWN, DEPRESSED OR HOPELESS: SEVERAL DAYS

## 2025-07-12 NOTE — SIGNIFICANT EVENT
Jake Updated Plan of Care    Mary Alice Aragon is a 45 y.o. female with HbSS c/b transfusion-related iron overload, cerebral aneurysm, NICHOL/MDD that presents with sickle cell pain crisis with pain in R shoulder, BL legs with severe R ankle and foot pain consistent with usual sickle cell pain. She reports the pain began to increase about a week ago, and has been taking her home pain medication without relief. CXR deferred on admit; pt denies new respiratory s/s, SOB or CP. Leukocytosis noted and lysis labs elevated on admit. Pt hgb also downtrending; hgb 7.3 to 6.2 (7/12). XR R foot and ankle (7/12) showed no fracture or malalignment and unchanged sclerosis of the distal tibia corresponding to known osteonecrosis. XR R shoulder (7/12) showed no fracture or malalignment. Started on IV Dilaudid per care path. Admitted for pain control.      #HbSS w/ pain crisis   - OARRS reviewed and Hydromorphone 4 mg refilled on 7/08/25 for 10 days (40T) and Lyrica 25 mg  refilled on 7/8 for 30 days (90T)  - hgb 7.3 (7/11)--> hgb 6.2 (7/12), will hold off on transfusion for now given significant hx of transfusion-related iron overload, continue to closely monitor  - WBC 16.9 on admit, likely 2/2 crisis, pt denies recent exposure to sick contacts, respiratory symptoms or fever/chills at home  -  on admit (-300) (7/12)  - tbili 3.7 on admit (BL 1.5-2.5) (7/12)  - ESR 4/CRP 0.67 (7/12)  - CXR deferred on admit; pt denies new respiratory s/s, SOB or CP  - XR R foot and ankle (7/12) showed no fracture or malalignment and unchanged sclerosis of the distal tibia corresponding to known osteonecrosis  - XR R shoulder (7/12) showed no fracture or malalignment  - US of bilateral lower extremity ordered (7/11) pending  - EKG on admit (7/11) NSR  - started on IV Dilaudid 2mg q2h PRN for severe pain (7/12- )  - continue home Hydroxyurea 1500mg M/W/F and 1000mg T/Th/Sat/Sun   - continue home Folic acid    - continue home Tizanidine 2mg  q8hrs scheduled, Tylenol 650mg q6hrs PRN mild pain, and Folic acid 1mg daily   - continue home Methadone 10 mg BID and Lyrica 25mg BID  - start Voltaren gel 4x/day and capsaicin cream 4x/day  - Toradol held i/s/o JOSE JUAN  - Bowel regimen for opioid-induced constipation with DocuSenna 2tabs BID and Miralax daily  - PO Benadryl PRN for opioid-induced pruritus, PO Compazine PRN for opioid-induced nausea  - continue home noc O2 (2-3L) for known nocturnal hypoxemia     #JOSE JUAN  - likely 2/2 dehydration  - sCr 1.12 on admit (BL ~0.90)  - continue D5-1/2NS @ 75 ml/hr for 1 L (7/12)  - monitor CMP daily     #Transfusion-mediated iron overload, chronic   - patient did not bring her cadd vicente pump   - ferritin 2117 (7/12)  - continue Deferoxamine 2g daily IV infused over 8 hours       #Hx of ingrown toe nails    - reports having a procedure on 4/9 and healed now     #Nocturnal Hypoxia  - cont night 2-3 L supplemental O2     #NICHOL  #MDD  - cont home Trazodone 100mg PRN     Prophy  - Lovenox subcutaneous, encourage SCDs and ambulation as able     DISPO  - Code Status: Full Code  - NOK: Penelope Bain (mother) #[phone]   - FUV: 7/25 w/ OBGYN, 8/18 w/ sickle cell, 5/29/26 w/ Primary Care     MICHELLE West-CNP

## 2025-07-12 NOTE — CARE PLAN
Problem: Pain - Adult  Goal: Verbalizes/displays adequate comfort level or baseline comfort level  Outcome: Progressing     Problem: Safety - Adult  Goal: Free from fall injury  Outcome: Progressing     Problem: Discharge Planning  Goal: Discharge to home or other facility with appropriate resources  Outcome: Progressing     Problem: Chronic Conditions and Co-morbidities  Goal: Patient's chronic conditions and co-morbidity symptoms are monitored and maintained or improved  Outcome: Progressing     Problem: Nutrition  Goal: Nutrient intake appropriate for maintaining nutritional needs  Outcome: Progressing     Problem: Skin  Goal: Decreased wound size/increased tissue granulation at next dressing change  Outcome: Progressing  Flowsheets (Taken 7/12/2025 1855)  Decreased wound size/increased tissue granulation at next dressing change: Promote sleep for wound healing  Goal: Participates in plan/prevention/treatment measures  Outcome: Progressing  Flowsheets (Taken 7/12/2025 1855)  Participates in plan/prevention/treatment measures: Elevate heels  Goal: Prevent/manage excess moisture  Outcome: Progressing  Flowsheets (Taken 7/12/2025 1855)  Prevent/manage excess moisture: Cleanse incontinence/protect with barrier cream  Goal: Prevent/minimize sheer/friction injuries  Outcome: Progressing  Flowsheets (Taken 7/12/2025 1855)  Prevent/minimize sheer/friction injuries: Use pull sheet  Goal: Promote/optimize nutrition  Outcome: Progressing  Flowsheets (Taken 7/12/2025 1855)  Promote/optimize nutrition: Monitor/record intake including meals  Goal: Promote skin healing  Outcome: Progressing  Flowsheets (Taken 7/12/2025 1855)  Promote skin healing:   Protective dressings over bony prominences   Assess skin/pad under line(s)/device(s)  The clinical goals for the shift include Pt will remain HDS and VSS while remaining free from injury on 7/12 until 1900    Patient has remained stable and has received her normal and pain  medications. She had an EKG due to a minimal chest pain and the ECG was normal. She had her legs wrapped due to pain and had eye drops and a throat lozenge added to her medication.

## 2025-07-12 NOTE — H&P
History Of Present Illness  Mary Alice Aragon is a 45 y.o. female presenting with  HbSS c/b transfusion-related iron overload, cerebral aneurysm, NICHOL/MDD that presents with sickle cell pain crisis with pain in R shoulder, bilateral legs and worse around right ankle and trouble ambulating because of the pain.  Admitted to [facility] for management of sickle cell pain crisis.  On admission pt complains pain feels typical for her sickle cell pain.Denies any HA, dizziness/lightheadedness, fevers/chills, cough, congestion, rhinorrhea, sore throat, SOB, CP, palpitations, abdominal pain, n/v/d/c, melena, dysuria, urgency/frequency, hematuria, numbness/tingling, bruising/bleeding or rashes. Denies any sick contacts. ROS otherwise negative.      ED Course  T 36.9 °C (98.4 °F)  HR 82  /85  RR 15  O2 99 % None (Room air)   Hgb 7.3 T bili 2.8    S/p IV dilaudid x4, toradol IV and zofran  Past Medical History  She has a past medical history of Sickle cell anemia and Unspecified astigmatism, bilateral (2020).    Surgical History  She has a past surgical history that includes Cholecystectomy (2016);  section, classic (2016); Other surgical history (2016); Tubal ligation (04/10/2017); MR angio head w and wo IV contrast (4/15/2013); MR angio neck wo IV contrast (2013); MR angio head w and wo IV contrast (2013); MR angio head wo IV contrast (2014); MR angio neck wo IV contrast (2014); MR angio head wo IV contrast (2016); MR angio head wo IV contrast (2019); and MR angio head wo IV contrast (2017).    Oncology History    No history exists.        Social History  She reports that she has never smoked. She has never used smokeless tobacco. She reports that she does not currently use alcohol. She reports that she does not currently use drugs.     Allergies  Patient has no known allergies.    Review of Systems   Constitutional:  Negative for activity change,  appetite change, fatigue and fever.   HENT:  Negative for congestion and mouth sores.    Eyes: Negative.    Respiratory:  Negative for cough, shortness of breath and wheezing.    Cardiovascular:  Negative for chest pain and leg swelling.   Gastrointestinal:  Negative for abdominal pain, constipation and vomiting.   Genitourinary:  Negative for difficulty urinating and hematuria.   Musculoskeletal:  Positive for arthralgias, gait problem and myalgias.   Neurological:  Negative for dizziness, tremors and weakness.   Hematological:  Negative for adenopathy. Does not bruise/bleed easily.   Psychiatric/Behavioral:  Negative for agitation, behavioral problems and confusion.         Physical Exam  HENT:      Head: Normocephalic and atraumatic.      Nose: No congestion or rhinorrhea.      Mouth/Throat:      Mouth: Mucous membranes are moist.   Eyes:      Extraocular Movements: Extraocular movements intact.      Conjunctiva/sclera: Conjunctivae normal.      Pupils: Pupils are equal, round, and reactive to light.   Cardiovascular:      Rate and Rhythm: Normal rate.      Pulses: Normal pulses.      Heart sounds: Normal heart sounds.   Pulmonary:      Effort: Pulmonary effort is normal.      Breath sounds: Normal breath sounds. No rales.   Chest:      Chest wall: No tenderness.   Abdominal:      Palpations: Abdomen is soft.      Tenderness: There is no abdominal tenderness.   Musculoskeletal:         General: Tenderness (right leg,ankle and foot and right shoulder) present.      Right lower leg: No edema.      Left lower leg: No edema.   Skin:     General: Skin is warm.      Findings: No bruising, erythema or lesion.   Neurological:      General: No focal deficit present.      Mental Status: She is alert and oriented to person, place, and time. Mental status is at baseline.      Gait: Gait abnormal.   Psychiatric:         Mood and Affect: Mood normal.         Behavior: Behavior normal.         Thought Content: Thought content  "normal.          Last Recorded Vitals  Blood pressure 107/66, pulse 70, temperature 36 °C (96.8 °F), temperature source Tympanic, resp. rate 16, height 1.6 m (5' 3\"), weight 68 kg (150 lb), SpO2 98%.    Relevant Results  Scheduled medications  Scheduled Medications[1]  Continuous medications  Continuous Medications[2]  PRN medications  PRN Medications[3]  Results for orders placed or performed during the hospital encounter of 07/11/25 (from the past 24 hours)   CBC with Differential   Result Value Ref Range    WBC 17.9 (H) 4.4 - 11.3 x10*3/uL    nRBC 0.3 (H) 0.0 - 0.0 /100 WBCs    RBC 2.31 (L) 4.00 - 5.20 x10*6/uL    Hemoglobin 7.3 (L) 12.0 - 16.0 g/dL    Hematocrit 20.4 (L) 36.0 - 46.0 %    MCV 88 80 - 100 fL    MCH 31.6 26.0 - 34.0 pg    MCHC 35.8 32.0 - 36.0 g/dL    RDW 17.0 (H) 11.5 - 14.5 %    Platelets 473 (H) 150 - 450 x10*3/uL    Neutrophils % 54.1 40.0 - 80.0 %    Immature Granulocytes %, Automated 0.3 0.0 - 0.9 %    Lymphocytes % 39.7 13.0 - 44.0 %    Monocytes % 4.3 2.0 - 10.0 %    Eosinophils % 1.0 0.0 - 6.0 %    Basophils % 0.6 0.0 - 2.0 %    Neutrophils Absolute 9.66 (H) 1.20 - 7.70 x10*3/uL    Immature Granulocytes Absolute, Automated 0.06 0.00 - 0.70 x10*3/uL    Lymphocytes Absolute 7.11 (H) 1.20 - 4.80 x10*3/uL    Monocytes Absolute 0.77 0.10 - 1.00 x10*3/uL    Eosinophils Absolute 0.18 0.00 - 0.70 x10*3/uL    Basophils Absolute 0.11 (H) 0.00 - 0.10 x10*3/uL   Comprehensive Metabolic Panel   Result Value Ref Range    Glucose 83 74 - 99 mg/dL    Sodium 138 136 - 145 mmol/L    Potassium 3.7 3.5 - 5.3 mmol/L    Chloride 107 98 - 107 mmol/L    Bicarbonate 21 21 - 32 mmol/L    Anion Gap 14 10 - 20 mmol/L    Urea Nitrogen 14 6 - 23 mg/dL    Creatinine 0.91 0.50 - 1.05 mg/dL    eGFR 80 >60 mL/min/1.73m*2    Calcium 9.1 8.6 - 10.6 mg/dL    Albumin 4.6 3.4 - 5.0 g/dL    Alkaline Phosphatase 64 33 - 110 U/L    Total Protein 7.9 6.4 - 8.2 g/dL    AST 21 9 - 39 U/L    Bilirubin, Total 2.8 (H) 0.0 - 1.2 mg/dL    " ALT 11 7 - 45 U/L   Lactate Dehydrogenase   Result Value Ref Range     (H) 84 - 246 U/L   Reticulocyte Count   Result Value Ref Range    Retic % 11.4 (H) 0.5 - 2.0 %    Retic Absolute 0.263 (H) 0.018 - 0.083 x10*6/uL    Reticulocyte Hemoglobin 34 28 - 38 pg    Immature Retic fraction 29.3 (H) <=16.0 %   Morphology   Result Value Ref Range    RBC Morphology See Below     Polychromasia Mild     Target Cells Few     Basophilic Stippling Present    Human Chorionic Gonadotropin, Serum Quantitative   Result Value Ref Range    HCG, Beta-Quantitative <3 <5 mIU/mL     *Note: Due to a large number of results and/or encounters for the requested time period, some results have not been displayed. A complete set of results can be found in Results Review.     No results found.       Assessment/Plan   Assessment & Plan  Sickle cell pain crisis (Multi)  Mary Alice Aragon is a 45 y.o. female with HbSS c/b transfusion-related iron overload, cerebral aneurysm, NICHOL/MDD that presents with sickle cell pain crisis with pain in R shoulder, bilateral legs and worse around right ankle and foot and trouble ambulating because of the pain.  Admitted to [facility] for management of sickle cell pain crisis.    #HbSS w/ pain crisis   #Chronic Leukocytosis  -OARRS reviewed and Hydromorphone 4 mg Tab refilled on 7/08/25 for 10 days and qty 40 and Lyrica 25 mg tab refilled on 7/8 for 30 days and qty 90   -  -% retic elevated 11.4  -Continue hydroxyurea 1500mg M/W/F and 1000mg T/Th/Sat/Sun   -Continue folic acid    -started on IV dilaudid 2mg q2h PRN with bowel regimen    -started on cyclobenzaprine  Q8H PRN   -Continue home methadone 10 mg BID and lyrica BID.  -Diclofenac gel PRN   -T bili 2.8  -WBC 17.9, no sign of infection noted.  -XR R shoulder, R ankle and R foot pending  -US of bilateral lower extremity pending  -Ferritin pending on admit  -Toradol held I/s/o JOSE JUAN    #JOSE JUAN  -sCr 1.12  -start D5-1/2NS at 75 ml/hr  -Monitor CMP  daily    #Transfusion-mediated iron overload, chronic   -Patient did not bring her cadd vicente pump   -Continue deferoxamine 2g daily IV infused over 8 hours      #Acute on chronic anemia   -Hgb on arrival 7.3 -->6.2 in ED  -Simple transfusion held I/s/o hx of iron overload, discuss plan with Dr. Saleem in am  -Monitor CBC    -Active Type and screen      #Hx of ingrown toe nails    -Reports having a procedure on 4/9 and healed now    #Nocturnal Hypoxia  -cont night 3 L supplemental O2    #NICHOL  #MDD  -cont home Trazodone      PPX  -lovenox subcutaneous, encourage SCDs and ambulation as able   Code Status: Full Code     NOK: mother Penelope Bain [phone]        I spent 60 minutes in the professional and overall care of this patient.      MICHELLE Robert-CNP           [1] pantoprazole, 20 mg, oral, Daily before breakfast  [2]    [3] PRN medications: cyclobenzaprine, diphenhydrAMINE, HYDROmorphone, ketorolac, ondansetron ODT, sennosides-docusate sodium **OR** polyethylene glycol

## 2025-07-12 NOTE — ASSESSMENT & PLAN NOTE
Mary Alice Aragon is a 45 y.o. female with HbSS c/b transfusion-related iron overload, cerebral aneurysm, NICHOL/MDD that presents with sickle cell pain crisis with pain in R shoulder, bilateral legs and worse around right ankle and foot and trouble ambulating because of the pain.  Admitted to [facility] for management of sickle cell pain crisis.    #HbSS w/ pain crisis   #Chronic Leukocytosis  -OARRS reviewed and Hydromorphone 4 mg Tab refilled on 7/08/25 for 10 days and qty 40 and Lyrica 25 mg tab refilled on 7/8 for 30 days and qty 90   -  -% retic elevated 11.4  -Continue hydroxyurea 1500mg M/W/F and 1000mg T/Th/Sat/Sun   -Continue folic acid    -started on IV dilaudid 2mg q2h PRN with bowel regimen    -started on cyclobenzaprine  Q8H PRN   -Continue home methadone 10 mg BID and lyrica BID.  -Diclofenac gel PRN   -T bili 2.8  -WBC 17.9, no sign of infection noted.  -XR R shoulder, R ankle and R foot pending  -US of bilateral lower extremity pending  -Ferritin pending on admit  -Toradol held I/s/o JOSE JUAN    #JOSE JUAN  -sCr 1.12  -start D5-1/2NS at 75 ml/hr  -Monitor CMP daily    #Transfusion-mediated iron overload, chronic   -Patient did not bring her cadd vicente pump   -Continue deferoxamine 2g daily IV infused over 8 hours      #Acute on chronic anemia   -Hgb on arrival 7.3 -->6.2 in ED  -Simple transfusion held I/s/o hx of iron overload, discuss plan with Dr. Saleem in am  -Monitor CBC    -Active Type and screen      #Hx of ingrown toe nails    -Reports having a procedure on 4/9 and healed now    #Nocturnal Hypoxia  -cont night 3 L supplemental O2    #NICHOL  #MDD  -cont home Trazodone      PPX  -lovenox subcutaneous, encourage SCDs and ambulation as able   Code Status: Full Code     NOK: mother Penelope Bain [phone]

## 2025-07-12 NOTE — PROGRESS NOTES
I received Mary Alice Aragon in signout from outgoing provider.  Please see the previous note for all HPI, PE and MDM up to the time of signout at 2200.    In brief Mary Alice Aragon is an 45 y.o. female presenting for sickle cell related pain  Chief Complaint   Patient presents with    Sickle Cell Pain Crisis   .  At the time of signout we were awaiting:  A plan of disposition  I examined the patient at time of assumption of care:  Constitutional: Resting comfortably in no acute distress  HEENT: EOMs are intact, no scleral icterus, normal phonation  Respiratory: No respiratory distress  Perfusion: Skin appears well-perfused  Neuro: Cranial nerves II through XII grossly intact, moving all 4 extremities with no obvious deficit, seen to ambulate normally          During my care patient reassessed as above.  Her lab work was pertinent for mildly elevated LDH, no actionable abnormalities at this time.  Patient was dispositioned to the inpatient team for management of her sickle cell related pain crisis.      Patient seen and discussed with attending of record.    Akin Babb MD

## 2025-07-12 NOTE — PROGRESS NOTES
Pharmacy Medication History Review    Mary Alice Aragon is a 45 y.o. female admitted for Sickle cell pain crisis (Multi). Pharmacy reviewed the patient's [id number] medications and allergies for accuracy.    Medications ADDED:  none  Medications CHANGED:  none  Medications REMOVED:   none     The list below reflects the updated PTA list.   Prior to Admission Medications   Prescriptions Last Dose Informant   HYDROmorphone (Dilaudid) 4 mg tablet  Self   Sig: Take 1 tablet (4 mg) by mouth every 6 hours if needed for severe pain (7 - 10).   ascorbic acid (Vitamin C) 500 mg tablet  Self   Sig: Take 2 tablets (1,000 mg) by mouth once daily.   capsaicin (Zostrix) 0.025 % cream Not Taking Self   Sig: Apply 1 Application topically if needed.   Patient not taking: Reported on 7/12/2025   deferoxamine (Desferal) 2 gram injection  Self   Sig: Infuse 2 g (2,000 mg) into a venous catheter once daily. Infuse 2000mg subcutaneously once daily over 8 hours via [place] Pump   diclofenac sodium (Voltaren) 1 % gel  Self   Sig: Per instructions 4 TIMES DAILY (route: topical)   Patient taking differently: Apply 2.25 inches (2 g) topically 4 times a day as needed (pain). Per instructions 4 TIMES DAILY (route: topical)   diphenhydrAMINE (BENADryl) 25 mg capsule  Self   Sig: Take 2 capsules (50 mg) by mouth every 8 hours if needed.   docusate sodium (Colace) 100 mg capsule  Self   Sig: Take 1 capsule (100 mg) by mouth every other day.   folic acid (Folvite) 1 mg tablet  Self   Sig: Take 1 tablet (1 mg) by mouth once daily.   hydroxyurea (Hydrea) 500 mg capsule  Self   Sig: Take 1 capsule (500 mg total) by mouth once a day on Sunday, Tuesday, Thursday, and Saturday.   hydroxyurea (Hydrea) 500 mg capsule  Self   Sig: Per instructions AS DIRECTED (route: oral)   ketamine (Ketalar) 10 mg/mL injection  Self   Sig: Infuse 1 mL (10 mg) into a venous catheter every 30 (thirty) days. Not administered in the home.   lidocaine (Lidoderm) 5 %  patch  Self   Sig: Place 1 patch on the skin once daily as needed for mild pain (1 - 3).   loratadine (Claritin) 10 mg tablet  Self   Sig: Take 1 tablet (10 mg) by mouth once daily as needed for allergies.   melatonin 5 mg tablet  Self   Sig: Take 2 tablets (10 mg) by mouth once daily at bedtime.   Patient taking differently: Take 2 tablets (10 mg) by mouth as needed at bedtime for sleep.   meloxicam (Mobic) 15 mg tablet  Self   Sig: Take 1 tablet (15 mg) by mouth once daily.   methadone (Dolophine) 10 mg tablet  Self   Sig: Take 1 tablet (10 mg) by mouth every 12 hours.   multivitamin tablet  Self   Sig: Take 1 tablet by mouth once daily.   naloxone (Narcan) 4 mg/0.1 mL nasal spray  Self   Sig: Administer 1 spray (4 mg) into affected nostril(s) if needed for opioid reversal or respiratory depression. May repeat every 2-3 minutes if needed, alternating nostrils, until medical assistance becomes available.   norethindrone (Aygestin) 5 mg tablet  Self   Sig: Take 1 tablet (5 mg) by mouth once daily.   oxygen (O2) gas therapy  Self   Sig: Inhale 3 L/min once daily at bedtime.   oxygen (O2) gas therapy  Self   Sig: Inhale 1 each continuously.   peg 400/hypromellose/glycerin (DRY EYE RELIEF OPHT)  Self   Sig: Administer 1-2 drops into both eyes once daily as needed.   pregabalin (Lyrica) 25 mg capsule  Self   Sig: Take 1 capsule (25 mg) by mouth 3 times a day.   prochlorperazine (Compazine) 10 mg tablet  Self   Sig: Take 1 tablet (10 mg) by mouth every 8 hours if needed for nausea or vomiting. 1 tablet EVERY 8 HOURS (route: oral)   senna 8.6 mg tablet  Self   Sig: Take 1 tablet (8.6 mg) by mouth 2 times a day as needed for constipation.   tiZANidine (Zanaflex) 2 mg tablet  Self   Sig: Take 1 tablet (2 mg) by mouth every 8 hours if needed for muscle spasms.   traZODone (Desyrel) 100 mg tablet  Self   Sig: TAKE 1 TO 2 TABLETS(100  MG) BY MOUTH AT BEDTIME AS NEEDED FOR SLEEP please schedule FUV   trolamine salicylate  "(Aspercreme) 10 % cream  Self   Sig: Apply 1 Application topically if needed.   valACYclovir (Valtrex) 500 mg tablet  Self   Sig: Take 1 tablet (500 mg) by mouth once daily.      Facility-Administered Medications: None          The list below reflects the updated allergy list. Please review each documented allergy for additional clarification and justification.  Allergies  Reviewed by Niharika Menezes on 2025   No Known Allergies         Patient accepts M2B at discharge.     Sources:   [facility]  Pharmacy dispense history  Patient Interview Good historian  Chart Review  25 Office Visit - Hem Onc - [facility] - [phone] - Pharmicist Cassius     Additional Comments:  Patient reports taking a break from the Desferal infusion; last time was 2-3 weeks ago  Patient reports taking 2 capsules of hydroxyurea on ,  and . She takes 1 capsule on Sundays, ,  and .  Patient reports last ketamine injection was in April.  Patient reports needing a refill on the Narcan nasal spray because she believes the ones she has at home are ; she has had them for quite a while.   Patient reports taking Valtrex as needed; there is no fill history. Patient states she gets it filled at [facility]. I spoke with the pharmacist and he stated Valtrex has never been filled there.       NIHARIKA MENEZES  Pharmacy Technician  25     Secure Chat preferred   If no response call [phone] or [employer] \"Med Rec\"    "

## 2025-07-12 NOTE — ED PROVIDER NOTES
Emergency Department Provider Note       History of Present Illness     History provided by: Patient  Limitations to History: None  External Records Reviewed with Brief Summary: Progress notes, ED provider notes, and discharge summary, including on 4/24 when patient was admitted for sickle cell pain management    HPI:  Mary Alice Aragon is a 45 y.o. female with past medical history of Hb SS (C/B transfusion related iron overload), cerebral aneurysm, NICHOL/MDD who presents to the emergency department for concern of sickle cell pain.  Patient states that the pain is rotated in her right leg, worse around the right ankle and lower leg.  Patient states that the pain has been there for almost a week, but she has been trying to deal with it with the hopes that it would fade.  Patient states that she has been taking her home pain regimen without success and finally decided that she had to come in after having trouble ambulating because of the pain.  Patient states that the pain feels typical for her sickle cell pain crises and denies any chest pain or abdomen pain.  Patient also denies any other systemic symptoms including shortness of breath, nausea or vomiting, fevers, recent illnesses, changes in appetite, changes in urination about this.    Physical Exam   Triage vitals:  T 36.9 °C (98.4 °F)  HR 82  /85  RR 15  O2 99 % None (Room air)    Physical Exam  Vitals and nursing note reviewed.   Constitutional:       General: She is not in acute distress.     Appearance: She is well-developed.   HENT:      Head: Normocephalic and atraumatic.   Eyes:      Conjunctiva/sclera: Conjunctivae normal.   Cardiovascular:      Rate and Rhythm: Normal rate and regular rhythm.      Pulses: Normal pulses.      Heart sounds: No murmur heard.  Pulmonary:      Effort: Pulmonary effort is normal. No respiratory distress.      Breath sounds: Normal breath sounds.      Comments: Lungs clear to auscultation bilaterally  Abdominal:       Palpations: Abdomen is soft.      Tenderness: There is no abdominal tenderness.      Comments: Abdomen soft and nontender to palpation   Musculoskeletal:         General: No swelling.      Cervical back: Neck supple.      Right lower leg: No edema.      Left lower leg: No edema.   Skin:     General: Skin is warm and dry.      Capillary Refill: Capillary refill takes less than 2 seconds.   Neurological:      Mental Status: She is alert and oriented to person, place, and time.      GCS: GCS eye subscore is 4. GCS verbal subscore is 5. GCS motor subscore is 6.   Psychiatric:         Mood and Affect: Mood normal.       Medical Decision Making & ED Course   Medical Decision Makin y.o. female with past medical history of Hb SS (C/B transfusion related iron overload), cerebral aneurysm, NICHOL/MDD who presents to the emergency department for concern of sickle cell pain.  Patient's vitals were stable and within normal limits upon presentation.  Patient's symptoms are consistent with her previous sickle cell pain without any concern for acute chest or acute abdomen.  Patient started on her acute pain regimen, which includes 1 mg Dilaudid every 30 minutes for up to 3 doses, 4 mg Zofran, 15 mg Toradol x 1, 25 mg Benadryl.  Independent interpretation of patient's EKG showed a normal sinus rhythm at a rate of 69 bpm, normal intervals, normal axis, no sign of ST elevation or depression.  Upon signout at 2300 hrs., patient care transferred to Dr. Babb and with patient in stable condition awaiting pain control reevaluation and final disposition.  ----    Differential diagnoses considered include but are not limited to: Sickle cell pain crisis, musculoskeletal pain    Social Determinants of Health which Significantly Impact Care: Social Determinants of Health which Significantly Impact Care: None identified     EKG Independent Interpretation: EKG interpreted by myself. Please see ED Course for full  interpretation.    Independent Result Review and Interpretation: Relevant laboratory and radiographic results were reviewed and independently interpreted by myself.  As necessary, they are commented on in the ED Course.    Chronic conditions affecting the patient's care: As documented above in MDM    The patient was discussed with the following consultants/services: None    Care Considerations: None    ED Course:  Diagnoses as of 07/11/25 5783   Sickle cell pain crisis (Multi)       Disposition   Patient was signed out to Dr. Babb at 2300 hrs. pending completion of their work-up.  Please see the next provider's transition of care note for the remainder of the patient's care.     Procedures   Procedures    Patient seen and discussed with ED attending physician.    Abdiel Bahena DO  Emergency Medicine                                                       Abdiel Bahena DO  Resident  07/11/25 [id number]     Patient

## 2025-07-13 VITALS
TEMPERATURE: 97.5 F | OXYGEN SATURATION: 99 % | HEIGHT: 63 IN | HEART RATE: 93 BPM | BODY MASS INDEX: 26.58 KG/M2 | DIASTOLIC BLOOD PRESSURE: 70 MMHG | RESPIRATION RATE: 18 BRPM | WEIGHT: 150 LBS | SYSTOLIC BLOOD PRESSURE: 110 MMHG

## 2025-07-13 LAB
ALBUMIN SERPL BCP-MCNC: 4.4 G/DL (ref 3.4–5)
ALP SERPL-CCNC: 66 U/L (ref 33–110)
ALT SERPL W P-5'-P-CCNC: 13 U/L (ref 7–45)
ANION GAP SERPL CALC-SCNC: 11 MMOL/L (ref 10–20)
AST SERPL W P-5'-P-CCNC: 24 U/L (ref 9–39)
BASOPHILS # BLD AUTO: 0.09 X10*3/UL (ref 0–0.1)
BASOPHILS NFR BLD AUTO: 0.4 %
BILIRUB SERPL-MCNC: 3.9 MG/DL (ref 0–1.2)
BLOOD EXPIRATION DATE: NORMAL
BLOOD EXPIRATION DATE: NORMAL
BUN SERPL-MCNC: 12 MG/DL (ref 6–23)
CALCIUM SERPL-MCNC: 9.1 MG/DL (ref 8.6–10.6)
CHLORIDE SERPL-SCNC: 108 MMOL/L (ref 98–107)
CO2 SERPL-SCNC: 24 MMOL/L (ref 21–32)
CREAT SERPL-MCNC: 1.01 MG/DL (ref 0.5–1.05)
DISPENSE STATUS: NORMAL
DISPENSE STATUS: NORMAL
EGFRCR SERPLBLD CKD-EPI 2021: 71 ML/MIN/1.73M*2
EOSINOPHIL # BLD AUTO: 0.38 X10*3/UL (ref 0–0.7)
EOSINOPHIL NFR BLD AUTO: 1.9 %
ERYTHROCYTE [DISTWIDTH] IN BLOOD BY AUTOMATED COUNT: 18.6 % (ref 11.5–14.5)
GLUCOSE SERPL-MCNC: 96 MG/DL (ref 74–99)
HCT VFR BLD AUTO: 17 % (ref 36–46)
HGB BLD-MCNC: 5.8 G/DL (ref 12–16)
HGB RETIC QN: 32 PG (ref 28–38)
IMM GRANULOCYTES # BLD AUTO: 0.34 X10*3/UL (ref 0–0.7)
IMM GRANULOCYTES NFR BLD AUTO: 1.7 % (ref 0–0.9)
IMMATURE RETIC FRACTION: 38.2 %
LDH SERPL L TO P-CCNC: 332 U/L (ref 84–246)
LYMPHOCYTES # BLD AUTO: 4.41 X10*3/UL (ref 1.2–4.8)
LYMPHOCYTES NFR BLD AUTO: 21.8 %
MAGNESIUM SERPL-MCNC: 2.29 MG/DL (ref 1.6–2.4)
MCH RBC QN AUTO: 31.2 PG (ref 26–34)
MCHC RBC AUTO-ENTMCNC: 34.1 G/DL (ref 32–36)
MCV RBC AUTO: 91 FL (ref 80–100)
MONOCYTES # BLD AUTO: 0.99 X10*3/UL (ref 0.1–1)
MONOCYTES NFR BLD AUTO: 4.9 %
NEUTROPHILS # BLD AUTO: 14.01 X10*3/UL (ref 1.2–7.7)
NEUTROPHILS NFR BLD AUTO: 69.3 %
NRBC BLD-RTO: 0.7 /100 WBCS (ref 0–0)
PLATELET # BLD AUTO: 428 X10*3/UL (ref 150–450)
POTASSIUM SERPL-SCNC: 4.3 MMOL/L (ref 3.5–5.3)
PRODUCT BLOOD TYPE: 5100
PRODUCT BLOOD TYPE: 5100
PRODUCT CODE: NORMAL
PRODUCT CODE: NORMAL
PROT SERPL-MCNC: 7.4 G/DL (ref 6.4–8.2)
RBC # BLD AUTO: 1.86 X10*6/UL (ref 4–5.2)
RETICS #: 0.17 X10*6/UL (ref 0.02–0.08)
RETICS/RBC NFR AUTO: 8.9 % (ref 0.5–2)
SODIUM SERPL-SCNC: 139 MMOL/L (ref 136–145)
UNIT ABO: NORMAL
UNIT ABO: NORMAL
UNIT NUMBER: NORMAL
UNIT NUMBER: NORMAL
UNIT RH: NORMAL
UNIT RH: NORMAL
UNIT VOLUME: 278
UNIT VOLUME: 350
WBC # BLD AUTO: 20.2 X10*3/UL (ref 4.4–11.3)
XM INTEP: NORMAL
XM INTEP: NORMAL

## 2025-07-13 PROCEDURE — P9040 RBC LEUKOREDUCED IRRADIATED: HCPCS

## 2025-07-13 PROCEDURE — 2500000004 HC RX 250 GENERAL PHARMACY W/ HCPCS (ALT 636 FOR OP/ED)

## 2025-07-13 PROCEDURE — 2500000001 HC RX 250 WO HCPCS SELF ADMINISTERED DRUGS (ALT 637 FOR MEDICARE OP)

## 2025-07-13 PROCEDURE — 84075 ASSAY ALKALINE PHOSPHATASE: CPT

## 2025-07-13 PROCEDURE — 83615 LACTATE (LD) (LDH) ENZYME: CPT

## 2025-07-13 PROCEDURE — 83735 ASSAY OF MAGNESIUM: CPT

## 2025-07-13 PROCEDURE — S0109 METHADONE ORAL 5MG: HCPCS

## 2025-07-13 PROCEDURE — 99233 SBSQ HOSP IP/OBS HIGH 50: CPT | Performed by: INTERNAL MEDICINE

## 2025-07-13 PROCEDURE — S4993 CONTRACEPTIVE PILLS FOR BC: HCPCS

## 2025-07-13 PROCEDURE — 85045 AUTOMATED RETICULOCYTE COUNT: CPT

## 2025-07-13 PROCEDURE — 2500000005 HC RX 250 GENERAL PHARMACY W/O HCPCS

## 2025-07-13 PROCEDURE — 2500000004 HC RX 250 GENERAL PHARMACY W/ HCPCS (ALT 636 FOR OP/ED): Mod: JZ,TB

## 2025-07-13 PROCEDURE — 85025 COMPLETE CBC W/AUTO DIFF WBC: CPT

## 2025-07-13 PROCEDURE — 2500000002 HC RX 250 W HCPCS SELF ADMINISTERED DRUGS (ALT 637 FOR MEDICARE OP, ALT 636 FOR OP/ED)

## 2025-07-13 PROCEDURE — 1170000001 HC PRIVATE ONCOLOGY ROOM DAILY

## 2025-07-13 PROCEDURE — 36430 TRANSFUSION BLD/BLD COMPNT: CPT

## 2025-07-13 RX ORDER — POLYETHYLENE GLYCOL 3350 17 G/17G
17 POWDER, FOR SOLUTION ORAL DAILY
Status: DISCONTINUED | OUTPATIENT
Start: 2025-07-13 | End: 2025-07-17 | Stop reason: HOSPADM

## 2025-07-13 RX ORDER — AMOXICILLIN 250 MG
2 CAPSULE ORAL 2 TIMES DAILY
Status: DISCONTINUED | OUTPATIENT
Start: 2025-07-13 | End: 2025-07-17 | Stop reason: HOSPADM

## 2025-07-13 RX ORDER — ONDANSETRON HYDROCHLORIDE 2 MG/ML
4 INJECTION, SOLUTION INTRAVENOUS ONCE
Status: COMPLETED | OUTPATIENT
Start: 2025-07-13 | End: 2025-07-13

## 2025-07-13 RX ORDER — HYDROMORPHONE HYDROCHLORIDE 1 MG/ML
1 INJECTION, SOLUTION INTRAMUSCULAR; INTRAVENOUS; SUBCUTANEOUS ONCE
Status: COMPLETED | OUTPATIENT
Start: 2025-07-13 | End: 2025-07-13

## 2025-07-13 RX ORDER — DEXTROSE MONOHYDRATE AND SODIUM CHLORIDE 5; .45 G/100ML; G/100ML
75 INJECTION, SOLUTION INTRAVENOUS CONTINUOUS
Status: DISCONTINUED | OUTPATIENT
Start: 2025-07-13 | End: 2025-07-14

## 2025-07-13 RX ORDER — KETOROLAC TROMETHAMINE 15 MG/ML
15 INJECTION, SOLUTION INTRAMUSCULAR; INTRAVENOUS ONCE
Status: COMPLETED | OUTPATIENT
Start: 2025-07-13 | End: 2025-07-13

## 2025-07-13 RX ORDER — DIPHENHYDRAMINE HCL 50 MG
50 CAPSULE ORAL ONCE
Status: COMPLETED | OUTPATIENT
Start: 2025-07-13 | End: 2025-07-13

## 2025-07-13 RX ORDER — ACETAMINOPHEN 325 MG/1
650 TABLET ORAL ONCE
Status: COMPLETED | OUTPATIENT
Start: 2025-07-13 | End: 2025-07-13

## 2025-07-13 RX ORDER — KETOROLAC TROMETHAMINE 15 MG/ML
15 INJECTION, SOLUTION INTRAMUSCULAR; INTRAVENOUS EVERY 6 HOURS PRN
Status: DISCONTINUED | OUTPATIENT
Start: 2025-07-13 | End: 2025-07-15

## 2025-07-13 RX ADMIN — HYDROXYUREA 1000 MG: 500 CAPSULE ORAL at 09:56

## 2025-07-13 RX ADMIN — HYDROMORPHONE HYDROCHLORIDE 2 MG: 2 INJECTION, SOLUTION INTRAMUSCULAR; INTRAVENOUS; SUBCUTANEOUS at 01:29

## 2025-07-13 RX ADMIN — LIDOCAINE 1 PATCH: 4 PATCH TOPICAL at 09:55

## 2025-07-13 RX ADMIN — PREGABALIN 25 MG: 25 CAPSULE ORAL at 09:56

## 2025-07-13 RX ADMIN — Medication 10 MG: at 20:34

## 2025-07-13 RX ADMIN — HYDROMORPHONE HYDROCHLORIDE 1 MG: 1 INJECTION, SOLUTION INTRAMUSCULAR; INTRAVENOUS; SUBCUTANEOUS at 09:29

## 2025-07-13 RX ADMIN — HYDROMORPHONE HYDROCHLORIDE 2 MG: 2 INJECTION, SOLUTION INTRAMUSCULAR; INTRAVENOUS; SUBCUTANEOUS at 15:59

## 2025-07-13 RX ADMIN — ACETAMINOPHEN 975 MG: 325 TABLET ORAL at 22:01

## 2025-07-13 RX ADMIN — DOCUSATE SODIUM 50 MG AND SENNOSIDES 8.6 MG 2 TABLET: 8.6; 5 TABLET, FILM COATED ORAL at 09:56

## 2025-07-13 RX ADMIN — ENOXAPARIN SODIUM 40 MG: 100 INJECTION SUBCUTANEOUS at 09:54

## 2025-07-13 RX ADMIN — HYDROMORPHONE HYDROCHLORIDE 2 MG: 2 INJECTION, SOLUTION INTRAMUSCULAR; INTRAVENOUS; SUBCUTANEOUS at 18:31

## 2025-07-13 RX ADMIN — TIZANIDINE 2 MG: 2 TABLET ORAL at 17:18

## 2025-07-13 RX ADMIN — FOLIC ACID 1 MG: 1 TABLET ORAL at 09:56

## 2025-07-13 RX ADMIN — POLYETHYLENE GLYCOL 3350 17 G: 17 POWDER, FOR SOLUTION ORAL at 10:02

## 2025-07-13 RX ADMIN — ACETAMINOPHEN 650 MG: 325 TABLET ORAL at 11:11

## 2025-07-13 RX ADMIN — CETIRIZINE HYDROCHLORIDE 10 MG: 10 TABLET, FILM COATED ORAL at 09:56

## 2025-07-13 RX ADMIN — METHADONE HYDROCHLORIDE 10 MG: 10 TABLET ORAL at 20:33

## 2025-07-13 RX ADMIN — DIPHENHYDRAMINE HYDROCHLORIDE 50 MG: 25 CAPSULE ORAL at 11:11

## 2025-07-13 RX ADMIN — DICLOFENAC SODIUM 4 G: 10 GEL TOPICAL at 06:00

## 2025-07-13 RX ADMIN — TIZANIDINE 2 MG: 2 TABLET ORAL at 09:57

## 2025-07-13 RX ADMIN — DEFEROXAMINE MESYLATE 2000 MG: 95 INJECTION, POWDER, LYOPHILIZED, FOR SOLUTION INTRAMUSCULAR; INTRAVENOUS; SUBCUTANEOUS at 09:54

## 2025-07-13 RX ADMIN — HYDROMORPHONE HYDROCHLORIDE 2 MG: 2 INJECTION, SOLUTION INTRAMUSCULAR; INTRAVENOUS; SUBCUTANEOUS at 20:35

## 2025-07-13 RX ADMIN — CARBOXYMETHYLCELLULOSE SODIUM 1 DROP: 5 SOLUTION/ DROPS OPHTHALMIC at 11:11

## 2025-07-13 RX ADMIN — ONDANSETRON 4 MG: 2 INJECTION INTRAMUSCULAR; INTRAVENOUS at 05:52

## 2025-07-13 RX ADMIN — OXYCODONE HYDROCHLORIDE AND ACETAMINOPHEN 1000 MG: 500 TABLET ORAL at 09:56

## 2025-07-13 RX ADMIN — DOCUSATE SODIUM 50 MG AND SENNOSIDES 8.6 MG 2 TABLET: 8.6; 5 TABLET, FILM COATED ORAL at 20:34

## 2025-07-13 RX ADMIN — METHADONE HYDROCHLORIDE 10 MG: 10 TABLET ORAL at 09:56

## 2025-07-13 RX ADMIN — DICLOFENAC SODIUM 4 G: 10 GEL TOPICAL at 22:01

## 2025-07-13 RX ADMIN — PANTOPRAZOLE SODIUM 20 MG: 20 TABLET, DELAYED RELEASE ORAL at 06:01

## 2025-07-13 RX ADMIN — HYDROMORPHONE HYDROCHLORIDE 2 MG: 2 INJECTION, SOLUTION INTRAMUSCULAR; INTRAVENOUS; SUBCUTANEOUS at 04:19

## 2025-07-13 RX ADMIN — HYDROMORPHONE HYDROCHLORIDE 2 MG: 2 INJECTION, SOLUTION INTRAMUSCULAR; INTRAVENOUS; SUBCUTANEOUS at 12:43

## 2025-07-13 RX ADMIN — PREGABALIN 25 MG: 25 CAPSULE ORAL at 17:18

## 2025-07-13 RX ADMIN — PROCHLORPERAZINE MALEATE 10 MG: 10 TABLET ORAL at 01:20

## 2025-07-13 RX ADMIN — HYDROMORPHONE HYDROCHLORIDE 2 MG: 2 INJECTION, SOLUTION INTRAMUSCULAR; INTRAVENOUS; SUBCUTANEOUS at 07:33

## 2025-07-13 RX ADMIN — PREGABALIN 25 MG: 25 CAPSULE ORAL at 20:33

## 2025-07-13 RX ADMIN — NORETHINDRONE ACETATE 5 MG: 5 TABLET ORAL at 09:54

## 2025-07-13 RX ADMIN — DEXTROSE AND SODIUM CHLORIDE 75 ML/HR: 5; .45 INJECTION, SOLUTION INTRAVENOUS at 05:52

## 2025-07-13 RX ADMIN — KETOROLAC TROMETHAMINE 15 MG: 15 INJECTION, SOLUTION INTRAMUSCULAR; INTRAVENOUS at 05:51

## 2025-07-13 RX ADMIN — VALACYCLOVIR HYDROCHLORIDE 500 MG: 500 TABLET, FILM COATED ORAL at 09:56

## 2025-07-13 RX ADMIN — TIZANIDINE 2 MG: 2 TABLET ORAL at 03:57

## 2025-07-13 RX ADMIN — DICLOFENAC SODIUM 4 G: 10 GEL TOPICAL at 17:19

## 2025-07-13 RX ADMIN — CARBOXYMETHYLCELLULOSE SODIUM 1 DROP: 5 SOLUTION/ DROPS OPHTHALMIC at 20:51

## 2025-07-13 ASSESSMENT — PAIN SCALES - GENERAL
PAINLEVEL_OUTOF10: 8
PAINLEVEL_OUTOF10: 9
PAINLEVEL_OUTOF10: 9
PAINLEVEL_OUTOF10: 7
PAINLEVEL_OUTOF10: 7
PAINLEVEL_OUTOF10: 8
PAINLEVEL_OUTOF10: 8
PAINLEVEL_OUTOF10: 7
PAINLEVEL_OUTOF10: 8
PAINLEVEL_OUTOF10: 8
PAINLEVEL_OUTOF10: 10 - WORST POSSIBLE PAIN
PAINLEVEL_OUTOF10: 7
PAINLEVEL_OUTOF10: 8
PAINLEVEL_OUTOF10: 8
PAINLEVEL_OUTOF10: 6

## 2025-07-13 ASSESSMENT — PAIN - FUNCTIONAL ASSESSMENT
PAIN_FUNCTIONAL_ASSESSMENT: 0-10

## 2025-07-13 ASSESSMENT — ACTIVITIES OF DAILY LIVING (ADL): LACK_OF_TRANSPORTATION: NO

## 2025-07-13 ASSESSMENT — COGNITIVE AND FUNCTIONAL STATUS - GENERAL
DRESSING REGULAR LOWER BODY CLOTHING: A LITTLE
MOBILITY SCORE: 19
DAILY ACTIVITIY SCORE: 20
MOBILITY SCORE: 19
HELP NEEDED FOR BATHING: A LITTLE
WALKING IN HOSPITAL ROOM: A LITTLE
DRESSING REGULAR UPPER BODY CLOTHING: A LITTLE
CLIMB 3 TO 5 STEPS WITH RAILING: A LOT
STANDING UP FROM CHAIR USING ARMS: A LITTLE
HELP NEEDED FOR BATHING: A LITTLE
MOVING TO AND FROM BED TO CHAIR: A LITTLE
STANDING UP FROM CHAIR USING ARMS: A LITTLE
PERSONAL GROOMING: A LITTLE
MOVING TO AND FROM BED TO CHAIR: A LITTLE
DRESSING REGULAR LOWER BODY CLOTHING: A LITTLE
CLIMB 3 TO 5 STEPS WITH RAILING: A LOT
TOILETING: A LITTLE
TOILETING: A LITTLE
WALKING IN HOSPITAL ROOM: A LITTLE
DAILY ACTIVITIY SCORE: 20

## 2025-07-13 NOTE — CARE PLAN
The patient's goals for the shift include      The clinical goals for the shift include Patient will remain HDS with VSS and pain managed [id number].      Problem: Pain - Adult  Goal: Verbalizes/displays adequate comfort level or baseline comfort level  Outcome: Progressing     Problem: Safety - Adult  Goal: Free from fall injury  Outcome: Progressing     Problem: Discharge Planning  Goal: Discharge to home or other facility with appropriate resources  Outcome: Progressing     Problem: Chronic Conditions and Co-morbidities  Goal: Patient's chronic conditions and co-morbidity symptoms are monitored and maintained or improved  Outcome: Progressing     Problem: Skin  Goal: Decreased wound size/increased tissue granulation at next dressing change  Outcome: Progressing  Goal: Participates in plan/prevention/treatment measures  Outcome: Progressing  Goal: Prevent/manage excess moisture  Outcome: Progressing  Goal: Prevent/minimize sheer/friction injuries  Outcome: Progressing  Goal: Promote/optimize nutrition  Outcome: Progressing  Goal: Promote skin healing  Outcome: Progressing

## 2025-07-13 NOTE — CARE PLAN
pt will remain hds and vss by 7/13/25 at 1900      Problem: Pain - Adult  Goal: Verbalizes/displays adequate comfort level or baseline comfort level  Outcome: Progressing     Problem: Safety - Adult  Goal: Free from fall injury  Outcome: Progressing     Problem: Discharge Planning  Goal: Discharge to home or other facility with appropriate resources  Outcome: Progressing     Problem: Chronic Conditions and Co-morbidities  Goal: Patient's chronic conditions and co-morbidity symptoms are monitored and maintained or improved  Outcome: Progressing     Problem: Nutrition  Goal: Nutrient intake appropriate for maintaining nutritional needs  Outcome: Progressing

## 2025-07-13 NOTE — ASSESSMENT & PLAN NOTE
Mary Alice Aragon is a 45 y.o. female with HbSS c/b transfusion-related iron overload, cerebral aneurysm, NICHOL/MDD that presents with sickle cell pain crisis with pain in R shoulder, BL legs with severe R ankle and foot pain consistent with usual sickle cell pain. She reports the pain began to increase about a week ago, and has been taking her home pain medication without relief. CXR deferred on admit; pt denies new respiratory s/s, SOB or CP. Leukocytosis noted and lysis labs elevated on admit. Pt hgb also downtrending; hgb 7.3 to 6.2 (7/12). XR R foot and ankle (7/12) showed no fracture or malalignment and unchanged sclerosis of the distal tibia corresponding to known osteonecrosis. XR R shoulder (7/12) showed no fracture or malalignment. Started on IV Dilaudid per care path. Admitted for pain control.     Update 7/13:  - hgb 5.8, plan 1u pRBC today, c/w IV desferal infusions q8h for hx of iron overload  - EKG NSR (7/12) for some CP, no resolved  - bilat LE US pending  - c/w IV dilaudid 2mg q2h PRN for severe pain   - hgb S pending     #HbSS w/ pain crisis   - OARRS reviewed and Hydromorphone 4 mg refilled on 7/08/25 for 10 days (40T) and Lyrica 25 mg  refilled on 7/8 for 30 days (90T)  - hgb 7.3 (7/11)--> hgb 6.2 (7/12), will hold off on transfusion for now given significant hx of transfusion-related iron overload, continue to closely monitor  - acute on chronic leukocytosis noted on admit WBC 16.9, likely 2/2 crisis, pt denies recent exposure to sick contacts, respiratory symptoms or fever/chills at home  -  on admit (-300) (7/12)  - tbili 3.7 on admit (BL 1.5-2.5) (7/12)  - ESR 4/CRP 0.67 (7/12)  - CXR deferred on admit; pt denies new respiratory s/s, SOB or CP  - XR R foot and ankle (7/12) showed no fracture or malalignment and unchanged sclerosis of the distal tibia corresponding to known osteonecrosis  - XR R shoulder (7/12) showed no fracture or malalignment  - US of bilateral lower extremity  ordered (7/11) pending  - EKG on admit (7/11) NSR  - started on IV Dilaudid 2mg q2h PRN for severe pain (7/12- )  - continue home Hydroxyurea 1500mg M/W/F and 1000mg T/Th/Sat/Sun   - continue home Folic acid    - continue home Tizanidine 2mg q8hrs scheduled, Tylenol 650mg q6hrs PRN mild pain, and Folic acid 1mg daily   - continue home Methadone 10 mg BID and Lyrica 25mg BID  - start Voltaren gel 4x/day and capsaicin cream 4x/day  - Toradol held i/s/o JOSE JUAN on admission, restarted toradol q6h PRN for mod pain 7/13 since JOSE JUAN resolved  - hgb S pending  - utox + cannabinoid (7/12)  - Bowel regimen for opioid-induced constipation with DocuSenna 2tabs BID and Miralax daily  - PO Benadryl PRN for opioid-induced pruritus, PO Compazine PRN for opioid-induced nausea  - continue home noc O2 (2-3L) for known nocturnal hypoxemia      #JOSE JUAN - resolved  - likely 2/2 dehydration  - sCr 1.12 on admit (BL ~0.90) --> sCr 1.01 (7/13)  - s/p D5-1/2NS @ 75 ml/hr for 8 hours (7/12)  - monitor CMP daily     #Transfusion-mediated iron overload, chronic   - patient did not bring her cadd vicente pump   - ferritin 2117 (7/12)  - continue Deferoxamine 2g daily IV infused over 8 hours       #Hx of ingrown toe nails    - reports having a procedure on 4/9 and healed now     #Nocturnal Hypoxia  - cont night 2-3 L supplemental O2     #NICHOL  #MDD  - cont home Trazodone 100mg PRN     Prophy  - Lovenox subcutaneous, encourage SCDs and ambulation as able      DISPO  - Code Status: Full Code  - NOK: Penelope Bain (mother) #[phone]   - FUV: 7/25 w/ OBGYN, 8/18 w/ sickle cell, 5/29/26 w/ Primary Care

## 2025-07-13 NOTE — PROGRESS NOTES
07/13/25 1649   Discharge Planning   Living Arrangements Children   Support Systems Children;Family members   Assistance Needed n/a   Type of Residence Private residence   Number of Stairs to Enter Residence 6   Number of Stairs Within Residence 0   Do you have animals or pets at home? Yes   Who is requesting discharge planning? Provider   Home or Post Acute Services None   Expected Discharge Disposition Home   Does the patient need discharge transport arranged? Yes   [facility] [employer] coordination needed? Yes   Financial Resource Strain   How hard is it for you to pay for the very basics like food, housing, medical care, and heating? Not very   Housing Stability   In the last 12 months, was there a time when you were not able to pay the mortgage or rent on time? N   In the past 12 months, how many times have you moved where you were living? 0   At any time in the past 12 months, were you homeless or living in a shelter (including now)? N   Transportation Needs   In the past 12 months, has lack of transportation kept you from medical appointments or from getting medications? no   In the past 12 months, has lack of transportation kept you from meetings, work, or from getting things needed for daily living? No   Patient Choice   Provider Choice list and CMS website (https://medicare.gov/care-compare#search) for post-acute Quality and Resource Measure Data were provided and reviewed with:   (n/a)   Patient / Family choosing to utilize agency / facility established prior to hospitalization No   Stroke Family Assessment   Stroke Family Assessment Needed No   Intensity of Service   Intensity of Service 0-30 min     TCC contacted patient by hospital phone. She states she lives with her 2 children (14 and 15 y.o.), Pt states she has home oxygen (HCS) and wears 3L at night and during episodes of pain. Pt also has a walker. She states she drives a car but when she isn't feeling well, then she just doesn't go out.  Pt's PCP is  Dr Mcdaniel and was last seen one month ago. Preferred pharmacy is the [facility] in [place]. Pt has [facility] dual insurance. She will need transportation arranged at discharge and has requested an addition mask for her home O2 at discharge, as she was going to call [facility] but came to the hospital instead that date. Pt states she had been getting Ketamine therapy at [facility], but states they have been cancelling her appts and she is upset this keeps happening. TCC suggested that she contact the ombudsman or pt advocate at [facility] regarding this situation.    Virgen Barrett RN  (Weekend Transitional Care Coordinator-TCC, send Epic message)

## 2025-07-13 NOTE — PROGRESS NOTES
"Mary Alice Aragon is a 45 y.o. female on day 1 of admission presenting with Sickle cell pain crisis (Multi).    Subjective   Mary Alice is doing fine this morning. States her pain is mostly in her legs. AxOx4, no neuro deficits noted. Pt states she had an episode of vomiting overnight. No s/sx of bleeding. Had some CP yesterday that has resolved, EKG NSR. Denies chest pain, SOB, N/V/D/C, fever, chills, abdominal pain. We discussed 1u pRBC today for low hgb, patient receiving IV Desferal for hx of iron transfusion overload. ROS: A complete review of systems was performed and is negative except for as mentioned above in the HPI     Objective     Physical Exam  HENT:      Nose: Nose normal.   Eyes:      Pupils: Pupils are equal, round, and reactive to light.   Cardiovascular:      Rate and Rhythm: Normal rate.   Pulmonary:      Effort: Pulmonary effort is normal.   Abdominal:      General: Abdomen is flat.   Musculoskeletal:         General: Normal range of motion.      Cervical back: Normal range of motion.   Neurological:      General: No focal deficit present.      Mental Status: She is alert.   Psychiatric:         Mood and Affect: Mood normal.       Last Recorded Vitals  Blood pressure 119/70, pulse 108, temperature 37.5 °C (99.5 °F), temperature source Temporal, resp. rate 16, height 1.6 m (5' 3\"), weight 68 kg (150 lb), SpO2 95%.  Intake/Output last 3 Shifts:  I/O last 3 completed shifts:  In: 1581.1 (23.2 mL/kg) [P.O.:960; IV Piggyback:621.1]  Out: 400 (5.9 mL/kg) [Urine:400 (0.2 mL/kg/hr)]  Weight: 68 kg     Relevant Results  XR shoulder right 2+ views  Result Date: 7/12/2025  Interpreted By:  London Hernandez and Liu Scott STUDY: XR SHOULDER RIGHT 2+ VIEWS; ;  7/12/2025 7:19 am   INDICATION: Signs/Symptoms:worsening pain.     COMPARISON: Radiographs 12/10/2024   ACCESSION NUMBER(S): [id number]   ORDERING CLINICIAN: JESSICA MATTHEWS   FINDINGS: Three views of the right shoulder   No acute fracture or malalignment. No " radiographic evidence of avascular necrosis. Right-sided MediPort noted with Young needle within the port. No other radiopaque foreign body. No soft tissue gas.       No acute fracture or malalignment of the right shoulder.   I personally reviewed the images/study and I agree with the findings as stated by resident physician Hugh Hadley MD. This study was interpreted at [place], [place].   MACRO: None   Signed by: London Hernandez 7/12/2025 7:50 AM Dictation workstation:   [id number]    XR foot right 3+ views  Result Date: 7/12/2025  Interpreted By:  London Hernandez and Liu Scott STUDY: XR FOOT RIGHT 3+ VIEWS; XR ANKLE RIGHT 3+ VIEWS;7/12/2025 7:19 am   INDICATION: Signs/Symptoms:worsening pain and hx of AVN.   COMPARISON: Radiographs 04/18/2025.   ACCESSION NUMBER(S): [id number]; [id number]   ORDERING CLINICIAN: JESSICA MATTHEWS   FINDINGS: Three views right ankle, three views right foot.   Unchanged sclerosis of the distal tibia corresponding to foci of known foci of osteonecrosis. Otherwise no acute fracture or malalignment of the right ankle or right foot. The ankle mortise is maintained.       Unchanged sclerosis of the distal tibia corresponding to known osteonecrosis. Otherwise no acute fracture or malalignment of the right ankle or right foot.   I personally reviewed the images/study and I agree with the findings as stated by resident physician Hugh Hadley MD. This study was interpreted at [facility], [place], [place].   MACRO: None.   Signed by: London Hernandez 7/12/2025 7:50 AM Dictation workstation:   [id number]    XR ankle right 3+ views  Result Date: 7/12/2025  Interpreted By:  London Hernandez and Liu Scott STUDY: XR FOOT RIGHT 3+ VIEWS; XR ANKLE RIGHT 3+ VIEWS;7/12/2025 7:19 am   INDICATION: Signs/Symptoms:worsening pain and hx of AVN.   COMPARISON: Radiographs 04/18/2025.   ACCESSION NUMBER(S): [id number]; [id number]    ORDERING CLINICIAN: JESSICA MATTHEWS   FINDINGS: Three views right ankle, three views right foot.   Unchanged sclerosis of the distal tibia corresponding to foci of known foci of osteonecrosis. Otherwise no acute fracture or malalignment of the right ankle or right foot. The ankle mortise is maintained.       Unchanged sclerosis of the distal tibia corresponding to known osteonecrosis. Otherwise no acute fracture or malalignment of the right ankle or right foot.   I personally reviewed the images/study and I agree with the findings as stated by resident physician Hugh Hadley MD. This study was interpreted at [facility], [place], [place].   MACRO: None.   Signed by: London Hernandez 7/12/2025 7:50 AM Dictation workstation:   [id number]    ECG 12 lead  Result Date: 7/12/2025  Normal sinus rhythm Normal ECG When compared with ECG of 20-APR-2025 18:30, Nonspecific T wave abnormality no longer evident in Inferior leads T wave inversion no longer evident in Anterior leads See ED provider note for full interpretation and clinical correlation Confirmed by Karyn Hidalgo ([id number]) on 7/12/2025 2:55:00 AM    This patient has a central line   Reason for the central line remaining today? Hemodynamic monitoring    Assessment & Plan  Sickle cell pain crisis (Multi)  Mary Alice Aragon is a 45 y.o. female with HbSS c/b transfusion-related iron overload, cerebral aneurysm, NICHOL/MDD that presents with sickle cell pain crisis with pain in R shoulder, BL legs with severe R ankle and foot pain consistent with usual sickle cell pain. She reports the pain began to increase about a week ago, and has been taking her home pain medication without relief. CXR deferred on admit; pt denies new respiratory s/s, SOB or CP. Leukocytosis noted and lysis labs elevated on admit. Pt hgb also downtrending; hgb 7.3 to 6.2 (7/12). XR R foot and ankle (7/12) showed no fracture or malalignment and unchanged sclerosis of the distal tibia  corresponding to known osteonecrosis. XR R shoulder (7/12) showed no fracture or malalignment. Started on IV Dilaudid per care path. Admitted for pain control.     Update 7/13:  - hgb 5.8, plan 1u pRBC today, c/w IV desferal infusions q8h for hx of iron overload  - EKG NSR (7/12) for some CP, no resolved  - bilat LE US pending  - c/w IV dilaudid 2mg q2h PRN for severe pain   - hgb S pending     #HbSS w/ pain crisis   - OARRS reviewed and Hydromorphone 4 mg refilled on 7/08/25 for 10 days (40T) and Lyrica 25 mg  refilled on 7/8 for 30 days (90T)  - hgb 7.3 (7/11)--> hgb 6.2 (7/12), will hold off on transfusion for now given significant hx of transfusion-related iron overload, continue to closely monitor  - acute on chronic leukocytosis noted on admit WBC 16.9, likely 2/2 crisis, pt denies recent exposure to sick contacts, respiratory symptoms or fever/chills at home  -  on admit (-300) (7/12)  - tbili 3.7 on admit (BL 1.5-2.5) (7/12)  - ESR 4/CRP 0.67 (7/12)  - CXR deferred on admit; pt denies new respiratory s/s, SOB or CP  - XR R foot and ankle (7/12) showed no fracture or malalignment and unchanged sclerosis of the distal tibia corresponding to known osteonecrosis  - XR R shoulder (7/12) showed no fracture or malalignment  - US of bilateral lower extremity ordered (7/11) pending  - EKG on admit (7/11) NSR  - started on IV Dilaudid 2mg q2h PRN for severe pain (7/12- )  - continue home Hydroxyurea 1500mg M/W/F and 1000mg T/Th/Sat/Sun   - continue home Folic acid    - continue home Tizanidine 2mg q8hrs scheduled, Tylenol 650mg q6hrs PRN mild pain, and Folic acid 1mg daily   - continue home Methadone 10 mg BID and Lyrica 25mg BID  - start Voltaren gel 4x/day and capsaicin cream 4x/day  - Toradol held i/s/o JOSE JUAN on admission, restarted toradol q6h PRN for mod pain 7/13 since JOSE JUAN resolved  - hgb S pending  - utox + cannabinoid (7/12)  - Bowel regimen for opioid-induced constipation with DocuSenna 2tabs BID  and Miralax daily  - PO Benadryl PRN for opioid-induced pruritus, PO Compazine PRN for opioid-induced nausea  - continue home noc O2 (2-3L) for known nocturnal hypoxemia      #JOSE JUAN - resolved  - likely 2/2 dehydration  - sCr 1.12 on admit (BL ~0.90) --> sCr 1.01 (7/13)  - s/p D5-1/2NS @ 75 ml/hr for 8 hours (7/12)  - monitor CMP daily     #Transfusion-mediated iron overload, chronic   - patient did not bring her cadd vicente pump   - ferritin 2117 (7/12)  - continue Deferoxamine 2g daily IV infused over 8 hours       #Hx of ingrown toe nails    - reports having a procedure on 4/9 and healed now     #Nocturnal Hypoxia  - cont night 2-3 L supplemental O2     #NICHOL  #MDD  - cont home Trazodone 100mg PRN     Prophy  - Lovenox subcutaneous, encourage SCDs and ambulation as able      DISPO  - Code Status: Full Code  - NOK: Penelope Bain (mother) #[phone]   - FUV: 7/25 w/ OBGYN, 8/18 w/ sickle cell, 5/29/26 w/ Primary Care    I spent 60 minutes in the professional and overall care of this patient.    Reena Estes PA-C

## 2025-07-14 ENCOUNTER — APPOINTMENT (OUTPATIENT)
Dept: VASCULAR MEDICINE | Facility: HOSPITAL | Age: 45
End: 2025-07-14
Payer: COMMERCIAL

## 2025-07-14 LAB
ALBUMIN SERPL BCP-MCNC: 3.9 G/DL (ref 3.4–5)
ALP SERPL-CCNC: 58 U/L (ref 33–110)
ALT SERPL W P-5'-P-CCNC: 12 U/L (ref 7–45)
ANION GAP SERPL CALC-SCNC: 8 MMOL/L (ref 10–20)
AST SERPL W P-5'-P-CCNC: 23 U/L (ref 9–39)
BASOPHILS # BLD AUTO: 0.09 X10*3/UL (ref 0–0.1)
BASOPHILS NFR BLD AUTO: 0.6 %
BILIRUB SERPL-MCNC: 3.1 MG/DL (ref 0–1.2)
BUN SERPL-MCNC: 9 MG/DL (ref 6–23)
CALCIUM SERPL-MCNC: 8.7 MG/DL (ref 8.6–10.6)
CHLORIDE SERPL-SCNC: 105 MMOL/L (ref 98–107)
CO2 SERPL-SCNC: 27 MMOL/L (ref 21–32)
CREAT SERPL-MCNC: 0.96 MG/DL (ref 0.5–1.05)
EGFRCR SERPLBLD CKD-EPI 2021: 75 ML/MIN/1.73M*2
EOSINOPHIL # BLD AUTO: 0.48 X10*3/UL (ref 0–0.7)
EOSINOPHIL NFR BLD AUTO: 3.2 %
ERYTHROCYTE [DISTWIDTH] IN BLOOD BY AUTOMATED COUNT: 17.7 % (ref 11.5–14.5)
GLUCOSE SERPL-MCNC: 90 MG/DL (ref 74–99)
HCT VFR BLD AUTO: 19 % (ref 36–46)
HEMOGLOBIN A2: 3.2 % (ref 2–3.5)
HEMOGLOBIN A2: 3.3 % (ref 2–3.5)
HEMOGLOBIN F: 16.8 % (ref 0–2)
HEMOGLOBIN F: 17.2 % (ref 0–2)
HEMOGLOBIN IDENTIFICATION INTERPRETATION: ABNORMAL
HEMOGLOBIN IDENTIFICATION INTERPRETATION: ABNORMAL
HEMOGLOBIN S: 79.5 %
HEMOGLOBIN S: 80 %
HGB BLD-MCNC: 6.7 G/DL (ref 12–16)
HGB RETIC QN: 33 PG (ref 28–38)
IMM GRANULOCYTES # BLD AUTO: 0.22 X10*3/UL (ref 0–0.7)
IMM GRANULOCYTES NFR BLD AUTO: 1.5 % (ref 0–0.9)
IMMATURE RETIC FRACTION: 42.3 %
LDH SERPL L TO P-CCNC: 305 U/L (ref 84–246)
LYMPHOCYTES # BLD AUTO: 5.3 X10*3/UL (ref 1.2–4.8)
LYMPHOCYTES NFR BLD AUTO: 35.1 %
MCH RBC QN AUTO: 32.2 PG (ref 26–34)
MCHC RBC AUTO-ENTMCNC: 35.3 G/DL (ref 32–36)
MCV RBC AUTO: 91 FL (ref 80–100)
MONOCYTES # BLD AUTO: 0.77 X10*3/UL (ref 0.1–1)
MONOCYTES NFR BLD AUTO: 5.1 %
NEUTROPHILS # BLD AUTO: 8.22 X10*3/UL (ref 1.2–7.7)
NEUTROPHILS NFR BLD AUTO: 54.5 %
NRBC BLD-RTO: 3.1 /100 WBCS (ref 0–0)
PATH REVIEW-HGB IDENTIFICATION: ABNORMAL
PATH REVIEW-HGB IDENTIFICATION: ABNORMAL
PLATELET # BLD AUTO: 418 X10*3/UL (ref 150–450)
POTASSIUM SERPL-SCNC: 4 MMOL/L (ref 3.5–5.3)
PROT SERPL-MCNC: 7 G/DL (ref 6.4–8.2)
RBC # BLD AUTO: 2.08 X10*6/UL (ref 4–5.2)
RETICS #: 0.19 X10*6/UL (ref 0.02–0.08)
RETICS/RBC NFR AUTO: 9.2 % (ref 0.5–2)
SODIUM SERPL-SCNC: 136 MMOL/L (ref 136–145)
WBC # BLD AUTO: 15.1 X10*3/UL (ref 4.4–11.3)

## 2025-07-14 PROCEDURE — 2500000001 HC RX 250 WO HCPCS SELF ADMINISTERED DRUGS (ALT 637 FOR MEDICARE OP)

## 2025-07-14 PROCEDURE — 2500000005 HC RX 250 GENERAL PHARMACY W/O HCPCS

## 2025-07-14 PROCEDURE — 1170000001 HC PRIVATE ONCOLOGY ROOM DAILY

## 2025-07-14 PROCEDURE — 2500000004 HC RX 250 GENERAL PHARMACY W/ HCPCS (ALT 636 FOR OP/ED): Mod: JZ,TB

## 2025-07-14 PROCEDURE — 80053 COMPREHEN METABOLIC PANEL: CPT

## 2025-07-14 PROCEDURE — 2500000004 HC RX 250 GENERAL PHARMACY W/ HCPCS (ALT 636 FOR OP/ED)

## 2025-07-14 PROCEDURE — S0109 METHADONE ORAL 5MG: HCPCS

## 2025-07-14 PROCEDURE — 99233 SBSQ HOSP IP/OBS HIGH 50: CPT

## 2025-07-14 PROCEDURE — 2500000002 HC RX 250 W HCPCS SELF ADMINISTERED DRUGS (ALT 637 FOR MEDICARE OP, ALT 636 FOR OP/ED)

## 2025-07-14 PROCEDURE — 85045 AUTOMATED RETICULOCYTE COUNT: CPT

## 2025-07-14 PROCEDURE — 93970 EXTREMITY STUDY: CPT

## 2025-07-14 PROCEDURE — 83615 LACTATE (LD) (LDH) ENZYME: CPT

## 2025-07-14 PROCEDURE — 85025 COMPLETE CBC W/AUTO DIFF WBC: CPT

## 2025-07-14 PROCEDURE — 93970 EXTREMITY STUDY: CPT | Performed by: SURGERY

## 2025-07-14 PROCEDURE — S4993 CONTRACEPTIVE PILLS FOR BC: HCPCS

## 2025-07-14 RX ORDER — ONDANSETRON 4 MG/1
4 TABLET, FILM COATED ORAL EVERY 8 HOURS PRN
Status: DISCONTINUED | OUTPATIENT
Start: 2025-07-14 | End: 2025-07-17 | Stop reason: HOSPADM

## 2025-07-14 RX ADMIN — HYDROMORPHONE HYDROCHLORIDE 2 MG: 2 INJECTION, SOLUTION INTRAMUSCULAR; INTRAVENOUS; SUBCUTANEOUS at 00:18

## 2025-07-14 RX ADMIN — DEFEROXAMINE MESYLATE 2000 MG: 95 INJECTION, POWDER, LYOPHILIZED, FOR SOLUTION INTRAMUSCULAR; INTRAVENOUS; SUBCUTANEOUS at 11:34

## 2025-07-14 RX ADMIN — DICLOFENAC SODIUM 4 G: 10 GEL TOPICAL at 05:43

## 2025-07-14 RX ADMIN — LIDOCAINE 1 PATCH: 4 PATCH TOPICAL at 11:34

## 2025-07-14 RX ADMIN — ENOXAPARIN SODIUM 40 MG: 100 INJECTION SUBCUTANEOUS at 08:08

## 2025-07-14 RX ADMIN — HYDROMORPHONE HYDROCHLORIDE 2 MG: 2 INJECTION, SOLUTION INTRAMUSCULAR; INTRAVENOUS; SUBCUTANEOUS at 18:30

## 2025-07-14 RX ADMIN — METHADONE HYDROCHLORIDE 10 MG: 10 TABLET ORAL at 20:42

## 2025-07-14 RX ADMIN — KETOROLAC TROMETHAMINE 15 MG: 15 INJECTION, SOLUTION INTRAMUSCULAR; INTRAVENOUS at 20:42

## 2025-07-14 RX ADMIN — METHADONE HYDROCHLORIDE 10 MG: 10 TABLET ORAL at 08:08

## 2025-07-14 RX ADMIN — HYDROMORPHONE HYDROCHLORIDE 2 MG: 2 INJECTION, SOLUTION INTRAMUSCULAR; INTRAVENOUS; SUBCUTANEOUS at 13:56

## 2025-07-14 RX ADMIN — DOCUSATE SODIUM 50 MG AND SENNOSIDES 8.6 MG 2 TABLET: 8.6; 5 TABLET, FILM COATED ORAL at 20:42

## 2025-07-14 RX ADMIN — OXYCODONE HYDROCHLORIDE AND ACETAMINOPHEN 1000 MG: 500 TABLET ORAL at 08:07

## 2025-07-14 RX ADMIN — DICLOFENAC SODIUM 4 G: 10 GEL TOPICAL at 21:00

## 2025-07-14 RX ADMIN — FOLIC ACID 1 MG: 1 TABLET ORAL at 08:08

## 2025-07-14 RX ADMIN — DOCUSATE SODIUM 50 MG AND SENNOSIDES 8.6 MG 2 TABLET: 8.6; 5 TABLET, FILM COATED ORAL at 08:08

## 2025-07-14 RX ADMIN — NORETHINDRONE ACETATE 5 MG: 5 TABLET ORAL at 08:08

## 2025-07-14 RX ADMIN — PANTOPRAZOLE SODIUM 20 MG: 20 TABLET, DELAYED RELEASE ORAL at 05:42

## 2025-07-14 RX ADMIN — CETIRIZINE HYDROCHLORIDE 10 MG: 10 TABLET, FILM COATED ORAL at 08:08

## 2025-07-14 RX ADMIN — HYDROMORPHONE HYDROCHLORIDE 2 MG: 2 INJECTION, SOLUTION INTRAMUSCULAR; INTRAVENOUS; SUBCUTANEOUS at 05:42

## 2025-07-14 RX ADMIN — BENZOCAINE AND MENTHOL 1 LOZENGE: 15; 3.6 LOZENGE ORAL at 00:19

## 2025-07-14 RX ADMIN — HYDROMORPHONE HYDROCHLORIDE 2 MG: 2 INJECTION, SOLUTION INTRAMUSCULAR; INTRAVENOUS; SUBCUTANEOUS at 23:32

## 2025-07-14 RX ADMIN — DOCUSATE SODIUM 100 MG: 100 CAPSULE, LIQUID FILLED ORAL at 08:14

## 2025-07-14 RX ADMIN — PREGABALIN 25 MG: 25 CAPSULE ORAL at 16:24

## 2025-07-14 RX ADMIN — VALACYCLOVIR HYDROCHLORIDE 500 MG: 500 TABLET, FILM COATED ORAL at 08:08

## 2025-07-14 RX ADMIN — ONDANSETRON HYDROCHLORIDE 4 MG: 4 TABLET, FILM COATED ORAL at 15:47

## 2025-07-14 RX ADMIN — HYDROMORPHONE HYDROCHLORIDE 2 MG: 2 INJECTION, SOLUTION INTRAMUSCULAR; INTRAVENOUS; SUBCUTANEOUS at 03:19

## 2025-07-14 RX ADMIN — CARBOXYMETHYLCELLULOSE SODIUM 1 DROP: 5 SOLUTION/ DROPS OPHTHALMIC at 18:30

## 2025-07-14 RX ADMIN — HYDROXYUREA 1500 MG: 500 CAPSULE ORAL at 11:34

## 2025-07-14 RX ADMIN — PROCHLORPERAZINE MALEATE 10 MG: 10 TABLET ORAL at 00:18

## 2025-07-14 RX ADMIN — DICLOFENAC SODIUM 4 G: 10 GEL TOPICAL at 13:56

## 2025-07-14 RX ADMIN — TRAZODONE HYDROCHLORIDE 100 MG: 50 TABLET ORAL at 06:41

## 2025-07-14 RX ADMIN — TIZANIDINE 2 MG: 2 TABLET ORAL at 16:25

## 2025-07-14 RX ADMIN — PREGABALIN 25 MG: 25 CAPSULE ORAL at 08:08

## 2025-07-14 RX ADMIN — HYDROMORPHONE HYDROCHLORIDE 2 MG: 2 INJECTION, SOLUTION INTRAMUSCULAR; INTRAVENOUS; SUBCUTANEOUS at 20:42

## 2025-07-14 RX ADMIN — HYDROMORPHONE HYDROCHLORIDE 2 MG: 2 INJECTION, SOLUTION INTRAMUSCULAR; INTRAVENOUS; SUBCUTANEOUS at 07:59

## 2025-07-14 RX ADMIN — HYDROMORPHONE HYDROCHLORIDE 2 MG: 2 INJECTION, SOLUTION INTRAMUSCULAR; INTRAVENOUS; SUBCUTANEOUS at 16:24

## 2025-07-14 RX ADMIN — HYDROMORPHONE HYDROCHLORIDE 2 MG: 2 INJECTION, SOLUTION INTRAMUSCULAR; INTRAVENOUS; SUBCUTANEOUS at 10:20

## 2025-07-14 RX ADMIN — PROCHLORPERAZINE MALEATE 10 MG: 10 TABLET ORAL at 10:20

## 2025-07-14 RX ADMIN — POLYETHYLENE GLYCOL 3350 17 G: 17 POWDER, FOR SOLUTION ORAL at 08:14

## 2025-07-14 RX ADMIN — DICLOFENAC SODIUM 4 G: 10 GEL TOPICAL at 16:31

## 2025-07-14 RX ADMIN — Medication 10 MG: at 20:42

## 2025-07-14 ASSESSMENT — COGNITIVE AND FUNCTIONAL STATUS - GENERAL
HELP NEEDED FOR BATHING: A LITTLE
DAILY ACTIVITIY SCORE: 20
TOILETING: A LITTLE
DRESSING REGULAR LOWER BODY CLOTHING: A LITTLE
STANDING UP FROM CHAIR USING ARMS: A LITTLE
DRESSING REGULAR LOWER BODY CLOTHING: A LITTLE
WALKING IN HOSPITAL ROOM: A LITTLE
STANDING UP FROM CHAIR USING ARMS: A LITTLE
HELP NEEDED FOR BATHING: A LITTLE
MOBILITY SCORE: 19
DAILY ACTIVITIY SCORE: 20
CLIMB 3 TO 5 STEPS WITH RAILING: A LOT
MOVING TO AND FROM BED TO CHAIR: A LITTLE
MOVING TO AND FROM BED TO CHAIR: A LITTLE
DRESSING REGULAR UPPER BODY CLOTHING: A LITTLE
CLIMB 3 TO 5 STEPS WITH RAILING: A LOT
TOILETING: A LITTLE
WALKING IN HOSPITAL ROOM: A LITTLE
DRESSING REGULAR UPPER BODY CLOTHING: A LITTLE
MOBILITY SCORE: 19

## 2025-07-14 ASSESSMENT — PAIN - FUNCTIONAL ASSESSMENT
PAIN_FUNCTIONAL_ASSESSMENT: UNABLE TO SELF-REPORT
PAIN_FUNCTIONAL_ASSESSMENT: 0-10
PAIN_FUNCTIONAL_ASSESSMENT: WONG-BAKER FACES
PAIN_FUNCTIONAL_ASSESSMENT: UNABLE TO SELF-REPORT
PAIN_FUNCTIONAL_ASSESSMENT: 0-10
PAIN_FUNCTIONAL_ASSESSMENT: WONG-BAKER FACES
PAIN_FUNCTIONAL_ASSESSMENT: UNABLE TO SELF-REPORT
PAIN_FUNCTIONAL_ASSESSMENT: 0-10

## 2025-07-14 ASSESSMENT — PAIN SCALES - GENERAL
PAINLEVEL_OUTOF10: 7
PAINLEVEL_OUTOF10: 8
PAINLEVEL_OUTOF10: 7
PAINLEVEL_OUTOF10: 8
PAINLEVEL_OUTOF10: 9
PAINLEVEL_OUTOF10: 8
PAINLEVEL_OUTOF10: 9
PAINLEVEL_OUTOF10: 10 - WORST POSSIBLE PAIN
PAINLEVEL_OUTOF10: 10 - WORST POSSIBLE PAIN
PAINLEVEL_OUTOF10: 9
PAINLEVEL_OUTOF10: 10 - WORST POSSIBLE PAIN
PAINLEVEL_OUTOF10: 8
PAINLEVEL_OUTOF10: 10 - WORST POSSIBLE PAIN
PAINLEVEL_OUTOF10: 9
PAINLEVEL_OUTOF10: 9
PAINLEVEL_OUTOF10: 0 - NO PAIN
PAINLEVEL_OUTOF10: 0 - NO PAIN
PAINLEVEL_OUTOF10: 10 - WORST POSSIBLE PAIN
PAINLEVEL_OUTOF10: 9

## 2025-07-14 ASSESSMENT — PAIN DESCRIPTION - LOCATION
LOCATION: BACK

## 2025-07-14 NOTE — CARE PLAN
Problem: Pain - Adult  Goal: Verbalizes/displays adequate comfort level or baseline comfort level  Outcome: Progressing     Problem: Safety - Adult  Goal: Free from fall injury  Outcome: Progressing     Problem: Discharge Planning  Goal: Discharge to home or other facility with appropriate resources  Outcome: Progressing     Problem: Chronic Conditions and Co-morbidities  Goal: Patient's chronic conditions and co-morbidity symptoms are monitored and maintained or improved  Outcome: Progressing     Problem: Nutrition  Goal: Nutrient intake appropriate for maintaining nutritional needs  Outcome: Progressing     Problem: Skin  Goal: Decreased wound size/increased tissue granulation at next dressing change  Outcome: Progressing  Flowsheets (Taken 7/14/2025 1849)  Decreased wound size/increased tissue granulation at next dressing change: Promote sleep for wound healing  Goal: Participates in plan/prevention/treatment measures  Outcome: Progressing  Flowsheets (Taken 7/14/2025 1849)  Participates in plan/prevention/treatment measures:   Discuss with provider PT/OT consult   Elevate heels   Increase activity/out of bed for meals  Goal: Prevent/manage excess moisture  Outcome: Progressing  Flowsheets (Taken 7/14/2025 1849)  Prevent/manage excess moisture:   Cleanse incontinence/protect with barrier cream   Monitor for/manage infection if present   Follow provider orders for dressing changes   Use wicking fabric (obtain order)   Moisturize dry skin  Goal: Prevent/minimize sheer/friction injuries  Outcome: Progressing  Flowsheets (Taken 7/14/2025 1849)  Prevent/minimize sheer/friction injuries:   Complete micro-shifts as needed if patient unable. Adjust patient position to relieve pressure points, not a full turn   Increase activity/out of bed for meals   Use pull sheet  Goal: Promote/optimize nutrition  Outcome: Progressing  Flowsheets (Taken 7/14/2025 1849)  Promote/optimize nutrition:   Monitor/record intake including  meals   Offer water/supplements/favorite foods   Consume > 50% meals/supplements  Goal: Promote skin healing  Outcome: Progressing  Flowsheets (Taken 7/14/2025 1849)  Promote skin healing:   Assess skin/pad under line(s)/device(s)   Protective dressings over bony prominences   Turn/reposition every 2 hours/use positioning/transfer devices   Ensure correct size (line/device) and apply per  instructions   The patient's goals for the shift include      The clinical goals for the shift include patient will have decreased pain throughout shift on 7/14/25 at 1900    Patient did not have decreased pain throughout shift. Patient pain is uncontrolled and given pain meds routinely throughout the day as appropriate per pain score. Plan to continue with pain regimen.

## 2025-07-14 NOTE — ASSESSMENT & PLAN NOTE
Mary Alice Aragon is a 45 y.o. female with HbSS c/b transfusion-related iron overload, cerebral aneurysm, NICHOL/MDD that presents with sickle cell pain crisis with pain in R shoulder, BL legs with severe R ankle and foot pain consistent with usual sickle cell pain. She reports the pain began to increase about a week ago, and has been taking her home pain medication without relief. CXR deferred on admit; pt denies new respiratory s/s, SOB or CP. Leukocytosis noted and lysis labs elevated on admit. Pt hgb also downtrending; hgb 7.3 to 6.2 (7/12). XR R foot and ankle (7/12) showed no fracture or malalignment and unchanged sclerosis of the distal tibia corresponding to known osteonecrosis. XR R shoulder (7/12) showed no fracture or malalignment. Started on IV Dilaudid per care path. Admitted for pain control.     Update 7/14:  - hgb incremented appropriately after 1u pRBC yesterday  - bilat LE US prelim neg  - c/w IV dilaudid 2mg q2h PRN for severe pain   - hgb S pending     #HbSS w/ pain crisis   - OARRS reviewed and Hydromorphone 4 mg refilled on 7/08/25 for 10 days (40T) and Lyrica 25 mg  refilled on 7/8 for 30 days (90T)  - hgb 7.3 (7/11)--> hgb 6.2 (7/12), will hold off on transfusion for now given significant hx of transfusion-related iron overload, continue to closely monitor  - 7/13 hgb 5.8, s/p 1u pRBC due to increased pain and worsening lysis labs  - 7/14 hgb incremented appropriately to 6.7  - acute on chronic leukocytosis noted on admit WBC 16.9, likely 2/2 crisis, pt denies recent exposure to sick contacts, respiratory symptoms or fever/chills at home  -  on admit (-300) (7/12)  - tbili 3.7 on admit (BL 1.5-2.5) (7/12)  - ESR 4/CRP 0.67 (7/12)  - CXR deferred on admit; pt denies new respiratory s/s, SOB or CP  - XR R foot and ankle (7/12) showed no fracture or malalignment and unchanged sclerosis of the distal tibia corresponding to known osteonecrosis  - XR R shoulder (7/12) showed no fracture or  malalignment  - US of bilateral lower extremity ordered (7/11) pending (prelim neg)  - EKG on admit (7/11) NSR  - started on IV Dilaudid 2mg q2h PRN for severe pain (7/12- )  - continue home Hydroxyurea 1500mg M/W/F and 1000mg T/Th/Sat/Sun   - continue home Folic acid    - continue home Tizanidine 2mg q8hrs scheduled, Tylenol 650mg q6hrs PRN mild pain, and Folic acid 1mg daily   - continue home Methadone 10 mg BID and Lyrica 25mg BID  - start Voltaren gel 4x/day and capsaicin cream 4x/day  - Toradol held i/s/o JOSE JUAN on admission, restarted toradol q6h PRN for mod pain 7/13 since JOSE JUAN resolved  - hgb S pending  - utox + cannabinoid (7/12)  - Bowel regimen for opioid-induced constipation with DocuSenna 2tabs BID and Miralax daily  - PO Benadryl PRN for opioid-induced pruritus, PO Compazine PRN for opioid-induced nausea  - continue home noc O2 (2-3L) for known nocturnal hypoxemia      #JOSE JUAN - resolved  - likely 2/2 dehydration  - sCr 1.12 on admit (BL ~0.90) --> sCr 0.96 (7/14)  - s/p D5-1/2NS @ 75 ml/hr x 1 day (7/12)  - monitor CMP daily     #Transfusion-mediated iron overload, chronic   - patient did not bring her cadd vicente pump   - ferritin 2117 (7/12)  - continue Deferoxamine 2g daily IV infused over 8 hours       #Hx of ingrown toe nails    - reports having a procedure on 4/9 and healed now     #Nocturnal Hypoxia  - cont night 2-3 L supplemental O2 (prefers ayesha mask)     #NICHOL  #MDD  - cont home Trazodone 100mg PRN     Prophy  - Lovenox subcutaneous, encourage SCDs and ambulation as able      DISPO  - Code Status: Full Code  - NOK: Penelope Bain (mother) #[phone]   - FUV: 7/25 w/ OBGYN, 8/18 w/ sickle cell, 5/29/26 w/ Primary Care

## 2025-07-14 NOTE — PROGRESS NOTES
"Con Aragon is a 45 y.o. female on day 2 of admission presenting with Sickle cell pain crisis (Multi).    Subjective   Con is doing fine this morning, tearful. States she did not have a good night due to nursing not giving her pain meds as ordered and was in a lot of pain overnight. Denies chest pain, SOB, N/V/D/C, fever, chills. We discussed making sure that nursing is aware of her pain meds and she will get her pain meds as ordered. DVT US prelim is neg. ROS: A complete review of systems was performed and is negative except for as mentioned above in the HPI     Objective     Physical Exam  HENT:      Nose: Nose normal.   Eyes:      Pupils: Pupils are equal, round, and reactive to light.   Cardiovascular:      Rate and Rhythm: Normal rate.   Pulmonary:      Effort: Pulmonary effort is normal.   Abdominal:      General: Abdomen is flat.   Musculoskeletal:         General: Normal range of motion.      Cervical back: Normal range of motion.   Neurological:      General: No focal deficit present.      Mental Status: She is alert.   Psychiatric:         Mood and Affect: Mood normal.       Last Recorded Vitals  Blood pressure 121/75, pulse 89, temperature 35.2 °C (95.4 °F), temperature source Temporal, resp. rate 16, height 1.6 m (5' 3\"), weight 68 kg (150 lb), SpO2 94%.  Intake/Output last 3 Shifts:  I/O last 3 completed shifts:  In: 2352 (34.6 mL/kg) [P.O.:1672; I.V.:330 (4.9 mL/kg); Blood:350]  Out: - (0 mL/kg)   Weight: 68 kg     Relevant Results  Lower extremity venous duplex bilateral  Result Date: 7/14/2025  Preliminary Cardiology Report           [place] [address]   Tel [phone] and Fax [phone]          Preliminary Vascular Lab Report  [facility] LOWER EXTREMITY VENOUS DUPLEX BILATERAL  Patient Name:      CON ARAGON Reading Physician:  [id number] Evan Zuniga MD Study Date:        7/14/2025       Ordering Physician: [id number] JESSICA MATTHEWS MRN/PID:     "       [id number]        Technologist:       Trevon Echeverria RVT Accession#:        [id number]    Technologist 2: Date of Birth/Age: 1980        Encounter#:         [id number] Gender:            F Admission Status:  Inpatient       Location Performed: [facility]  Diagnosis/ICD: Localized swelling, bilateral lower extremities-R22.43 Procedure/CPT: 76274 Peripheral venous duplex scan for DVT complete  PRELIMINARY CONCLUSIONS: Right Lower Venous: No evidence of acute deep vein thrombus visualized in the right lower extremity. Left Lower Venous: No evidence of acute deep vein thrombus visualized in the left lower extremity.  Imaging & Doppler Findings:  Right                 Compressible Thrombus        Flow Distal External Iliac                None   Spontaneous/Phasic CFV                       Yes        None   Spontaneous/Phasic PFV                       Yes        None FV Proximal               Yes        None   Spontaneous/Phasic FV Mid                    Yes        None FV Distal                 Yes        None Popliteal                 Yes        None   Spontaneous/Phasic Peroneal                  Yes        None PTV                       Yes        None  Left                  Compress Thrombus        Flow Distal External Iliac            None   Spontaneous/Phasic CFV                     Yes      None   Spontaneous/Phasic PFV                     Yes      None FV Proximal             Yes      None   Spontaneous/Phasic FV Mid                  Yes      None FV Distal               Yes      None Popliteal               Yes      None   Spontaneous/Phasic Peroneal                Yes      None PTV                     Yes      None VASCULAR PRELIMINARY REPORT completed by Trevon Echeverria RVT on 7/14/2025 at 9:12:52 AM  ** Final **     This patient has a central line   Reason for the central line remaining today? Hemodynamic monitoring    Assessment & Plan  Sickle cell pain crisis (Multi)  Mary Alice Aragon is a 45  y.o. female with HbSS c/b transfusion-related iron overload, cerebral aneurysm, NICHOL/MDD that presents with sickle cell pain crisis with pain in R shoulder, BL legs with severe R ankle and foot pain consistent with usual sickle cell pain. She reports the pain began to increase about a week ago, and has been taking her home pain medication without relief. CXR deferred on admit; pt denies new respiratory s/s, SOB or CP. Leukocytosis noted and lysis labs elevated on admit. Pt hgb also downtrending; hgb 7.3 to 6.2 (7/12). XR R foot and ankle (7/12) showed no fracture or malalignment and unchanged sclerosis of the distal tibia corresponding to known osteonecrosis. XR R shoulder (7/12) showed no fracture or malalignment. Started on IV Dilaudid per care path. Admitted for pain control.     Update 7/14:  - hgb incremented appropriately after 1u pRBC yesterday  - bilat LE US prelim neg  - c/w IV dilaudid 2mg q2h PRN for severe pain   - hgb S pending     #HbSS w/ pain crisis   - OARRS reviewed and Hydromorphone 4 mg refilled on 7/08/25 for 10 days (40T) and Lyrica 25 mg  refilled on 7/8 for 30 days (90T)  - hgb 7.3 (7/11)--> hgb 6.2 (7/12), will hold off on transfusion for now given significant hx of transfusion-related iron overload, continue to closely monitor  - 7/13 hgb 5.8, s/p 1u pRBC due to increased pain and worsening lysis labs  - 7/14 hgb incremented appropriately to 6.7  - acute on chronic leukocytosis noted on admit WBC 16.9, likely 2/2 crisis, pt denies recent exposure to sick contacts, respiratory symptoms or fever/chills at home  -  on admit (-300) (7/12)  - tbili 3.7 on admit (BL 1.5-2.5) (7/12)  - ESR 4/CRP 0.67 (7/12)  - CXR deferred on admit; pt denies new respiratory s/s, SOB or CP  - XR R foot and ankle (7/12) showed no fracture or malalignment and unchanged sclerosis of the distal tibia corresponding to known osteonecrosis  - XR R shoulder (7/12) showed no fracture or malalignment  - US of  bilateral lower extremity ordered (7/11) pending (prelim neg)  - EKG on admit (7/11) NSR  - started on IV Dilaudid 2mg q2h PRN for severe pain (7/12- )  - continue home Hydroxyurea 1500mg M/W/F and 1000mg T/Th/Sat/Sun   - continue home Folic acid    - continue home Tizanidine 2mg q8hrs scheduled, Tylenol 650mg q6hrs PRN mild pain, and Folic acid 1mg daily   - continue home Methadone 10 mg BID and Lyrica 25mg BID  - start Voltaren gel 4x/day and capsaicin cream 4x/day  - Toradol held i/s/o JOSE JUAN on admission, restarted toradol q6h PRN for mod pain 7/13 since JOSE JUAN resolved  - hgb S pending  - utox + cannabinoid (7/12)  - Bowel regimen for opioid-induced constipation with DocuSenna 2tabs BID and Miralax daily  - PO Benadryl PRN for opioid-induced pruritus, PO Compazine PRN for opioid-induced nausea  - continue home noc O2 (2-3L) for known nocturnal hypoxemia      #JOSE JUAN - resolved  - likely 2/2 dehydration  - sCr 1.12 on admit (BL ~0.90) --> sCr 0.96 (7/14)  - s/p D5-1/2NS @ 75 ml/hr x 1 day (7/12)  - monitor CMP daily     #Transfusion-mediated iron overload, chronic   - patient did not bring her cadd vicente pump   - ferritin 2117 (7/12)  - continue Deferoxamine 2g daily IV infused over 8 hours       #Hx of ingrown toe nails    - reports having a procedure on 4/9 and healed now     #Nocturnal Hypoxia  - cont night 2-3 L supplemental O2 (prefers ayesha mask)     #NICHOL  #MDD  - cont home Trazodone 100mg PRN     Prophy  - Lovenox subcutaneous, encourage SCDs and ambulation as able      DISPO  - Code Status: Full Code  - NOK: Penelope Bain (mother) #[phone]   - FUV: 7/25 w/ OBGYN, 8/18 w/ sickle cell, 5/29/26 w/ Primary Care    I spent 60 minutes in the professional and overall care of this patient.    Assessment and plan as above discussed with attending physician Dr. Ca Estes PA-C

## 2025-07-14 NOTE — CONSULTS
"Nutrition Initial Assessment:   Nutrition Assessment    Reason for Assessment: Admission nursing screening    Patient is a 45 y.o. female presenting with sickle cell pain crisis. PMH significant for HbSS c/b transfusion-related iron overload, cerebral aneurysm, NICHOL/MDD.    Patient was assessed virtually.    Nutrition History:  Food and Nutrient History: Spoke with pt briefly on the phone. Pt reports poor appetite for the past day or so due to n[place]asea. States her appetite was doing great with intake at baseline prior to admission. Reports she usually eats 2 big meals/day. RD asked if pt would be interested in receiving oral nutrition supplement. Pt described poor experience with her children taking Pediasure several years ago and having issues with their teeth, but was interested in discussing further. Pt requested to end conversation due to nausea, but was interested to continue speaking another time.       Anthropometrics:  Height: 160 cm (5' 3\")   Weight: 68 kg (150 lb)   BMI (Calculated): 26.58  IBW/kg (Dietitian Calculated): 52.3 kg  Percent of IBW: 130 %                      Weight History:   Wt Readings from Last 15 Encounters:   07/11/25 68 kg (150 lb)   05/29/25 66.7 kg (147 lb)   05/19/25 69 kg (152 lb 1.9 oz)   04/19/25 67.5 kg (148 lb 12.8 oz)   02/24/25 66.2 kg (146 lb)   12/03/24 68 kg (149 lb 14.6 oz)   12/02/24 68 kg (150 lb)   11/24/24 68 kg (150 lb)   11/21/24 68 kg (150 lb)   11/14/24 66.7 kg (147 lb)   11/05/24 66.2 kg (146 lb)   10/21/24 64.8 kg (142 lb 13.7 oz)   10/02/24 64.4 kg (142 lb)   08/28/24 62.9 kg (138 lb 11.2 oz)   07/05/24 68 kg (150 lb)     Weight Change %:  Weight History / % Weight Change: No weight method listed this admission, so suspect weight is stated. Per chart review, weight appears to be trending up over past year.  Significant Weight Loss: No    Nutrition Focused Physical Exam Findings:    Subcutaneous Fat Loss:   Defer Subcutaneous Fat Loss Assessment: Defer all  Defer " "All Reason: remote assessment  Muscle Wasting:  Defer Muscle Wasting Assessment: Defer all  Defer All Reason: remote assessment  Edema:  Edema:  (Non-pitting BLE)  Physical Findings:  Skin: Negative  Digestive System Findings: Nausea    Nutrition Significant Labs:  BMP Trend:   Results from last 7 days   Lab Units 07/14/25  0318 07/13/25  0412 07/12/25  0403 07/11/25  2309   GLUCOSE mg/dL 90 96 96 83   CALCIUM mg/dL 8.7 9.1 9.0 9.1   SODIUM mmol/L 136 139 138 138   POTASSIUM mmol/L 4.0 4.3 3.7 3.7   CO2 mmol/L 27 24 23 21   CHLORIDE mmol/L 105 108* 107 107   BUN mg/dL 9 12 17 14   CREATININE mg/dL 0.96 1.01 1.12* 0.91    , A1C:No results found for: \"HGBA1C\", Vit D:   Lab Results   Component Value Date    VITD25 11 (A) 12/28/2022        Nutrition Specific Medications:  Scheduled medications  Scheduled Medications[1]  Continuous medications  Continuous Medications[2]  PRN medications  PRN Medications[3]      I/O:    ;      Dietary Orders (From admission, onward)       Start     Ordered    07/12/25 0453  May Participate in Room Service  ( ROOM SERVICE MAY PARTICIPATE)  Once        Question:  .  Answer:  Yes    07/12/25 0452 07/12/25 0209  Adult diet Regular  Diet effective now        Question:  Diet type  Answer:  Regular    07/12/25 0212                     Estimated Needs:   Total Energy Estimated Needs in 24 hours (kCal): 1700 kCal  Method for Estimating Needs: 25 kcal/kg CBW (68 kg)  Total Protein Estimated Needs in 24 Hours (g): 82 g  Method for Estimating 24 Hour Protein Needs: 1.2+ g/kg CBW (68 kg)  Total Fluid Estimated Needs in 24 Hours (mL): 1700 mL  Method for Estimating 24 Hour Fluid Needs: 1 ml/kcal or per provider        Nutrition Diagnosis   Malnutrition Diagnosis  Patient has Malnutrition Diagnosis: No    Nutrition Diagnosis  Patient has Nutrition Diagnosis: Yes  Diagnosis Status (1): Active  Nutrition Diagnosis 1: Increased nutrient needs  Related to (1): increased metabolic demand  As Evidenced " by (1): sickle cell anemia       Nutrition Interventions/Recommendations   Nutrition prescription for oral nutrition    Nutrition Recommendations:  Individualized Nutrition Prescription Provided for : Continue regular diet as ordered.    Nutrition Interventions/Goals:   Meals and Snacks: General healthful diet  Goal: Pt to tolerate 3 meals/day      Education Documentation  No documentation found.            Nutrition Monitoring and Evaluation   Intake / Amount of food: Consumes at least 75% or more of meals/snacks/supplements    Body Weight: Body weight - Maintain stable weight              Goal Status: New goal(s) identified    Time Spent (min): 60 minutes            [1] ascorbic acid, 1,000 mg, oral, Daily  cetirizine, 10 mg, oral, Daily  deferoxamine, 2,000 mg, intravenous, Daily  diclofenac sodium, 4 g, Topical, 4x daily  docusate sodium, 100 mg, oral, Every other day  enoxaparin, 40 mg, subcutaneous, Daily  folic acid, 1 mg, oral, Daily  hydroxyurea, 1,000 mg, oral, Every Sun/Tues/Thur/Sat  hydroxyurea, 1,500 mg, oral, Every Mon/Wed/Fri  lidocaine, 1 patch, transdermal, Daily  melatonin, 10 mg, oral, Nightly  methadone, 10 mg, oral, q12h  norethindrone, 5 mg, oral, Daily  pantoprazole, 20 mg, oral, Daily before breakfast  polyethylene glycol, 17 g, oral, Daily  pregabalin, 25 mg, oral, TID  sennosides-docusate sodium, 2 tablet, oral, BID  valACYclovir, 500 mg, oral, Daily  [2]    [3] PRN medications: acetaminophen, benzocaine-menthol, capsaicin, diphenhydrAMINE, HYDROmorphone, ketorolac, lubricating eye drops, ondansetron, oxygen, prochlorperazine, tiZANidine, traZODone

## 2025-07-14 NOTE — CARE PLAN
The patient's goals for the shift include  Pain Management     The clinical goals for the shift include Pain Management     Over the shift, the patient did make progress toward the following goals.       Problem: Pain - Adult  Goal: Verbalizes/displays adequate comfort level or baseline comfort level  Outcome: Progressing     Problem: Safety - Adult  Goal: Free from fall injury  Outcome: Progressing     Problem: Discharge Planning  Goal: Discharge to home or other facility with appropriate resources  Outcome: Progressing     Problem: Chronic Conditions and Co-morbidities  Goal: Patient's chronic conditions and co-morbidity symptoms are monitored and maintained or improved  Outcome: Progressing     Problem: Nutrition  Goal: Nutrient intake appropriate for maintaining nutritional needs  Outcome: Progressing     Problem: Skin  Goal: Decreased wound size/increased tissue granulation at next dressing change  Outcome: Progressing  Flowsheets (Taken 7/13/2025 2232)  Decreased wound size/increased tissue granulation at next dressing change: Promote sleep for wound healing  Goal: Participates in plan/prevention/treatment measures  Outcome: Progressing  Flowsheets (Taken 7/13/2025 2232)  Participates in plan/prevention/treatment measures: Elevate heels  Goal: Prevent/manage excess moisture  Outcome: Progressing  Flowsheets (Taken 7/13/2025 2232)  Prevent/manage excess moisture: Cleanse incontinence/protect with barrier cream  Goal: Prevent/minimize sheer/friction injuries  Outcome: Progressing  Flowsheets (Taken 7/13/2025 2232)  Prevent/minimize sheer/friction injuries: Increase activity/out of bed for meals  Goal: Promote/optimize nutrition  Outcome: Progressing  Flowsheets (Taken 7/13/2025 2232)  Promote/optimize nutrition: Offer water/supplements/favorite foods  Goal: Promote skin healing  Outcome: Progressing  Flowsheets (Taken 7/13/2025 2232)  Promote skin healing: Protective dressings over bony prominences

## 2025-07-15 LAB
1OH-MIDAZOLAM UR CFM-MCNC: <25 NG/ML
6MAM UR CFM-MCNC: <25 NG/ML
7AMINOCLONAZEPAM UR CFM-MCNC: <25 NG/ML
A-OH ALPRAZ UR CFM-MCNC: <25 NG/ML
ALBUMIN SERPL BCP-MCNC: 3.9 G/DL (ref 3.4–5)
ALP SERPL-CCNC: 55 U/L (ref 33–110)
ALPRAZ UR CFM-MCNC: <25 NG/ML
ALT SERPL W P-5'-P-CCNC: 12 U/L (ref 7–45)
ANION GAP SERPL CALC-SCNC: 10 MMOL/L (ref 10–20)
AST SERPL W P-5'-P-CCNC: 21 U/L (ref 9–39)
ATRIAL RATE: 80 BPM
BASOPHILS # BLD AUTO: 0.05 X10*3/UL (ref 0–0.1)
BASOPHILS NFR BLD AUTO: 0.4 %
BILIRUB SERPL-MCNC: 3.2 MG/DL (ref 0–1.2)
BLOOD EXPIRATION DATE: NORMAL
BUN SERPL-MCNC: 9 MG/DL (ref 6–23)
CALCIUM SERPL-MCNC: 8.8 MG/DL (ref 8.6–10.6)
CHLORDIAZEP UR CFM-MCNC: <25 NG/ML
CHLORIDE SERPL-SCNC: 105 MMOL/L (ref 98–107)
CLONAZEPAM UR CFM-MCNC: <25 NG/ML
CO2 SERPL-SCNC: 26 MMOL/L (ref 21–32)
CODEINE UR CFM-MCNC: <50 NG/ML
CREAT SERPL-MCNC: 1.01 MG/DL (ref 0.5–1.05)
DIAZEPAM UR CFM-MCNC: <25 NG/ML
DISPENSE STATUS: NORMAL
EDDP UR CFM-MCNC: 178 NG/ML
EGFRCR SERPLBLD CKD-EPI 2021: 71 ML/MIN/1.73M*2
EOSINOPHIL # BLD AUTO: 0.34 X10*3/UL (ref 0–0.7)
EOSINOPHIL NFR BLD AUTO: 2.8 %
ERYTHROCYTE [DISTWIDTH] IN BLOOD BY AUTOMATED COUNT: 19.6 % (ref 11.5–14.5)
FENTANYL UR CFM-MCNC: <2.5 NG/ML
GLUCOSE SERPL-MCNC: 92 MG/DL (ref 74–99)
HCT VFR BLD AUTO: 18.5 % (ref 36–46)
HGB BLD-MCNC: 6.4 G/DL (ref 12–16)
HGB RETIC QN: 34 PG (ref 28–38)
HYDROCODONE CTO UR CFM-MCNC: <25 NG/ML
HYDROMORPHONE UR CFM-MCNC: >2500 NG/ML
IMM GRANULOCYTES # BLD AUTO: 0.05 X10*3/UL (ref 0–0.7)
IMM GRANULOCYTES NFR BLD AUTO: 0.4 % (ref 0–0.9)
IMMATURE RETIC FRACTION: 48.6 %
LDH SERPL L TO P-CCNC: 276 U/L (ref 84–246)
LORAZEPAM UR CFM-MCNC: <25 NG/ML
LYMPHOCYTES # BLD AUTO: 5.16 X10*3/UL (ref 1.2–4.8)
LYMPHOCYTES NFR BLD AUTO: 41.9 %
MCH RBC QN AUTO: 31.5 PG (ref 26–34)
MCHC RBC AUTO-ENTMCNC: 34.6 G/DL (ref 32–36)
MCV RBC AUTO: 91 FL (ref 80–100)
METHADONE UR CFM-MCNC: 238 NG/ML
MIDAZOLAM UR CFM-MCNC: <25 NG/ML
MONOCYTES # BLD AUTO: 0.7 X10*3/UL (ref 0.1–1)
MONOCYTES NFR BLD AUTO: 5.7 %
MORPHINE UR CFM-MCNC: <50 NG/ML
NEUTROPHILS # BLD AUTO: 6.02 X10*3/UL (ref 1.2–7.7)
NEUTROPHILS NFR BLD AUTO: 48.8 %
NORDIAZEPAM UR CFM-MCNC: <25 NG/ML
NORFENTANYL UR CFM-MCNC: <2.5 NG/ML
NORHYDROCODONE UR CFM-MCNC: <25 NG/ML
NOROXYCODONE UR CFM-MCNC: <25 NG/ML
NORTRAMADOL UR-MCNC: <50 NG/ML
NRBC BLD-RTO: 4.8 /100 WBCS (ref 0–0)
OVALOCYTES BLD QL SMEAR: NORMAL
OXAZEPAM UR CFM-MCNC: <25 NG/ML
OXYCODONE UR CFM-MCNC: <25 NG/ML
OXYMORPHONE UR CFM-MCNC: <25 NG/ML
P AXIS: 72 DEGREES
P OFFSET: 186 MS
P ONSET: 133 MS
PLATELET # BLD AUTO: 430 X10*3/UL (ref 150–450)
POLYCHROMASIA BLD QL SMEAR: NORMAL
POTASSIUM SERPL-SCNC: 3.7 MMOL/L (ref 3.5–5.3)
PR INTERVAL: 166 MS
PRODUCT BLOOD TYPE: 5100
PRODUCT CODE: NORMAL
PROT SERPL-MCNC: 7 G/DL (ref 6.4–8.2)
Q ONSET: 216 MS
QRS COUNT: 13 BEATS
QRS DURATION: 90 MS
QT INTERVAL: 376 MS
QTC CALCULATION(BAZETT): 433 MS
QTC FREDERICIA: 414 MS
R AXIS: 40 DEGREES
RBC # BLD AUTO: 2.03 X10*6/UL (ref 4–5.2)
RBC MORPH BLD: NORMAL
RETICS #: 0.25 X10*6/UL (ref 0.02–0.08)
RETICS/RBC NFR AUTO: 12.2 % (ref 0.5–2)
SCHISTOCYTES BLD QL SMEAR: NORMAL
SICKLE CELLS BLD QL SMEAR: NORMAL
SODIUM SERPL-SCNC: 137 MMOL/L (ref 136–145)
T AXIS: 57 DEGREES
T OFFSET: 404 MS
TARGETS BLD QL SMEAR: NORMAL
TEMAZEPAM UR CFM-MCNC: <25 NG/ML
TRAMADOL UR CFM-MCNC: <50 NG/ML
UNIT ABO: NORMAL
UNIT NUMBER: NORMAL
UNIT RH: NORMAL
UNIT VOLUME: 278
VENTRICULAR RATE: 80 BPM
WBC # BLD AUTO: 12.3 X10*3/UL (ref 4.4–11.3)
XM INTEP: NORMAL
ZOLPIDEM UR CFM-MCNC: <25 NG/ML
ZOLPIDEM UR-MCNC: <25 NG/ML

## 2025-07-15 PROCEDURE — 80053 COMPREHEN METABOLIC PANEL: CPT

## 2025-07-15 PROCEDURE — 99233 SBSQ HOSP IP/OBS HIGH 50: CPT | Performed by: NURSE PRACTITIONER

## 2025-07-15 PROCEDURE — 1170000001 HC PRIVATE ONCOLOGY ROOM DAILY

## 2025-07-15 PROCEDURE — 2500000002 HC RX 250 W HCPCS SELF ADMINISTERED DRUGS (ALT 637 FOR MEDICARE OP, ALT 636 FOR OP/ED)

## 2025-07-15 PROCEDURE — 2500000001 HC RX 250 WO HCPCS SELF ADMINISTERED DRUGS (ALT 637 FOR MEDICARE OP)

## 2025-07-15 PROCEDURE — 83615 LACTATE (LD) (LDH) ENZYME: CPT

## 2025-07-15 PROCEDURE — 2500000005 HC RX 250 GENERAL PHARMACY W/O HCPCS

## 2025-07-15 PROCEDURE — 85045 AUTOMATED RETICULOCYTE COUNT: CPT

## 2025-07-15 PROCEDURE — 85025 COMPLETE CBC W/AUTO DIFF WBC: CPT

## 2025-07-15 PROCEDURE — 2500000004 HC RX 250 GENERAL PHARMACY W/ HCPCS (ALT 636 FOR OP/ED)

## 2025-07-15 PROCEDURE — S0109 METHADONE ORAL 5MG: HCPCS

## 2025-07-15 PROCEDURE — 2500000004 HC RX 250 GENERAL PHARMACY W/ HCPCS (ALT 636 FOR OP/ED): Mod: JZ,TB

## 2025-07-15 PROCEDURE — S4993 CONTRACEPTIVE PILLS FOR BC: HCPCS

## 2025-07-15 PROCEDURE — 2500000001 HC RX 250 WO HCPCS SELF ADMINISTERED DRUGS (ALT 637 FOR MEDICARE OP): Performed by: NURSE PRACTITIONER

## 2025-07-15 PROCEDURE — 2500000004 HC RX 250 GENERAL PHARMACY W/ HCPCS (ALT 636 FOR OP/ED): Mod: JZ,TB | Performed by: NURSE PRACTITIONER

## 2025-07-15 RX ORDER — KETOROLAC TROMETHAMINE 15 MG/ML
15 INJECTION, SOLUTION INTRAMUSCULAR; INTRAVENOUS EVERY 6 HOURS SCHEDULED
Status: DISCONTINUED | OUTPATIENT
Start: 2025-07-15 | End: 2025-07-17

## 2025-07-15 RX ORDER — BISACODYL 10 MG/1
10 SUPPOSITORY RECTAL DAILY
Status: DISCONTINUED | OUTPATIENT
Start: 2025-07-15 | End: 2025-07-17 | Stop reason: HOSPADM

## 2025-07-15 RX ADMIN — CARBOXYMETHYLCELLULOSE SODIUM 1 DROP: 5 SOLUTION/ DROPS OPHTHALMIC at 20:20

## 2025-07-15 RX ADMIN — TIZANIDINE 2 MG: 2 TABLET ORAL at 00:03

## 2025-07-15 RX ADMIN — HYDROMORPHONE HYDROCHLORIDE 2 MG: 2 INJECTION, SOLUTION INTRAMUSCULAR; INTRAVENOUS; SUBCUTANEOUS at 15:56

## 2025-07-15 RX ADMIN — HYDROMORPHONE HYDROCHLORIDE 2 MG: 2 INJECTION, SOLUTION INTRAMUSCULAR; INTRAVENOUS; SUBCUTANEOUS at 07:57

## 2025-07-15 RX ADMIN — PANTOPRAZOLE SODIUM 20 MG: 20 TABLET, DELAYED RELEASE ORAL at 05:48

## 2025-07-15 RX ADMIN — CETIRIZINE HYDROCHLORIDE 10 MG: 10 TABLET, FILM COATED ORAL at 07:57

## 2025-07-15 RX ADMIN — BISACODYL 10 MG: 10 SUPPOSITORY RECTAL at 11:13

## 2025-07-15 RX ADMIN — HYDROMORPHONE HYDROCHLORIDE 2 MG: 2 INJECTION, SOLUTION INTRAMUSCULAR; INTRAVENOUS; SUBCUTANEOUS at 05:48

## 2025-07-15 RX ADMIN — PREGABALIN 25 MG: 25 CAPSULE ORAL at 18:01

## 2025-07-15 RX ADMIN — KETOROLAC TROMETHAMINE 15 MG: 15 INJECTION, SOLUTION INTRAMUSCULAR; INTRAVENOUS at 18:02

## 2025-07-15 RX ADMIN — NORETHINDRONE ACETATE 5 MG: 5 TABLET ORAL at 07:57

## 2025-07-15 RX ADMIN — DICLOFENAC SODIUM 4 G: 10 GEL TOPICAL at 18:05

## 2025-07-15 RX ADMIN — HYDROMORPHONE HYDROCHLORIDE 2 MG: 2 INJECTION, SOLUTION INTRAMUSCULAR; INTRAVENOUS; SUBCUTANEOUS at 20:19

## 2025-07-15 RX ADMIN — POLYETHYLENE GLYCOL 3350 17 G: 17 POWDER, FOR SOLUTION ORAL at 07:57

## 2025-07-15 RX ADMIN — DICLOFENAC SODIUM 4 G: 10 GEL TOPICAL at 05:48

## 2025-07-15 RX ADMIN — PREGABALIN 25 MG: 25 CAPSULE ORAL at 00:03

## 2025-07-15 RX ADMIN — KETOROLAC TROMETHAMINE 15 MG: 15 INJECTION, SOLUTION INTRAMUSCULAR; INTRAVENOUS at 11:13

## 2025-07-15 RX ADMIN — FOLIC ACID 1 MG: 1 TABLET ORAL at 07:57

## 2025-07-15 RX ADMIN — HYDROMORPHONE HYDROCHLORIDE 2 MG: 2 INJECTION, SOLUTION INTRAMUSCULAR; INTRAVENOUS; SUBCUTANEOUS at 18:02

## 2025-07-15 RX ADMIN — OXYCODONE HYDROCHLORIDE AND ACETAMINOPHEN 1000 MG: 500 TABLET ORAL at 08:02

## 2025-07-15 RX ADMIN — HYDROMORPHONE HYDROCHLORIDE 2 MG: 2 INJECTION, SOLUTION INTRAMUSCULAR; INTRAVENOUS; SUBCUTANEOUS at 11:17

## 2025-07-15 RX ADMIN — LIDOCAINE 1 PATCH: 4 PATCH TOPICAL at 09:20

## 2025-07-15 RX ADMIN — METHADONE HYDROCHLORIDE 10 MG: 10 TABLET ORAL at 20:18

## 2025-07-15 RX ADMIN — DICLOFENAC SODIUM 4 G: 10 GEL TOPICAL at 11:24

## 2025-07-15 RX ADMIN — ENOXAPARIN SODIUM 40 MG: 100 INJECTION SUBCUTANEOUS at 07:57

## 2025-07-15 RX ADMIN — HYDROMORPHONE HYDROCHLORIDE 2 MG: 2 INJECTION, SOLUTION INTRAMUSCULAR; INTRAVENOUS; SUBCUTANEOUS at 22:28

## 2025-07-15 RX ADMIN — HYDROMORPHONE HYDROCHLORIDE 2 MG: 2 INJECTION, SOLUTION INTRAMUSCULAR; INTRAVENOUS; SUBCUTANEOUS at 02:46

## 2025-07-15 RX ADMIN — PREGABALIN 25 MG: 25 CAPSULE ORAL at 22:28

## 2025-07-15 RX ADMIN — HYDROMORPHONE HYDROCHLORIDE 2 MG: 2 INJECTION, SOLUTION INTRAMUSCULAR; INTRAVENOUS; SUBCUTANEOUS at 13:29

## 2025-07-15 RX ADMIN — DOCUSATE SODIUM 50 MG AND SENNOSIDES 8.6 MG 2 TABLET: 8.6; 5 TABLET, FILM COATED ORAL at 20:18

## 2025-07-15 RX ADMIN — DOCUSATE SODIUM 50 MG AND SENNOSIDES 8.6 MG 2 TABLET: 8.6; 5 TABLET, FILM COATED ORAL at 08:02

## 2025-07-15 RX ADMIN — VALACYCLOVIR HYDROCHLORIDE 500 MG: 500 TABLET, FILM COATED ORAL at 07:57

## 2025-07-15 RX ADMIN — METHADONE HYDROCHLORIDE 10 MG: 10 TABLET ORAL at 07:57

## 2025-07-15 RX ADMIN — HYDROXYUREA 1000 MG: 500 CAPSULE ORAL at 08:03

## 2025-07-15 RX ADMIN — DICLOFENAC SODIUM 4 G: 10 GEL TOPICAL at 22:28

## 2025-07-15 RX ADMIN — ONDANSETRON HYDROCHLORIDE 4 MG: 4 TABLET, FILM COATED ORAL at 20:18

## 2025-07-15 RX ADMIN — TIZANIDINE 2 MG: 2 TABLET ORAL at 18:07

## 2025-07-15 RX ADMIN — PREGABALIN 25 MG: 25 CAPSULE ORAL at 07:57

## 2025-07-15 RX ADMIN — DEFEROXAMINE MESYLATE 2000 MG: 95 INJECTION, POWDER, LYOPHILIZED, FOR SOLUTION INTRAMUSCULAR; INTRAVENOUS; SUBCUTANEOUS at 11:12

## 2025-07-15 RX ADMIN — ONDANSETRON HYDROCHLORIDE 4 MG: 4 TABLET, FILM COATED ORAL at 00:03

## 2025-07-15 RX ADMIN — Medication 10 MG: at 20:18

## 2025-07-15 ASSESSMENT — COGNITIVE AND FUNCTIONAL STATUS - GENERAL
MOVING TO AND FROM BED TO CHAIR: A LITTLE
HELP NEEDED FOR BATHING: A LITTLE
DRESSING REGULAR UPPER BODY CLOTHING: A LITTLE
STANDING UP FROM CHAIR USING ARMS: A LITTLE
TOILETING: A LITTLE
DRESSING REGULAR LOWER BODY CLOTHING: A LITTLE
MOBILITY SCORE: 20
DAILY ACTIVITIY SCORE: 20
WALKING IN HOSPITAL ROOM: A LITTLE
CLIMB 3 TO 5 STEPS WITH RAILING: A LITTLE

## 2025-07-15 ASSESSMENT — PAIN SCALES - GENERAL
PAINLEVEL_OUTOF10: 6
PAINLEVEL_OUTOF10: 9
PAINLEVEL_OUTOF10: 7
PAINLEVEL_OUTOF10: 9
PAINLEVEL_OUTOF10: 10 - WORST POSSIBLE PAIN
PAINLEVEL_OUTOF10: 7
PAINLEVEL_OUTOF10: 9
PAINLEVEL_OUTOF10: 9
PAINLEVEL_OUTOF10: 10 - WORST POSSIBLE PAIN
PAINLEVEL_OUTOF10: 8
PAINLEVEL_OUTOF10: 7
PAINLEVEL_OUTOF10: 7
PAINLEVEL_OUTOF10: 8
PAINLEVEL_OUTOF10: 6
PAINLEVEL_OUTOF10: 9
PAINLEVEL_OUTOF10: 10 - WORST POSSIBLE PAIN
PAINLEVEL_OUTOF10: 9
PAINLEVEL_OUTOF10: 9

## 2025-07-15 ASSESSMENT — PAIN - FUNCTIONAL ASSESSMENT
PAIN_FUNCTIONAL_ASSESSMENT: 0-10
PAIN_FUNCTIONAL_ASSESSMENT: 0-10
PAIN_FUNCTIONAL_ASSESSMENT: UNABLE TO SELF-REPORT
PAIN_FUNCTIONAL_ASSESSMENT: 0-10
PAIN_FUNCTIONAL_ASSESSMENT: UNABLE TO SELF-REPORT
PAIN_FUNCTIONAL_ASSESSMENT: 0-10

## 2025-07-15 ASSESSMENT — PAIN DESCRIPTION - ORIENTATION: ORIENTATION: LEFT

## 2025-07-15 ASSESSMENT — PAIN DESCRIPTION - LOCATION: LOCATION: LEG

## 2025-07-15 NOTE — PROGRESS NOTES
"Mary Alice Aragon is a 45 y.o. female on day 3 of admission presenting with Sickle cell pain crisis (Multi).    Subjective   Mary Alice is a little better this morning after her pain meds. Pain was 12/10 and down to 8/10 after IV pain meds. Wants to keep her same regimen, not ready to go down on her dose. Pain remains to her legs. She is not moving her bowels despite taking ordered meds. Notes it takes lactulose a long time to work, but is agreeable to a suppository. Denies chest pain, SOB, N/V/D/C, fever, chills.  We discussed discharge in the next day or two.    Objective     Physical Exam  Vitals reviewed.   HENT:      Nose: Nose normal.   Eyes:      Pupils: Pupils are equal, round, and reactive to light.   Cardiovascular:      Rate and Rhythm: Normal rate.   Pulmonary:      Effort: Pulmonary effort is normal.      Breath sounds: Normal breath sounds.      Comments: Right chest wall mediport  Abdominal:      General: Abdomen is flat. Bowel sounds are normal.   Musculoskeletal:         General: Normal range of motion.      Cervical back: Normal range of motion.   Skin:     General: Skin is warm and dry.   Neurological:      General: No focal deficit present.      Mental Status: She is alert and oriented to person, place, and time.   Psychiatric:         Mood and Affect: Mood normal.       Last Recorded Vitals  Blood pressure 106/58, pulse 92, temperature 37.5 °C (99.5 °F), temperature source Temporal, resp. rate 16, height 1.6 m (5' 3\"), weight 68 kg (150 lb), SpO2 95%.  Intake/Output last 3 Shifts:  I/O last 3 completed shifts:  In: 702 (10.3 mL/kg) [P.O.:372; I.V.:330 (4.9 mL/kg)]  Out: - (0 mL/kg)   Weight: 68 kg     Relevant Results  Lower extremity venous duplex bilateral  Result Date: 7/14/2025  Preliminary Cardiology Report           [place] [address]   Tel [phone] and Fax [phone]          Preliminary Vascular Lab Report  VASC US LOWER EXTREMITY VENOUS " DUPLEX BILATERAL  Patient Name:      CON YA Reading Physician:  [id number] Evan Zuniga MD Study Date:        7/14/2025       Ordering Physician: [id number] JESSICA MATTHEWS MRN/PID:           [id number]        Technologist:       Trevon Echeverria RVT Accession#:        [id number]    Technologist 2: Date of Birth/Age: 1980        Encounter#:         [id number] Gender:            F Admission Status:  Inpatient       Location Performed: [facility]  Diagnosis/ICD: Localized swelling, bilateral lower extremities-R22.43 Procedure/CPT: 58823 Peripheral venous duplex scan for DVT complete  PRELIMINARY CONCLUSIONS: Right Lower Venous: No evidence of acute deep vein thrombus visualized in the right lower extremity. Left Lower Venous: No evidence of acute deep vein thrombus visualized in the left lower extremity.  Imaging & Doppler Findings:  Right                 Compressible Thrombus        Flow Distal External Iliac                None   Spontaneous/Phasic CFV                       Yes        None   Spontaneous/Phasic PFV                       Yes        None FV Proximal               Yes        None   Spontaneous/Phasic FV Mid                    Yes        None FV Distal                 Yes        None Popliteal                 Yes        None   Spontaneous/Phasic Peroneal                  Yes        None PTV                       Yes        None  Left                  Compress Thrombus        Flow Distal External Iliac            None   Spontaneous/Phasic CFV                     Yes      None   Spontaneous/Phasic PFV                     Yes      None FV Proximal             Yes      None   Spontaneous/Phasic FV Mid                  Yes      None FV Distal               Yes      None Popliteal               Yes      None   Spontaneous/Phasic Peroneal                Yes      None PTV                     Yes      None VASCULAR PRELIMINARY REPORT completed by Trevon Echeverria RVT on 7/14/2025 at 9:12:52 AM  ** Final **      This patient has a central line   Reason for the central line remaining today? Hemodynamic monitoring    Assessment & Plan  Sickle cell pain crisis (Multi)    Mary Alice Aragon is a 45 y.o. female with HbSS c/b transfusion-related iron overload, cerebral aneurysm, NICHOL/MDD that presents with sickle cell pain crisis with pain in R shoulder, BL legs with severe R ankle and foot pain consistent with usual sickle cell pain. She reports the pain began to increase about a week ago, and has been taking her home pain medication without relief. CXR deferred on admit; pt denies new respiratory s/s, SOB or CP. Leukocytosis noted and lysis labs elevated on admit. Pt hgb also downtrending; hgb 7.3 to 6.2 (7/12). XR R foot and ankle (7/12) showed no fracture or malalignment and unchanged sclerosis of the distal tibia corresponding to known osteonecrosis. XR R shoulder (7/12) showed no fracture or malalignment. Started on IV Dilaudid per care path. Admitted for pain control.      Update 7/15:  - bilat LE US neg  - c/w IV dilaudid 2mg q2h PRN for severe pain   - schedule tordol  - add suppository     #HbSS w/ pain crisis   - OARRS reviewed and Hydromorphone 4 mg refilled on 7/08/25 for 10 days (40T) and Lyrica 25 mg  refilled on 7/8 for 30 days (90T)  - hgb 7.3 (7/11)--> hgb 6.2 (7/12), will hold off on transfusion for now given significant hx of transfusion-related iron overload, continue to closely monitor  - 7/13 hgb 5.8, s/p 1u pRBC due to increased pain and worsening lysis labs  - 7/14 hgb incremented appropriately to 6.7  - acute on chronic leukocytosis noted on admit WBC 16.9, likely 2/2 crisis, pt denies recent exposure to sick contacts, respiratory symptoms or fever/chills at home  -  on admit (-300) (7/12)  - tbili 3.7 on admit (BL 1.5-2.5) (7/12)  - ESR 4/CRP 0.67 (7/12)  - CXR deferred on admit; pt denies new respiratory s/s, SOB or CP  - XR R foot and ankle (7/12) showed no fracture or malalignment and  unchanged sclerosis of the distal tibia corresponding to known osteonecrosis  - XR R shoulder (7/12) showed no fracture or malalignment  - US of bilateral lower extremity ordered (7/11) negative  - EKG on admit (7/11) NSR  - c/w IV Dilaudid 2mg q2h PRN for severe pain (7/12- )  - continue home Hydroxyurea 1500mg M/W/F and 1000mg T/Th/Sat/Sun   - continue home Folic acid    - continue home Tizanidine 2mg q8hrs scheduled, Tylenol 650mg q6hrs PRN mild pain, and Folic acid 1mg daily   - continue home Methadone 10 mg BID and Lyrica 25mg BID  - start Voltaren gel 4x/day and capsaicin cream 4x/day  - Toradol held i/s/o JOSE JUAN on admission, restarted toradol q6h PRN for mod pain 7/13 since JOSE JUAN resolved  - hgb S 80%  - utox + cannabinoid (7/12)  - Bowel regimen for opioid-induced constipation with DocuSenna 2tabs BID and Miralax daily  - PO Benadryl PRN for opioid-induced pruritus, PO Compazine PRN for opioid-induced nausea  - continue home noc O2 (2-3L) for known nocturnal hypoxemia      #JOSE JUAN - resolved  - likely 2/2 dehydration  - sCr 1.12 on admit (BL ~0.90) --> sCr 0.96 (7/14) --> 1.01 (7/15)  - s/p D5-1/2NS @ 75 ml/hr x 1 day (7/12)  - monitor CMP daily     #Transfusion-mediated iron overload, chronic   - patient did not bring her cadd vicente pump   - ferritin 2117 (7/12)  - continue Deferoxamine 2g daily IV infused over 8 hours       #Hx of ingrown toe nails    - reports having a procedure on 4/9 and healed now     #Nocturnal Hypoxia  - cont night 2-3 L supplemental O2 (prefers ayesha mask)     #NICHOL  #MDD  - cont home Trazodone 100mg PRN     Prophy  - Lovenox subcutaneous, encourage SCDs and ambulation as able      DISPO  - Code Status: Full Code  - NOK: Penelope Bain (mother) #[phone]   - FUV: 7/25 w/ OBGYN, 8/18 w/ sickle cell, 5/29/26 w/ Primary Care     I spent 60 minutes in the professional and overall care of this patient.     Assessment and plan as above discussed with attending physician Dr. Ca MCKEON  Shorty, APRN-CNP

## 2025-07-16 PROBLEM — D57.00 SICKLE CELL PAIN CRISIS (MULTI): Status: RESOLVED | Noted: 2025-07-12 | Resolved: 2025-07-16

## 2025-07-16 LAB
ALBUMIN SERPL BCP-MCNC: 3.8 G/DL (ref 3.4–5)
ALP SERPL-CCNC: 52 U/L (ref 33–110)
ALT SERPL W P-5'-P-CCNC: 15 U/L (ref 7–45)
ANION GAP SERPL CALC-SCNC: 9 MMOL/L (ref 10–20)
AST SERPL W P-5'-P-CCNC: 20 U/L (ref 9–39)
BASOPHILS # BLD AUTO: 0.05 X10*3/UL (ref 0–0.1)
BASOPHILS NFR BLD AUTO: 0.4 %
BILIRUB SERPL-MCNC: 2 MG/DL (ref 0–1.2)
BUN SERPL-MCNC: 15 MG/DL (ref 6–23)
CALCIUM SERPL-MCNC: 8.5 MG/DL (ref 8.6–10.6)
CHLORIDE SERPL-SCNC: 111 MMOL/L (ref 98–107)
CO2 SERPL-SCNC: 24 MMOL/L (ref 21–32)
CREAT SERPL-MCNC: 1.01 MG/DL (ref 0.5–1.05)
EGFRCR SERPLBLD CKD-EPI 2021: 71 ML/MIN/1.73M*2
EOSINOPHIL # BLD AUTO: 0.26 X10*3/UL (ref 0–0.7)
EOSINOPHIL NFR BLD AUTO: 1.8 %
ERYTHROCYTE [DISTWIDTH] IN BLOOD BY AUTOMATED COUNT: 19.5 % (ref 11.5–14.5)
GLUCOSE SERPL-MCNC: 87 MG/DL (ref 74–99)
HCT VFR BLD AUTO: 18.9 % (ref 36–46)
HGB BLD-MCNC: 6.3 G/DL (ref 12–16)
HGB RETIC QN: 36 PG (ref 28–38)
IMM GRANULOCYTES # BLD AUTO: 0.09 X10*3/UL (ref 0–0.7)
IMM GRANULOCYTES NFR BLD AUTO: 0.6 % (ref 0–0.9)
IMMATURE RETIC FRACTION: 26.4 %
LDH SERPL L TO P-CCNC: 245 U/L (ref 84–246)
LYMPHOCYTES # BLD AUTO: 4.28 X10*3/UL (ref 1.2–4.8)
LYMPHOCYTES NFR BLD AUTO: 30.4 %
MCH RBC QN AUTO: 32 PG (ref 26–34)
MCHC RBC AUTO-ENTMCNC: 33.3 G/DL (ref 32–36)
MCV RBC AUTO: 96 FL (ref 80–100)
MONOCYTES # BLD AUTO: 0.76 X10*3/UL (ref 0.1–1)
MONOCYTES NFR BLD AUTO: 5.4 %
NEUTROPHILS # BLD AUTO: 8.63 X10*3/UL (ref 1.2–7.7)
NEUTROPHILS NFR BLD AUTO: 61.4 %
NRBC BLD-RTO: 2.8 /100 WBCS (ref 0–0)
PLATELET # BLD AUTO: 412 X10*3/UL (ref 150–450)
POTASSIUM SERPL-SCNC: 4.3 MMOL/L (ref 3.5–5.3)
PROT SERPL-MCNC: 6.7 G/DL (ref 6.4–8.2)
RBC # BLD AUTO: 1.97 X10*6/UL (ref 4–5.2)
RETICS #: 0.25 X10*6/UL (ref 0.02–0.08)
RETICS/RBC NFR AUTO: 12.8 % (ref 0.5–2)
SODIUM SERPL-SCNC: 140 MMOL/L (ref 136–145)
WBC # BLD AUTO: 14.1 X10*3/UL (ref 4.4–11.3)

## 2025-07-16 PROCEDURE — S0109 METHADONE ORAL 5MG: HCPCS

## 2025-07-16 PROCEDURE — 2500000002 HC RX 250 W HCPCS SELF ADMINISTERED DRUGS (ALT 637 FOR MEDICARE OP, ALT 636 FOR OP/ED)

## 2025-07-16 PROCEDURE — 2500000004 HC RX 250 GENERAL PHARMACY W/ HCPCS (ALT 636 FOR OP/ED): Mod: JZ,TB

## 2025-07-16 PROCEDURE — 2500000001 HC RX 250 WO HCPCS SELF ADMINISTERED DRUGS (ALT 637 FOR MEDICARE OP)

## 2025-07-16 PROCEDURE — 2500000005 HC RX 250 GENERAL PHARMACY W/O HCPCS

## 2025-07-16 PROCEDURE — 99233 SBSQ HOSP IP/OBS HIGH 50: CPT | Performed by: NURSE PRACTITIONER

## 2025-07-16 PROCEDURE — 2500000001 HC RX 250 WO HCPCS SELF ADMINISTERED DRUGS (ALT 637 FOR MEDICARE OP): Performed by: NURSE PRACTITIONER

## 2025-07-16 PROCEDURE — S4993 CONTRACEPTIVE PILLS FOR BC: HCPCS

## 2025-07-16 PROCEDURE — 2500000004 HC RX 250 GENERAL PHARMACY W/ HCPCS (ALT 636 FOR OP/ED): Mod: JZ,TB | Performed by: NURSE PRACTITIONER

## 2025-07-16 PROCEDURE — 83615 LACTATE (LD) (LDH) ENZYME: CPT

## 2025-07-16 PROCEDURE — 85045 AUTOMATED RETICULOCYTE COUNT: CPT

## 2025-07-16 PROCEDURE — 2500000004 HC RX 250 GENERAL PHARMACY W/ HCPCS (ALT 636 FOR OP/ED)

## 2025-07-16 PROCEDURE — 1170000001 HC PRIVATE ONCOLOGY ROOM DAILY

## 2025-07-16 PROCEDURE — 84075 ASSAY ALKALINE PHOSPHATASE: CPT

## 2025-07-16 PROCEDURE — 85025 COMPLETE CBC W/AUTO DIFF WBC: CPT

## 2025-07-16 RX ORDER — HYDROMORPHONE HYDROCHLORIDE 1 MG/ML
1 INJECTION, SOLUTION INTRAMUSCULAR; INTRAVENOUS; SUBCUTANEOUS ONCE
Status: COMPLETED | OUTPATIENT
Start: 2025-07-16 | End: 2025-07-16

## 2025-07-16 RX ORDER — HYDROMORPHONE HYDROCHLORIDE 4 MG/1
4 TABLET ORAL EVERY 4 HOURS PRN
Refills: 0 | Status: DISCONTINUED | OUTPATIENT
Start: 2025-07-16 | End: 2025-07-17 | Stop reason: HOSPADM

## 2025-07-16 RX ADMIN — KETOROLAC TROMETHAMINE 15 MG: 15 INJECTION, SOLUTION INTRAMUSCULAR; INTRAVENOUS at 20:36

## 2025-07-16 RX ADMIN — PREGABALIN 25 MG: 25 CAPSULE ORAL at 17:36

## 2025-07-16 RX ADMIN — CARBOXYMETHYLCELLULOSE SODIUM 1 DROP: 5 SOLUTION/ DROPS OPHTHALMIC at 14:09

## 2025-07-16 RX ADMIN — DICLOFENAC SODIUM 4 G: 10 GEL TOPICAL at 17:36

## 2025-07-16 RX ADMIN — HYDROMORPHONE HYDROCHLORIDE 2 MG: 2 INJECTION, SOLUTION INTRAMUSCULAR; INTRAVENOUS; SUBCUTANEOUS at 04:17

## 2025-07-16 RX ADMIN — HYDROMORPHONE HYDROCHLORIDE 2 MG: 2 INJECTION, SOLUTION INTRAMUSCULAR; INTRAVENOUS; SUBCUTANEOUS at 17:35

## 2025-07-16 RX ADMIN — PREGABALIN 25 MG: 25 CAPSULE ORAL at 09:05

## 2025-07-16 RX ADMIN — CARBOXYMETHYLCELLULOSE SODIUM 1 DROP: 5 SOLUTION/ DROPS OPHTHALMIC at 20:36

## 2025-07-16 RX ADMIN — FOLIC ACID 1 MG: 1 TABLET ORAL at 09:05

## 2025-07-16 RX ADMIN — HYDROMORPHONE HYDROCHLORIDE 4 MG: 4 TABLET ORAL at 10:56

## 2025-07-16 RX ADMIN — BENZOCAINE AND MENTHOL 1 LOZENGE: 15; 3.6 LOZENGE ORAL at 20:36

## 2025-07-16 RX ADMIN — LIDOCAINE 1 PATCH: 4 PATCH TOPICAL at 08:59

## 2025-07-16 RX ADMIN — KETOROLAC TROMETHAMINE 15 MG: 15 INJECTION, SOLUTION INTRAMUSCULAR; INTRAVENOUS at 00:25

## 2025-07-16 RX ADMIN — PREGABALIN 25 MG: 25 CAPSULE ORAL at 20:36

## 2025-07-16 RX ADMIN — KETOROLAC TROMETHAMINE 15 MG: 15 INJECTION, SOLUTION INTRAMUSCULAR; INTRAVENOUS at 13:55

## 2025-07-16 RX ADMIN — DEFEROXAMINE MESYLATE 2000 MG: 95 INJECTION, POWDER, LYOPHILIZED, FOR SOLUTION INTRAMUSCULAR; INTRAVENOUS; SUBCUTANEOUS at 13:56

## 2025-07-16 RX ADMIN — HYDROMORPHONE HYDROCHLORIDE 4 MG: 4 TABLET ORAL at 15:15

## 2025-07-16 RX ADMIN — PANTOPRAZOLE SODIUM 20 MG: 20 TABLET, DELAYED RELEASE ORAL at 06:28

## 2025-07-16 RX ADMIN — CARBOXYMETHYLCELLULOSE SODIUM 1 DROP: 5 SOLUTION/ DROPS OPHTHALMIC at 10:00

## 2025-07-16 RX ADMIN — OXYCODONE HYDROCHLORIDE AND ACETAMINOPHEN 1000 MG: 500 TABLET ORAL at 09:05

## 2025-07-16 RX ADMIN — TIZANIDINE 2 MG: 2 TABLET ORAL at 20:36

## 2025-07-16 RX ADMIN — HYDROMORPHONE HYDROCHLORIDE 1 MG: 1 INJECTION, SOLUTION INTRAMUSCULAR; INTRAVENOUS; SUBCUTANEOUS at 23:16

## 2025-07-16 RX ADMIN — VALACYCLOVIR HYDROCHLORIDE 500 MG: 500 TABLET, FILM COATED ORAL at 08:47

## 2025-07-16 RX ADMIN — Medication 10 MG: at 20:36

## 2025-07-16 RX ADMIN — DICLOFENAC SODIUM 4 G: 10 GEL TOPICAL at 06:28

## 2025-07-16 RX ADMIN — KETOROLAC TROMETHAMINE 15 MG: 15 INJECTION, SOLUTION INTRAMUSCULAR; INTRAVENOUS at 06:28

## 2025-07-16 RX ADMIN — HYDROMORPHONE HYDROCHLORIDE 2 MG: 2 INJECTION, SOLUTION INTRAMUSCULAR; INTRAVENOUS; SUBCUTANEOUS at 08:47

## 2025-07-16 RX ADMIN — ENOXAPARIN SODIUM 40 MG: 100 INJECTION SUBCUTANEOUS at 09:02

## 2025-07-16 RX ADMIN — BENZOCAINE AND MENTHOL 1 LOZENGE: 15; 3.6 LOZENGE ORAL at 00:31

## 2025-07-16 RX ADMIN — ONDANSETRON HYDROCHLORIDE 4 MG: 4 TABLET, FILM COATED ORAL at 20:36

## 2025-07-16 RX ADMIN — HYDROMORPHONE HYDROCHLORIDE 2 MG: 2 INJECTION, SOLUTION INTRAMUSCULAR; INTRAVENOUS; SUBCUTANEOUS at 00:25

## 2025-07-16 RX ADMIN — PROCHLORPERAZINE MALEATE 10 MG: 10 TABLET ORAL at 00:25

## 2025-07-16 RX ADMIN — NORETHINDRONE ACETATE 5 MG: 5 TABLET ORAL at 09:05

## 2025-07-16 RX ADMIN — HYDROMORPHONE HYDROCHLORIDE 4 MG: 4 TABLET ORAL at 20:36

## 2025-07-16 RX ADMIN — DICLOFENAC SODIUM 4 G: 10 GEL TOPICAL at 13:56

## 2025-07-16 RX ADMIN — METHADONE HYDROCHLORIDE 10 MG: 10 TABLET ORAL at 09:05

## 2025-07-16 RX ADMIN — HYDROMORPHONE HYDROCHLORIDE 2 MG: 2 INJECTION, SOLUTION INTRAMUSCULAR; INTRAVENOUS; SUBCUTANEOUS at 06:28

## 2025-07-16 RX ADMIN — HYDROXYUREA 1500 MG: 500 CAPSULE ORAL at 13:56

## 2025-07-16 RX ADMIN — CETIRIZINE HYDROCHLORIDE 10 MG: 10 TABLET, FILM COATED ORAL at 09:05

## 2025-07-16 RX ADMIN — METHADONE HYDROCHLORIDE 10 MG: 10 TABLET ORAL at 20:36

## 2025-07-16 RX ADMIN — DICLOFENAC SODIUM 4 G: 10 GEL TOPICAL at 20:37

## 2025-07-16 ASSESSMENT — PAIN SCALES - GENERAL
PAINLEVEL_OUTOF10: 8
PAINLEVEL_OUTOF10: 9
PAINLEVEL_OUTOF10: 9
PAINLEVEL_OUTOF10: 8
PAINLEVEL_OUTOF10: 9
PAINLEVEL_OUTOF10: 6
PAINLEVEL_OUTOF10: 10 - WORST POSSIBLE PAIN
PAINLEVEL_OUTOF10: 9
PAINLEVEL_OUTOF10: 8
PAINLEVEL_OUTOF10: 9

## 2025-07-16 ASSESSMENT — COGNITIVE AND FUNCTIONAL STATUS - GENERAL
CLIMB 3 TO 5 STEPS WITH RAILING: A LITTLE
WALKING IN HOSPITAL ROOM: A LITTLE
DAILY ACTIVITIY SCORE: 23
MOBILITY SCORE: 21
STANDING UP FROM CHAIR USING ARMS: A LITTLE
DRESSING REGULAR LOWER BODY CLOTHING: A LITTLE

## 2025-07-16 ASSESSMENT — PAIN - FUNCTIONAL ASSESSMENT
PAIN_FUNCTIONAL_ASSESSMENT: 0-10
PAIN_FUNCTIONAL_ASSESSMENT: UNABLE TO SELF-REPORT
PAIN_FUNCTIONAL_ASSESSMENT: 0-10
PAIN_FUNCTIONAL_ASSESSMENT: 0-10
PAIN_FUNCTIONAL_ASSESSMENT: UNABLE TO SELF-REPORT
PAIN_FUNCTIONAL_ASSESSMENT: 0-10

## 2025-07-16 ASSESSMENT — PAIN DESCRIPTION - LOCATION
LOCATION: FOOT
LOCATION: BACK
LOCATION: FOOT

## 2025-07-16 ASSESSMENT — PAIN DESCRIPTION - ORIENTATION
ORIENTATION: RIGHT
ORIENTATION: RIGHT

## 2025-07-16 NOTE — PROGRESS NOTES
"Con Aragon is a 45 y.o. female on day 4 of admission presenting with Sickle cell pain crisis (Multi).    Subjective   Con is better this morning and wants to try one more IV dose then switch to orals with a goal of discharge tomorrow morning. Pain remains to her legs. She moved her bowels after taking a suppository yesterday. Denies chest pain, SOB, N/V/D/C, fever, chills.  We discussed discharge in the next day or two.    Objective     Physical Exam  Vitals reviewed.   HENT:      Nose: Nose normal.     Eyes:      Pupils: Pupils are equal, round, and reactive to light.       Cardiovascular:      Rate and Rhythm: Normal rate.   Pulmonary:      Effort: Pulmonary effort is normal.      Breath sounds: Normal breath sounds.      Comments: Right chest wall mediport  Abdominal:      General: Abdomen is flat. Bowel sounds are normal.     Musculoskeletal:         General: Normal range of motion.      Cervical back: Normal range of motion.     Skin:     General: Skin is warm and dry.     Neurological:      General: No focal deficit present.      Mental Status: She is alert and oriented to person, place, and time.     Psychiatric:         Mood and Affect: Mood normal.       Last Recorded Vitals  Blood pressure 118/71, pulse 98, temperature 37 °C (98.6 °F), temperature source Temporal, resp. rate 16, height 1.6 m (5' 3\"), weight 68 kg (150 lb), SpO2 97%.  Intake/Output last 3 Shifts:  I/O last 3 completed shifts:  In: 860 (12.6 mL/kg) [P.O.:260; IV Piggyback:600]  Out: - (0 mL/kg)   Weight: 68 kg     Relevant Results  Lower extremity venous duplex bilateral  Result Date: 7/15/2025            [place] [address]   Tel [phone] and Fax [phone]  Vascular Lab Report [facility] US LOWER EXTREMITY VENOUS DUPLEX BILATERAL  Patient Name:     CON ARAGON    Reading Physician: [id number] Evan Zuniga MD Study Date:       " 7/14/2025          Ordering           [id number] JESSICA DO KARRIESAURAV                                      Physician: MRN/PID:          [id number]           Technologist:      Trevon Echeverria RVT Accession#:       [id number]       Technologist 2: Date of           1980 / 45      Encounter#:        [id number] Birth/Age:        years Gender:           F Admission Status: Inpatient          Location           [facility]                                      Performed:  Diagnosis/ICD: Localized swelling, bilateral lower extremities-R22.43 CPT Codes:     80883 Peripheral venous duplex scan for DVT complete  CONCLUSIONS: Right Lower Venous: No evidence of acute deep vein thrombus visualized in the right lower extremity. Left Lower Venous: No evidence of acute deep vein thrombus visualized in the left lower extremity.  Imaging & Doppler Findings:  Right                 Compressible Thrombus        Flow Distal External Iliac                None   Spontaneous/Phasic CFV                       Yes        None   Spontaneous/Phasic PFV                       Yes        None FV Proximal               Yes        None   Spontaneous/Phasic FV Mid                    Yes        None FV Distal                 Yes        None Popliteal                 Yes        None   Spontaneous/Phasic Peroneal                  Yes        None PTV                       Yes        None  Left                  Compress Thrombus        Flow Distal External Iliac            None   Spontaneous/Phasic CFV                     Yes      None   Spontaneous/Phasic PFV                     Yes      None FV Proximal             Yes      None   Spontaneous/Phasic FV Mid                  Yes      None FV Distal               Yes      None Popliteal               Yes      None   Spontaneous/Phasic Peroneal                Yes      None PTV                     Yes      None  [id number] Evan Zuniga MD Electronically signed by [id number] Evan Zuniga MD on 7/15/2025 at 11:49:31  AM  ** Final **     This patient has a central line   Reason for the central line remaining today? Hemodynamic monitoring    Assessment & Plan  Sickle cell pain crisis (Multi)    Mary Alice Aragon is a 45 y.o. female with HbSS c/b transfusion-related iron overload, cerebral aneurysm, NICHOL/MDD that presents with sickle cell pain crisis with pain in R shoulder, BL legs with severe R ankle and foot pain consistent with usual sickle cell pain. She reports the pain began to increase about a week ago, and has been taking her home pain medication without relief. CXR deferred on admit; pt denies new respiratory s/s, SOB or CP. Leukocytosis noted and lysis labs elevated on admit. Pt hgb also downtrending; hgb 7.3 to 6.2 (7/12). XR R foot and ankle (7/12) showed no fracture or malalignment and unchanged sclerosis of the distal tibia corresponding to known osteonecrosis. XR R shoulder (7/12) showed no fracture or malalignment. Started on IV Dilaudid per care path. Admitted for pain control.      Update 7/15:  - bilat LE US neg  - c/w IV dilaudid 2mg q2h PRN for severe pain   - schedule tordol  - add suppository     #HbSS w/ pain crisis   - OARRS reviewed and Hydromorphone 4 mg refilled on 7/08/25 for 10 days (40T) and Lyrica 25 mg  refilled on 7/8 for 30 days (90T)  - hgb 7.3 (7/11)--> hgb 6.2 (7/12), will hold off on transfusion for now given significant hx of transfusion-related iron overload, continue to closely monitor  - 7/13 hgb 5.8, s/p 1u pRBC due to increased pain and worsening lysis labs  - 7/14 hgb incremented appropriately to 6.7  - acute on chronic leukocytosis noted on admit WBC 16.9, likely 2/2 crisis, pt denies recent exposure to sick contacts, respiratory symptoms or fever/chills at home  -  on admit (-300) (7/12)  - tbili 3.7 on admit (BL 1.5-2.5) (7/12)  - ESR 4/CRP 0.67 (7/12)  - CXR deferred on admit; pt denies new respiratory s/s, SOB or CP  - XR R foot and ankle (7/12) showed no fracture or  malalignment and unchanged sclerosis of the distal tibia corresponding to known osteonecrosis  - XR R shoulder (7/12) showed no fracture or malalignment  - US of bilateral lower extremity ordered (7/11) negative  - EKG on admit (7/11) NSR  - c/w IV Dilaudid 2mg q2h PRN for severe pain (7/12- )  - continue home Hydroxyurea 1500mg M/W/F and 1000mg T/Th/Sat/Sun   - continue home Folic acid    - continue home Tizanidine 2mg q8hrs scheduled, Tylenol 650mg q6hrs PRN mild pain, and Folic acid 1mg daily   - continue home Methadone 10 mg BID and Lyrica 25mg BID  - start Voltaren gel 4x/day and capsaicin cream 4x/day  - Toradol held i/s/o JOSE JUAN on admission, restarted toradol q6h PRN for mod pain 7/13 since JOSE JUAN resolved  - hgb S 80%  - utox + cannabinoid (7/12)  - Bowel regimen for opioid-induced constipation with DocuSenna 2tabs BID and Miralax daily  - PO Benadryl PRN for opioid-induced pruritus, PO Compazine PRN for opioid-induced nausea  - continue home noc O2 (2-3L) for known nocturnal hypoxemia      #JOSE JUAN - resolved  - likely 2/2 dehydration  - sCr 1.12 on admit (BL ~0.90) --> sCr 0.96 (7/14) --> 1.01 (7/15)  - s/p D5-1/2NS @ 75 ml/hr x 1 day (7/12)  - monitor CMP daily     #Transfusion-mediated iron overload, chronic   - patient did not bring her cadd vicente pump   - ferritin 2117 (7/12)  - continue Deferoxamine 2g daily IV infused over 8 hours       #Hx of ingrown toe nails    - reports having a procedure on 4/9 and healed now     #Nocturnal Hypoxia  - cont night 2-3 L supplemental O2 (prefers ayesha mask)     #NICHOL  #MDD  - cont home Trazodone 100mg PRN     Prophy  - Lovenox subcutaneous, encourage SCDs and ambulation as able      DISPO  - Code Status: Full Code  - NOK: Penelope Bain (mother) #[phone]   - FUV: 7/25 w/ OBGYN, 8/18 w/ sickle cell, 5/29/26 w/ Primary Care     I spent 60 minutes in the professional and overall care of this patient.     Assessment and plan as above discussed with attending physician   [place]       Giles Oro, APRN-CNP

## 2025-07-16 NOTE — PROGRESS NOTES
"Mary Alice Aragon is a 45 y.o. female on day 4 of admission presenting with Sickle cell pain crisis (Multi).    Subjective   Mary Alice feels better this morning and is agreeable to switch to orals after one dose of IV this morning. Pain remains to her legs. She moved her bowels after agreeing to a suppository yesterday. Denies chest pain, SOB, N/V/D/C, fever, chills.  We discussed discharge tomorrow morning. Daughter at bedside.    Objective     Physical Exam  Vitals reviewed.   HENT:      Nose: Nose normal.     Eyes:      Pupils: Pupils are equal, round, and reactive to light.       Cardiovascular:      Rate and Rhythm: Normal rate.   Pulmonary:      Effort: Pulmonary effort is normal.      Breath sounds: Normal breath sounds.      Comments: Right chest wall mediport  Abdominal:      General: Abdomen is flat. Bowel sounds are normal.     Musculoskeletal:         General: Normal range of motion.      Cervical back: Normal range of motion.     Skin:     General: Skin is warm and dry.     Neurological:      General: No focal deficit present.      Mental Status: She is alert and oriented to person, place, and time.     Psychiatric:         Mood and Affect: Mood normal.       Last Recorded Vitals  Blood pressure 127/76, pulse 104, temperature 36.9 °C (98.4 °F), temperature source Temporal, resp. rate 16, height 1.6 m (5' 3\"), weight 68 kg (150 lb), SpO2 96%.  Intake/Output last 3 Shifts:  I/O last 3 completed shifts:  In: 860 (12.6 mL/kg) [P.O.:260; IV Piggyback:600]  Out: - (0 mL/kg)   Weight: 68 kg     Relevant Results  No results found.    This patient has a central line   Reason for the central line remaining today? Hemodynamic monitoring    Assessment & Plan    Mary Alice Aragon is a 45 y.o. female with HbSS c/b transfusion-related iron overload, cerebral aneurysm, NICHOL/MDD that presents with sickle cell pain crisis with pain in R shoulder, BL legs with severe R ankle and foot pain consistent with usual sickle cell pain. " She reports the pain began to increase about a week ago, and has been taking her home pain medication without relief. CXR deferred on admit; pt denies new respiratory s/s, SOB or CP. Leukocytosis noted and lysis labs elevated on admit. Pt hgb also downtrending; hgb 7.3 to 6.2 (7/12). XR R foot and ankle (7/12) showed no fracture or malalignment and unchanged sclerosis of the distal tibia corresponding to known osteonecrosis. XR R shoulder (7/12) showed no fracture or malalignment. Started on IV Dilaudid per care path. Admitted for pain control.      Update 7/16:  - Switch to orals (home regimen) with IV as backup if needed for breakthrough     #HbSS w/ pain crisis   - OARRS reviewed and Hydromorphone 4 mg refilled on 7/08/25 for 10 days (40T) and Lyrica 25 mg  refilled on 7/8 for 30 days (90T)  - hgb 7.3 (7/11)--> hgb 6.2 (7/12), will hold off on transfusion for now given significant hx of transfusion-related iron overload, continue to closely monitor  - 7/13 hgb 5.8, s/p 1u pRBC due to increased pain and worsening lysis labs  - 7/14 hgb incremented appropriately to 6.7  - acute on chronic leukocytosis noted on admit WBC 16.9, likely 2/2 crisis, pt denies recent exposure to sick contacts, respiratory symptoms or fever/chills at home  -  on admit (-300) (7/12)  - tbili 3.7 on admit (BL 1.5-2.5) (7/12)  - ESR 4/CRP 0.67 (7/12)  - CXR deferred on admit; pt denies new respiratory s/s, SOB or CP  - XR R foot and ankle (7/12) showed no fracture or malalignment and unchanged sclerosis of the distal tibia corresponding to known osteonecrosis  - XR R shoulder (7/12) showed no fracture or malalignment  - US of bilateral lower extremity ordered (7/11) negative  - EKG on admit (7/11) NSR  - discontinue IV Dilaudid 2mg q2h PRN for severe pain (7/12- 7/16)  - start oral home regimen (dilaudid 4mg PO)  - continue home Hydroxyurea 1500mg M/W/F and 1000mg T/Th/Sat/Sun   - continue home Folic acid    - continue home  Tizanidine 2mg q8hrs scheduled, Tylenol 650mg q6hrs PRN mild pain, and Folic acid 1mg daily   - continue home Methadone 10 mg BID and Lyrica 25mg BID  - c/w Voltaren gel 4x/day and capsaicin cream 4x/day  - Toradol held i/s/o JOSE JUAN on admission, restarted toradol q6h PRN for mod pain 7/13 since JOSE JUAN resolved, scheduled it on 7/15 as patient was not getting   - hgb S 80%  - utox + cannabinoid (7/12)  - Bowel regimen for opioid-induced constipation with DocuSenna 2tabs BID and Miralax daily, added suppository  - PO Benadryl PRN for opioid-induced pruritus, PO Compazine PRN for opioid-induced nausea  - continue home noc O2 (2-3L) for known nocturnal hypoxemia      #JOSE JUAN - resolved  - likely 2/2 dehydration  - sCr 1.12 on admit (BL ~0.90) , now downtrending  - s/p D5-1/2NS @ 75 ml/hr x 1 day (7/12)     #Transfusion-mediated iron overload, chronic   - patient did not bring her cadd vicente pump   - ferritin 2117 (7/12)  - continue Deferoxamine 2g daily IV infused over 8 hours       #Hx of ingrown toe nails    - reports having a procedure on 4/9 and healed now     #Nocturnal Hypoxia  - cont night 2-3 L supplemental O2 (prefers ayesha mask)     #NICHOL  #MDD  - cont home Trazodone 100mg PRN     Prophy  - Lovenox subcutaneous, encourage SCDs and ambulation as able      DISPO  - Code Status: Full Code  - NOK: Penelope Bain (mother) #[phone]   - FUV: 7/25 w/ OBGYN, 8/18 w/ sickle cell, 5/29/26 w/ Primary Care     I spent 60 minutes in the professional and overall care of this patient.     Assessment and plan as above discussed with attending physician Dr. Ca Oro, APRN-CNP

## 2025-07-16 NOTE — CARE PLAN
The patient's goals for the shift include      The clinical goals for the shift include pt will ate pian <7 and be HDS

## 2025-07-17 ENCOUNTER — HOME INFUSION (OUTPATIENT)
Dept: INFUSION THERAPY | Age: 45
End: 2025-07-17
Payer: COMMERCIAL

## 2025-07-17 VITALS
SYSTOLIC BLOOD PRESSURE: 123 MMHG | BODY MASS INDEX: 26.58 KG/M2 | TEMPERATURE: 98.6 F | WEIGHT: 150 LBS | OXYGEN SATURATION: 95 % | RESPIRATION RATE: 16 BRPM | HEIGHT: 63 IN | HEART RATE: 93 BPM | DIASTOLIC BLOOD PRESSURE: 73 MMHG

## 2025-07-17 LAB
ALBUMIN SERPL BCP-MCNC: 3.9 G/DL (ref 3.4–5)
ALP SERPL-CCNC: 53 U/L (ref 33–110)
ALT SERPL W P-5'-P-CCNC: 15 U/L (ref 7–45)
ANION GAP SERPL CALC-SCNC: 9 MMOL/L (ref 10–20)
AST SERPL W P-5'-P-CCNC: 21 U/L (ref 9–39)
BASOPHILS # BLD AUTO: 0.05 X10*3/UL (ref 0–0.1)
BASOPHILS NFR BLD AUTO: 0.4 %
BILIRUB SERPL-MCNC: 1.5 MG/DL (ref 0–1.2)
BUN SERPL-MCNC: 18 MG/DL (ref 6–23)
CALCIUM SERPL-MCNC: 8.5 MG/DL (ref 8.6–10.6)
CARBOXYTHC UR-MCNC: 36 NG/ML
CHLORIDE SERPL-SCNC: 109 MMOL/L (ref 98–107)
CO2 SERPL-SCNC: 26 MMOL/L (ref 21–32)
CREAT SERPL-MCNC: 1.27 MG/DL (ref 0.5–1.05)
EGFRCR SERPLBLD CKD-EPI 2021: 54 ML/MIN/1.73M*2
EOSINOPHIL # BLD AUTO: 0.23 X10*3/UL (ref 0–0.7)
EOSINOPHIL NFR BLD AUTO: 1.9 %
ERYTHROCYTE [DISTWIDTH] IN BLOOD BY AUTOMATED COUNT: 19.3 % (ref 11.5–14.5)
GLUCOSE SERPL-MCNC: 89 MG/DL (ref 74–99)
HCT VFR BLD AUTO: 18.2 % (ref 36–46)
HGB BLD-MCNC: 6.3 G/DL (ref 12–16)
HGB RETIC QN: 35 PG (ref 28–38)
IMM GRANULOCYTES # BLD AUTO: 0.07 X10*3/UL (ref 0–0.7)
IMM GRANULOCYTES NFR BLD AUTO: 0.6 % (ref 0–0.9)
IMMATURE RETIC FRACTION: 26.5 %
LDH SERPL L TO P-CCNC: 256 U/L (ref 84–246)
LYMPHOCYTES # BLD AUTO: 4.26 X10*3/UL (ref 1.2–4.8)
LYMPHOCYTES NFR BLD AUTO: 35.3 %
MCH RBC QN AUTO: 32.3 PG (ref 26–34)
MCHC RBC AUTO-ENTMCNC: 34.6 G/DL (ref 32–36)
MCV RBC AUTO: 93 FL (ref 80–100)
MONOCYTES # BLD AUTO: 0.7 X10*3/UL (ref 0.1–1)
MONOCYTES NFR BLD AUTO: 5.8 %
NEUTROPHILS # BLD AUTO: 6.76 X10*3/UL (ref 1.2–7.7)
NEUTROPHILS NFR BLD AUTO: 56 %
NRBC BLD-RTO: 2 /100 WBCS (ref 0–0)
PLATELET # BLD AUTO: 416 X10*3/UL (ref 150–450)
POTASSIUM SERPL-SCNC: 4.2 MMOL/L (ref 3.5–5.3)
PROT SERPL-MCNC: 6.7 G/DL (ref 6.4–8.2)
RBC # BLD AUTO: 1.95 X10*6/UL (ref 4–5.2)
RETICS #: 0.2 X10*6/UL (ref 0.02–0.08)
RETICS/RBC NFR AUTO: 10.3 % (ref 0.5–2)
SODIUM SERPL-SCNC: 140 MMOL/L (ref 136–145)
WBC # BLD AUTO: 12.1 X10*3/UL (ref 4.4–11.3)

## 2025-07-17 PROCEDURE — 2500000004 HC RX 250 GENERAL PHARMACY W/ HCPCS (ALT 636 FOR OP/ED)

## 2025-07-17 PROCEDURE — 85045 AUTOMATED RETICULOCYTE COUNT: CPT

## 2025-07-17 PROCEDURE — S0109 METHADONE ORAL 5MG: HCPCS

## 2025-07-17 PROCEDURE — 2500000004 HC RX 250 GENERAL PHARMACY W/ HCPCS (ALT 636 FOR OP/ED): Mod: JZ,TB | Performed by: NURSE PRACTITIONER

## 2025-07-17 PROCEDURE — S4993 CONTRACEPTIVE PILLS FOR BC: HCPCS

## 2025-07-17 PROCEDURE — 2500000002 HC RX 250 W HCPCS SELF ADMINISTERED DRUGS (ALT 637 FOR MEDICARE OP, ALT 636 FOR OP/ED)

## 2025-07-17 PROCEDURE — 2500000001 HC RX 250 WO HCPCS SELF ADMINISTERED DRUGS (ALT 637 FOR MEDICARE OP): Performed by: NURSE PRACTITIONER

## 2025-07-17 PROCEDURE — 83615 LACTATE (LD) (LDH) ENZYME: CPT

## 2025-07-17 PROCEDURE — 85025 COMPLETE CBC W/AUTO DIFF WBC: CPT

## 2025-07-17 PROCEDURE — 2500000001 HC RX 250 WO HCPCS SELF ADMINISTERED DRUGS (ALT 637 FOR MEDICARE OP)

## 2025-07-17 PROCEDURE — 99239 HOSP IP/OBS DSCHRG MGMT >30: CPT

## 2025-07-17 PROCEDURE — 2500000005 HC RX 250 GENERAL PHARMACY W/O HCPCS

## 2025-07-17 PROCEDURE — 84075 ASSAY ALKALINE PHOSPHATASE: CPT

## 2025-07-17 RX ORDER — HEPARIN 100 UNIT/ML
5 SYRINGE INTRAVENOUS AS NEEDED
Status: DISCONTINUED | OUTPATIENT
Start: 2025-07-17 | End: 2025-07-17 | Stop reason: HOSPADM

## 2025-07-17 RX ORDER — DEFEROXAMINE MESYLATE 2 G/1
2000 INJECTION, POWDER, LYOPHILIZED, FOR SOLUTION INTRAMUSCULAR; INTRAVENOUS; SUBCUTANEOUS DAILY
Qty: 30 EACH | Refills: 11 | Status: SHIPPED | OUTPATIENT
Start: 2025-07-17 | End: 2025-08-16

## 2025-07-17 RX ADMIN — DICLOFENAC SODIUM 4 G: 10 GEL TOPICAL at 14:10

## 2025-07-17 RX ADMIN — ACETAMINOPHEN 975 MG: 325 TABLET ORAL at 01:17

## 2025-07-17 RX ADMIN — HYDROMORPHONE HYDROCHLORIDE 4 MG: 4 TABLET ORAL at 14:08

## 2025-07-17 RX ADMIN — NORETHINDRONE ACETATE 5 MG: 5 TABLET ORAL at 08:40

## 2025-07-17 RX ADMIN — DIPHENHYDRAMINE HYDROCHLORIDE 25 MG: 25 CAPSULE ORAL at 01:18

## 2025-07-17 RX ADMIN — HYDROMORPHONE HYDROCHLORIDE 4 MG: 4 TABLET ORAL at 10:37

## 2025-07-17 RX ADMIN — HYDROMORPHONE HYDROCHLORIDE 2 MG: 2 INJECTION, SOLUTION INTRAMUSCULAR; INTRAVENOUS; SUBCUTANEOUS at 03:11

## 2025-07-17 RX ADMIN — OXYCODONE HYDROCHLORIDE AND ACETAMINOPHEN 1000 MG: 500 TABLET ORAL at 08:39

## 2025-07-17 RX ADMIN — METHADONE HYDROCHLORIDE 10 MG: 10 TABLET ORAL at 08:40

## 2025-07-17 RX ADMIN — BENZOCAINE AND MENTHOL 1 LOZENGE: 15; 3.6 LOZENGE ORAL at 01:17

## 2025-07-17 RX ADMIN — HYDROMORPHONE HYDROCHLORIDE 4 MG: 4 TABLET ORAL at 18:00

## 2025-07-17 RX ADMIN — SODIUM CHLORIDE, SODIUM LACTATE, POTASSIUM CHLORIDE, AND CALCIUM CHLORIDE 500 ML: .6; .31; .03; .02 INJECTION, SOLUTION INTRAVENOUS at 08:39

## 2025-07-17 RX ADMIN — DICLOFENAC SODIUM 4 G: 10 GEL TOPICAL at 18:04

## 2025-07-17 RX ADMIN — HYDROXYUREA 1000 MG: 500 CAPSULE ORAL at 08:40

## 2025-07-17 RX ADMIN — FOLIC ACID 1 MG: 1 TABLET ORAL at 08:39

## 2025-07-17 RX ADMIN — DICLOFENAC SODIUM 4 G: 10 GEL TOPICAL at 06:05

## 2025-07-17 RX ADMIN — CETIRIZINE HYDROCHLORIDE 10 MG: 10 TABLET, FILM COATED ORAL at 08:40

## 2025-07-17 RX ADMIN — DEFEROXAMINE MESYLATE 2000 MG: 95 INJECTION, POWDER, LYOPHILIZED, FOR SOLUTION INTRAMUSCULAR; INTRAVENOUS; SUBCUTANEOUS at 10:52

## 2025-07-17 RX ADMIN — PANTOPRAZOLE SODIUM 20 MG: 20 TABLET, DELAYED RELEASE ORAL at 06:05

## 2025-07-17 RX ADMIN — PREGABALIN 25 MG: 25 CAPSULE ORAL at 08:40

## 2025-07-17 RX ADMIN — HYDROMORPHONE HYDROCHLORIDE 4 MG: 4 TABLET ORAL at 01:17

## 2025-07-17 RX ADMIN — PREGABALIN 25 MG: 25 CAPSULE ORAL at 18:00

## 2025-07-17 RX ADMIN — CARBOXYMETHYLCELLULOSE SODIUM 1 DROP: 5 SOLUTION/ DROPS OPHTHALMIC at 01:21

## 2025-07-17 RX ADMIN — VALACYCLOVIR HYDROCHLORIDE 500 MG: 500 TABLET, FILM COATED ORAL at 08:40

## 2025-07-17 RX ADMIN — LIDOCAINE 1 PATCH: 4 PATCH TOPICAL at 08:39

## 2025-07-17 RX ADMIN — ENOXAPARIN SODIUM 40 MG: 100 INJECTION SUBCUTANEOUS at 08:39

## 2025-07-17 RX ADMIN — KETOROLAC TROMETHAMINE 15 MG: 15 INJECTION, SOLUTION INTRAMUSCULAR; INTRAVENOUS at 03:12

## 2025-07-17 RX ADMIN — HYDROMORPHONE HYDROCHLORIDE 4 MG: 4 TABLET ORAL at 06:04

## 2025-07-17 ASSESSMENT — PAIN - FUNCTIONAL ASSESSMENT
PAIN_FUNCTIONAL_ASSESSMENT: 0-10
PAIN_FUNCTIONAL_ASSESSMENT: UNABLE TO SELF-REPORT
PAIN_FUNCTIONAL_ASSESSMENT: 0-10
PAIN_FUNCTIONAL_ASSESSMENT: UNABLE TO SELF-REPORT
PAIN_FUNCTIONAL_ASSESSMENT: 0-10

## 2025-07-17 ASSESSMENT — PAIN SCALES - GENERAL
PAINLEVEL_OUTOF10: 8
PAINLEVEL_OUTOF10: 8
PAINLEVEL_OUTOF10: 9
PAINLEVEL_OUTOF10: 8
PAINLEVEL_OUTOF10: 9
PAINLEVEL_OUTOF10: 9
PAINLEVEL_OUTOF10: 8

## 2025-07-17 ASSESSMENT — PAIN DESCRIPTION - LOCATION: LOCATION: BACK

## 2025-07-17 NOTE — PROGRESS NOTES
Review of chart. Patient with order for deferoxamine subcutaneous infusions over 8 hours daily for transfusional iron overload. Orders valid through 7/10/26. No labs are ordered, nursing not involved.     Patient had to be rushed into ED before last delivery could be accepted. She is not sure of the status of that package - it may still be on her porch. She will be discharging today after her desferal infusion is done - thinks she will be home around 7-8pm. Agreeable to delivery of medications and all supplies to match (in case previous delivery is no longer there) by 12pm tomorrow. No questions for RP at this time. She will discard last delivery's medication if its still there.     Pharmacy to mix 7/17 and deliver OVN by noon with supplies to match:  7x deferoxamine bags (one off ice)  DOS 7/18 - 7/24     Follow up 7/23 - check needs, deliver OVN     *REMINDER: DON'T send a delivery if not able to confirm with patient (Per patient request)*

## 2025-07-17 NOTE — DISCHARGE SUMMARY
Discharge Diagnosis  Sickle cell pain crisis (Multi)      Test Results Pending At Discharge  Pending Labs       Order Current Status    THC (Marijuana), Urine, Confirmation In process            Hospital Course  Mary Alice Aragon is a 45 y.o. female with HbSS c/b transfusion-related iron overload, cerebral aneurysm, NICHOL/MDD that presents with sickle cell pain crisis with pain in R shoulder, BL legs with severe R ankle and foot pain consistent with usual sickle cell pain. She reports the pain began to increase about a week ago, and has been taking her home pain medication without relief. CXR deferred on admit; pt denies new respiratory s/s, SOB or CP. Leukocytosis noted and lysis labs elevated on admit. XR R foot and ankle (7/12) showed no fracture or malalignment and unchanged sclerosis of the distal tibia corresponding to known osteonecrosis. XR R shoulder (7/12) showed no fracture or malalignment. Started on IV Dilaudid PRN per care path, transitioned to home PRN regimen 7/16 with IV breakthrough doses PRN. 7/13 hgb 5.8, s/p 1u pRBC and hgb incremented appropriately. 7/17 sCr 1.27, prerenal likely due to toradol use. S/p 500cc IV LR once. Per attending okay to dc with outpt CMP ordered for monitoring of JOSE JUAN. Pt states her pain is improved and would like to dc home today. Pt educated on the importance of taking home medications as prescribed and attending otpt appointments. FUV listed below. Rx of home desferal sent to specialty pharmacy per patient.      Visit Vitals  /67 (BP Location: Right arm, Patient Position: Lying)   Pulse 94   Temp 37 °C (98.6 °F) (Temporal)   Resp 17     Vitals:    07/11/25 1853   Weight: 68 kg (150 lb)       Immunization History   Administered Date(s) Administered    Flu vaccine (IIV4), preservative free *Check age/dose* 10/19/2017, 10/11/2018, 09/10/2019, 09/29/2020, 09/09/2021, 12/06/2023    Flu vaccine, trivalent, preservative free, age 6 months and greater  (Fluarix/Fluzone/Flulaval) 10/16/2013, 10/08/2015, 10/25/2024    Influenza, Unspecified 11/07/2016    Influenza, seasonal, injectable 09/23/2014, 10/08/2015, 11/07/2016, 10/19/2017, 09/10/2019    Meningococcal ACWY-D (Menactra) 4-valent conjugate vaccine 10/16/2013, 08/12/2014    PPD Test 05/08/2015, 05/10/2016, 05/19/2017, 01/15/2020, 02/10/2021, 06/08/2021, 06/13/2022, 06/08/2023, 06/08/2023, 12/06/2023, 12/17/2023, 06/04/2024    Pfizer Gray Cap SARS-CoV-2 05/07/2022    Pfizer Purple Cap SARS-CoV-2 03/17/2021, 04/07/2021    Pneumococcal polysaccharide vaccine, 23-valent, age 2 years and older (PNEUMOVAX 23) 11/17/2004, 01/28/2021    Tdap vaccine, age 7 year and older (BOOSTRIX, ADACEL) 02/27/2012, 03/20/2017    Varicella vaccine, subcutaneous (VARIVAX) 01/02/2009       Results  No results found.      Pertinent Physical Exam At Time of Discharge  On the day of discharge, the patient reported feeling well and pain was controlled. Vitals and labs were stable. Pt denies chest pain, SOB, N/V/D/C, fever, chills. ROS: A complete review of systems was performed and is negative except for as mentioned above in the HP   On exam:  Constitutional: Awake, NAD  ENMT: mucous membranes moist, no apparent injury, no lesions seen  Head/Neck: NCAT  Respiratory/Thorax: CTAB, no wheezing  Cardiovascular: RRR, S1+S2, no murmur  Gastrointestinal: Soft, NT, nondistended, +BS  Extremities: No LE edema  Psychological: Appropriate mood and behavior  Skin: no rash noted    30 minutes was spent on the discharge and planning of this patient.    Assessment and plan as above discussed with attending physician Dr. Penaloza    Home Medications     Medication List      CHANGE how you take these medications     melatonin 5 mg tablet     CONTINUE taking these medications     Aspercreme 10 % cream; Generic drug: trolamine salicylate   deferoxamine 2 gram injection; Commonly known as: Desferal; Infuse 2 g   (2,000 mg) into a venous catheter once daily.  Infuse 2000mg subcutaneously   once daily over 8 hours via [place] Pump   diclofenac sodium 1 % gel; Commonly known as: Voltaren; Per instructions   4 TIMES DAILY (route: topical)   diphenhydrAMINE 25 mg capsule; Commonly known as: BENADryl   docusate sodium 100 mg capsule; Commonly known as: Colace   DRY EYE RELIEF OPHT   folic acid 1 mg tablet; Commonly known as: Folvite   HYDROmorphone 4 mg tablet; Commonly known as: Dilaudid; Take 1 tablet (4   mg) by mouth every 6 hours if needed for severe pain (7 - 10).   * hydroxyurea 500 mg capsule; Commonly known as: Hydrea   * hydroxyurea 500 mg capsule; Commonly known as: Hydrea; Take 3 capsules   by mouth daily on Monday Wednesday and Friday and take 2 capsules daily on   Tuesday, Thursday, Saturday and Sunday; (Per instructions AS DIRECTED   (route: oral))   ketamine 10 mg/mL injection; Commonly known as: Ketalar   lidocaine 5 % patch; Commonly known as: Lidoderm   loratadine 10 mg tablet; Commonly known as: Claritin   meloxicam 15 mg tablet; Commonly known as: Mobic; Take 1 tablet (15 mg)   by mouth once daily.   methadone 10 mg tablet; Commonly known as: Dolophine; Take 1 tablet   (10mg) by mouth every 12 hours; (Take 1 tablet (10 mg) by mouth every 12   hours.)   multivitamin tablet   naloxone 4 mg/0.1 mL nasal spray; Commonly known as: Narcan; Administer   1 spray (4 mg) into affected nostril(s) if needed for opioid reversal or   respiratory depression. May repeat every 2-3 minutes if needed,   alternating nostrils, until medical assistance becomes available.   norethindrone 5 mg tablet; Commonly known as: Aygestin; Take 1 tablet (5   mg) by mouth once daily.   oxygen gas therapy; Commonly known as: O2   oxygen gas therapy; Commonly known as: O2; Inhale 1 each continuously.   pregabalin 25 mg capsule; Commonly known as: Lyrica; Take 1 capsule (25   mg) by mouth 3 times a day.   prochlorperazine 10 mg tablet; Commonly known as: Compazine; Take 1   tablet (10 mg)  by mouth every 8 hours if needed for nausea or vomiting. 1   tablet EVERY 8 HOURS (route: oral)   senna 8.6 mg tablet; Generic drug: sennosides; Take 1 tablet (8.6 mg) by   mouth 2 times a day as needed for constipation.   tiZANidine 2 mg tablet; Commonly known as: Zanaflex; Take 1 tablet (2   mg) by mouth every 8 hours if needed for muscle spasms.   traZODone 100 mg tablet; Commonly known as: Desyrel; TAKE 1 TO 2   TABLETS(100  MG) BY MOUTH AT BEDTIME AS NEEDED FOR SLEEP please   schedule FUV   valACYclovir 500 mg tablet; Commonly known as: Valtrex   Vitamin C 500 mg tablet; Generic drug: ascorbic acid  * This list has 2 medication(s) that are the same as other medications   prescribed for you. Read the directions carefully, and ask your doctor or   other care provider to review them with you.     STOP taking these medications     capsaicin 0.025 % cream; Commonly known as: Zostrix       Outpatient Follow-Up  Future Appointments   Date Time Provider Department Center   7/25/2025 11:15 AM Mary Ann Bolanos MD [id number] [facility]   8/18/2025 11:30 AM Gio Saleem MD [id number] [facility]   5/29/2026 10:00 AM Nyasia Mcdaniel MD [id number] [facility]       Reena Estes PA-C

## 2025-07-17 NOTE — DISCHARGE INSTRUCTIONS
For pain medication refills please call the sickle cell prescription line at [phone].  For scheduling follow up with sickle cell team call [phone].  If you feel your pain is uncontrolled at home, please call the sickle cell acute care clinic ( if you qualify)  Monday-Friday, 8a-2p at [phone].

## 2025-07-25 ENCOUNTER — HOME INFUSION (OUTPATIENT)
Dept: INFUSION THERAPY | Age: 45
End: 2025-07-25

## 2025-07-25 ENCOUNTER — APPOINTMENT (OUTPATIENT)
Dept: OBSTETRICS AND GYNECOLOGY | Facility: CLINIC | Age: 45
End: 2025-07-25
Payer: COMMERCIAL

## 2025-07-25 NOTE — PROGRESS NOTES
Message left for patient on  and  regarding next deferoxamine delivery. Per previous note, patient requests that we do not send a delivery unless we are able to confirm with patient.   Asked patient to call back today if delivery needed for weekend. Otherwise we would check back one more time next week to see if delivery is needed.   Any doses left in the home will  after 25. Last delivery of 7 x deferoxamine bags was sent on  meant to cover  thru 25.

## 2025-07-30 ENCOUNTER — TELEPHONE (OUTPATIENT)
Dept: ADMISSION | Facility: HOSPITAL | Age: 45
End: 2025-07-30
Payer: COMMERCIAL

## 2025-07-30 DIAGNOSIS — G60.9 IDIOPATHIC PERIPHERAL NEUROPATHY: ICD-10-CM

## 2025-07-30 DIAGNOSIS — D57.00 SICKLE CELL DISEASE WITH CRISIS (MULTI): ICD-10-CM

## 2025-07-30 RX ORDER — HYDROMORPHONE HYDROCHLORIDE 4 MG/1
4 TABLET ORAL EVERY 6 HOURS PRN
Qty: 40 TABLET | Refills: 0 | Status: SHIPPED | OUTPATIENT
Start: 2025-07-30

## 2025-07-30 RX ORDER — PREGABALIN 25 MG/1
25 CAPSULE ORAL 3 TIMES DAILY
Qty: 90 CAPSULE | Refills: 0 | Status: SHIPPED | OUTPATIENT
Start: 2025-07-30 | End: 2025-08-29

## 2025-07-30 RX ORDER — TIZANIDINE 2 MG/1
2 TABLET ORAL EVERY 8 HOURS PRN
Qty: 90 TABLET | Refills: 0 | Status: SHIPPED | OUTPATIENT
Start: 2025-07-30 | End: 2025-08-29

## 2025-07-30 NOTE — TELEPHONE ENCOUNTER
Pt called the refill line to request the following medications:  tiZANidine (Zanaflex) 2 mg tablet  pregabalin (Lyrica) 25 mg capsule  HYDROmorphone (Dilaudid) 4 mg tablet    Preferred pharmacy is [facility]

## 2025-08-11 ENCOUNTER — TELEPHONE (OUTPATIENT)
Dept: ADMISSION | Facility: HOSPITAL | Age: 45
End: 2025-08-11
Payer: COMMERCIAL

## 2025-08-25 ENCOUNTER — TELEPHONE (OUTPATIENT)
Dept: ADMISSION | Facility: HOSPITAL | Age: 45
End: 2025-08-25
Payer: COMMERCIAL

## 2025-08-25 DIAGNOSIS — D57.00 SICKLE CELL DISEASE WITH CRISIS (MULTI): ICD-10-CM

## 2025-08-26 DIAGNOSIS — D57.00 SICKLE CELL DISEASE WITH CRISIS (MULTI): Primary | ICD-10-CM

## 2025-08-26 RX ORDER — HYDROMORPHONE HYDROCHLORIDE 4 MG/1
4 TABLET ORAL EVERY 6 HOURS PRN
Qty: 40 TABLET | Refills: 0 | Status: SHIPPED | OUTPATIENT
Start: 2025-08-26

## 2025-08-26 RX ORDER — IBUPROFEN 600 MG/1
600 TABLET, FILM COATED ORAL EVERY 8 HOURS PRN
Qty: 45 TABLET | Refills: 3 | Status: SHIPPED | OUTPATIENT
Start: 2025-08-26 | End: 2025-09-05

## 2025-09-04 ENCOUNTER — LAB (OUTPATIENT)
Dept: HEMATOLOGY/ONCOLOGY | Facility: HOSPITAL | Age: 45
End: 2025-09-04
Payer: COMMERCIAL

## 2025-09-04 DIAGNOSIS — D57.1 SICKLE CELL DISEASE WITHOUT CRISIS (MULTI): ICD-10-CM

## 2025-09-04 DIAGNOSIS — D57.00 SICKLE CELL DISEASE WITH CRISIS (MULTI): ICD-10-CM

## 2025-09-04 LAB
ABO GROUP (TYPE) IN BLOOD: NORMAL
ALBUMIN SERPL BCP-MCNC: 4.5 G/DL (ref 3.4–5)
ALP SERPL-CCNC: 62 U/L (ref 33–110)
ALT SERPL W P-5'-P-CCNC: 10 U/L (ref 7–45)
ANION GAP SERPL CALC-SCNC: 9 MMOL/L (ref 10–20)
ANTIBODY SCREEN: NORMAL
AST SERPL W P-5'-P-CCNC: 21 U/L (ref 9–39)
BASOPHILS # BLD AUTO: 0.07 X10*3/UL (ref 0–0.1)
BASOPHILS NFR BLD AUTO: 0.5 %
BILIRUB SERPL-MCNC: 2.3 MG/DL (ref 0–1.2)
BUN SERPL-MCNC: 11 MG/DL (ref 6–23)
CA-I BLD-SCNC: 1.18 MMOL/L (ref 1.1–1.33)
CALCIUM SERPL-MCNC: 9 MG/DL (ref 8.6–10.3)
CHLORIDE SERPL-SCNC: 107 MMOL/L (ref 98–107)
CO2 SERPL-SCNC: 26 MMOL/L (ref 21–32)
CREAT SERPL-MCNC: 0.92 MG/DL (ref 0.5–1.05)
EGFRCR SERPLBLD CKD-EPI 2021: 79 ML/MIN/1.73M*2
EOSINOPHIL # BLD AUTO: 0.12 X10*3/UL (ref 0–0.7)
EOSINOPHIL NFR BLD AUTO: 0.8 %
ERYTHROCYTE [DISTWIDTH] IN BLOOD BY AUTOMATED COUNT: 16.8 % (ref 11.5–14.5)
GLUCOSE SERPL-MCNC: 97 MG/DL (ref 74–99)
HCT VFR BLD AUTO: 19.6 % (ref 36–46)
HGB BLD-MCNC: 6.8 G/DL (ref 12–16)
HGB RETIC QN: 33 PG (ref 28–38)
IMM GRANULOCYTES # BLD AUTO: 0.09 X10*3/UL (ref 0–0.7)
IMM GRANULOCYTES NFR BLD AUTO: 0.6 % (ref 0–0.9)
IMMATURE RETIC FRACTION: 39.9 %
LDH SERPL L TO P-CCNC: 257 U/L (ref 84–246)
LYMPHOCYTES # BLD AUTO: 4.46 X10*3/UL (ref 1.2–4.8)
LYMPHOCYTES NFR BLD AUTO: 31.4 %
MCH RBC QN AUTO: 31.6 PG (ref 26–34)
MCHC RBC AUTO-ENTMCNC: 34.7 G/DL (ref 32–36)
MCV RBC AUTO: 91 FL (ref 80–100)
MONOCYTES # BLD AUTO: 0.81 X10*3/UL (ref 0.1–1)
MONOCYTES NFR BLD AUTO: 5.7 %
NEUTROPHILS # BLD AUTO: 8.64 X10*3/UL (ref 1.2–7.7)
NEUTROPHILS NFR BLD AUTO: 61 %
NRBC BLD-RTO: 0.4 /100 WBCS (ref 0–0)
PLATELET # BLD AUTO: 409 X10*3/UL (ref 150–450)
POTASSIUM SERPL-SCNC: 3.7 MMOL/L (ref 3.5–5.3)
PROT SERPL-MCNC: 7.4 G/DL (ref 6.4–8.2)
RBC # BLD AUTO: 2.15 X10*6/UL (ref 4–5.2)
RETICS #: 0.22 X10*6/UL (ref 0.02–0.08)
RETICS/RBC NFR AUTO: 10.1 % (ref 0.5–2)
RH FACTOR (ANTIGEN D): NORMAL
SODIUM SERPL-SCNC: 138 MMOL/L (ref 136–145)
WBC # BLD AUTO: 14.2 X10*3/UL (ref 4.4–11.3)

## 2025-09-04 PROCEDURE — 85045 AUTOMATED RETICULOCYTE COUNT: CPT

## 2025-09-04 PROCEDURE — 2500000004 HC RX 250 GENERAL PHARMACY W/ HCPCS (ALT 636 FOR OP/ED): Performed by: NURSE PRACTITIONER

## 2025-09-04 PROCEDURE — 85025 COMPLETE CBC W/AUTO DIFF WBC: CPT

## 2025-09-04 PROCEDURE — 86850 RBC ANTIBODY SCREEN: CPT

## 2025-09-04 PROCEDURE — 82330 ASSAY OF CALCIUM: CPT

## 2025-09-04 PROCEDURE — 83021 HEMOGLOBIN CHROMOTOGRAPHY: CPT

## 2025-09-04 PROCEDURE — 83615 LACTATE (LD) (LDH) ENZYME: CPT

## 2025-09-04 PROCEDURE — 80053 COMPREHEN METABOLIC PANEL: CPT

## 2025-09-04 PROCEDURE — 36591 DRAW BLOOD OFF VENOUS DEVICE: CPT

## 2025-09-04 RX ORDER — HEPARIN 100 UNIT/ML
500 SYRINGE INTRAVENOUS AS NEEDED
Status: CANCELLED | OUTPATIENT
Start: 2025-09-04

## 2025-09-04 RX ORDER — HEPARIN 100 UNIT/ML
500 SYRINGE INTRAVENOUS AS NEEDED
OUTPATIENT
Start: 2025-09-04

## 2025-09-04 RX ORDER — HEPARIN SODIUM,PORCINE/PF 10 UNIT/ML
50 SYRINGE (ML) INTRAVENOUS AS NEEDED
OUTPATIENT
Start: 2025-09-04

## 2025-09-04 RX ORDER — HEPARIN 100 UNIT/ML
500 SYRINGE INTRAVENOUS AS NEEDED
Status: DISCONTINUED | OUTPATIENT
Start: 2025-09-04 | End: 2025-09-04 | Stop reason: HOSPADM

## 2025-09-04 RX ADMIN — HEPARIN 500 UNITS: 100 SYRINGE at 10:42

## 2025-09-05 ENCOUNTER — TELEPHONE (OUTPATIENT)
Dept: ADMISSION | Facility: HOSPITAL | Age: 45
End: 2025-09-05
Payer: COMMERCIAL

## 2025-09-05 LAB
HEMOGLOBIN A2: 3.2 % (ref 2–3.5)
HEMOGLOBIN A: 8.4 % (ref 95.8–98)
HEMOGLOBIN F: 16.4 % (ref 0–2)
HEMOGLOBIN IDENTIFICATION INTERPRETATION: ABNORMAL
HEMOGLOBIN S: 72 %
PATH REVIEW-HGB IDENTIFICATION: ABNORMAL

## (undated) DEVICE — MANIFOLD, 4 PORT NEPTUNE STANDARD

## (undated) DEVICE — ELECTRODE, INSULATED BLADE, EDGE, W/ 2.75 SLEEVE

## (undated) DEVICE — TROCAR, KII OPTICAL BLADELESS 5MM Z THREAD 100MM LNGTH

## (undated) DEVICE — TUBE, SALEM SUMP, 16 FR X 48IN, ENFIT

## (undated) DEVICE — TUBE SET, PNEUMOCLEAR, SMOKE EVACU, HIGH-FLOW

## (undated) DEVICE — RETRIEVAL SYSTEM, MONARCH, 10MM DISP ENDOSCOPIC

## (undated) DEVICE — GOWN, SURGICAL, SMARTGOWN, XLARGE, STERILE

## (undated) DEVICE — LIGASURE, SEALER/DIVIDER MARYLAND JAW, 5MM

## (undated) DEVICE — Device

## (undated) DEVICE — TROCAR SYSTEM, BALLOON, KII GELPORT, 12 X 100MM

## (undated) DEVICE — STAPLER, ECHELON 3000, 45MM STD

## (undated) DEVICE — COVER, CART, 45 X 27 X 48 IN, CLEAR

## (undated) DEVICE — DRAPE, SHEET, ENDOSCOPY, GENERAL, FENESTRATED, ARMBOARD COVER, 98 X 123.5 IN, DISPOSABLE, LF, STERILE

## (undated) DEVICE — SCOPE WARMER, LAPAROSCOPE, BAG ONLY, LF

## (undated) DEVICE — STAPLER,  ENDO ECHELON 45MM RELOAD, BLUE

## (undated) DEVICE — ANTIFOG, SOLUTION, FOG-OUT